# Patient Record
Sex: MALE | Race: WHITE | NOT HISPANIC OR LATINO | URBAN - METROPOLITAN AREA
[De-identification: names, ages, dates, MRNs, and addresses within clinical notes are randomized per-mention and may not be internally consistent; named-entity substitution may affect disease eponyms.]

---

## 2021-04-08 ENCOUNTER — EMERGENCY (EMERGENCY)
Facility: HOSPITAL | Age: 61
LOS: 1 days | Discharge: HOME | End: 2021-04-08
Attending: STUDENT IN AN ORGANIZED HEALTH CARE EDUCATION/TRAINING PROGRAM | Admitting: STUDENT IN AN ORGANIZED HEALTH CARE EDUCATION/TRAINING PROGRAM
Payer: COMMERCIAL

## 2021-04-08 VITALS
OXYGEN SATURATION: 98 % | RESPIRATION RATE: 18 BRPM | DIASTOLIC BLOOD PRESSURE: 88 MMHG | SYSTOLIC BLOOD PRESSURE: 149 MMHG | TEMPERATURE: 97 F | HEART RATE: 65 BPM

## 2021-04-08 VITALS
OXYGEN SATURATION: 97 % | RESPIRATION RATE: 18 BRPM | WEIGHT: 218.04 LBS | HEART RATE: 82 BPM | HEIGHT: 67 IN | TEMPERATURE: 98 F | SYSTOLIC BLOOD PRESSURE: 151 MMHG | DIASTOLIC BLOOD PRESSURE: 92 MMHG

## 2021-04-08 DIAGNOSIS — Z98.89 OTHER SPECIFIED POSTPROCEDURAL STATES: Chronic | ICD-10-CM

## 2021-04-08 DIAGNOSIS — Z90.49 ACQUIRED ABSENCE OF OTHER SPECIFIED PARTS OF DIGESTIVE TRACT: Chronic | ICD-10-CM

## 2021-04-08 LAB
ALBUMIN SERPL ELPH-MCNC: 3.5 G/DL — SIGNIFICANT CHANGE UP (ref 3.3–5)
ALP SERPL-CCNC: 79 U/L — SIGNIFICANT CHANGE UP (ref 40–120)
ALT FLD-CCNC: 31 U/L — SIGNIFICANT CHANGE UP (ref 12–78)
ANION GAP SERPL CALC-SCNC: 6 MMOL/L — SIGNIFICANT CHANGE UP (ref 5–17)
APTT BLD: 32.7 SEC — SIGNIFICANT CHANGE UP (ref 27.5–35.5)
AST SERPL-CCNC: 54 U/L — HIGH (ref 15–37)
BASOPHILS # BLD AUTO: 0.04 K/UL — SIGNIFICANT CHANGE UP (ref 0–0.2)
BASOPHILS NFR BLD AUTO: 0.4 % — SIGNIFICANT CHANGE UP (ref 0–2)
BILIRUB SERPL-MCNC: 0.6 MG/DL — SIGNIFICANT CHANGE UP (ref 0.2–1.2)
BUN SERPL-MCNC: 13 MG/DL — SIGNIFICANT CHANGE UP (ref 7–23)
CALCIUM SERPL-MCNC: 8.5 MG/DL — SIGNIFICANT CHANGE UP (ref 8.5–10.1)
CHLORIDE SERPL-SCNC: 109 MMOL/L — HIGH (ref 96–108)
CO2 SERPL-SCNC: 28 MMOL/L — SIGNIFICANT CHANGE UP (ref 22–31)
CREAT SERPL-MCNC: 0.78 MG/DL — SIGNIFICANT CHANGE UP (ref 0.5–1.3)
EOSINOPHIL # BLD AUTO: 0.1 K/UL — SIGNIFICANT CHANGE UP (ref 0–0.5)
EOSINOPHIL NFR BLD AUTO: 1 % — SIGNIFICANT CHANGE UP (ref 0–6)
GLUCOSE SERPL-MCNC: 93 MG/DL — SIGNIFICANT CHANGE UP (ref 70–99)
HCT VFR BLD CALC: 39 % — SIGNIFICANT CHANGE UP (ref 39–50)
HGB BLD-MCNC: 12.1 G/DL — LOW (ref 13–17)
IMM GRANULOCYTES NFR BLD AUTO: 0.3 % — SIGNIFICANT CHANGE UP (ref 0–1.5)
INR BLD: 1.11 RATIO — SIGNIFICANT CHANGE UP (ref 0.88–1.16)
LACTATE SERPL-SCNC: 0.7 MMOL/L — SIGNIFICANT CHANGE UP (ref 0.7–2)
LYMPHOCYTES # BLD AUTO: 1.69 K/UL — SIGNIFICANT CHANGE UP (ref 1–3.3)
LYMPHOCYTES # BLD AUTO: 17.2 % — SIGNIFICANT CHANGE UP (ref 13–44)
MCHC RBC-ENTMCNC: 19.7 PG — LOW (ref 27–34)
MCHC RBC-ENTMCNC: 31 GM/DL — LOW (ref 32–36)
MCV RBC AUTO: 63.5 FL — LOW (ref 80–100)
MONOCYTES # BLD AUTO: 1.24 K/UL — HIGH (ref 0–0.9)
MONOCYTES NFR BLD AUTO: 12.6 % — SIGNIFICANT CHANGE UP (ref 2–14)
NEUTROPHILS # BLD AUTO: 6.72 K/UL — SIGNIFICANT CHANGE UP (ref 1.8–7.4)
NEUTROPHILS NFR BLD AUTO: 68.5 % — SIGNIFICANT CHANGE UP (ref 43–77)
NRBC # BLD: 0 /100 WBCS — SIGNIFICANT CHANGE UP (ref 0–0)
PLATELET # BLD AUTO: 269 K/UL — SIGNIFICANT CHANGE UP (ref 150–400)
POTASSIUM SERPL-MCNC: 3.9 MMOL/L — SIGNIFICANT CHANGE UP (ref 3.5–5.3)
POTASSIUM SERPL-SCNC: 3.9 MMOL/L — SIGNIFICANT CHANGE UP (ref 3.5–5.3)
PROT SERPL-MCNC: 7.6 G/DL — SIGNIFICANT CHANGE UP (ref 6–8.3)
PROTHROM AB SERPL-ACNC: 12.9 SEC — SIGNIFICANT CHANGE UP (ref 10.6–13.6)
RBC # BLD: 6.14 M/UL — HIGH (ref 4.2–5.8)
RBC # FLD: 17.8 % — HIGH (ref 10.3–14.5)
SODIUM SERPL-SCNC: 143 MMOL/L — SIGNIFICANT CHANGE UP (ref 135–145)
WBC # BLD: 9.82 K/UL — SIGNIFICANT CHANGE UP (ref 3.8–10.5)
WBC # FLD AUTO: 9.82 K/UL — SIGNIFICANT CHANGE UP (ref 3.8–10.5)

## 2021-04-08 PROCEDURE — 80053 COMPREHEN METABOLIC PANEL: CPT

## 2021-04-08 PROCEDURE — 93971 EXTREMITY STUDY: CPT | Mod: 26,RT

## 2021-04-08 PROCEDURE — 73590 X-RAY EXAM OF LOWER LEG: CPT | Mod: 26,RT

## 2021-04-08 PROCEDURE — 90715 TDAP VACCINE 7 YRS/> IM: CPT

## 2021-04-08 PROCEDURE — 36415 COLL VENOUS BLD VENIPUNCTURE: CPT

## 2021-04-08 PROCEDURE — 85610 PROTHROMBIN TIME: CPT

## 2021-04-08 PROCEDURE — 90471 IMMUNIZATION ADMIN: CPT

## 2021-04-08 PROCEDURE — 73590 X-RAY EXAM OF LOWER LEG: CPT

## 2021-04-08 PROCEDURE — 85730 THROMBOPLASTIN TIME PARTIAL: CPT

## 2021-04-08 PROCEDURE — 73610 X-RAY EXAM OF ANKLE: CPT | Mod: 26,RT

## 2021-04-08 PROCEDURE — 93971 EXTREMITY STUDY: CPT

## 2021-04-08 PROCEDURE — 87040 BLOOD CULTURE FOR BACTERIA: CPT

## 2021-04-08 PROCEDURE — 83605 ASSAY OF LACTIC ACID: CPT

## 2021-04-08 PROCEDURE — 99284 EMERGENCY DEPT VISIT MOD MDM: CPT | Mod: 25

## 2021-04-08 PROCEDURE — 99285 EMERGENCY DEPT VISIT HI MDM: CPT

## 2021-04-08 PROCEDURE — 96374 THER/PROPH/DIAG INJ IV PUSH: CPT

## 2021-04-08 PROCEDURE — 73610 X-RAY EXAM OF ANKLE: CPT

## 2021-04-08 PROCEDURE — 85025 COMPLETE CBC W/AUTO DIFF WBC: CPT

## 2021-04-08 RX ORDER — AZTREONAM 2 G
160 VIAL (EA) INJECTION
Qty: 3200 | Refills: 0
Start: 2021-04-08 | End: 2021-04-17

## 2021-04-08 RX ORDER — CEPHALEXIN 500 MG
1 CAPSULE ORAL
Qty: 20 | Refills: 0
Start: 2021-04-08 | End: 2021-04-17

## 2021-04-08 RX ORDER — SODIUM CHLORIDE 9 MG/ML
2000 INJECTION INTRAMUSCULAR; INTRAVENOUS; SUBCUTANEOUS ONCE
Refills: 0 | Status: COMPLETED | OUTPATIENT
Start: 2021-04-08 | End: 2021-04-08

## 2021-04-08 RX ORDER — ACETAMINOPHEN 500 MG
650 TABLET ORAL ONCE
Refills: 0 | Status: COMPLETED | OUTPATIENT
Start: 2021-04-08 | End: 2021-04-08

## 2021-04-08 RX ORDER — CEFAZOLIN SODIUM 1 G
1000 VIAL (EA) INJECTION ONCE
Refills: 0 | Status: COMPLETED | OUTPATIENT
Start: 2021-04-08 | End: 2021-04-08

## 2021-04-08 RX ORDER — TETANUS TOXOID, REDUCED DIPHTHERIA TOXOID AND ACELLULAR PERTUSSIS VACCINE, ADSORBED 5; 2.5; 8; 8; 2.5 [IU]/.5ML; [IU]/.5ML; UG/.5ML; UG/.5ML; UG/.5ML
0.5 SUSPENSION INTRAMUSCULAR ONCE
Refills: 0 | Status: COMPLETED | OUTPATIENT
Start: 2021-04-08 | End: 2021-04-08

## 2021-04-08 RX ADMIN — SODIUM CHLORIDE 2000 MILLILITER(S): 9 INJECTION INTRAMUSCULAR; INTRAVENOUS; SUBCUTANEOUS at 16:22

## 2021-04-08 RX ADMIN — Medication 650 MILLIGRAM(S): at 16:20

## 2021-04-08 RX ADMIN — Medication 1 TABLET(S): at 16:20

## 2021-04-08 RX ADMIN — Medication 100 MILLIGRAM(S): at 16:20

## 2021-04-08 RX ADMIN — TETANUS TOXOID, REDUCED DIPHTHERIA TOXOID AND ACELLULAR PERTUSSIS VACCINE, ADSORBED 0.5 MILLILITER(S): 5; 2.5; 8; 8; 2.5 SUSPENSION INTRAMUSCULAR at 16:20

## 2021-04-08 NOTE — ED ADULT NURSE NOTE - NSIMPLEMENTINTERV_GEN_ALL_ED
Implemented All Universal Safety Interventions:  [place] to call system. Call bell, personal items and telephone within reach. Instruct patient to call for assistance. Room bathroom lighting operational. Non-slip footwear when patient is off stretcher. Physically safe environment: no spills, clutter or unnecessary equipment. Stretcher in lowest position, wheels locked, appropriate side rails in place.

## 2021-04-08 NOTE — ED PROVIDER NOTE - ATTENDING CONTRIBUTION TO CARE
60 M no PMH send by urgent care due to leg swelling. patient report he scrapped his leg a few days ago and has had swelling since then. patient report pain when walking on leg. No fevers, chills, nausea, vomiting. On exam, patient well appearing, right shin with abrasion with mild surrounding erythema and tenderness to palpation. DP 2+, sensation intact. r/o DVT, check labs / XRs, treat as cellulitis.

## 2021-04-08 NOTE — ED PROVIDER NOTE - PHYSICAL EXAMINATION
Constitutional: Awake, Alert, non-toxic. NAD. Well appearing, well nourished.   HEAD: Normocephalic, atraumatic.   EYES: EOM intact, conjunctiva and sclera are clear bilaterally.   ENT: No rhinorrhea, patent, mucous membranes pink/moist, no drooling or stridor.   NECK: Supple, non-tender  CARDIOVASCULAR: Normal S1, S2; regular rate and rhythm.  RESPIRATORY: Normal respiratory effort; breath sounds CTAB, no wheezes, rhonchi, or rales. Speaking in full sentences. No accessory muscle use.   ABDOMEN: Soft; non-tender, non-distended.   EXTREMITIES: Full passive and active ROM in all extremities; (+) right LE 6 cm abrasion with mild ecchymosis, (+) asymmetrical right leg swelling, (+) erythema surrounding abrasion with mild streaking proximal thigh, (+) calf TTP, distal pulses palpable and symmetric  SKIN: Warm, dry; good skin turgor, no apparent lesions or rashes, no ecchymosis, brisk capillary refill.  NEURO: A&O x3. Sensory and motor functions are grossly intact. Speech is normal. Appearance and judgement seem appropriate for gender and age.

## 2021-04-08 NOTE — ED PROVIDER NOTE - PATIENT PORTAL LINK FT
You can access the FollowMyHealth Patient Portal offered by [facility] by registering at the following website: [email or web address]. By joining [employer]’s FollowMyHealth portal, you will also be able to view your health information using other applications (apps) compatible with our system.

## 2021-04-08 NOTE — ED PROVIDER NOTE - CLINICAL SUMMARY MEDICAL DECISION MAKING FREE TEXT BOX
presents today due to RLE swelling x 2 days. pt reports a table fell on his right leg and sustained injury with abrasion. pt reports he is not UTD with tetanus. pt reports he went to urgent care PTA in which was advised to come to ED to rule out DVT. pt reports mild swelling, redness, and increased warmth. Plan includes labs, Doppler r/o DVT, Xray r/o free air, IV abx, adacel, re-assess

## 2021-04-08 NOTE — ED PROVIDER NOTE - OBJECTIVE STATEMENT
59 y/o male without reported PMHx presents today due to RLE swelling x 2 days. pt reports a table fell on his right leg and sustained injury with abrasion. pt reports he is not UTD with tetanus. pt reports he went to urgent care PTA in which was advised to come to ED to rule out DVT. pt reports mild swelling, redness, and increased warmth. Pt denies fever, numbness/weakness, chest pain, SOB, hx of DVT, or any other complaints.  PCP Jamie

## 2021-04-08 NOTE — ED PROVIDER NOTE - NSFOLLOWUPINSTRUCTIONS_ED_ALL_ED_FT
Please take your antibiotics as prescribed.  Please follow up with PMD.    Cellulitis    WHAT YOU NEED TO KNOW:    Cellulitis is a skin infection caused by bacteria. Cellulitis may go away on its own or you may need treatment. Your healthcare provider may draw a [place] around the outside edges of your cellulitis. If your cellulitis spreads, your healthcare provider will see it outside of the [place]. Cellulitis          DISCHARGE INSTRUCTIONS:    Call 911 if:     You have sudden trouble breathing or chest pain.        Return to the emergency department if:     Your wound gets larger and more painful.       You feel a crackling under your skin when you touch it.      You have purple dots or bumps on your skin, or you see bleeding under your skin.      You have new swelling and pain in your legs.      The red, warm, swollen area gets larger.      You see red streaks coming from the infected area.    Contact your healthcare provider if:     You have a fever.      Your fever or pain does not go away or gets worse.      The area does not get smaller after 2 days of antibiotics.      Your skin is flaking or peeling off.      You have questions or concerns about your condition or care.    Medicines:     Antibiotics help treat the bacterial infection.       NSAIDs, such as ibuprofen, help decrease swelling, pain, and fever. NSAIDs can cause stomach bleeding or kidney problems in certain people. If you take blood thinner medicine, always ask if NSAIDs are safe for you. Always read the medicine label and follow directions. Do not give these medicines to children under 6 months of age without direction from your child's healthcare provider.      Acetaminophen decreases pain and fever. It is available without a doctor's order. Ask how much to take and how often to take it. Follow directions. Read the labels of all other medicines you are using to see if they also contain acetaminophen, or ask your doctor or pharmacist. Acetaminophen can cause liver damage if not taken correctly. Do not use more than 4 grams (4,000 milligrams) total of acetaminophen in one day.       Take your medicine as directed. Contact your healthcare provider if you think your medicine is not helping or if you have side effects. Tell him or her if you are allergic to any medicine. Keep a list of the medicines, vitamins, and herbs you take. Include the amounts, and when and why you take them. Bring the list or the pill bottles to follow-up visits. Carry your medicine list with you in case of an emergency.    Self-care:     Elevate the area above the level of your heart as often as you can. This will help decrease swelling and pain. Prop the area on pillows or blankets to keep it elevated comfortably.       Clean the area daily until the wound scabs over. Gently wash the area with soap and water. Pat dry. Use dressings as directed.       Place cool or warm, wet cloths on the area as directed. Use clean cloths and clean water. Leave it on the area until the cloth is room temperature. Pat the area dry with a clean, dry cloth. The cloths may help decrease pain.     Prevent cellulitis:     Do not scratch bug bites or areas of injury. You increase your risk for cellulitis by scratching these areas.       Do not share personal items, such as towels, clothing, and razors.       Clean exercise equipment with germ-killing  before and after you use it.      Wash your hands often. Use soap and water. Wash your hands after you use the bathroom, change a child's diapers, or sneeze. Wash your hands before you prepare or eat food. Use lotion to prevent dry, cracked skin. Handwashing       Wear pressure stockings as directed. You may be told to wear the stockings if you have peripheral edema. The stockings improve blood flow and decrease swelling.      Treat athlete’s foot. This can help prevent the spread of a bacterial skin infection.    Follow up with your healthcare provider within 3 days, or as directed: Your healthcare provider will check if your cellulitis is getting better. You may need different medicine. Write down your questions so you remember to ask them during your visits.

## 2021-04-08 NOTE — ED ADULT TRIAGE NOTE - BP NONINVASIVE DIASTOLIC (MM HG)
Presenting Problems





- Arrival Data


Date of Arrival on Unit: 20


Time of Arrival on Unit: 09:46


Mode of Transport: Ambulatory





- Complaint


OB-Reason for Admission/Chief Complaint: PIH, Observation/Evaluation


Comment: Pt sent to triage c\script for PIH work up





Prenatal Medical History





- Pregnancy Information


: 1


Para: 0





- Gestational Age


Gestational Age by RENEE (wks/days): 22 Weeks and 0 Days





Review of Systems





- Review of Systems


Constitutional: No problems


Breast: No problems


ENT: No problems


Cardiovascular: No problems


Respiratory: No problems


Gastrointestinal: No problems


Genitourinary: No problems


Musculoskeletal: No problems


Neurological: No problems


Skin: No problems





Vital Signs





- Temperature


Temperature: 97.6 F


Temperature Source: Temporal Artery Scan





- Pulse


  ** Right Sitting Ulnar


Pulse Rate: 93


Pulse Assessment Method: Automatic Cuff





- Respirations


Respiratory Rate: 18


Oxygen Delivery Method: Room Air


O2 Sat by Pulse Oximetry: 100





- Blood Pressure


  ** Right Arm Sitting


Blood Pressure: 132/83


Blood Pressure Mean: 99


Blood Pressure Source: Automatic Cuff





Medical Screen Scoring (Pre)





- Cervical Exam


Dilation: Exam Deferred


Effacement: Exam Deferred


Membranes: Intact





- Uterine Contractions


Frequency: N/A





- Maternal Vital Signs


Maternal Temperature: N/A


Maternal Blood Pressure: N/A


Signs of Preeclampsia: N/A


Maternal Respirations: N/A





- Maternal Trauma


Maternal Trauma: N/A





- Fetal Assessment - Baby A


Baseline FHR: 134


Fetal Position: N/A


Fetal Station: N/A





- Total Score - Baby A


Total Score - Baby A: 0





- Total Score - Baby B


Total Score - Baby B: 0





- Total Score - Baby C


Total Score - Baby C: 0





- Level of Risk - Baby A


Level of Risk - Baby A: Low (0-5)





- Level of Risk - Baby B


Level of Risk - Baby B: Low (0-5)





- Level of Risk - Baby C


Level of Risk - Baby C: Low (0-5)





Physician Notification (Pre)





- Physician Notified


Physician Notified Date: 20


Physician Notified Time: 11:20


New Order Received: Yes





- Notification Comment


Comment: Spk c\Dr. Trammell, reviewed serial BP and labs as well as continued 

headache.  States to d/c home, follow up as scheduled, may take Tylenol and 

benadryl, avoid.  sudafed.





Disposition





- Disposition


OB Disposition: Discharge to home, Written follow up instructions reviewed


Discharge Date: 20


Discharge Time: 11:30


I agree with the RN Medical Screening Exam: Yes


Risk & Benefit of care provided described in d/c instruction: Yes


Diagnosis: HEADACHE, UNSPECIFIED 92

## 2021-04-08 NOTE — ED ADULT NURSE NOTE - OBJECTIVE STATEMENT
pt comes from home, A&OX4, VSS, afebrile. pt was moving a table on tuesday 4/6, table fell and hit his leg, large abrasion and +1 swelling noted to right lateral calf. Pt independent, sacrum/buttocks intact. continent, ambulates without difficulty. pt provided with callbell, blanket, tv remote. all labs drawn, IV placed, pt had US already and just came back from xray. to be medicated now. will continue to monitor.

## 2021-04-08 NOTE — ED PROVIDER NOTE - PROGRESS NOTE DETAILS
patient doing well, labs / imaging reviewed with patient. will discharge with antibiotics. PMD follow up suggested. red flags for return discussed with patient.

## 2021-04-13 LAB
CULTURE RESULTS: SIGNIFICANT CHANGE UP
CULTURE RESULTS: SIGNIFICANT CHANGE UP
SPECIMEN SOURCE: SIGNIFICANT CHANGE UP
SPECIMEN SOURCE: SIGNIFICANT CHANGE UP

## 2021-04-22 ENCOUNTER — EMERGENCY (EMERGENCY)
Facility: HOSPITAL | Age: 61
LOS: 1 days | Discharge: HOME | End: 2021-04-22
Attending: EMERGENCY MEDICINE | Admitting: EMERGENCY MEDICINE
Payer: COMMERCIAL

## 2021-04-22 VITALS
HEART RATE: 82 BPM | WEIGHT: 216.93 LBS | RESPIRATION RATE: 18 BRPM | DIASTOLIC BLOOD PRESSURE: 78 MMHG | SYSTOLIC BLOOD PRESSURE: 134 MMHG | HEIGHT: 67 IN | TEMPERATURE: 98 F | OXYGEN SATURATION: 97 %

## 2021-04-22 VITALS
DIASTOLIC BLOOD PRESSURE: 74 MMHG | SYSTOLIC BLOOD PRESSURE: 138 MMHG | HEART RATE: 78 BPM | OXYGEN SATURATION: 98 % | RESPIRATION RATE: 17 BRPM | TEMPERATURE: 98 F

## 2021-04-22 DIAGNOSIS — Z98.89 OTHER SPECIFIED POSTPROCEDURAL STATES: Chronic | ICD-10-CM

## 2021-04-22 DIAGNOSIS — Z90.49 ACQUIRED ABSENCE OF OTHER SPECIFIED PARTS OF DIGESTIVE TRACT: Chronic | ICD-10-CM

## 2021-04-22 DIAGNOSIS — T14.90XA INJURY, UNSPECIFIED, INITIAL ENCOUNTER: ICD-10-CM

## 2021-04-22 LAB
ALBUMIN SERPL ELPH-MCNC: 3.8 G/DL — SIGNIFICANT CHANGE UP (ref 3.3–5)
ALP SERPL-CCNC: 90 U/L — SIGNIFICANT CHANGE UP (ref 40–120)
ALT FLD-CCNC: 31 U/L — SIGNIFICANT CHANGE UP (ref 12–78)
ANION GAP SERPL CALC-SCNC: 5 MMOL/L — SIGNIFICANT CHANGE UP (ref 5–17)
ANISOCYTOSIS BLD QL: SLIGHT — SIGNIFICANT CHANGE UP
AST SERPL-CCNC: 20 U/L — SIGNIFICANT CHANGE UP (ref 15–37)
BASOPHILS # BLD AUTO: 0.07 K/UL — SIGNIFICANT CHANGE UP (ref 0–0.2)
BASOPHILS NFR BLD AUTO: 1.1 % — SIGNIFICANT CHANGE UP (ref 0–2)
BILIRUB SERPL-MCNC: 0.2 MG/DL — SIGNIFICANT CHANGE UP (ref 0.2–1.2)
BUN SERPL-MCNC: 12 MG/DL — SIGNIFICANT CHANGE UP (ref 7–23)
CALCIUM SERPL-MCNC: 8.5 MG/DL — SIGNIFICANT CHANGE UP (ref 8.5–10.1)
CHLORIDE SERPL-SCNC: 104 MMOL/L — SIGNIFICANT CHANGE UP (ref 96–108)
CO2 SERPL-SCNC: 27 MMOL/L — SIGNIFICANT CHANGE UP (ref 22–31)
CREAT SERPL-MCNC: 1 MG/DL — SIGNIFICANT CHANGE UP (ref 0.5–1.3)
DACRYOCYTES BLD QL SMEAR: SLIGHT — SIGNIFICANT CHANGE UP
EOSINOPHIL # BLD AUTO: 0.09 K/UL — SIGNIFICANT CHANGE UP (ref 0–0.5)
EOSINOPHIL NFR BLD AUTO: 1.5 % — SIGNIFICANT CHANGE UP (ref 0–6)
GLUCOSE SERPL-MCNC: 97 MG/DL — SIGNIFICANT CHANGE UP (ref 70–99)
HCT VFR BLD CALC: 39.2 % — SIGNIFICANT CHANGE UP (ref 39–50)
HGB BLD-MCNC: 12.5 G/DL — LOW (ref 13–17)
HYPOCHROMIA BLD QL: SLIGHT — SIGNIFICANT CHANGE UP
IMM GRANULOCYTES NFR BLD AUTO: 0.5 % — SIGNIFICANT CHANGE UP (ref 0–1.5)
LYMPHOCYTES # BLD AUTO: 1.59 K/UL — SIGNIFICANT CHANGE UP (ref 1–3.3)
LYMPHOCYTES # BLD AUTO: 26.1 % — SIGNIFICANT CHANGE UP (ref 13–44)
MANUAL SMEAR VERIFICATION: SIGNIFICANT CHANGE UP
MCHC RBC-ENTMCNC: 20 PG — LOW (ref 27–34)
MCHC RBC-ENTMCNC: 31.9 GM/DL — LOW (ref 32–36)
MCV RBC AUTO: 62.7 FL — LOW (ref 80–100)
MICROCYTES BLD QL: SIGNIFICANT CHANGE UP
MONOCYTES # BLD AUTO: 0.87 K/UL — SIGNIFICANT CHANGE UP (ref 0–0.9)
MONOCYTES NFR BLD AUTO: 14.3 % — HIGH (ref 2–14)
NEUTROPHILS # BLD AUTO: 3.44 K/UL — SIGNIFICANT CHANGE UP (ref 1.8–7.4)
NEUTROPHILS NFR BLD AUTO: 56.5 % — SIGNIFICANT CHANGE UP (ref 43–77)
NRBC # BLD: 0 /100 WBCS — SIGNIFICANT CHANGE UP (ref 0–0)
PLAT MORPH BLD: NORMAL — SIGNIFICANT CHANGE UP
PLATELET # BLD AUTO: 342 K/UL — SIGNIFICANT CHANGE UP (ref 150–400)
POTASSIUM SERPL-MCNC: 4.1 MMOL/L — SIGNIFICANT CHANGE UP (ref 3.5–5.3)
POTASSIUM SERPL-SCNC: 4.1 MMOL/L — SIGNIFICANT CHANGE UP (ref 3.5–5.3)
PROT SERPL-MCNC: 8.1 G/DL — SIGNIFICANT CHANGE UP (ref 6–8.3)
RBC # BLD: 6.25 M/UL — HIGH (ref 4.2–5.8)
RBC # FLD: 18.1 % — HIGH (ref 10.3–14.5)
RBC BLD AUTO: ABNORMAL
SODIUM SERPL-SCNC: 136 MMOL/L — SIGNIFICANT CHANGE UP (ref 135–145)
TARGETS BLD QL SMEAR: SLIGHT — SIGNIFICANT CHANGE UP
WBC # BLD: 6.09 K/UL — SIGNIFICANT CHANGE UP (ref 3.8–10.5)
WBC # FLD AUTO: 6.09 K/UL — SIGNIFICANT CHANGE UP (ref 3.8–10.5)

## 2021-04-22 PROCEDURE — 36415 COLL VENOUS BLD VENIPUNCTURE: CPT

## 2021-04-22 PROCEDURE — 99284 EMERGENCY DEPT VISIT MOD MDM: CPT

## 2021-04-22 PROCEDURE — 99284 EMERGENCY DEPT VISIT MOD MDM: CPT | Mod: 25

## 2021-04-22 PROCEDURE — 76376 3D RENDER W/INTRP POSTPROCES: CPT | Mod: 26

## 2021-04-22 PROCEDURE — 73700 CT LOWER EXTREMITY W/O DYE: CPT

## 2021-04-22 PROCEDURE — 93971 EXTREMITY STUDY: CPT | Mod: 26,RT

## 2021-04-22 PROCEDURE — 99285 EMERGENCY DEPT VISIT HI MDM: CPT

## 2021-04-22 PROCEDURE — 73700 CT LOWER EXTREMITY W/O DYE: CPT | Mod: 26,RT,MA

## 2021-04-22 PROCEDURE — 85025 COMPLETE CBC W/AUTO DIFF WBC: CPT

## 2021-04-22 PROCEDURE — 93971 EXTREMITY STUDY: CPT

## 2021-04-22 PROCEDURE — 76376 3D RENDER W/INTRP POSTPROCES: CPT

## 2021-04-22 PROCEDURE — 80053 COMPREHEN METABOLIC PANEL: CPT

## 2021-04-22 NOTE — ED PROVIDER NOTE - ATTENDING CONTRIBUTION TO CARE
60 male presents to ER for wound check of right lower extremity, states he was in ER 4/8/21, states a table fell on his right leg, patient had labs, xrays, US no fracture, no DVT, placed on bactrim and keflex, went to urgent care when he was done with antibiotics then placed on an extra week of antibiotics, patient returns today states wound has not fully healed, having some discomfort over the area, patient alert and oriented, no acute distress, right lower extrmity tib fib area, noted healing wound not closed with surrounding erythema, slight swelling, no weeping or drainage, f/u US r/oDVT, CT lower extrremity r/o hematoma/abscess, wound care.

## 2021-04-22 NOTE — CONSULT NOTE ADULT - ASSESSMENT
59 y/o M with PMHx s/p incisional hernia repair (2015), s/p appendectomy (2014), s/p cholecystectomy (2005), presents to ER with RLE wound, CT shows small anterolateral fluid collection, negative for DVT  61 y/o M with PMHx s/p incisional hernia repair (2015), s/p appendectomy (2014), s/p cholecystectomy (2005), presents to ER with RLE wound, CT shows small anterolateral fluid collection, negative for DVT   As above in PA notation.  Patient personally seen and examined.  Vitals non-suggestive.  Area of concern with thin, dry central eschar at site of prior wound.  Reactive erythema present without palpable fluid collection or blanching change/ tenderness to suggest cellulitis or abscess.  Labs reassuring with normal WBCs and no acidosis/ end organ failure.  Clinically, improving overall per patient's report, though he was concerned as to "slow nature" of progress.  Surgically, stable at present without indication for drainage or debridement.  I have discussed with Mr. Aparicio my concern for superinfection once any small collection is accessed or contaminated.  Duplex previously and then again today elevated.  Therefore, recommending:  -Elevation to address edema.  -Local wound care regimen as outlined in PA text  -Wound personally dressed tonight and supplies given  -Follow-up with me in 1 to 2 weeks.  This process should be amenable to conservative management.  However, if this becomes protracted, I would then incorporate a more formal wound care evaluation into his overall plain.  He is pleased, agrees, shows good insight and is willing to proceed as above.   61 y/o M with PMHx s/p incisional hernia repair (2015), s/p appendectomy (2014), s/p cholecystectomy (2005), presents to ER with RLE wound, CT shows small anterolateral fluid collection, negative for DVT   As above in PA notation.  Patient personally seen and examined.  Now with soft tissue edema, small hematoma/ seroma of SQ and muscular compartment as well as a closed wound with resultant eschar following minor trauma.  Vitals non-suggestive.  Area of concern with thin, dry central eschar at site of prior wound.  Reactive erythema present without palpable fluid collection or blanching change/ tenderness to suggest cellulitis or abscess.  Labs reassuring with normal WBCs and no acidosis/ end organ failure.  Clinically, improving overall per patient's report, though he was concerned as to "slow nature" of progress.  Surgically, stable at present without indication for drainage or debridement.  I have discussed with Mr. Aparicio my concern for superinfection once any small collection is accessed or contaminated.  Duplex previously and then again today elevated.  Therefore, recommending:  -Elevation to address edema.  -Local wound care regimen as outlined in PA text  -Wound personally dressed tonight and supplies given  -Follow-up with me in 1 to 2 weeks.  This process should be amenable to conservative management.  However, if this becomes protracted, I would then incorporate a more formal wound care evaluation into his overall plain.  He is pleased, agrees, shows good insight and is willing to proceed as above.

## 2021-04-22 NOTE — ED PROVIDER NOTE - PATIENT PORTAL LINK FT
You can access the FollowMyHealth Patient Portal offered by [facility] by registering at the following website: [email or web address]. By joining [employer]’s FollowMyHealth portal, you will also be able to view your health information using other applications (apps) compatible with our system.

## 2021-04-22 NOTE — CONSULT NOTE ADULT - NSICDXPROBLEMREC1_GEN_ALL_CORE_FT
RLE anterolateral wound s/p trauma  -No acute surgical intervention indicated at this time  -D/C from ER with outpatient follow up with Dr. Suero in one week  -Local wound care - wash daily with soapy water, apply hydrogen peroxide directly to wound, cover with xeroform dressing. Patient provided with instructions and necessary supplies   -Patient instructed to continue previously prescribed course of antibiotics. No additional antibiotics prescribed.   -Patient seen and examined with Dr. Suero

## 2021-04-22 NOTE — ED PROVIDER NOTE - NSFOLLOWUPINSTRUCTIONS_ED_ALL_ED_FT
Follow up with Dr. whitten. Return for fever, redness, swelling, etc. continue your antibiotics.     Acute Wounds    WHAT YOU NEED TO KNOW:    What is an acute wound? A wound is an injury that causes a break in the skin. An acute wound can happen suddenly, last a short time, and may heal on its own.    What causes an acute wound?   •An abrasion is a scrape caused when a rough surface rubs against the skin.      •A laceration is a jagged wound caused by a hard blow to the skin.      •A puncture wound is usually made by a sharp, round, and pointed object, such as a needle or nail.      •A cut is caused by an object with a sharp edge, such as a knife or broken glass. It is called an incision when the cut happens during surgery.      What are the signs and symptoms of an infected wound?   •Milky, yellow, green, or brown pus in the wound      •Red, tender, or warm area around the wound      •Fever      How is an acute wound diagnosed? Your healthcare provider will ask about your injury. He or she will examine the injury and the area around it. He or she will check to see how deep the wound is and look for signs of infection. Your provider may check how well you can move the injured body part. He or she will check to see if you are numb at your injury site or below it. You may have either of the following:  •A wound culture is a test of fluid or tissue used to see if your wound is infected. The wound culture will also tell your healthcare providers the cause of your infection.      •An x-ray is a picture of your bones and tissues in the wound area. Healthcare providers use the pictures to look for broken bones, injuries, or foreign objects such as glass or metal.      How is an acute wound treated? Treatment will depend on how severe the wound is and where it is located. It may also depend on the length of time you have had the injury. You may need any of the following:  •NSAIDs, such as ibuprofen, help decrease swelling, pain, and fever. This medicine is available with or without a doctor's order. NSAIDs can cause stomach bleeding or kidney problems in certain people. If you take blood thinner medicine, always ask if NSAIDs are safe for you. Always read the medicine label and follow directions. Do not give these medicines to children under 6 months of age without direction from your child's healthcare provider.      •Acetaminophen decreases pain and fever. It is available without a doctor's order. Ask how much to take and how often to take it. Follow directions. Read the labels of all other medicines you are using to see if they also contain acetaminophen, or ask your doctor or pharmacist. Acetaminophen can cause liver damage if not taken correctly. Do not use more than 4 grams (4,000 milligrams) total of acetaminophen in one day.       •Antibiotics may be given to prevent or treat an infection caused by bacteria.      •Td vaccine is a booster shot used to help prevent tetanus and diphtheria. The Td booster may be given to adolescents and adults every 10 years or for certain wounds and injuries.      •Wound care may include any of the following: ?Cleaning and debridement is done to clean and remove objects, dirt, or dead tissues from the open wound. Your healthcare provider may use soap and water or a different solution to clean your wound. He or she may use a syringe to push the solution into all areas of your wound. The force from the syringe will help push out dirt.      ?Closure of the wound is done with stitches, staples, skin adhesive, or other treatments. This may be done if the wound is wide or deep. Some wounds may need to be packed with wet gauze for some time before closure. Some wounds may not need closure at all to heal.        Call your local emergency number (911 in the [place]) if:   •You have sudden trouble breathing or chest pain.          When should I seek immediate care?   •You have pus or a foul odor coming from the wound.      •Blood soaks through your bandage.      When should I call my doctor?   •You have muscle, joint, or body aches, sweating, or a fever.      •You have increased swelling, redness, or bleeding in your wound.      •Your skin is itchy, swollen, or you have a rash.      •You have questions or concerns about your condition or care.      CARE AGREEMENT:    You have the right to help plan your care. Learn about your health condition and how it may be treated. Discuss treatment options with your healthcare providers to decide what care you want to receive. You always have the right to refuse treatment.        © Copyright [employer] 2021

## 2021-04-22 NOTE — CONSULT NOTE ADULT - SUBJECTIVE AND OBJECTIVE BOX
HPI:  61 y/o M with PMHx s/p incisional hernia repair (), s/p appendectomy (), s/p cholecystectomy (), presents to ER with RLE wound. Patient states a table fell on his R leg a few weeks ago. He noticed swelling and an abrasion to the affected area. He came to ER on 21 for swelling. DVT was ruled out. Patient was discharged home with antibiotics. He reports some improvement, but persistent swelling. He now comes in for re-evaluation of RLE wound. Denies fever, chills, chest pain, shortness of breath, abdominal pain, nausea, vomiting, weakness, numbness.    PAST MEDICAL & SURGICAL HISTORY:  Incisional hernia      S/P appendectomy  2014    S/P cholecystectomy  15 years ago    FAMILY HISTORY:  Family history of coronary artery disease in father  Father -  age 60 following MI      ALLERGIES:  No Known Allergies      REVIEW OF SYSTEMS:  CONSTITUTIONAL: No weakness, fever, chills   EYES/ENT: No visual changes;  No vertigo or throat pain   NECK: No pain or stiffness  RESPIRATORY: No cough, wheezing, hemoptysis; No shortness of breath  CARDIOVASCULAR: No chest pain or palpitations  GASTROINTESTINAL: No abdominal or epigastric pain. No nausea, vomiting, or hematemesis; No diarrhea or constipation. No melena or hematochezia.  GENITOURINARY: No dysuria, frequency or hematuria  NEUROLOGICAL: No numbness or weakness  SKIN: +RLE wound. No itching, burning, rashes  All other review of systems is negative unless indicated above.      Vital Signs Last 24 Hrs  T(C): 36.8 (2021 16:35), Max: 36.8 (2021 16:35)  T(F): 98.2 (2021 16:35), Max: 98.2 (2021 16:35)  HR: 82 (2021 16:35) (82 - 82)  BP: 134/78 (2021 16:35) (134/78 - 134/78)  BP(mean): --  RR: 18 (2021 16:35) (18 - 18)  SpO2: 97% (2021 16:35) (97% - 97%)      PHYSICAL EXAM:  GENERAL: NAD, resting comfortably in bed   HEAD:  Atraumatic, Normocephalic  NECK: Supple  NEUROLOGY: A&Ox3,  EXTREMITIES:  RLE with anterolateral superficial wound with eschar. +surrounding erythema. +edema. ROM, sensation, motor intact. +distal pulses.       LABS:                        12.5   6.09  )-----------( 342      ( 2021 17:48 )             39.2       LIVER FUNCTIONS - ( 2021 17:48 )  Alb: 3.8 g/dL / Pro: 8.1 g/dL / ALK PHOS: 90 U/L / ALT: 31 U/L / AST: 20 U/L / GGT: x             RADIOLOGY/IMAGING:  < from: CT Lower Extremity No Cont, Right (21 @ 18:51) >  IMPRESSION:  Fluid collection in the subcutaneous fat along the anterolateral aspect of the leg at the level of the mid tibial diaphysis with an associated fluid collection in the anterior compartment which partially displaces the adjacent musculature. It is difficult to determine if the fluid is sterile or infected on the basis of noncontrast imaging alone. Consider sampling of the fluid in the anterior compartment for decompression and further evaluation.    MONAE TURNER MD; Attending Radiologist  This document has been electronically signed. 2021  7:50PM    < from: US Duplex Venous Lower Ext Ltd, Right (21 @ 17:59) >  IMPRESSION:  No evidence of right lower extremity deep venous thrombosis.    < end of copied text >

## 2021-04-22 NOTE — ED PROVIDER NOTE - CARE PROVIDER_API CALL
Jozef Suero (MD)  Surgery  [address], [place] [id number]  Phone: [phone]  Fax: [phone]  Follow Up Time: 4-6 Days

## 2021-04-22 NOTE — ED ADULT NURSE NOTE - OBJECTIVE STATEMENT
Received the patient in the Er. Patient is alert and oriented. Skin warm and dry. S/P sustained a wound on right leg 2 weeks ago. Patient was seen and treated here. C/O more pain and black discoloration. Patient is ambulatory.

## 2021-04-22 NOTE — ED ADULT NURSE NOTE - NSIMPLEMENTINTERV_GEN_ALL_ED
Implemented All Universal Safety Interventions:  [place] to call system. Call bell, personal items and telephone within reach. Instruct patient to call for assistance. Room bathroom lighting operational. Non-slip footwear when patient is off stretcher. Physically safe environment: no spills, clutter or unnecessary equipment. Stretcher in lowest position, wheels locked, appropriate side rails in place.

## 2021-04-22 NOTE — ED ADULT NURSE REASSESSMENT NOTE - NS ED NURSE REASSESS COMMENT FT1
RN spoke with Trisha Suero concerning patient leg.  patient to follow up in 1 week with MD Suero and re-eval

## 2021-04-22 NOTE — ED PROVIDER NOTE - PHYSICAL EXAMINATION
Constitutional: Awake, Alert, non-toxic. NAD. Well appearing, well nourished.   HEAD: Normocephalic, atraumatic.   EYES: EOM intact, conjunctiva and sclera are clear bilaterally.   ENT: No rhinorrhea, patent, mucous membranes pink/moist, no drooling or stridor.   CARDIOVASCULAR: Normal S1, S2; regular rate and rhythm.  RESPIRATORY: Normal respiratory effort; breath sounds CTAB, no wheezes, rhonchi, or rales. Speaking in full sentences. No accessory muscle use.   EXTREMITIES: Full passive and active ROM in all extremities; (+) right LE 6 cm eschar, (+) asymmetrical right leg swelling, (+) erythema surrounding abrasion, no streaking erythema, distal pulses palpable and symmetric  SKIN: Warm, dry; good skin turgor, no ecchymosis, brisk capillary refill.  NEURO: A&O x3. Sensory and motor functions are grossly intact. Speech is normal. Appearance and judgement seem appropriate for gender and age.

## 2021-04-22 NOTE — ED PROVIDER NOTE - OBJECTIVE STATEMENT
61 y/o male without reported PMHx presents today due to RLE swelling x weeks. pt reports a table fell on his right leg and sustained injury with abrasion. pt came to ED in which had US to rule out DVT and was prescribed antibiotics. pt reports wound improved and went to urgent care. pt was prescribed Bactrim and keflex by urgent care. pt reports some persistent swelling. Pt denies fever, numbness/weakness, chest pain, SOB, hx of DVT, or any other complaints.

## 2021-04-22 NOTE — CONSULT NOTE ADULT - TIME BILLING
Discussed with patient in detail, ER staff at time of consult request, Surgery PA's and tonight's RN team.

## 2021-04-22 NOTE — ED PROVIDER NOTE - CLINICAL SUMMARY MEDICAL DECISION MAKING FREE TEXT BOX
presents today due to RLE swelling x weeks. pt reports a table fell on his right leg and sustained injury with abrasion. pt came to ED in which had US to rule out DVT and was prescribed antibiotics. pt reports wound improved and went to urgent care. pt was prescribed Bactrim and keflex by urgent care. pt reports some persistent swelling. plan includes Doppler r/o DVT, Ct extremity r/o abscess, labs, re-assess

## 2021-04-27 ENCOUNTER — APPOINTMENT (OUTPATIENT)
Dept: SURGERY | Facility: CLINIC | Age: 61
End: 2021-04-27
Payer: COMMERCIAL

## 2021-04-27 VITALS
SYSTOLIC BLOOD PRESSURE: 130 MMHG | BODY MASS INDEX: 36.12 KG/M2 | HEIGHT: 67 IN | HEART RATE: 83 BPM | WEIGHT: 230.16 LBS | DIASTOLIC BLOOD PRESSURE: 80 MMHG | TEMPERATURE: 98.2 F | OXYGEN SATURATION: 97 %

## 2021-04-27 PROCEDURE — 99072 ADDL SUPL MATRL&STAF TM PHE: CPT

## 2021-04-27 PROCEDURE — 99214 OFFICE O/P EST MOD 30 MIN: CPT

## 2021-04-29 NOTE — PLAN
[FreeTextEntry1] : Blunt trauma to right leg.  No systemic complaints to suggest sepsis.\par Patient reports improvement with edema with leg elevation.  Exam today shows softening and liyah eschar.\par No cellulitis and now off ABX.\par I would not advocate for drainage given risk of introduction of bacteria into these small collections.\par Rather, will continue supportive care as progress continues:\par -Daily warm water and soap wash, H2O2 rinse, Xeroform dressing, secure with DSD.\par -Instructions reviewed in detail and further supplies give.\par He agrees to contact me with any deterioration.  Otherwise, RTO ~ 2 weeks.  I remain available.\par I expect conservative care to be successful.  However, should progress fail, can consider Wound Care input.\par He is pleased, agrees, leaves in good spirits and is able to verbalize issues, plan and instructions as above.\par Please note that more than 50% of face to face time was spent in counseling and coordination of care.

## 2021-04-29 NOTE — DATA REVIEWED
[FreeTextEntry1] : EXAM:  CT LWR EXT RT                      \par EXAM:  CT 3D RECONSTRUCT [place]                      \par PROCEDURE DATE:  04/22/2021  \par INTERPRETATION:  CT OF THE RIGHT LOWER EXTREMITY\par CLINICAL INFORMATION: Table fell on leg 2 weeks ago. Patient reports recent increased pain and swelling about the 2-week-old right lower extremity wound. Evaluate for abscess.\par TECHNIQUE: Multidetector CT of the right lower extremity was performed from the level of the knee through the level of the ankle. The study was performed without the use of intravenous or intra-articular contrast. Multiplanar reformats were generated for review. Three dimensional reconstructions were obtained on an independent workstation. The interpretation of these images is included in the body of the report of the main portion of the study.\par COMPARISON: Right lower extremity radiographs 8 April 2021.\par FINDINGS:\par BONE: No acute fracture. No focal lytic or blastic lesions. No cortical destruction or periosteal new bone formation. No dislocation. Cartilage spaces are maintained. Enthesopathy at the attachment of the extensor mechanism on the patella.\par SOFT TISSUE: Fluid collection in the subcutaneous fat along the anterolateral aspect of the leg at the level of the mid tibial diaphysis. Additional fluid is seen in the anterior compartment of the leg adjacent to the anterior tibialis, extensor hallucis longus, and extensor digitorum longus muscle bellies (4:). The fluid exerts mild mass effect on the adjacent musculature. It is difficult to determine if the fluid is sterile or infected on the basis of noncontrast imaging alone. No additional focal fluid collections are identified. Normal CT appearance of the imaged tendons. Neurovascular structures are normal in course and caliber. No ankle or knee joint effusion.\par IMPRESSION:\par Fluid collection in the subcutaneous fat along the anterolateral aspect of the leg at the level of the mid tibial diaphysis with an associated fluid collection in the anterior compartment which partially displaces the adjacent musculature. It is difficult to determine if the fluid is sterile or infected on the basis of noncontrast imaging alone. Consider sampling of the fluid in the anterior compartment for decompression and further evaluation.\par MONAE TURNER MD; Attending Radiologist\par This document has been electronically signed. Apr 22 2021  7:50PM\par

## 2021-04-29 NOTE — HISTORY OF PRESENT ILLNESS
[de-identified] : Very pleasant gentleman who presents to office today for ongoing surgery follow-up.\par Last seen in ER for Surgery consult after prior outpatient care by Urgent Care and ER following blunt injury to leg with table.\par Multiple Doppler studies negative for DVT.\par Most recent CT with small SQ and intramuscular collection.\par He reports slow but continued improvement in leg edema and liyah eschar with ongoing wound care...

## 2021-04-29 NOTE — PHYSICAL EXAM
[Respiratory Effort] : normal respiratory effort [Normal Rate and Rhythm] : normal rate and rhythm [No HSM] : no hepatosplenomegaly [Tender] : was nontender [Enlarged] : not enlarged [Alert] : alert [Oriented to Person] : oriented to person [Oriented to Place] : oriented to place [Oriented to Time] : oriented to time [Calm] : calm [de-identified] : Appears well, no acute distress, ambulates easily into office and assumes examination table without need of assistance. [de-identified] : Normocephalic and atraumatic.  [de-identified] : Supple with full range of motion.  [FreeTextEntry1] : No cervical, supraclavicular, axillary or inguinal adenopathy.  [de-identified] : No visible lesion or palpable mass. [de-identified] : Full/ obese, but soft and non tense and non tender. [de-identified] : Deferred.  [de-identified] : Right lower extremity with mild edema compared to left below level of knee but no pitting edema. [de-identified] : Deferred.  [de-identified] : Lateral aspect of right leg with softening central eschar, mild surrounding reactive erythema, no cellulitis, no odor, no drainage and no gross fluctuance.

## 2021-05-11 ENCOUNTER — APPOINTMENT (OUTPATIENT)
Dept: SURGERY | Facility: CLINIC | Age: 61
End: 2021-05-11
Payer: COMMERCIAL

## 2021-05-11 VITALS
HEIGHT: 67 IN | SYSTOLIC BLOOD PRESSURE: 120 MMHG | HEART RATE: 89 BPM | OXYGEN SATURATION: 98 % | DIASTOLIC BLOOD PRESSURE: 80 MMHG | WEIGHT: 230 LBS | BODY MASS INDEX: 36.1 KG/M2 | TEMPERATURE: 97 F

## 2021-05-11 DIAGNOSIS — S81.801A UNSPECIFIED OPEN WOUND, RIGHT LOWER LEG, INITIAL ENCOUNTER: ICD-10-CM

## 2021-05-11 PROCEDURE — 99072 ADDL SUPL MATRL&STAF TM PHE: CPT

## 2021-05-11 PROCEDURE — 99214 OFFICE O/P EST MOD 30 MIN: CPT

## 2021-05-11 NOTE — PHYSICAL EXAM
[Respiratory Effort] : normal respiratory effort [Normal Rate and Rhythm] : normal rate and rhythm [No HSM] : no hepatosplenomegaly [Tender] : was nontender [Enlarged] : not enlarged [Alert] : alert [Oriented to Person] : oriented to person [Oriented to Place] : oriented to place [Oriented to Time] : oriented to time [Anxious] : anxious [de-identified] : Appears well, NAD, ambulates easily into office to assume exam table without need of assistance. [de-identified] : Remains normocephalic and atraumatic.  [de-identified] : Still supple with full range of motion.  [FreeTextEntry1] : No cervical, supraclavicular, axillary or inguinal adenopathy today.  [de-identified] : No visible lesion or palpable mass. [de-identified] : Full/ obese, but soft, non tense and non tender. [de-identified] : Deferred.  [de-identified] : Deferred.  [de-identified] : Right lower extremity with minimal edema compared to left below level of knee but no pitting edema. [de-identified] : Lateral aspect of right leg with softening/ sloughing central eschar, minimal surrounding reactive erythema, no cellulitis or odor, no drainage or fluctuance.  There is now exposed granulation tissue below the sloughing eschar. [de-identified] : though appropriately so...

## 2021-05-11 NOTE — REVIEW OF SYSTEMS
[Feeling Poorly] : not feeling poorly [Feeling Tired] : not feeling tired [As Noted in HPI] : as noted in HPI [Anxiety] : anxiety [Depression] : no depression [Negative] : Heme/Lymph [de-identified] : regarding this issue and visit...

## 2021-05-11 NOTE — DATA REVIEWED
[FreeTextEntry1] : EXAM:  CT LWR EXT RT                      \par EXAM:  CT 3D RECONSTRUCT WO WORKSTATION                      \par PROCEDURE DATE:  04/22/2021  \par INTERPRETATION:  CT OF THE RIGHT LOWER EXTREMITY\par CLINICAL INFORMATION: Table fell on leg 2 weeks ago. Patient reports recent increased pain and swelling about the 2-week-old right lower extremity wound. Evaluate for abscess.\par TECHNIQUE: Multidetector CT of the right lower extremity was performed from the level of the knee through the level of the ankle. The study was performed without the use of intravenous or intra-articular contrast. Multiplanar reformats were generated for review. Three dimensional reconstructions were obtained on an independent workstation. The interpretation of these images is included in the body of the report of the main portion of the study.\par COMPARISON: Right lower extremity radiographs 8 April 2021.\par FINDINGS:\par BONE: No acute fracture. No focal lytic or blastic lesions. No cortical destruction or periosteal new bone formation. No dislocation. Cartilage spaces are maintained. Enthesopathy at the attachment of the extensor mechanism on the patella.\par SOFT TISSUE: Fluid collection in the subcutaneous fat along the anterolateral aspect of the leg at the level of the mid tibial diaphysis. Additional fluid is seen in the anterior compartment of the leg adjacent to the anterior tibialis, extensor hallucis longus, and extensor digitorum longus muscle bellies (4:). The fluid exerts mild mass effect on the adjacent musculature. It is difficult to determine if the fluid is sterile or infected on the basis of noncontrast imaging alone. No additional focal fluid collections are identified. Normal CT appearance of the imaged tendons. Neurovascular structures are normal in course and caliber. No ankle or knee joint effusion.\par IMPRESSION:\par Fluid collection in the subcutaneous fat along the anterolateral aspect of the leg at the level of the mid tibial diaphysis with an associated fluid collection in the anterior compartment which partially displaces the adjacent musculature. It is difficult to determine if the fluid is sterile or infected on the basis of noncontrast imaging alone. Consider sampling of the fluid in the anterior compartment for decompression and further evaluation.\par MONAE TURNER MD; Attending Radiologist\par This document has been electronically signed. Apr 22 2021  7:50PM

## 2021-05-11 NOTE — PLAN
[FreeTextEntry1] : Blunt trauma to right leg with no systemic complaints to suggest sepsis and no neurologic sequelae.\par Patient reports ongoing improvement in edema with leg elevation.  \par Exam today shows softening, liyah eschar and now sloughing eschar.\par As such, will trial Allevyn dressing for comfort and ease.\par He agrees to contact me with any deterioration.  \par Otherwise, RTO ~ 2 weeks, though I remain available in the interim..\par While I still expect conservative care to be successful, this is proving to be a slow progress.\par As such, I have referred him to the [facility] practice in [place] for further input.\par He is pleased and agrees, leaves in good spirits and is able to verbalize issues and plan as above.\par I have provided him with an additional Allevyn dressing to apply independently in a week.\par Please note that more than 50% of face to face time was spent in counseling and coordination of care.

## 2021-05-11 NOTE — HISTORY OF PRESENT ILLNESS
[de-identified] : Very pleasant gentleman who presents to office today for ongoing surgery follow-up.\par Last seen in ER for Surgery consult after prior outpatient care by Urgent Care and ER following blunt injury to leg with table.\par Multiple Doppler studies negative for DVT.\par Most recent CT with small SQ and intramuscular collection.\par He reports slow but continued improvement in leg edema and liyah eschar with ongoing wound care... [de-identified] : Very pleasant gentleman who returns to office for ongoing surgery follow-up.\par Now being seen after Urgent Care and then ER visits following blunt injury to leg with table.\par Multiple Doppler studies have been negative for DVT.\par Prior CT with small SQ and intramuscular collection.\par However, Mr. Aparicio is pleased with the "normalization" of the size and shape of the leg.\par Yet, he is concerned regarding the sloughing eschar at the wound site...

## 2021-05-20 ENCOUNTER — APPOINTMENT (OUTPATIENT)
Dept: PLASTIC SURGERY | Facility: HOSPITAL | Age: 61
End: 2021-05-20
Payer: COMMERCIAL

## 2021-05-20 ENCOUNTER — OUTPATIENT (OUTPATIENT)
Dept: OUTPATIENT SERVICES | Facility: HOSPITAL | Age: 61
LOS: 1 days | Discharge: HOME | End: 2021-05-20
Payer: COMMERCIAL

## 2021-05-20 VITALS
SYSTOLIC BLOOD PRESSURE: 145 MMHG | WEIGHT: 222 LBS | BODY MASS INDEX: 34.84 KG/M2 | HEART RATE: 82 BPM | RESPIRATION RATE: 20 BRPM | HEIGHT: 67 IN | DIASTOLIC BLOOD PRESSURE: 84 MMHG | OXYGEN SATURATION: 97 % | TEMPERATURE: 97.5 F
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
## 2021-05-20 DIAGNOSIS — S81.801D UNSPECIFIED OPEN WOUND, RIGHT LOWER LEG, SUBSEQUENT ENCOUNTER: ICD-10-CM

## 2021-05-20 DIAGNOSIS — Z90.49 ACQUIRED ABSENCE OF OTHER SPECIFIED PARTS OF DIGESTIVE TRACT: ICD-10-CM

## 2021-05-20 DIAGNOSIS — Z98.89 OTHER SPECIFIED POSTPROCEDURAL STATES: Chronic | ICD-10-CM

## 2021-05-20 DIAGNOSIS — Z88.2 ALLERGY STATUS TO SULFONAMIDES: ICD-10-CM
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
## 2021-05-20 DIAGNOSIS — J44.9 CHRONIC OBSTRUCTIVE PULMONARY DISEASE, UNSPECIFIED: ICD-10-CM

## 2021-05-20 DIAGNOSIS — E66.9 OBESITY, UNSPECIFIED: ICD-10-CM

## 2021-05-20 DIAGNOSIS — L97.801 NON-PRESSURE CHRONIC ULCER OF OTHER PART OF UNSPECIFIED LOWER LEG LIMITED TO BREAKDOWN OF SKIN: ICD-10-CM

## 2021-05-20 DIAGNOSIS — Z78.9 OTHER SPECIFIED HEALTH STATUS: ICD-10-CM

## 2021-05-20 DIAGNOSIS — Z87.891 PERSONAL HISTORY OF NICOTINE DEPENDENCE: ICD-10-CM
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
## 2021-05-20 DIAGNOSIS — Z82.49 FAMILY HISTORY OF ISCHEMIC HEART DISEASE AND OTHER DISEASES OF THE CIRCULATORY SYSTEM: ICD-10-CM

## 2021-05-20 DIAGNOSIS — Z98.890 OTHER SPECIFIED POSTPROCEDURAL STATES: ICD-10-CM

## 2021-05-20 DIAGNOSIS — Z90.49 ACQUIRED ABSENCE OF OTHER SPECIFIED PARTS OF DIGESTIVE TRACT: Chronic | ICD-10-CM

## 2021-05-20 PROCEDURE — 97602 WOUND(S) CARE NON-SELECTIVE: CPT

## 2021-05-20 PROCEDURE — 99203 OFFICE O/P NEW LOW 30 MIN: CPT

## 2021-05-20 PROCEDURE — G0463: CPT | Mod: 25

## 2021-05-24 NOTE — ASSESSMENT
[Verbal] : Verbal [Demo] : Demo [Patient] : Patient [Good - alert, interested, motivated] : Good - alert, interested, motivated [Verbalizes knowledge/Understanding] : Verbalizes knowledge/understanding [Dressing changes] : dressing changes [Skin Care] : skin care [Pressure relief] : pressure relief [Signs and symptoms of infection] : sign and symptoms of infection [How and When to Call] : how and when to call [Off-loading] : off-loading [Patient responsibility to plan of care] : patient responsibility to plan of care [] : Yes [Stable] : stable [Home] : Home [Ambulatory] : Ambulatory [FreeTextEntry2] : Alteration in skin integrity- promote optimal skin integrity\par  [FreeTextEntry4] : Dr Chacko/ Photo taken\par Collagenase escribed by Dr Chacko\par Preauth sheet submitted for [facility] next visit to [facility] as per Dr Chacko\par F/U to [facility] in 1 week

## 2021-05-24 NOTE — HISTORY OF PRESENT ILLNESS
[FreeTextEntry1] : The wound is located on Right Leg- pt states that on 04/07/21 he was lifting a heavy metal table & it fell against his Leg & injured it- pt states he went to Urgent Care & was advised to go to ER- Pt went to [facility] ER on 04/08/21 & was given Tetanus shot & treated & prescribed Bactrim DS & Keflex (completed) & then followed up to Dr Suero x 2- pt states wound has not healing so Dr Suero recommended pt schedule appointment to [facility]

## 2021-05-24 NOTE — PHYSICAL EXAM
[Normal Breath Sounds] : Normal breath sounds [Normal Heart Sounds] : normal heart sounds [2+] : left 2+ [Ankle Swelling (On Exam)] : present [Ankle Swelling On The Right] : mild [Alert] : alert [Oriented to Person] : oriented to person [Oriented to Place] : oriented to place [Oriented to Time] : oriented to time [Calm] : calm [de-identified] : WD WN 59 Y/O White male in NAD [de-identified] : DOLORES [de-identified] : wound with some necrotic tissue right anterior lower leg [FreeTextEntry1] : Right Leg [FreeTextEntry2] : 6.9 [FreeTextEntry3] : 4.8 [FreeTextEntry4] : 0.1 [de-identified] : Small Serosanguineous [de-identified] : Intact [de-identified] : 25-50% [de-identified] : 25-50% [de-identified] : 1-25% [de-identified] : Collagenase/ Dry Dressing & Kerlix [de-identified] : Cleansed with Normal saline\par  [de-identified] : CIRCULATION\par Dorsalis Pedis: R palpable  L palpable\par Posterior Tibialis: R palpable L palpable\par Extremity Color: Pigmented\par Extremity Temperature: Warm\par Capillary Refill: < 3 seconds bilaterally\par Vascular studies not ordered by Dr Chacko\par \par \par  [de-identified] : None [de-identified] : Yes

## 2021-05-24 NOTE — PLAN
[FreeTextEntry1] : collagenase to right lower leg with DD QD\par stressed elevation. Patient does not want to wear ace wrap\par return 1 week\par 35 minutes to review, evaluate and treat

## 2021-05-24 NOTE — ED PROVIDER NOTE - CAS EDP CONSULT REGARDING 1
Patient ID: Miguel Ángel Ramsey is a 50 y o  male  Assessment/Plan:    Nerve sheath tumor  -- MRI of the brain with 5-6 mm stable acoustic neuroma,  For repeat study in 2022  -- patient's previous issues with balance have improved,   -- following with ENT for hearing loss and tinnitus, no surgical intervention, considering a hearing advice in the future      Headache/ cervicalgia  -- continue with gabapentin 100 mg t i d   -- MRI cervical spine with spondylitic degenerative changes C3 through C6, no cord compression  -- seen by [facility], underwent injections with no clear benefit  -- patient has access to Imitrex if needed  Limit use of NSAIDs no more than 3 times a week  Patient to call with any increase in headaches  -- can try Voltaren gel if needed for his neck and shoulder discomfort  Can consider baclofen if needed      No problem-specific Assessment & Plan notes found for this encounter  Diagnoses and all orders for this visit:    Acoustic neuroma [facility])    Generalized headaches           Subjective:    HPI    Divya Mcallister is a 66-year-old gentleman who presents today for neurologic follow-up for acoustic neuroma, cervicalgia  Patient noted to have stable 5-6 mm acoustic neuroma on MRI imaging  He notes issues with hearing loss and tinnitus  Also has issues with cervical radiculopathy  Was on Lexapro in the past but had side effects  patient last seen in February  Fortunately this time, remains clinically stable  The interim, patient seen by Otolaryngology  For re-evaluation of his right acoustic neuroma which was found in 2019  Patient continues with right-sided tinnitus and hearing loss, audiometric testing showed ongoing issues in the right ear  Left ear normal range  Discussed his options, patient not interested in operative intervention, would consider possible hearing aid in the future  Patient for repeat MRI of the brain in February 2022     His balance for the most part is stable, denies having had any interim falls  Overall, from a headache standpoint, remains clinically stable  He has had only 1 significant migraine which occurred approximately 3 weeks ago  This occurred in the left orbital-frontal region, associated with mild nausea, photophobia  Lasted approximately 24 hours  He took Imitrex, took about 2 hours to take effect  He denies any interim headaches other than that migraine  On occasion he has some mild neck discomfort, particularly at nighttime  Will provoke a minor cervicalgia, typically does not take any medication,  He does note some mild problems with neck discomfort, occasional numbness and tingling in his shoulders bilaterally  He continues on gabapentin 100 mg t i d  without any associated side effects  below maintain in chart for complexity of case and review as previously documented by myself in 2019   presented to [facility] on October 15 secondary to complaints of  a blind spot in the right eye,this occurred 4 days prior, and lasted approximately 20 minutes  Patient with history of dysesthesias occurring in his right upper extremity, radiating across his chest   Cardiac workup has been unremarkable  Patient recently seen by his PCP for those complaints, did undergo MRI of the cervical spine  According to the patient, he noted sudden loss of part of his visual field,  he could only read parts of a word  he is unclear if it occurred on the right or the left  It only lasted approximately 20 minutes  He did note having a slight frontal headache following this episode which lasted a few hours  He did take Advil at the time  The next day, he was mildly nauseous, He states he felt "funky"  He called his PCP, he was sent to the ER,  Labs were unremarkable, he underwent CT H which was unrevealing  He was treated with Toradol and Reglan  His symptoms had subsided and he was subsequently discharged    2 days later, patient was at work, once again he felt funny  At that point he reported back to the ER  He had occasional twitching in his muscles  Patient did note being under excessive stress, he lost his father September 2019 and his wife is now disabled paralyzed in a wheelchair  He has been trying Lexapro for anxiety  Patient seen by neurology, noted to have hyperreflexia  Symptoms were longstanding and stable  Patient had recent MRI cervical spine August 2019 with evidence of C3- C6 spondylitic changes associated with neck pain and vision loss on the right side  Neuro exam without focal deficits, eye exam grossly unrevealing  Patient was started on gabapentin  Unfortunately, patient needed to return home to care for his disabled wife  He was asked to obtain outpatient follow-up MRI, as well as to see Ophthalmology  He did undergo labs, NMO antibodies were negative  Carotid ultrasound, less than 50% stenosis noted on the right, no arterial disease noted in the left  He underwent MRI of the brain with and without contrast,  Evidence of small enhancing mass identified within the right IAC extending along the 7th nerve as it extends to the geniculate ganglion  This felt likely representing a nerve sheath tumor  MRI brain IAC's with without contrast 02/03/2020; Stable 5 to 6 mm enhancing nodule within the right internal auditory canal, likely acoustic neuroma      The following portions of the patient's history were reviewed and updated as appropriate: allergies, current medications, past family history, past medical history, past social history, past surgical history and problem list          Objective:  Current Outpatient Medications   Medication Sig Dispense Refill    escitalopram (LEXAPRO) 5 mg tablet Take 5 mg by mouth daily at bedtime      gabapentin (NEURONTIN) 100 mg capsule Take 1 capsule (100 mg total) by mouth 3 (three) times a day 90 capsule 0    omeprazole (PriLOSEC) 20 mg delayed release capsule Take 40 mg by mouth daily       LORazepam (ATIVAN) 0 5 mg tablet Take 1 tablet (0 5 mg total) by mouth every 8 (eight) hours as needed for anxiety for up to 10 days (Patient not taking: Reported on 8/24/2020) 15 tablet 0     No current facility-administered medications for this visit  Blood pressure 132/67, pulse 82, height 5' 8" (1 727 m), weight 83 5 kg (184 lb)  Physical Exam  Constitutional:       Appearance: Normal appearance  HENT:      Head: Normocephalic  Left Ear: Hearing normal    Eyes:      Extraocular Movements: Extraocular movements intact  Pupils: Pupils are equal, round, and reactive to light  Neck:      Musculoskeletal: Normal range of motion  Cardiovascular:      Rate and Rhythm: Normal rate and regular rhythm  Pulses: Normal pulses  Pulmonary:      Effort: Pulmonary effort is normal    Musculoskeletal: Normal range of motion  Skin:     General: Skin is warm  Neurological:      Deep Tendon Reflexes: Strength normal and reflexes are normal and symmetric  Psychiatric:         Mood and Affect: Mood normal          Speech: Speech normal          Behavior: Behavior normal          Thought Content: Thought content normal          Neurological Exam  Mental Status  Awake, alert and oriented to person, place and time  Speech is normal  Language is fluent with no aphasia  Cranial Nerves  CN II: Visual fields full to confrontation  CN III, IV, VI: Extraocular movements intact bilaterally  Pupils equal round and reactive to light bilaterally  CN V: Facial sensation is normal   CN VII: Full and symmetric facial movement  CN VIII:  Right: Hearing is decreased  Left: Hearing is normal   CN XI: Shoulder shrug strength is normal   CN XII: Tongue midline without atrophy or fasciculations  Motor  Normal muscle bulk throughout  Normal muscle tone  No abnormal involuntary movements  Strength is 5/5 throughout all four extremities      Sensory  Temperature is normal in upper and lower extremities  Vibration is normal in upper and lower extremities  Reflexes  Deep tendon reflexes are 2+ and symmetric in all four extremities with downgoing toes bilaterally  Coordination  Right: Finger-to-nose normal   Left: Finger-to-nose normal     Gait  Casual gait is normal including stance, stride, and arm swing  ROS: personally reviewed with patient    Review of Systems  Constitutional: Negative  Negative for appetite change and fever  HENT: Negative  Negative for hearing loss, tinnitus, trouble swallowing and voice change  Eyes: Negative  Negative for photophobia and pain  Respiratory: Negative  Negative for shortness of breath  Cardiovascular: Negative  Negative for palpitations  Gastrointestinal: Negative  Negative for nausea and vomiting  Endocrine: Negative  Negative for cold intolerance  Genitourinary: Negative  Negative for dysuria, frequency and urgency  Musculoskeletal: Positive for neck pain  Negative for myalgias  Skin: Negative  Negative for rash  Neurological: Positive for headaches  Negative for dizziness, tremors, seizures, syncope, facial asymmetry, speech difficulty, weakness, light-headedness and numbness  Hematological: Negative  Does not bruise/bleed easily  Psychiatric/Behavioral: Negative  Negative for confusion, hallucinations and sleep disturbance     All other systems reviewed and are negative     consult

## 2021-05-24 NOTE — REASON FOR VISIT
2018      RE: Jhoan Hoffman  [address] [facility] [address]       [facility]  Nephrology Clinic  Kidney/Pancreas Recipient  2018     Name: Jhoan Hoffman  MRN: [id number]   Age: 24 year old  : 1994  Referring provider: Jan Jones     CHRONIC TRANSPLANT NEPHROLOGY VISIT    Assessment and Plan:   # DDKT:    - Baseline Cr ~ 1.9-2.2; Stable from last visit and less likely rejection.  The patient is on triple immune suppression but with some low-normal tacrolimus levels.  Will recheck DSA and urine studies and consider biopsy vs increase in tacrolimus goals   - Proteinuria: Mild   - Date of DSA last checked: 2017 Latest DSA: No   - BK Viremia: No   - Kidney Tx Biopsy: No    # Immunosuppression: Tacrolimus immediate release (goal  4-6), Mycophenolate mofetil (goal  1-3.5) and Prednisone (dose  5 mg daily)   - Changes: No    # Prophylaxis:    - PJP on bactrim.     # Hypertension: Controlled; Goal BP: < 130/80   - Changes: No    # Anemia in chronic renal disease: Hgb: Stable   - Iron studies: Not checked recently    # Mineral Bone Disorder:    - Secondary renal hyperparathyroidism; PTH level is: Minimally elevated   - Vitamin D; level is: Low, started on supplementation   - Calcium; level is: Normal   - Phosphorus; level is: Not checked recently     # Electrolytes:   - Bicarbonate; level: Low, mildly low, continue to monitor    Follow-up: Return in about 6 months (around 2019) for Routine Visit.     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 The patient was last seen on 03/12/2018. During this visit no changes were made to his immunosuppression. Please see that note for details.     Today, the patient reports feeling great. Was recently at the Emergency Room for throat pain and inflammation. This has since resolved. He says he is not exercising very frequently. The patient has received a flu vaccination this season.  He notes current good energy level.  He enjoys playing video games.     Recent Hospitalizations:  [x] No [] Yes    New Medical Issues: [x] No [] Yes    Decreased energy: [x] No [] Yes    Chest pain or SOB with exertion:  [x] No [] Yes    Appetite change or weight change: [x] No [] Yes    Nausea, vomiting or diarrhea:  [x] No [] Yes    Fever, sweats or chills: [x] No [] Yes    Leg swelling: [x] No [] Yes      Other medical issues:  No    Home BP: Doesn't know     Review of Systems:   A comprehensive review of systems was obtained and negative, except as noted in the HPI or past medical history.     Active Medications:     Current Outpatient Prescriptions:      amLODIPine (NORVASC) 5 MG tablet, TAKE 1 TABLET BY MOUTH ONCE DAILY, Disp: 90 tablet, Rfl: 3     aspirin (ASPIRIN ADULT LOW STRENGTH) 81 MG EC tablet, Take 1 tablet by mouth daily., Disp: 90 tablet, Rfl: 3     atenolol (TENORMIN) 25 MG tablet, TAKE 1 TABLET BY MOUTH TWICE DAILY, Disp: 180 tablet, Rfl: 11     cholecalciferol 2000 UNITS CAPS, Take 2,000 Int'l Units by mouth daily, Disp: 30 capsule, Rfl:      clonazePAM (KLONOPIN) 0.5 MG tablet, Take 1 tablet (0.5 mg) by mouth 2 times daily as needed for anxiety, Disp: 20 tablet, Rfl: 0     FLUoxetine (PROZAC) 20 MG capsule, Take 40 mg by mouth daily , Disp: 90 capsule, Rfl: 0      mycophenolate (GENERIC EQUIVALENT) 250 MG capsule, TAKE 2 CAPSULES TWICE A DAY, Disp: 120 capsule, Rfl: 6     omeprazole 20 MG tablet, Take 1 tablet (20 mg) by mouth every 12 hours, Disp: 180 tablet, Rfl: 0     predniSONE (DELTASONE) 10 MG tablet, TAKE 1/2 TABLET BY MOUTH ONCE DAILY, Disp: 15 tablet, Rfl: 11     sulfamethoxazole-trimethoprim (BACTRIM/SEPTRA) 400-80 MG per tablet, Take 1 tablet by mouth daily, Disp: 30 tablet, Rfl: 11     tacrolimus (GENERIC EQUIVALENT) 0.5 MG capsule, Take 3 capsules (1.5 mg) by mouth 2 times daily, Disp: 180 capsule, Rfl: 11      Allergies:   Amoxicillin  Azithromycin  Vancomycin  Cefprozil      Active Medical Problems:  Kidney replaced by transplant  Hypertension secondary to other renal disorders  Depression   BK viremia  Gastroesophageal reflux disease  Secondary renal hyperparathyroidism  Vitamin D deficiency  Immunosuppressed status    Social History:   The patient doesn't smoke or consume alcohol.     Physical Exam:   There were no vitals taken for this visit.   Wt Readings from Last 4 Encounters:   03/12/18 91.3 kg (201 lb 3.2 oz)   09/18/17 87.6 kg (193 lb 3.2 oz)   02/20/17 91.5 kg (201 lb 12.8 oz)   08/25/16 90 kg (198 lb 6.4 oz)       GENERAL APPEARANCE: alert and no distress  HENT: mouth without ulcers or lesions  LYMPHATICS: no cervical or supraclavicular nodes  RESP: lungs clear to auscultation - no rales, rhonchi or wheezes  CV: regular rhythm, normal rate, no rub, no murmur  EDEMA: no LE edema bilaterally  ABDOMEN: soft, nondistended, nontender, bowel sounds normal  MS: extremities normal - no gross deformities noted, no evidence of inflammation in joints, no muscle tenderness  SKIN: no rash  TX KIDNEY: normal      Data:   Renal Latest Ref Rng & Units 12/6/2018 10/17/2018 8/10/2018   Na 133 - 144 mmol/L - - -   Na (external) 134 - 145 mmol/L 143 142 143   K 3.4 - 5.3 mmol/L - - -   K (external) 3.5 - 5.1 mmol/L 4.1 4.2 4.2   Cl 94 - 109 mmol/L - - -   Cl (external)  97 - 107 mmol/L 109(H) 107 105   CO2 20 - 32 mmol/L - - -   CO2 (external) 22 - 29 mmol/L 20(L) 21.0(L) 24.0   BUN 7 - 30 mg/dL - - -   BUN (external) 7.0 - 27.0 mg/dL 25.0 21.0 18.0   Cr 0.66 - 1.25 mg/dL - - -   Cr (external) 0.64 - 1.34 mg/dL 2.25(H) 1.88(H) 1.94(H)   Glucose 70 - 99 mg/dL - - -   Glucose (external) 70 - 110 mg/dL 100 86 94   Ca  8.5 - 10.1 mg/dL - - -   Ca (external) 8.6 - 10.3 mg/dL 10.1 10.0 9.1   Mg 1.6 - 2.3 mg/dL - - -   Mg (external) 1.8 - 2.4 MG/DL - - -     Bone Health Latest Ref Rng & Units 9/18/2017 3/27/2015 1/28/2015   Phos 2.8 - 4.6 mg/dL - - -   Phos (external) 2.5 - 4.9 MG/DL - 4.1 3.7   PTHi 12 - 72 pg/mL 151(H) - -   Vit D Def 20 - 75 ug/L 28 - -     Heme Latest Ref Rng & Units 12/6/2018 10/17/2018 8/10/2018   WBC 4.0 - 11.0 10e9/L - - -   WBC (external) 4.8 - 10.8 10(3)/uL 9.8 11.6(H) 9.2   Hgb 13.3 - 17.7 g/dL - - -   Hgb (external) 14.0 - 18.0 g/dL 13.5(L) 13.0(L) 13.8(L)   Plt 150 - 450 10e9/L - - -   Plt (external) 150 - 350 10(3)/uL 217 231 191     Liver Latest Ref Rng & Units 11/7/2017 9/18/2015 4/28/2015   AP 40 - 150 U/L - - -   AP (external) 46 - 116 U/L 146(H) 102 110   TBili 0.2 - 1.3 mg/dL - - -   TBili (external) 0.2 - 1.0 MG/DL 0.3 0.5 0.4   ALT 0 - 70 U/L - - -   ALT (external) 12 - 78 U/L 30 34 36   AST 0 - 45 U/L - - -   AST (external) 15 - 37 U/L 14(L) 15 14(L)   Tot Protein 6.8 - 8.8 g/dL - - -   Tot Protein (external) 6.4 - 8.2 GM/DL 7.3 7.0 6.3(L)   Albumin 3.9 - 5.1 g/dL - - -   Albumin (external) 3.4 - 5.0 GM/DL 4.0 4.1 3.6     Pancreas Latest Ref Rng & Units 2/16/2011 10/20/2010   Amylase 30 - 110 U/L 142(H) 127(H)   Lipase 20 - 250 U/L 202 256(H)     Iron studies Latest Ref Rng & Units 3/21/2012 12/13/2011 9/27/2011   Iron 35 - 180 ug/dL 138 63 81   Iron sat 15 - 46 % 75(H) 33 55(H)   Ferritin 20 - 300 ng/mL - - -     [facility] Txp Virology Latest Ref Rng & Units 11/7/2017 5/4/2017 2/17/2017   CMV IgG EU/mL - - -   CMV IgM <0.90 - - -   CMV IgM Interp <0.90 -  - -   CVM DNA Quant - - - -   CMV Quant <100 Copies/mL - - -   CMV QT Log <2.0 Log copies/mL - - -   BK Spec - - - -   BK Res BKNEG copies/mL - - -   BK Log <2.7 Log copies/mL - - -   BK Quant Log Ext - - - -   BK Quant Result Ext None detected None detected None detected None Detected   BK Quant Spec Ext - - - -   EBV IgG - - - -   Hep B Core NEG - - -   Hep B Surf - - - -   HIV 1&2 NEG - - -        Recent Labs   Lab Test  07/09/18   0850  08/10/18   0819  10/17/18   0834   DOSTAC  07/08/18  2200  2030, 08/09/18  2000, 10/16/18   TACROL  5.5  3.4*  4.9*     Recent Labs   Lab Test  06/02/11   0654  06/03/11   2340  08/30/11   1503   DOSMPA  Not Provided  Not Provided  [id number] CORRECTED ON 08/30 AT 1509: PREVIOUSLY REPORTED  ON 8 30   MPAG  >200.0*  >200.0*  38.5           Scribe Disclosure:   I, John Gold, am serving as a scribe to document services personally performed by German Comer at this visit, based upon the provider's statements to me. All documentation has been reviewed by the aforementioned provider prior to being entered into the official medical record.     Portions of this medical record were completed by a scribe. UPON MY REVIEW AND AUTHENTICATION BY ELECTRONIC SIGNATURE, this confirms (a) I performed the applicable clinical services, and (b) the record is accurate.       Kidney/Pancreas Recipient       [Consultation] : a consultation visit [FreeTextEntry1] : New Evaluation

## 2021-05-25 ENCOUNTER — APPOINTMENT (OUTPATIENT)
Dept: SURGERY | Facility: CLINIC | Age: 61
End: 2021-05-25

## 2021-05-25 NOTE — ED ADULT TRIAGE NOTE - PATIENT ON (OXYGEN DELIVERY METHOD)
no lesions , no deformities , no traumatic injuries , no significant scars are present , breathing is unlabored without accessory muscle use , normal breath sounds
room air

## 2021-05-27 ENCOUNTER — OUTPATIENT (OUTPATIENT)
Dept: OUTPATIENT SERVICES | Facility: HOSPITAL | Age: 61
LOS: 1 days | Discharge: HOME | End: 2021-05-27
Payer: COMMERCIAL

## 2021-05-27 ENCOUNTER — APPOINTMENT (OUTPATIENT)
Dept: SURGERY | Facility: HOSPITAL | Age: 61
End: 2021-05-27
Payer: COMMERCIAL

## 2021-05-27 VITALS
TEMPERATURE: 97.7 F | SYSTOLIC BLOOD PRESSURE: 134 MMHG | BODY MASS INDEX: 34.84 KG/M2 | OXYGEN SATURATION: 97 % | RESPIRATION RATE: 18 BRPM | DIASTOLIC BLOOD PRESSURE: 91 MMHG | HEIGHT: 67 IN | HEART RATE: 77 BPM | WEIGHT: 222 LBS

## 2021-05-27 DIAGNOSIS — Z90.49 ACQUIRED ABSENCE OF OTHER SPECIFIED PARTS OF DIGESTIVE TRACT: Chronic | ICD-10-CM

## 2021-05-27 DIAGNOSIS — L97.801 NON-PRESSURE CHRONIC ULCER OF OTHER PART OF UNSPECIFIED LOWER LEG LIMITED TO BREAKDOWN OF SKIN: ICD-10-CM

## 2021-05-27 DIAGNOSIS — Z98.89 OTHER SPECIFIED POSTPROCEDURAL STATES: Chronic | ICD-10-CM

## 2021-05-27 PROCEDURE — 97602 WOUND(S) CARE NON-SELECTIVE: CPT

## 2021-05-27 PROCEDURE — 99213 OFFICE O/P EST LOW 20 MIN: CPT

## 2021-05-27 RX ORDER — METHYLPREDNISOLONE 4 MG/1
4 TABLET ORAL
Refills: 0 | Status: ACTIVE | COMMUNITY

## 2021-05-27 NOTE — ASSESSMENT
[Verbal] : Verbal [Demo] : Demo [Patient] : Patient [Good - alert, interested, motivated] : Good - alert, interested, motivated [Dressing changes] : dressing changes [Verbalizes knowledge/Understanding] : Verbalizes knowledge/understanding [Skin Care] : skin care [Pressure relief] : pressure relief [Signs and symptoms of infection] : sign and symptoms of infection [How and When to Call] : how and when to call [Off-loading] : off-loading [Patient responsibility to plan of care] : patient responsibility to plan of care [Stable] : stable [Home] : Home [Ambulatory] : Ambulatory [FreeTextEntry2] : Alteration in skin integrity- promote optimal skin integrity\par  [] : No [FreeTextEntry4] : Dr Bauer\par F/U to [facility] in 1 week [FreeTextEntry1] : Right leg wound is improving, no acute infection\par

## 2021-05-27 NOTE — PHYSICAL EXAM
[Normal Breath Sounds] : Normal breath sounds [Normal Heart Sounds] : normal heart sounds [2+] : left 2+ [Ankle Swelling Bilaterally] : bilaterally  [JVD] : no jugular venous distention  [Ankle Swelling (On Exam)] : not present [Varicose Veins Of Lower Extremities] : not present [] : not present [de-identified] : WD/WN in no acute distress. [de-identified] : Within Paula Limits. [de-identified] : Within Paula Limits. [de-identified] : Within Paula Limits. [de-identified] : Right leg wound is clean, lose necrotic tissue was removed easily, baser is moderate slough with some granulation, no drainage, no odor, no acute infection, periwound skin is intact with no cellulitis. [FreeTextEntry1] : Right Leg [FreeTextEntry2] : 6.1 [FreeTextEntry3] : 4.2 [FreeTextEntry4] : 0.2 [de-identified] : 1-25% [de-identified] : Intact [de-identified] : 50-75% [de-identified] : 1-25% [de-identified] : Collagenase/ Dry Dressing [de-identified] : Cleansed with Normal saline\par  [de-identified] : None

## 2021-05-27 NOTE — PLAN
[FreeTextEntry1] : Continue Collagenase, dry dressing, return to office in one week.\par 25 minutes spent for patient care and medical decision making.\par

## 2021-05-27 NOTE — PHYSICAL EXAM
[Normal Breath Sounds] : Normal breath sounds [Normal Heart Sounds] : normal heart sounds [2+] : left 2+ [Ankle Swelling Bilaterally] : bilaterally  [JVD] : no jugular venous distention  [Ankle Swelling (On Exam)] : not present [Varicose Veins Of Lower Extremities] : not present [] : not present [de-identified] : WD/WN in no acute distress. [de-identified] : Within Paula Limits. [de-identified] : Within Paula Limits. [de-identified] : Within Paula Limits. [de-identified] : Right leg wound is clean, lose necrotic tissue was removed easily, baser is moderate slough with some granulation, no drainage, no odor, no acute infection, periwound skin is intact with no cellulitis. [FreeTextEntry1] : Right Leg [FreeTextEntry2] : 6.1 [FreeTextEntry3] : 4.2 [FreeTextEntry4] : 0.2 [de-identified] : 1-25% [de-identified] : Intact [de-identified] : 50-75% [de-identified] : 1-25% [de-identified] : Collagenase/ Dry Dressing [de-identified] : Cleansed with Normal saline\par  [de-identified] : None

## 2021-05-27 NOTE — ASSESSMENT
[Verbal] : Verbal [Demo] : Demo [Patient] : Patient [Good - alert, interested, motivated] : Good - alert, interested, motivated [Verbalizes knowledge/Understanding] : Verbalizes knowledge/understanding [Dressing changes] : dressing changes [Skin Care] : skin care [Pressure relief] : pressure relief [Signs and symptoms of infection] : sign and symptoms of infection [How and When to Call] : how and when to call [Off-loading] : off-loading [Patient responsibility to plan of care] : patient responsibility to plan of care [Stable] : stable [Home] : Home [Ambulatory] : Ambulatory [] : No [FreeTextEntry2] : Alteration in skin integrity- promote optimal skin integrity\par  [FreeTextEntry4] : Dr Bauer\par F/U to [facility] in 1 week [FreeTextEntry1] : Right leg wound is improving, no acute infection\par

## 2021-05-27 NOTE — VITALS
[] : No [de-identified] : Pt denies c/o any pains or discomforts at present [FreeTextEntry5] : Pt states he was started on Medrol tapering dose pack for resolving Abdomen rash- recent allergic reaction to Bactrim DS- Dr Baure advised & aware
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
## 2021-05-30 DIAGNOSIS — S81.801D UNSPECIFIED OPEN WOUND, RIGHT LOWER LEG, SUBSEQUENT ENCOUNTER: ICD-10-CM

## 2021-05-30 DIAGNOSIS — Z98.890 OTHER SPECIFIED POSTPROCEDURAL STATES: ICD-10-CM

## 2021-05-30 DIAGNOSIS — E66.9 OBESITY, UNSPECIFIED: ICD-10-CM
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
## 2021-05-30 DIAGNOSIS — Z90.49 ACQUIRED ABSENCE OF OTHER SPECIFIED PARTS OF DIGESTIVE TRACT: ICD-10-CM

## 2021-05-30 DIAGNOSIS — Z87.891 PERSONAL HISTORY OF NICOTINE DEPENDENCE: ICD-10-CM

## 2021-05-30 DIAGNOSIS — Z88.2 ALLERGY STATUS TO SULFONAMIDES: ICD-10-CM
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
## 2021-06-03 ENCOUNTER — OUTPATIENT (OUTPATIENT)
Dept: OUTPATIENT SERVICES | Facility: HOSPITAL | Age: 61
LOS: 1 days | Discharge: HOME | End: 2021-06-03
Payer: COMMERCIAL

## 2021-06-03 ENCOUNTER — APPOINTMENT (OUTPATIENT)
Dept: SURGERY | Facility: HOSPITAL | Age: 61
End: 2021-06-03
Payer: COMMERCIAL

## 2021-06-03 VITALS
HEART RATE: 75 BPM | HEIGHT: 67 IN | OXYGEN SATURATION: 96 % | WEIGHT: 222 LBS | BODY MASS INDEX: 34.84 KG/M2 | SYSTOLIC BLOOD PRESSURE: 134 MMHG | TEMPERATURE: 98.8 F | RESPIRATION RATE: 18 BRPM | DIASTOLIC BLOOD PRESSURE: 90 MMHG

## 2021-06-03 DIAGNOSIS — J44.9 CHRONIC OBSTRUCTIVE PULMONARY DISEASE, UNSPECIFIED: ICD-10-CM

## 2021-06-03 DIAGNOSIS — L97.801 NON-PRESSURE CHRONIC ULCER OF OTHER PART OF UNSPECIFIED LOWER LEG LIMITED TO BREAKDOWN OF SKIN: ICD-10-CM

## 2021-06-03 DIAGNOSIS — Z98.890 OTHER SPECIFIED POSTPROCEDURAL STATES: ICD-10-CM

## 2021-06-03 DIAGNOSIS — Z90.49 ACQUIRED ABSENCE OF OTHER SPECIFIED PARTS OF DIGESTIVE TRACT: Chronic | ICD-10-CM

## 2021-06-03 DIAGNOSIS — Z98.89 OTHER SPECIFIED POSTPROCEDURAL STATES: Chronic | ICD-10-CM

## 2021-06-03 DIAGNOSIS — E66.9 OBESITY, UNSPECIFIED: ICD-10-CM
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
## 2021-06-03 DIAGNOSIS — Z90.49 ACQUIRED ABSENCE OF OTHER SPECIFIED PARTS OF DIGESTIVE TRACT: ICD-10-CM
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
## 2021-06-03 DIAGNOSIS — Z88.2 ALLERGY STATUS TO SULFONAMIDES: ICD-10-CM

## 2021-06-03 DIAGNOSIS — Z87.891 PERSONAL HISTORY OF NICOTINE DEPENDENCE: ICD-10-CM
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
## 2021-06-03 DIAGNOSIS — S81.801D UNSPECIFIED OPEN WOUND, RIGHT LOWER LEG, SUBSEQUENT ENCOUNTER: ICD-10-CM
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
## 2021-06-03 PROCEDURE — 97602 WOUND(S) CARE NON-SELECTIVE: CPT

## 2021-06-03 PROCEDURE — 99213 OFFICE O/P EST LOW 20 MIN: CPT

## 2021-06-03 NOTE — PHYSICAL EXAM
[4 x 4] : 4 x 4  [JVD] : no jugular venous distention  [Normal Breath Sounds] : Normal breath sounds [Normal Heart Sounds] : normal heart sounds [2+] : left 2+ [Ankle Swelling (On Exam)] : not present [Ankle Swelling Bilaterally] : bilaterally  [Varicose Veins Of Lower Extremities] : bilaterally [] : bilaterally [Alert] : alert [Oriented to Person] : oriented to person [Calm] : calm [de-identified] : WD/WN in no acute distress. [de-identified] : Within Paula Limits. [de-identified] : Within Paula Limits. [de-identified] : Within Paula Limits. [de-identified] : Right leg wound is clean, moderate soft slough removed easily during examination, good granulation tissue is forming, no drainage, no odor, no acute infection, periwound skin is intact with no cellulitis. [FreeTextEntry1] : Right Leg [FreeTextEntry2] : 5.0 [FreeTextEntry3] : 3.5 [FreeTextEntry4] : 0.2 [de-identified] : Intact [de-identified] : <25% [de-identified] : Collagenase [de-identified] : Cleansed with Normal saline\par  [de-identified] : None [de-identified] : None [de-identified] : >75% [de-identified] : Daily

## 2021-06-03 NOTE — ASSESSMENT
[Verbal] : Verbal [Demo] : Demo [Patient] : Patient [Good - alert, interested, motivated] : Good - alert, interested, motivated [Verbalizes knowledge/Understanding] : Verbalizes knowledge/understanding [Dressing changes] : dressing changes [Skin Care] : skin care [Pressure relief] : pressure relief [Signs and symptoms of infection] : sign and symptoms of infection [How and When to Call] : how and when to call [Off-loading] : off-loading [Patient responsibility to plan of care] : patient responsibility to plan of care [] : Yes [Stable] : stable [Home] : Home [Ambulatory] : Ambulatory [FreeTextEntry2] : Restore Skin Integrity\par Infection Control\par Localized wound care\par Maintain acceptable pain levels [FreeTextEntry4] : F/U to [facility] in 1 week [FreeTextEntry1] : Right leg wound is healing, no acute infection.\par

## 2021-06-03 NOTE — PLAN
[FreeTextEntry1] : Continue Collagenase, dry dressing, return to office in one week.\par 25 minutes spent for patient care and medical decision making.\par \par

## 2021-06-10 ENCOUNTER — APPOINTMENT (OUTPATIENT)
Dept: PLASTIC SURGERY | Facility: HOSPITAL | Age: 61
End: 2021-06-10
Payer: COMMERCIAL

## 2021-06-10 ENCOUNTER — OUTPATIENT (OUTPATIENT)
Dept: OUTPATIENT SERVICES | Facility: HOSPITAL | Age: 61
LOS: 1 days | Discharge: HOME | End: 2021-06-10
Payer: COMMERCIAL

## 2021-06-10 VITALS
WEIGHT: 222 LBS | DIASTOLIC BLOOD PRESSURE: 78 MMHG | OXYGEN SATURATION: 95 % | RESPIRATION RATE: 18 BRPM | HEART RATE: 75 BPM | TEMPERATURE: 98.8 F | SYSTOLIC BLOOD PRESSURE: 133 MMHG | HEIGHT: 67 IN | BODY MASS INDEX: 34.84 KG/M2

## 2021-06-10 VITALS
TEMPERATURE: 98.8 F | SYSTOLIC BLOOD PRESSURE: 133 MMHG | DIASTOLIC BLOOD PRESSURE: 78 MMHG | BODY MASS INDEX: 34.84 KG/M2 | RESPIRATION RATE: 20 BRPM | HEIGHT: 67 IN | WEIGHT: 222 LBS | HEART RATE: 75 BPM
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
## 2021-06-10 DIAGNOSIS — E66.9 OBESITY, UNSPECIFIED: ICD-10-CM

## 2021-06-10 DIAGNOSIS — L97.801 NON-PRESSURE CHRONIC ULCER OF OTHER PART OF UNSPECIFIED LOWER LEG LIMITED TO BREAKDOWN OF SKIN: ICD-10-CM
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
## 2021-06-10 DIAGNOSIS — Z98.890 OTHER SPECIFIED POSTPROCEDURAL STATES: ICD-10-CM

## 2021-06-10 DIAGNOSIS — Z88.2 ALLERGY STATUS TO SULFONAMIDES: ICD-10-CM

## 2021-06-10 DIAGNOSIS — S81.801D UNSPECIFIED OPEN WOUND, RIGHT LOWER LEG, SUBSEQUENT ENCOUNTER: ICD-10-CM

## 2021-06-10 DIAGNOSIS — J44.9 CHRONIC OBSTRUCTIVE PULMONARY DISEASE, UNSPECIFIED: ICD-10-CM
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
## 2021-06-10 DIAGNOSIS — Z90.49 ACQUIRED ABSENCE OF OTHER SPECIFIED PARTS OF DIGESTIVE TRACT: ICD-10-CM

## 2021-06-10 DIAGNOSIS — Z90.49 ACQUIRED ABSENCE OF OTHER SPECIFIED PARTS OF DIGESTIVE TRACT: Chronic | ICD-10-CM

## 2021-06-10 DIAGNOSIS — Z87.891 PERSONAL HISTORY OF NICOTINE DEPENDENCE: ICD-10-CM

## 2021-06-10 DIAGNOSIS — Z98.89 OTHER SPECIFIED POSTPROCEDURAL STATES: Chronic | ICD-10-CM

## 2021-06-10 PROCEDURE — 99213 OFFICE O/P EST LOW 20 MIN: CPT

## 2021-06-10 PROCEDURE — G0463: CPT

## 2021-06-10 NOTE — PHYSICAL EXAM
[Normal Breath Sounds] : Normal breath sounds [Normal Heart Sounds] : normal heart sounds [2+] : left 2+ [Ankle Swelling Bilaterally] : bilaterally  [Alert] : alert [Oriented to Person] : oriented to person [Calm] : calm [JVD] : no jugular venous distention  [Ankle Swelling (On Exam)] : not present [Varicose Veins Of Lower Extremities] : not present [] : not present [de-identified] : WD/WN in no acute distress. [de-identified] : Within Paula Limits. [de-identified] : Within Paula Limits. [de-identified] : Within Paula Limits. [de-identified] : Right leg wound is clean, moderate soft slough removed easily during examination, good granulation tissue is forming, no drainage, no odor, no acute infection, periwound skin is intact with no cellulitis. [FreeTextEntry1] : Right Leg [FreeTextEntry2] : 5.0 [FreeTextEntry3] : 3.1 [FreeTextEntry4] : 0.1 [de-identified] : Small Serosanguineous [de-identified] : Intact [de-identified] : % [de-identified] : Karlee/ Dry Dressing [de-identified] : 1-25% [de-identified] : Cleansed with Normal saline\par  [FreeTextEntry7] : Right Leg- Anterior- Dry Eschar- NEW [de-identified] : No Dressing [de-identified] : Cleansed with Normal saline\par  [de-identified] : None [de-identified] : None [de-identified] : Yes

## 2021-06-10 NOTE — PLAN
[FreeTextEntry1] : mary alice QD, dry dressing, return to office in one week.\par 25 minutes spent for patient care and medical decision making.\par \par

## 2021-06-10 NOTE — ASSESSMENT
[Verbal] : Verbal [Demo] : Demo [Patient] : Patient [Good - alert, interested, motivated] : Good - alert, interested, motivated [Verbalizes knowledge/Understanding] : Verbalizes knowledge/understanding [Dressing changes] : dressing changes [Skin Care] : skin care [Pressure relief] : pressure relief [Signs and symptoms of infection] : sign and symptoms of infection [How and When to Call] : how and when to call [Off-loading] : off-loading [Patient responsibility to plan of care] : patient responsibility to plan of care [] : Yes [Stable] : stable [Home] : Home [Ambulatory] : Ambulatory [FreeTextEntry2] : Alteration in skin integrity- promote optimal skin integrity\par  [FreeTextEntry4] : Dr Chacko/ Photos taken\par F/U to [facility] in 1 week\par

## 2021-06-17 ENCOUNTER — OUTPATIENT (OUTPATIENT)
Dept: OUTPATIENT SERVICES | Facility: HOSPITAL | Age: 61
LOS: 1 days | Discharge: HOME | End: 2021-06-17
Payer: COMMERCIAL

## 2021-06-17 ENCOUNTER — APPOINTMENT (OUTPATIENT)
Dept: WOUND CARE | Facility: HOSPITAL | Age: 61
End: 2021-06-17
Payer: COMMERCIAL

## 2021-06-17 VITALS
SYSTOLIC BLOOD PRESSURE: 137 MMHG | TEMPERATURE: 97.9 F | OXYGEN SATURATION: 98 % | HEIGHT: 67 IN | DIASTOLIC BLOOD PRESSURE: 87 MMHG | HEART RATE: 73 BPM | RESPIRATION RATE: 18 BRPM | WEIGHT: 222 LBS | BODY MASS INDEX: 34.84 KG/M2

## 2021-06-17 DIAGNOSIS — Z98.89 OTHER SPECIFIED POSTPROCEDURAL STATES: Chronic | ICD-10-CM

## 2021-06-17 DIAGNOSIS — Z90.49 ACQUIRED ABSENCE OF OTHER SPECIFIED PARTS OF DIGESTIVE TRACT: Chronic | ICD-10-CM

## 2021-06-17 DIAGNOSIS — H90.5 UNSPECIFIED SENSORINEURAL HEARING LOSS: ICD-10-CM

## 2021-06-17 PROCEDURE — G0463: CPT

## 2021-06-17 PROCEDURE — 99213 OFFICE O/P EST LOW 20 MIN: CPT

## 2021-06-17 NOTE — PLAN
[FreeTextEntry1] : mary alice, DD\par ag nitrate applied.\par f/u 1 wk\par \par \par \par time spent 25 mins.

## 2021-06-17 NOTE — ASSESSMENT
[Verbal] : Verbal [Demo] : Demo [Patient] : Patient [Good - alert, interested, motivated] : Good - alert, interested, motivated [Verbalizes knowledge/Understanding] : Verbalizes knowledge/understanding [Dressing changes] : dressing changes [Skin Care] : skin care [Pressure relief] : pressure relief [Signs and symptoms of infection] : sign and symptoms of infection [How and When to Call] : how and when to call [Off-loading] : off-loading [Patient responsibility to plan of care] : patient responsibility to plan of care [] : Yes [Stable] : stable [Home] : Home [Ambulatory] : Ambulatory [FreeTextEntry2] : Alteration in skin integrity- promote optimal skin integrity\par  [FreeTextEntry4] : Dr Chacko/ Photos taken\par F/U to [facility] in 1 week\par

## 2021-06-17 NOTE — HISTORY OF PRESENT ILLNESS
[FreeTextEntry1] : 59 yo WM, here for a f/u of a traumatic wound to his RLE which developed in 4/21 when a metal fell onto the leg. Using mary alice. The wound is liyah.

## 2021-06-17 NOTE — PHYSICAL EXAM
[4 x 4] : 4 x 4  [JVD] : no jugular venous distention  [Normal Thyroid] : the thyroid was normal [Normal Breath Sounds] : Normal breath sounds [Normal Heart Sounds] : normal heart sounds [Normal Rate and Rhythm] : normal rate and rhythm [Ankle Swelling (On Exam)] : present [Ankle Swelling On The Right] : mild [Abdomen Masses] : No abdominal massess [Abdomen Tenderness] : ~T ~M No abdominal tenderness [Tender] : nontender [Enlarged] : not enlarged [Alert] : alert [Oriented to Person] : oriented to person [Oriented to Place] : oriented to place [Oriented to Time] : oriented to time [Calm] : calm [de-identified] : adult WM, NAD, alert, Ox3. [FreeTextEntry1] : Right Leg [FreeTextEntry3] : 3.1 [FreeTextEntry2] : 4.5 [FreeTextEntry4] : 0.1 [de-identified] : Small Serosanguineous [de-identified] : Intact [de-identified] : 90% [de-identified] : 1-10% [de-identified] : Karlee/ Dry Dressing [de-identified] : Cleansed with Normal saline\par  [de-identified] : No Dressing [FreeTextEntry7] : Right Leg- Anterior- Dry Eschar- NEW [de-identified] : Cleansed with Normal saline\par  [de-identified] : None [de-identified] : None [de-identified] : Yes [de-identified] : 3x Weekly
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
## 2021-06-18 DIAGNOSIS — Z87.891 PERSONAL HISTORY OF NICOTINE DEPENDENCE: ICD-10-CM
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
## 2021-06-18 DIAGNOSIS — E66.9 OBESITY, UNSPECIFIED: ICD-10-CM
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
## 2021-06-18 DIAGNOSIS — S81.801D UNSPECIFIED OPEN WOUND, RIGHT LOWER LEG, SUBSEQUENT ENCOUNTER: ICD-10-CM

## 2021-06-18 DIAGNOSIS — Z90.49 ACQUIRED ABSENCE OF OTHER SPECIFIED PARTS OF DIGESTIVE TRACT: ICD-10-CM

## 2021-06-18 DIAGNOSIS — Z98.890 OTHER SPECIFIED POSTPROCEDURAL STATES: ICD-10-CM
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
## 2021-06-18 DIAGNOSIS — Z88.2 ALLERGY STATUS TO SULFONAMIDES: ICD-10-CM

## 2021-06-18 DIAGNOSIS — J44.9 CHRONIC OBSTRUCTIVE PULMONARY DISEASE, UNSPECIFIED: ICD-10-CM

## 2021-06-24 ENCOUNTER — OUTPATIENT (OUTPATIENT)
Dept: OUTPATIENT SERVICES | Facility: HOSPITAL | Age: 61
LOS: 1 days | Discharge: HOME | End: 2021-06-24
Payer: COMMERCIAL

## 2021-06-24 ENCOUNTER — APPOINTMENT (OUTPATIENT)
Dept: WOUND CARE | Facility: HOSPITAL | Age: 61
End: 2021-06-24
Payer: COMMERCIAL

## 2021-06-24 VITALS
DIASTOLIC BLOOD PRESSURE: 86 MMHG | WEIGHT: 220 LBS | HEART RATE: 84 BPM | SYSTOLIC BLOOD PRESSURE: 132 MMHG | BODY MASS INDEX: 34.53 KG/M2 | HEIGHT: 67 IN | RESPIRATION RATE: 18 BRPM | TEMPERATURE: 97.8 F | OXYGEN SATURATION: 96 %
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
## 2021-06-24 DIAGNOSIS — Z98.890 OTHER SPECIFIED POSTPROCEDURAL STATES: ICD-10-CM

## 2021-06-24 DIAGNOSIS — L97.801 NON-PRESSURE CHRONIC ULCER OF OTHER PART OF UNSPECIFIED LOWER LEG LIMITED TO BREAKDOWN OF SKIN: ICD-10-CM

## 2021-06-24 DIAGNOSIS — Z88.2 ALLERGY STATUS TO SULFONAMIDES: ICD-10-CM

## 2021-06-24 DIAGNOSIS — E66.9 OBESITY, UNSPECIFIED: ICD-10-CM

## 2021-06-24 DIAGNOSIS — L92.9 GRANULOMATOUS DISORDER OF THE SKIN AND SUBCUTANEOUS TISSUE, UNSPECIFIED: ICD-10-CM

## 2021-06-24 DIAGNOSIS — J44.9 CHRONIC OBSTRUCTIVE PULMONARY DISEASE, UNSPECIFIED: ICD-10-CM
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
## 2021-06-24 DIAGNOSIS — Z90.49 ACQUIRED ABSENCE OF OTHER SPECIFIED PARTS OF DIGESTIVE TRACT: Chronic | ICD-10-CM

## 2021-06-24 DIAGNOSIS — Z98.89 OTHER SPECIFIED POSTPROCEDURAL STATES: Chronic | ICD-10-CM
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
## 2021-06-24 DIAGNOSIS — S81.801A UNSPECIFIED OPEN WOUND, RIGHT LOWER LEG, INITIAL ENCOUNTER: ICD-10-CM

## 2021-06-24 DIAGNOSIS — Z87.891 PERSONAL HISTORY OF NICOTINE DEPENDENCE: ICD-10-CM

## 2021-06-24 DIAGNOSIS — Z90.49 ACQUIRED ABSENCE OF OTHER SPECIFIED PARTS OF DIGESTIVE TRACT: ICD-10-CM
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
## 2021-06-24 PROCEDURE — 99213 OFFICE O/P EST LOW 20 MIN: CPT

## 2021-06-24 PROCEDURE — 17250 CHEM CAUT OF GRANLTJ TISSUE: CPT

## 2021-06-24 NOTE — ASSESSMENT
[Verbal] : Verbal [Written] : Written [Demo] : Demo [Patient] : Patient [Fair - mild discomfort, physical impairment, low acceptance] : Fair - mild discomfort, physical impairment, low acceptance [Needs reinforcement] : needs reinforcement [Dressing changes] : dressing changes [Skin Care] : skin care [Signs and symptoms of infection] : sign and symptoms of infection [Venous Disease] : venous disease [Nutrition] : nutrition [How and When to Call] : how and when to call [Pain Management] : pain management [Patient responsibility to plan of care] : patient responsibility to plan of care [Stable] : stable [Home] : Home [Ambulatory] : Ambulatory [Not Applicable - Long Term Care/Home Health Agency] : Long Term Care/Home Health Agency: Not Applicable [] : No [FreeTextEntry2] : Infection prevention\par Localized wound care \par Goal of remaining pain free regarding wounds.\par Collagen matrix therapy  [FreeTextEntry4] : Follow up in 2 weeks  [FreeTextEntry1] : Right leg laceration is healing, stable, no acute infection\par

## 2021-06-24 NOTE — PHYSICAL EXAM
[Normal Breath Sounds] : Normal breath sounds [Normal Heart Sounds] : normal heart sounds [2+] : left 2+ [Ankle Swelling Bilaterally] : bilaterally  [Alert] : alert [Oriented to Person] : oriented to person [Calm] : calm [4 x 4] : 4 x 4  [Ankle Swelling (On Exam)] : not present [JVD] : no jugular venous distention  [Varicose Veins Of Lower Extremities] : not present [] : not present [de-identified] : WD/WN in no acute distress. [de-identified] : Within Paula Limits. [de-identified] : Within Paula Limits. [de-identified] : Within Paula Limits. [de-identified] : Right leg wound is clean, base is covered with hypergranulation tissue, no drainage, no odor, no acute infection, periwound skin is intact with no cellulitis. [FreeTextEntry1] : Right leg  [FreeTextEntry2] : 3.5 [FreeTextEntry3] : 2.8 [FreeTextEntry4] : 0.1 [de-identified] : With some hypergranulation  [de-identified] : Karlee  [de-identified] : Cleansed with NS\par Cloth tape \par \par ** AgNo3 used by MD [de-identified] : None [de-identified] : Normal [de-identified] : None [de-identified] : 100% [de-identified] : No [de-identified] : Every other day [de-identified] : Primary Dressing

## 2021-06-24 NOTE — PLAN
[FreeTextEntry1] : Laceration was treated with two Silver Nitrate sticks, mary alice, dry dressing, return to office in two weeks.\par 25 minutes spent for patient care and medical decision making.\par

## 2021-07-08 ENCOUNTER — APPOINTMENT (OUTPATIENT)
Dept: WOUND CARE | Facility: HOSPITAL | Age: 61
End: 2021-07-08
Payer: COMMERCIAL

## 2021-07-08 ENCOUNTER — OUTPATIENT (OUTPATIENT)
Dept: OUTPATIENT SERVICES | Facility: HOSPITAL | Age: 61
LOS: 1 days | Discharge: HOME | End: 2021-07-08
Payer: COMMERCIAL

## 2021-07-08 VITALS
HEART RATE: 83 BPM | SYSTOLIC BLOOD PRESSURE: 136 MMHG | WEIGHT: 220 LBS | TEMPERATURE: 97.6 F | RESPIRATION RATE: 18 BRPM | BODY MASS INDEX: 34.53 KG/M2 | HEIGHT: 67 IN | DIASTOLIC BLOOD PRESSURE: 90 MMHG | OXYGEN SATURATION: 93 %

## 2021-07-08 DIAGNOSIS — L97.801 NON-PRESSURE CHRONIC ULCER OF OTHER PART OF UNSPECIFIED LOWER LEG LIMITED TO BREAKDOWN OF SKIN: ICD-10-CM

## 2021-07-08 DIAGNOSIS — Z90.49 ACQUIRED ABSENCE OF OTHER SPECIFIED PARTS OF DIGESTIVE TRACT: Chronic | ICD-10-CM

## 2021-07-08 DIAGNOSIS — Z98.89 OTHER SPECIFIED POSTPROCEDURAL STATES: Chronic | ICD-10-CM

## 2021-07-08 PROCEDURE — 17250 CHEM CAUT OF GRANLTJ TISSUE: CPT

## 2021-07-08 PROCEDURE — 99213 OFFICE O/P EST LOW 20 MIN: CPT

## 2021-07-08 NOTE — PLAN
[FreeTextEntry1] : Laceration was treated with one  Silver Nitrate sticks, mary alice, dry dressing QD, return to office in two weeks.\par 25 minutes spent for patient care and medical decision making.\par

## 2021-07-08 NOTE — PHYSICAL EXAM
[4 x 4] : 4 x 4  [Normal Breath Sounds] : Normal breath sounds [Normal Heart Sounds] : normal heart sounds [2+] : left 2+ [Ankle Swelling Bilaterally] : bilaterally  [Alert] : alert [Oriented to Person] : oriented to person [Calm] : calm [JVD] : no jugular venous distention  [Ankle Swelling (On Exam)] : not present [Varicose Veins Of Lower Extremities] : not present [] : not present [de-identified] : WD/WN in no acute distress. [de-identified] : Within Paula Limits. [de-identified] : Within Paula Limits. [de-identified] : Within Paula Limits. [de-identified] : Right leg wound is clean, base is covered with hypergranulation tissue, no drainage, no odor, no acute infection, periwound skin is intact with no cellulitis. [FreeTextEntry1] : Right leg  [FreeTextEntry2] : 2.7 [FreeTextEntry3] : 2.3 [FreeTextEntry4] : Hypergranular [de-identified] : Mingob [de-identified] : Karlee  [de-identified] : Cleansed with Normal Saline \par Cloth Tape \par \par ** AgNo3 used by MD [de-identified] : Normal [de-identified] : None [de-identified] : None [de-identified] : 100% [de-identified] : No [de-identified] : Every other day [de-identified] : Primary Dressing

## 2021-07-08 NOTE — ASSESSMENT
[Verbal] : Verbal [Written] : Written [Patient] : Patient [Good - alert, interested, motivated] : Good - alert, interested, motivated [Verbalizes knowledge/Understanding] : Verbalizes knowledge/understanding [Dressing changes] : dressing changes [] : Yes [Stable] : stable [Home] : Home [Ambulatory] : Ambulatory [Not Applicable - Long Term Care/Home Health Agency] : Long Term Care/Home Health Agency: Not Applicable [FreeTextEntry2] : Infection Prevention\par Localized Wound Care\par Pressure Relief\par Promote Optimal Skin Integrity\par Collagen Matrix\par Maintain acceptable pain level with use of pharmacological and nonpharmacological interventions  \par . [FreeTextEntry4] : F/U to [facility] for an assessment in Two Weeks

## 2021-07-08 NOTE — HISTORY OF PRESENT ILLNESS
[FreeTextEntry1] : Right leg laceration is clean, no C/O\par 7/8/21 wound with hypertrophic granulation but clean and smaller

## 2021-07-08 NOTE — VITALS
[] : No [de-identified] : Pt rates pain 0/10.  Patient denies any pain or discomfort at the present
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
## 2021-07-09 DIAGNOSIS — S81.801A UNSPECIFIED OPEN WOUND, RIGHT LOWER LEG, INITIAL ENCOUNTER: ICD-10-CM

## 2021-07-09 DIAGNOSIS — E66.9 OBESITY, UNSPECIFIED: ICD-10-CM

## 2021-07-09 DIAGNOSIS — L92.9 GRANULOMATOUS DISORDER OF THE SKIN AND SUBCUTANEOUS TISSUE, UNSPECIFIED: ICD-10-CM

## 2021-07-09 DIAGNOSIS — Z88.2 ALLERGY STATUS TO SULFONAMIDES: ICD-10-CM
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
## 2021-07-09 DIAGNOSIS — Z87.891 PERSONAL HISTORY OF NICOTINE DEPENDENCE: ICD-10-CM

## 2021-07-09 DIAGNOSIS — J44.9 CHRONIC OBSTRUCTIVE PULMONARY DISEASE, UNSPECIFIED: ICD-10-CM

## 2021-07-09 DIAGNOSIS — Z98.890 OTHER SPECIFIED POSTPROCEDURAL STATES: ICD-10-CM

## 2021-07-09 DIAGNOSIS — Z90.49 ACQUIRED ABSENCE OF OTHER SPECIFIED PARTS OF DIGESTIVE TRACT: ICD-10-CM
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
## 2021-07-22 ENCOUNTER — APPOINTMENT (OUTPATIENT)
Dept: WOUND CARE | Facility: HOSPITAL | Age: 61
End: 2021-07-22
Payer: COMMERCIAL

## 2021-07-22 ENCOUNTER — OUTPATIENT (OUTPATIENT)
Dept: OUTPATIENT SERVICES | Facility: HOSPITAL | Age: 61
LOS: 1 days | Discharge: HOME | End: 2021-07-22
Payer: COMMERCIAL

## 2021-07-22 VITALS
OXYGEN SATURATION: 95 % | BODY MASS INDEX: 34.53 KG/M2 | HEIGHT: 67 IN | RESPIRATION RATE: 18 BRPM | DIASTOLIC BLOOD PRESSURE: 81 MMHG | TEMPERATURE: 99.4 F | HEART RATE: 77 BPM | WEIGHT: 220 LBS | SYSTOLIC BLOOD PRESSURE: 139 MMHG

## 2021-07-22 DIAGNOSIS — Z88.2 ALLERGY STATUS TO SULFONAMIDES: ICD-10-CM

## 2021-07-22 DIAGNOSIS — S81.801D UNSPECIFIED OPEN WOUND, RIGHT LOWER LEG, SUBSEQUENT ENCOUNTER: ICD-10-CM
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
## 2021-07-22 DIAGNOSIS — L97.801 NON-PRESSURE CHRONIC ULCER OF OTHER PART OF UNSPECIFIED LOWER LEG LIMITED TO BREAKDOWN OF SKIN: ICD-10-CM

## 2021-07-22 DIAGNOSIS — Z90.49 ACQUIRED ABSENCE OF OTHER SPECIFIED PARTS OF DIGESTIVE TRACT: ICD-10-CM

## 2021-07-22 DIAGNOSIS — Z90.49 ACQUIRED ABSENCE OF OTHER SPECIFIED PARTS OF DIGESTIVE TRACT: Chronic | ICD-10-CM
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
## 2021-07-22 DIAGNOSIS — Z98.89 OTHER SPECIFIED POSTPROCEDURAL STATES: Chronic | ICD-10-CM

## 2021-07-22 DIAGNOSIS — J44.9 CHRONIC OBSTRUCTIVE PULMONARY DISEASE, UNSPECIFIED: ICD-10-CM

## 2021-07-22 DIAGNOSIS — Z87.891 PERSONAL HISTORY OF NICOTINE DEPENDENCE: ICD-10-CM
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
## 2021-07-22 DIAGNOSIS — Z98.890 OTHER SPECIFIED POSTPROCEDURAL STATES: ICD-10-CM

## 2021-07-22 PROCEDURE — G0463: CPT

## 2021-07-22 PROCEDURE — 99213 OFFICE O/P EST LOW 20 MIN: CPT

## 2021-07-22 RX ORDER — CLOTRIMAZOLE AND BETAMETHASONE DIPROPIONATE 10; .5 MG/G; MG/G
1-0.05 CREAM TOPICAL
Qty: 2 | Refills: 4 | Status: ACTIVE | COMMUNITY
Start: 2021-07-22 | End: 1900-01-01

## 2021-07-22 NOTE — ASSESSMENT
[FreeTextEntry2] : Restore Skin Integrity\par Infection Control\par Localized wound care\par Maintain acceptable pain levels at satisfactory relief.\par Demonstrates use of both nonpharmacological and pharmacological pain relief strategies [FreeTextEntry4] : Photos Taken\par F/U to [facility] in 2 weeks

## 2021-07-22 NOTE — PLAN
[FreeTextEntry1] :  mary alice, dry dressing QD, \par lotrisone to periwound skin\par return to office in two weeks.\par 25 minutes spent for patient care and medical decision making.\par

## 2021-07-22 NOTE — PHYSICAL EXAM
[Normal Breath Sounds] : Normal breath sounds [Normal Heart Sounds] : normal heart sounds [2+] : left 2+ [Ankle Swelling Bilaterally] : bilaterally  [Alert] : alert [Oriented to Person] : oriented to person [Calm] : calm [2 x 2] : 2 x 2  [JVD] : no jugular venous distention  [Ankle Swelling (On Exam)] : not present [Varicose Veins Of Lower Extremities] : not present [] : not present [de-identified] : WD/WN in no acute distress. [de-identified] : Within Paula Limits. [de-identified] : Within Paula Limits. [de-identified] : Within Paula Limits. [de-identified] : Right leg wound is clean, base is covered with hypergranulation tissue, no drainage, no odor, no acute infection, periwound skin is intact with no cellulitis. [FreeTextEntry1] : Right leg  [FreeTextEntry2] : 1.6 [FreeTextEntry3] : 1.4 [FreeTextEntry4] : 0.1 [de-identified] : Serosanguineous [de-identified] : Intact/ Dry Skin [de-identified] : Karlee  [de-identified] : Cleansed with Normal Saline \par Cloth Tape \par Lotrisone to Periwound [TWNoteComboBox4] : None [TWNoteComboBox5] : Yes [de-identified] : No [de-identified] : Erythema [de-identified] : None [de-identified] : None [de-identified] : 100% [de-identified] : No [de-identified] : 3x Weekly [de-identified] : Primary Dressing

## 2021-07-22 NOTE — HISTORY OF PRESENT ILLNESS
[FreeTextEntry1] : Right leg laceration is clean, no C/O\par 7/8/21 wound with hypertrophic granulation but clean and smaller\par 7/22/21 wound much smaller. C/O itching with mild erythema. Lotrisone started

## 2021-08-05 ENCOUNTER — APPOINTMENT (OUTPATIENT)
Dept: WOUND CARE | Facility: HOSPITAL | Age: 61
End: 2021-08-05

## 2021-08-05 ENCOUNTER — OUTPATIENT (OUTPATIENT)
Dept: OUTPATIENT SERVICES | Facility: HOSPITAL | Age: 61
LOS: 1 days | Discharge: HOME | End: 2021-08-05
Payer: COMMERCIAL

## 2021-08-05 ENCOUNTER — APPOINTMENT (OUTPATIENT)
Dept: WOUND CARE | Facility: HOSPITAL | Age: 61
End: 2021-08-05
Payer: COMMERCIAL

## 2021-08-05 VITALS
DIASTOLIC BLOOD PRESSURE: 85 MMHG | BODY MASS INDEX: 34.53 KG/M2 | WEIGHT: 220 LBS | SYSTOLIC BLOOD PRESSURE: 143 MMHG | HEIGHT: 67 IN | TEMPERATURE: 98.6 F | RESPIRATION RATE: 18 BRPM | OXYGEN SATURATION: 96 % | HEART RATE: 78 BPM

## 2021-08-05 DIAGNOSIS — L97.801 NON-PRESSURE CHRONIC ULCER OF OTHER PART OF UNSPECIFIED LOWER LEG LIMITED TO BREAKDOWN OF SKIN: ICD-10-CM

## 2021-08-05 DIAGNOSIS — Z90.49 ACQUIRED ABSENCE OF OTHER SPECIFIED PARTS OF DIGESTIVE TRACT: Chronic | ICD-10-CM

## 2021-08-05 DIAGNOSIS — Z98.89 OTHER SPECIFIED POSTPROCEDURAL STATES: Chronic | ICD-10-CM

## 2021-08-05 PROCEDURE — G0463: CPT

## 2021-08-05 PROCEDURE — 99213 OFFICE O/P EST LOW 20 MIN: CPT

## 2021-08-05 RX ORDER — COLLAGENASE SANTYL 250 [ARB'U]/G
250 OINTMENT TOPICAL DAILY
Qty: 4 | Refills: 2 | Status: DISCONTINUED | COMMUNITY
Start: 2021-05-20 | End: 2021-08-05

## 2021-08-05 RX ORDER — MUPIROCIN 2 G/100G
2 CREAM TOPICAL
Qty: 1 | Refills: 0 | Status: ACTIVE | COMMUNITY
Start: 2021-08-05 | End: 1900-01-01
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
## 2021-08-06 DIAGNOSIS — Z87.891 PERSONAL HISTORY OF NICOTINE DEPENDENCE: ICD-10-CM

## 2021-08-06 DIAGNOSIS — J44.9 CHRONIC OBSTRUCTIVE PULMONARY DISEASE, UNSPECIFIED: ICD-10-CM

## 2021-08-06 DIAGNOSIS — Z82.49 FAMILY HISTORY OF ISCHEMIC HEART DISEASE AND OTHER DISEASES OF THE CIRCULATORY SYSTEM: ICD-10-CM

## 2021-08-06 DIAGNOSIS — Z88.2 ALLERGY STATUS TO SULFONAMIDES: ICD-10-CM

## 2021-08-06 DIAGNOSIS — S81.801D UNSPECIFIED OPEN WOUND, RIGHT LOWER LEG, SUBSEQUENT ENCOUNTER: ICD-10-CM

## 2021-08-06 DIAGNOSIS — Z98.890 OTHER SPECIFIED POSTPROCEDURAL STATES: ICD-10-CM

## 2021-08-06 DIAGNOSIS — Z90.49 ACQUIRED ABSENCE OF OTHER SPECIFIED PARTS OF DIGESTIVE TRACT: ICD-10-CM
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
## 2021-08-06 NOTE — ASSESSMENT
[FreeTextEntry2] : Restore Skin Integrity\par Infection Control\par Localized wound care\par Maintain acceptable pain levels at satisfactory relief.\par Demonstrates use of both nonpharmacological and pharmacological pain relief strategies [FreeTextEntry4] : Photos Taken\par F/U to [facility] in 2 weeks

## 2021-08-06 NOTE — PLAN
[FreeTextEntry1] : 60M s/p RLE wound, healing well. \par \par -mary alice to remaining portion of open wound\par -bactroban to excoriated areas\par -continue RLE elevation\par -follow up in 2 weeks\par \par 20 minutes spent with patient making clinical decisions and coordinating care

## 2021-08-06 NOTE — HISTORY OF PRESENT ILLNESS
[FreeTextEntry1] : 60M with RLE laceration in April 2021. Has been treating with Karlee with significant improvement in overall size. Earlier this week patient roughly rubbed his leg with a towel after showering and caused 2 new superficial abrasions with small hematoma.

## 2021-08-13 RX ORDER — MUPIROCIN 20 MG/G
2 OINTMENT TOPICAL
Qty: 1 | Refills: 0 | Status: ACTIVE | COMMUNITY
Start: 2021-08-13 | End: 1900-01-01

## 2021-08-19 ENCOUNTER — OUTPATIENT (OUTPATIENT)
Dept: OUTPATIENT SERVICES | Facility: HOSPITAL | Age: 61
LOS: 1 days | Discharge: HOME | End: 2021-08-19
Payer: COMMERCIAL

## 2021-08-19 ENCOUNTER — APPOINTMENT (OUTPATIENT)
Dept: WOUND CARE | Facility: HOSPITAL | Age: 61
End: 2021-08-19
Payer: COMMERCIAL

## 2021-08-19 DIAGNOSIS — Z82.49 FAMILY HISTORY OF ISCHEMIC HEART DISEASE AND OTHER DISEASES OF THE CIRCULATORY SYSTEM: ICD-10-CM

## 2021-08-19 DIAGNOSIS — Z90.49 ACQUIRED ABSENCE OF OTHER SPECIFIED PARTS OF DIGESTIVE TRACT: Chronic | ICD-10-CM
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
## 2021-08-19 DIAGNOSIS — Z87.891 PERSONAL HISTORY OF NICOTINE DEPENDENCE: ICD-10-CM
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
## 2021-08-19 DIAGNOSIS — S81.801D UNSPECIFIED OPEN WOUND, RIGHT LOWER LEG, SUBSEQUENT ENCOUNTER: ICD-10-CM

## 2021-08-19 DIAGNOSIS — Z90.49 ACQUIRED ABSENCE OF OTHER SPECIFIED PARTS OF DIGESTIVE TRACT: ICD-10-CM

## 2021-08-19 DIAGNOSIS — J44.9 CHRONIC OBSTRUCTIVE PULMONARY DISEASE, UNSPECIFIED: ICD-10-CM

## 2021-08-19 DIAGNOSIS — Z98.89 OTHER SPECIFIED POSTPROCEDURAL STATES: Chronic | ICD-10-CM

## 2021-08-19 DIAGNOSIS — Z88.2 ALLERGY STATUS TO SULFONAMIDES: ICD-10-CM
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
## 2021-08-19 DIAGNOSIS — L97.801 NON-PRESSURE CHRONIC ULCER OF OTHER PART OF UNSPECIFIED LOWER LEG LIMITED TO BREAKDOWN OF SKIN: ICD-10-CM

## 2021-08-19 DIAGNOSIS — Z98.890 OTHER SPECIFIED POSTPROCEDURAL STATES: ICD-10-CM

## 2021-08-19 PROCEDURE — G0463: CPT

## 2021-08-19 PROCEDURE — 99213 OFFICE O/P EST LOW 20 MIN: CPT

## 2021-08-31 NOTE — HISTORY OF PRESENT ILLNESS
[FreeTextEntry1] : 60 yo WM, here for f/u of a traumatic wound to his RLE which he sustained in 4/21. Using mary alice and almost completely healed.

## 2021-08-31 NOTE — ASSESSMENT
[Verbal] : Verbal [Patient] : Patient [Good - alert, interested, motivated] : Good - alert, interested, motivated [Verbalizes knowledge/Understanding] : Verbalizes knowledge/understanding [Dressing changes] : dressing changes [Skin Care] : skin care [Signs and symptoms of infection] : sign and symptoms of infection [How and When to Call] : how and when to call [Patient responsibility to plan of care] : patient responsibility to plan of care [] : Yes [Stable] : stable [Home] : Home [Ambulatory] : Ambulatory [Not Applicable - Long Term Care/Home Health Agency] : Long Term Care/Home Health Agency: Not Applicable [FreeTextEntry2] : Restore Skin Integrity\par Infection Control\par Localized wound care\par Maintain acceptable pain levels at satisfactory relief.\par Demonstrates use of both nonpharmacological and pharmacological pain relief strategies [FreeTextEntry4] : F/U to [facility] in 2 weeks

## 2021-08-31 NOTE — PLAN
[FreeTextEntry1] : mary alice, Dd\par compression stockings.\par f/u 2 wks.\par \par time spent 20 mins.

## 2021-08-31 NOTE — ADDENDUM
[FreeTextEntry1] : Vitals not entered at time of visit due to error. Vital signs were within normal limits and patient was stable during their visit at [facility].\par

## 2021-08-31 NOTE — PHYSICAL EXAM
[Normal Thyroid] : the thyroid was normal [Normal Breath Sounds] : Normal breath sounds [Normal Heart Sounds] : normal heart sounds [Normal Rate and Rhythm] : normal rate and rhythm [Ankle Swelling (On Exam)] : present [Ankle Swelling On The Right] : of the right ankle [Ankle Swelling On The Left] : moderate [Alert] : alert [Oriented to Place] : oriented to place [Oriented to Person] : oriented to person [Oriented to Time] : oriented to time [Calm] : calm [JVD] : no jugular venous distention  [Varicose Veins Of Lower Extremities] : not present [] : not present [Abdomen Masses] : No abdominal massess [Abdomen Tenderness] : ~T ~M No abdominal tenderness [Tender] : nontender [Enlarged] : not enlarged [de-identified] : adult WM, NAD, alert, Ox3. [FreeTextEntry1] : Right Leg [FreeTextEntry3] : 0.3 [FreeTextEntry2] : 0.2 [FreeTextEntry4] : 0.1 [de-identified] : Scant Serosanguineous [de-identified] : Small scattered excoriation [de-identified] : Karlee [de-identified] : Cleansed with Normal Saline [TWNoteComboBox5] : No [de-identified] : No [de-identified] : None [de-identified] : None [de-identified] : 100% [de-identified] : No [de-identified] : 3x Weekly

## 2021-09-02 ENCOUNTER — APPOINTMENT (OUTPATIENT)
Dept: WOUND CARE | Facility: HOSPITAL | Age: 61
End: 2021-09-02
Payer: COMMERCIAL

## 2021-09-02 ENCOUNTER — OUTPATIENT (OUTPATIENT)
Dept: OUTPATIENT SERVICES | Facility: HOSPITAL | Age: 61
LOS: 1 days | Discharge: HOME | End: 2021-09-02
Payer: COMMERCIAL

## 2021-09-02 VITALS
HEART RATE: 83 BPM | HEIGHT: 67 IN | SYSTOLIC BLOOD PRESSURE: 144 MMHG | BODY MASS INDEX: 34.53 KG/M2 | OXYGEN SATURATION: 94 % | RESPIRATION RATE: 18 BRPM | WEIGHT: 220 LBS | DIASTOLIC BLOOD PRESSURE: 91 MMHG | TEMPERATURE: 97.8 F

## 2021-09-02 DIAGNOSIS — Z82.49 FAMILY HISTORY OF ISCHEMIC HEART DISEASE AND OTHER DISEASES OF THE CIRCULATORY SYSTEM: ICD-10-CM

## 2021-09-02 DIAGNOSIS — Z98.890 OTHER SPECIFIED POSTPROCEDURAL STATES: ICD-10-CM
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
## 2021-09-02 DIAGNOSIS — Z90.49 ACQUIRED ABSENCE OF OTHER SPECIFIED PARTS OF DIGESTIVE TRACT: Chronic | ICD-10-CM
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
## 2021-09-02 DIAGNOSIS — S81.801D UNSPECIFIED OPEN WOUND, RIGHT LOWER LEG, SUBSEQUENT ENCOUNTER: ICD-10-CM
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
## 2021-09-02 DIAGNOSIS — E66.9 OBESITY, UNSPECIFIED: ICD-10-CM

## 2021-09-02 DIAGNOSIS — L97.801 NON-PRESSURE CHRONIC ULCER OF OTHER PART OF UNSPECIFIED LOWER LEG LIMITED TO BREAKDOWN OF SKIN: ICD-10-CM

## 2021-09-02 DIAGNOSIS — Z87.891 PERSONAL HISTORY OF NICOTINE DEPENDENCE: ICD-10-CM

## 2021-09-02 DIAGNOSIS — Z90.49 ACQUIRED ABSENCE OF OTHER SPECIFIED PARTS OF DIGESTIVE TRACT: ICD-10-CM

## 2021-09-02 DIAGNOSIS — J44.9 CHRONIC OBSTRUCTIVE PULMONARY DISEASE, UNSPECIFIED: ICD-10-CM

## 2021-09-02 DIAGNOSIS — Z98.89 OTHER SPECIFIED POSTPROCEDURAL STATES: Chronic | ICD-10-CM
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
## 2021-09-02 DIAGNOSIS — Z88.2 ALLERGY STATUS TO SULFONAMIDES: ICD-10-CM

## 2021-09-02 PROCEDURE — 99213 OFFICE O/P EST LOW 20 MIN: CPT

## 2021-09-02 PROCEDURE — G0463: CPT

## 2021-09-02 NOTE — ASSESSMENT
[Verbal] : Verbal [Patient] : Patient [Good - alert, interested, motivated] : Good - alert, interested, motivated [Verbalizes knowledge/Understanding] : Verbalizes knowledge/understanding [Dressing changes] : dressing changes [Skin Care] : skin care [Signs and symptoms of infection] : sign and symptoms of infection [How and When to Call] : how and when to call [Patient responsibility to plan of care] : patient responsibility to plan of care [] : Yes [Stable] : stable [Home] : Home [Ambulatory] : Ambulatory [Not Applicable - Long Term Care/Home Health Agency] : Long Term Care/Home Health Agency: Not Applicable [FreeTextEntry2] : Restore Skin Integrity\par Infection Control\par Localized wound care\par Maintain acceptable pain levels at satisfactory relief.\par Demonstrates use of both nonpharmacological and pharmacological pain relief strategies\par F/U 1 month  [FreeTextEntry4] : F/U 1 month

## 2021-09-02 NOTE — VITALS
[Pain related to present condition?] : The patient's  pain is not related to present condition. [] : No [de-identified] : 0

## 2021-09-02 NOTE — PLAN
[FreeTextEntry1] : DD right lower leg\par compression stockings.\par f/u 1 mo\par \par time spent 20 mins.

## 2021-09-02 NOTE — HISTORY OF PRESENT ILLNESS
[FreeTextEntry1] : 60 yo WM, here for f/u of a traumatic wound to his RLE which he sustained in 4/21. Using mary alice and almost completely healed.\par \par 9/2/21 wound closed except for 1 small scab. patient to wear compression

## 2021-09-02 NOTE — PHYSICAL EXAM
[JVD] : no jugular venous distention  [Normal Thyroid] : the thyroid was normal [Normal Breath Sounds] : Normal breath sounds [Normal Heart Sounds] : normal heart sounds [Normal Rate and Rhythm] : normal rate and rhythm [Ankle Swelling (On Exam)] : present [Ankle Swelling On The Right] : of the right ankle [Ankle Swelling On The Left] : moderate [Varicose Veins Of Lower Extremities] : not present [] : not present [Abdomen Masses] : No abdominal massess [Abdomen Tenderness] : ~T ~M No abdominal tenderness [Tender] : nontender [Enlarged] : not enlarged [Alert] : alert [Oriented to Person] : oriented to person [Oriented to Place] : oriented to place [Oriented to Time] : oriented to time [Calm] : calm [de-identified] : adult WM, NAD, alert, Ox3. [FreeTextEntry1] :  Leg- eschar [de-identified] : No treatment required [de-identified] : Cleansed with Normal Saline [TWNoteComboBox1] : Right [TWNoteComboBox4] : None [TWNoteComboBox5] : No [de-identified] : No [de-identified] : Normal [de-identified] : None [de-identified] : None [de-identified] : 100% [de-identified] : No [de-identified] : 3x Weekly [de-identified] : Primary Dressing

## 2021-10-05 ENCOUNTER — OUTPATIENT (OUTPATIENT)
Dept: OUTPATIENT SERVICES | Facility: HOSPITAL | Age: 61
LOS: 1 days | Discharge: HOME | End: 2021-10-05
Payer: COMMERCIAL

## 2021-10-05 ENCOUNTER — APPOINTMENT (OUTPATIENT)
Dept: WOUND CARE | Facility: HOSPITAL | Age: 61
End: 2021-10-05
Payer: COMMERCIAL

## 2021-10-05 VITALS
HEIGHT: 67 IN | WEIGHT: 220 LBS | TEMPERATURE: 98.1 F | DIASTOLIC BLOOD PRESSURE: 91 MMHG | SYSTOLIC BLOOD PRESSURE: 151 MMHG | BODY MASS INDEX: 34.53 KG/M2 | OXYGEN SATURATION: 96 % | RESPIRATION RATE: 18 BRPM | HEART RATE: 72 BPM
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
## 2021-10-05 DIAGNOSIS — Z98.89 OTHER SPECIFIED POSTPROCEDURAL STATES: Chronic | ICD-10-CM

## 2021-10-05 DIAGNOSIS — Z87.891 PERSONAL HISTORY OF NICOTINE DEPENDENCE: ICD-10-CM

## 2021-10-05 DIAGNOSIS — Z82.49 FAMILY HISTORY OF ISCHEMIC HEART DISEASE AND OTHER DISEASES OF THE CIRCULATORY SYSTEM: ICD-10-CM
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
## 2021-10-05 DIAGNOSIS — Z90.49 ACQUIRED ABSENCE OF OTHER SPECIFIED PARTS OF DIGESTIVE TRACT: ICD-10-CM
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
## 2021-10-05 DIAGNOSIS — S81.801D UNSPECIFIED OPEN WOUND, RIGHT LOWER LEG, SUBSEQUENT ENCOUNTER: ICD-10-CM

## 2021-10-05 DIAGNOSIS — J44.9 CHRONIC OBSTRUCTIVE PULMONARY DISEASE, UNSPECIFIED: ICD-10-CM

## 2021-10-05 DIAGNOSIS — E66.9 OBESITY, UNSPECIFIED: ICD-10-CM

## 2021-10-05 DIAGNOSIS — Z98.890 OTHER SPECIFIED POSTPROCEDURAL STATES: ICD-10-CM

## 2021-10-05 DIAGNOSIS — Z88.2 ALLERGY STATUS TO SULFONAMIDES: ICD-10-CM

## 2021-10-05 DIAGNOSIS — Z90.49 ACQUIRED ABSENCE OF OTHER SPECIFIED PARTS OF DIGESTIVE TRACT: Chronic | ICD-10-CM

## 2021-10-05 PROCEDURE — G0463: CPT

## 2021-10-05 PROCEDURE — 99213 OFFICE O/P EST LOW 20 MIN: CPT

## 2021-10-05 NOTE — HISTORY OF PRESENT ILLNESS
[FreeTextEntry1] : 60 yo WM, here for f/u of a traumatic wound to his RLE which he sustained in 4/21. Using mary alice and almost completely healed.\par \par 9/2/21 wound closed except for 1 small scab. patient to wear compression\par 10/5/21 wound remains closed. Patient discharged

## 2021-10-05 NOTE — ASSESSMENT
[Verbal] : Verbal [Patient] : Patient [Good - alert, interested, motivated] : Good - alert, interested, motivated [Verbalizes knowledge/Understanding] : Verbalizes knowledge/understanding [Skin Care] : skin care [Signs and symptoms of infection] : sign and symptoms of infection [How and When to Call] : how and when to call [Patient responsibility to plan of care] : patient responsibility to plan of care [] : Yes [Stable] : stable [Home] : Home [Ambulatory] : Ambulatory [Discharge Planning] : discharge planning [FreeTextEntry2] : Maintain Skin Integrity  [FreeTextEntry4] : Pt Discharged from [facility]

## 2021-10-05 NOTE — PHYSICAL EXAM
[JVD] : no jugular venous distention  [Normal Thyroid] : the thyroid was normal [Normal Breath Sounds] : Normal breath sounds [Normal Heart Sounds] : normal heart sounds [Normal Rate and Rhythm] : normal rate and rhythm [Ankle Swelling (On Exam)] : present [Ankle Swelling On The Right] : of the right ankle [Ankle Swelling On The Left] : moderate [Varicose Veins Of Lower Extremities] : not present [] : not present [Abdomen Masses] : No abdominal massess [Abdomen Tenderness] : ~T ~M No abdominal tenderness [Tender] : nontender [Enlarged] : not enlarged [Alert] : alert [Oriented to Person] : oriented to person [Oriented to Place] : oriented to place [Oriented to Time] : oriented to time [Calm] : calm [de-identified] : adult WM, NAD, alert, Ox3. [FreeTextEntry1] :  Leg- Resolved [de-identified] : No treatment required [de-identified] : Cleansed with Normal Saline [TWNoteComboBox1] : Right [TWNoteComboBox4] : None [TWNoteComboBox5] : No [de-identified] : No [de-identified] : Normal [de-identified] : None [de-identified] : None [de-identified] : 100% [de-identified] : No

## 2021-10-05 NOTE — HISTORY OF PRESENT ILLNESS
[FreeTextEntry1] : 62 yo WM, here for f/u of a traumatic wound to his RLE which he sustained in 4/21. Using mary alice and almost completely healed.\par \par 9/2/21 wound closed except for 1 small scab. patient to wear compression\par 10/5/21 wound remains closed. Patient discharged

## 2021-10-05 NOTE — PHYSICAL EXAM
[JVD] : no jugular venous distention  [Normal Thyroid] : the thyroid was normal [Normal Breath Sounds] : Normal breath sounds [Normal Heart Sounds] : normal heart sounds [Normal Rate and Rhythm] : normal rate and rhythm [Ankle Swelling (On Exam)] : present [Ankle Swelling On The Right] : of the right ankle [Ankle Swelling On The Left] : moderate [Varicose Veins Of Lower Extremities] : not present [] : not present [Abdomen Masses] : No abdominal massess [Abdomen Tenderness] : ~T ~M No abdominal tenderness [Tender] : nontender [Enlarged] : not enlarged [Alert] : alert [Oriented to Person] : oriented to person [Oriented to Place] : oriented to place [Oriented to Time] : oriented to time [Calm] : calm [de-identified] : adult WM, NAD, alert, Ox3. [FreeTextEntry1] :  Leg- Resolved [de-identified] : No treatment required [de-identified] : Cleansed with Normal Saline [TWNoteComboBox1] : Right [TWNoteComboBox4] : None [TWNoteComboBox5] : No [de-identified] : No [de-identified] : Normal [de-identified] : None [de-identified] : None [de-identified] : 100% [de-identified] : No

## 2022-10-27 ENCOUNTER — APPOINTMENT (OUTPATIENT)
Dept: SURGERY | Facility: CLINIC | Age: 62
End: 2022-10-27

## 2022-10-27 VITALS
BODY MASS INDEX: 35.31 KG/M2 | OXYGEN SATURATION: 95 % | HEIGHT: 67 IN | WEIGHT: 225 LBS | TEMPERATURE: 97.5 F | DIASTOLIC BLOOD PRESSURE: 93 MMHG | HEART RATE: 77 BPM | SYSTOLIC BLOOD PRESSURE: 165 MMHG

## 2022-10-27 DIAGNOSIS — Z82.49 FAMILY HISTORY OF ISCHEMIC HEART DISEASE AND OTHER DISEASES OF THE CIRCULATORY SYSTEM: ICD-10-CM

## 2022-10-27 PROCEDURE — 21501 I&D DP ABSC/HMTMA SFT TS NCK: CPT | Mod: 59

## 2022-10-27 PROCEDURE — 99202 OFFICE O/P NEW SF 15 MIN: CPT | Mod: 25

## 2022-10-27 NOTE — HISTORY OF PRESENT ILLNESS
[de-identified] : posterior neck abscess [de-identified] : 62 year old white male who presents c/o pain and swelling of his neck for the past few days. Pt states he has had multiple small lumps on his neck that have not changed until this past week when this one suddenly increased in size and became red. He has not had any drainage from the area. He denies any fevers or chills. He was seen by his PMD two days ago and was started on antibiotics.

## 2022-10-27 NOTE — ASSESSMENT
[FreeTextEntry1] : Pt had an incision and drainage of his neck abscess with pus and sebum. The incision was packed open and a DSD was placed

## 2022-10-27 NOTE — PHYSICAL EXAM
[JVD] : no jugular venous distention  [Normal Thyroid] : the thyroid was normal [Carotid Bruits] : no carotid bruits [Normal Breath Sounds] : Normal breath sounds [Normal Heart Sounds] : normal heart sounds [Normal Rate and Rhythm] : normal rate and rhythm [No Rash or Lesion] : No rash or lesion [Alert] : alert [Oriented to Person] : oriented to person [Oriented to Place] : oriented to place [Oriented to Time] : oriented to time [Calm] : calm [de-identified] : well developed white male in no distress [de-identified] : noninjected and nonicteric [de-identified] : two cysts of neck, one abscess 3 by 3 cm [de-identified] : benign [de-identified] : without calf pain or swelling

## 2022-10-31 ENCOUNTER — APPOINTMENT (OUTPATIENT)
Dept: SURGERY | Facility: CLINIC | Age: 62
End: 2022-10-31

## 2022-10-31 VITALS — TEMPERATURE: 98 F

## 2022-10-31 PROCEDURE — 99024 POSTOP FOLLOW-UP VISIT: CPT

## 2022-10-31 NOTE — PHYSICAL EXAM
[Normal Breath Sounds] : Normal breath sounds [Normal Heart Sounds] : normal heart sounds [Normal Rate and Rhythm] : normal rate and rhythm [Calm] : calm [de-identified] : well developed male in no acute distress [de-identified] : incision opened with serous drainage ,

## 2022-10-31 NOTE — HISTORY OF PRESENT ILLNESS
[de-identified] : posterior neck abscess,s/p Incision and Drainage. [de-identified] : Pt improved, decreased swelling and drainage. He denies any fevers or chills

## 2022-11-29 ENCOUNTER — APPOINTMENT (OUTPATIENT)
Dept: SURGERY | Facility: CLINIC | Age: 62
End: 2022-11-29

## 2022-11-29 VITALS — TEMPERATURE: 96.3 F

## 2022-11-29 PROCEDURE — 99212 OFFICE O/P EST SF 10 MIN: CPT | Mod: 24

## 2022-11-29 NOTE — PHYSICAL EXAM
[Normal Breath Sounds] : Normal breath sounds [Normal Heart Sounds] : normal heart sounds [Calm] : calm [de-identified] : well developed male in no acute distress [de-identified] : No adenopathy [de-identified] : Three sebaceous cysts posterior aspect of neck, no erythema or draiage [de-identified] : No rashes

## 2022-11-29 NOTE — HISTORY OF PRESENT ILLNESS
[de-identified] : s/p I&D Abscess Back of Neck 10/27/2022 [de-identified] : 62 year old white male returns for follow-up.  Patient states lump in area of abscess has returned and he feels to other lumps on back of neck.\par Patient denies fever, chills or drainage from lumps.  Lumps bother him, since they rub on clothing.

## 2022-11-29 NOTE — PLAN
[FreeTextEntry1] : -Discuss risks, benefits and options to surgical removal of Neck cysts with patient.  All questions answered.\par Patient would like to schedule removal of three Neck Cysts.

## 2022-12-19 ENCOUNTER — OUTPATIENT (OUTPATIENT)
Dept: OUTPATIENT SERVICES | Facility: HOSPITAL | Age: 62
LOS: 1 days | End: 2022-12-19
Payer: COMMERCIAL

## 2022-12-19 DIAGNOSIS — Z98.89 OTHER SPECIFIED POSTPROCEDURAL STATES: Chronic | ICD-10-CM

## 2022-12-19 DIAGNOSIS — Z20.828 CONTACT WITH AND (SUSPECTED) EXPOSURE TO OTHER VIRAL COMMUNICABLE DISEASES: ICD-10-CM

## 2022-12-19 DIAGNOSIS — Z90.49 ACQUIRED ABSENCE OF OTHER SPECIFIED PARTS OF DIGESTIVE TRACT: Chronic | ICD-10-CM

## 2022-12-19 LAB — SARS-COV-2 RNA SPEC QL NAA+PROBE: SIGNIFICANT CHANGE UP

## 2022-12-19 PROCEDURE — U0003: CPT

## 2022-12-19 PROCEDURE — U0005: CPT

## 2022-12-20 ENCOUNTER — TRANSCRIPTION ENCOUNTER (OUTPATIENT)
Age: 62
End: 2022-12-20

## 2022-12-21 ENCOUNTER — OUTPATIENT (OUTPATIENT)
Dept: OUTPATIENT SERVICES | Facility: HOSPITAL | Age: 62
LOS: 1 days | End: 2022-12-21
Payer: COMMERCIAL

## 2022-12-21 ENCOUNTER — RESULT REVIEW (OUTPATIENT)
Age: 62
End: 2022-12-21

## 2022-12-21 ENCOUNTER — APPOINTMENT (OUTPATIENT)
Dept: SURGERY | Facility: HOSPITAL | Age: 62
End: 2022-12-21

## 2022-12-21 DIAGNOSIS — Z90.49 ACQUIRED ABSENCE OF OTHER SPECIFIED PARTS OF DIGESTIVE TRACT: Chronic | ICD-10-CM

## 2022-12-21 DIAGNOSIS — Z98.89 OTHER SPECIFIED POSTPROCEDURAL STATES: Chronic | ICD-10-CM

## 2022-12-21 DIAGNOSIS — D48.1 NEOPLASM OF UNCERTAIN BEHAVIOR OF CONNECTIVE AND OTHER SOFT TISSUE: ICD-10-CM

## 2022-12-21 PROCEDURE — 88304 TISSUE EXAM BY PATHOLOGIST: CPT | Mod: 26

## 2022-12-21 PROCEDURE — 11426 EXC H-F-NK-SP B9+MARG >4 CM: CPT

## 2022-12-21 PROCEDURE — 88304 TISSUE EXAM BY PATHOLOGIST: CPT

## 2022-12-21 PROCEDURE — 12046 INTMD RPR N-HF/GENIT20.1-30: CPT

## 2022-12-21 PROCEDURE — 21555 EXC NECK LES SC < 3 CM: CPT | Mod: 59,58

## 2022-12-21 PROCEDURE — 21552 EXC NECK LES SC 3 CM/>: CPT | Mod: 58

## 2022-12-23 LAB — SURGICAL PATHOLOGY STUDY: SIGNIFICANT CHANGE UP

## 2022-12-29 ENCOUNTER — APPOINTMENT (OUTPATIENT)
Dept: SURGERY | Facility: CLINIC | Age: 62
End: 2022-12-29

## 2022-12-29 VITALS — TEMPERATURE: 97.5 F

## 2022-12-29 PROCEDURE — 99024 POSTOP FOLLOW-UP VISIT: CPT

## 2022-12-29 RX ORDER — CEPHALEXIN 500 MG/1
500 CAPSULE ORAL
Qty: 14 | Refills: 0 | Status: ACTIVE | COMMUNITY
Start: 2022-12-29 | End: 1900-01-01

## 2022-12-29 NOTE — PLAN
[FreeTextEntry1] : -I&D Hematoma, Compressive dsg applied.\par -Warm soaks to area BID\par -Rx Keflex 500 mg po BID x 7 days\par -Follow-up 1 week

## 2022-12-29 NOTE — PHYSICAL EXAM
[Calm] : calm [de-identified] : well developed male in no acute distress [de-identified] : No adenopathy [de-identified] : Small Hematoma collection noted on lower incision

## 2022-12-29 NOTE — HISTORY OF PRESENT ILLNESS
[de-identified] : s/p Excision of Neck Masses 12/21/2022 [de-identified] : Patient is feeling well.  Patient denies fever, chills or wound drainage.  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

## 2023-01-03 ENCOUNTER — APPOINTMENT (OUTPATIENT)
Dept: SURGERY | Facility: CLINIC | Age: 63
End: 2023-01-03
Payer: COMMERCIAL

## 2023-01-03 VITALS — TEMPERATURE: 96.5 F

## 2023-01-03 PROCEDURE — 99024 POSTOP FOLLOW-UP VISIT: CPT

## 2023-01-03 NOTE — HISTORY OF PRESENT ILLNESS
[de-identified] : s/p Excision Neck Masses 12/21/2022 [de-identified] : 61 yo White Male returns s/p I&D Hematoma Bottom incision on neck on 12/02/2022.  Patient denies fever, chills oor wound drainage.  \par Patient is doing warm compresses, up to 5 x daily and taking Keflex, as prescribed.

## 2023-01-03 NOTE — PHYSICAL EXAM
[Calm] : calm [de-identified] : well developed male in n o acute distress [de-identified] : No adenopathy [de-identified] : Incisions clean and closed, bottom incision with mild swelling, improved from last visit

## 2023-01-03 NOTE — PLAN
[FreeTextEntry1] : -I&D Small amount of Hematoma bottom incision Neck, no signs of acute infection.\par -Compressive dressing applied.\par -Continue warm soaks\par -Complete Keflex course\par -Follow-up next week, or sooner if needed

## 2023-01-10 ENCOUNTER — APPOINTMENT (OUTPATIENT)
Dept: SURGERY | Facility: CLINIC | Age: 63
End: 2023-01-10
Payer: COMMERCIAL

## 2023-01-10 VITALS — TEMPERATURE: 95.2 F

## 2023-01-10 PROCEDURE — 99024 POSTOP FOLLOW-UP VISIT: CPT

## 2023-01-10 NOTE — HISTORY OF PRESENT ILLNESS
[de-identified] : posterior neck abscess,s/p Incision and Drainage. [de-identified] : Pt improved, drainage has stopped, redness has decreased, denies any fevers or chills

## 2023-01-10 NOTE — PHYSICAL EXAM
[Normal Breath Sounds] : Normal breath sounds [Normal Heart Sounds] : normal heart sounds [Calm] : calm [de-identified] : well developed male in no distress [de-identified] : all three incision are clean and closed [de-identified] : benign none

## 2023-01-24 ENCOUNTER — APPOINTMENT (OUTPATIENT)
Dept: SURGERY | Facility: CLINIC | Age: 63
End: 2023-01-24
Payer: COMMERCIAL

## 2023-01-24 PROCEDURE — 99024 POSTOP FOLLOW-UP VISIT: CPT

## 2023-01-24 NOTE — HISTORY OF PRESENT ILLNESS
[de-identified] : h/o I&D Posterior Neck Incisional Hematoma 12/29/2022\par s/p Excision Posterior Neck Mass x 3 12/21/2022 [de-identified] : 63 yo white male feeling well.  Patient denies pain, redness, fever, chills or drainage from Neck Incisions.  Patient has been applying warm compresses TID.\par \par Pathology: Epidermal Inclusion Cyst\par

## 2023-01-24 NOTE — PHYSICAL EXAM
[Normal Breath Sounds] : Normal breath sounds [Normal Heart Sounds] : normal heart sounds [de-identified] : incisions clean and closed [de-identified] : benign [No Rash or Lesion] : No rash or lesion [Calm] : calm [de-identified] : well developed male in no acute distress [de-identified] : No adenopathy [de-identified] : Incisions Posterior Neck  x 3, all clean and closed, still with minimal swelling, no erythema.  Possible development of Hypertrophic Scar vs. Keloid

## 2023-01-24 NOTE — PHYSICAL EXAM
[Normal Breath Sounds] : Normal breath sounds [Normal Heart Sounds] : normal heart sounds [de-identified] : incisions clean and closed [de-identified] : benign [No Rash or Lesion] : No rash or lesion [Calm] : calm [de-identified] : well developed male in no acute distress [de-identified] : No adenopathy [de-identified] : Incisions Posterior Neck  x 3, all clean and closed, still with minimal swelling, no erythema.  Possible development of Hypertrophic Scar vs. Keloid

## 2023-01-24 NOTE — PLAN
[FreeTextEntry1] : -Patient can d/c warm soaks, no signs of infection\par -Follow up appointment 3-4 weeks to reevaluate wound

## 2023-01-24 NOTE — HISTORY OF PRESENT ILLNESS
[de-identified] : h/o I&D Posterior Neck Incisional Hematoma 12/29/2022\par s/p Excision Posterior Neck Mass x 3 12/21/2022 [de-identified] : 61 yo white male feeling well.  Patient denies pain, redness, fever, chills or drainage from Neck Incisions.  Patient has been applying warm compresses TID.\par \par Pathology: Epidermal Inclusion Cyst\par

## 2023-02-28 ENCOUNTER — APPOINTMENT (OUTPATIENT)
Dept: SURGERY | Facility: CLINIC | Age: 63
End: 2023-02-28
Payer: COMMERCIAL

## 2023-02-28 VITALS — TEMPERATURE: 98.2 F

## 2023-02-28 DIAGNOSIS — T14.8XXA OTHER INJURY OF UNSPECIFIED BODY REGION, INITIAL ENCOUNTER: ICD-10-CM

## 2023-02-28 DIAGNOSIS — D36.7 BENIGN NEOPLASM OF OTHER SPECIFIED SITES: ICD-10-CM

## 2023-02-28 DIAGNOSIS — L02.11 CUTANEOUS ABSCESS OF NECK: ICD-10-CM

## 2023-02-28 PROCEDURE — 99024 POSTOP FOLLOW-UP VISIT: CPT

## 2023-02-28 NOTE — PHYSICAL EXAM
[No Rash or Lesion] : No rash or lesion [Alert] : alert [Oriented to Person] : oriented to person [Oriented to Place] : oriented to place [Oriented to Time] : oriented to time [Calm] : calm [de-identified] : well developed white male in no acute distress [de-identified] : No adenopathy [de-identified] : All three neck incisions clean and closed

## 2023-02-28 NOTE — HISTORY OF PRESENT ILLNESS
[de-identified] : h/o Excision Neck Masses x 3 12/21/2022\par h/o I&D abscess lower incision\par  [de-identified] : Patient feeling well.  Patient denies fever, chills or wound redness and drainage.

## 2023-03-21 NOTE — VITALS
Addended by: Sola Aguilar on: 3/21/2023 02:58 PM     Modules accepted: Orders [] : No [de-identified] : Pt denies c/o any pains or discomforts at present [FreeTextEntry5] : Pt states he was started on Medrol tapering dose pack for resolving Abdomen rash- recent allergic reaction to Bactrim DS- Dr Bauer advised & aware

## 2024-07-25 NOTE — ASSESSMENT
[FreeTextEntry1] : Patient doing well Detail Level: Zone Initiate Treatment: Zoryve 0.3 % topical foam \\nSig: Rebecca avila vin.\\n\\nfluocinonide 0.05 % topical solution \\nSig: Apply to scalp BID for itchiness as needed Initiate Treatment: Zoryve 0.3 % topical cream \\nSig: Apply to affected areas where psoriasis rash is present once daily until resolved then stop.

## 2024-09-30 ENCOUNTER — INPATIENT (INPATIENT)
Facility: HOSPITAL | Age: 64
LOS: 0 days | Discharge: HOME | End: 2024-10-01
Attending: INTERNAL MEDICINE | Admitting: INTERNAL MEDICINE
Payer: COMMERCIAL

## 2024-09-30 ENCOUNTER — EMERGENCY (EMERGENCY)
Facility: HOSPITAL | Age: 64
LOS: 1 days | Discharge: ANOTHER HOSPITAL | End: 2024-09-30
Attending: EMERGENCY MEDICINE | Admitting: EMERGENCY MEDICINE
Payer: COMMERCIAL

## 2024-09-30 VITALS
OXYGEN SATURATION: 97 % | HEART RATE: 72 BPM | DIASTOLIC BLOOD PRESSURE: 93 MMHG | TEMPERATURE: 98 F | RESPIRATION RATE: 17 BRPM | SYSTOLIC BLOOD PRESSURE: 153 MMHG

## 2024-09-30 VITALS
HEART RATE: 74 BPM | SYSTOLIC BLOOD PRESSURE: 158 MMHG | HEIGHT: 67 IN | TEMPERATURE: 99 F | RESPIRATION RATE: 16 BRPM | DIASTOLIC BLOOD PRESSURE: 83 MMHG | WEIGHT: 294.98 LBS | OXYGEN SATURATION: 96 %

## 2024-09-30 VITALS
DIASTOLIC BLOOD PRESSURE: 95 MMHG | RESPIRATION RATE: 16 BRPM | SYSTOLIC BLOOD PRESSURE: 151 MMHG | TEMPERATURE: 98 F | HEART RATE: 75 BPM | HEIGHT: 67 IN | WEIGHT: 225.97 LBS | OXYGEN SATURATION: 97 %

## 2024-09-30 DIAGNOSIS — Z90.49 ACQUIRED ABSENCE OF OTHER SPECIFIED PARTS OF DIGESTIVE TRACT: Chronic | ICD-10-CM

## 2024-09-30 DIAGNOSIS — K31.89 OTHER DISEASES OF STOMACH AND DUODENUM: ICD-10-CM

## 2024-09-30 DIAGNOSIS — Z98.89 OTHER SPECIFIED POSTPROCEDURAL STATES: Chronic | ICD-10-CM

## 2024-09-30 LAB
ALBUMIN SERPL ELPH-MCNC: 3.8 G/DL — SIGNIFICANT CHANGE UP (ref 3.3–5)
ALP SERPL-CCNC: 96 U/L — SIGNIFICANT CHANGE UP (ref 40–120)
ALT FLD-CCNC: 25 U/L — SIGNIFICANT CHANGE UP (ref 12–78)
ANION GAP SERPL CALC-SCNC: 8 MMOL/L — SIGNIFICANT CHANGE UP (ref 5–17)
APPEARANCE UR: CLEAR — SIGNIFICANT CHANGE UP
APTT BLD: 33.7 SEC — SIGNIFICANT CHANGE UP (ref 24.5–35.6)
AST SERPL-CCNC: 18 U/L — SIGNIFICANT CHANGE UP (ref 15–37)
BASOPHILS # BLD AUTO: 0.06 K/UL — SIGNIFICANT CHANGE UP (ref 0–0.2)
BASOPHILS NFR BLD AUTO: 0.7 % — SIGNIFICANT CHANGE UP (ref 0–2)
BILIRUB SERPL-MCNC: 0.4 MG/DL — SIGNIFICANT CHANGE UP (ref 0.2–1.2)
BILIRUB UR-MCNC: NEGATIVE — SIGNIFICANT CHANGE UP
BLD GP AB SCN SERPL QL: NEGATIVE — SIGNIFICANT CHANGE UP
BUN SERPL-MCNC: 15 MG/DL — SIGNIFICANT CHANGE UP (ref 7–23)
CALCIUM SERPL-MCNC: 9.1 MG/DL — SIGNIFICANT CHANGE UP (ref 8.5–10.1)
CHLORIDE SERPL-SCNC: 105 MMOL/L — SIGNIFICANT CHANGE UP (ref 96–108)
CO2 SERPL-SCNC: 22 MMOL/L — SIGNIFICANT CHANGE UP (ref 22–31)
COLOR SPEC: YELLOW — SIGNIFICANT CHANGE UP
CREAT SERPL-MCNC: 1.1 MG/DL — SIGNIFICANT CHANGE UP (ref 0.5–1.3)
DIFF PNL FLD: NEGATIVE — SIGNIFICANT CHANGE UP
EGFR: 75 ML/MIN/1.73M2 — SIGNIFICANT CHANGE UP
EOSINOPHIL # BLD AUTO: 0.03 K/UL — SIGNIFICANT CHANGE UP (ref 0–0.5)
EOSINOPHIL NFR BLD AUTO: 0.3 % — SIGNIFICANT CHANGE UP (ref 0–6)
FERRITIN SERPL-MCNC: 140 NG/ML — SIGNIFICANT CHANGE UP (ref 30–400)
GLUCOSE SERPL-MCNC: 94 MG/DL — SIGNIFICANT CHANGE UP (ref 70–99)
GLUCOSE UR QL: NEGATIVE MG/DL — SIGNIFICANT CHANGE UP
HCT VFR BLD CALC: 43.9 % — SIGNIFICANT CHANGE UP (ref 39–50)
HGB BLD-MCNC: 13.3 G/DL — SIGNIFICANT CHANGE UP (ref 13–17)
IMM GRANULOCYTES NFR BLD AUTO: 0.6 % — SIGNIFICANT CHANGE UP (ref 0–0.9)
INR BLD: 1.06 RATIO — SIGNIFICANT CHANGE UP (ref 0.85–1.16)
KETONES UR-MCNC: ABNORMAL MG/DL
LEUKOCYTE ESTERASE UR-ACNC: NEGATIVE — SIGNIFICANT CHANGE UP
LYMPHOCYTES # BLD AUTO: 0.98 K/UL — LOW (ref 1–3.3)
LYMPHOCYTES # BLD AUTO: 10.9 % — LOW (ref 13–44)
MCHC RBC-ENTMCNC: 19.4 PG — LOW (ref 27–34)
MCHC RBC-ENTMCNC: 30.3 GM/DL — LOW (ref 32–36)
MCV RBC AUTO: 64.1 FL — LOW (ref 80–100)
MONOCYTES # BLD AUTO: 1.04 K/UL — HIGH (ref 0–0.9)
MONOCYTES NFR BLD AUTO: 11.5 % — SIGNIFICANT CHANGE UP (ref 2–14)
NEUTROPHILS # BLD AUTO: 6.87 K/UL — SIGNIFICANT CHANGE UP (ref 1.8–7.4)
NEUTROPHILS NFR BLD AUTO: 76 % — SIGNIFICANT CHANGE UP (ref 43–77)
NITRITE UR-MCNC: NEGATIVE — SIGNIFICANT CHANGE UP
NRBC # BLD: 0 /100 WBCS — SIGNIFICANT CHANGE UP (ref 0–0)
PH UR: 6.5 — SIGNIFICANT CHANGE UP (ref 5–8)
PLATELET # BLD AUTO: 304 K/UL — SIGNIFICANT CHANGE UP (ref 150–400)
POTASSIUM SERPL-MCNC: 4.4 MMOL/L — SIGNIFICANT CHANGE UP (ref 3.5–5.3)
POTASSIUM SERPL-SCNC: 4.4 MMOL/L — SIGNIFICANT CHANGE UP (ref 3.5–5.3)
PROT SERPL-MCNC: 7.9 G/DL — SIGNIFICANT CHANGE UP (ref 6–8.3)
PROT UR-MCNC: SIGNIFICANT CHANGE UP MG/DL
PROTHROM AB SERPL-ACNC: 12.3 SEC — SIGNIFICANT CHANGE UP (ref 9.9–13.4)
RBC # BLD: 6.85 M/UL — HIGH (ref 4.2–5.8)
RBC # FLD: 18.7 % — HIGH (ref 10.3–14.5)
RH IG SCN BLD-IMP: POSITIVE — SIGNIFICANT CHANGE UP
SODIUM SERPL-SCNC: 135 MMOL/L — SIGNIFICANT CHANGE UP (ref 135–145)
SP GR SPEC: 1.02 — SIGNIFICANT CHANGE UP (ref 1–1.03)
UROBILINOGEN FLD QL: 0.2 MG/DL — SIGNIFICANT CHANGE UP (ref 0.2–1)
WBC # BLD: 9.03 K/UL — SIGNIFICANT CHANGE UP (ref 3.8–10.5)
WBC # FLD AUTO: 9.03 K/UL — SIGNIFICANT CHANGE UP (ref 3.8–10.5)

## 2024-09-30 PROCEDURE — 36415 COLL VENOUS BLD VENIPUNCTURE: CPT

## 2024-09-30 PROCEDURE — 87086 URINE CULTURE/COLONY COUNT: CPT

## 2024-09-30 PROCEDURE — 74176 CT ABD & PELVIS W/O CONTRAST: CPT | Mod: MC

## 2024-09-30 PROCEDURE — 85025 COMPLETE CBC W/AUTO DIFF WBC: CPT

## 2024-09-30 PROCEDURE — 99285 EMERGENCY DEPT VISIT HI MDM: CPT

## 2024-09-30 PROCEDURE — 96361 HYDRATE IV INFUSION ADD-ON: CPT

## 2024-09-30 PROCEDURE — 74176 CT ABD & PELVIS W/O CONTRAST: CPT | Mod: 26,MC

## 2024-09-30 PROCEDURE — 99223 1ST HOSP IP/OBS HIGH 75: CPT

## 2024-09-30 PROCEDURE — 81003 URINALYSIS AUTO W/O SCOPE: CPT

## 2024-09-30 PROCEDURE — 99285 EMERGENCY DEPT VISIT HI MDM: CPT | Mod: 25

## 2024-09-30 PROCEDURE — 96374 THER/PROPH/DIAG INJ IV PUSH: CPT

## 2024-09-30 PROCEDURE — 80053 COMPREHEN METABOLIC PANEL: CPT

## 2024-09-30 RX ORDER — SODIUM CHLORIDE 9 MG/ML
1000 INJECTION INTRAMUSCULAR; INTRAVENOUS; SUBCUTANEOUS ONCE
Refills: 0 | Status: COMPLETED | OUTPATIENT
Start: 2024-09-30 | End: 2024-09-30

## 2024-09-30 RX ORDER — 5-HYDROXYTRYPTOPHAN (5-HTP) 100 MG
3 TABLET,DISINTEGRATING ORAL AT BEDTIME
Refills: 0 | Status: DISCONTINUED | OUTPATIENT
Start: 2024-09-30 | End: 2024-10-01

## 2024-09-30 RX ORDER — KETOROLAC TROMETHAMINE 30 MG/ML
15 INJECTION, SOLUTION INTRAMUSCULAR ONCE
Refills: 0 | Status: DISCONTINUED | OUTPATIENT
Start: 2024-09-30 | End: 2024-09-30

## 2024-09-30 RX ORDER — KETOROLAC TROMETHAMINE 10 MG/1
15 TABLET, FILM COATED ORAL ONCE
Refills: 0 | Status: DISCONTINUED | OUTPATIENT
Start: 2024-09-30 | End: 2024-09-30

## 2024-09-30 RX ADMIN — KETOROLAC TROMETHAMINE 15 MILLIGRAM(S): 30 INJECTION, SOLUTION INTRAMUSCULAR at 10:32

## 2024-09-30 RX ADMIN — KETOROLAC TROMETHAMINE 15 MILLIGRAM(S): 10 TABLET, FILM COATED ORAL at 21:07

## 2024-09-30 RX ADMIN — SODIUM CHLORIDE 1000 MILLILITER(S): 9 INJECTION INTRAMUSCULAR; INTRAVENOUS; SUBCUTANEOUS at 13:39

## 2024-09-30 RX ADMIN — KETOROLAC TROMETHAMINE 15 MILLIGRAM(S): 30 INJECTION, SOLUTION INTRAMUSCULAR at 11:42

## 2024-09-30 RX ADMIN — SODIUM CHLORIDE 1000 MILLILITER(S): 9 INJECTION INTRAMUSCULAR; INTRAVENOUS; SUBCUTANEOUS at 10:33

## 2024-09-30 NOTE — H&P ADULT - NSHPREVIEWOFSYSTEMS_GEN_ALL_CORE
Review of Systems:   CONSTITUTIONAL: No fever or chills  EYES: No eye pain, visual disturbances, or discharge  ENMT:  No difficulty hearing, no throat pain  NECK: No pain or stiffness  RESPIRATORY: No cough, No shortness of breath  CARDIOVASCULAR: No chest pain, palpitations, dizziness, or leg swelling  GASTROINTESTINAL: L flank pain, nausea, vomiting or diarrhea  GENITOURINARY: No dysuria, or hematuria  NEUROLOGICAL: No headaches, weakness, or numbness  SKIN: No rashes, or lesions   LYMPH NODES: No enlarged glands  ENDOCRINE: No heat or cold intolerance  MUSCULOSKELETAL: No joint pain or swelling  PSYCHIATRIC: No depression or anxiety  HEME/LYMPH: No easy bruising, or bleeding  ALLERGY AND IMMUNOLOGIC: No hives or eczema

## 2024-09-30 NOTE — ED ADULT NURSE NOTE - OBJECTIVE STATEMENT
pt to er c/o left flank pain denies nausea s alert oriented skin warm and dry denies sob states he has had this pain for several days worse today no difficulty urinating iv started 20 angio right arm labs drawn iv infusing for ct scan

## 2024-09-30 NOTE — ED ADULT NURSE REASSESSMENT NOTE - NS ED NURSE REASSESS COMMENT FT1
Pt received in intake, aaox3, ambulatory, breathing even and unlabored in bed. Pt c/o lower abd/back pain at this time, pt medicated as per orders. Pt denies chest pain, SOB, dizziness, headache, blurry vision, chills. Bed in lowest position, call bell within reach. Report to be given to ESSU 2.

## 2024-09-30 NOTE — ED ADULT TRIAGE NOTE - NSWEIGHTCALCTOOLDRUG_GEN_A_CORE
Is This A New Presentation, Or A Follow-Up?: Animal Bite  used What Type Of Animal Bit You (Leave Blank If Unknown)?: dog How Severe Is It?: moderate Additional History: Pt states dog has all of his shots

## 2024-09-30 NOTE — ED PROVIDER NOTE - CLINICAL SUMMARY MEDICAL DECISION MAKING FREE TEXT BOX
Patient is a 64-year-old male who presents to the emergency room with a chief complaint of left flank pain.  Past medical history of an incarcerated hernia.  Patient reports he has been experiencing left flank pain for the last 5 days.  Initially thought he pulled a muscle but then developed some urinary frequency was concerned about a possible renal stone.  Denies any fevers chills nausea vomiting chest pain or shortness of breath.  Denies any unintentional weight loss.  On exam patient is lying in bed no acute distress normocephalic atraumatic pupils equal round and reactive hearts regular rate lungs clear to auscultation abdomen soft with no significant reproducible tenderness although there is some mild discomfort in the left upper quadrant to palpation positive bowel sounds noted.  Neuroexam is nonfocal.  Patient presenting to the emergency room with left flank pain.  Differential includes not limited to renal stone versus additional intra-abdominal pathology.  Will obtain screening labs check UA hydrate medicate for symptoms obtain CT imaging and monitor.  Ultimate clinical disposition will be pending results.  Independent review of CT reveals a gastrohepatic mass possibly enlarged lymph nodes.  Gastroenterology was consulted will need transfer for ultrasound-guided endoscopy.

## 2024-09-30 NOTE — ED PROVIDER NOTE - DIFFERENTIAL DIAGNOSIS
Patient presenting to the emergency room with left flank pain.  Differential includes not limited to renal stone versus additional intra-abdominal pathology.  Will obtain screening labs check UA hydrate medicate for symptoms obtain CT imaging and monitor.  Ultimate clinical disposition will be pending results Differential Diagnosis

## 2024-09-30 NOTE — ED ADULT NURSE NOTE - CHIEF COMPLAINT QUOTE
Pt transferred from [place] due to gastrohepatic mass.  Pt was sent for an US endoscopy.  Hx: gall bladder

## 2024-09-30 NOTE — ED PROVIDER NOTE - ATTENDING MD FREE TEXT FOR MDM DISCUSSED CASE WITH QUESTION
Pt informed  
Pt is asking for a Rf oh Hydrocodone and something for a cold sore he is getting.  KS    I sent in valtrex.  I printed out hydrocodone.  ESTELAW  
[employer]reichlers

## 2024-09-30 NOTE — CHART NOTE - NSCHARTNOTEFT_GEN_A_CORE
ON CALL NOTE    65yo gentleman presented to ED with complaint of L flank pain x 5 days, without fever, chills, night sweats, per ED team.     WBC 9.03, Hgb 13.3, MCV 64.1 (low), plt 304,   ANC 6.87, ALC 0.98 (low), absolute monocyte count 1.04  CMP unremarkable  UA negative    CT renal stone hunt:   IMPRESSION:  No renal stones or hydronephrosis.    Gastrohepatic mass (3.9 x 2.4 cm), possibly enlarged lymph node.   Mesenteric and retroperitoneal adenopathy. Findings suspicious for   malignancy such as lymphoma.    Recommendations:  - Please check LDH, uric acid, coags, iron, TIBC, ferritin, B12, folate, hemoglobin electrophoresis,   - Mentzer index is 9.4, suggesting possible beta thalassemia trait causing notable microcytosis.   - Please perform CT CAP w/ IV con  - Would appreciate GI evaluation for endoscopy and biopsy  - Notify our team when path results.     Note not finalized until signed by attending.   Please do not hesitate to page with questions.     Scarlet Pickett MD PGY6   [phone]  Hematology-Oncology Fellow  WEEKENDS- Please call  to page on-call fellow

## 2024-09-30 NOTE — H&P ADULT - HISTORY OF PRESENT ILLNESS
63 y/o M with no significant PMH p/w L flank pain.  Pt reports pain in L flank for the past week that waxes and wanes, but became more severe and persistent over the past 1-2 days.  Pain varies in quality from sharp to dull.  Pt has not had nausea, vomiting, hematuria, fever or chills.  No night sweats, weight loss or change in appetite.  Pt presented to [facility] ED where CT showed gastrohepatic mass and RP LAD suspicious for lymphoma.  He was evaluated by GI and transferred to [facility] for possible EUS.

## 2024-09-30 NOTE — H&P ADULT - ASSESSMENT
63 y/o M with no significant PMH p/w L flank pain, and found to have gastrohepatic mass and RP LAD.

## 2024-09-30 NOTE — ED ADULT TRIAGE NOTE - CHIEF COMPLAINT QUOTE
Pt transferred from [place] due to gastrohepatic mass.  Pt was sent for an US endoscopy.  Hx: gall bladder

## 2024-09-30 NOTE — ED PROVIDER NOTE - NSICDXFAMILYHX_GEN_ALL_CORE_FT
FAMILY HISTORY:  Family history of coronary artery disease in father, Father -  age 60 following MI

## 2024-09-30 NOTE — ED PROVIDER NOTE - OBJECTIVE STATEMENT
64-year-old male presents emergency department complaining of left flank pain x 5 days.  Patient states that pain is intermittent, initially thought that he pulled a muscle.  Patient now reports urinary frequency as well.  Patient denies hematuria, dysuria, fever, chills, nausea, vomiting.

## 2024-09-30 NOTE — H&P ADULT - NSHPLABSRESULTS_GEN_ALL_CORE
13.3   9.03  )-----------( 304      ( 30 Sep 2024 10:10 )             43.9     135  |  105  |  15  ----------------------------<  94     09-30  4.4   |  22  |  1.10    Ca    9.1      30 Sep 2024 10:10    TPro  7.9  /  Alb  3.8  /  TBili  0.4  /  DBili  x   /  AST  18  /  ALT  25  /  AlkPhos  96  09-30    PT/INR: 12.3/1.06 (09-30-24 @ 20:07)  PTT: 33.7 (09-30-24 @ 20:07)              < from: CT Renal Stone Hunt (09.30.24 @ 11:11) >    Gastrohepatic mass (3.9 x 2.4 cm), possibly enlarged lymph node.   Mesenteric and retroperitoneal adenopathy. Findings suspicious for   malignancy such as lymphoma.    < end of copied text >

## 2024-09-30 NOTE — ED ADULT NURSE NOTE - NSFALLUNIVINTERV_ED_ALL_ED
Bed/Stretcher in lowest position, wheels locked, appropriate side rails in place/Call bell, personal items and telephone in reach/Instruct patient to call for assistance before getting out of bed/chair/stretcher/Non-slip footwear applied when patient is off stretcher/[place]leve to call system/Physically safe environment - no spills, clutter or unnecessary equipment/Purposeful proactive rounding/Room/bathroom lighting operational, light cord in reach

## 2024-09-30 NOTE — ED PROVIDER NOTE - CLINICAL SUMMARY MEDICAL DECISION MAKING FREE TEXT BOX
Patient was transferred here to [facility] after being seen earlier today at [place].  Patient has no known history of any medical issues.  Denies hypertension although today "is excited".  He had pain in his left side that he thought might be a kidney stone first went to urgent care and they sent him to the emergency department.  No history of renal stones in the ED he had a CAT scan done and that the CAT scan then showed something concerning for gastrohepatic mass was seen by GI Dr. Sheikh in [place] who advised transfer to [facility]'s because the mass is not accessible via normal endoscopy and needs an ultrasound endoscopy.  Patient without nausea vomiting diarrhea abdominal pain chest pain shortness of breath.  Pain is well-controlled.  I contacted Dr. Fraga who is on for GI who advised patient should be admitted to medicine overnight and then can have procedure tomorrow.    Patient denies being on any sort of blood thinners or any other medications.  Otherwise has been feeling well denies fever nausea vomiting diarrhea.    Blood pressure 158/83 respiratory rate 16 heart rate 74 temp 98.8 O2 sat is normal    Pt alert and can phonate well  h at/nc  perrl, conj clear, sclera anicteric,  neck supple  no nodes in axilla or groin  cor rrr pos s1s2  lungs clear to asno wheeze  abd soft no r/g/t  ext no edema no deformities  neueo awake, lucid normal gait moves all extremities with strength  psych normal affect  vs reasonable    contacted GI on call as well as oncology.  as per gi, admit to medicine and they will arrange for procedure tomorrow.  oncology contacted on teams, but need tissue foirst    labs done at [place] in chart as well as ct.

## 2024-09-30 NOTE — ED ADULT NURSE NOTE - OBJECTIVE STATEMENT
pt is a 64y old male received awake and responsive, transferred from [facility] for eval, new onset of mass to lt sided abd, labs drawn and sent as per Md order

## 2024-09-30 NOTE — ED ADULT NURSE NOTE - IN THE PAST 12 MONTHS HAVE YOU USED DRUGS OTHER THAN THOSE REQUIRED FOR MEDICAL REASON?
Since I have not seen her in 2 1/2 years, I need to know what she needs this refill for. What are her current symptoms, and how often does she use the inhaler?   No

## 2024-09-30 NOTE — H&P ADULT - NSHPPHYSICALEXAM_GEN_ALL_CORE
Vital Signs Last 24 Hrs  T(C): 36.4 (30 Sep 2024 22:00), Max: 37.1 (30 Sep 2024 17:26)  T(F): 97.6 (30 Sep 2024 22:00), Max: 98.8 (30 Sep 2024 17:26)  HR: 65 (30 Sep 2024 22:00) (65 - 83)  BP: 113/76 (30 Sep 2024 22:00) (113/76 - 158/83)  BP(mean): --  RR: 17 (30 Sep 2024 22:00) (16 - 18)  SpO2: 96% (30 Sep 2024 22:00) (95% - 97%)    Parameters below as of 30 Sep 2024 22:00  Patient On (Oxygen Delivery Method): room air        PHYSICAL EXAM:  GENERAL: No Acute Distress  EYES: conjunctiva and sclera clear  ENMT: Moist mucous membranes   NECK: Supple  PULMONARY: Clear to auscultation bilaterally  CARDIAC: Regular rate and rhythm; No murmurs, rubs, or gallops  GASTROINTESTINAL: Abdomen soft, Nontender, Nondistended; Bowel sounds normal  EXTREMITIES:   No clubbing, cyanosis, or pedal edema  PSYCH: Normal Affect, Normal Behavior  NEUROLOGY:   - Mental status A&O x 3  SKIN: No rashes or lesions  MUSCULOSKELETAL: No joint swelling

## 2024-09-30 NOTE — CONSULT NOTE ADULT - ASSESSMENT
abnormal CT    CT reviewed  no luminal lesion accessible by EGD  will need eus/fnb; not available here  most expeditious way would be ER to ER transfer to site that offers eus   d/w patient  d/w ED
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
## 2024-09-30 NOTE — CONSULT NOTE ADULT - SUBJECTIVE AND OBJECTIVE BOX
[facility]    Scar Kirill Bernal NP    [address], [place] [id number] [phone]      Chief Complaint:  Patient is a 64y old  Male who presents with a chief complaint of     HPI: 64-year-old male presents emergency department complaining of left flank pain x 5 days.  Patient states that pain is intermittent, initially thought that he pulled a muscle.  Patient now reports urinary frequency as well.  Patient denies hematuria, dysuria, fever, chills, nausea, vomiting.    Allergies:  Bactrim DS (Hives)      Medications:      PMHX/PSHX:  No pertinent past medical history    Incisional hernia    No significant past surgical history    S/P appendectomy    S/P cholecystectomy        Family history:  Family history of coronary artery disease in father        Social History:     ROS:     General:  No wt loss, fevers, chills, night sweats, fatigue,   Eyes:  Good vision, no reported pain  ENT:  No sore throat, pain, runny nose, dysphagia  CV:  No pain, palpitations, hypo/hypertension  Resp:  No dyspnea, cough, tachypnea, wheezing  GI:  No pain, No nausea, No vomiting, No diarrhea, No constipation, No weight loss, No fever, No pruritis, No rectal bleeding, No tarry stools, No dysphagia,  :  No pain, bleeding, incontinence, nocturia  Muscle:  No pain, weakness  Neuro:  No weakness, tingling, memory problems  Psych:  No fatigue, insomnia, mood problems, depression  Endocrine:  No polyuria, polydipsia, cold/heat intolerance  Heme:  No petechiae, ecchymosis, easy bruisability  Skin:  No rash, tattoos, scars, edema      PHYSICAL EXAM:   Vital Signs:  Vital Signs Last 24 Hrs  T(C): 36.7 (30 Sep 2024 12:10), Max: 36.9 (30 Sep 2024 09:28)  T(F): 98 (30 Sep 2024 12:10), Max: 98.4 (30 Sep 2024 09:28)  HR: 72 (30 Sep 2024 12:10) (72 - 75)  BP: 153/93 (30 Sep 2024 12:10) (151/95 - 153/93)  BP(mean): --  RR: 17 (30 Sep 2024 12:10) (16 - 17)  SpO2: 97% (30 Sep 2024 12:10) (97% - 97%)      Daily Height in cm: 170.18 (30 Sep 2024 09:28)    Daily     GENERAL:  Appears stated age, well-groomed, well-nourished, no distress  HEENT:  NC/AT,  conjunctivae clear and pink, no thyromegaly, nodules, adenopathy, no JVD, sclera -anicteric  CHEST:  Full & symmetric excursion, no increased effort, breath sounds clear  HEART:  Regular rhythm, S1, S2, no murmur/rub/S3/S4, no abdominal bruit, no edema  ABDOMEN:  Soft, non-tender, non-distended, normoactive bowel sounds,  no masses ,no hepato-splenomegaly, no signs of chronic liver disease  EXTEREMITIES:  no cyanosis,clubbing or edema  SKIN:  No rash/erythema/ecchymoses/petechiae/wounds/abscess/warm/dry  NEURO:  Alert, oriented, no asterixis, no tremor, no encephalopathy    LABS:                        13.3   9.03  )-----------( 304      ( 30 Sep 2024 10:10 )             43.9     09-30    135  |  105  |  15  ----------------------------<  94  4.4   |  22  |  1.10    Ca    9.1      30 Sep 2024 10:10    TPro  7.9  /  Alb  3.8  /  TBili  0.4  /  DBili  x   /  AST  18  /  ALT  25  /  AlkPhos  96  09-30    LIVER FUNCTIONS - ( 30 Sep 2024 10:10 )  Alb: 3.8 g/dL / Pro: 7.9 g/dL / ALK PHOS: 96 U/L / ALT: 25 U/L / AST: 18 U/L / GGT: x             Urinalysis Basic - ( 30 Sep 2024 10:10 )    Color: x / Appearance: x / SG: x / pH: x  Gluc: 94 mg/dL / Ketone: x  / Bili: x / Urobili: x   Blood: x / Protein: x / Nitrite: x   Leuk Esterase: x / RBC: x / WBC x   Sq Epi: x / Non Sq Epi: x / Bacteria: x          Imaging:

## 2024-10-01 ENCOUNTER — TRANSCRIPTION ENCOUNTER (OUTPATIENT)
Age: 64
End: 2024-10-01

## 2024-10-01 VITALS
SYSTOLIC BLOOD PRESSURE: 123 MMHG | DIASTOLIC BLOOD PRESSURE: 77 MMHG | OXYGEN SATURATION: 96 % | HEART RATE: 70 BPM | RESPIRATION RATE: 16 BRPM

## 2024-10-01 DIAGNOSIS — R19.00 INTRA-ABDOMINAL AND PELVIC SWELLING, MASS AND LUMP, UNSPECIFIED SITE: ICD-10-CM

## 2024-10-01 LAB
CULTURE RESULTS: SIGNIFICANT CHANGE UP
FOLATE SERPL-MCNC: 12.6 NG/ML — SIGNIFICANT CHANGE UP (ref 3.1–17.5)
HEMOGLOBIN INTERPRETATION: SIGNIFICANT CHANGE UP
HGB A MFR BLD: 95 % — SIGNIFICANT CHANGE UP (ref 95–97.6)
HGB A2 MFR BLD: 4.5 % — HIGH (ref 2.4–3.5)
HGB F MFR BLD: <1 % — SIGNIFICANT CHANGE UP (ref 0–1.5)
SPECIMEN SOURCE: SIGNIFICANT CHANGE UP
VIT B12 SERPL-MCNC: 1506 PG/ML — HIGH (ref 200–900)

## 2024-10-01 PROCEDURE — 88360 TUMOR IMMUNOHISTOCHEM/MANUAL: CPT | Mod: 26,59

## 2024-10-01 PROCEDURE — 88305 TISSUE EXAM BY PATHOLOGIST: CPT | Mod: 26

## 2024-10-01 PROCEDURE — 88189 FLOWCYTOMETRY/READ 16 & >: CPT | Mod: 59

## 2024-10-01 PROCEDURE — 99222 1ST HOSP IP/OBS MODERATE 55: CPT | Mod: GC,25

## 2024-10-01 PROCEDURE — 43242 EGD US FINE NEEDLE BX/ASPIR: CPT | Mod: GC

## 2024-10-01 PROCEDURE — 88342 IMHCHEM/IMCYTCHM 1ST ANTB: CPT | Mod: 26

## 2024-10-01 PROCEDURE — 88341 IMHCHEM/IMCYTCHM EA ADD ANTB: CPT | Mod: 26

## 2024-10-01 PROCEDURE — 88108 CYTOPATH CONCENTRATE TECH: CPT | Mod: 26,59

## 2024-10-01 RX ORDER — ACETAMINOPHEN 325 MG
1000 TABLET ORAL ONCE
Refills: 0 | Status: COMPLETED | OUTPATIENT
Start: 2024-10-01 | End: 2024-10-01

## 2024-10-01 RX ORDER — KETOROLAC TROMETHAMINE 10 MG/1
15 TABLET, FILM COATED ORAL ONCE
Refills: 0 | Status: DISCONTINUED | OUTPATIENT
Start: 2024-10-01 | End: 2024-10-01

## 2024-10-01 RX ORDER — INFLUENZA VIRUS VACCINE 15; 15; 15; 15 UG/.5ML; UG/.5ML; UG/.5ML; UG/.5ML
0.5 SUSPENSION INTRAMUSCULAR ONCE
Refills: 0 | Status: DISCONTINUED | OUTPATIENT
Start: 2024-10-01 | End: 2024-10-01

## 2024-10-01 RX ADMIN — Medication 1000 MILLIGRAM(S): at 16:28

## 2024-10-01 RX ADMIN — Medication 400 MILLIGRAM(S): at 15:52

## 2024-10-01 RX ADMIN — KETOROLAC TROMETHAMINE 15 MILLIGRAM(S): 10 TABLET, FILM COATED ORAL at 10:20

## 2024-10-01 RX ADMIN — KETOROLAC TROMETHAMINE 15 MILLIGRAM(S): 10 TABLET, FILM COATED ORAL at 09:20

## 2024-10-01 NOTE — CONSULT NOTE ADULT - ASSESSMENT
Retroperitoneal Lymphadenopathy  -Gastrohepatic mass (3.9 x 2.4 cm), possibly enlarged lymph node. Mesenteric and retroperitoneal adenopathy  -Findings suspicious for Lymphoma  -check CT Chest/Abdomen/Pelvis for staging in addition to soft tissue neck  -pls check Flow cytometry, Uric acid, LDH, TLS labs  -EGD/EUS planned for today, -Will need core biopsies in the event EUS cannot achieve, please see if IR can determine if there any accessible areas for lymph node biopsy  -GI recs appreciated    Will continue to follow      Eloise Anderson NP  Hematology/Oncology  [facility]  [phone] (Office)  [phone] (Alt office)  Evenings and weekends please call MD on call or office   Retroperitoneal Lymphadenopathy  -Gastrohepatic mass (3.9 x 2.4 cm), possibly enlarged lymph node. Mesenteric and retroperitoneal adenopathy  -Findings suspicious for Lymphoma  -check CT Chest/Abdomen/Pelvis for staging in addition to soft tissue neck  -pls check Flow cytometry, Uric acid, LDH, TLS labs  -EGD/EUS planned for today, -Will need core biopsies in the event EUS cannot achieve, please see if IR can determine if there any accessible areas for lymph node biopsy  -GI recs appreciated        Anemia  -H&H 7.4/20.3 ( called in as critical)  -Transfuse for Hgb < 7.0  -Check Iron studies and Hemolysis labs      Will continue to follow      Eloise Anderson NP  Hematology/Oncology  [facility]  [phone] (Office)  [phone] (Alt office)  Evenings and weekends please call MD on call or office

## 2024-10-01 NOTE — DISCHARGE NOTE PROVIDER - NSDCCPCAREPLAN_GEN_ALL_CORE_FT
Bedside report given to Naveen Ochoa [facility]. Including SBAR, procedure summary, TR band status. 5cc air remain in band. No bleeding, no hematoma. PRINCIPAL DISCHARGE DIAGNOSIS  Diagnosis: Gastric mass  Assessment and Plan of Treatment: You have a gastric mass,  you had an Endoscopic procedure today and tissue was sent for pathology to rule out lymphoma. You have to follow-up with Gastroenterology and Oncology within this week or next week. Please call and make an appointment. It is very important that you follow-up with these doctors.

## 2024-10-01 NOTE — CONSULT NOTE ADULT - ATTENDING COMMENTS
Agree with above.    I saw and examined the patient on 10-01-24. I agree with the findings and pan of care as documented in the fellow/resident/medical student/physician assistant/nurse practitioner.    Today we are treating the patient for:   Abnormal imaging    ***General note with plan / recommendation:   64 yrs old male w/ no significant past medical history who presented with left sided flank pain, found to have a gastrohepatic mass, concerning for lymphoma.  Plan on EUS today.    Billing:  I have reviewed records from CT, labs    Other:  - Thank you for involving us in the care of this patient. If you have any questions regarding the plan of care please do not hesitate to call us back.    Contact:     Daytime        Available on [employer] Teams        [phone] ([facility] Short Range Pager)        [phone] ([facility] Long Range Pager)     On Weekends/Holidays (All day) and Weekdays after 5 PM to 8AM:        For non-urgent consults, please email [email or web address] or [email or web address]        For emergent consults, please contact on call GI team.  See Amion schedule ([facility]), [employer] paging system ([facility]), or call hospital  ([facility])

## 2024-10-01 NOTE — PROVIDER CONTACT NOTE (CRITICAL VALUE NOTIFICATION) - ASSESSMENT
Patient is A&Ox4, complains of abdominal pain, denies chest pain, shortness of breath, nausea, vomiting or dizziness.

## 2024-10-01 NOTE — DISCHARGE NOTE PROVIDER - HOSPITAL COURSE
63 y/o M with no significant PMH p/w L flank pain, and found to have Gastrohepatic mass and Retroperitoneal lymphadenopathy.      Intraabdominal mass.   ·  Plan: Gastrohepatic mass concerning for lymphoma  - GI consulted for EUS. S/P EUS, patient with enlarged lymph node, sent for pathology. As per GI, patient can be discharged and follow-up as outpatient.   - Patient had CT Abd/Pelvis done at [facility], will need CT chest as outpatient for staging in addition to soft tissue neck.   - Patient will follow-up with   Retroperitoneal Lymphadenopathy  -Gastrohepatic mass (3.9 x 2.4 cm), possibly enlarged lymph node. Mesenteric and retroperitoneal adenopathy  -Findings suspicious for Lymphoma    -Will need Flow cytometry, Uric acid, LDH, TLS labs    Anemia  -H&H 7.4/20.3 ( called in as critical)  -Transfuse for Hgb < 7.0  -Check Iron studies and Hemolysis labs      Will continue to follow       63 y/o M with no significant PMH p/w L flank pain, and found to have Gastrohepatic mass and Retroperitoneal lymphadenopathy.      Intraabdominal mass.   ·  Plan: Gastrohepatic mass concerning for lymphoma  - GI consulted for EUS. S/P EUS, patient with enlarged lymph node, sent for pathology. As per GI, patient can be discharged and follow-up as outpatient.   - Patient had CT Abd/Pelvis done at [facility], will need CT chest as outpatient for staging in addition to soft tissue neck.     Retroperitoneal Lymphadenopathy  -Gastrohepatic mass (3.9 x 2.4 cm), possibly enlarged lymph node. Mesenteric and retroperitoneal adenopathy  -Findings suspicious for Lymphoma    -Will need Flow cytometry, Uric acid, LDH, TLS labs    Anemia  -H&H 7.4/20.3 ( called in as critical)  -Transfuse for Hgb < 7.0  -Check Iron studies and Hemolysis labs      Patient will follow-up with:   Hematology/Oncology  [facility]  [phone] (Office)  [phone] (Alt office)             63 y/o M with no significant PMH p/w L flank pain, and found to have Gastrohepatic mass and Retroperitoneal lymphadenopathy.      Intraabdominal mass.   ·  Plan: Gastrohepatic mass concerning for lymphoma  - GI consulted for EUS. S/P EUS, patient with enlarged lymph node, sent for pathology. As per GI, patient can be discharged and follow-up as outpatient.   - Patient had CT Abd/Pelvis done at [facility], will need CT chest as outpatient for staging in addition to soft tissue neck.     Retroperitoneal Lymphadenopathy  -Gastrohepatic mass (3.9 x 2.4 cm), possibly enlarged lymph node. Mesenteric and retroperitoneal adenopathy  -Findings suspicious for Lymphoma    -Will need Flow cytometry, Uric acid, LDH, TLS labs    Anemia  -H&H 7.4/20.3 ( called in as critical)  -Transfuse for Hgb < 7.0  -Check Iron studies and Hemolysis labs      Patient will follow-up with:   Hematology/Oncology  [facility]  [phone] (Office)  [phone] (Alt office)    Gastroenterology               63 y/o M with no significant PMH p/w L flank pain, and found to have Gastrohepatic mass and Retroperitoneal lymphadenopathy.      Intraabdominal mass.   ·  Plan: Gastrohepatic mass concerning for lymphoma  - GI consulted for EUS. S/P EUS, patient with enlarged lymph node, sent for pathology. As per GI, patient can be discharged and follow-up as outpatient.   - Patient had CT Abd/Pelvis done at [facility], will need CT chest as outpatient for staging in addition to soft tissue neck.     Retroperitoneal Lymphadenopathy  -Gastrohepatic mass (3.9 x 2.4 cm), possibly enlarged lymph node. Mesenteric and retroperitoneal adenopathy  -Findings suspicious for Lymphoma    -Will need Flow cytometry, Uric acid, LDH, TLS labs as outpatient.     Anemia  -H&H 7.4/20.3 ( called in as critical)  -Transfuse for Hgb < 7.0  -Check Iron studies and Hemolysis labs      Patient will follow-up with:   Hematology/Oncology  [facility]  [phone] (Office)  [phone] (Alt office)    Gastroenterology  [facility]  Gastroenterology  [address] [place] [address]  Phone: [phone]  Fax:            63 y/o M with no significant PMH p/w L flank pain, and found to have Gastrohepatic mass and Retroperitoneal lymphadenopathy.      Intraabdominal mass.   ·  Plan: Gastrohepatic mass concerning for lymphoma  - GI consulted for EUS. S/P EUS, patient with enlarged lymph node, sent for pathology. As per GI, patient can be discharged and follow-up as outpatient.   - Patient had CT Abd/Pelvis done at [facility], will need CT chest as outpatient for staging in addition to soft tissue neck.     Retroperitoneal Lymphadenopathy  -Gastrohepatic mass (3.9 x 2.4 cm), possibly enlarged lymph node. Mesenteric and retroperitoneal adenopathy  -Findings suspicious for Lymphoma    -Will need Flow cytometry, Uric acid, LDH, TLS labs as outpatient.     Patient will follow-up with:   Hematology/Oncology  [facility]  [phone] (Office)  [phone] (Alt office)    Gastroenterology  [facility]  Gastroenterology  [address] [place] [address], [address]  [place] [address]  Phone: [phone]  Fax:            63 y/o M with no significant PMH p/w L flank pain, and found to have Gastrohepatic mass and Retroperitoneal lymphadenopathy.      Intraabdominal mass.   ·  Plan: Gastrohepatic mass concerning for lymphoma  - GI consulted for EUS. S/P EUS, patient with enlarged lymph node, sent for pathology. As per GI, patient can be discharged and follow-up as outpatient.   - Patient had CT Abd/Pelvis done at [facility], will need CT chest as outpatient for staging in addition to soft tissue neck.     Retroperitoneal Lymphadenopathy  -Gastrohepatic mass (3.9 x 2.4 cm), possibly enlarged lymph node. Mesenteric and retroperitoneal adenopathy  -Findings suspicious for Lymphoma    -Will need Flow cytometry, Uric acid, LDH, TLS labs as outpatient.     Patient will follow-up with:   Hematology/Oncology  [facility]  [phone] (Office)  [phone] (Alt office)    Gastroenterology  [facility]  Gastroenterology  [address] [place] [address], [address]  [place] [address]  Phone: [phone]  Fax:         Case discussed with Dr. Soni and patient is medically stable for discharge with outpatient follow-up.

## 2024-10-01 NOTE — CONSULT NOTE ADULT - ASSESSMENT
Impression:   64 yrs old male w/ no significant past medical history who presented with left sided flank pain, found to have a gastrohepatic mass, concerning for lymphoma. [facility] consulted for EUS.     #Left flank pain  #Gastrohepatic LAD/mass  - CT imaging showed gastrohepatic mass (3.9 x 2.4 cm), possibly enlarged lymph node.   Mesenteric and retroperitoneal adenopathy.  - CMP normal.     Recommendations:        Impression:   64 yrs old male w/ no significant past medical history who presented with left sided flank pain, found to have a gastrohepatic mass, concerning for lymphoma. [facility] consulted for EUS.     #Left flank pain  #Gastrohepatic LAD/mass  - CT imaging showed gastrohepatic mass (3.9 x 2.4 cm), possibly enlarged lymph node.   Mesenteric and retroperitoneal adenopathy.  - CMP normal.     Recommendations:   - Will plan for possible EGD/EUS today.   - Keep him NPO for now.   - If emergent procedures occurs later in the day today, will likely need to postpone the procedure tomorrow.   - Rest of the care per primary team.     Discussed the case with Dr. Garcia.     GI will continue to follow.     All recommendations are tentative until note is attested by an attending.     Ermelinda Carvajal, PGY-6  Gastroenterology/Hepatology Fellow  Available on [employer] Teams  [phone] (Short Range Pager)  [phone] (Long Range Pager)    After 5pm, please contact the on-call GI fellow.

## 2024-10-01 NOTE — CONSULT NOTE ADULT - SUBJECTIVE AND OBJECTIVE BOX
HPI:    Mr. Aparicio is a 64 yrs old male w/ no significant past medical history who presented with left sided flank pain. Reported the left flank pain started last week on Wednesday when he was at the Gym, felt like he pulled a muscle. The pain was persistent, so he went to the [facility] ED, received Toradol which resolved his pain. Denied abd pain, nausea, vomiting, fevers and chills. Denied weight loss. Reported maternal GF had colon cancer. CT showed gastrohepatic mass and RP LAD suspicious for lymphoma.  He was evaluated by GI and transferred to [facility] for possible EUS.      Allergies:  Bactrim DS (Hives)      Home Medications:  None    Hospital Medications:  influenza   Vaccine 0.5 milliLiter(s) IntraMuscular once  melatonin 3 milliGRAM(s) Oral at bedtime      PMHX/PSHX:  No pertinent past medical history    Incisional hernia    No significant past surgical history    S/P appendectomy    S/P cholecystectomy        Family history:  Family history of coronary artery disease in father, colon cancer in maternal grandfather.     Social History:   Tob: Denies  EtOH: Denies  Illicit Drugs: Denies    ROS:     General:  No wt loss, fevers, chills, night sweats, fatigue  Eyes:  Good vision, no reported pain  ENT:  No sore throat, pain, runny nose, dysphagia  CV:  No pain, palpitations, hypo/hypertension  Pulm:  No dyspnea, cough, tachypnea, wheezing  GI:  see HPI  :  No pain, bleeding, incontinence, nocturia  Muscle:  No pain, weakness  Neuro:  No weakness, tingling, memory problems  Psych:  No fatigue, insomnia, mood problems, depression  Endocrine:  No polyuria, polydipsia, cold/heat intolerance  Heme:  No petechiae, ecchymosis, easy bruisability  Skin:  No rash, tattoos, scars, edema    PHYSICAL EXAM:     GENERAL:  No acute distress, obese male, sitting on the bed.   HEENT:  NCAT, no scleral icterus   CHEST:  no respiratory distress  HEART:  Regular rate and rhythm  ABDOMEN:  Soft, non-tender, moderately distended.   EXTREMITIES: No LE edema b/l  SKIN:  No rash/erythema/ecchymoses/petechiae/wounds/abscess/warm/dry  NEURO:  Alert and oriented x 3, no tremors.     Vital Signs:  Vital Signs Last 24 Hrs  T(C): 36.6 (01 Oct 2024 10:29), Max: 37.1 (30 Sep 2024 17:26)  T(F): 97.8 (01 Oct 2024 10:29), Max: 98.8 (30 Sep 2024 17:26)  HR: 64 (01 Oct 2024 10:29) (64 - 83)  BP: 144/78 (01 Oct 2024 10:29) (113/76 - 158/83)  BP(mean): --  RR: 16 (01 Oct 2024 10:) (16 - 18)  SpO2: 97% (01 Oct 2024 10:) (95% - 97%)    Parameters below as of 01 Oct 2024 10:29  Patient On (Oxygen Delivery Method): room air      Daily Height in cm: 170.18 (30 Sep 2024 17:26)    Daily     LABS:                        13.3   9.03  )-----------( 304      ( 30 Sep 2024 10:10 )             43.9       09-30    135  |  105  |  15  ----------------------------<  94  4.4   |  22  |  1.10    Ca    9.1      30 Sep 2024 10:10    TPro  7.9  /  Alb  3.8  /  TBili  0.4  /  DBili  x   /  AST  18  /  ALT  25  /  AlkPhos  96  09-30    LIVER FUNCTIONS - ( 30 Sep 2024 10:10 )  Alb: 3.8 g/dL / Pro: 7.9 g/dL / ALK PHOS: 96 U/L / ALT: 25 U/L / AST: 18 U/L / GGT: x           PT/INR - ( 30 Sep 2024 20:07 )   PT: 12.3 sec;   INR: 1.06 ratio         PTT - ( 30 Sep 2024 20:07 )  PTT:33.7 sec  Urinalysis Basic - ( 30 Sep 2024 13:30 )    Color: Yellow / Appearance: Clear / S.017 / pH: x  Gluc: x / Ketone: Trace mg/dL  / Bili: Negative / Urobili: 0.2 mg/dL   Blood: x / Protein: Trace mg/dL / Nitrite: Negative   Leuk Esterase: Negative / RBC: x / WBC x   Sq Epi: x / Non Sq Epi: x / Bacteria: x                              13.3   9.03  )-----------( 304      ( 30 Sep 2024 10:10 )             43.9       Imaging:    CT renal  FINDINGS:  LOWER CHEST: Basilar atelectasis. Right lower lobe micronodule. Coronary   artery calcifications.    Please note that evaluation of the abdominal organs and vascular   structures is limited by lack of intravenous contrast.    LIVER: Within normal limits.  BILE DUCTS: Normal caliber.  GALLBLADDER: Within normal limits.  SPLEEN: Within normal limits.  PANCREAS: Within normal limits.  ADRENALS: Within normal limits.  KIDNEYS/URETERS: No renal stones or hydronephrosis. Renal hypodensities,   possibly cysts.    BLADDER: Within normal limits.  REPRODUCTIVE ORGANS: Prostate is not enlarged.    BOWEL: Hiatal hernia. No bowel obstruction. Appendix is absent.  PERITONEUM/RETROPERITONEUM: Within normal limits.  VESSELS: Atherosclerotic calcifications.  LYMPH NODES: Mesenteric and retroperitoneal adenopathy measuring up to   2.4 x 1.5 cm. 3.9 x 2.4 cm gastrohepatic mass.  ABDOMINAL WALL: Postsurgical changes.  BONES: Degenerative changes.    IMPRESSION:  No renal stones or hydronephrosis.    Gastrohepatic mass (3.9 x 2.4 cm), possibly enlarged lymph node.   Mesenteric and retroperitoneal adenopathy. Findings suspicious for   malignancy such as lymphoma.        
Reason for consult: R/O Lymphoma    HPI:  65 y/o M with no significant PMH p/w L flank pain.  Pt reports pain in L flank for the past week that waxes and wanes, but became more severe and persistent over the past 1-2 days.  Pain varies in quality from sharp to dull.  Pt has not had nausea, vomiting, hematuria, fever or chills.  No night sweats, weight loss or change in appetite.  Pt presented to [facility] ED where CT showed gastrohepatic mass and RP LAD suspicious for lymphoma.  He was evaluated by GI and transferred to [facility] for possible EUS.  (30 Sep 2024 23:41)    Oncology consultation completed for this 64 year old male with no significant medical history who was initially seen at [facility] with complaints of left flank pain whereby a CT scan revealed a gastrohepatic mass and retroperitoneal lymphadenopathy suspicious for Lymphoma. He was transferred  to [facility] for EUS, He denies weight loss, fevers and night sweats, colonoscopy overdue.      PAST MEDICAL & SURGICAL HISTORY:  Incisional hernia        S/P appendectomy  2014      S/P cholecystectomy  15 years ago          FAMILY HISTORY:  Family history of coronary artery disease in father  Father -  age 60 following MI        Alcohol: Denied  Smoking: Nonsmoker  Drug Use: Denied  Marital Status:         Allergies    Bactrim DS (Hives)    Intolerances        MEDICATIONS  (STANDING):  influenza   Vaccine 0.5 milliLiter(s) IntraMuscular once  melatonin 3 milliGRAM(s) Oral at bedtime    MEDICATIONS  (PRN):      ROS  No fever, sweats, chills  No epistaxis, HA, sore throat  No CP, SOB, cough, sputum  No n/v/d, abd pain, melena, hematochezia  No edema  No rash  No anxiety  No back pain, joint pain  No bleeding, bruising  No dysuria, hematuria    T(C): 36.6 (10-01-24 @ 10:29), Max: 37.1 (24 @ 17:26)  HR: 64 (10-01-24 @ 10:29) (64 - 83)  BP: 144/78 (10-01-24 @ 10:29) (113/76 - 158/83)  RR: 16 (10-01-24 @ 10:29) (16 - 18)  SpO2: 97% (10-01-24 @ 10:29) (95% - 97%)  Wt(kg): --    PE  NAD  Awake, alert  Anicteric, MMM  RRR  CTAB  Abd soft, NT, ND  No c/c/e  No rash grossly  FROM                          13.3   9.03  )-----------( 304      ( 30 Sep 2024 10:10 )             43.9           135  |  105  |  15  ----------------------------<  94  4.4   |  22  |  1.10    Ca    9.1      30 Sep 2024 10:10    TPro  7.9  /  Alb  3.8  /  TBili  0.4  /  DBili  x   /  AST  18  /  ALT  25  /  AlkPhos  96

## 2024-10-01 NOTE — DISCHARGE NOTE PROVIDER - PROVIDER TOKENS
FREE:[LAST:[Follow-up],PHONE:[(   )    -],FAX:[(   )    -],ADDRESS:[Please call and make an appointment.   Hematology/Oncology  [facility]  [phone] (Office)  [phone] (Alt office)]]

## 2024-10-01 NOTE — DISCHARGE NOTE PROVIDER - CARE PROVIDER_API CALL
Follow-up,   Please call and make an appointment.   Hematology/Oncology  [facility]  [phone] (Office)  [phone] (Alt office)  Phone: (   )    -  Fax: (   )    -  Follow Up Time:

## 2024-10-01 NOTE — PROVIDER CONTACT NOTE (CRITICAL VALUE NOTIFICATION) - BACKGROUND
35 year old may admitted for abdominal pain. Patient has jaundice and ascites. Lat paracentesis was on saturday.

## 2024-10-01 NOTE — ASU PREOP CHECKLIST - SPO2 (%)
Patient arrives for venofer # 4 of 5 ordered by  for Iron deficiency anemia. No complaints except for fatigue. States she will be having a colonoscopy in the near future. PIV started in right hand with  positive blood return noted, x1 IV stick done by Bakari Vela RN. Venofer given slowly via side port of free flowing NS flush bag. Occasional c/o burning , but subsided when IV flushed with saline. Patient tolerated well, no s/s of adverse reaction noted. Post vs after 30 minute observation wnl. PIV removed, site covered with 2x2 and coban. No redness swelling or discoloration at site. Patient discharged stable.
95

## 2024-10-01 NOTE — DISCHARGE NOTE NURSING/CASE MANAGEMENT/SOCIAL WORK - PATIENT PORTAL LINK FT
You can access the FollowMyHealth Patient Portal offered by [facility] by registering at the following website: [email or web address]. By joining [employer]’s FollowMyHealth portal, you will also be able to view your health information using other applications (apps) compatible with our system.

## 2024-10-01 NOTE — PROGRESS NOTE ADULT - SUBJECTIVE AND OBJECTIVE BOX
Patient is a 64y old  Male who presents with a chief complaint of Flank pain (01 Oct 2024 14:11)    Date of servie : 10-01-24 @ 16:53  INTERVAL HPI/OVERNIGHT EVENTS:  T(C): 36.2 (10-01-24 @ 15:16), Max: 37.1 (24 @ 17:26)  HR: 73 (10-01-24 @ 15:26) (64 - 83)  BP: 134/82 (10-01-24 @ 15:26) (113/76 - 158/83)  RR: 21 (10-01-24 @ 15:26) (15 - 21)  SpO2: 96% (10-01-24 @ 15:26) (95% - 97%)  Wt(kg): --  I&O's Summary      LABS:                        13.3   9.03  )-----------( 304      ( 30 Sep 2024 10:10 )             43.9     09-30    135  |  105  |  15  ----------------------------<  94  4.4   |  22  |  1.10    Ca    9.1      30 Sep 2024 10:10    TPro  7.9  /  Alb  3.8  /  TBili  0.4  /  DBili  x   /  AST  18  /  ALT  25  /  AlkPhos  96  09-30    PT/INR - ( 30 Sep 2024 20:07 )   PT: 12.3 sec;   INR: 1.06 ratio         PTT - ( 30 Sep 2024 20:07 )  PTT:33.7 sec  Urinalysis Basic - ( 30 Sep 2024 13:30 )    Color: Yellow / Appearance: Clear / S.017 / pH: x  Gluc: x / Ketone: Trace mg/dL  / Bili: Negative / Urobili: 0.2 mg/dL   Blood: x / Protein: Trace mg/dL / Nitrite: Negative   Leuk Esterase: Negative / RBC: x / WBC x   Sq Epi: x / Non Sq Epi: x / Bacteria: x      CAPILLARY BLOOD GLUCOSE            Urinalysis Basic - ( 30 Sep 2024 13:30 )    Color: Yellow / Appearance: Clear / S.017 / pH: x  Gluc: x / Ketone: Trace mg/dL  / Bili: Negative / Urobili: 0.2 mg/dL   Blood: x / Protein: Trace mg/dL / Nitrite: Negative   Leuk Esterase: Negative / RBC: x / WBC x   Sq Epi: x / Non Sq Epi: x / Bacteria: x        MEDICATIONS  (STANDING):  influenza   Vaccine 0.5 milliLiter(s) IntraMuscular once  melatonin 3 milliGRAM(s) Oral at bedtime    MEDICATIONS  (PRN):          PHYSICAL EXAM:  GENERAL: NAD, well-groomed, well-developed  HEAD:  Atraumatic, Normocephalic  CHEST/LUNG: Clear to percussion bilaterally; No rales, rhonchi, wheezing, or rubs  HEART: Regular rate and rhythm; No murmurs, rubs, or gallops  ABDOMEN: Soft, Nontender, Nondistended; Bowel sounds present  EXTREMITIES:  2+ Peripheral Pulses, No clubbing, cyanosis, or edema  LYMPH: No lymphadenopathy noted  SKIN: No rashes or lesions    Care Discussed with Consultants/Other Providers [ ] YES  [ ] NO

## 2024-10-01 NOTE — CONSULT NOTE ADULT - NS ATTEND AMEND GEN_ALL_CORE FT
Patient seen and examined with NP during rounds  I agree with her assessment and plan    patient admitted for left flank pain,   CT renal protocol notable for gastrohepatic mass, as well as mesenteric and retro LN (1-2 cm in size)  Concern for lymphoma, would send workup per NP note  GI planning for EGD/EUS, with FNA,  Would also check CT N/C to see if any other accessible sites for core biopsy  Will follow

## 2024-10-01 NOTE — PROGRESS NOTE ADULT - ASSESSMENT
63 y/o M with no significant PMH p/w L flank pain, and found to have gastrohepatic mass and RP LAD.          Intraabdominal mass.   ·  Plan: Gastrohepatic mass concerning for lymphoma  - GI fu   -  EUS today  - pain control  - heme onc fu as putpt

## 2024-10-01 NOTE — DISCHARGE NOTE NURSING/CASE MANAGEMENT/SOCIAL WORK - NSDCVIVACCINE_GEN_ALL_CORE_FT
influenza, injectable, quadrivalent, preservative free; 26-Nov-2014 17:06; Dulce Hair (RN); [employer]; ; IntraMuscular; Deltoid Left.; 0.5 milliLiter(s);   Tdap; 08-Apr-2021 16:20; Lilly OlsonRN); [employer]; [id number] (Exp. Date: 10-Shekhar-2022); IntraMuscular; Deltoid Left.; 0.5 milliLiter(s); VIS (VIS Published: 09-May-2013, VIS Presented: 08-Apr-2021);

## 2024-10-01 NOTE — PATIENT PROFILE ADULT - FALL HARM RISK - HARM RISK INTERVENTIONS

## 2024-10-01 NOTE — DISCHARGE NOTE PROVIDER - NSFOLLOWUPCLINICS_GEN_ALL_ED_FT
[facility] Gastroenter[facility]  Gastroenterology  [address] [place] [address] [address]  [place] [address]  Phone: [phone]  Fax:

## 2024-10-02 LAB
TM INTERPRETATION: SIGNIFICANT CHANGE UP
TM INTERPRETATION: SIGNIFICANT CHANGE UP

## 2024-10-09 LAB — SURGICAL PATHOLOGY STUDY: SIGNIFICANT CHANGE UP

## 2024-10-16 ENCOUNTER — APPOINTMENT (OUTPATIENT)
Dept: GASTROENTEROLOGY | Facility: CLINIC | Age: 64
End: 2024-10-16

## 2024-11-05 ENCOUNTER — NON-APPOINTMENT (OUTPATIENT)
Age: 64
End: 2024-11-05

## 2024-11-20 ENCOUNTER — INPATIENT (INPATIENT)
Facility: HOSPITAL | Age: 64
LOS: 10 days | Discharge: HOME | DRG: 684 | End: 2024-12-01
Attending: INTERNAL MEDICINE | Admitting: INTERNAL MEDICINE
Payer: COMMERCIAL

## 2024-11-20 VITALS
TEMPERATURE: 98 F | DIASTOLIC BLOOD PRESSURE: 90 MMHG | WEIGHT: 225.97 LBS | HEIGHT: 67 IN | HEART RATE: 74 BPM | SYSTOLIC BLOOD PRESSURE: 160 MMHG | RESPIRATION RATE: 18 BRPM

## 2024-11-20 DIAGNOSIS — N17.9 ACUTE KIDNEY FAILURE, UNSPECIFIED: ICD-10-CM

## 2024-11-20 DIAGNOSIS — Z98.89 OTHER SPECIFIED POSTPROCEDURAL STATES: Chronic | ICD-10-CM

## 2024-11-20 DIAGNOSIS — R06.00 DYSPNEA, UNSPECIFIED: ICD-10-CM

## 2024-11-20 DIAGNOSIS — Z29.9 ENCOUNTER FOR PROPHYLACTIC MEASURES, UNSPECIFIED: ICD-10-CM

## 2024-11-20 DIAGNOSIS — Z90.49 ACQUIRED ABSENCE OF OTHER SPECIFIED PARTS OF DIGESTIVE TRACT: Chronic | ICD-10-CM

## 2024-11-20 DIAGNOSIS — C85.99 NON-HODGKIN LYMPHOMA, UNSPECIFIED, EXTRANODAL AND SOLID ORGAN SITES: ICD-10-CM

## 2024-11-20 LAB
ALBUMIN SERPL ELPH-MCNC: 3.1 G/DL — LOW (ref 3.3–5)
ALP SERPL-CCNC: 79 U/L — SIGNIFICANT CHANGE UP (ref 40–120)
ALT FLD-CCNC: 39 U/L — SIGNIFICANT CHANGE UP (ref 12–78)
ANION GAP SERPL CALC-SCNC: 12 MMOL/L — SIGNIFICANT CHANGE UP (ref 5–17)
AST SERPL-CCNC: 19 U/L — SIGNIFICANT CHANGE UP (ref 15–37)
BASOPHILS # BLD AUTO: 0.04 K/UL — SIGNIFICANT CHANGE UP (ref 0–0.2)
BASOPHILS NFR BLD AUTO: 0.5 % — SIGNIFICANT CHANGE UP (ref 0–2)
BILIRUB SERPL-MCNC: 0.2 MG/DL — SIGNIFICANT CHANGE UP (ref 0.2–1.2)
BUN SERPL-MCNC: 58 MG/DL — HIGH (ref 7–23)
CALCIUM SERPL-MCNC: 8.3 MG/DL — LOW (ref 8.5–10.1)
CHLORIDE SERPL-SCNC: 107 MMOL/L — SIGNIFICANT CHANGE UP (ref 96–108)
CO2 SERPL-SCNC: 21 MMOL/L — LOW (ref 22–31)
CREAT SERPL-MCNC: 5.3 MG/DL — HIGH (ref 0.5–1.3)
EGFR: 11 ML/MIN/1.73M2 — LOW
EOSINOPHIL # BLD AUTO: 0.1 K/UL — SIGNIFICANT CHANGE UP (ref 0–0.5)
EOSINOPHIL NFR BLD AUTO: 1.4 % — SIGNIFICANT CHANGE UP (ref 0–6)
GLUCOSE SERPL-MCNC: 122 MG/DL — HIGH (ref 70–99)
HCT VFR BLD CALC: 31.1 % — LOW (ref 39–50)
HGB BLD-MCNC: 10 G/DL — LOW (ref 13–17)
IMM GRANULOCYTES NFR BLD AUTO: 0.3 % — SIGNIFICANT CHANGE UP (ref 0–0.9)
LYMPHOCYTES # BLD AUTO: 0.82 K/UL — LOW (ref 1–3.3)
LYMPHOCYTES # BLD AUTO: 11.2 % — LOW (ref 13–44)
MAGNESIUM SERPL-MCNC: 2.8 MG/DL — HIGH (ref 1.6–2.6)
MCHC RBC-ENTMCNC: 19.7 PG — LOW (ref 27–34)
MCHC RBC-ENTMCNC: 32.2 G/DL — SIGNIFICANT CHANGE UP (ref 32–36)
MCV RBC AUTO: 61.3 FL — LOW (ref 80–100)
MONOCYTES # BLD AUTO: 0.74 K/UL — SIGNIFICANT CHANGE UP (ref 0–0.9)
MONOCYTES NFR BLD AUTO: 10.1 % — SIGNIFICANT CHANGE UP (ref 2–14)
NEUTROPHILS # BLD AUTO: 5.59 K/UL — SIGNIFICANT CHANGE UP (ref 1.8–7.4)
NEUTROPHILS NFR BLD AUTO: 76.5 % — SIGNIFICANT CHANGE UP (ref 43–77)
NRBC # BLD: 0 /100 WBCS — SIGNIFICANT CHANGE UP (ref 0–0)
NT-PROBNP SERPL-SCNC: 593 PG/ML — HIGH (ref 0–125)
PLATELET # BLD AUTO: 282 K/UL — SIGNIFICANT CHANGE UP (ref 150–400)
POTASSIUM SERPL-MCNC: 4.3 MMOL/L — SIGNIFICANT CHANGE UP (ref 3.5–5.3)
POTASSIUM SERPL-SCNC: 4.3 MMOL/L — SIGNIFICANT CHANGE UP (ref 3.5–5.3)
PROT SERPL-MCNC: 6.5 G/DL — SIGNIFICANT CHANGE UP (ref 6–8.3)
RBC # BLD: 5.07 M/UL — SIGNIFICANT CHANGE UP (ref 4.2–5.8)
RBC # FLD: 16.2 % — HIGH (ref 10.3–14.5)
SODIUM SERPL-SCNC: 140 MMOL/L — SIGNIFICANT CHANGE UP (ref 135–145)
TROPONIN I, HIGH SENSITIVITY RESULT: 13.7 NG/L — SIGNIFICANT CHANGE UP
TSH SERPL-MCNC: 1.11 UIU/ML — SIGNIFICANT CHANGE UP (ref 0.36–3.74)
WBC # BLD: 7.31 K/UL — SIGNIFICANT CHANGE UP (ref 3.8–10.5)
WBC # FLD AUTO: 7.31 K/UL — SIGNIFICANT CHANGE UP (ref 3.8–10.5)

## 2024-11-20 PROCEDURE — 93010 ELECTROCARDIOGRAM REPORT: CPT

## 2024-11-20 PROCEDURE — 99285 EMERGENCY DEPT VISIT HI MDM: CPT

## 2024-11-20 PROCEDURE — 74176 CT ABD & PELVIS W/O CONTRAST: CPT | Mod: 26,MC

## 2024-11-20 PROCEDURE — 71045 X-RAY EXAM CHEST 1 VIEW: CPT | Mod: 26

## 2024-11-20 PROCEDURE — 71250 CT THORAX DX C-: CPT | Mod: 26,MC

## 2024-11-20 RX ORDER — ONDANSETRON HYDROCHLORIDE 4 MG/1
4 TABLET, FILM COATED ORAL EVERY 8 HOURS
Refills: 0 | Status: DISCONTINUED | OUTPATIENT
Start: 2024-11-20 | End: 2024-11-27

## 2024-11-20 RX ORDER — POLYETHYLENE GLYCOL 3350 17 G/17G
17 POWDER, FOR SOLUTION ORAL DAILY
Refills: 0 | Status: DISCONTINUED | OUTPATIENT
Start: 2024-11-20 | End: 2024-12-01

## 2024-11-20 RX ORDER — IPRATROPIUM BROMIDE AND ALBUTEROL SULFATE 2.5; .5 MG/3ML; MG/3ML
3 SOLUTION RESPIRATORY (INHALATION) EVERY 12 HOURS
Refills: 0 | Status: DISCONTINUED | OUTPATIENT
Start: 2024-11-20 | End: 2024-12-01

## 2024-11-20 RX ORDER — SENNOSIDES 8.6 MG
2 TABLET ORAL AT BEDTIME
Refills: 0 | Status: DISCONTINUED | OUTPATIENT
Start: 2024-11-20 | End: 2024-12-01

## 2024-11-20 RX ORDER — ACETAMINOPHEN, DIPHENHYDRAMINE HCL, PHENYLEPHRINE HCL 325; 25; 5 MG/1; MG/1; MG/1
5 TABLET ORAL AT BEDTIME
Refills: 0 | Status: DISCONTINUED | OUTPATIENT
Start: 2024-11-20 | End: 2024-12-01

## 2024-11-20 RX ORDER — SODIUM BICARBONATE 84 MG/ML
0.06 INJECTION, SOLUTION INTRAVENOUS
Qty: 75 | Refills: 0 | Status: DISCONTINUED | OUTPATIENT
Start: 2024-11-20 | End: 2024-11-21

## 2024-11-20 RX ORDER — SODIUM CHLORIDE 9 MG/ML
4 INJECTION, SOLUTION INTRAMUSCULAR; INTRAVENOUS; SUBCUTANEOUS EVERY 12 HOURS
Refills: 0 | Status: DISCONTINUED | OUTPATIENT
Start: 2024-11-20 | End: 2024-12-01

## 2024-11-20 RX ORDER — ACETAMINOPHEN 500MG 500 MG/1
650 TABLET, COATED ORAL EVERY 6 HOURS
Refills: 0 | Status: DISCONTINUED | OUTPATIENT
Start: 2024-11-20 | End: 2024-11-21

## 2024-11-20 RX ORDER — HEPARIN SODIUM,PORCINE 1000/ML
5000 VIAL (ML) INJECTION EVERY 8 HOURS
Refills: 0 | Status: DISCONTINUED | OUTPATIENT
Start: 2024-11-20 | End: 2024-11-27

## 2024-11-20 RX ORDER — SODIUM CHLORIDE 9 MG/ML
500 INJECTION, SOLUTION INTRAMUSCULAR; INTRAVENOUS; SUBCUTANEOUS ONCE
Refills: 0 | Status: COMPLETED | OUTPATIENT
Start: 2024-11-20 | End: 2024-11-20

## 2024-11-20 RX ORDER — MAGNESIUM, ALUMINUM HYDROXIDE 200-225/5
30 SUSPENSION, ORAL (FINAL DOSE FORM) ORAL EVERY 4 HOURS
Refills: 0 | Status: DISCONTINUED | OUTPATIENT
Start: 2024-11-20 | End: 2024-12-01

## 2024-11-20 RX ADMIN — SODIUM BICARBONATE 75 MEQ/KG/HR: 84 INJECTION, SOLUTION INTRAVENOUS at 23:27

## 2024-11-20 RX ADMIN — SODIUM CHLORIDE 500 MILLILITER(S): 9 INJECTION, SOLUTION INTRAMUSCULAR; INTRAVENOUS; SUBCUTANEOUS at 19:19

## 2024-11-20 RX ADMIN — ACETAMINOPHEN, DIPHENHYDRAMINE HCL, PHENYLEPHRINE HCL 5 MILLIGRAM(S): 325; 25; 5 TABLET ORAL at 23:09

## 2024-11-20 RX ADMIN — Medication 5000 UNIT(S): at 23:09

## 2024-11-20 NOTE — H&P ADULT - PROBLEM SELECTOR PLAN 1
Pt sent in to ED for elevated Cr (Baseline 1.1, increasing to 1.5-->3.0), endorsing decreased urination and mild flank pain  - Cr 5.3 on admission  - Metabolic Acidosis, CO2 21  - s/p wise catheter placement in ED with minimal output  - CT Abdomen: Collapsed bladder. Bilateral renal cysts, no hydro. No obstruction.  - No urgent indication for HD per nephro  - Can be pre-renal in setting of dehydration, less likely obstructive  - s/p 500cc NS bolus in ED  - Start sodium bicarb gtt 75cc/hr x20h for metabolic acidosis per nephro recs  - F/u urine and serum studies per nephro  - Tylenol PRN for flank pain  - Strict Is&Os q6h  - Monitor volume status  - Avoid nephrotoxic medications  - Daily CMPs  - Nephrology (Dr. Cantu) following, recs appreciated Pt sent in to ED for elevated Cr (Baseline 1.1, increasing to 1.5-->3.0), endorsing decreased urination and flank pain  - Pt c/o flank pain (L), per chart review, also was endorsing pain during admission at [facility], prior to elevated Cr  - Cr 5.3 on admission  - Metabolic Acidosis, CO2 21  - s/p wise catheter placement in ED with minimal output  - CT Abdomen: Collapsed bladder. Bilateral renal cysts, no hydro. No obstruction.  - No urgent indication for HD per nephro  - Can be pre-renal in setting of dehydration, less likely obstructive  - s/p 500cc NS bolus in ED  - Start sodium bicarb gtt 75cc/hr x20h for metabolic acidosis per nephro recs  - F/u urine and serum studies per nephro  - Tylenol PRN for flank pain  - Strict Is&Os q6h  - Monitor volume status  - Avoid nephrotoxic medications  - Daily CMPs  - Nephrology (Dr. Cantu) following, recs appreciated Pt sent in to ED for elevated Cr (Baseline 1.1, increasing to 1.5-->3.0), endorsing decreased urination and flank pain  - Pt c/o flank pain (L), per chart review, also was endorsing pain during admission at [facility], prior to elevated Cr  - Cr 5.3 on admission-Pre renal likely - 2/2 NSAID-Advil use   - Metabolic Acidosis, CO2 21  - s/p wise catheter placement in ED with minimal output  - CT Abdomen: Collapsed bladder. Bilateral renal cysts, no hydro. No obstruction.  - No urgent indication for HD per nephro  - Can be pre-renal in setting of dehydration, less likely obstructive  - s/p 500cc NS bolus in ED  - Start sodium bicarb gtt 75cc/hr x20h for metabolic acidosis per nephro recs  - F/u urine and serum studies per nephro  - Tylenol PRN for flank pain  - Strict Is&Os q6h  - Monitor volume status  - Avoid nephrotoxic medications, Renal dose meds   - Daily CMPs  - Nephrology (Dr. Cantu) following, recs appreciated d/w Pt sent in to ED for elevated Cr (Baseline 1.1, increasing to 1.5-->3.0), endorsing decreased urination and flank pain  - Pt c/o flank pain (L), per chart review, also was endorsing pain during admission at [facility], prior to elevated Cr  - Cr 5.3 on admission-Pre renal likely - 2/2 NSAID-Advil use   - Metabolic Acidosis, CO2 21  - s/p wise catheter placement in ED with minimal output  - CT Abdomen: Collapsed bladder. Bilateral renal cysts, no hydro. No obstruction.  - No urgent indication for HD per nephro  - Can be pre-renal in setting of dehydration, less likely obstructive  - s/p 500cc NS bolus in ED  - Start sodium bicarb gtt 75cc/hr x20h for metabolic acidosis per nephro recs  - F/u urine and serum studies per nephro  - tramadol 25mg PRN mild pain, tramadol 50mg for moderate pain, oxycodone 5mg for severe pain   - Strict Is&Os q6h  - Monitor volume status  - Avoid nephrotoxic medications, Renal dose meds   - Daily CMPs  - Nephrology (Dr. Cantu) following, recs appreciated d/w

## 2024-11-20 NOTE — ED PROVIDER NOTE - CLINICAL SUMMARY MEDICAL DECISION MAKING FREE TEXT BOX
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 patient recently just had chest port placed for planned cancer treatment but was found on blood work to have worsening creatinine. baseline around 1.1, went to 1.5>3.0 and now today 5.3. patient reports urinated this morning but since then no urine output. has some flank discomfort and some shortness of breath    PE: Patient is well appearing, no acute distress, no signs of head trauma, lungs clear bilaterally, abdomen is soft and nontender to palpation without guarding or rebound, normal pulses in all extremities    labs, urine, will place wise to assess for outlet obstruction. consults, anticipate admission for MARYSOL

## 2024-11-20 NOTE — ED PROVIDER NOTE - PROGRESS NOTE DETAILS
Patient has collapsed bladder volume and minimal urine output with wise placement. his creatinine continues to worsen. concern for MARYSOL and renal failure. nephrology consulted. will require admission

## 2024-11-20 NOTE — H&P ADULT - PROBLEM SELECTOR PLAN 2
Pt endorsing ZHOU while walking, new onset  - CT Chest:  Mild mottled density in the left mainstem bronchus may represent secretions. No focal consolidation or discrete mass is seen. Regions of atelectasis or scarring in the left upper lobe/lingula. Atelectatic changes at the lung bases  - Pt saturating well on RA  - Elevation in proBNP, can be 2/2 MARYSOL as pt does not appear clinically volume overloaded on examination and denies history of HF. No cardiomegaly on CT Chest  - Encourage incentive spirometer use  - Aspiration precautions  - Monitor routine hemodynamics Pt endorsing ZHOU while walking, new onset  - CT Chest:  Mild mottled density in the left mainstem bronchus may represent secretions. No focal consolidation or discrete mass is seen. Regions of atelectasis or scarring in the left upper lobe/lingula. Atelectatic changes at the lung bases  - Pt saturating well on RA  - Elevation in proBNP, can be 2/2 MARYSOL, less likely cardiac as pt does not appear clinically volume overloaded on examination and denies history of HF. No cardiomegaly on CT Chest  - Standing duonebs and hypertonic saline  - Encourage incentive spirometer use  - Aspiration precautions  - Monitor routine hemodynamics Pt endorsing ZHOU while walking, new onset  - CT Chest:  Mild mottled density in the left mainstem bronchus may represent secretions. No focal consolidation or discrete mass is seen. Regions of atelectasis or scarring in the left upper lobe/lingula. Atelectatic changes at the lung bases  - Pt saturating well on RA  - Elevation in proBNP, can be 2/2 MARYSOL, less likely cardiac as pt does not appear clinically volume overloaded on examination and denies history of HF. No cardiomegaly on CT Chest  - Standing duonebs and hypertonic saline  - Encourage incentive spirometer use  - Aspiration precautions  - Monitor routine hemodynamics  TTE

## 2024-11-20 NOTE — ED ADULT TRIAGE NOTE - CHIEF COMPLAINT QUOTE
patient ambulatory to triage a&ox4 sent by Dr Andrea for increased creatinine - had bloodwork done yesterday and port placed today for treatment for CA to stomach and lymph nodes.

## 2024-11-20 NOTE — H&P ADULT - GASTROINTESTINAL
details… normal/soft obese/normal/soft/nontender/nondistended/normal active bowel sounds/no guarding/no rigidity/no organomegaly/no palpable ted/no masses palpable

## 2024-11-20 NOTE — H&P ADULT - NEGATIVE GASTROINTESTINAL SYMPTOMS
no nausea/no vomiting/no diarrhea/no constipation no nausea/no vomiting/no diarrhea/no constipation/no change in bowel habits/no flatulence/no abdominal pain

## 2024-11-20 NOTE — H&P ADULT - NSHPSOCIALHISTORY_GEN_ALL_CORE
Lives at home with family  Recently retired  Active, ambulates independently and independent with ADLs Please follow up with your pediatrician 1-3 days. If no appointment can be made, please follow up at the [facility] clinic by calling [phone] to set up an appointment.

## 2024-11-20 NOTE — H&P ADULT - ATTENDING COMMENTS
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 Andrea for elevated Creatinine- MARYSOL with MA   Pt seen, Examined, Case & Care plan d/w pt residents at detail.  Consults  Renal -DR ELLIS   AM labs   PO diet  OOB to chair   DVT ppx

## 2024-11-20 NOTE — H&P ADULT - MUSCULOSKELETAL
negative ROM intact/no joint swelling/no calf tenderness ROM intact/no joint swelling/no calf tenderness/strength 5/5 bilateral upper extremities/strength 5/5 bilateral lower extremities

## 2024-11-20 NOTE — H&P ADULT - CARDIOVASCULAR
regular rate and rhythm/S1 S2 present/no murmur details… regular rate and rhythm/S1 S2 present/no murmur/no JVD/no pedal edema/vascular

## 2024-11-20 NOTE — ED ADULT NURSE NOTE - HISTORY OF COVID-19 VACCINATION
From: Flores Locke  Sent: 7/28/2022   4:52 PM CDT  To: Alicia Umaña, Altagracia Zarco RN, *  Subject: xolair denial (loading doses)                     Good afternoon,      I have contacted the insurance to follow up and they advised the Xolair authorization for the patient to receive the first 3 doses in the outpatient hospital setting has been denied.      The denial reason was that they did not see certain details about the patient's use and treatment.      I asked for the denial letter to be faxed to us. Inquired if there is option for peer to peer or an appeal. She stated there is an option for an appeal.      If you would like to submit an appeal for this medication please write a letter of medical necessity. After the letter has been completed please send a message to Alicia (who is the person working on this request and why I have included her in this message) and our department pool ([facility] IV Drug or Provider Admin Injection) to let us know that the letter is ready to be faxed to the insurance.      Thank you   Flores Locke    Yes

## 2024-11-20 NOTE — ED PROVIDER NOTE - RESPIRATORY [+], MLM
EXERTIONAL DYSPNEA/SHORTNESS OF BREATH Clindamycin Counseling: I counseled the patient regarding use of clindamycin as an antibiotic for prophylactic and/or therapeutic purposes. Clindamycin is active against numerous classes of bacteria, including skin bacteria. Side effects may include nausea, diarrhea, gastrointestinal upset, rash, hives, yeast infections, and in rare cases, colitis.

## 2024-11-20 NOTE — H&P ADULT - NEUROLOGICAL
Spoke to [employer] and Lyrica was denied due to not enough information based on an acceptable diagnosis.    normal/sensation intact normal/sensation intact/deep reflexes intact/superficial reflexes intact details…

## 2024-11-20 NOTE — H&P ADULT - PROBLEM SELECTOR PLAN 3
Pt with recently diagnosed gastric lymphoma, port placed today for planned chemo (appointment on Monday per pt). Follows Dr. Andrea  - CT Ab: Lymphadenopathy which appears increased when compared with 9/30/2024. Nodularity along posterior peritoneal reflections and minimal perihepatic fluid which were not seen previously. Pt with recently diagnosed gastric lymphoma, port placed today for planned chemo (appointment on Monday per pt). Follows Dr. Andrea  - CT Ab: Lymphadenopathy which appears increased when compared with 9/30/2024. Nodularity along posterior peritoneal reflections and minimal perihepatic fluid which were not seen previously.  Anemia -Mild anemia

## 2024-11-20 NOTE — H&P ADULT - ASSESSMENT
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 Andrea for elevated Creatinine.  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 Andrea for elevated Creatinine- MARYSOL with MA .

## 2024-11-20 NOTE — ED ADULT NURSE NOTE - CADM POA URETHRAL CATHETER
"Maicol Hunt" Monty was seen and treated in our emergency department on 12/16/2023.  He may return to school on 12/20/2023.      If you have any questions or concerns, please don't hesitate to call.      Rose Avila RN"
No

## 2024-11-20 NOTE — H&P ADULT - RESPIRATORY
clear to auscultation bilaterally/no wheezes/no rales/no rhonchi/no respiratory distress/no use of accessory muscles clear to auscultation bilaterally/no wheezes/no rales/no rhonchi/no respiratory distress/no use of accessory muscles/airway patent

## 2024-11-20 NOTE — ED PROVIDER NOTE - PHYSICAL EXAMINATION
Gen: Awake, Alert, NAD  Head:  NC/AT  Eyes:  PERRL, EOMI, Conjunctiva pink, no scleral icterus  ENT:  no exudates, no erythema, uvula midline, TMs clear bilaterally, moist mucus membranes  Neck: supple, nontender, no meningismus, no JVD, trachea midline  Cardiac/CV:  S1 S2, RRR, no murmurs  Respiratory/Pulm:  CTAB, good air movement, normal resp effort, no wheezes/stridor/retractions/rales/rhonchi  Gastrointestinal/Abdomen:  Soft, nontender, slightly distended, no rebound/guarding  Ext:  warm, well perfused, moving all extremities spontaneously, no cyanosis, no erythema, no edema, distal pulses intact  Skin: intact, no rash, no petechiae, no ecchymosis  Neuro:  AAOx3, sensation intact, motor 5/5 x 4 extremities, clear speech

## 2024-11-20 NOTE — H&P ADULT - NSICDXPASTMEDICALHX_GEN_ALL_CORE_FT
PAST MEDICAL HISTORY:  Gastric lymphoma     Incisional hernia 2015     PAST MEDICAL HISTORY:  Anemia of chronic disease     Gastric lymphoma     Incisional hernia 2015

## 2024-11-20 NOTE — PROGRESS NOTE ADULT - ASSESSMENT
MARYSOL  Gastric Lymphoma  Metabolic Acidosis  Check Urine and blood studies  Barba to r/o obstruction, however no hydro on CT abd  Trial of IVF  No emergent indication for dialysis  Consult to follow

## 2024-11-20 NOTE — H&P ADULT - HISTORY OF PRESENT ILLNESS
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 Andrea for elevated Creatinine. Pt called by doctor for elevated Cr at 5.3.  Baseline 1.1, increasing to 1.5-->3.0--> 5.3 on outpatient labs. Pt had chemo port placed today, has not been drinking as much as he usually does. Per pt, urinated a small amount this AM, contributed it to decreased fluid intake. Pt endorsing mild flank pain, prescribed oxycodone by outpatient doctor. Pt also endorsing new onset dyspnea on exertion.    Denies fever, chills, chest pain, palpitations, abdominal pain, nausea, vomiting, diarrhea, constipation, or dysuria    ED Course:   Vitals: BP: 160/90, HR: 74, Temp: 98F, RR: 18  Labs:  WBC 7.31, Hg 10, CO2 21, BUN/CR 58,5.3, Ca 8.3, eGFR 11, Mag 2.8, proBNP 593  UA: Pending  CT Chest: Mild mottled density in the left mainstem bronchus may represent secretions. No focal consolidation or discrete mass is seen. Regions of atelectasis or scarring in the left upper lobe/lingula. Atelectatic changes at the lung bases.  CT Abdomen/Pelvis: Lymphadenopathy which appears increased when compared with 9/30/2024. Nodularity along posterior peritoneal reflections and minimal perihepatic fluid which were not seen previously.    EKG: NSR rate 68bpm. QTc 408ms  Received in the ED: 500cc NS bolus x1   Pt is a 63yo M with PMHx recently diagnosed gastric lymphoma  S/P Biopsy of gastic mass & neck Lymphnode  with LN spread presents to the ED as recommended by his oncologist Dr. Andrea for elevated Creatinine. Pt called by doctor for elevated Cr at 5.3.  Baseline 1.1, increasing to 1.5-->3.0--> 5.3 on outpatient labs. Pt had chemo port placed today, has not been drinking as much as he usually does. Per pt, urinated a small amount this AM, contributed it to decreased fluid intake. Pt endorsing mild flank pain, prescribed oxycodone by outpatient doctor. Pt also endorsing new onset dyspnea on exertion.    Denies fever, chills, chest pain, palpitations, abdominal pain, nausea, vomiting, diarrhea, constipation, or dysuria    ED Course:   Vitals: BP: 160/90, HR: 74, Temp: 98F, RR: 18  Labs:  WBC 7.31, Hg 10, CO2 21, BUN/CR 58,5.3, Ca 8.3, eGFR 11, Mag 2.8, proBNP 593  UA: Pending  CT Chest: Mild mottled density in the left mainstem bronchus may represent secretions. No focal consolidation or discrete mass is seen. Regions of atelectasis or scarring in the left upper lobe/lingula. Atelectatic changes at the lung bases.  CT Abdomen/Pelvis: Lymphadenopathy which appears increased when compared with 9/30/2024. Nodularity along posterior peritoneal reflections and minimal perihepatic fluid which were not seen previously.    EKG: NSR rate 68bpm. QTc 408ms  Received in the ED: 500cc NS bolus x1   Pt is a 65yo M with PMHx recently diagnosed gastric lymphoma  S/P Biopsy of gastric mass & neck Lymph node  with LN spread presents to the ED as recommended by his oncologist Dr. Andrea for elevated Creatinine. Pt called by doctor for elevated Cr at 5.3.  Baseline 1.1, increasing to 1.5-->3.0--> 5.3 on outpatient labs. Pt had chemo port placed today, has not been drinking as much as he usually does. Per pt, urinated a small amount this AM, contributed it to decreased fluid intake. Pt endorsing mild flank pain, prescribed oxycodone by outpatient doctor. Pt also endorsing new onset dyspnea on exertion. Pt has been taking Advil for 5 weeks for Lower Back pain. Pt had placement of Medi port as out pt   today     Denies fever, chills, chest pain, palpitations, abdominal pain, nausea, vomiting, diarrhea, constipation, or dysuria    ED Course:   Vitals: BP: 160/90, HR: 74, Temp: 98F, RR: 18  Labs:  WBC 7.31, Hg 10, CO2 21, BUN/CR 58,5.3, Ca 8.3, eGFR 11, Mag 2.8, proBNP 593  UA: Pending  CT Chest: Mild mottled density in the left mainstem bronchus may represent secretions. No focal consolidation or discrete mass is seen. Regions of atelectasis or scarring in the left upper lobe/lingula. Atelectatic changes at the lung bases.  CT Abdomen/Pelvis: Lymphadenopathy which appears increased when compared with 9/30/2024. Nodularity along posterior peritoneal reflections and minimal perihepatic fluid which were not seen previously.    EKG: NSR rate 68bpm. QTc 408ms  Received in the ED: 500cc NS bolus x1

## 2024-11-20 NOTE — ED PROVIDER NOTE - OBJECTIVE STATEMENT
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 patient recently just had chest port placed for planned cancer treatment but was found on blood work to have worsening creatinine. baseline around 1.1, went to 1.5>3.0 and now today 5.3. patient reports urinated this morning but since then no urine output. has some flank discomfort and some shortness of breath

## 2024-11-21 DIAGNOSIS — E87.5 HYPERKALEMIA: ICD-10-CM

## 2024-11-21 LAB
ALBUMIN SERPL ELPH-MCNC: 3 G/DL — LOW (ref 3.3–5)
ALP SERPL-CCNC: 79 U/L — SIGNIFICANT CHANGE UP (ref 40–120)
ALT FLD-CCNC: 34 U/L — SIGNIFICANT CHANGE UP (ref 12–78)
ANION GAP SERPL CALC-SCNC: 11 MMOL/L — SIGNIFICANT CHANGE UP (ref 5–17)
ANION GAP SERPL CALC-SCNC: 9 MMOL/L — SIGNIFICANT CHANGE UP (ref 5–17)
APPEARANCE UR: CLEAR — SIGNIFICANT CHANGE UP
AST SERPL-CCNC: 16 U/L — SIGNIFICANT CHANGE UP (ref 15–37)
BASE EXCESS BLDV CALC-SCNC: -4.5 MMOL/L — LOW (ref -2–3)
BASOPHILS # BLD AUTO: 0.06 K/UL — SIGNIFICANT CHANGE UP (ref 0–0.2)
BASOPHILS NFR BLD AUTO: 0.6 % — SIGNIFICANT CHANGE UP (ref 0–2)
BILIRUB SERPL-MCNC: 0.2 MG/DL — SIGNIFICANT CHANGE UP (ref 0.2–1.2)
BILIRUB UR-MCNC: NEGATIVE — SIGNIFICANT CHANGE UP
BLOOD GAS COMMENTS, VENOUS: SIGNIFICANT CHANGE UP
BUN SERPL-MCNC: 66 MG/DL — HIGH (ref 7–23)
BUN SERPL-MCNC: 73 MG/DL — HIGH (ref 7–23)
CALCIUM SERPL-MCNC: 8.3 MG/DL — LOW (ref 8.5–10.1)
CALCIUM SERPL-MCNC: 8.5 MG/DL — SIGNIFICANT CHANGE UP (ref 8.5–10.1)
CHLORIDE SERPL-SCNC: 107 MMOL/L — SIGNIFICANT CHANGE UP (ref 96–108)
CHLORIDE SERPL-SCNC: 108 MMOL/L — SIGNIFICANT CHANGE UP (ref 96–108)
CO2 SERPL-SCNC: 18 MMOL/L — LOW (ref 22–31)
CO2 SERPL-SCNC: 22 MMOL/L — SIGNIFICANT CHANGE UP (ref 22–31)
COLOR SPEC: YELLOW — SIGNIFICANT CHANGE UP
CREAT ?TM UR-MCNC: 72 MG/DL — SIGNIFICANT CHANGE UP
CREAT SERPL-MCNC: 6.6 MG/DL — HIGH (ref 0.5–1.3)
CREAT SERPL-MCNC: 7.4 MG/DL — HIGH (ref 0.5–1.3)
DIFF PNL FLD: ABNORMAL
EGFR: 8 ML/MIN/1.73M2 — LOW
EGFR: 9 ML/MIN/1.73M2 — LOW
EOSINOPHIL # BLD AUTO: 0.1 K/UL — SIGNIFICANT CHANGE UP (ref 0–0.5)
EOSINOPHIL NFR BLD AUTO: 1 % — SIGNIFICANT CHANGE UP (ref 0–6)
GAS PNL BLDV: SIGNIFICANT CHANGE UP
GLUCOSE SERPL-MCNC: 103 MG/DL — HIGH (ref 70–99)
GLUCOSE SERPL-MCNC: 149 MG/DL — HIGH (ref 70–99)
GLUCOSE UR QL: NEGATIVE MG/DL — SIGNIFICANT CHANGE UP
HCO3 BLDV-SCNC: 22 MMOL/L — SIGNIFICANT CHANGE UP (ref 22–29)
HCT VFR BLD CALC: 30.9 % — LOW (ref 39–50)
HGB BLD-MCNC: 10 G/DL — LOW (ref 13–17)
IMM GRANULOCYTES NFR BLD AUTO: 0.3 % — SIGNIFICANT CHANGE UP (ref 0–0.9)
KETONES UR-MCNC: NEGATIVE MG/DL — SIGNIFICANT CHANGE UP
LEUKOCYTE ESTERASE UR-ACNC: ABNORMAL
LYMPHOCYTES # BLD AUTO: 0.82 K/UL — LOW (ref 1–3.3)
LYMPHOCYTES # BLD AUTO: 8.1 % — LOW (ref 13–44)
MAGNESIUM SERPL-MCNC: 3 MG/DL — HIGH (ref 1.6–2.6)
MCHC RBC-ENTMCNC: 19.7 PG — LOW (ref 27–34)
MCHC RBC-ENTMCNC: 32.4 G/DL — SIGNIFICANT CHANGE UP (ref 32–36)
MCV RBC AUTO: 60.8 FL — LOW (ref 80–100)
MONOCYTES # BLD AUTO: 1.04 K/UL — HIGH (ref 0–0.9)
MONOCYTES NFR BLD AUTO: 10.3 % — SIGNIFICANT CHANGE UP (ref 2–14)
NEUTROPHILS # BLD AUTO: 8.07 K/UL — HIGH (ref 1.8–7.4)
NEUTROPHILS NFR BLD AUTO: 79.7 % — HIGH (ref 43–77)
NITRITE UR-MCNC: NEGATIVE — SIGNIFICANT CHANGE UP
NRBC # BLD: 0 /100 WBCS — SIGNIFICANT CHANGE UP (ref 0–0)
OSMOLALITY UR: 287 MOSM/KG — SIGNIFICANT CHANGE UP (ref 50–1200)
PCO2 BLDV: 42 MMHG — SIGNIFICANT CHANGE UP (ref 42–55)
PH BLDV: 7.32 — SIGNIFICANT CHANGE UP (ref 7.32–7.43)
PH UR: 5.5 — SIGNIFICANT CHANGE UP (ref 5–8)
PHOSPHATE SERPL-MCNC: 5.5 MG/DL — HIGH (ref 2.5–4.5)
PLATELET # BLD AUTO: 298 K/UL — SIGNIFICANT CHANGE UP (ref 150–400)
PO2 BLDV: 52 MMHG — HIGH (ref 25–45)
POTASSIUM SERPL-MCNC: 4.5 MMOL/L — SIGNIFICANT CHANGE UP (ref 3.5–5.3)
POTASSIUM SERPL-MCNC: 5.9 MMOL/L — HIGH (ref 3.5–5.3)
POTASSIUM SERPL-SCNC: 4.5 MMOL/L — SIGNIFICANT CHANGE UP (ref 3.5–5.3)
POTASSIUM SERPL-SCNC: 5.9 MMOL/L — HIGH (ref 3.5–5.3)
POTASSIUM UR-SCNC: 22.4 MMOL/L — SIGNIFICANT CHANGE UP
PROT ?TM UR-MCNC: 39 MG/DL — HIGH (ref 0–12)
PROT SERPL-MCNC: 6.6 G/DL — SIGNIFICANT CHANGE UP (ref 6–8.3)
PROT UR-MCNC: 30 MG/DL
PROT/CREAT UR-RTO: 0.6 RATIO — HIGH (ref 0–0.2)
RBC # BLD: 5.08 M/UL — SIGNIFICANT CHANGE UP (ref 4.2–5.8)
RBC # FLD: 16.2 % — HIGH (ref 10.3–14.5)
SAO2 % BLDV: 82.6 % — SIGNIFICANT CHANGE UP (ref 67–88)
SODIUM SERPL-SCNC: 137 MMOL/L — SIGNIFICANT CHANGE UP (ref 135–145)
SODIUM SERPL-SCNC: 138 MMOL/L — SIGNIFICANT CHANGE UP (ref 135–145)
SODIUM UR-SCNC: 51 MMOL/L — SIGNIFICANT CHANGE UP
SP GR SPEC: 1.01 — SIGNIFICANT CHANGE UP (ref 1–1.03)
UROBILINOGEN FLD QL: 0.2 MG/DL — SIGNIFICANT CHANGE UP (ref 0.2–1)
UUN UR-MCNC: 417 MG/DL — SIGNIFICANT CHANGE UP
WBC # BLD: 10.12 K/UL — SIGNIFICANT CHANGE UP (ref 3.8–10.5)
WBC # FLD AUTO: 10.12 K/UL — SIGNIFICANT CHANGE UP (ref 3.8–10.5)

## 2024-11-21 RX ORDER — OXYCODONE HYDROCHLORIDE 30 MG/1
5 TABLET ORAL EVERY 6 HOURS
Refills: 0 | Status: DISCONTINUED | OUTPATIENT
Start: 2024-11-21 | End: 2024-11-24

## 2024-11-21 RX ORDER — SODIUM ZIRCONIUM CYCLOSILICATE 5 G/5G
10 POWDER, FOR SUSPENSION ORAL EVERY 8 HOURS
Refills: 0 | Status: COMPLETED | OUTPATIENT
Start: 2024-11-21 | End: 2024-11-22

## 2024-11-21 RX ORDER — SODIUM BICARBONATE 84 MG/ML
0.15 INJECTION, SOLUTION INTRAVENOUS
Qty: 150 | Refills: 0 | Status: DISCONTINUED | OUTPATIENT
Start: 2024-11-21 | End: 2024-11-23

## 2024-11-21 RX ORDER — TRAMADOL HYDROCHLORIDE 300 MG/1
25 CAPSULE ORAL EVERY 12 HOURS
Refills: 0 | Status: DISCONTINUED | OUTPATIENT
Start: 2024-11-21 | End: 2024-11-27

## 2024-11-21 RX ORDER — INFLUENZA VIRUS VACCINE 15; 15; 15; 15 UG/.5ML; UG/.5ML; UG/.5ML; UG/.5ML
0.5 SUSPENSION INTRAMUSCULAR ONCE
Refills: 0 | Status: DISCONTINUED | OUTPATIENT
Start: 2024-11-21 | End: 2024-12-01

## 2024-11-21 RX ORDER — TRAMADOL HYDROCHLORIDE 300 MG/1
50 CAPSULE ORAL EVERY 6 HOURS
Refills: 0 | Status: DISCONTINUED | OUTPATIENT
Start: 2024-11-21 | End: 2024-11-21

## 2024-11-21 RX ORDER — TRAMADOL HYDROCHLORIDE 300 MG/1
50 CAPSULE ORAL EVERY 12 HOURS
Refills: 0 | Status: DISCONTINUED | OUTPATIENT
Start: 2024-11-21 | End: 2024-11-27

## 2024-11-21 RX ORDER — TRAMADOL HYDROCHLORIDE 300 MG/1
25 CAPSULE ORAL EVERY 6 HOURS
Refills: 0 | Status: DISCONTINUED | OUTPATIENT
Start: 2024-11-21 | End: 2024-11-21

## 2024-11-21 RX ORDER — CHLORHEXIDINE GLUCONATE 1.2 MG/ML
1 RINSE ORAL DAILY
Refills: 0 | Status: DISCONTINUED | OUTPATIENT
Start: 2024-11-21 | End: 2024-11-25

## 2024-11-21 RX ADMIN — OXYCODONE HYDROCHLORIDE 5 MILLIGRAM(S): 30 TABLET ORAL at 06:35

## 2024-11-21 RX ADMIN — SODIUM CHLORIDE 4 MILLILITER(S): 9 INJECTION, SOLUTION INTRAMUSCULAR; INTRAVENOUS; SUBCUTANEOUS at 07:23

## 2024-11-21 RX ADMIN — Medication 5000 UNIT(S): at 21:59

## 2024-11-21 RX ADMIN — SODIUM ZIRCONIUM CYCLOSILICATE 10 GRAM(S): 5 POWDER, FOR SUSPENSION ORAL at 10:56

## 2024-11-21 RX ADMIN — Medication 2 TABLET(S): at 21:51

## 2024-11-21 RX ADMIN — SODIUM ZIRCONIUM CYCLOSILICATE 10 GRAM(S): 5 POWDER, FOR SUSPENSION ORAL at 21:51

## 2024-11-21 RX ADMIN — Medication 100 GRAM(S): at 10:56

## 2024-11-21 RX ADMIN — IPRATROPIUM BROMIDE AND ALBUTEROL SULFATE 3 MILLILITER(S): 2.5; .5 SOLUTION RESPIRATORY (INHALATION) at 07:27

## 2024-11-21 RX ADMIN — IPRATROPIUM BROMIDE AND ALBUTEROL SULFATE 3 MILLILITER(S): 2.5; .5 SOLUTION RESPIRATORY (INHALATION) at 19:54

## 2024-11-21 RX ADMIN — OXYCODONE HYDROCHLORIDE 5 MILLIGRAM(S): 30 TABLET ORAL at 06:05

## 2024-11-21 RX ADMIN — SODIUM BICARBONATE 100 MEQ/KG/HR: 84 INJECTION, SOLUTION INTRAVENOUS at 14:02

## 2024-11-21 RX ADMIN — SODIUM CHLORIDE 4 MILLILITER(S): 9 INJECTION, SOLUTION INTRAMUSCULAR; INTRAVENOUS; SUBCUTANEOUS at 19:54

## 2024-11-21 RX ADMIN — OXYCODONE HYDROCHLORIDE 5 MILLIGRAM(S): 30 TABLET ORAL at 19:18

## 2024-11-21 RX ADMIN — Medication 5000 UNIT(S): at 14:02

## 2024-11-21 RX ADMIN — POLYETHYLENE GLYCOL 3350 17 GRAM(S): 17 POWDER, FOR SOLUTION ORAL at 11:07

## 2024-11-21 RX ADMIN — ACETAMINOPHEN, DIPHENHYDRAMINE HCL, PHENYLEPHRINE HCL 5 MILLIGRAM(S): 325; 25; 5 TABLET ORAL at 21:51

## 2024-11-21 RX ADMIN — Medication 5000 UNIT(S): at 06:03

## 2024-11-21 NOTE — PHYSICAL THERAPY INITIAL EVALUATION ADULT - BED MOBILITY TRAINING, PT EVAL
Pt dropped of malorie santos 35 units hs  novolog 8 units break 10 lunch and dinner Patient will be independent in all bed mobility in 2 weeks in order to increase safety at home.

## 2024-11-21 NOTE — PROGRESS NOTE ADULT - SUBJECTIVE AND OBJECTIVE BOX
Patient is a 64y old  Male who presents with a chief complaint of MARYSOL (2024 22:40)    HPI:  Pt is a 65yo M with PMHx recently diagnosed gastric lymphoma  S/P Biopsy of gastric mass & neck Lymph node  with LN spread presents to the ED as recommended by his oncologist Dr. Andrea for elevated Creatinine. Pt called by doctor for elevated Cr at 5.3.  Baseline 1.1, increasing to 1.5-->3.0--> 5.3 on outpatient labs. Pt had chemo port placed today, has not been drinking as much as he usually does. Per pt, urinated a small amount this AM, contributed it to decreased fluid intake. Pt endorsing mild flank pain, prescribed oxycodone by outpatient doctor. Pt also endorsing new onset dyspnea on exertion. Pt has been taking Advil for 5 weeks for Lower Back pain. Pt had placement of Medi port as out pt   today     Denies fever, chills, chest pain, palpitations, abdominal pain, nausea, vomiting, diarrhea, constipation, or dysuria    ED Course:   Vitals: BP: 160/90, HR: 74, Temp: 98F, RR: 18  Labs:  WBC 7.31, Hg 10, CO2 21, BUN/CR 58,5.3, Ca 8.3, eGFR 11, Mag 2.8, proBNP 593  UA: Pending  CT Chest: Mild mottled density in the left mainstem bronchus may represent secretions. No focal consolidation or discrete mass is seen. Regions of atelectasis or scarring in the left upper lobe/lingula. Atelectatic changes at the lung bases.  CT Abdomen/Pelvis: Lymphadenopathy which appears increased when compared with 2024. Nodularity along posterior peritoneal reflections and minimal perihepatic fluid which were not seen previously.    EKG: NSR rate 68bpm. QTc 408ms  Received in the ED: 500cc NS bolus x1   (2024 22:40)    INTERVAL HPI:  10/21: Pt seen and examined at bedside. Pt was making minimal urine output overnight but has voided significantly more since than. He has been taking Advil for the last month at the very minimum, with  max of 6 pills/day for R. low back pain. However, the pain is not present currently. No acute c/o at this time.     OVERNIGHT EVENTS:    Home Medications:      MEDICATIONS  (STANDING):  albuterol/ipratropium for Nebulization 3 milliLiter(s) Nebulizer every 12 hours  heparin   Injectable 5000 Unit(s) SubCutaneous every 8 hours  influenza   Vaccine 0.5 milliLiter(s) IntraMuscular once  melatonin 5 milliGRAM(s) Oral at bedtime  polyethylene glycol 3350 17 Gram(s) Oral daily  senna 2 Tablet(s) Oral at bedtime  sodium bicarbonate  Infusion 0.146 mEq/kG/Hr (100 mL/Hr) IV Continuous <Continuous>  sodium chloride 3%  Inhalation 4 milliLiter(s) Inhalation every 12 hours  sodium zirconium cyclosilicate 10 Gram(s) Oral every 8 hours    MEDICATIONS  (PRN):  aluminum hydroxide/magnesium hydroxide/simethicone Suspension 30 milliLiter(s) Oral every 4 hours PRN Dyspepsia  ondansetron Injectable 4 milliGRAM(s) IV Push every 8 hours PRN Nausea and/or Vomiting  oxyCODONE    IR 5 milliGRAM(s) Oral every 6 hours PRN Severe Pain (7 - 10)  traMADol 25 milliGRAM(s) Oral every 12 hours PRN Mild Pain (1 - 3)  traMADol 50 milliGRAM(s) Oral every 12 hours PRN Moderate Pain (4 - 6)      Allergies    Bactrim DS (Hives)    Intolerances        Social History:  Lives at home with family  Recently retired  Active, ambulates independently and independent with ADLs (2024 22:40)      REVIEW OF SYSTEMS:  CONSTITUTIONAL: No fever, No chills, No fatigue, No myalgia, No Body ache, No Weakness  EYES: No eye pain,  No visual disturbances, No discharge, NO Redness  ENMT:  No ear pain, No nose bleed, No vertigo; No sinus pain, NO throat pain, No Congestion  NECK: No pain, No stiffness  RESPIRATORY: No cough, NO wheezing, No  hemoptysis, NO  shortness of breath  CARDIOVASCULAR: No chest pain, palpitations  GASTROINTESTINAL: No abdominal pain, NO epigastric pain. No nausea, No vomiting; No diarrhea, No constipation. [  ] BM  GENITOURINARY: No dysuria, No frequency, No urgency, No hematuria, NO incontinence  NEUROLOGICAL: No headaches, No dizziness, No numbness, No tingling, No tremors, No weakness  EXT: No Swelling, No Pain, No Edema  SKIN:  [ x ] No itching, burning, rashes, or lesions   MUSCULOSKELETAL: No joint pain ,No Jt swelling; No muscle pain, No back pain, No extremity pain  PSYCHIATRIC: No depression,  No anxiety,  No mood swings ,No difficulty sleeping at night  PAIN SCALE: [x  ] None  [  ] Other-  ROS Unable to obtain due to - [  ] Dementia  [  ] Lethargy [  ] Drowsy [  ] Sedated [  ] non verbal  REST OF REVIEW Of SYSTEM - [  ] Normal     Vital Signs Last 24 Hrs  T(C): 37.4 (:55), Max: 37.4 (:55)  T(F): 99.3 (:55), Max: 99.3 (:55)  HR: 69 (:) (59 - 78)  BP: 140/85 () (125/68 - 160/90)  BP(mean): --  RR: 20 () (18 - 20)  SpO2: 92% () (91% - 97%)    Parameters below as of :  Patient On (Oxygen Delivery Method): room air      Finger Stick         @ :  -   @ 07:00  --------------------------------------------------------  IN: 0 mL / OUT: 110 mL / NET: -110 mL     @ :  -   @ 14:23  --------------------------------------------------------  IN: 1690 mL / OUT: 500 mL / NET: 1190 mL        PHYSICAL EXAM:  GENERAL:  [ x ] NAD , [ x ] well appearing, [  ] Agitated, [  ] Drowsy,  [  ] Lethargy, [  ] confused   HEAD:  [ x ] Normal, [  ] Other  EYES:  [ x ] EOMI, [  ] PERRLA, [x  ] conjunctiva and sclera clear normal, [  ] Other,  [  ] Pallor,[  ] Discharge  ENMT:  [ x] Normal, [ x ] Moist mucous membranes, [  ] Good dentition, [  ] No Thrush  NECK:  [ x ] Supple, [x  ] No JVD, [  ] Normal thyroid, [  ] Lymphadenopathy [  ] Other  CHEST/LUNG:  [  x] Clear to auscultation bilaterally, [x  ] Breath Sounds equal B/L [  ] poor effort  [x  ] No rales, [ x ] No rhonchi  [  x]  No wheezing,   HEART:  [x  ] Regular rate and rhythm, [  ] tachycardia, [  ] Bradycardia,  [  ] irregular  [ x ] No murmurs, No rubs, No gallops, [  ] PPM in place (Mfr:  )  ABDOMEN:  [x  ] Soft, [x  ] Nontender, [ x ] Nondistended, [  ] No mass, [  ] Bowel sounds present, [  ] obese  NERVOUS SYSTEM:  [ x ] Alert & Oriented X3, [  ] Nonfocal  [  ] Confusion  [  ] Encephalopathic [  ] Sedated [  ] Unable to assess, [  ] Dementia [  ] Other-  EXTREMITIES: [ x ] 2+ Peripheral Pulses, No clubbing, No cyanosis,  [  ] edema B/L lower EXT. [  ] PVD stasis skin changes B/L Lower EXT, [  ] wound  LYMPH: No lymphadenopathy noted  SKIN:  [x  ] No rashes or lesions, [  ] Pressure Ulcers, [  ] ecchymosis, [  ] Skin Tears, [  ] Other    DIET: Diet, Renal Restrictions:   For patients receiving Renal Replacement - No Protein Restr, No Conc K, No Conc Phos, Low Sodium (24 @ 12:33)      LABS:                        10.0   10.12 )-----------( 298      ( 2024 06:48 )             30.9     2024 06:48    137    |  108    |  66     ----------------------------<  103    5.9     |  18     |  6.60     Ca    8.5        2024 06:48  Phos  5.5       2024 06:48  Mg     3.0       2024 06:48    TPro  6.6    /  Alb  3.0    /  TBili  0.2    /  DBili  x      /  AST  16     /  ALT  34     /  AlkPhos  79     2024 06:48      Urinalysis Basic - ( 2024 07:30 )    Color: Yellow / Appearance: Clear / S.010 / pH: x  Gluc: x / Ketone: Negative mg/dL  / Bili: Negative / Urobili: 0.2 mg/dL   Blood: x / Protein: 30 mg/dL / Nitrite: Negative   Leuk Esterase: Moderate / RBC: 50 /HPF / WBC 4 /HPF   Sq Epi: x / Non Sq Epi: x / Bacteria: x                        Urinalysis with Rflx Culture (collected 2024 07:30)         Anemia Panel:      Thyroid Panel:  Thyroid Stimulating Hormone, Serum: 1.11 uIU/mL (24 @ 18:40)                RADIOLOGY & ADDITIONAL TESTS:      HEALTH ISSUES - PROBLEM Dx:  MARYSOL (acute kidney injury)    Gastric lymphoma    Need for prophylactic measure    Dyspnea            Consultant(s) Notes Reviewed:  [  ] YES     Care Discussed with [X] Consultants  [  ] Patient  [  ] Family [  ] HCP [  ]   [  ] Social Service  [  ] RN, [  ] Physical Therapy,[  ] Palliative care team  DVT PPX: [  ] Lovenox, [  ] S C Heparin, [  ] Coumadin, [  ] Xarelto, [  ] Eliquis, [  ] Pradaxa, [  ] IV Heparin drip, [  ] SCD [  ] Contraindication 2 to GI Bleed,[  ] Ambulation [  ] Contraindicated 2 to  bleed [  ] Contraindicated 2 to Brain Bleed  Advanced directive: [  ] None, [  ] DNR/DNI Patient is a 64y old  Male who presents with a chief complaint of MARYSOL (2024 22:40)    HPI:  Pt is a 65yo M with PMHx recently diagnosed gastric lymphoma  S/P Biopsy of gastric mass & neck Lymph node  with LN spread presents to the ED as recommended by his oncologist Dr. Andrea for elevated Creatinine. Pt called by doctor for elevated Cr at 5.3.  Baseline 1.1, increasing to 1.5-->3.0--> 5.3 on outpatient labs. Pt had chemo port placed today, has not been drinking as much as he usually does. Per pt, urinated a small amount this AM, contributed it to decreased fluid intake. Pt endorsing mild flank pain, prescribed oxycodone by outpatient doctor. Pt also endorsing new onset dyspnea on exertion. Pt has been taking Advil for 5 weeks for Lower Back pain. Pt had placement of Medi port as out pt   today     Denies fever, chills, chest pain, palpitations, abdominal pain, nausea, vomiting, diarrhea, constipation, or dysuria    ED Course:   Vitals: BP: 160/90, HR: 74, Temp: 98F, RR: 18  Labs:  WBC 7.31, Hg 10, CO2 21, BUN/CR 58,5.3, Ca 8.3, eGFR 11, Mag 2.8, proBNP 593  UA: Pending  CT Chest: Mild mottled density in the left mainstem bronchus may represent secretions. No focal consolidation or discrete mass is seen. Regions of atelectasis or scarring in the left upper lobe/lingula. Atelectatic changes at the lung bases.  CT Abdomen/Pelvis: Lymphadenopathy which appears increased when compared with 2024. Nodularity along posterior peritoneal reflections and minimal perihepatic fluid which were not seen previously.    EKG: NSR rate 68bpm. QTc 408ms  Received in the ED: 500cc NS bolus x1   (2024 22:40)    INTERVAL HPI:  10/21: Pt seen and examined at bedside. Pt was making minimal urine output overnight but has voided significantly more since than. He has been taking Advil for the last month at the very minimum, with  max of 6 pills/day for R. low back pain. However, the pain is not present currently. No acute c/o at this time.     OVERNIGHT EVENTS: NONE    Home Medications:      MEDICATIONS  (STANDING):  albuterol/ipratropium for Nebulization 3 milliLiter(s) Nebulizer every 12 hours  heparin   Injectable 5000 Unit(s) SubCutaneous every 8 hours  influenza   Vaccine 0.5 milliLiter(s) IntraMuscular once  melatonin 5 milliGRAM(s) Oral at bedtime  polyethylene glycol 3350 17 Gram(s) Oral daily  senna 2 Tablet(s) Oral at bedtime  sodium bicarbonate  Infusion 0.146 mEq/kG/Hr (100 mL/Hr) IV Continuous <Continuous>  sodium chloride 3%  Inhalation 4 milliLiter(s) Inhalation every 12 hours  sodium zirconium cyclosilicate 10 Gram(s) Oral every 8 hours    MEDICATIONS  (PRN):  aluminum hydroxide/magnesium hydroxide/simethicone Suspension 30 milliLiter(s) Oral every 4 hours PRN Dyspepsia  ondansetron Injectable 4 milliGRAM(s) IV Push every 8 hours PRN Nausea and/or Vomiting  oxyCODONE    IR 5 milliGRAM(s) Oral every 6 hours PRN Severe Pain (7 - 10)  traMADol 25 milliGRAM(s) Oral every 12 hours PRN Mild Pain (1 - 3)  traMADol 50 milliGRAM(s) Oral every 12 hours PRN Moderate Pain (4 - 6)      Allergies    Bactrim DS (Hives)    Intolerances        Social History:  Lives at home with family  Recently retired  Active, ambulates independently and independent with ADLs (2024 22:40)      REVIEW OF SYSTEMS: i am OK  CONSTITUTIONAL: No fever, No chills, No fatigue, No myalgia, No Body ache, No Weakness  EYES: No eye pain,  No visual disturbances, No discharge, NO Redness  ENMT:  No ear pain, No nose bleed, No vertigo; No sinus pain, NO throat pain, No Congestion  NECK: No pain, No stiffness  RESPIRATORY: No cough, NO wheezing, No  hemoptysis, NO  shortness of breath  CARDIOVASCULAR: No chest pain, palpitations  GASTROINTESTINAL: No abdominal pain, NO epigastric pain. No nausea, No vomiting; No diarrhea, No constipation. [  ] BM  GENITOURINARY: No dysuria, No frequency, No urgency, No hematuria, NO incontinence  NEUROLOGICAL: No headaches, No dizziness, No numbness, No tingling, No tremors, No weakness  EXT: No Swelling, No Pain, No Edema  SKIN:  [ x ] No itching, burning, rashes, or lesions   MUSCULOSKELETAL: No joint pain ,No Jt swelling; No muscle pain, No back pain, No extremity pain  PSYCHIATRIC: No depression,  No anxiety,  No mood swings ,No difficulty sleeping at night  PAIN SCALE: [x  ] None  [  ] Other-  ROS Unable to obtain due to - [  ] Dementia  [  ] Lethargy [  ] Drowsy [  ] Sedated [  ] non verbal  REST OF REVIEW Of SYSTEM - [x ] Normal     Vital Signs Last 24 Hrs  T(C): 37.4 (2024 13:55), Max: 37.4 (:55)  T(F): 99.3 (:55), Max: 99.3 (:55)  HR: 69 (:) (59 - 78)  BP: 140/85 (:) (125/68 - 160/90)  BP(mean): --  RR: 20 (:) (18 - 20)  SpO2: 92% (:) (91% - 97%)    Parameters below as of :  Patient On (Oxygen Delivery Method): room air      Finger Stick         @ : @ 07:00  --------------------------------------------------------  IN: 0 mL / OUT: 110 mL / NET: -110 mL     @ :  -   @ 14:23  --------------------------------------------------------  IN: 1690 mL / OUT: 500 mL / NET: 1190 mL        PHYSICAL EXAM: medi port Rt chest wall  GENERAL:  [ x ] NAD , [ x ] well appearing, [  ] Agitated, [  ] Drowsy,  [  ] Lethargy, [  ] confused   HEAD:  [ x ] Normal, [  ] Other  EYES:  [ x ] EOMI, [  ] PERRLA, [x  ] conjunctiva and sclera clear normal, [  ] Other,  [  ] Pallor,[  ] Discharge  ENMT:  [ x] Normal, [ x ] Moist mucous membranes, [  ] Good dentition, [  ] No Thrush  NECK:  [ x ] Supple, [x  ] No JVD, [x  ] Normal thyroid, [  ] Lymphadenopathy [  ] Other  CHEST/LUNG:  [  x] Clear to auscultation bilaterally, [x  ] Breath Sounds equal B/L [  ] poor effort  [x  ] No rales, [ x ] No rhonchi  [  x]  No wheezing,   HEART:  [x  ] Regular rate and rhythm, [  ] tachycardia, [  ] Bradycardia,  [  ] irregular  [ x ] No murmurs, No rubs, No gallops, [  ] PPM in place (Mfr:  )  ABDOMEN:  [x  ] Soft, [x  ] Nontender, [ x ] Nondistended, [  ] No mass, [ x ] Bowel sounds present, [  ] obese  NERVOUS SYSTEM:  [ x ] Alert & Oriented X3, [ x ] Nonfocal  [  ] Confusion  [  ] Encephalopathic [  ] Sedated [  ] Unable to assess, [  ] Dementia [  ] Other-  EXTREMITIES: [ x ] 2+ Peripheral Pulses, No clubbing, No cyanosis,  [  ] edema B/L lower EXT. [  ] PVD stasis skin changes B/L Lower EXT, [  ] wound  LYMPH: No lymphadenopathy noted  SKIN:  [x  ] No rashes or lesions, [  ] Pressure Ulcers, [  ] ecchymosis, [  ] Skin Tears, [  ] Other    DIET: Diet, Renal Restrictions:   For patients receiving Renal Replacement - No Protein Restr, No Conc K, No Conc Phos, Low Sodium (24 @ 12:33)      LABS:                        10.0   10.12 )-----------( 298      ( 2024 06:48 )             30.9     2024 06:48    137    |  108    |  66     ----------------------------<  103    5.9     |  18     |  6.60     Ca    8.5        2024 06:48  Phos  5.5       2024 06:48  Mg     3.0       2024 06:48    TPro  6.6    /  Alb  3.0    /  TBili  0.2    /  DBili  x      /  AST  16     /  ALT  34     /  AlkPhos  79     2024 06:48      Urinalysis Basic - ( 2024 07:30 )    Color: Yellow / Appearance: Clear / S.010 / pH: x  Gluc: x / Ketone: Negative mg/dL  / Bili: Negative / Urobili: 0.2 mg/dL   Blood: x / Protein: 30 mg/dL / Nitrite: Negative   Leuk Esterase: Moderate / RBC: 50 /HPF / WBC 4 /HPF   Sq Epi: x / Non Sq Epi: x / Bacteria: x    x      Urinalysis with Rflx Culture (collected 2024 07:30)         Anemia Panel:      Thyroid Panel:  Thyroid Stimulating Hormone, Serum: 1.11 uIU/mL (24 @ 18:40)    x    RADIOLOGY & ADDITIONAL TESTS:      HEALTH ISSUES - PROBLEM Dx:  MARYSOL (acute kidney injury)    Gastric lymphoma    Need for prophylactic measure    Dyspnea        Consultant(s) Notes Reviewed:  [x  ] YES     Care Discussed with [X] Consultants  [x  ] Patient  [x  ] Family [  ] HCP [  ]   [  ] Social Service  [x  ] RN, [  ] Physical Therapy,[  ] Palliative care team  DVT PPX: [  ] Lovenox, [ x ] S C Heparin, [  ] Coumadin, [  ] Xarelto, [  ] Eliquis, [  ] Pradaxa, [  ] IV Heparin drip, [  ] SCD [  ] Contraindication 2 to GI Bleed,[  ] Ambulation [  ] Contraindicated 2 to  bleed [  ] Contraindicated 2 to Brain Bleed  Advanced directive: [ x ] None, [  ] DNR/DNI

## 2024-11-21 NOTE — PROGRESS NOTE ADULT - PROBLEM SELECTOR PLAN 1
Pt sent in to ED for elevated Cr (Baseline 1.1, increasing to 1.5-->3.0), endorsing decreased urination and flank pain  - Pt c/o flank pain (L), per chart review, also was endorsing pain during admission at [facility], prior to elevated Cr  - Cr 5.3 on admission-Pre renal likely - 2/2 NSAID-Advil use   -Cr elevated today to 6.6, K 5.9. Nephro aware. calcium gluconate and Lokelma ordered STAT. Pt asymptomatic.  - Metabolic Acidosis, CO2 21  - s/p wise catheter placement in ED with minimal output  - CT Abdomen: Collapsed bladder. Bilateral renal cysts, no hydro. No obstruction.  - No urgent indication for HD per nephro  - Can be pre-renal in setting of dehydration, less likely obstructive  - s/p 500cc NS bolus in ED  - Start sodium bicarb gtt 150cc/hr for metabolic acidosis per nephro  - F/u urine and serum studies per nephro  - tramadol 25mg PRN mild pain, tramadol 50mg for moderate pain, oxycodone 5mg for severe pain   - Strict Is&Os q6h  - Monitor volume status  - Avoid nephrotoxic medications, Renal dose meds   - Daily CMPs  - Nephrology (Dr. Cantu) following, recs appreciated d/w Pt sent in to ED for elevated Cr (Baseline 1.1, increasing to 1.5-->3.0), endorsing decreased urination and flank pain  - Pt c/o flank pain (L), per chart review, also was endorsing pain during admission at [facility], prior to elevated Cr  - Cr 5.3 on admission-Pre renal likely - 2/2 NSAID-Advil use   -Cr elevated today to 6.6, K 5.9. Nephro aware. calcium gluconate and Lokelma ordered STAT. Pt asymptomatic.  - Metabolic Acidosis, CO2 21  - s/p wise catheter placement in ED with minimal output  - CT Abdomen: Collapsed bladder. Bilateral renal cysts, no hydro. No obstruction.  - No urgent indication for HD per nephro  -FeNA 3.4, likely ATN from progression of prerenal MARYSOL  - s/p 500cc NS bolus in ED  - Start sodium bicarb gtt 150cc/hr for metabolic acidosis per nephro  - F/u urine and serum studies per nephro  - tramadol 25mg PRN mild pain, tramadol 50mg for moderate pain, oxycodone 5mg for severe pain   - Strict Is&Os q6h  - Monitor volume status  - Avoid nephrotoxic medications, Renal dose meds   - Daily CMPs  - Nephrology (Dr. Cantu) following, recs appreciated d/w Pt sent in to ED for elevated Cr (Baseline 1.1, increasing to 1.5-->3.0), endorsing decreased urination and flank pain  - Pt c/o flank pain (L), per chart review, also was endorsing pain during admission at [facility], prior to elevated Cr  - Cr 5.3 on admission, pt with prolonged NSAID use.   -FeNA 3.4, likely ATN from progression of prerenal MARYSOL  -Cr elevated today to 6.6, K 5.9. Nephro aware. calcium gluconate and Lokelma ordered STAT. Pt asymptomatic.  - Metabolic Acidosis, CO2 21  - s/p wise catheter placement in ED with minimal output  - CT Abdomen: Collapsed bladder. Bilateral renal cysts, no hydro. No obstruction.  - No urgent indication for HD per nephro  - s/p 500cc NS bolus in ED  - Start sodium bicarb gtt 150cc/hr for metabolic acidosis per nephro  - tramadol 25mg PRN mild pain, tramadol 50mg for moderate pain, oxycodone 5mg for severe pain   - Strict Is&Os q6h  - Monitor volume status  - Avoid nephrotoxic medications, Renal dose meds   - Daily CMPs  - Nephrology (Dr. Cantu) following, recs appreciated d/w Pt sent in to ED for elevated Cr (Baseline 1.1, increasing to 1.5-->3.0), endorsing decreased urination and flank pain  - Pt c/o flank pain (L), per chart review, also was endorsing pain during admission at [facility], prior to elevated Cr  - Cr 5.3 on admission, pt with prolonged NSAID use.   -FeNA 3.4, likely ATN from progression of prerenal MARYSOL  -Cr elevated today to 6.6, K 5.9. Nephro aware. calcium gluconate and Lokelma ordered STAT. Pt asymptomatic.  - Metabolic Acidosis, CO2 21  - s/p wise catheter placement in ED with minimal output  - CT Abdomen: Collapsed bladder. Bilateral renal cysts, no hydro. No obstruction.  - No urgent indication for HD per nephro  - s/p 500cc NS bolus in ED  - Start sodium bicarb gtt 150cc/hr for metabolic acidosis per nephro  - tramadol 25mg PRN mild pain, tramadol 50mg for moderate pain, oxycodone 5mg for severe pain   - Strict Is&Os q6h  - Monitor volume status  - Avoid nephrotoxic medications, Renal dose meds   -Renal diet  - Daily CMPs  - Nephrology (Dr. Cantu) following, recs appreciated d/w Pt sent in to ED for elevated Cr (Baseline 1.1, increasing to 1.5-->3.0), endorsing decreased urination and flank pain  - Pt c/o flank pain (L), per chart review, also was endorsing pain during admission at [facility], prior to elevated Cr  - Cr 5.3 on admission, pt with prolonged NSAID use.   -FeNA 3.4, likely ATN from progression of prerenal MARYSOL MA   -Cr elevated today to 6.6, K 5.9. Nephro aware. calcium gluconate and Lokelma ordered STAT. Pt asymptomatic.  - Metabolic Acidosis, CO2 21  - s/p wise catheter placement in ED with minimal output, I/O  - CT Abdomen: Collapsed bladder. Bilateral renal cysts, no hydro. No obstruction.  - No urgent indication for HD per nephro  - s/p 500cc NS bolus in ED  - Start sodium bicarb gtt 150cc/hr for metabolic acidosis per nephro  - tramadol 25mg PRN mild pain, tramadol 50mg for moderate pain, oxycodone 5mg for severe pain   - Strict Is&Os q6h  - Monitor volume status  - Avoid nephrotoxic medications, Renal dose meds   -Renal diet  - Daily CMPs  - Nephrology (Dr. Cantu) following, recs appreciated d/w

## 2024-11-21 NOTE — PHYSICAL THERAPY INITIAL EVALUATION ADULT - ADDITIONAL COMMENTS
Patient lives in private home with brother.  Patient resides on main floor and was independent in all ADLs and ambulated independently without device.

## 2024-11-21 NOTE — PHYSICAL THERAPY INITIAL EVALUATION ADULT - GENERAL OBSERVATIONS, REHAB EVAL
Patient received supine in bed, cooperative with physical therapy, +IV, Barba Patient received reclined in elena chair, cooperative with physical therapy, +IV, Barba

## 2024-11-21 NOTE — PHYSICAL THERAPY INITIAL EVALUATION ADULT - PERTINENT HX OF CURRENT PROBLEM, REHAB EVAL
Pt is a 65yo M with PMHx recently diagnosed gastric lymphoma  S/P Biopsy of gastric mass & neck Lymph node  with LN spread presents to the ED as recommended by his oncologist Dr. Andrea for elevated Creatinine. Pt called by doctor for elevated Cr at 5.3.  Baseline 1.1, increasing to 1.5-->3.0--> 5.3 on outpatient labs. Pt had chemo port placed today, has not been drinking as much as he usually does. Per pt, urinated a small amount this AM, contributed it to decreased fluid intake. Pt endorsing mild flank pain, prescribed oxycodone by outpatient doctor. Pt also endorsing new onset dyspnea on exertion. Pt has been taking Advil for 5 weeks for Lower Back pain. Pt had placement of Medi port as out pt   today

## 2024-11-21 NOTE — PROGRESS NOTE ADULT - PROBLEM SELECTOR PLAN 4
Heparin 5000U qd Pt with recently diagnosed gastric lymphoma, port placed today for planned chemo (appointment on Monday per pt). Follows Dr. Andrea  - CT Ab: Lymphadenopathy which appears increased when compared with 9/30/2024. Nodularity along posterior peritoneal reflections and minimal perihepatic fluid which were not seen previously.  Anemia -Mild anemia  -F/U AM Iron st alexysies

## 2024-11-21 NOTE — CARE COORDINATION ASSESSMENT. - NSPASTMEDSURGHISTORY_GEN_ALL_CORE_FT
PAST MEDICAL & SURGICAL HISTORY:  Incisional hernia  2015      S/P cholecystectomy  15 years ago      S/P appendectomy  November 17th 2014      Anemia of chronic disease      Gastric lymphoma

## 2024-11-21 NOTE — CARE COORDINATION ASSESSMENT. - NSCAREPROVIDERS_GEN_ALL_CORE_FT
CARE PROVIDERS:  Accepting Physician: David Tyler  Administration: Saravanan Samuels  Admitting: David Tyler  Attending: David Tyler  Case Management: Gladys Kiser  Consultant: Rashi Cantu  Covering Team: Vale Hawkins  ED Attending: Jerrod Enriquez ED Nurse: Jemma Gorman  Nurse: Frances Diez  Nurse: Sharee Zamora  Nurse: Tonie Ambriz  Oncology: Scarlet Pickett  Ordered: Doctor, Unknown  Outpatient Provider: Jhonny Hunter  Override: Frances Diez  Override: Dora Hercules  Override: Hermelindo Archer  Override: Tonie Ambriz  PCA/Nursing Assistant: Nimo Roa  Primary Team: Luis E Angela  Primary Team: Arin Daniels  Primary Team: David Tyler  Registered Dietitian: Naima Michelle  Respiratory Therapy: Jennifer Ramirez  Respiratory Therapy: Tom Thrasher

## 2024-11-21 NOTE — CONSULT NOTE ADULT - ASSESSMENT
Acute kidney injury, with ATN  Anemia  Hyperkalemia  Metabolic acidosis      -MARYSOL likely due to ATN from NSAID use and poor po intake, has normal renal function at baseline  -SCr 1.1 at baseline with normal electrolytes  -SCr was rising on last check early this am, but since then having more uop  -Repeat labs this afternoon to eval hyperkalemia, if K remains elevated, please call Nephrology  -S/p Lokelma  -Remains on bicarb infusion, will change to full dose with 150meq bicarb in D5W  -Change to renal diet restrictions  -daily chem and cbc  -Iron panel with am labs for anemia  -no HD indications at this time, will follow labs    D/w hospitalist Dr. Tyler Acute kidney injury, with ATN  Anemia  Hyperkalemia  Metabolic acidosis      -MARYSOL likely due to ATN from NSAID use and poor po intake, has normal renal function at baseline  -SCr 1.1 at baseline with normal electrolytes  -SCr was rising on last check early this am, but since then having more uop  -Repeat labs this afternoon to eval hyperkalemia, if K remains elevated, please call Nephrology  -S/p Lokelma  -Remains on bicarb infusion, will change to full dose with 150meq bicarb in D5W  -Change to renal diet restrictions  -daily chem and cbc  -Iron panel with am labs for anemia  -no HD indications at this time, will follow labs  -Advised patient to avoid all nsaids     D/w hospitalist Dr. Tyler Acute kidney injury, with ATN  Anemia  Hyperkalemia  Metabolic acidosis  Urinary retention      -MARYSOL likely due to ATN from NSAID use and poor po intake, has normal renal function at baseline  -Likely some retention, has wise  -SCr 1.1 at baseline with normal electrolytes  -SCr was rising on last check early this am, but since then having more uop  -Repeat labs this afternoon to eval hyperkalemia, if K remains elevated, please call Nephrology  -S/p Lokelma  -Remains on bicarb infusion, will change to full dose with 150meq bicarb in D5W  -Change to renal diet restrictions  -daily chem and cbc  -Iron panel with am labs for anemia  -no HD indications at this time, will follow labs  -Advised patient to avoid all nsaids     D/w hospitalist Dr. Tyler

## 2024-11-21 NOTE — PROGRESS NOTE ADULT - PROBLEM SELECTOR PLAN 2
Pt endorsing ZHOU while walking, new onset  - CT Chest:  Mild mottled density in the left mainstem bronchus may represent secretions. No focal consolidation or discrete mass is seen. Regions of atelectasis or scarring in the left upper lobe/lingula. Atelectatic changes at the lung bases  - Pt saturating well on RA  - Elevation in proBNP, can be 2/2 MARYSOL, less likely cardiac as pt does not appear clinically volume overloaded on examination and denies history of HF. No cardiomegaly on CT Chest  - Standing duonebs and hypertonic saline  - Encourage incentive spirometer use  - Aspiration precautions  - Monitor routine hemodynamics  -F/U TTE elevated k, Mag,  Nutritional eval  Diet change

## 2024-11-21 NOTE — SBIRT NOTE ADULT - NSSBIRTUNABLESCR_GEN_A_CORE
attempted to meet with pt yesterday/ today but was not in room. SW to follow and remain available for any needs.

## 2024-11-21 NOTE — PROGRESS NOTE ADULT - ASSESSMENT
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 Andrea for elevated Creatinine- MARYSOL with MA .

## 2024-11-21 NOTE — PATIENT PROFILE ADULT - FALL HARM RISK - HARM RISK INTERVENTIONS

## 2024-11-21 NOTE — PHYSICAL THERAPY INITIAL EVALUATION ADULT - ASSISTIVE DEVICE FOR TRANSFER: SIT/STAND, REHAB EVAL
I have personally seen and examined this patient.  I have fully participated in the care of this patient. I have reviewed all pertinent clinical information, including history, physical exam, plan and the Resident’s note and agree except as noted. no device

## 2024-11-22 ENCOUNTER — TRANSCRIPTION ENCOUNTER (OUTPATIENT)
Age: 64
End: 2024-11-22

## 2024-11-22 LAB
ALBUMIN SERPL ELPH-MCNC: 2.8 G/DL — LOW (ref 3.3–5)
ALP SERPL-CCNC: 80 U/L — SIGNIFICANT CHANGE UP (ref 40–120)
ALT FLD-CCNC: 19 U/L — SIGNIFICANT CHANGE UP (ref 12–78)
ANION GAP SERPL CALC-SCNC: 13 MMOL/L — SIGNIFICANT CHANGE UP (ref 5–17)
ANION GAP SERPL CALC-SCNC: 9 MMOL/L — SIGNIFICANT CHANGE UP (ref 5–17)
AST SERPL-CCNC: 11 U/L — LOW (ref 15–37)
BASOPHILS # BLD AUTO: 0.06 K/UL — SIGNIFICANT CHANGE UP (ref 0–0.2)
BASOPHILS NFR BLD AUTO: 0.7 % — SIGNIFICANT CHANGE UP (ref 0–2)
BILIRUB SERPL-MCNC: 0.3 MG/DL — SIGNIFICANT CHANGE UP (ref 0.2–1.2)
BUN SERPL-MCNC: 76 MG/DL — HIGH (ref 7–23)
BUN SERPL-MCNC: 78 MG/DL — HIGH (ref 7–23)
CALCIUM SERPL-MCNC: 7.9 MG/DL — LOW (ref 8.5–10.1)
CALCIUM SERPL-MCNC: 8 MG/DL — LOW (ref 8.5–10.1)
CHLORIDE SERPL-SCNC: 100 MMOL/L — SIGNIFICANT CHANGE UP (ref 96–108)
CHLORIDE SERPL-SCNC: 102 MMOL/L — SIGNIFICANT CHANGE UP (ref 96–108)
CO2 SERPL-SCNC: 25 MMOL/L — SIGNIFICANT CHANGE UP (ref 22–31)
CO2 SERPL-SCNC: 26 MMOL/L — SIGNIFICANT CHANGE UP (ref 22–31)
CREAT SERPL-MCNC: 8.1 MG/DL — HIGH (ref 0.5–1.3)
CREAT SERPL-MCNC: 8.3 MG/DL — HIGH (ref 0.5–1.3)
EGFR: 7 ML/MIN/1.73M2 — LOW
EGFR: 7 ML/MIN/1.73M2 — LOW
EOSINOPHIL # BLD AUTO: 0.16 K/UL — SIGNIFICANT CHANGE UP (ref 0–0.5)
EOSINOPHIL NFR BLD AUTO: 1.9 % — SIGNIFICANT CHANGE UP (ref 0–6)
FERRITIN SERPL-MCNC: 257 NG/ML — SIGNIFICANT CHANGE UP (ref 30–400)
GLUCOSE SERPL-MCNC: 115 MG/DL — HIGH (ref 70–99)
GLUCOSE SERPL-MCNC: 147 MG/DL — HIGH (ref 70–99)
HBV CORE AB SER-ACNC: SIGNIFICANT CHANGE UP
HBV SURFACE AB SER-ACNC: <3 MIU/ML — LOW
HBV SURFACE AG SER-ACNC: SIGNIFICANT CHANGE UP
HCT VFR BLD CALC: 29.3 % — LOW (ref 39–50)
HCV AB S/CO SERPL IA: 0.24 S/CO — SIGNIFICANT CHANGE UP (ref 0–0.99)
HCV AB SERPL-IMP: SIGNIFICANT CHANGE UP
HGB BLD-MCNC: 9.7 G/DL — LOW (ref 13–17)
IMM GRANULOCYTES NFR BLD AUTO: 1.1 % — HIGH (ref 0–0.9)
IRON SATN MFR SERPL: 17 UG/DL — LOW (ref 45–165)
IRON SATN MFR SERPL: 7 % — LOW (ref 16–55)
LDH SERPL L TO P-CCNC: 340 U/L — HIGH (ref 50–242)
LYMPHOCYTES # BLD AUTO: 0.91 K/UL — LOW (ref 1–3.3)
LYMPHOCYTES # BLD AUTO: 11 % — LOW (ref 13–44)
MAGNESIUM SERPL-MCNC: 2.8 MG/DL — HIGH (ref 1.6–2.6)
MCHC RBC-ENTMCNC: 19.7 PG — LOW (ref 27–34)
MCHC RBC-ENTMCNC: 33.1 G/DL — SIGNIFICANT CHANGE UP (ref 32–36)
MCV RBC AUTO: 59.4 FL — LOW (ref 80–100)
MONOCYTES # BLD AUTO: 1.02 K/UL — HIGH (ref 0–0.9)
MONOCYTES NFR BLD AUTO: 12.3 % — SIGNIFICANT CHANGE UP (ref 2–14)
NEUTROPHILS # BLD AUTO: 6.02 K/UL — SIGNIFICANT CHANGE UP (ref 1.8–7.4)
NEUTROPHILS NFR BLD AUTO: 73 % — SIGNIFICANT CHANGE UP (ref 43–77)
NRBC # BLD: 0 /100 WBCS — SIGNIFICANT CHANGE UP (ref 0–0)
PHOSPHATE SERPL-MCNC: 6.7 MG/DL — HIGH (ref 2.5–4.5)
PLATELET # BLD AUTO: 274 K/UL — SIGNIFICANT CHANGE UP (ref 150–400)
POTASSIUM SERPL-MCNC: 4.3 MMOL/L — SIGNIFICANT CHANGE UP (ref 3.5–5.3)
POTASSIUM SERPL-MCNC: 4.9 MMOL/L — SIGNIFICANT CHANGE UP (ref 3.5–5.3)
POTASSIUM SERPL-SCNC: 4.3 MMOL/L — SIGNIFICANT CHANGE UP (ref 3.5–5.3)
POTASSIUM SERPL-SCNC: 4.9 MMOL/L — SIGNIFICANT CHANGE UP (ref 3.5–5.3)
PROT SERPL-MCNC: 6.2 G/DL — SIGNIFICANT CHANGE UP (ref 6–8.3)
RBC # BLD: 4.93 M/UL — SIGNIFICANT CHANGE UP (ref 4.2–5.8)
RBC # FLD: 15.8 % — HIGH (ref 10.3–14.5)
SODIUM SERPL-SCNC: 137 MMOL/L — SIGNIFICANT CHANGE UP (ref 135–145)
SODIUM SERPL-SCNC: 138 MMOL/L — SIGNIFICANT CHANGE UP (ref 135–145)
TIBC SERPL-MCNC: 241 UG/DL — SIGNIFICANT CHANGE UP (ref 220–430)
UIBC SERPL-MCNC: 225 UG/DL — SIGNIFICANT CHANGE UP (ref 110–370)
URATE SERPL-MCNC: 8.9 MG/DL — HIGH (ref 3.4–8.8)
WBC # BLD: 8.26 K/UL — SIGNIFICANT CHANGE UP (ref 3.8–10.5)
WBC # FLD AUTO: 8.26 K/UL — SIGNIFICANT CHANGE UP (ref 3.8–10.5)

## 2024-11-22 RX ORDER — PANTOPRAZOLE SODIUM 40 MG/1
40 TABLET, DELAYED RELEASE ORAL ONCE
Refills: 0 | Status: COMPLETED | OUTPATIENT
Start: 2024-11-22 | End: 2024-11-22

## 2024-11-22 RX ADMIN — SODIUM BICARBONATE 100 MEQ/KG/HR: 84 INJECTION, SOLUTION INTRAVENOUS at 00:02

## 2024-11-22 RX ADMIN — PANTOPRAZOLE SODIUM 40 MILLIGRAM(S): 40 TABLET, DELAYED RELEASE ORAL at 18:49

## 2024-11-22 RX ADMIN — SODIUM BICARBONATE 100 MEQ/KG/HR: 84 INJECTION, SOLUTION INTRAVENOUS at 11:34

## 2024-11-22 RX ADMIN — Medication 5000 UNIT(S): at 14:06

## 2024-11-22 RX ADMIN — POLYETHYLENE GLYCOL 3350 17 GRAM(S): 17 POWDER, FOR SOLUTION ORAL at 11:38

## 2024-11-22 RX ADMIN — Medication 5000 UNIT(S): at 21:17

## 2024-11-22 RX ADMIN — Medication 2 TABLET(S): at 21:17

## 2024-11-22 RX ADMIN — SODIUM CHLORIDE 4 MILLILITER(S): 9 INJECTION, SOLUTION INTRAMUSCULAR; INTRAVENOUS; SUBCUTANEOUS at 20:48

## 2024-11-22 RX ADMIN — SODIUM CHLORIDE 4 MILLILITER(S): 9 INJECTION, SOLUTION INTRAMUSCULAR; INTRAVENOUS; SUBCUTANEOUS at 07:44

## 2024-11-22 RX ADMIN — IPRATROPIUM BROMIDE AND ALBUTEROL SULFATE 3 MILLILITER(S): 2.5; .5 SOLUTION RESPIRATORY (INHALATION) at 20:45

## 2024-11-22 RX ADMIN — SODIUM ZIRCONIUM CYCLOSILICATE 10 GRAM(S): 5 POWDER, FOR SUSPENSION ORAL at 05:42

## 2024-11-22 RX ADMIN — IPRATROPIUM BROMIDE AND ALBUTEROL SULFATE 3 MILLILITER(S): 2.5; .5 SOLUTION RESPIRATORY (INHALATION) at 07:44

## 2024-11-22 RX ADMIN — SODIUM BICARBONATE 100 MEQ/KG/HR: 84 INJECTION, SOLUTION INTRAVENOUS at 21:16

## 2024-11-22 RX ADMIN — Medication 5000 UNIT(S): at 05:42

## 2024-11-22 RX ADMIN — ACETAMINOPHEN, DIPHENHYDRAMINE HCL, PHENYLEPHRINE HCL 5 MILLIGRAM(S): 325; 25; 5 TABLET ORAL at 21:17

## 2024-11-22 NOTE — DISCHARGE NOTE PROVIDER - NSDCCPCAREPLAN_GEN_ALL_CORE_FT
PRINCIPAL DISCHARGE DIAGNOSIS  Diagnosis: MARYSOL (acute kidney injury)  Assessment and Plan of Treatment: You were admitted to the hospital with acute renal failure. You had a wise catheter placed and you were treated with fluids. You were seen by a nephrologist. Your kidney numbers continued to rise, so you were started on dialysis while hospitalized. It is very important that you go to every session of dialysis once you are discharged. Please be sure to follow up with your nephrologist and your primary care doctor within one week of your discharge.     PRINCIPAL DISCHARGE DIAGNOSIS  Diagnosis: MARYSOL (acute kidney injury)  Assessment and Plan of Treatment: You were admitted to the hospital with acute renal failure. You had a wise catheter placed and you were treated with fluids. You were seen by a nephrologist. Your kidney numbers continued to rise, so you were started on dialysis while hospitalized. You were also started on a water pill called bumex, which helps to remove excess fluids. Your urinary catheter was removed, and you were started on flomax to help with urinary retention. Upon your discharge, you will be started on renvela to help remove the extra phosphate that has built up due to your decreased kidney function. It is very important that you go to every session of dialysis once you are discharged. Please be sure to follow up with your nephrologist and your primary care doctor within one week of your discharge. Please take medications as follows:  Bumex (Bumetanide- water pill) once per day  Labetalol twice per day (Blood pressure)  Pantoprazole once per day in the morning 30 mins prior to first meal (Reflux)  Flomax (Tamsulosin- for urinary retention) once per day at bedtime  Renvela (sevelamer) three times per day with meals      SECONDARY DISCHARGE DIAGNOSES  Diagnosis: Hypertension  Assessment and Plan of Treatment: Your blood pressure was found to be high while hospitalized. This is likely related to your kidney function. You were started on a medication called labetalol, and your blood pressure improved. Please continue to take labetalol as prescribed. You should start to check your blood pressure at home, and if the top number is below 110 you should not take this medication. Your vital signs including blood pressure will be checked when you go to dialysis, and adjustments may be made. Please be sure to follow up with your primary care doctor and your nephrologist within one week of your discharge     PRINCIPAL DISCHARGE DIAGNOSIS  Diagnosis: JUANA (acute kidney injury)  Assessment and Plan of Treatment: You were admitted to the hospital with acute renal failure.  Juana,ATN with Hyperkalemia, elevated Phos & MAg level  - You had a wise catheter placed and you were treated with fluids. You were seen by a nephrologist. Your kidney numbers continued to rise, so you were started on  hemo dialysis while hospitalized. You were also started on a water pill called bumex, which helps to remove excess fluids. Your urinary catheter was removed, and you were started on flomax   -Upon your discharge, you will be started on renvela to help remove the extra phosphate that has built up due to your decreased kidney function. It is very important that you go to every session of dialysis once you are discharged. Please be sure to follow up with your nephrologist and your primary care doctor within one week of your discharge. Please take medications as follows:  Bumex (Bumetanide- water pill) once per day  -HD cath -PC in left chest wall -Keep dressing Dry, sterile intact  Pantoprazole once per day in the morning 30 mins prior to first meal (Reflux)  Flomax (Tamsulosin- for urinary retention) once per day at bedtime  Renvela (sevelamer) three times per day with meals      SECONDARY DISCHARGE DIAGNOSES  Diagnosis: Hypertension  Assessment and Plan of Treatment: Your blood pressure was found to be high while hospitalized.   This is likely related to your kidney function.  - You were started on a medication called labetalol, and your blood pressure improved.  - Please continue to take labetalol 2x day  as prescribed.  -,. Your vital signs including blood pressure will be checked when you go to dialysis, and adjustments may be made.  - Please be sure to follow up with your primary care doctor and your nephrologist within one week of your discharge    Diagnosis: Gastric lymphoma  Assessment and Plan of Treatment: Follow up with your Pncologist on Monday next week for treatment options for Lymphoma  Mediport in Rt Chest in place     PRINCIPAL DISCHARGE DIAGNOSIS  Diagnosis: JUANA (acute kidney injury)  Assessment and Plan of Treatment: You were admitted to the hospital with acute renal failure.  Juana,ATN with Hyperkalemia, elevated Phos & MAg level  - You had a wise catheter placed and you were treated with fluids. You were seen by a nephrologist. Your kidney numbers continued to rise, so you were started on  hemo dialysis while hospitalized. You were also started on a water pill called bumex, which helps to remove excess fluids. Your urinary catheter was removed, and you were started on flomax   -Upon your discharge, you will be started on renvela to help remove the extra phosphate that has built up due to your decreased kidney function. It is very important that you go to every session of dialysis once you are discharged. Please be sure to follow up with your nephrologist and your primary care doctor within one week of your discharge. Please take medications as follows:  Bumex (Bumetanide- water pill) once per day  -HD cath -PC in left chest wall -Keep dressing Dry, sterile intact  Pantoprazole once per day in the morning 30 mins prior to first meal (Reflux)  Flomax (Tamsulosin- for urinary retention) once per day at bedtime  Renvela (sevelamer) three times per day with meals      SECONDARY DISCHARGE DIAGNOSES  Diagnosis: Hypertension  Assessment and Plan of Treatment: Your blood pressure was found to be high while hospitalized.   This is likely related to your JUANA  - You were started on a medication called labetalol, and your blood pressure improved.  - Please continue to take labetalol 2x day  as prescribed.  -,Your  blood pressure will be checked at dialysis, and adjustments may be made.  - Please be sure to follow up with your primary care doctor and your nephrologist within one week of your discharge    Diagnosis: Gastric lymphoma  Assessment and Plan of Treatment: you have recent diagnosis of Gastric Lymphoma   Follow up with your Oncologist on Monday next week for treatment options for Lymphoma  Mediport in Rt Chest in place    Diagnosis: GERD (gastroesophageal reflux disease)  Assessment and Plan of Treatment: seen by GI  Continue Pantoprezole 40 mg 1 tab 2x day  start Carafate 1 gm 1 tab 4x day x 2 weeks     PRINCIPAL DISCHARGE DIAGNOSIS  Diagnosis: JUANA (acute kidney injury)  Assessment and Plan of Treatment: You were admitted to the hospital with acute renal failure.  Juana,ATN with Hyperkalemia, elevated Phos & MAg level  - You had a wise catheter placed and you were treated with fluids. You were seen by a nephrologist. Your kidney numbers continued to rise, so you were started on  hemo dialysis while hospitalized. You were also started on a water pill called bumex, which helps to remove excess fluids. Your urinary catheter was removed, and you were started on flomax   -Upon your discharge, you will be started on renvela to help remove the extra phosphate that has built up due to your decreased kidney function. It is very important that you go to every session of dialysis once you are discharged. Please be sure to follow up with your nephrologist and your primary care doctor within one week of your discharge. Please take medications as follows:  Bumex (Bumetanide- water pill) once per day  -HD cath -PC in left chest wall -Keep dressing Dry, sterile intact  Pantoprazole once per day in the morning 30 mins prior to first meal (Reflux)  Flomax (Tamsulosin- for urinary retention) once per day at bedtime  Renvela (sevelamer)  1 tab three times per day with meals      SECONDARY DISCHARGE DIAGNOSES  Diagnosis: Hypertension  Assessment and Plan of Treatment: Your blood pressure was found to be high while hospitalized.   This is likely related to your JUANA  - You were started on a medication called labetalol, and your blood pressure improved.  - Please continue to take labetalol 2x day  as prescribed.  -,Your  blood pressure will be checked at dialysis, and adjustments may be made.  - Please be sure to follow up with your primary care doctor and your nephrologist within one week of your discharge    Diagnosis: Gastric lymphoma  Assessment and Plan of Treatment: you have recent diagnosis of Gastric Lymphoma   Follow up with your Oncologist on Monday next week for treatment options for Lymphoma  Mediport in Rt Chest in place    Diagnosis: GERD (gastroesophageal reflux disease)  Assessment and Plan of Treatment: seen by GI  Continue Pantoprezole 40 mg 1 tab daily  start Carafate 1 gm 1 tab 4x day x 2 weeks    Diagnosis: Leukocytosis  Assessment and Plan of Treatment: Likely reactive with Vomitting ,? PNA   CT Chest/A/P done  New Patchy and nodular opacities in the lower lobes, left greater than   right, concerning for multifocal infection.  Upper abdominal adenopathy without significant change.  Mild bilateral hydronephrosis without change.  seen by ID   start Cefuroxime 500 mg 1 tab 2x day x 5 days   Repeat CBC with Oncologist tomorrow at visit

## 2024-11-22 NOTE — DISCHARGE NOTE PROVIDER - HOSPITAL COURSE
ADMISSION DATE:  11-20-24    ---  FROM ADMISSION H+P:   HPI:  Pt is a 63yo M with PMHx recently diagnosed gastric lymphoma  S/P Biopsy of gastric mass & neck Lymph node  with LN spread presents to the ED as recommended by his oncologist Dr. Andrea for elevated Creatinine. Pt called by doctor for elevated Cr at 5.3.  Baseline 1.1, increasing to 1.5-->3.0--> 5.3 on outpatient labs. Pt had chemo port placed today, has not been drinking as much as he usually does. Per pt, urinated a small amount this AM, contributed it to decreased fluid intake. Pt endorsing mild flank pain, prescribed oxycodone by outpatient doctor. Pt also endorsing new onset dyspnea on exertion. Pt has been taking Advil for 5 weeks for Lower Back pain. Pt had placement of Medi port as out pt   today     Denies fever, chills, chest pain, palpitations, abdominal pain, nausea, vomiting, diarrhea, constipation, or dysuria    ED Course:   Vitals: BP: 160/90, HR: 74, Temp: 98F, RR: 18  Labs:  WBC 7.31, Hg 10, CO2 21, BUN/CR 58,5.3, Ca 8.3, eGFR 11, Mag 2.8, proBNP 593  UA: Pending  CT Chest: Mild mottled density in the left mainstem bronchus may represent secretions. No focal consolidation or discrete mass is seen. Regions of atelectasis or scarring in the left upper lobe/lingula. Atelectatic changes at the lung bases.  CT Abdomen/Pelvis: Lymphadenopathy which appears increased when compared with 9/30/2024. Nodularity along posterior peritoneal reflections and minimal perihepatic fluid which were not seen previously.    EKG: NSR rate 68bpm. QTc 408ms  Received in the ED: 500cc NS bolus x1   (20 Nov 2024 22:40)      ---  HOSPITAL COURSE/PERTINENT LABS/PROCEDURES PERFORMED/PENDING TESTS:  The patient was admitted to the hospital for ARF and Nephrology was consulted. He was treated with Lokelma for hyperkalemia. He also c/o dyspnea on exertion which was treated with duonebs and hypertonic saline nebs.    The patient was seen by physical therapy who recommended ***. The patient was seen and examined on the day of discharge. The patient is medically optimized for discharge to ***.    ---  PATIENT CONDITION:  - stable    ---  PHYSICAL EXAM ON DAY OF DISCHARGE: See progress note from day of discharge.    ---  CONSULTANTS:   Nephrology Dr. Pepe Rivera/Onc Dr. Hu  ---  ADVANCED CARE PLANNING:  - Code status:      - MOLST completed:      [  ] NO     [  ] YES    ---  TIME SPENT:  I, the attending physician, was physically present for the key portions of the evaluation and management (E/M) service provided. The total amount of time spent reviewing the hospital notes, laboratory values, imaging findings, assessing/counseling the patient, discussing with consultant physicians, social work, nursing staff was -- minutes ADMISSION DATE:  11-20-24    ---  FROM ADMISSION H+P:   HPI:  Pt is a 65yo M with PMHx recently diagnosed gastric lymphoma  S/P Biopsy of gastric mass & neck Lymph node  with LN spread presents to the ED as recommended by his oncologist Dr. Andrea for elevated Creatinine. Pt called by doctor for elevated Cr at 5.3.  Baseline 1.1, increasing to 1.5-->3.0--> 5.3 on outpatient labs. Pt had chemo port placed today, has not been drinking as much as he usually does. Per pt, urinated a small amount this AM, contributed it to decreased fluid intake. Pt endorsing mild flank pain, prescribed oxycodone by outpatient doctor. Pt also endorsing new onset dyspnea on exertion. Pt has been taking Advil for 5 weeks for Lower Back pain. Pt had placement of Medi port as out pt   today     Denies fever, chills, chest pain, palpitations, abdominal pain, nausea, vomiting, diarrhea, constipation, or dysuria    ED Course:   Vitals: BP: 160/90, HR: 74, Temp: 98F, RR: 18  Labs:  WBC 7.31, Hg 10, CO2 21, BUN/CR 58,5.3, Ca 8.3, eGFR 11, Mag 2.8, proBNP 593  UA: Pending  CT Chest: Mild mottled density in the left mainstem bronchus may represent secretions. No focal consolidation or discrete mass is seen. Regions of atelectasis or scarring in the left upper lobe/lingula. Atelectatic changes at the lung bases.  CT Abdomen/Pelvis: Lymphadenopathy which appears increased when compared with 9/30/2024. Nodularity along posterior peritoneal reflections and minimal perihepatic fluid which were not seen previously.    EKG: NSR rate 68bpm. QTc 408ms  Received in the ED: 500cc NS bolus x1   (20 Nov 2024 22:40)      ---  HOSPITAL COURSE/PERTINENT LABS/PROCEDURES PERFORMED/PENDING TESTS:  The patient was admitted to the hospital for ARF and Nephrology was consulted. He was treated with Lokelma for hyperkalemia. He also c/o dyspnea on exertion which was treated with duonebs and hypertonic saline nebs. He had a TTE which was WNL. His kidney function and urine output were closely monitored. The patient had adequate urine output, however his Cr continued to rise and the decision was made to start the patient on temporary HD on 11/24.    The patient was seen by physical therapy who recommended ***. The patient was seen and examined on the day of discharge. The patient is medically optimized for discharge to ***.    ---  PATIENT CONDITION:  - stable    ---  PHYSICAL EXAM ON DAY OF DISCHARGE: See progress note from day of discharge.    ---  CONSULTANTS:   Nephrology Dr. Pepe Rivera/Onc Dr. Hu  ---  ADVANCED CARE PLANNING:  - Code status:      - MOLST completed:      [  ] NO     [  ] YES    ---  TIME SPENT:  I, the attending physician, was physically present for the key portions of the evaluation and management (E/M) service provided. The total amount of time spent reviewing the hospital notes, laboratory values, imaging findings, assessing/counseling the patient, discussing with consultant physicians, social work, nursing staff was -- minutes ADMISSION DATE:  11-20-24    ---  FROM ADMISSION H+P:   HPI:  Pt is a 65yo M with PMHx recently diagnosed gastric lymphoma  S/P Biopsy of gastric mass & neck Lymph node  with LN spread presents to the ED as recommended by his oncologist Dr. Andrea for elevated Creatinine. Pt called by doctor for elevated Cr at 5.3.  Baseline 1.1, increasing to 1.5-->3.0--> 5.3 on outpatient labs. Pt had chemo port placed today, has not been drinking as much as he usually does. Per pt, urinated a small amount this AM, contributed it to decreased fluid intake. Pt endorsing mild flank pain, prescribed oxycodone by outpatient doctor. Pt also endorsing new onset dyspnea on exertion. Pt has been taking Advil for 5 weeks for Lower Back pain. Pt had placement of Medi port as out pt   today     Denies fever, chills, chest pain, palpitations, abdominal pain, nausea, vomiting, diarrhea, constipation, or dysuria    ED Course:   Vitals: BP: 160/90, HR: 74, Temp: 98F, RR: 18  Labs:  WBC 7.31, Hg 10, CO2 21, BUN/CR 58,5.3, Ca 8.3, eGFR 11, Mag 2.8, proBNP 593  UA: Pending  CT Chest: Mild mottled density in the left mainstem bronchus may represent secretions. No focal consolidation or discrete mass is seen. Regions of atelectasis or scarring in the left upper lobe/lingula. Atelectatic changes at the lung bases.  CT Abdomen/Pelvis: Lymphadenopathy which appears increased when compared with 9/30/2024. Nodularity along posterior peritoneal reflections and minimal perihepatic fluid which were not seen previously.    EKG: NSR rate 68bpm. QTc 408ms  Received in the ED: 500cc NS bolus x1   (20 Nov 2024 22:40)      ---  HOSPITAL COURSE/PERTINENT LABS/PROCEDURES PERFORMED/PENDING TESTS:  The patient was admitted to the hospital for ARF and Nephrology was consulted. He was treated with Lokelma for hyperkalemia. He also c/o dyspnea on exertion which was treated with duonebs and hypertonic saline nebs. He had a TTE which was WNL. His kidney function and urine output were closely monitored. The patient had adequate urine output, however his Cr continued to rise and the decision was made to start the patient on temporary HD on 11/24. Permacath was placed.    The patient was seen and examined on the day of discharge. The patient is medically optimized for discharge to ***.    ---  PATIENT CONDITION:  - stable    ---  PHYSICAL EXAM ON DAY OF DISCHARGE: See progress note from day of discharge.    ---  CONSULTANTS:   Nephrology Dr. Pepe Rivera/Onc Dr. Hu  ---  ADVANCED CARE PLANNING:  - Code status:      - MOLST completed:      [  ] NO     [  ] YES    ---  TIME SPENT:  I, the attending physician, was physically present for the key portions of the evaluation and management (E/M) service provided. The total amount of time spent reviewing the hospital notes, laboratory values, imaging findings, assessing/counseling the patient, discussing with consultant physicians, social work, nursing staff was -- minutes ADMISSION DATE:  11-20-24    ---  FROM ADMISSION H+P:   HPI:  Pt is a 63yo M with PMHx recently diagnosed gastric lymphoma  S/P Biopsy of gastric mass & neck Lymph node  with LN spread presents to the ED as recommended by his oncologist Dr. Andrea for elevated Creatinine. Pt called by doctor for elevated Cr at 5.3.  Baseline 1.1, increasing to 1.5-->3.0--> 5.3 on outpatient labs. Pt had chemo port placed today, has not been drinking as much as he usually does. Per pt, urinated a small amount this AM, contributed it to decreased fluid intake. Pt endorsing mild flank pain, prescribed oxycodone by outpatient doctor. Pt also endorsing new onset dyspnea on exertion. Pt has been taking Advil for 5 weeks for Lower Back pain. Pt had placement of Medi port as out pt   today     Denies fever, chills, chest pain, palpitations, abdominal pain, nausea, vomiting, diarrhea, constipation, or dysuria    ED Course:   Vitals: BP: 160/90, HR: 74, Temp: 98F, RR: 18  Labs:  WBC 7.31, Hg 10, CO2 21, BUN/CR 58,5.3, Ca 8.3, eGFR 11, Mag 2.8, proBNP 593  UA: Pending  CT Chest: Mild mottled density in the left mainstem bronchus may represent secretions. No focal consolidation or discrete mass is seen. Regions of atelectasis or scarring in the left upper lobe/lingula. Atelectatic changes at the lung bases.  CT Abdomen/Pelvis: Lymphadenopathy which appears increased when compared with 9/30/2024. Nodularity along posterior peritoneal reflections and minimal perihepatic fluid which were not seen previously.    EKG: NSR rate 68bpm. QTc 408ms  Received in the ED: 500cc NS bolus x1   (20 Nov 2024 22:40)      ---  HOSPITAL COURSE/PERTINENT LABS/PROCEDURES PERFORMED/PENDING TESTS:  The patient was admitted to the hospital for ARF and Nephrology was consulted. He was treated with Lokelma for hyperkalemia. He also c/o dyspnea on exertion which was treated with duonebs and hypertonic saline nebs. He had a TTE which was WNL. His kidney function and urine output were closely monitored. The patient had adequate urine output, however his Cr continued to rise and the decision was made to start the patient on temporary HD on 11/24 which the patient tolerated well. The patients Cr began to downtrend and he continued to have good UOP. He was started on bumex for further diuresis. Labetalol was initiated due to elevated BPs. Permacath was placed with IR on 11/27. The patient was set up to continue outpatient HD.    The patient was seen and examined on the day of discharge. The patient is medically optimized for discharge to home with close outpatient follow up.    ---  PATIENT CONDITION:  - stable    ---  PHYSICAL EXAM ON DAY OF DISCHARGE: See progress note from day of discharge.    ---  CONSULTANTS:   Nephrology Dr. Pepe Rivera/Onc Dr. Hu  ---  ADVANCED CARE PLANNING:  - Code status:      - MOLST completed:      [  ] NO     [  ] YES    ---  TIME SPENT:  I, the attending physician, was physically present for the key portions of the evaluation and management (E/M) service provided. The total amount of time spent reviewing the hospital notes, laboratory values, imaging findings, assessing/counseling the patient, discussing with consultant physicians, social work, nursing staff was -- minutes ADMISSION DATE:  11-20-24    ---  FROM ADMISSION H+P:   HPI:  Pt is a 63yo M with PMHx recently diagnosed gastric lymphoma  S/P Biopsy of gastric mass & neck Lymph node  with LN spread presents to the ED as recommended by his oncologist Dr. Andrea for elevated Creatinine. Pt called by doctor for elevated Cr at 5.3.  Baseline 1.1, increasing to 1.5-->3.0--> 5.3 on outpatient labs. Pt had chemo port placed today, has not been drinking as much as he usually does. Per pt, urinated a small amount this AM, contributed it to decreased fluid intake. Pt endorsing mild flank pain, prescribed oxycodone by outpatient doctor. Pt also endorsing new onset dyspnea on exertion. Pt has been taking Advil for 5 weeks for Lower Back pain. Pt had placement of Medi port as out pt   today     Denies fever, chills, chest pain, palpitations, abdominal pain, nausea, vomiting, diarrhea, constipation, or dysuria    ED Course:   Vitals: BP: 160/90, HR: 74, Temp: 98F, RR: 18  Labs:  WBC 7.31, Hg 10, CO2 21, BUN/CR 58,5.3, Ca 8.3, eGFR 11, Mag 2.8, proBNP 593  UA: Pending  CT Chest: Mild mottled density in the left mainstem bronchus may represent secretions. No focal consolidation or discrete mass is seen. Regions of atelectasis or scarring in the left upper lobe/lingula. Atelectatic changes at the lung bases.  CT Abdomen/Pelvis: Lymphadenopathy which appears increased when compared with 9/30/2024. Nodularity along posterior peritoneal reflections and minimal perihepatic fluid which were not seen previously.    EKG: NSR rate 68bpm. QTc 408ms  Received in the ED: 500cc NS bolus x1   (20 Nov 2024 22:40)      ---  HOSPITAL COURSE/PERTINENT LABS/PROCEDURES PERFORMED/PENDING TESTS:  The patient was admitted to the hospital for ARF and Nephrology was consulted. He was treated with Lokelma for hyperkalemia. He also c/o dyspnea on exertion which was treated with duonebs and hypertonic saline nebs. He had a TTE which was WNL. His kidney function and urine output were closely monitored. The patient had adequate urine output, however his Cr continued to rise and the decision was made to start the patient on HD on 11/24 which the patient tolerated well. The patients Cr began to downtrend and he continued to have good UOP. He was started on bumex for further diuresis. Labetalol was initiated due to elevated BPs. Permacath was placed with IR on 11/27. He had some swelling and pain in his bilateral LEs, b/l dopplers were done which was negative for DVT. The patient was set up to continue outpatient HD.    The patient was seen and examined on the day of discharge. The patient is medically optimized for discharge to home with close outpatient follow up.    ---  PATIENT CONDITION:  - stable    ---  PHYSICAL EXAM ON DAY OF DISCHARGE: See progress note from day of discharge.    ---  CONSULTANTS:   Nephrology Dr. Pepe Rivera/Onc Dr. Hu  ---  ADVANCED CARE PLANNING:  - Code status:      - MOLST completed:      [  ] NO     [  ] YES    ---  TIME SPENT:  I, the attending physician, was physically present for the key portions of the evaluation and management (E/M) service provided. The total amount of time spent reviewing the hospital notes, laboratory values, imaging findings, assessing/counseling the patient, discussing with consultant physicians, social work, nursing staff was -- minutes ADMISSION DATE:  11-20-24    ---  FROM ADMISSION H+P:   HPI:  Pt is a 65yo M with PMHx recently diagnosed gastric lymphoma  S/P Biopsy of gastric mass & neck Lymph node  with LN spread presents to the ED as recommended by his oncologist Dr. Andrea for elevated Creatinine. Pt called by doctor for elevated Cr at 5.3.  Baseline 1.1, increasing to 1.5-->3.0--> 5.3 on outpatient labs. Pt had chemo port placed today, has not been drinking as much as he usually does. Per pt, urinated a small amount this AM, contributed it to decreased fluid intake. Pt endorsing mild flank pain, prescribed oxycodone by outpatient doctor. Pt also endorsing new onset dyspnea on exertion. Pt has been taking Advil for 5 weeks for Lower Back pain. Pt had placement of Medi port as out pt   today     Denies fever, chills, chest pain, palpitations, abdominal pain, nausea, vomiting, diarrhea, constipation, or dysuria    ED Course:   Vitals: BP: 160/90, HR: 74, Temp: 98F, RR: 18  Labs:  WBC 7.31, Hg 10, CO2 21, BUN/CR 58,5.3, Ca 8.3, eGFR 11, Mag 2.8, proBNP 593  UA: Pending  CT Chest: Mild mottled density in the left mainstem bronchus may represent secretions. No focal consolidation or discrete mass is seen. Regions of atelectasis or scarring in the left upper lobe/lingula. Atelectatic changes at the lung bases.  CT Abdomen/Pelvis: Lymphadenopathy which appears increased when compared with 9/30/2024. Nodularity along posterior peritoneal reflections and minimal perihepatic fluid which were not seen previously.    EKG: NSR rate 68bpm. QTc 408ms  Received in the ED: 500cc NS bolus x1   (20 Nov 2024 22:40)      ---  HOSPITAL COURSE/PERTINENT LABS/PROCEDURES PERFORMED/PENDING TESTS:  The patient was admitted to the hospital for ARF and Nephrology was consulted. He was treated with Lokelma for hyperkalemia. He also c/o dyspnea on exertion which was treated with duonebs and hypertonic saline nebs. He had a TTE which was WNL. His kidney function and urine output were closely monitored. The patient had adequate urine output, however his Cr continued to rise and the decision was made to start the patient on HD on 11/24 which the patient tolerated well. The patients Cr began to downtrend and he continued to have good UOP. He was started on bumex for further diuresis. Labetalol was initiated due to elevated BPs. Permacath was placed with IR on 11/27. He had some swelling and pain in his bilateral LEs, b/l dopplers were done which was negative for DVT. The patient was set up to continue outpatient HD for M/W/F with Dr LEONARD Eugene    The patient was seen and examined on the day of discharge. The patient is medically optimized for discharge to home with close outpatient follow up.    ---  PATIENT CONDITION:  - stable    ---  PHYSICAL EXAM ON DAY OF DISCHARGE: See progress note from day of discharge.    ---  CONSULTANTS:   Nephrology Dr. Pepe Rivera/Onc Dr. Hu  ---  ADVANCED CARE PLANNING:  - Code status:      - MOLST completed:      [  ] NO     [  ] YES    ---  TIME SPENT:  I, the attending physician, was physically present for the key portions of the evaluation and management (E/M) service provided. The total amount of time spent reviewing the hospital notes, laboratory values, imaging findings, assessing/counseling the patient, discussing with consultant physicians, social work, nursing staff was 60 minutes ADMISSION DATE:  11-20-24    ---  FROM ADMISSION H+P:   HPI:  Pt is a 65yo M with PMHx recently diagnosed gastric lymphoma  S/P Biopsy of gastric mass & neck Lymph node  with LN spread presents to the ED as recommended by his oncologist Dr. Andrea for elevated Creatinine. Pt called by doctor for elevated Cr at 5.3.  Baseline 1.1, increasing to 1.5-->3.0--> 5.3 on outpatient labs. Pt had chemo port placed today, has not been drinking as much as he usually does. Per pt, urinated a small amount this AM, contributed it to decreased fluid intake. Pt endorsing mild flank pain, prescribed oxycodone by outpatient doctor. Pt also endorsing new onset dyspnea on exertion. Pt has been taking Advil for 5 weeks for Lower Back pain. Pt had placement of Medi port as out pt   today     Denies fever, chills, chest pain, palpitations, abdominal pain, nausea, vomiting, diarrhea, constipation, or dysuria    ED Course:   Vitals: BP: 160/90, HR: 74, Temp: 98F, RR: 18  Labs:  WBC 7.31, Hg 10, CO2 21, BUN/CR 58,5.3, Ca 8.3, eGFR 11, Mag 2.8, proBNP 593  UA: Pending  CT Chest: Mild mottled density in the left mainstem bronchus may represent secretions. No focal consolidation or discrete mass is seen. Regions of atelectasis or scarring in the left upper lobe/lingula. Atelectatic changes at the lung bases.  CT Abdomen/Pelvis: Lymphadenopathy which appears increased when compared with 9/30/2024. Nodularity along posterior peritoneal reflections and minimal perihepatic fluid which were not seen previously.    EKG: NSR rate 68bpm. QTc 408ms  Received in the ED: 500cc NS bolus x1   (20 Nov 2024 22:40)      ---  HOSPITAL COURSE/PERTINENT LABS/PROCEDURES PERFORMED/PENDING TESTS:  The patient was admitted to the hospital for ARF and Nephrology was consulted. He was treated with Lokelma for hyperkalemia. He also c/o dyspnea on exertion which was treated with duonebs and hypertonic saline nebs. He had a TTE which was WNL. His kidney function and urine output were closely monitored. The patient had adequate urine output, however his Cr continued to rise and the decision was made to start the patient on HD on 11/24 which the patient tolerated well. The patients Cr began to downtrend and he continued to have good UOP. He was started on bumex for further diuresis. Labetalol was initiated due to elevated BPs. Permacath was placed with IR on 11/27. He had some swelling and pain in his bilateral LEs, b/l dopplers were done which was negative for DVT. The patient was set up to continue outpatient HD for M/W/F with Dr LEONARD Eugene. On 11/30 the patient began to have nausea and vomiting. He was started on antiemetics and abdominal XR was done. GI was consulted.CTAP with oral contrast was done which showed ______.    The patient was seen and examined on the day of discharge. The patient is medically optimized for discharge to home with close outpatient follow up.    ---  PATIENT CONDITION:  - stable    ---  PHYSICAL EXAM ON DAY OF DISCHARGE: See progress note from day of discharge.    ---  CONSULTANTS:   Nephrology Dr. Pepe Rivera/Onc Dr. Hu  ---  ADVANCED CARE PLANNING:  - Code status:      - MOLST completed:      [  ] NO     [  ] YES    ---  TIME SPENT:  I, the attending physician, was physically present for the key portions of the evaluation and management (E/M) service provided. The total amount of time spent reviewing the hospital notes, laboratory values, imaging findings, assessing/counseling the patient, discussing with consultant physicians, social work, nursing staff was 60 minutes ADMISSION DATE:  11-20-24    ---  FROM ADMISSION H+P:   HPI:  Pt is a 63yo M with PMHx recently diagnosed gastric lymphoma  S/P Biopsy of gastric mass & neck Lymph node  with LN spread presents to the ED as recommended by his oncologist Dr. Andrea for elevated Creatinine. Pt called by doctor for elevated Cr at 5.3.  Baseline 1.1, increasing to 1.5-->3.0--> 5.3 on outpatient labs. Pt had chemo port placed today, has not been drinking as much as he usually does. Per pt, urinated a small amount this AM, contributed it to decreased fluid intake. Pt endorsing mild flank pain, prescribed oxycodone by outpatient doctor. Pt also endorsing new onset dyspnea on exertion. Pt has been taking Advil for 5 weeks for Lower Back pain. Pt had placement of Medi port as out pt   today     Denies fever, chills, chest pain, palpitations, abdominal pain, nausea, vomiting, diarrhea, constipation, or dysuria    ED Course:   Vitals: BP: 160/90, HR: 74, Temp: 98F, RR: 18  Labs:  WBC 7.31, Hg 10, CO2 21, BUN/CR 58,5.3, Ca 8.3, eGFR 11, Mag 2.8, proBNP 593  UA: Pending  CT Chest: Mild mottled density in the left mainstem bronchus may represent secretions. No focal consolidation or discrete mass is seen. Regions of atelectasis or scarring in the left upper lobe/lingula. Atelectatic changes at the lung bases.  CT Abdomen/Pelvis: Lymphadenopathy which appears increased when compared with 9/30/2024. Nodularity along posterior peritoneal reflections and minimal perihepatic fluid which were not seen previously.    EKG: NSR rate 68bpm. QTc 408ms  Received in the ED: 500cc NS bolus x1   (20 Nov 2024 22:40)      ---  HOSPITAL COURSE/PERTINENT LABS/PROCEDURES PERFORMED/PENDING TESTS:  The patient was admitted to the hospital for ARF and Nephrology was consulted. He was treated with Lokelma for hyperkalemia. He also c/o dyspnea on exertion which was treated with duonebs and hypertonic saline nebs. He had a TTE which was WNL. His kidney function and urine output were closely monitored. The patient had adequate urine output, however his Cr continued to rise and the decision was made to start the patient on HD on 11/24 which the patient tolerated well. The patients Cr began to downtrend and he continued to have good UOP. He was started on Bumex for further diuresis. Labetalol was initiated due to elevated BPs. Permacath was placed with IR on 11/27. He had some swelling and pain in his bilateral LEs, b/l dopplers were done which was negative for DVT. The patient was set up to continue outpatient HD for M/W/F with Dr LEONARD Eugene. On 11/30 the patient began to have nausea and vomiting. He was started on antiemetics and abdominal XR was done. GI was consulted. CT scan C/A/P with oral contrast was done which showed     IMPRESSION:  New Patchy and nodular opacities in the lower lobes, left greater than   right, concerning for multifocal infection.  Upper abdominal adenopathy without significant change.  Mild bilateral hydronephrosis without change.  D/W ID -Dr JUANY Tyler about CT scan findings with Mild leukocytosis , ------    The patient was seen and examined on the day of discharge. The patient is medically optimized for discharge to home with close outpatient follow up.    ---  PATIENT CONDITION:  - stable    ---  PHYSICAL EXAM ON DAY OF DISCHARGE: See progress note from day of discharge.    ---  CONSULTANTS:   Nephrology Dr. Pepe Rivera/Onc Dr. Hu  ID- BRADEN Montgomery -   GI- Dr Calvin group       ADVANCED CARE PLANNING:  - Code status:   Full   - MOLST completed:      [ x ] NO     [  ] YES    ---  TIME SPENT:  I, the attending physician, was physically present for the key portions of the evaluation and management (E/M) service provided. The total amount of time spent reviewing the hospital notes, laboratory values, imaging findings, assessing/counseling the patient, discussing with consultant physicians, social work, nursing staff was 60 minutes ADMISSION DATE:  11-20-24    ---  FROM ADMISSION H+P:   HPI:  Pt is a 63yo M with PMHx recently diagnosed gastric lymphoma  S/P Biopsy of gastric mass & neck Lymph node  with LN spread presents to the ED as recommended by his oncologist Dr. Andrea for elevated Creatinine. Pt called by doctor for elevated Cr at 5.3.  Baseline 1.1, increasing to 1.5-->3.0--> 5.3 on outpatient labs. Pt had chemo port placed today, has not been drinking as much as he usually does. Per pt, urinated a small amount this AM, contributed it to decreased fluid intake. Pt endorsing mild flank pain, prescribed oxycodone by outpatient doctor. Pt also endorsing new onset dyspnea on exertion. Pt has been taking Advil for 5 weeks for Lower Back pain. Pt had placement of Medi port as out pt   today     Denies fever, chills, chest pain, palpitations, abdominal pain, nausea, vomiting, diarrhea, constipation, or dysuria    ED Course:   Vitals: BP: 160/90, HR: 74, Temp: 98F, RR: 18  Labs:  WBC 7.31, Hg 10, CO2 21, BUN/CR 58,5.3, Ca 8.3, eGFR 11, Mag 2.8, proBNP 593  UA: Pending  CT Chest: Mild mottled density in the left mainstem bronchus may represent secretions. No focal consolidation or discrete mass is seen. Regions of atelectasis or scarring in the left upper lobe/lingula. Atelectatic changes at the lung bases.  CT Abdomen/Pelvis: Lymphadenopathy which appears increased when compared with 9/30/2024. Nodularity along posterior peritoneal reflections and minimal perihepatic fluid which were not seen previously.    EKG: NSR rate 68bpm. QTc 408ms  Received in the ED: 500cc NS bolus x1   (20 Nov 2024 22:40)      ---  HOSPITAL COURSE/PERTINENT LABS/PROCEDURES PERFORMED/PENDING TESTS:  The patient was admitted to the hospital for ARF and Nephrology was consulted. He was treated with Lokelma for hyperkalemia. He also c/o dyspnea on exertion which was treated with duonebs and hypertonic saline nebs. He had a TTE which was WNL. His kidney function and urine output were closely monitored. The patient had adequate urine output, however his Cr continued to rise and the decision was made to start the patient on HD on 11/24 which the patient tolerated well. The patients Cr began to downtrend and he continued to have good UOP. He was started on Bumex for further diuresis. Labetalol was initiated due to elevated BPs. Permacath was placed with IR on 11/27. He had some swelling and pain in his bilateral LEs, b/l dopplers were done which was negative for DVT. The patient was set up to continue outpatient HD for M/W/F with Dr LEONARD Eugene. On 11/30 the patient began to have nausea and vomiting. He was started on antiemetics and abdominal XR was done. GI was consulted. CT scan C/A/P with oral contrast was done which showed     IMPRESSION:  New Patchy and nodular opacities in the lower lobes, left greater than   right, concerning for multifocal infection.  Upper abdominal adenopathy without significant change.  Mild bilateral hydronephrosis without change.  D/W ID -Dr JUANY Tyler about CT scan findings with Mild leukocytosis ,Id Dr JUANY Tyler started on PO Cefuroxime 500 mg 2x day x 5 days with out pt follow up with Oncologist for CBC check prior to Treatment.    The patient was seen and examined on the day of discharge. The patient is medically optimized for discharge to home with close outpatient follow up.    ---  PATIENT CONDITION:  - stable    ---  PHYSICAL EXAM ON DAY OF DISCHARGE: See progress note from day of discharge.    ---  CONSULTANTS:   Nephrology Dr. Pepe Rivera/Onc Dr. Mayte Montgomery -   SANDY- Dr Calvin group       ADVANCED CARE PLANNING:  - Code status:   Full   - MOLST completed:      [ x ] NO     [  ] YES    ---  TIME SPENT:  I, the attending physician, was physically present for the key portions of the evaluation and management (E/M) service provided. The total amount of time spent reviewing the hospital notes, laboratory values, imaging findings, assessing/counseling the patient, discussing with consultant physicians, social work, nursing staff was 60 minutes

## 2024-11-22 NOTE — DISCHARGE NOTE PROVIDER - NSDCFUADDINST_GEN_ALL_CORE_FT
Follow up with your oncologist on Monday & Out pt HD unit   DO NOT Wet your Dialysis Cath in chest wall Follow up with your oncologist on Monday & Out pt HD unit   DO NOT Wet your Dialysis Cath in chest wall- dressing will be changed at dialysis center-Keep dressing Dry.Clean ,Intact

## 2024-11-22 NOTE — DIETITIAN INITIAL EVALUATION ADULT - PERTINENT MEDS FT
MEDICATIONS  (STANDING):  albuterol/ipratropium for Nebulization 3 milliLiter(s) Nebulizer every 12 hours  chlorhexidine 2% Cloths 1 Application(s) Topical daily  heparin   Injectable 5000 Unit(s) SubCutaneous every 8 hours  influenza   Vaccine 0.5 milliLiter(s) IntraMuscular once  melatonin 5 milliGRAM(s) Oral at bedtime  polyethylene glycol 3350 17 Gram(s) Oral daily  senna 2 Tablet(s) Oral at bedtime  sodium bicarbonate  Infusion 0.146 mEq/kG/Hr (100 mL/Hr) IV Continuous <Continuous>  sodium chloride 3%  Inhalation 4 milliLiter(s) Inhalation every 12 hours    MEDICATIONS  (PRN):  aluminum hydroxide/magnesium hydroxide/simethicone Suspension 30 milliLiter(s) Oral every 4 hours PRN Dyspepsia  ondansetron Injectable 4 milliGRAM(s) IV Push every 8 hours PRN Nausea and/or Vomiting  oxyCODONE    IR 5 milliGRAM(s) Oral every 6 hours PRN Severe Pain (7 - 10)  traMADol 25 milliGRAM(s) Oral every 12 hours PRN Mild Pain (1 - 3)  traMADol 50 milliGRAM(s) Oral every 12 hours PRN Moderate Pain (4 - 6)

## 2024-11-22 NOTE — CONSULT NOTE ADULT - ASSESSMENT
65 yo man (does not see any medical PCP),  recent dx lymphoma (neck/retroperitonal/mesenteric and ?stomach involvements, under care Dr Mariana Fung [facility]), pt had blood work done w Onc Tues 11/19 adn infusoport through IR at [facility] Wed, was called by Onc that his Cr sig high and go to ER.   Pt reports dev EMILY Monday 11/18, has been drinking fluids, but due to right flank pain(assoc w the LADs) on Advil 6 tabs a day x1wk(was on Tylenol before but without effect)  No night sweats fever anorexia weight loss  Pt had first presented w flank pain 9/2024 thought renal stone, but 9/30/24 CT renal stone hunt show sig mesenteric/retroperitoneal LADs up to 3.9cm), tx from [place] to [facility] for ?endoscopic bx(pt reports had "stomach bx", but path report in computer 9/30/24 perihepatic LN FNA c/w met poorly diff ca), Pt subsequent had left neck LN bx and reports was told lymphoma(does not know type)  Chemo w Dr Fung was planned for Monday 11/25/24 (does not recall name of medication)  Has had PETCT w [facility]    Labs   9/30/24 BUN/Cr 15/1,1on adm 11/20 58/5.3, w daily progression, now 73/7.4, urinating  Hgb stable since adm at 10  CT c/a/p--no sig above diaphragm findings, sig mesenteric/retroperitoneal LADs up to 4.6cm had been up to 3.9cm on 9/30/24 CT renal hunt. No hydronephrsis, andreas renal cysts seen    -seen by Renal, feels progressive MARYSOL may be due to NSAID effect, possible dialysis  -no obstructive nephropathy/ tract noted  -will check uric acid and LDC for sign s of tumor lysis(but pt has not started on treatment yet, unless this is aggressive lymphoma)  -will contact Dr Fung for more information w exact pathology, type of lymphoma. [phone]    multiple records reviewed  time spent on management 90min  -

## 2024-11-22 NOTE — PROGRESS NOTE ADULT - ASSESSMENT
Acute kidney injury, with ATN  Anemia  Hyperkalemia  Metabolic acidosis  Urinary retention      -MARYSOL likely due to ATN from NSAID use and poor po intake, has normal renal function at baseline  -Likely some retention, has wise  -SCr 1.1 at baseline with normal electrolytes  -S/p Lokelma  -Remains on bicarb infusion,  full dose with 150meq bicarb in D5W  -Change to renal diet restrictions  -daily chem and cbc  -Iron panel with am labs for anemia  -No HD indications at this time, He still has adequate urine output, but discussed with him about worsening renal function and dialysis and risks and benefits of HD discussed upto and including death discussed. Informed consent obtained and he agrees to HD if necessary  -Advised patient to avoid all nsaids     D/w RN  d/w family

## 2024-11-22 NOTE — DIETITIAN INITIAL EVALUATION ADULT - NSICDXPASTMEDICALHX_GEN_ALL_CORE_FT
PAST MEDICAL HISTORY:  Anemia of chronic disease     Gastric lymphoma     Incisional hernia 2015

## 2024-11-22 NOTE — DISCHARGE NOTE PROVIDER - NPI NUMBER (FOR SYSADMIN USE ONLY) :
[[id number]] [[id number] [[id number]] [[id number]] [[id number]]

## 2024-11-22 NOTE — DIETITIAN INITIAL EVALUATION ADULT - PERTINENT LABORATORY DATA
11-22    137  |  102  |  78[H]  ----------------------------<  115[H]  4.9   |  26  |  8.30[H]    Ca    7.9[L]      22 Nov 2024 07:05  Phos  6.7     11-22  Mg     2.8     11-22    TPro  6.2  /  Alb  2.8[L]  /  TBili  0.3  /  DBili  x   /  AST  11[L]  /  ALT  19  /  AlkPhos  80  11-22

## 2024-11-22 NOTE — DIETITIAN INITIAL EVALUATION ADULT - OTHER INFO
"Pt is a 63yo M with PMHx recently diagnosed gastric lymphoma with LN spread presents to the ED as recommended by his oncologist Dr. Andrea for elevated Creatinine- MARYSOL with MA ."

## 2024-11-22 NOTE — DISCHARGE NOTE PROVIDER - NSDCACTIVITY_GEN_ALL_CORE
Activity as tolerated Bathing allowed/Stairs allowed/Walking - Indoors allowed/Activity as tolerated Bathing allowed/Stairs allowed/Walking - Indoors allowed/No heavy lifting/straining/Activity as tolerated

## 2024-11-22 NOTE — DIETITIAN INITIAL EVALUATION ADULT - PROBLEM SELECTOR PLAN 3
Pt with recently diagnosed gastric lymphoma, port placed today for planned chemo (appointment on Monday per pt). Follows Dr. Andrea  - CT Ab: Lymphadenopathy which appears increased when compared with 9/30/2024. Nodularity along posterior peritoneal reflections and minimal perihepatic fluid which were not seen previously.  Anemia -Mild anemia

## 2024-11-22 NOTE — DISCHARGE NOTE PROVIDER - NSDCMRMEDTOKEN_GEN_ALL_CORE_FT
bumetanide 1 mg oral tablet: 1 tab(s) orally once a day  labetalol 100 mg oral tablet: 1 tab(s) orally 2 times a day  pantoprazole 40 mg oral delayed release tablet: 1 tab(s) orally once a day (before a meal)  Renvela 800 mg oral tablet: 2 tab(s) orally 3 times a day (with meals)  tamsulosin 0.4 mg oral capsule: 1 cap(s) orally once a day (at bedtime)   bumetanide 1 mg oral tablet: 1 tab(s) orally once a day  labetalol 100 mg oral tablet: 1 tab(s) orally 2 times a day  pantoprazole 40 mg oral delayed release tablet: 1 tab(s) orally once a day (before a meal)  Renvela 800 mg oral tablet: 2 tab(s) orally 3 times a day (with meals)  sevelamer carbonate 800 mg oral tablet: 2 tab(s) orally 3 times a day (with meals)  tamsulosin 0.4 mg oral capsule: 1 cap(s) orally once a day (at bedtime)   bumetanide 1 mg oral tablet: 1 tab(s) orally once a day  labetalol 100 mg oral tablet: 1 tab(s) orally 2 times a day  pantoprazole 40 mg oral delayed release tablet: 1 tab(s) orally once a day (before a meal)  sevelamer carbonate 800 mg oral tablet: 2 tab(s) orally 3 times a day (with meals)  tamsulosin 0.4 mg oral capsule: 1 cap(s) orally once a day (at bedtime)   bumetanide 1 mg oral tablet: 1 tab(s) orally once a day  cefuroxime 500 mg oral tablet: 1 tab(s) orally 2 times a day  labetalol 100 mg oral tablet: 1 tab(s) orally 2 times a day  pantoprazole 40 mg oral delayed release tablet: 1 tab(s) orally once a day (before a meal)  sucralfate 1 g oral tablet: 1 tab(s) orally 4 times a day  tamsulosin 0.4 mg oral capsule: 1 cap(s) orally once a day (at bedtime)

## 2024-11-22 NOTE — DIETITIAN INITIAL EVALUATION ADULT - ORAL INTAKE PTA/DIET HISTORY
Pt doesn't follow any therapeutic diet at home, diet recall reveals intake of processed meats often, hero s/w, burgers, etc. Does not seem to have lost wt, no problems chewing or swallowing. NKFA. Pt reports not eating as much as usual lately due to current medical issues.

## 2024-11-22 NOTE — PROGRESS NOTE ADULT - PROBLEM SELECTOR PLAN 2
Pt with elevated electrolytes likely in setting of ARF  - K improved s/p Lokelma  - Mag/Phos remain elevated  - will check BMP @ 1500 to ensure K remains NWL  - Nutritional eval  - Renal diet Pt with elevated electrolytes likely in setting of [place]-RESOLVED  - K improved s/p Lokelma  - Mag/Phos remain elevated  - will check BMP @ 1500 to ensure K remains NWL  - Nutritional eval  - Renal diet

## 2024-11-22 NOTE — DIETITIAN INITIAL EVALUATION ADULT - PROBLEM SELECTOR PLAN 1
Pt sent in to ED for elevated Cr (Baseline 1.1, increasing to 1.5-->3.0), endorsing decreased urination and flank pain  - Pt c/o flank pain (L), per chart review, also was endorsing pain during admission at [facility], prior to elevated Cr  - Cr 5.3 on admission-Pre renal likely - 2/2 NSAID-Advil use   - Metabolic Acidosis, CO2 21  - s/p wsie catheter placement in ED with minimal output  - CT Abdomen: Collapsed bladder. Bilateral renal cysts, no hydro. No obstruction.  - No urgent indication for HD per nephro  - Can be pre-renal in setting of dehydration, less likely obstructive  - s/p 500cc NS bolus in ED  - Start sodium bicarb gtt 75cc/hr x20h for metabolic acidosis per nephro recs  - F/u urine and serum studies per nephro  - tramadol 25mg PRN mild pain, tramadol 50mg for moderate pain, oxycodone 5mg for severe pain   - Strict Is&Os q6h  - Monitor volume status  - Avoid nephrotoxic medications, Renal dose meds   - Daily CMPs  - Nephrology (Dr. Cantu) following, recs appreciated d/w

## 2024-11-22 NOTE — DISCHARGE NOTE PROVIDER - CARE PROVIDER_API CALL
125.[address] Rashi Cantu  Nephrology  [address], [address]  [place] [address]  Phone: [phone]  Fax: [phone]  Follow Up Time:    Rashi Cantu  Nephrology  [address], [address]  [place] [address]  Phone: [phone]  Fax: [phone]  Follow Up Time:     Ruba Cardenas  [facility] Medicine  [address], [address]  [place] [address]  Phone: [phone]  Fax: [phone]  Follow Up Time:    Rashi Cantu  Nephrology  [address], [address]  [place] [address]  Phone: [phone]  Fax: [phone]  Follow Up Time:     Yanick Arita  Internal Medicine  [address] [place] [address]  Phone: [phone]  Fax: [phone]  Follow Up Time:     Tl Mejía  Internal Medicine  [address] [place] [address]  Phone: [phone]  Fax: [phone]  Follow Up Time:    Rashi Cantu  Nephrology  [address], [address]  [place] [address]  Phone: [phone]  Fax: [phone]  Follow Up Time:     Yanick Arita  Internal Medicine  [address] [place] [address]  Phone: [phone]  Fax: [phone]  Follow Up Time:     Tl Mejía  Internal Medicine  [address] [place] [address]  Phone: [phone]  Fax: [phone]  Follow Up Time:     Jay Quiñones  Medical Oncology  [address], [address]  [place] [address]  Phone: [phone]  Fax: [phone]  Follow Up Time:    Rashi Cantu  Nephrology  [address], [address]  [place] [address]  Phone: [phone]  Fax: [phone]  Follow Up Time:     Yanick Arita  Internal Medicine  [address] [place] [address]  Phone: [phone]  Fax: [phone]  Follow Up Time:     lT Mejía  Internal Medicine  [address] [place] [address]  Phone: [phone]  Fax: [phone]  Follow Up Time:     Jay Quiñones  Medical Oncology  [address], [address]  [place] [address]  Phone: [phone]  Fax: [phone]  Follow Up Time:     Scar Roy  Gastroenterology  [address] [place] [address]  Phone: [phone]  Fax: [phone]  Follow Up Time:

## 2024-11-22 NOTE — PROGRESS NOTE ADULT - PROBLEM SELECTOR PLAN 1
Pt sent in to ED for elevated Cr (Baseline 1.1, increasing to 1.5-->3.0), endorsing decreased urination and flank pain  - Pt c/o flank pain (L), per chart review, also was endorsing pain during admission at [facility], prior to elevated Cr  - Cr 5.3 on admission, pt with prolonged NSAID use.   - FeNA 3.4, likely ATN from progression of prerenal MARYSOL MA   - s/p wise catheter placement in ED with minimal output  - CT Abdomen: Collapsed bladder. Bilateral renal cysts, no hydro. No obstruction.  - Creatinine continues to rise however good UOP; K elevated yesterday now s/p lokelma with improvement in K  - Metabolic Acidosis, CO2 21  - No urgent indication for HD at this time as pt with good UOP - has been consented and agrees to HD if needed as per nephro  - continue sodium bicarb gtt 150cc/hr for metabolic acidosis  - tramadol 25mg PRN mild pain, tramadol 50mg for moderate pain, oxycodone 5mg for severe pain   - Strict Is&Os q6h  - Monitor volume status  - Avoid nephrotoxic medications, Renal dose meds   - Renal diet  - Daily CMPs - will check BMP @ 1500 to check K/Cr  - Nephrology (Dr. Cantu) following, recs appreciated d/w

## 2024-11-22 NOTE — DIETITIAN INITIAL EVALUATION ADULT - NS FNS DIET ORDER
Diet, Renal Restrictions:   For patients receiving Renal Replacement - No Protein Restr, No Conc K, No Conc Phos, Low Sodium (11-21-24 @ 12:33)

## 2024-11-22 NOTE — PROGRESS NOTE ADULT - PROBLEM SELECTOR PLAN 4
Pt with recently diagnosed gastric lymphoma, port placed prior to admission for planned chemo (appointment on Monday per pt). Follows Dr. Andrea  - CT Ab: Lymphadenopathy which appears increased when compared with 9/30/2024. Nodularity along posterior peritoneal reflections and minimal perihepatic fluid which were not seen previously.  - Microcytic anemia on labs  - F/U AM Iron studies   - hx beta thal minor Pt with recently diagnosed gastric lymphoma, port placed prior to admission for planned chemo (appointment on Monday per pt). Follows Dr. Andrea  - CT Ab: Lymphadenopathy which appears increased when compared with 9/30/2024. Nodularity along posterior peritoneal reflections and minimal perihepatic fluid which were not seen previously.  - Microcytic anemia on labs  - F/U AM Iron studies   - hx beta thal minor  Hematology -Dr lu called

## 2024-11-22 NOTE — PROGRESS NOTE ADULT - PROBLEM SELECTOR PLAN 3
Pt endorsing ZHOU while walking, new onset  - CT Chest:  Mild mottled density in the left mainstem bronchus may represent secretions. No focal consolidation or discrete mass is seen. Regions of atelectasis or scarring in the left upper lobe/lingula. Atelectatic changes at the lung bases  - Pt saturating well on RA  - Elevation in proBNP, can be 2/2 MARYSOL, less likely cardiac as pt does not appear clinically volume overloaded on examination and denies history of HF. No cardiomegaly on CT Chest  - Standing duonebs and hypertonic saline  - Encourage incentive spirometer use  - Aspiration precautions  - Monitor routine hemodynamics  - F/U TTE - ordered

## 2024-11-22 NOTE — DISCHARGE NOTE PROVIDER - PROVIDER TOKENS
PROVIDER:[TOKEN:[[id number]]] PROVIDER:[TOKEN:[[id number]]],PROVIDER:[TOKEN:[[id number]]] PROVIDER:[TOKEN:[[id number]]],PROVIDER:[TOKEN:[[id number]]],PROVIDER:[TOKEN:[[id number]]] PROVIDER:[TOKEN:[[id number]]],PROVIDER:[TOKEN:[[id number]]],PROVIDER:[TOKEN:[[id number]]],PROVIDER:[TOKEN:[[id number]]] PROVIDER:[TOKEN:[[id number]]],PROVIDER:[TOKEN:[[id number]]],PROVIDER:[TOKEN:[[id number]]],PROVIDER:[TOKEN:[[id number]]],PROVIDER:[TOKEN:[[id number]]]

## 2024-11-22 NOTE — DISCHARGE NOTE PROVIDER - CARE PROVIDERS DIRECT ADDRESSES
,DirectAddress_Unknown ,DirectAddress_Unknown,[email or web address] ,DirectAddress_Unknown,DirectAddress_Unknown,[email or web address] ,DirectAddress_Unknown,DirectAddress_Unknown,[email or web address],DirectAddress_Unknown ,DirectAddress_Unknown,DirectAddress_Unknown,[email or web address],DirectAddress_Unknown,DirectAddress_Unknown

## 2024-11-22 NOTE — PROGRESS NOTE ADULT - SUBJECTIVE AND OBJECTIVE BOX
Patient is a 64y old  Male who presents with a chief complaint of MARYSOL (22 Nov 2024 09:53)    HPI:  Pt is a 65yo M with PMHx recently diagnosed gastric lymphoma  S/P Biopsy of gastric mass & neck Lymph node  with LN spread presents to the ED as recommended by his oncologist Dr. Andrea for elevated Creatinine. Pt called by doctor for elevated Cr at 5.3.  Baseline 1.1, increasing to 1.5-->3.0--> 5.3 on outpatient labs. Pt had chemo port placed today, has not been drinking as much as he usually does. Per pt, urinated a small amount this AM, contributed it to decreased fluid intake. Pt endorsing mild flank pain, prescribed oxycodone by outpatient doctor. Pt also endorsing new onset dyspnea on exertion. Pt has been taking Advil for 5 weeks for Lower Back pain. Pt had placement of Medi port as out pt   today     Denies fever, chills, chest pain, palpitations, abdominal pain, nausea, vomiting, diarrhea, constipation, or dysuria    ED Course:   Vitals: BP: 160/90, HR: 74, Temp: 98F, RR: 18  Labs:  WBC 7.31, Hg 10, CO2 21, BUN/CR 58,5.3, Ca 8.3, eGFR 11, Mag 2.8, proBNP 593  UA: Pending  CT Chest: Mild mottled density in the left mainstem bronchus may represent secretions. No focal consolidation or discrete mass is seen. Regions of atelectasis or scarring in the left upper lobe/lingula. Atelectatic changes at the lung bases.  CT Abdomen/Pelvis: Lymphadenopathy which appears increased when compared with 9/30/2024. Nodularity along posterior peritoneal reflections and minimal perihepatic fluid which were not seen previously.    EKG: NSR rate 68bpm. QTc 408ms  Received in the ED: 500cc NS bolus x1   (20 Nov 2024 22:40)    INTERVAL HPI:  11/21: Pt seen and examined at bedside. Pt was making minimal urine output overnight but has voided significantly more since than. He has been taking Advil for the last month at the very minimum, with  max of 6 pills/day for R. low back pain. However, the pain is not present currently. No acute c/o at this time.   11/22: Pt seen and examined at bedside. 1.3L UOP in the last 24 hours. Pt reports feeling well, shortnedd of breath has improved a bit. No other concerns    OVERNIGHT EVENTS: None    Home Medications:      MEDICATIONS  (STANDING):  albuterol/ipratropium for Nebulization 3 milliLiter(s) Nebulizer every 12 hours  chlorhexidine 2% Cloths 1 Application(s) Topical daily  heparin   Injectable 5000 Unit(s) SubCutaneous every 8 hours  influenza   Vaccine 0.5 milliLiter(s) IntraMuscular once  melatonin 5 milliGRAM(s) Oral at bedtime  polyethylene glycol 3350 17 Gram(s) Oral daily  senna 2 Tablet(s) Oral at bedtime  sodium bicarbonate  Infusion 0.146 mEq/kG/Hr (100 mL/Hr) IV Continuous <Continuous>  sodium chloride 3%  Inhalation 4 milliLiter(s) Inhalation every 12 hours    MEDICATIONS  (PRN):  aluminum hydroxide/magnesium hydroxide/simethicone Suspension 30 milliLiter(s) Oral every 4 hours PRN Dyspepsia  ondansetron Injectable 4 milliGRAM(s) IV Push every 8 hours PRN Nausea and/or Vomiting  oxyCODONE    IR 5 milliGRAM(s) Oral every 6 hours PRN Severe Pain (7 - 10)  traMADol 25 milliGRAM(s) Oral every 12 hours PRN Mild Pain (1 - 3)  traMADol 50 milliGRAM(s) Oral every 12 hours PRN Moderate Pain (4 - 6)      Allergies    Bactrim DS (Hives)    Intolerances        Social History:  Lives at home with family  Recently retired  Active, ambulates independently and independent with ADLs (20 Nov 2024 22:40)      REVIEW OF SYSTEMS:  CONSTITUTIONAL: No fever, No chills, No fatigue, No myalgia, No Body ache, No Weakness  EYES: No eye pain,  No visual disturbances, No discharge, NO Redness  ENMT:  No ear pain, No nose bleed, No vertigo; No sinus pain, NO throat pain, No Congestion  NECK: No pain, No stiffness  RESPIRATORY: No cough, NO wheezing, No  hemoptysis, NO  shortness of breath  CARDIOVASCULAR: No chest pain, palpitations  GASTROINTESTINAL: No abdominal pain, NO epigastric pain. No nausea, No vomiting; No diarrhea, No constipation. [  ] BM  GENITOURINARY: No dysuria, No frequency, No urgency, No hematuria, NO incontinence  NEUROLOGICAL: No headaches, No dizziness, No numbness, No tingling, No tremors, No weakness  EXT: No Swelling, No Pain, No Edema  SKIN:  [ x ] No itching, burning, rashes, or lesions   MUSCULOSKELETAL: No joint pain ,No Jt swelling; No muscle pain, No back pain, No extremity pain  PSYCHIATRIC: No depression,  No anxiety,  No mood swings ,No difficulty sleeping at night  PAIN SCALE: [ x ] None  [  ] Other-  ROS Unable to obtain due to - [  ] Dementia  [  ] Lethargy [  ] Drowsy [  ] Sedated [  ] non verbal  REST OF REVIEW Of SYSTEM - [ x ] Normal     Vital Signs Last 24 Hrs  T(C): 37 (22 Nov 2024 04:45), Max: 37.4 (21 Nov 2024 13:55)  T(F): 98.6 (22 Nov 2024 04:45), Max: 99.3 (21 Nov 2024 13:55)  HR: 61 (22 Nov 2024 08:00) (59 - 69)  BP: 130/76 (22 Nov 2024 04:45) (125/68 - 140/85)  BP(mean): --  RR: 19 (22 Nov 2024 04:45) (19 - 20)  SpO2: 93% (22 Nov 2024 08:00) (91% - 93%)    Parameters below as of 22 Nov 2024 08:00  Patient On (Oxygen Delivery Method): room air      Finger Stick        11-21 @ 07:01  -  11-22 @ 07:00  --------------------------------------------------------  IN: 3655 mL / OUT: 1350 mL / NET: 2305 mL        PHYSICAL EXAM: medi port right chest wall  GENERAL:  [ x ] NAD , [ x ] well appearing, [  ] Agitated, [  ] Drowsy,  [  ] Lethargy, [  ] confused   HEAD:  [ x ] Normal, [  ] Other  EYES:  [ x ] EOMI, [  ] PERRLA, [ x ] conjunctiva and sclera clear normal, [  ] Other,  [  ] Pallor,[  ] Discharge  ENMT:  [ x ] Normal, [ x ] Moist mucous membranes, [  ] Good dentition, [  ] No Thrush  NECK:  [ x ] Supple, [x  ] No JVD, [  ] Normal thyroid, [  ] Lymphadenopathy [  ] Other  CHEST/LUNG:  [  x] Clear to auscultation bilaterally, [x  ] Breath Sounds equal B/L [  ] poor effort  [x  ] No rales, [ x ] No rhonchi  [  x]  No wheezing,   HEART:  [x  ] Regular rate and rhythm, [  ] tachycardia, [  ] Bradycardia,  [  ] irregular  [ x ] No murmurs, No rubs, No gallops, [  ] PPM in place (Mfr:  )  ABDOMEN:  [x  ] Soft, [x  ] Nontender, [ x ] Nondistended, [  ] No mass, [ x ] Bowel sounds present, [  ] obese  NERVOUS SYSTEM:  [ x ] Alert & Oriented X3, [ x ] Nonfocal  [  ] Confusion  [  ] Encephalopathic [  ] Sedated [  ] Unable to assess, [  ] Dementia [  ] Other-  EXTREMITIES: [ x ] 2+ Peripheral Pulses, No clubbing, No cyanosis,  [  ] edema B/L lower EXT. [  ] PVD stasis skin changes B/L Lower EXT, [  ] wound  LYMPH: No lymphadenopathy noted  SKIN:  [x  ] No rashes or lesions, [  ] Pressure Ulcers, [  ] ecchymosis, [  ] Skin Tears, [  ] Other    DIET: Diet, Renal Restrictions:   For patients receiving Renal Replacement - No Protein Restr, No Conc K, No Conc Phos, Low Sodium (11-21-24 @ 12:33)      LABS:                        9.7    8.26  )-----------( 274      ( 22 Nov 2024 07:05 )             29.3     22 Nov 2024 07:05    137    |  102    |  78     ----------------------------<  115    4.9     |  26     |  8.30     Ca    7.9        22 Nov 2024 07:05  Phos  6.7       22 Nov 2024 07:05  Mg     2.8       22 Nov 2024 07:05    TPro  6.2    /  Alb  2.8    /  TBili  0.3    /  DBili  x      /  AST  11     /  ALT  19     /  AlkPhos  80     22 Nov 2024 07:05      Urinalysis Basic - ( 22 Nov 2024 07:05 )    Color: x / Appearance: x / SG: x / pH: x  Gluc: 115 mg/dL / Ketone: x  / Bili: x / Urobili: x   Blood: x / Protein: x / Nitrite: x   Leuk Esterase: x / RBC: x / WBC x   Sq Epi: x / Non Sq Epi: x / Bacteria: x        Culture Results:   No growth (11-21 @ 07:30)      culture blood  -- Clean Catch 11-21 @ 07:30    culture urine  --  11-21 @ 07:30              Urinalysis with Rflx Culture (collected 21 Nov 2024 07:30)    Culture - Urine (collected 21 Nov 2024 07:30)  Source: Clean Catch  Final Report (22 Nov 2024 09:09):    No growth         Anemia Panel:      Thyroid Panel:  Thyroid Stimulating Hormone, Serum: 1.11 uIU/mL (11-20-24 @ 18:40)                RADIOLOGY & ADDITIONAL TESTS:      HEALTH ISSUES - PROBLEM Dx:  MARYSOL (acute kidney injury)    Gastric lymphoma    Need for prophylactic measure    Dyspnea    Hyperkalemia            Consultant(s) Notes Reviewed:  [ x ] YES     Care Discussed with [X] Consultants  [ x ] Patient  [  ] Family [  ] HCP [  ]   [  ] Social Service  [  ] RN, [  ] Physical Therapy,[  ] Palliative care team  DVT PPX: [  ] Lovenox, [ x ] S C Heparin, [  ] Coumadin, [  ] Xarelto, [  ] Eliquis, [  ] Pradaxa, [  ] IV Heparin drip, [  ] SCD [  ] Contraindication 2 to GI Bleed,[  ] Ambulation [  ] Contraindicated 2 to  bleed [  ] Contraindicated 2 to Brain Bleed  Advanced directive: [ x ] None, [  ] DNR/DNI Patient is a 64y old  Male who presents with a chief complaint of MARYSOL (22 Nov 2024 09:53)    HPI:  Pt is a 63yo M with PMHx recently diagnosed gastric lymphoma  S/P Biopsy of gastric mass & neck Lymph node  with LN spread presents to the ED as recommended by his oncologist Dr. Andrea for elevated Creatinine. Pt called by doctor for elevated Cr at 5.3.  Baseline 1.1, increasing to 1.5-->3.0--> 5.3 on outpatient labs. Pt had chemo port placed today, has not been drinking as much as he usually does. Per pt, urinated a small amount this AM, contributed it to decreased fluid intake. Pt endorsing mild flank pain, prescribed oxycodone by outpatient doctor. Pt also endorsing new onset dyspnea on exertion. Pt has been taking Advil for 5 weeks for Lower Back pain. Pt had placement of Medi port as out pt   today     Denies fever, chills, chest pain, palpitations, abdominal pain, nausea, vomiting, diarrhea, constipation, or dysuria    ED Course:   Vitals: BP: 160/90, HR: 74, Temp: 98F, RR: 18  Labs:  WBC 7.31, Hg 10, CO2 21, BUN/CR 58,5.3, Ca 8.3, eGFR 11, Mag 2.8, proBNP 593  UA: Pending  CT Chest: Mild mottled density in the left mainstem bronchus may represent secretions. No focal consolidation or discrete mass is seen. Regions of atelectasis or scarring in the left upper lobe/lingula. Atelectatic changes at the lung bases.  CT Abdomen/Pelvis: Lymphadenopathy which appears increased when compared with 9/30/2024. Nodularity along posterior peritoneal reflections and minimal perihepatic fluid which were not seen previously.    EKG: NSR rate 68bpm. QTc 408ms  Received in the ED: 500cc NS bolus x1   (20 Nov 2024 22:40)    INTERVAL HPI:  11/21: Pt seen and examined at bedside. Pt was making minimal urine output overnight but has voided significantly more since than. He has been taking Advil for the last month at the very minimum, with  max of 6 pills/day for R. low back pain. However, the pain is not present currently. No acute c/o at this time.   11/22: Pt seen and examined at bedside. 1.3L UOP in the last 24 hours. Pt reports feeling well, shortness  of breath has improved a bit. No other concerns worsening MARYSOL , K stable     OVERNIGHT EVENTS: None    Home Medications:      MEDICATIONS  (STANDING):  albuterol/ipratropium for Nebulization 3 milliLiter(s) Nebulizer every 12 hours  chlorhexidine 2% Cloths 1 Application(s) Topical daily  heparin   Injectable 5000 Unit(s) SubCutaneous every 8 hours  influenza   Vaccine 0.5 milliLiter(s) IntraMuscular once  melatonin 5 milliGRAM(s) Oral at bedtime  polyethylene glycol 3350 17 Gram(s) Oral daily  senna 2 Tablet(s) Oral at bedtime  sodium bicarbonate  Infusion 0.146 mEq/kG/Hr (100 mL/Hr) IV Continuous <Continuous>  sodium chloride 3%  Inhalation 4 milliLiter(s) Inhalation every 12 hours    MEDICATIONS  (PRN):  aluminum hydroxide/magnesium hydroxide/simethicone Suspension 30 milliLiter(s) Oral every 4 hours PRN Dyspepsia  ondansetron Injectable 4 milliGRAM(s) IV Push every 8 hours PRN Nausea and/or Vomiting  oxyCODONE    IR 5 milliGRAM(s) Oral every 6 hours PRN Severe Pain (7 - 10)  traMADol 25 milliGRAM(s) Oral every 12 hours PRN Mild Pain (1 - 3)  traMADol 50 milliGRAM(s) Oral every 12 hours PRN Moderate Pain (4 - 6)      Allergies    Bactrim DS (Hives)    Intolerances        Social History:  Lives at home with family  Recently retired  Active, ambulates independently and independent with ADLs (20 Nov 2024 22:40)      REVIEW OF SYSTEMS:  CONSTITUTIONAL: No fever, No chills, No fatigue, No myalgia, No Body ache, No Weakness  EYES: No eye pain,  No visual disturbances, No discharge, NO Redness  ENMT:  No ear pain, No nose bleed, No vertigo; No sinus pain, NO throat pain, No Congestion  NECK: No pain, No stiffness  RESPIRATORY: No cough, NO wheezing, No  hemoptysis, NO  shortness of breath  CARDIOVASCULAR: No chest pain, palpitations  GASTROINTESTINAL: No abdominal pain, NO epigastric pain. No nausea, No vomiting; No diarrhea, No constipation. [  ] BM  GENITOURINARY: No dysuria, No frequency, No urgency, No hematuria, NO incontinence  NEUROLOGICAL: No headaches, No dizziness, No numbness, No tingling, No tremors, No weakness  EXT: No Swelling, No Pain, No Edema  SKIN:  [ x ] No itching, burning, rashes, or lesions   MUSCULOSKELETAL: No joint pain ,No Jt swelling; No muscle pain, No back pain, No extremity pain  PSYCHIATRIC: No depression,  No anxiety,  No mood swings ,No difficulty sleeping at night  PAIN SCALE: [ x ] None  [  ] Other-  ROS Unable to obtain due to - [  ] Dementia  [  ] Lethargy [  ] Drowsy [  ] Sedated [  ] non verbal  REST OF REVIEW Of SYSTEM - [ x ] Normal     Vital Signs Last 24 Hrs  T(C): 37 (22 Nov 2024 04:45), Max: 37.4 (21 Nov 2024 13:55)  T(F): 98.6 (22 Nov 2024 04:45), Max: 99.3 (21 Nov 2024 13:55)  HR: 61 (22 Nov 2024 08:00) (59 - 69)  BP: 130/76 (22 Nov 2024 04:45) (125/68 - 140/85)  BP(mean): --  RR: 19 (22 Nov 2024 04:45) (19 - 20)  SpO2: 93% (22 Nov 2024 08:00) (91% - 93%)    Parameters below as of 22 Nov 2024 08:00  Patient On (Oxygen Delivery Method): room air      Finger Stick        11-21 @ 07:01  -  11-22 @ 07:00  --------------------------------------------------------  IN: 3655 mL / OUT: 1350 mL / NET: 2305 mL        PHYSICAL EXAM: medi port right chest wall  GENERAL:  [ x ] NAD , [ x ] well appearing, [  ] Agitated, [  ] Drowsy,  [  ] Lethargy, [  ] confused   HEAD:  [ x ] Normal, [  ] Other  EYES:  [ x ] EOMI, [  ] PERRLA, [ x ] conjunctiva and sclera clear normal, [  ] Other,  [  ] Pallor,[  ] Discharge  ENMT:  [ x ] Normal, [ x ] Moist mucous membranes, [  ] Good dentition, [  ] No Thrush  NECK:  [ x ] Supple, [x  ] No JVD, [  ] Normal thyroid, [  ] Lymphadenopathy [  ] Other  CHEST/LUNG:  [  x] Clear to auscultation bilaterally, [x  ] Breath Sounds equal B/L [  ] poor effort  [x  ] No rales, [ x ] No rhonchi  [  x]  No wheezing,   HEART:  [x  ] Regular rate and rhythm, [  ] tachycardia, [  ] Bradycardia,  [  ] irregular  [ x ] No murmurs, No rubs, No gallops, [  ] PPM in place (Mfr:  )  ABDOMEN:  [x  ] Soft, [x  ] Nontender, [ x ] Nondistended, [  ] No mass, [ x ] Bowel sounds present, [  ] obese  NERVOUS SYSTEM:  [ x ] Alert & Oriented X3, [ x ] Nonfocal  [  ] Confusion  [  ] Encephalopathic [  ] Sedated [  ] Unable to assess, [  ] Dementia [  ] Other-  EXTREMITIES: [ x ] 2+ Peripheral Pulses, No clubbing, No cyanosis,  [ x ] trace pitting  edema B/L lower EXT. [x  ] PVD stasis skin changes B/L Lower EXT, [  ] wound  LYMPH: No lymphadenopathy noted  SKIN:  [x  ] No rashes or lesions, [  ] Pressure Ulcers, [  ] ecchymosis, [  ] Skin Tears, [  ] Other    DIET: Diet, Renal Restrictions:   For patients receiving Renal Replacement - No Protein Restr, No Conc K, No Conc Phos, Low Sodium (11-21-24 @ 12:33)      LABS:                        9.7    8.26  )-----------( 274      ( 22 Nov 2024 07:05 )             29.3     22 Nov 2024 07:05    137    |  102    |  78     ----------------------------<  115    4.9     |  26     |  8.30     Ca    7.9        22 Nov 2024 07:05  Phos  6.7       22 Nov 2024 07:05  Mg     2.8       22 Nov 2024 07:05    TPro  6.2    /  Alb  2.8    /  TBili  0.3    /  DBili  x      /  AST  11     /  ALT  19     /  AlkPhos  80     22 Nov 2024 07:05      Urinalysis Basic - ( 22 Nov 2024 07:05 )    Color: x / Appearance: x / SG: x / pH: x  Gluc: 115 mg/dL / Ketone: x  / Bili: x / Urobili: x   Blood: x / Protein: x / Nitrite: x   Leuk Esterase: x / RBC: x / WBC x   Sq Epi: x / Non Sq Epi: x / Bacteria: x        Culture Results:   No growth (11-21 @ 07:30)      culture blood  -- Clean Catch 11-21 @ 07:30    culture urine  --  11-21 @ 07:30        Urinalysis with Rflx Culture (collected 21 Nov 2024 07:30)    Culture - Urine (collected 21 Nov 2024 07:30)  Source: Clean Catch  Final Report (22 Nov 2024 09:09):    No growth         Anemia Panel:      Thyroid Panel:  Thyroid Stimulating Hormone, Serum: 1.11 uIU/mL (11-20-24 @ 18:40)        RADIOLOGY & ADDITIONAL TESTS:      HEALTH ISSUES - PROBLEM Dx:  MARYSOL (acute kidney injury)    Gastric lymphoma    Need for prophylactic measure    Dyspnea    Hyperkalemia            Consultant(s) Notes Reviewed:  [ x ] YES     Care Discussed with [X] Consultants  [ x ] Patient  [  ] Family [  ] HCP [  ]   [  ] Social Service  [x  ] RN, [  ] Physical Therapy,[  ] Palliative care team  DVT PPX: [  ] Lovenox, [ x ] S C Heparin, [  ] Coumadin, [  ] Xarelto, [  ] Eliquis, [  ] Pradaxa, [  ] IV Heparin drip, [  ] SCD [  ] Contraindication 2 to GI Bleed,[  ] Ambulation [  ] Contraindicated 2 to  bleed [  ] Contraindicated 2 to Brain Bleed  Advanced directive: [ x ] None, [  ] DNR/DNI

## 2024-11-22 NOTE — DIETITIAN INITIAL EVALUATION ADULT - PROBLEM SELECTOR PLAN 2
Pt endorsing ZHOU while walking, new onset  - CT Chest:  Mild mottled density in the left mainstem bronchus may represent secretions. No focal consolidation or discrete mass is seen. Regions of atelectasis or scarring in the left upper lobe/lingula. Atelectatic changes at the lung bases  - Pt saturating well on RA  - Elevation in proBNP, can be 2/2 MARYSOL, less likely cardiac as pt does not appear clinically volume overloaded on examination and denies history of HF. No cardiomegaly on CT Chest  - Standing duonebs and hypertonic saline  - Encourage incentive spirometer use  - Aspiration precautions  - Monitor routine hemodynamics  TTE

## 2024-11-22 NOTE — PROGRESS NOTE ADULT - SUBJECTIVE AND OBJECTIVE BOX
[facility]                             Nephrology and Hypertension                             Gustavo Oscar                                          [phone]     Patient is a 64y old  Male who presents with a chief complaint of MARYSOL (2024 14:23)     HPI:  Pt is a 65yo M with PMHx recently diagnosed gastric lymphoma  S/P Biopsy of gastric mass & neck Lymph node  with LN spread presents to the ED as recommended by his oncologist Dr. Andrea for elevated Creatinine. Pt called by doctor for elevated Cr at 5.3.  Baseline 1.1, increasing to 1.5-->3.0--> 5.3 on outpatient labs. Pt had chemo port placed today, has not been drinking as much as he usually does. Per pt, urinated a small amount this AM, contributed it to decreased fluid intake. Pt endorsing mild flank pain, prescribed oxycodone by outpatient doctor. Pt also endorsing new onset dyspnea on exertion. Pt has been taking Advil for 5 weeks for Lower Back pain. Pt had placement of Medi port as out pt today   Nephrology is c/s for Acute kidney injury. When I saw him, he is very anxious. He states he had been taking 6 tabs of advil every day for a few weeks for pain. He also endorsed not eating as much. Per discussion with RN frandy was not draining much overnight but over last few hours, has better uop. On labs review, Serum Creat was 1.1 in 2024, was 5.3 on admission, is >6 on repeat labs early this am with hyperkalemia. Was given lokelma . He has no sob, no dizziness, no N/V, no new pain.       Has some decreased appetite, but says he feels a bit depressed with everything going on. NO N/V/SOB       PAST MEDICAL & SURGICAL HISTORY:  Incisional hernia    Gastric lymphoma  Anemia of chronic disease  S/P appendectomy  2014  S/P cholecystectomy  15 years ago  FAMILY HISTORY:  Family history of coronary artery disease in father  Father -  age 60 following MI    Social History:Non smoker    MEDICATIONS  (STANDING):  albuterol/ipratropium for Nebulization 3 milliLiter(s) Nebulizer every 12 hours  chlorhexidine 2% Cloths 1 Application(s) Topical daily  heparin   Injectable 5000 Unit(s) SubCutaneous every 8 hours  influenza   Vaccine 0.5 milliLiter(s) IntraMuscular once  melatonin 5 milliGRAM(s) Oral at bedtime  polyethylene glycol 3350 17 Gram(s) Oral daily  senna 2 Tablet(s) Oral at bedtime  sodium bicarbonate  Infusion 0.146 mEq/kG/Hr (100 mL/Hr) IV Continuous <Continuous>  sodium chloride 3%  Inhalation 4 milliLiter(s) Inhalation every 12 hours  sodium zirconium cyclosilicate 10 Gram(s) Oral every 8 hours    MEDICATIONS  (PRN):  aluminum hydroxide/magnesium hydroxide/simethicone Suspension 30 milliLiter(s) Oral every 4 hours PRN Dyspepsia  ondansetron Injectable 4 milliGRAM(s) IV Push every 8 hours PRN Nausea and/or Vomiting  oxyCODONE    IR 5 milliGRAM(s) Oral every 6 hours PRN Severe Pain (7 - 10)  traMADol 25 milliGRAM(s) Oral every 12 hours PRN Mild Pain (1 - 3)  traMADol 50 milliGRAM(s) Oral every 12 hours PRN Moderate Pain (4 - 6)   Meds reviewed    Allergies    Bactrim DS (Hives)    Intolerances         REVIEW OF SYSTEMS: as per HPI    ICU Vital Signs Last 24 Hrs  T(C): 37 (2024 04:45), Max: 37.4 (2024 13:55)  T(F): 98.6 (2024 04:45), Max: 99.3 (2024 13:55)  HR: 61 (2024 08:00) (59 - 69)  BP: 130/76 (2024 04:45) (125/68 - 140/85)  BP(mean): --  ABP: --  ABP(mean): --  RR: 19 (2024 04:45) (19 - 20)  SpO2: 93% (2024 08:00) (91% - 93%)    O2 Parameters below as of 2024 08:00  Patient On (Oxygen Delivery Method): room air            PHYSICAL EXAM:    GENERAL: NAD, obese, anxious  HEAD:  Atraumatic, Normocephalic  EYES: EOMI, conjunctiva and sclera clear  ENMT: No Drainage from nares, No drainage from ears  NERVOUS SYSTEM:  Awake and Alert  CHEST/LUNG: Clear to percussion bilaterally, RCW port  EXTREMITIES:  no edema  SKIN: No rashes No obvious ecchymosis  : wise noted       LABS:                                   9.7    8.26  )-----------( 274      ( 2024 07:05 )             29.3         137  |  102  |  78[H]  ----------------------------<  115[H]  4.9   |  26  |  8.30[H]    Ca    7.9[L]      2024 07:05  Phos  6.7       Mg     2.8         TPro  6.2  /  Alb  2.8[L]  /  TBili  0.3  /  DBili  x   /  AST  11[L]  /  ALT  19  /  AlkPhos  80        Urinalysis Basic - ( 2024 07:05 )    Color: x / Appearance: x / SG: x / pH: x  Gluc: 115 mg/dL / Ketone: x  / Bili: x / Urobili: x   Blood: x / Protein: x / Nitrite: x   Leuk Esterase: x / RBC: x / WBC x   Sq Epi: x / Non Sq Epi: x / Bacteria: x                  RADIOLOGY & ADDITIONAL TESTS:

## 2024-11-23 ENCOUNTER — RESULT REVIEW (OUTPATIENT)
Age: 64
End: 2024-11-23

## 2024-11-23 LAB
ALBUMIN SERPL ELPH-MCNC: 2.7 G/DL — LOW (ref 3.3–5)
ALP SERPL-CCNC: 81 U/L — SIGNIFICANT CHANGE UP (ref 40–120)
ALT FLD-CCNC: 14 U/L — SIGNIFICANT CHANGE UP (ref 12–78)
ANION GAP SERPL CALC-SCNC: 13 MMOL/L — SIGNIFICANT CHANGE UP (ref 5–17)
AST SERPL-CCNC: 25 U/L — SIGNIFICANT CHANGE UP (ref 15–37)
BASOPHILS # BLD AUTO: 0.05 K/UL — SIGNIFICANT CHANGE UP (ref 0–0.2)
BASOPHILS NFR BLD AUTO: 0.6 % — SIGNIFICANT CHANGE UP (ref 0–2)
BILIRUB SERPL-MCNC: 0.4 MG/DL — SIGNIFICANT CHANGE UP (ref 0.2–1.2)
BUN SERPL-MCNC: 76 MG/DL — HIGH (ref 7–23)
CALCIUM SERPL-MCNC: 8 MG/DL — LOW (ref 8.5–10.1)
CHLORIDE SERPL-SCNC: 99 MMOL/L — SIGNIFICANT CHANGE UP (ref 96–108)
CO2 SERPL-SCNC: 24 MMOL/L — SIGNIFICANT CHANGE UP (ref 22–31)
CREAT SERPL-MCNC: 8.9 MG/DL — HIGH (ref 0.5–1.3)
EGFR: 6 ML/MIN/1.73M2 — LOW
EOSINOPHIL # BLD AUTO: 0.13 K/UL — SIGNIFICANT CHANGE UP (ref 0–0.5)
EOSINOPHIL NFR BLD AUTO: 1.5 % — SIGNIFICANT CHANGE UP (ref 0–6)
GLUCOSE SERPL-MCNC: 106 MG/DL — HIGH (ref 70–99)
HCT VFR BLD CALC: 29.8 % — LOW (ref 39–50)
HGB BLD-MCNC: 9.9 G/DL — LOW (ref 13–17)
IMM GRANULOCYTES NFR BLD AUTO: 0.6 % — SIGNIFICANT CHANGE UP (ref 0–0.9)
LYMPHOCYTES # BLD AUTO: 0.78 K/UL — LOW (ref 1–3.3)
LYMPHOCYTES # BLD AUTO: 9.1 % — LOW (ref 13–44)
MAGNESIUM SERPL-MCNC: 2.7 MG/DL — HIGH (ref 1.6–2.6)
MCHC RBC-ENTMCNC: 19.8 PG — LOW (ref 27–34)
MCHC RBC-ENTMCNC: 33.2 G/DL — SIGNIFICANT CHANGE UP (ref 32–36)
MCV RBC AUTO: 59.6 FL — LOW (ref 80–100)
MONOCYTES # BLD AUTO: 0.95 K/UL — HIGH (ref 0–0.9)
MONOCYTES NFR BLD AUTO: 11.1 % — SIGNIFICANT CHANGE UP (ref 2–14)
NEUTROPHILS # BLD AUTO: 6.61 K/UL — SIGNIFICANT CHANGE UP (ref 1.8–7.4)
NEUTROPHILS NFR BLD AUTO: 77.1 % — HIGH (ref 43–77)
NRBC # BLD: 0 /100 WBCS — SIGNIFICANT CHANGE UP (ref 0–0)
PHOSPHATE SERPL-MCNC: 7.5 MG/DL — HIGH (ref 2.5–4.5)
PLATELET # BLD AUTO: 297 K/UL — SIGNIFICANT CHANGE UP (ref 150–400)
POTASSIUM SERPL-MCNC: 5.3 MMOL/L — SIGNIFICANT CHANGE UP (ref 3.5–5.3)
POTASSIUM SERPL-SCNC: 5.3 MMOL/L — SIGNIFICANT CHANGE UP (ref 3.5–5.3)
PROT SERPL-MCNC: 6.5 G/DL — SIGNIFICANT CHANGE UP (ref 6–8.3)
RBC # BLD: 5 M/UL — SIGNIFICANT CHANGE UP (ref 4.2–5.8)
RBC # FLD: 15.9 % — HIGH (ref 10.3–14.5)
SODIUM SERPL-SCNC: 136 MMOL/L — SIGNIFICANT CHANGE UP (ref 135–145)
WBC # BLD: 8.57 K/UL — SIGNIFICANT CHANGE UP (ref 3.8–10.5)
WBC # FLD AUTO: 8.57 K/UL — SIGNIFICANT CHANGE UP (ref 3.8–10.5)

## 2024-11-23 PROCEDURE — 93306 TTE W/DOPPLER COMPLETE: CPT | Mod: 26

## 2024-11-23 RX ORDER — 0.9 % SODIUM CHLORIDE 0.9 %
1000 INTRAVENOUS SOLUTION INTRAVENOUS
Refills: 0 | Status: DISCONTINUED | OUTPATIENT
Start: 2024-11-23 | End: 2024-11-24

## 2024-11-23 RX ADMIN — Medication 2 TABLET(S): at 21:13

## 2024-11-23 RX ADMIN — CHLORHEXIDINE GLUCONATE 1 APPLICATION(S): 1.2 RINSE ORAL at 05:26

## 2024-11-23 RX ADMIN — Medication 75 MILLILITER(S): at 21:39

## 2024-11-23 RX ADMIN — Medication 75 MILLILITER(S): at 10:55

## 2024-11-23 RX ADMIN — SODIUM CHLORIDE 4 MILLILITER(S): 9 INJECTION, SOLUTION INTRAMUSCULAR; INTRAVENOUS; SUBCUTANEOUS at 08:17

## 2024-11-23 RX ADMIN — IPRATROPIUM BROMIDE AND ALBUTEROL SULFATE 3 MILLILITER(S): 2.5; .5 SOLUTION RESPIRATORY (INHALATION) at 08:17

## 2024-11-23 RX ADMIN — Medication 5000 UNIT(S): at 21:13

## 2024-11-23 RX ADMIN — IPRATROPIUM BROMIDE AND ALBUTEROL SULFATE 3 MILLILITER(S): 2.5; .5 SOLUTION RESPIRATORY (INHALATION) at 20:35

## 2024-11-23 RX ADMIN — OXYCODONE HYDROCHLORIDE 5 MILLIGRAM(S): 30 TABLET ORAL at 15:31

## 2024-11-23 RX ADMIN — SODIUM BICARBONATE 100 MEQ/KG/HR: 84 INJECTION, SOLUTION INTRAVENOUS at 08:24

## 2024-11-23 RX ADMIN — OXYCODONE HYDROCHLORIDE 5 MILLIGRAM(S): 30 TABLET ORAL at 16:30

## 2024-11-23 RX ADMIN — OXYCODONE HYDROCHLORIDE 5 MILLIGRAM(S): 30 TABLET ORAL at 22:39

## 2024-11-23 RX ADMIN — ACETAMINOPHEN, DIPHENHYDRAMINE HCL, PHENYLEPHRINE HCL 5 MILLIGRAM(S): 325; 25; 5 TABLET ORAL at 21:13

## 2024-11-23 RX ADMIN — Medication 5000 UNIT(S): at 05:25

## 2024-11-23 RX ADMIN — Medication 5000 UNIT(S): at 14:29

## 2024-11-23 RX ADMIN — OXYCODONE HYDROCHLORIDE 5 MILLIGRAM(S): 30 TABLET ORAL at 21:39

## 2024-11-23 RX ADMIN — SODIUM CHLORIDE 4 MILLILITER(S): 9 INJECTION, SOLUTION INTRAMUSCULAR; INTRAVENOUS; SUBCUTANEOUS at 20:35

## 2024-11-23 NOTE — PROGRESS NOTE ADULT - PROBLEM SELECTOR PLAN 3
Pt endorsing ZHOU while walking, new onset  - CT Chest:  Mild mottled density in the left mainstem bronchus may represent secretions. No focal consolidation or discrete mass is seen. Regions of atelectasis or scarring in the left upper lobe/lingula. Atelectatic changes at the lung bases  - Pt saturating well on RA  - Elevation in proBNP, can be 2/2 MARYSOL, less likely cardiac as pt does not appear clinically volume overloaded on examination and denies history of HF. No cardiomegaly on CT Chest  - Standing Duoneb and hypertonic saline  - Encourage incentive spirometer use  - Aspiration precautions  - Monitor routine hemodynamics  - F/U TTE - done, NL EF

## 2024-11-23 NOTE — PROGRESS NOTE ADULT - PROBLEM SELECTOR PLAN 4
Pt with recently diagnosed gastric lymphoma, port placed prior to admission for planned chemo (appointment on Monday per pt). Follows Dr. Andrea  - CT Ab: Lymphadenopathy which appears increased when compared with 9/30/2024. Nodularity along posterior peritoneal reflections and minimal perihepatic fluid which were not seen previously.  - Microcytic anemia on labs  - F/U AM Iron studies   - hx beta thal minor  Hematology -Dr lu follow up

## 2024-11-23 NOTE — PROGRESS NOTE ADULT - ASSESSMENT
Acute kidney injury, with ATN  Anemia  Hyperkalemia  Metabolic acidosis  Urinary retention      -MARYSOL likely due to ATN from NSAID use and poor po intake, has normal renal function at baseline  -Likely some retention, has wise  -SCr 1.1 at baseline with normal electrolytes  -Change to renal diet restrictions  -Iron panel with am labs for anemia  -No HD indications at this time, He still has adequate urine output, but discussed with him about worsening renal function and dialysis and risks and benefits of HD discussed upto and including death discussed. Informed consent obtained and he agrees to HD if necessary  -Advised patient to avoid all nsaids   -DC bicarb drip, start LR  -Lokelma 10 grams po x 1     D/w RN  d/w family

## 2024-11-23 NOTE — PROGRESS NOTE ADULT - PROBLEM SELECTOR PLAN 2
Pt with elevated electrolytes likely in setting of [place]- RESOLVED  - K improved s/p Lokelma  - Mag/Phos remain elevated  - will check BMP @ 1500 to ensure K remains NWL  - Nutritional eval  - Renal diet

## 2024-11-23 NOTE — PROGRESS NOTE ADULT - PROBLEM SELECTOR PLAN 1
Pt sent in to ED for elevated Cr (Baseline 1.1, increasing to 1.5-->3.0), endorsing decreased urination and flank pain  - Pt c/o flank pain (L), per chart review, also was endorsing pain during admission at [facility], prior to elevated Cr  - Cr 5.3 on admission, pt with prolonged NSAID use.   - FeNA 3.4, likely ATN from progression of prerenal MARYSOL MA   - s/p wise catheter placement in ED with minimal output  - CT Abdomen: Collapsed bladder. Bilateral renal cysts, no hydro. No obstruction.  - Creatinine continues to rise however good UOP; K elevated yesterday now s/p LOKELMA with improvement in K  - Metabolic Acidosis,   - No urgent indication for HD at this time as pt with good UOP - has been consented and agrees to HD if needed as per nephro  - continue sodium bicarb gtt 150cc/hr for metabolic acidosis---> change to LR   - tramadol 25mg PRN mild pain, tramadol 50mg for moderate pain, oxycodone 5mg for severe pain   - Strict Is&Os q6h  - Monitor volume status  - Avoid nephrotoxic medications, Renal dose meds   - Renal diet  - Daily CMP  - Nephrology (Dr. Cantu) following, recs appreciated d/w - pt may need HD tomorrow

## 2024-11-23 NOTE — PROGRESS NOTE ADULT - SUBJECTIVE AND OBJECTIVE BOX
All interim records and events noted.    up in chair, reports feeling improved  cont to urinate well      MEDICATIONS  (STANDING):  albuterol/ipratropium for Nebulization 3 milliLiter(s) Nebulizer every 12 hours  chlorhexidine 2% Cloths 1 Application(s) Topical daily  heparin   Injectable 5000 Unit(s) SubCutaneous every 8 hours  influenza   Vaccine 0.5 milliLiter(s) IntraMuscular once  lactated ringers. 1000 milliLiter(s) (75 mL/Hr) IV Continuous <Continuous>  melatonin 5 milliGRAM(s) Oral at bedtime  polyethylene glycol 3350 17 Gram(s) Oral daily  senna 2 Tablet(s) Oral at bedtime  sodium chloride 3%  Inhalation 4 milliLiter(s) Inhalation every 12 hours    MEDICATIONS  (PRN):  aluminum hydroxide/magnesium hydroxide/simethicone Suspension 30 milliLiter(s) Oral every 4 hours PRN Dyspepsia  ondansetron Injectable 4 milliGRAM(s) IV Push every 8 hours PRN Nausea and/or Vomiting  oxyCODONE    IR 5 milliGRAM(s) Oral every 6 hours PRN Severe Pain (7 - 10)  traMADol 25 milliGRAM(s) Oral every 12 hours PRN Mild Pain (1 - 3)  traMADol 50 milliGRAM(s) Oral every 12 hours PRN Moderate Pain (4 - 6)      Vital Signs Last 24 Hrs  T(C): 36.7 (23 Nov 2024 11:32), Max: 37.3 (23 Nov 2024 05:47)  T(F): 98 (23 Nov 2024 11:32), Max: 99.1 (23 Nov 2024 05:47)  HR: 74 (23 Nov 2024 11:32) (66 - 83)  BP: 142/72 (23 Nov 2024 11:32) (133/70 - 167/87)  BP(mean): --  RR: 18 (23 Nov 2024 11:32) (18 - 20)  SpO2: 93% (23 Nov 2024 11:32) (85% - 94%)    Parameters below as of 23 Nov 2024 11:32  Patient On (Oxygen Delivery Method): room air        PHYSICAL EXAM  General: in no acute distress  Head: atraumatic, normocephalic  ENT: sclera anicteric, buccal mucosa moist  Neck: supple, trachea midline  CV: S1 S2, regular rate and rhythm  Lungs: clear to auscultation, no wheezes/rhonchi  Abdomen: distended but nontender to palp, bowel sounds normal  Extrem: no clubbing/cyanosis/edema  Skin: no significant increased ecchymosis/petechiae  Neuro: alert and oriented X3,  no focal deficits      LABS:             9.9    8.57  )-----------( 297      ( 11-23 @ 10:50 )             29.8                9.7    8.26  )-----------( 274      ( 11-22 @ 07:05 )             29.3                10.0   10.12 )-----------( 298      ( 11-21 @ 06:48 )             30.9                10.0   7.31  )-----------( 282      ( 11-20 @ 18:40 )             31.1       11-23    136  |  99  |  76[H]  ----------------------------<  106[H]  5.3   |  24  |  8.90[H]    Ca    8.0[L]      23 Nov 2024 07:40  Phos  7.5     11-23  Mg     2.7     11-23    TPro  6.5  /  Alb  2.7[L]  /  TBili  0.4  /  DBili  x   /  AST  25  /  ALT  14  /  AlkPhos  81  11-23        RADIOLOGY & ADDITIONAL STUDIES:    IMPRESSION/RECOMMENDATIONS:

## 2024-11-23 NOTE — PROGRESS NOTE ADULT - ASSESSMENT
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 LN/mesenteric LN involvements(under care Dr Mariana Fung [facility]), adm w rapidly progressive MARYSOL, Cr 5s on adm was 1.1 9/30/24 and 3 w Dr Fung Tuesday 11/19  Pt had first presented w flank pain 9/2024 thought renal stone, but 9/30/24 CT renal stone hunt show sig mesenteric/retroperitoneal LADs up to 3.9cm), tx from [facility] for ?endoscopic bx(pt reports had "stomach bx", but path report in computer 9/30/24 perihepatic LN FNA c/w met poorly diff ca), Pt subsequent had additional left neck LN bx  First chemo FOLFOX w Dr Fung was planned for Monday 11/25/24   Hgb stable since adm at 10  CT c/a/p--no sig above diaphragm findings, sig mesenteric/retroperitoneal LADs up to 4.6cm had been up to 3.9cm on 9/30/24 CT renal hunt. No hydronephrsis, andreas renal cysts seen  Seen by Renal, etiology of MARYSOL ?NSAIDs effect    -Cr continue to worsen since adm now appear rel stabilized at 8s since yesterday, continue urinating well  -LDH 300s and Uric Acid 8s(Uric Acid similar w Dr Fung), doubt contributes to the MARYSOL  -Hgb remains stable today  -continue present management    discussed w pt  -

## 2024-11-23 NOTE — PROGRESS NOTE ADULT - SUBJECTIVE AND OBJECTIVE BOX
[facility]                             Nephrology and Hypertension                             Gustavo Oscar                                          [phone]     Patient is a 64y old  Male who presents with a chief complaint of MARYSOL (2024 14:23)     HPI:  Pt is a 63yo M with PMHx recently diagnosed gastric lymphoma  S/P Biopsy of gastric mass & neck Lymph node  with LN spread presents to the ED as recommended by his oncologist Dr. Andrea for elevated Creatinine. Pt called by doctor for elevated Cr at 5.3.  Baseline 1.1, increasing to 1.5-->3.0--> 5.3 on outpatient labs. Pt had chemo port placed today, has not been drinking as much as he usually does. Per pt, urinated a small amount this AM, contributed it to decreased fluid intake. Pt endorsing mild flank pain, prescribed oxycodone by outpatient doctor. Pt also endorsing new onset dyspnea on exertion. Pt has been taking Advil for 5 weeks for Lower Back pain. Pt had placement of Medi port as out pt today   Nephrology is c/s for Acute kidney injury. When I saw him, he is very anxious. He states he had been taking 6 tabs of advil every day for a few weeks for pain. He also endorsed not eating as much. Per discussion with RN frandy was not draining much overnight but over last few hours, has better uop. On labs review, Serum Creat was 1.1 in 2024, was 5.3 on admission, is >6 on repeat labs early this am with hyperkalemia. Was given lokelma . He has no sob, no dizziness, no N/V, no new pain.       Has some decreased appetite, but says he feels a bit depressed with everything going on. NO N/V/SOB       PAST MEDICAL & SURGICAL HISTORY:  Incisional hernia    Gastric lymphoma  Anemia of chronic disease  S/P appendectomy  2014  S/P cholecystectomy  15 years ago  FAMILY HISTORY:  Family history of coronary artery disease in father  Father -  age 60 following MI    Social History:Non smoker    MEDICATIONS  (STANDING):  albuterol/ipratropium for Nebulization 3 milliLiter(s) Nebulizer every 12 hours  chlorhexidine 2% Cloths 1 Application(s) Topical daily  heparin   Injectable 5000 Unit(s) SubCutaneous every 8 hours  influenza   Vaccine 0.5 milliLiter(s) IntraMuscular once  melatonin 5 milliGRAM(s) Oral at bedtime  polyethylene glycol 3350 17 Gram(s) Oral daily  senna 2 Tablet(s) Oral at bedtime  sodium bicarbonate  Infusion 0.146 mEq/kG/Hr (100 mL/Hr) IV Continuous <Continuous>  sodium chloride 3%  Inhalation 4 milliLiter(s) Inhalation every 12 hours  sodium zirconium cyclosilicate 10 Gram(s) Oral every 8 hours    MEDICATIONS  (PRN):  aluminum hydroxide/magnesium hydroxide/simethicone Suspension 30 milliLiter(s) Oral every 4 hours PRN Dyspepsia  ondansetron Injectable 4 milliGRAM(s) IV Push every 8 hours PRN Nausea and/or Vomiting  oxyCODONE    IR 5 milliGRAM(s) Oral every 6 hours PRN Severe Pain (7 - 10)  traMADol 25 milliGRAM(s) Oral every 12 hours PRN Mild Pain (1 - 3)  traMADol 50 milliGRAM(s) Oral every 12 hours PRN Moderate Pain (4 - 6)   Meds reviewed    Allergies    Bactrim DS (Hives)    Intolerances         REVIEW OF SYSTEMS: as per HPI      Vital Signs Last 24 Hrs  T(C): 37.3 (2024 05:47), Max: 37.3 (2024 05:47)  T(F): 99.1 (2024 05:47), Max: 99.1 (2024 05:47)  HR: 80 (2024 08:20) (66 - 83)  BP: 167/87 (2024 05:47) (133/70 - 167/87)  BP(mean): --  RR: 20 (2024 05:47) (20 - 20)  SpO2: 94% (2024 08:20) (85% - 94%)    Parameters below as of 2024 05:47  Patient On (Oxygen Delivery Method): room air      PHYSICAL EXAM:    GENERAL: NAD, obese, anxious  HEAD:  Atraumatic, Normocephalic  EYES: EOMI, conjunctiva and sclera clear  ENMT: No Drainage from nares, No drainage from ears  NERVOUS SYSTEM:  Awake and Alert  CHEST/LUNG: Clear to percussion bilaterally, RCW port  EXTREMITIES:  no edema  SKIN: No rashes No obvious ecchymosis  : wise noted       LABS:                        9.7    8.26  )-----------( 274      ( 2024 07:05 )             29.3         136  |  99  |  76[H]  ----------------------------<  106[H]  5.3   |  24  |  8.90[H]    Ca    8.0[L]      2024 07:40  Phos  7.5       Mg     2.7         TPro  6.5  /  Alb  2.7[L]  /  TBili  0.4  /  DBili  x   /  AST  25  /  ALT  14  /  AlkPhos  81        Urinalysis Basic - ( 2024 07:40 )    Color: x / Appearance: x / SG: x / pH: x  Gluc: 106 mg/dL / Ketone: x  / Bili: x / Urobili: x   Blood: x / Protein: x / Nitrite: x   Leuk Esterase: x / RBC: x / WBC x   Sq Epi: x / Non Sq Epi: x / Bacteria: x

## 2024-11-23 NOTE — PROGRESS NOTE ADULT - SUBJECTIVE AND OBJECTIVE BOX
Patient is a 64y old  Male who presents with a chief complaint of MARYSOL (23 Nov 2024 12:48)    HPI:  Pt is a 63yo M with PMHx recently diagnosed gastric lymphoma  S/P Biopsy of gastric mass & neck Lymph node  with LN spread presents to the ED as recommended by his oncologist Dr. Andrea for elevated Creatinine. Pt called by doctor for elevated Cr at 5.3.  Baseline 1.1, increasing to 1.5-->3.0--> 5.3 on outpatient labs. Pt had chemo port placed today, has not been drinking as much as he usually does. Per pt, urinated a small amount this AM, contributed it to decreased fluid intake. Pt endorsing mild flank pain, prescribed oxycodone by outpatient doctor. Pt also endorsing new onset dyspnea on exertion. Pt has been taking Advil for 5 weeks for Lower Back pain. Pt had placement of Medi port as out pt   today     Denies fever, chills, chest pain, palpitations, abdominal pain, nausea, vomiting, diarrhea, constipation, or dysuria    ED Course:   Vitals: BP: 160/90, HR: 74, Temp: 98F, RR: 18  Labs:  WBC 7.31, Hg 10, CO2 21, BUN/CR 58,5.3, Ca 8.3, eGFR 11, Mag 2.8, proBNP 593  UA: Pending  CT Chest: Mild mottled density in the left mainstem bronchus may represent secretions. No focal consolidation or discrete mass is seen. Regions of atelectasis or scarring in the left upper lobe/lingula. Atelectatic changes at the lung bases.  CT Abdomen/Pelvis: Lymphadenopathy which appears increased when compared with 9/30/2024. Nodularity along posterior peritoneal reflections and minimal perihepatic fluid which were not seen previously.    EKG: NSR rate 68bpm. QTc 408ms  Received in the ED: 500cc NS bolus x1   (20 Nov 2024 22:40)      INTERVAL HPI:  11/21: Pt seen and examined at bedside. Pt was making minimal urine output overnight but has voided significantly more since than. He has been taking Advil for the last month at the very minimum, with  max of 6 pills/day for R. low back pain. However, the pain is not present currently. No acute c/o at this time.   11/22: Pt seen and examined at bedside. 1.3L UOP in the last 24 hours. Pt reports feeling well, shortness  of breath has improved a bit. No other concerns worsening MARYSOL , K stable   11/23:pt seen, examined, Cr increasing, Low h/h stable, High electrolytes.        OVERNIGHT EVENTS: None    Home Medications:      MEDICATIONS  (STANDING):  albuterol/ipratropium for Nebulization 3 milliLiter(s) Nebulizer every 12 hours  chlorhexidine 2% Cloths 1 Application(s) Topical daily  heparin   Injectable 5000 Unit(s) SubCutaneous every 8 hours  influenza   Vaccine 0.5 milliLiter(s) IntraMuscular once  lactated ringers. 1000 milliLiter(s) (75 mL/Hr) IV Continuous <Continuous>  melatonin 5 milliGRAM(s) Oral at bedtime  polyethylene glycol 3350 17 Gram(s) Oral daily  senna 2 Tablet(s) Oral at bedtime  sodium chloride 3%  Inhalation 4 milliLiter(s) Inhalation every 12 hours    MEDICATIONS  (PRN):  aluminum hydroxide/magnesium hydroxide/simethicone Suspension 30 milliLiter(s) Oral every 4 hours PRN Dyspepsia  ondansetron Injectable 4 milliGRAM(s) IV Push every 8 hours PRN Nausea and/or Vomiting  oxyCODONE    IR 5 milliGRAM(s) Oral every 6 hours PRN Severe Pain (7 - 10)  traMADol 25 milliGRAM(s) Oral every 12 hours PRN Mild Pain (1 - 3)  traMADol 50 milliGRAM(s) Oral every 12 hours PRN Moderate Pain (4 - 6)      Allergies    Bactrim DS (Hives)    Intolerances        Social History:  Lives at home with family  Recently retired  Active, ambulates independently and independent with ADLs (20 Nov 2024 22:40)      REVIEW OF SYSTEMS: i am ok, appropriate urine out put  CONSTITUTIONAL: No fever, No chills, No fatigue, No myalgia, No Body ache, No Weakness  EYES: No eye pain,  No visual disturbances, No discharge, NO Redness  ENMT:  No ear pain, No nose bleed, No vertigo; No sinus pain, NO throat pain, No Congestion  NECK: No pain, No stiffness  RESPIRATORY: No cough, NO wheezing, No  hemoptysis, NO  shortness of breath  CARDIOVASCULAR: No chest pain, palpitations  GASTROINTESTINAL: No abdominal pain, NO epigastric pain. No nausea, No vomiting; No diarrhea, No constipation. [ x ] BM  GENITOURINARY: No dysuria, No frequency, No urgency, No hematuria, NO incontinence  NEUROLOGICAL: No headaches, No dizziness, No numbness, No tingling, No tremors, No weakness  EXT: No Swelling, No Pain, No Edema  SKIN:  [x  ] No itching, burning, rashes, or lesions   MUSCULOSKELETAL: No joint pain ,No Jt swelling; No muscle pain, No back pain, No extremity pain  PSYCHIATRIC: No depression,  No anxiety,  No mood swings ,No difficulty sleeping at night  PAIN SCALE: [  ] None  [ x ] Other-  ROS Unable to obtain due to - [  ] Dementia  [  ] Lethargy [  ] Drowsy [  ] Sedated [  ] non verbal  REST OF REVIEW Of SYSTEM - [ x ] Normal     Vital Signs Last 24 Hrs  T(C): 36.7 (23 Nov 2024 11:32), Max: 37.3 (23 Nov 2024 05:47)  T(F): 98 (23 Nov 2024 11:32), Max: 99.1 (23 Nov 2024 05:47)  HR: 74 (23 Nov 2024 11:32) (74 - 83)  BP: 142/72 (23 Nov 2024 11:32) (133/70 - 167/87)  BP(mean): --  RR: 18 (23 Nov 2024 11:32) (18 - 20)  SpO2: 93% (23 Nov 2024 11:32) (85% - 94%)    Parameters below as of 23 Nov 2024 11:32  Patient On (Oxygen Delivery Method): room air      Finger Stick        11-22 @ 07:01 - 11-23 @ 07:00  --------------------------------------------------------  IN: 3480 mL / OUT: 2450 mL / NET: 1030 mL    11-23 @ 07:01 - 11-23 @ 16:18  --------------------------------------------------------  IN: 0 mL / OUT: 650 mL / NET: -650 mL        PHYSICAL EXAM:  GENERAL:  [x  ] NAD , [ x ] well appearing, [  ] Agitated, [  ] Drowsy,  [  ] Lethargy, [  ] confused   HEAD:  [ x ] Normal, [  ] Other  EYES:  [ x ] EOMI, [ x ] PERRLA, [ x ] conjunctiva and sclera clear normal, [  ] Other,  [ x ] Pallor,[  ] Discharge  ENMT:  [ x ] Normal, [ x ] Moist mucous membranes, [  ] Good dentition, [ x ] No Thrush  NECK:  [x  ] Supple, [ x ] No JVD, [ x ] Normal thyroid, [  ] Lymphadenopathy [  ] Other  CHEST/LUNG:  [ x ] Clear to auscultation bilaterally, [ x ] Breath Sounds equal B/L / Decrease, [x  ] poor effort  [x  ] No rales, [x  ] No rhonchi  [ x ]  No wheezing,   HEART:  [x  ] Regular rate and rhythm, [  ] tachycardia, [  ] Bradycardia,  [  ] irregular  [ x ] No murmurs, No rubs, No gallops, [  ] PPM in place (Mfr:  )  ABDOMEN:  [ x ] Soft, [ x ] Nontender, [ x ] Nondistended, [x  ] No mass, [ x ] Bowel sounds present, [x  ] obese  NERVOUS SYSTEM:  [ x ] Alert & Oriented X3, [x  ] Nonfocal  [  ] Confusion  [  ] Encephalopathic [  ] Sedated [  ] Unable to assess, [  ] Dementia [  ] Other-  EXTREMITIES: [ x ] 2+ Peripheral Pulses, No clubbing, No cyanosis,  [x  ] 1 + pitting edema B/L lower EXT. [  ] PVD stasis skin changes B/L Lower EXT, [  ] wound  LYMPH: No lymphadenopathy noted  SKIN:  [x  ] No rashes or lesions, [  ] Pressure Ulcers, [  ] ecchymosis, [  ] Skin Tears, [  ] Other    DIET: Diet, Renal Restrictions:   For patients receiving Renal Replacement - No Protein Restr, No Conc K, No Conc Phos, Low Sodium  Supplement Feeding Modality:  Oral  Suplena Cans or Servings Per Day:  1       Frequency:  Daily (11-22-24 @ 11:31)      LABS:                        9.9    8.57  )-----------( 297      ( 23 Nov 2024 10:50 )             29.8     23 Nov 2024 07:40    136    |  99     |  76     ----------------------------<  106    5.3     |  24     |  8.90     Ca    8.0        23 Nov 2024 07:40  Phos  7.5       23 Nov 2024 07:40  Mg     2.7       23 Nov 2024 07:40    TPro  6.5    /  Alb  2.7    /  TBili  0.4    /  DBili  x      /  AST  25     /  ALT  14     /  AlkPhos  81     23 Nov 2024 07:40      Urinalysis Basic - ( 23 Nov 2024 07:40 )    Color: x / Appearance: x / SG: x / pH: x  Gluc: 106 mg/dL / Ketone: x  / Bili: x / Urobili: x   Blood: x / Protein: x / Nitrite: x   Leuk Esterase: x / RBC: x / WBC x   Sq Epi: x / Non Sq Epi: x / Bacteria: x        Culture Results:   No growth (11-21 @ 07:30)      Culture - Urine (collected 21 Nov 2024 07:30)  Source: Clean Catch  Final Report (22 Nov 2024 09:09):    No growth    Urinalysis with Rflx Culture (collected 21 Nov 2024 07:30)         Anemia Panel:  Iron Total: 17 ug/dL (11-22-24 @ 07:05)  Iron - Total Binding Capacity.: 241 ug/dL (11-22-24 @ 07:05)  Ferritin: 257 ng/mL (11-22-24 @ 07:05)      Thyroid Panel:  Thyroid Stimulating Hormone, Serum: 1.11 uIU/mL (11-20-24 @ 18:40)      RADIOLOGY & ADDITIONAL TESTS:  < from: TTE W or WO Ultrasound Enhancing Agent (11.23.24 @ 08:34) >     CONCLUSIONS:      1. Technically difficult image quality.   2. Left ventricular cavity is normal in size. Left ventricular systolic function is normal with an ejection fraction of 65 % by David's method of disks.   3. Normal right ventricular cavity size and normal right ventricular systolic function.   4. Trileaflet aortic valve with normal systolic excursion.   5. Structurally normal mitral valve with normal leaflet excursion.   6. Trace mitral regurgitation.   7. Tricuspid valve was not well visualized.   8. The inferior vena cava is normal in size measuring 1.50 cm in diameter, (normal <2.1cm) with normal inspiratory collapse (normal >50%) consistent with normal right atrial pressure (~3, range 0-5mmHg).   9. No pericardial effusion seen.      < end of copied text >      HEALTH ISSUES - PROBLEM Dx:  MARYSOL (acute kidney injury)    Hyperkalemia    Dyspnea    Gastric lymphoma    Need for prophylactic measure      Consultant(s) Notes Reviewed:  [x  ] YES     Care Discussed with [X] Consultants  [ x ] Patient  [x  ] Family [  ] HCP [  ]   [  ] Social Service  [ x ] RN, [  ] Physical Therapy,[  ] Palliative care team  DVT PPX: [  ] Lovenox, [ x ] S C Heparin, [  ] Coumadin, [  ] Xarelto, [  ] Eliquis, [  ] Pradaxa, [  ] IV Heparin drip, [  ] SCD [  ] Contraindication 2 to GI Bleed,[  ] Ambulation [  ] Contraindicated 2 to  bleed [  ] Contraindicated 2 to Brain Bleed  Advanced directive: [x  ] None, [  ] DNR/DNI

## 2024-11-24 LAB
ALBUMIN SERPL ELPH-MCNC: 2.6 G/DL — LOW (ref 3.3–5)
ALP SERPL-CCNC: 74 U/L — SIGNIFICANT CHANGE UP (ref 40–120)
ALT FLD-CCNC: 11 U/L — LOW (ref 12–78)
ANION GAP SERPL CALC-SCNC: 14 MMOL/L — SIGNIFICANT CHANGE UP (ref 5–17)
AST SERPL-CCNC: 10 U/L — LOW (ref 15–37)
BILIRUB SERPL-MCNC: 0.3 MG/DL — SIGNIFICANT CHANGE UP (ref 0.2–1.2)
BUN SERPL-MCNC: 82 MG/DL — HIGH (ref 7–23)
CALCIUM SERPL-MCNC: 7.9 MG/DL — LOW (ref 8.5–10.1)
CHLORIDE SERPL-SCNC: 98 MMOL/L — SIGNIFICANT CHANGE UP (ref 96–108)
CO2 SERPL-SCNC: 26 MMOL/L — SIGNIFICANT CHANGE UP (ref 22–31)
CREAT SERPL-MCNC: 10 MG/DL — HIGH (ref 0.5–1.3)
EGFR: 5 ML/MIN/1.73M2 — LOW
GLUCOSE SERPL-MCNC: 95 MG/DL — SIGNIFICANT CHANGE UP (ref 70–99)
HCT VFR BLD CALC: 28.8 % — LOW (ref 39–50)
HGB BLD-MCNC: 9.6 G/DL — LOW (ref 13–17)
MAGNESIUM SERPL-MCNC: 2.8 MG/DL — HIGH (ref 1.6–2.6)
MCHC RBC-ENTMCNC: 20.1 PG — LOW (ref 27–34)
MCHC RBC-ENTMCNC: 33.3 G/DL — SIGNIFICANT CHANGE UP (ref 32–36)
MCV RBC AUTO: 60.4 FL — LOW (ref 80–100)
NRBC # BLD: 0 /100 WBCS — SIGNIFICANT CHANGE UP (ref 0–0)
PHOSPHATE SERPL-MCNC: 8.5 MG/DL — HIGH (ref 2.5–4.5)
PLATELET # BLD AUTO: 290 K/UL — SIGNIFICANT CHANGE UP (ref 150–400)
POTASSIUM SERPL-MCNC: 4.8 MMOL/L — SIGNIFICANT CHANGE UP (ref 3.5–5.3)
POTASSIUM SERPL-SCNC: 4.8 MMOL/L — SIGNIFICANT CHANGE UP (ref 3.5–5.3)
PROT SERPL-MCNC: 6.4 G/DL — SIGNIFICANT CHANGE UP (ref 6–8.3)
RBC # BLD: 4.77 M/UL — SIGNIFICANT CHANGE UP (ref 4.2–5.8)
RBC # FLD: 15.9 % — HIGH (ref 10.3–14.5)
SODIUM SERPL-SCNC: 138 MMOL/L — SIGNIFICANT CHANGE UP (ref 135–145)
WBC # BLD: 7.74 K/UL — SIGNIFICANT CHANGE UP (ref 3.8–10.5)
WBC # FLD AUTO: 7.74 K/UL — SIGNIFICANT CHANGE UP (ref 3.8–10.5)

## 2024-11-24 PROCEDURE — 71045 X-RAY EXAM CHEST 1 VIEW: CPT | Mod: 26

## 2024-11-24 RX ORDER — HYDROMORPHONE HYDROCHLORIDE 2 MG/1
0.5 TABLET ORAL ONCE
Refills: 0 | Status: DISCONTINUED | OUTPATIENT
Start: 2024-11-24 | End: 2024-11-24

## 2024-11-24 RX ORDER — CHLORHEXIDINE GLUCONATE 1.2 MG/ML
1 RINSE ORAL
Refills: 0 | Status: DISCONTINUED | OUTPATIENT
Start: 2024-11-24 | End: 2024-12-01

## 2024-11-24 RX ORDER — SODIUM CHLORIDE 9 MG/ML
10 INJECTION, SOLUTION INTRAMUSCULAR; INTRAVENOUS; SUBCUTANEOUS
Refills: 0 | Status: DISCONTINUED | OUTPATIENT
Start: 2024-11-24 | End: 2024-12-01

## 2024-11-24 RX ADMIN — SODIUM CHLORIDE 4 MILLILITER(S): 9 INJECTION, SOLUTION INTRAMUSCULAR; INTRAVENOUS; SUBCUTANEOUS at 07:21

## 2024-11-24 RX ADMIN — Medication 2 TABLET(S): at 21:25

## 2024-11-24 RX ADMIN — OXYCODONE HYDROCHLORIDE 5 MILLIGRAM(S): 30 TABLET ORAL at 22:25

## 2024-11-24 RX ADMIN — HYDROMORPHONE HYDROCHLORIDE 0.5 MILLIGRAM(S): 2 TABLET ORAL at 11:41

## 2024-11-24 RX ADMIN — ACETAMINOPHEN, DIPHENHYDRAMINE HCL, PHENYLEPHRINE HCL 5 MILLIGRAM(S): 325; 25; 5 TABLET ORAL at 21:25

## 2024-11-24 RX ADMIN — OXYCODONE HYDROCHLORIDE 5 MILLIGRAM(S): 30 TABLET ORAL at 21:25

## 2024-11-24 RX ADMIN — HYDROMORPHONE HYDROCHLORIDE 0.5 MILLIGRAM(S): 2 TABLET ORAL at 12:00

## 2024-11-24 RX ADMIN — IPRATROPIUM BROMIDE AND ALBUTEROL SULFATE 3 MILLILITER(S): 2.5; .5 SOLUTION RESPIRATORY (INHALATION) at 20:51

## 2024-11-24 RX ADMIN — Medication 5000 UNIT(S): at 21:25

## 2024-11-24 RX ADMIN — IPRATROPIUM BROMIDE AND ALBUTEROL SULFATE 3 MILLILITER(S): 2.5; .5 SOLUTION RESPIRATORY (INHALATION) at 07:21

## 2024-11-24 RX ADMIN — SODIUM CHLORIDE 4 MILLILITER(S): 9 INJECTION, SOLUTION INTRAMUSCULAR; INTRAVENOUS; SUBCUTANEOUS at 20:51

## 2024-11-24 RX ADMIN — Medication 5000 UNIT(S): at 06:06

## 2024-11-24 NOTE — PROGRESS NOTE ADULT - ASSESSMENT
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 LN/mesenteric LN involvements(under care Dr Mariana Fung [facility]), adm w rapidly progressive MARYSOL, Cr 5s on adm was 1.1 9/30/24 and 3 w Dr Fung Tuesday 11/19  Pt had first presented w flank pain 9/2024 thought renal stone, but 9/30/24 CT renal stone hunt show sig mesenteric/retroperitoneal LADs up to 3.9cm), tx from [place] to [facility] for ?endoscopic bx(pt reports had "stomach bx", but path report in computer 9/30/24 perihepatic LN FNA c/w met poorly diff ca), Pt subsequent had additional left neck LN bx  First chemo FOLFOX w Dr Fung was planned for Monday 11/25/24   Hgb stable since adm at 10  CT c/a/p--no sig above diaphragm findings, sig mesenteric/retroperitoneal LADs up to 4.6cm had been up to 3.9cm on 9/30/24 CT renal hunt. No hydronephrsis, andreas renal cysts seen  Seen by Renal, etiology of MARYSOL ?NSAIDs effect    -Cr continue to worsen since adm, today at 10, starting HD  -Hgb remains stable today at 9s  -continue present management    discussed w pt  -

## 2024-11-24 NOTE — PROGRESS NOTE ADULT - PROBLEM SELECTOR PLAN 2
Pt with elevated electrolytes likely in setting of [place]- RESOLVED  - K improved s/p Lokelma  - Mag/Phos remain elevated  - for HD today  - Nutritional eval  - Renal diet

## 2024-11-24 NOTE — PROGRESS NOTE ADULT - PROBLEM SELECTOR PLAN 1
Pt sent in to ED for elevated Cr (Baseline 1.1, increasing to 1.5-->3.0), endorsing decreased urination and flank pain  - Pt c/o flank pain (L), per chart review, also was endorsing pain during admission at [facility], prior to elevated Cr  - Cr 5.3 on admission, pt with prolonged NSAID use.   - FeNA 3.4, likely ATN from progression of prerenal MARYSOL MA   - s/p wise catheter placement in ED with minimal output  - CT Abdomen: Collapsed bladder. Bilateral renal cysts, no hydro. No obstruction.  - Creatinine continues to rise however good UOP; K elevated on admission now s/p LOKELMA x1 with improvement in K  - Metabolic Acidosis,   - Per nephro - pt to start temporary HD today given Cr continues to increase  - fluids discontinued due to LE edema  - tramadol 25mg PRN mild pain, tramadol 50mg for moderate pain, oxycodone 5mg for severe pain   - Strict Is&Os q6h  - Monitor volume status  - Avoid nephrotoxic medications, Renal dose meds   - Renal diet  - Daily CMP  - Nephrology (Dr. Cantu) following, recs appreciated - HD today Pt sent in to ED for elevated Cr (Baseline 1.1, increasing to 1.5-->3.0), endorsing decreased urination and flank pain  - Pt c/o flank pain (L), per chart review, also was endorsing pain during admission at [facility], prior to elevated Cr  - Cr 5.3 on admission, pt with prolonged NSAID use.   - FeNA 3.4, likely ATN from progression of prerenal MARYSOL MA -HD Rx today as Cr increasing   - s/p wise catheter placement in ED with minimal output  - CT Abdomen: Collapsed bladder. Bilateral renal cysts, no hydro. No obstruction.  - Creatinine continues to rise however good UOP; K elevated on admission now s/p LOKELMA x1 with improvement in K  - Metabolic Acidosis, S/P- NaHco3  - Per nephro - pt to start temporary HD today given Cr continues to increase.  - fluids discontinued due to LE edema  - tramadol 25mg PRN mild pain, tramadol 50mg for moderate pain, oxycodone 5mg for severe pain   - Strict Is&Os q6h  - Monitor volume status  - Avoid nephrotoxic medications, Renal dose meds   - Renal diet  - Daily CMP  - Nephrology (Dr. Cantu)  group following, recs appreciated - HD today

## 2024-11-24 NOTE — PROCEDURE NOTE - ADDITIONAL PROCEDURE DETAILS
Date of entry of this note is equal to the date of services rendered.  Procedural time noninclusive of other E/M time  Dx: Acute renal failure, metabolic acidosis, hyperphos, hypermagnesemia, Gastric lymphoma

## 2024-11-24 NOTE — PROGRESS NOTE ADULT - SUBJECTIVE AND OBJECTIVE BOX
Patient is a 64y old  Male who presents with a chief complaint of MARYSOL (24 Nov 2024 09:19)    HPI:  Pt is a 63yo M with PMHx recently diagnosed gastric lymphoma  S/P Biopsy of gastric mass & neck Lymph node  with LN spread presents to the ED as recommended by his oncologist Dr. Andrea for elevated Creatinine. Pt called by doctor for elevated Cr at 5.3.  Baseline 1.1, increasing to 1.5-->3.0--> 5.3 on outpatient labs. Pt had chemo port placed today, has not been drinking as much as he usually does. Per pt, urinated a small amount this AM, contributed it to decreased fluid intake. Pt endorsing mild flank pain, prescribed oxycodone by outpatient doctor. Pt also endorsing new onset dyspnea on exertion. Pt has been taking Advil for 5 weeks for Lower Back pain. Pt had placement of Medi port as out pt   today     Denies fever, chills, chest pain, palpitations, abdominal pain, nausea, vomiting, diarrhea, constipation, or dysuria    ED Course:   Vitals: BP: 160/90, HR: 74, Temp: 98F, RR: 18  Labs:  WBC 7.31, Hg 10, CO2 21, BUN/CR 58,5.3, Ca 8.3, eGFR 11, Mag 2.8, proBNP 593  UA: Pending  CT Chest: Mild mottled density in the left mainstem bronchus may represent secretions. No focal consolidation or discrete mass is seen. Regions of atelectasis or scarring in the left upper lobe/lingula. Atelectatic changes at the lung bases.  CT Abdomen/Pelvis: Lymphadenopathy which appears increased when compared with 9/30/2024. Nodularity along posterior peritoneal reflections and minimal perihepatic fluid which were not seen previously.    EKG: NSR rate 68bpm. QTc 408ms  Received in the ED: 500cc NS bolus x1   (20 Nov 2024 22:40)    INTERVAL HPI:  11/21: Pt seen and examined at bedside. Pt was making minimal urine output overnight but has voided significantly more since than. He has been taking Advil for the last month at the very minimum, with  max of 6 pills/day for R. low back pain. However, the pain is not present currently. No acute c/o at this time.   11/22: Pt seen and examined at bedside. 1.3L UOP in the last 24 hours. Pt reports feeling well, shortness  of breath has improved a bit. No other concerns worsening MARYSOL , K stable   11/23:pt seen, examined, Cr increasing, Low h/h stable, High electrolytes.  11/24: Pt seen and examined, seated in chair. Pt reports feeling well, and states his shortness of breath when walking has improved. He still has intermittent lower back pain. Cr rising, today 10, nephro to start temporary HD today.    OVERNIGHT EVENTS: None    Home Medications:      MEDICATIONS  (STANDING):  albuterol/ipratropium for Nebulization 3 milliLiter(s) Nebulizer every 12 hours  chlorhexidine 2% Cloths 1 Application(s) Topical daily  heparin   Injectable 5000 Unit(s) SubCutaneous every 8 hours  influenza   Vaccine 0.5 milliLiter(s) IntraMuscular once  melatonin 5 milliGRAM(s) Oral at bedtime  polyethylene glycol 3350 17 Gram(s) Oral daily  senna 2 Tablet(s) Oral at bedtime  sodium chloride 3%  Inhalation 4 milliLiter(s) Inhalation every 12 hours    MEDICATIONS  (PRN):  aluminum hydroxide/magnesium hydroxide/simethicone Suspension 30 milliLiter(s) Oral every 4 hours PRN Dyspepsia  ondansetron Injectable 4 milliGRAM(s) IV Push every 8 hours PRN Nausea and/or Vomiting  oxyCODONE    IR 5 milliGRAM(s) Oral every 6 hours PRN Severe Pain (7 - 10)  traMADol 25 milliGRAM(s) Oral every 12 hours PRN Mild Pain (1 - 3)  traMADol 50 milliGRAM(s) Oral every 12 hours PRN Moderate Pain (4 - 6)      Allergies    Bactrim DS (Hives)    Intolerances        Social History:  Lives at home with family  Recently retired  Active, ambulates independently and independent with ADLs (20 Nov 2024 22:40)      REVIEW OF SYSTEMS:  CONSTITUTIONAL: No fever, No chills, No fatigue, No myalgia, No Body ache, No Weakness  EYES: No eye pain,  No visual disturbances, No discharge, NO Redness  ENMT:  No ear pain, No nose bleed, No vertigo; No sinus pain, NO throat pain, No Congestion  NECK: No pain, No stiffness  RESPIRATORY: No cough, NO wheezing, No  hemoptysis, NO  shortness of breath  CARDIOVASCULAR: No chest pain, palpitations  GASTROINTESTINAL: No abdominal pain, NO epigastric pain. No nausea, No vomiting; No diarrhea, No constipation. [ x ] BM  GENITOURINARY: No dysuria, No frequency, No urgency, No hematuria, NO incontinence  NEUROLOGICAL: No headaches, No dizziness, No numbness, No tingling, No tremors, No weakness  EXT: + Swelling, No Pain  SKIN:  [  ] No itching, burning, rashes, or lesions   MUSCULOSKELETAL: No joint pain ,No Jt swelling; No muscle pain, + back pain, No extremity pain  PSYCHIATRIC: No depression,  No anxiety,  No mood swings ,No difficulty sleeping at night  PAIN SCALE: [  ] None  [  ] Other-  ROS Unable to obtain due to - [  ] Dementia  [  ] Lethargy [  ] Drowsy [  ] Sedated [  ] non verbal  REST OF REVIEW Of SYSTEM - [ x ] Normal     Vital Signs Last 24 Hrs  T(C): 37 (24 Nov 2024 04:51), Max: 37.1 (23 Nov 2024 19:57)  T(F): 98.6 (24 Nov 2024 04:51), Max: 98.7 (23 Nov 2024 19:57)  HR: 85 (24 Nov 2024 07:25) (69 - 88)  BP: 146/83 (24 Nov 2024 04:51) (139/74 - 146/83)  BP(mean): --  RR: 18 (24 Nov 2024 04:51) (18 - 18)  SpO2: 95% (24 Nov 2024 07:25) (93% - 95%)    Parameters below as of 24 Nov 2024 07:25  Patient On (Oxygen Delivery Method): room air      Finger Stick        11-23 @ 07:01  -  11-24 @ 07:00  --------------------------------------------------------  IN: 900 mL / OUT: 1300 mL / NET: -400 mL        PHYSICAL EXAM:  GENERAL:  [ x ] NAD , [ x ] well appearing, [  ] Agitated, [  ] Drowsy,  [  ] Lethargy, [  ] confused   HEAD:  [ x ] Normal, [  ] Other  EYES:  [ x ] EOMI, [  ] PERRLA, [ x ] erythematous conjunctiva, sclera clear, [  ] Other,  [  ] Pallor,[  ] Discharge  ENMT:  [ x ] Normal, [  ] Moist mucous membranes, [  ] Good dentition, [  ] No Thrush  NECK:  [  ] Supple, [ x ] No JVD, [  ] Normal thyroid, [  ] Lymphadenopathy [  ] Other  CHEST/LUNG:  [ x ] Clear to auscultation bilaterally, [ x ] Breath Sounds equal B/L, [  ] poor effort  [ x ] No rales, [ x ] No rhonchi  [ x ]  No wheezing,   HEART:  [ x ] Regular rate and rhythm, [  ] tachycardia, [  ] Bradycardia,  [  ] irregular  [ x ] No murmurs, No rubs, No gallops, [  ] PPM in place (Mfr:  )  ABDOMEN:  [ x ] Soft, [ x ] Nontender, [ x ] Nondistended, [  ] No mass, [  ] Bowel sounds present, [  ] obese  NERVOUS SYSTEM:  [ x ] Alert & Oriented X3, [ x ] Nonfocal  [  ] Confusion  [  ] Encephalopathic [  ] Sedated [  ] Unable to assess, [  ] Dementia [  ] Other-  EXTREMITIES: [  ] 2+ Peripheral Pulses, No clubbing, No cyanosis,  [ x ] edema B/L lower EXT. [  ] PVD stasis skin changes B/L Lower EXT, [  ] wound  LYMPH: No lymphadenopathy noted  SKIN:  [  ] No rashes or lesions, [  ] Pressure Ulcers, [  ] ecchymosis, [  ] Skin Tears, [  ] Other    DIET: Diet, Renal Restrictions:   For patients receiving Renal Replacement - No Protein Restr, No Conc K, No Conc Phos, Low Sodium  Supplement Feeding Modality:  Oral  Suplena Cans or Servings Per Day:  1       Frequency:  Daily (11-22-24 @ 11:31)      LABS:                        9.6    7.74  )-----------( 290      ( 24 Nov 2024 07:12 )             28.8     24 Nov 2024 07:12    138    |  98     |  82     ----------------------------<  95     4.8     |  26     |  10.00    Ca    7.9        24 Nov 2024 07:12  Phos  8.5       24 Nov 2024 07:12  Mg     2.8       24 Nov 2024 07:12    TPro  6.4    /  Alb  2.6    /  TBili  0.3    /  DBili  x      /  AST  10     /  ALT  11     /  AlkPhos  74     24 Nov 2024 07:12      Urinalysis Basic - ( 24 Nov 2024 07:12 )    Color: x / Appearance: x / SG: x / pH: x  Gluc: 95 mg/dL / Ketone: x  / Bili: x / Urobili: x   Blood: x / Protein: x / Nitrite: x   Leuk Esterase: x / RBC: x / WBC x   Sq Epi: x / Non Sq Epi: x / Bacteria: x        Culture Results:   No growth (11-21 @ 07:30)                  Culture - Urine (collected 21 Nov 2024 07:30)  Source: Clean Catch  Final Report (22 Nov 2024 09:09):    No growth    Urinalysis with Rflx Culture (collected 21 Nov 2024 07:30)         Anemia Panel:  Iron Total: 17 ug/dL (11-22-24 @ 07:05)  Iron - Total Binding Capacity.: 241 ug/dL (11-22-24 @ 07:05)  Ferritin: 257 ng/mL (11-22-24 @ 07:05)      Thyroid Panel:  Thyroid Stimulating Hormone, Serum: 1.11 uIU/mL (11-20-24 @ 18:40)                RADIOLOGY & ADDITIONAL TESTS:      HEALTH ISSUES - PROBLEM Dx:  MARYSOL (acute kidney injury)    Gastric lymphoma    Need for prophylactic measure    Dyspnea    Hyperkalemia            Consultant(s) Notes Reviewed:  [ x ] YES     Care Discussed with [X] Consultants  [ x ] Patient  [ x ] Family [  ] HCP [  ]   [  ] Social Service  [  ] RN, [  ] Physical Therapy,[  ] Palliative care team  DVT PPX: [  ] Lovenox, [ x ] S C Heparin, [  ] Coumadin, [  ] Xarelto, [  ] Eliquis, [  ] Pradaxa, [  ] IV Heparin drip, [  ] SCD [  ] Contraindication 2 to GI Bleed,[  ] Ambulation [  ] Contraindicated 2 to  bleed [  ] Contraindicated 2 to Brain Bleed  Advanced directive: [  ] None, [  ] DNR/DNI Patient is a 64y old  Male who presents with a chief complaint of MARYSOL (24 Nov 2024 09:19)    HPI:  Pt is a 65yo M with PMHx recently diagnosed gastric lymphoma  S/P Biopsy of gastric mass & neck Lymph node  with LN spread presents to the ED as recommended by his oncologist Dr. Andrea for elevated Creatinine. Pt called by doctor for elevated Cr at 5.3.  Baseline 1.1, increasing to 1.5-->3.0--> 5.3 on outpatient labs. Pt had chemo port placed today, has not been drinking as much as he usually does. Per pt, urinated a small amount this AM, contributed it to decreased fluid intake. Pt endorsing mild flank pain, prescribed oxycodone by outpatient doctor. Pt also endorsing new onset dyspnea on exertion. Pt has been taking Advil for 5 weeks for Lower Back pain. Pt had placement of Medi port as out pt   today     Denies fever, chills, chest pain, palpitations, abdominal pain, nausea, vomiting, diarrhea, constipation, or dysuria    ED Course:   Vitals: BP: 160/90, HR: 74, Temp: 98F, RR: 18  Labs:  WBC 7.31, Hg 10, CO2 21, BUN/CR 58,5.3, Ca 8.3, eGFR 11, Mag 2.8, proBNP 593  UA: Pending  CT Chest: Mild mottled density in the left mainstem bronchus may represent secretions. No focal consolidation or discrete mass is seen. Regions of atelectasis or scarring in the left upper lobe/lingula. Atelectatic changes at the lung bases.  CT Abdomen/Pelvis: Lymphadenopathy which appears increased when compared with 9/30/2024. Nodularity along posterior peritoneal reflections and minimal perihepatic fluid which were not seen previously.    EKG: NSR rate 68bpm. QTc 408ms  Received in the ED: 500cc NS bolus x1   (20 Nov 2024 22:40)    INTERVAL HPI:  11/21: Pt seen and examined at bedside. Pt was making minimal urine output overnight but has voided significantly more since than. He has been taking Advil for the last month at the very minimum, with  max of 6 pills/day for R. low back pain. However, the pain is not present currently. No acute c/o at this time.   11/22: Pt seen and examined at bedside. 1.3L UOP in the last 24 hours. Pt reports feeling well, shortness  of breath has improved a bit. No other concerns worsening MARYSOL , K stable   11/23:pt seen, examined, Cr increasing, Low h/h stable, High electrolytes.  11/24: Pt seen and examined, seated in chair. Pt reports feeling well, and states his shortness of breath when walking has improved. He still has intermittent lower back pain. Cr rising, today 10, nephro to start temporary HD today.    OVERNIGHT EVENTS: None    Home Medications:      MEDICATIONS  (STANDING):  albuterol/ipratropium for Nebulization 3 milliLiter(s) Nebulizer every 12 hours  chlorhexidine 2% Cloths 1 Application(s) Topical daily  heparin   Injectable 5000 Unit(s) SubCutaneous every 8 hours  influenza   Vaccine 0.5 milliLiter(s) IntraMuscular once  melatonin 5 milliGRAM(s) Oral at bedtime  polyethylene glycol 3350 17 Gram(s) Oral daily  senna 2 Tablet(s) Oral at bedtime  sodium chloride 3%  Inhalation 4 milliLiter(s) Inhalation every 12 hours    MEDICATIONS  (PRN):  aluminum hydroxide/magnesium hydroxide/simethicone Suspension 30 milliLiter(s) Oral every 4 hours PRN Dyspepsia  ondansetron Injectable 4 milliGRAM(s) IV Push every 8 hours PRN Nausea and/or Vomiting  oxyCODONE    IR 5 milliGRAM(s) Oral every 6 hours PRN Severe Pain (7 - 10)  traMADol 25 milliGRAM(s) Oral every 12 hours PRN Mild Pain (1 - 3)  traMADol 50 milliGRAM(s) Oral every 12 hours PRN Moderate Pain (4 - 6)      Allergies    Bactrim DS (Hives)    Intolerances        Social History:  Lives at home with family  Recently retired  Active, ambulates independently and independent with ADLs (20 Nov 2024 22:40)      REVIEW OF SYSTEMS: i am ok,   CONSTITUTIONAL: No fever, No chills, No fatigue, No myalgia, No Body ache, No Weakness  EYES: No eye pain,  No visual disturbances, No discharge, NO Redness  ENMT:  No ear pain, No nose bleed, No vertigo; No sinus pain, NO throat pain, No Congestion  NECK: No pain, No stiffness  RESPIRATORY: No cough, NO wheezing, No  hemoptysis, NO  shortness of breath  CARDIOVASCULAR: No chest pain, palpitations  GASTROINTESTINAL: No abdominal pain, NO epigastric pain. No nausea, No vomiting; No diarrhea, No constipation. [ x ] BM  GENITOURINARY: No dysuria, No frequency, No urgency, No hematuria, NO incontinence  NEUROLOGICAL: No headaches, No dizziness, No numbness, No tingling, No tremors, No weakness  EXT: + Swelling, No Pain  SKIN:  [x  ] No itching, burning, rashes, or lesions   MUSCULOSKELETAL: No joint pain ,No Jt swelling; No muscle pain, + back pain, No extremity pain  PSYCHIATRIC: No depression,  No anxiety,  No mood swings ,No difficulty sleeping at night  PAIN SCALE: [ x ] None  [  ] Other-  ROS Unable to obtain due to - [  ] Dementia  [  ] Lethargy [  ] Drowsy [  ] Sedated [  ] non verbal  REST OF REVIEW Of SYSTEM - [ x ] Normal     Vital Signs Last 24 Hrs  T(C): 37 (24 Nov 2024 04:51), Max: 37.1 (23 Nov 2024 19:57)  T(F): 98.6 (24 Nov 2024 04:51), Max: 98.7 (23 Nov 2024 19:57)  HR: 85 (24 Nov 2024 07:25) (69 - 88)  BP: 146/83 (24 Nov 2024 04:51) (139/74 - 146/83)  BP(mean): --  RR: 18 (24 Nov 2024 04:51) (18 - 18)  SpO2: 95% (24 Nov 2024 07:25) (93% - 95%)    Parameters below as of 24 Nov 2024 07:25  Patient On (Oxygen Delivery Method): room air      Finger Stick        11-23 @ 07:01  -  11-24 @ 07:00  --------------------------------------------------------  IN: 900 mL / OUT: 1300 mL / NET: -400 mL        PHYSICAL EXAM: chemo port  GENERAL:  [ x ] NAD , [ x ] well appearing, [  ] Agitated, [  ] Drowsy,  [  ] Lethargy, [  ] confused   HEAD:  [ x ] Normal, [  ] Other  EYES:  [ x ] EOMI, [  ] PERRLA, [ x ] erythematous conjunctiva, sclera clear, [  ] Other,  [  ] Pallor,[  ] Discharge  ENMT:  [ x ] Normal, [  ] Moist mucous membranes, [  ] Good dentition, [  ] No Thrush  NECK:  [  ] Supple, [ x ] No JVD, [  ] Normal thyroid, [  ] Lymphadenopathy [  ] Other  CHEST/LUNG:  [ x ] Clear to auscultation bilaterally, [ x ] Breath Sounds equal B/L, [  ] poor effort  [ x ] No rales, [ x ] No rhonchi  [ x ]  No wheezing,   HEART:  [ x ] Regular rate and rhythm, [  ] tachycardia, [  ] Bradycardia,  [  ] irregular  [ x ] No murmurs, No rubs, No gallops, [  ] PPM in place (Mfr:  )  ABDOMEN:  [ x ] Soft, [ x ] Nontender, [ x ] Nondistended, [  ] No mass, [  ] Bowel sounds present, [  ] obese  NERVOUS SYSTEM:  [ x ] Alert & Oriented X3, [ x ] Nonfocal  [  ] Confusion  [  ] Encephalopathic [  ] Sedated [  ] Unable to assess, [  ] Dementia [  ] Other-  EXTREMITIES: [  ] 2+ Peripheral Pulses, No clubbing, No cyanosis,  [ x ]  2 +pitting edema B/L lower EXT. [  ] PVD stasis skin changes B/L Lower EXT, [  ] wound  LYMPH: No lymphadenopathy noted  SKIN:  [  ] No rashes or lesions, [  ] Pressure Ulcers, [  ] ecchymosis, [  ] Skin Tears, [  ] Other    DIET: Diet, Renal Restrictions:   For patients receiving Renal Replacement - No Protein Restr, No Conc K, No Conc Phos, Low Sodium  Supplement Feeding Modality:  Oral  Suplena Cans or Servings Per Day:  1       Frequency:  Daily (11-22-24 @ 11:31)      LABS:                        9.6    7.74  )-----------( 290      ( 24 Nov 2024 07:12 )             28.8     24 Nov 2024 07:12    138    |  98     |  82     ----------------------------<  95     4.8     |  26     |  10.00    Ca    7.9        24 Nov 2024 07:12  Phos  8.5       24 Nov 2024 07:12  Mg     2.8       24 Nov 2024 07:12    TPro  6.4    /  Alb  2.6    /  TBili  0.3    /  DBili  x      /  AST  10     /  ALT  11     /  AlkPhos  74     24 Nov 2024 07:12      Urinalysis Basic - ( 24 Nov 2024 07:12 )    Color: x / Appearance: x / SG: x / pH: x  Gluc: 95 mg/dL / Ketone: x  / Bili: x / Urobili: x   Blood: x / Protein: x / Nitrite: x   Leuk Esterase: x / RBC: x / WBC x   Sq Epi: x / Non Sq Epi: x / Bacteria: x        Culture Results:   No growth (11-21 @ 07:30)      Culture - Urine (collected 21 Nov 2024 07:30)  Source: Clean Catch  Final Report (22 Nov 2024 09:09):    No growth    Urinalysis with Rflx Culture (collected 21 Nov 2024 07:30)      Anemia Panel:  Iron Total: 17 ug/dL (11-22-24 @ 07:05)  Iron - Total Binding Capacity.: 241 ug/dL (11-22-24 @ 07:05)  Ferritin: 257 ng/mL (11-22-24 @ 07:05)      Thyroid Panel:  Thyroid Stimulating Hormone, Serum: 1.11 uIU/mL (11-20-24 @ 18:40)      RADIOLOGY & ADDITIONAL TESTS:      HEALTH ISSUES - PROBLEM Dx:  MARYSOL (acute kidney injury)    Gastric lymphoma    Need for prophylactic measure    Dyspnea    Hyperkalemia        Consultant(s) Notes Reviewed:  [ x ] YES     Care Discussed with [X] Consultants  [ x ] Patient  [ x ] Family [  ] HCP [  ]   [  ] Social Service  [ x ] RN, [  ] Physical Therapy,[  ] Palliative care team  DVT PPX: [  ] Lovenox, [ x ] S C Heparin, [  ] Coumadin, [  ] Xarelto, [  ] Eliquis, [  ] Pradaxa, [  ] IV Heparin drip, [  ] SCD [  ] Contraindication 2 to GI Bleed,[  ] Ambulation [  ] Contraindicated 2 to  bleed [  ] Contraindicated 2 to Brain Bleed  Advanced directive: [ x ] None, [  ] DNR/DNI

## 2024-11-24 NOTE — PROGRESS NOTE ADULT - SUBJECTIVE AND OBJECTIVE BOX
All interim records and events noted.    feeling well, but Cr now 10 and staring HD today  urinating  no SOB or ZHOU      MEDICATIONS  (STANDING):  albuterol/ipratropium for Nebulization 3 milliLiter(s) Nebulizer every 12 hours  chlorhexidine 2% Cloths 1 Application(s) Topical daily  heparin   Injectable 5000 Unit(s) SubCutaneous every 8 hours  influenza   Vaccine 0.5 milliLiter(s) IntraMuscular once  melatonin 5 milliGRAM(s) Oral at bedtime  polyethylene glycol 3350 17 Gram(s) Oral daily  senna 2 Tablet(s) Oral at bedtime  sodium chloride 3%  Inhalation 4 milliLiter(s) Inhalation every 12 hours    MEDICATIONS  (PRN):  aluminum hydroxide/magnesium hydroxide/simethicone Suspension 30 milliLiter(s) Oral every 4 hours PRN Dyspepsia  ondansetron Injectable 4 milliGRAM(s) IV Push every 8 hours PRN Nausea and/or Vomiting  oxyCODONE    IR 5 milliGRAM(s) Oral every 6 hours PRN Severe Pain (7 - 10)  traMADol 25 milliGRAM(s) Oral every 12 hours PRN Mild Pain (1 - 3)  traMADol 50 milliGRAM(s) Oral every 12 hours PRN Moderate Pain (4 - 6)      Vital Signs Last 24 Hrs  T(C): 37 (24 Nov 2024 04:51), Max: 37.1 (23 Nov 2024 19:57)  T(F): 98.6 (24 Nov 2024 04:51), Max: 98.7 (23 Nov 2024 19:57)  HR: 85 (24 Nov 2024 07:25) (69 - 88)  BP: 146/83 (24 Nov 2024 04:51) (139/74 - 146/83)  BP(mean): --  RR: 18 (24 Nov 2024 04:51) (18 - 18)  SpO2: 95% (24 Nov 2024 07:25) (94% - 95%)    Parameters below as of 24 Nov 2024 07:25  Patient On (Oxygen Delivery Method): room air        PHYSICAL EXAM  General: in no acute distress  Head: atraumatic, normocephalic  ENT: sclera anicteric, buccal mucosa moist  Neck: supple, trachea midline  CV: S1 S2, regular rate and rhythm  Lungs: clear to auscultation, no wheezes/rhonchi  Abdomen: soft, nontender, bowel sounds present, pouchy  Extrem: no clubbing/cyanosis/edema  Skin: no significant increased ecchymosis/petechiae  Neuro: alert and oriented X3,  no focal deficits      LABS:             9.6    7.74  )-----------( 290      ( 11-24 @ 07:12 )             28.8                9.9    8.57  )-----------( 297      ( 11-23 @ 10:50 )             29.8                9.7    8.26  )-----------( 274      ( 11-22 @ 07:05 )             29.3       11-24    138  |  98  |  82[H]  ----------------------------<  95  4.8   |  26  |  10.00[H]    Ca    7.9[L]      24 Nov 2024 07:12  Phos  8.5     11-24  Mg     2.8     11-24    TPro  6.4  /  Alb  2.6[L]  /  TBili  0.3  /  DBili  x   /  AST  10[L]  /  ALT  11[L]  /  AlkPhos  74  11-24        RADIOLOGY & ADDITIONAL STUDIES:    IMPRESSION/RECOMMENDATIONS:

## 2024-11-24 NOTE — PROGRESS NOTE ADULT - SUBJECTIVE AND OBJECTIVE BOX
[facility]                             Nephrology and Hypertension                             Gustavo Oscar                                          [phone]     Patient is a 64y old  Male who presents with a chief complaint of MARYSOL (2024 14:23)     HPI:  Pt is a 65yo M with PMHx recently diagnosed gastric lymphoma  S/P Biopsy of gastric mass & neck Lymph node  with LN spread presents to the ED as recommended by his oncologist Dr. Andrea for elevated Creatinine. Pt called by doctor for elevated Cr at 5.3.  Baseline 1.1, increasing to 1.5-->3.0--> 5.3 on outpatient labs. Pt had chemo port placed today, has not been drinking as much as he usually does. Per pt, urinated a small amount this AM, contributed it to decreased fluid intake. Pt endorsing mild flank pain, prescribed oxycodone by outpatient doctor. Pt also endorsing new onset dyspnea on exertion. Pt has been taking Advil for 5 weeks for Lower Back pain. Pt had placement of Medi port as out pt today   Nephrology is c/s for Acute kidney injury. When I saw him, he is very anxious. He states he had been taking 6 tabs of advil every day for a few weeks for pain. He also endorsed not eating as much. Per discussion with RN frandy was not draining much overnight but over last few hours, has better uop. On labs review, Serum Creat was 1.1 in 2024, was 5.3 on admission, is >6 on repeat labs early this am with hyperkalemia. Was given lokelma . He has no sob, no dizziness, no N/V, no new pain.       Has some decreased appetite, but says he feels a bit depressed with everything going on. NO N/V/SOB    Leg elevated in chair. No c/o        PAST MEDICAL & SURGICAL HISTORY:  Incisional hernia    Gastric lymphoma  Anemia of chronic disease  S/P appendectomy  2014  S/P cholecystectomy  15 years ago  FAMILY HISTORY:  Family history of coronary artery disease in father  Father -  age 60 following MI    Social History:Non smoker    MEDICATIONS  (STANDING):  albuterol/ipratropium for Nebulization 3 milliLiter(s) Nebulizer every 12 hours  chlorhexidine 2% Cloths 1 Application(s) Topical daily  heparin   Injectable 5000 Unit(s) SubCutaneous every 8 hours  influenza   Vaccine 0.5 milliLiter(s) IntraMuscular once  melatonin 5 milliGRAM(s) Oral at bedtime  polyethylene glycol 3350 17 Gram(s) Oral daily  senna 2 Tablet(s) Oral at bedtime  sodium bicarbonate  Infusion 0.146 mEq/kG/Hr (100 mL/Hr) IV Continuous <Continuous>  sodium chloride 3%  Inhalation 4 milliLiter(s) Inhalation every 12 hours  sodium zirconium cyclosilicate 10 Gram(s) Oral every 8 hours    MEDICATIONS  (PRN):  aluminum hydroxide/magnesium hydroxide/simethicone Suspension 30 milliLiter(s) Oral every 4 hours PRN Dyspepsia  ondansetron Injectable 4 milliGRAM(s) IV Push every 8 hours PRN Nausea and/or Vomiting  oxyCODONE    IR 5 milliGRAM(s) Oral every 6 hours PRN Severe Pain (7 - 10)  traMADol 25 milliGRAM(s) Oral every 12 hours PRN Mild Pain (1 - 3)  traMADol 50 milliGRAM(s) Oral every 12 hours PRN Moderate Pain (4 - 6)   Meds reviewed    Allergies    Bactrim DS (Hives)    Intolerances         REVIEW OF SYSTEMS: as per HPI    Vital Signs Last 24 Hrs  T(C): 37 (2024 04:51), Max: 37.1 (2024 19:57)  T(F): 98.6 (2024 04:51), Max: 98.7 (2024 19:57)  HR: 85 (2024 07:25) (69 - 88)  BP: 146/83 (2024 04:51) (139/74 - 146/83)  BP(mean): --  RR: 18 (2024 04:51) (18 - 18)  SpO2: 95% (2024 07:25) (93% - 95%)    Parameters below as of 2024 07:25  Patient On (Oxygen Delivery Method): room air      PHYSICAL EXAM:    GENERAL: NAD, obese, anxious  HEAD:  Atraumatic, Normocephalic  EYES: EOMI, conjunctiva and sclera clear  ENMT: No Drainage from nares, No drainage from ears  NERVOUS SYSTEM:  Awake and Alert  CHEST/LUNG: Clear to percussion bilaterally, RCW port  EXTREMITIES:  no edema  SKIN: No rashes No obvious ecchymosis  : wise noted       LABS:                        9.6    7.74  )-----------( 290      ( 2024 07:12 )             28.8         138  |  98  |  82[H]  ----------------------------<  95  4.8   |  26  |  10.00[H]    Ca    7.9[L]      2024 07:12  Phos  8.5       Mg     2.8         TPro  6.4  /  Alb  2.6[L]  /  TBili  0.3  /  DBili  x   /  AST  10[L]  /  ALT  11[L]  /  AlkPhos  74  -24      Urinalysis Basic - ( 2024 07:12 )    Color: x / Appearance: x / SG: x / pH: x  Gluc: 95 mg/dL / Ketone: x  / Bili: x / Urobili: x   Blood: x / Protein: x / Nitrite: x   Leuk Esterase: x / RBC: x / WBC x   Sq Epi: x / Non Sq Epi: x / Bacteria: x

## 2024-11-24 NOTE — PROGRESS NOTE ADULT - ASSESSMENT
Acute kidney injury, with ATN  Anemia  Hyperkalemia  Metabolic acidosis  Urinary retention      -MARYSOL likely due to ATN from NSAID use and poor po intake, has normal renal function at baseline  -Likely some retention, has wise  -SCr 1.1 at baseline with normal electrolytes  -Changed to renal diet restrictions  -Iron panel with am labs for anemia  -Informed consent obtained. HD discussed upto and including death discussed. Informed consent obtained and he agrees to HD if necessary  -Advised patient to avoid all nsaids   -DC IVF. Fluid overloaded   -Lokelma 10 grams po x 1   -ICU team to place temp dialysis catheter. Appreciate the help. HD to start once dialysis catheter obtained. HD to start today 11/24/24     D/w HD RN  D/w RN  d/w family

## 2024-11-24 NOTE — PROGRESS NOTE ADULT - PROBLEM SELECTOR PLAN 4
Pt with recently diagnosed gastric lymphoma, port placed prior to admission for planned chemo (appointment on Monday per pt). Follows Dr. Andrea  - CT Ab: Lymphadenopathy which appears increased when compared with 9/30/2024. Nodularity along posterior peritoneal reflections and minimal perihepatic fluid which were not seen previously.  - Microcytic anemia on labs  - iron studies noted, heme/onc following  - hx beta thal minor  Hematology -Dr lu follow up

## 2024-11-24 NOTE — PROGRESS NOTE ADULT - PROBLEM SELECTOR PLAN 3
Pt endorsing ZHOU while walking, new onset  - CT Chest:  Mild mottled density in the left mainstem bronchus may represent secretions. No focal consolidation or discrete mass is seen. Regions of atelectasis or scarring in the left upper lobe/lingula. Atelectatic changes at the lung bases  - Pt saturating well on RA  - Elevation in proBNP, can be 2/2 MARSYOL, less likely cardiac as pt does not appear clinically volume overloaded on examination and denies history of HF. No cardiomegaly on CT Chest  - Standing Duoneb and hypertonic saline  - Encourage incentive spirometer use  - Aspiration precautions  - Monitor routine hemodynamics  - F/U TTE - done, NL EF  - ZHOU improving subjectively

## 2024-11-25 LAB
ALBUMIN SERPL ELPH-MCNC: 2.8 G/DL — LOW (ref 3.3–5)
ALP SERPL-CCNC: 80 U/L — SIGNIFICANT CHANGE UP (ref 40–120)
ALT FLD-CCNC: 16 U/L — SIGNIFICANT CHANGE UP (ref 12–78)
ANION GAP SERPL CALC-SCNC: 12 MMOL/L — SIGNIFICANT CHANGE UP (ref 5–17)
AST SERPL-CCNC: 20 U/L — SIGNIFICANT CHANGE UP (ref 15–37)
BILIRUB SERPL-MCNC: 0.2 MG/DL — SIGNIFICANT CHANGE UP (ref 0.2–1.2)
BUN SERPL-MCNC: 81 MG/DL — HIGH (ref 7–23)
CALCIUM SERPL-MCNC: 8.1 MG/DL — LOW (ref 8.5–10.1)
CHLORIDE SERPL-SCNC: 99 MMOL/L — SIGNIFICANT CHANGE UP (ref 96–108)
CO2 SERPL-SCNC: 25 MMOL/L — SIGNIFICANT CHANGE UP (ref 22–31)
CREAT SERPL-MCNC: 11 MG/DL — HIGH (ref 0.5–1.3)
EGFR: 5 ML/MIN/1.73M2 — LOW
GLUCOSE SERPL-MCNC: 98 MG/DL — SIGNIFICANT CHANGE UP (ref 70–99)
HCT VFR BLD CALC: 28.6 % — LOW (ref 39–50)
HGB BLD-MCNC: 9.5 G/DL — LOW (ref 13–17)
MAGNESIUM SERPL-MCNC: 2.9 MG/DL — HIGH (ref 1.6–2.6)
MCHC RBC-ENTMCNC: 19.8 PG — LOW (ref 27–34)
MCHC RBC-ENTMCNC: 33.2 G/DL — SIGNIFICANT CHANGE UP (ref 32–36)
MCV RBC AUTO: 59.5 FL — LOW (ref 80–100)
NRBC # BLD: 0 /100 WBCS — SIGNIFICANT CHANGE UP (ref 0–0)
PHOSPHATE SERPL-MCNC: 8.1 MG/DL — HIGH (ref 2.5–4.5)
PLATELET # BLD AUTO: 300 K/UL — SIGNIFICANT CHANGE UP (ref 150–400)
POTASSIUM SERPL-MCNC: 5.2 MMOL/L — SIGNIFICANT CHANGE UP (ref 3.5–5.3)
POTASSIUM SERPL-SCNC: 5.2 MMOL/L — SIGNIFICANT CHANGE UP (ref 3.5–5.3)
PROT SERPL-MCNC: 6.4 G/DL — SIGNIFICANT CHANGE UP (ref 6–8.3)
RBC # BLD: 4.81 M/UL — SIGNIFICANT CHANGE UP (ref 4.2–5.8)
RBC # FLD: 15.7 % — HIGH (ref 10.3–14.5)
SODIUM SERPL-SCNC: 136 MMOL/L — SIGNIFICANT CHANGE UP (ref 135–145)
WBC # BLD: 8.82 K/UL — SIGNIFICANT CHANGE UP (ref 3.8–10.5)
WBC # FLD AUTO: 8.82 K/UL — SIGNIFICANT CHANGE UP (ref 3.8–10.5)

## 2024-11-25 RX ADMIN — SODIUM CHLORIDE 4 MILLILITER(S): 9 INJECTION, SOLUTION INTRAMUSCULAR; INTRAVENOUS; SUBCUTANEOUS at 07:00

## 2024-11-25 RX ADMIN — Medication 5000 UNIT(S): at 05:45

## 2024-11-25 RX ADMIN — Medication 5000 UNIT(S): at 21:38

## 2024-11-25 RX ADMIN — IPRATROPIUM BROMIDE AND ALBUTEROL SULFATE 3 MILLILITER(S): 2.5; .5 SOLUTION RESPIRATORY (INHALATION) at 19:56

## 2024-11-25 RX ADMIN — IPRATROPIUM BROMIDE AND ALBUTEROL SULFATE 3 MILLILITER(S): 2.5; .5 SOLUTION RESPIRATORY (INHALATION) at 07:43

## 2024-11-25 RX ADMIN — SODIUM CHLORIDE 4 MILLILITER(S): 9 INJECTION, SOLUTION INTRAMUSCULAR; INTRAVENOUS; SUBCUTANEOUS at 19:58

## 2024-11-25 RX ADMIN — Medication 5000 UNIT(S): at 14:28

## 2024-11-25 RX ADMIN — CHLORHEXIDINE GLUCONATE 1 APPLICATION(S): 1.2 RINSE ORAL at 05:45

## 2024-11-25 RX ADMIN — POLYETHYLENE GLYCOL 3350 17 GRAM(S): 17 POWDER, FOR SOLUTION ORAL at 14:28

## 2024-11-25 RX ADMIN — ACETAMINOPHEN, DIPHENHYDRAMINE HCL, PHENYLEPHRINE HCL 5 MILLIGRAM(S): 325; 25; 5 TABLET ORAL at 21:38

## 2024-11-25 NOTE — SOCIAL WORK PROGRESS NOTE - NSSWPROGRESSNOTE_GEN_ALL_CORE
SW met with pt at bedside regarding dc planning. Per Nephro, pt will need HD post dc from the hospital. Pt requesting referral to [facility] and [facility] for MWF slot earliest time available (3-5AM if possible). SW to send referrals. SW to follow and remain available for any needs.

## 2024-11-25 NOTE — PROGRESS NOTE ADULT - ASSESSMENT
IMPRESSION    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/mesenteric LN involvements(under care Dr Mariana Fung [facility]), adm w rapidly progressive MARYSOL, Cr 5s on adm was 1.1 9/30/24 and 3 w Dr Fung Tuesday 11/19  Pt had first presented w flank pain 9/2024 thought renal stone, but 9/30/24 CT renal stone hunt show sig mesenteric/retroperitoneal LADs up to 3.9cm), tx from [place] to [facility] for ?endoscopic bx(pt reports had "stomach bx", but path report in computer 9/30/24 perihepatic LN FNA c/w met poorly diff ca), Pt subsequent had additional left neck LN bx  First chemo FOLFOX w Dr Fung was planned for Monday 11/25/24   Hgb stable since adm at 10    CT CAP: no sig above diaphragm findings, significant mesenteric/retroperitoneal LADs up to 4.6cm had been up to 3.9cm on 9/30/24 CT renal hunt.   No hydronephrosis andreas renal cysts seen  Seen by Renal, etiology of MARYSOL ?NSAIDs effect    -Cr continue to worsen since adm, today at 10, starting HD  -Hgb remains stable today at 9s  -continue present management      RECOMMENDATIONS    AMRYSOL  HD per renal  follow renal fx  follow urine outpt    Gastric Cancer  supportive measures  outpt followup with oncology  treatment to be re-eval given new MARYSOL, and hopefully not long term ESRD    DVT prophyalxis    discussed w pt

## 2024-11-25 NOTE — PROGRESS NOTE ADULT - ASSESSMENT
Acute kidney injury, with ATN  Anemia  Hyperkalemia  Metabolic acidosis  Urinary retention      -MARYSOL likely due to ATN from NSAID use and poor po intake, has normal renal function at baseline  -Likely some retention, has wise  -SCr 1.1 at baseline with normal electrolytes  -Changed to renal diet restrictions  -Iron panel with am labs for anemia  -Informed consent obtained. HD discussed upto and including death discussed. Informed consent obtained and he agrees to HD if necessary  -Advised patient to avoid all nsaids   -DC IVF. Fluid overloaded   -Lokelma 10 grams po x 1   -ICU team to place temp dialysis catheter. Appreciate the help. HD to start once dialysis catheter obtained. HD started 11/24/24, Plan HD #2 today    SW/CM for out patient HD At [facility] ([facility]), [facility], [facility], [facility] ([place]), [facility],  [facility], [facility], [facility] Acute kidney injury, with ATN  Anemia  Hyperkalemia  Metabolic acidosis  Urinary retention      -MARYSOL likely due to ATN from NSAID use and poor po intake, has normal renal function at baseline  -Likely some retention, has wise  -SCr 1.1 at baseline with normal electrolytes  -Changed to renal diet restrictions  -Iron panel with am labs for anemia  -Informed consent obtained. HD discussed upto and including death discussed. Informed consent obtained and he agrees to HD if necessary  -Advised patient to avoid all nsaids   -DC IVF. Fluid overloaded   -Lokelma 10 grams po x 1   -ICU team to place temp dialysis catheter. Appreciate the help. HD to start once dialysis catheter obtained. HD started 11/24/24, Plan HD #2 today    SW/CM for out patient HD At [facility] ([facility]), [facility], [facility], [facility],  [facility], [facility] Acute kidney injury, with ATN  Anemia  Hyperkalemia  Metabolic acidosis  Urinary retention      -MARYSOL likely due to ATN from NSAID use and poor po intake, has normal renal function at baseline  -Likely some retention, has wise  -SCr 1.1 at baseline with normal electrolytes  -Changed to renal diet restrictions  -Iron panel with am labs for anemia  -Informed consent obtained. HD discussed upto and including death discussed. Informed consent obtained and he agrees to HD if necessary  -Advised patient to avoid all nsaids   -DC IVF. Fluid overloaded   -Lokelma 10 grams po x 1   -ICU team  placed temp dialysis catheter. Appreciate the help.  HD started 11/24/24, Plan HD #2 today.  HD again tomorrow with more fluid removal  -IR consult for PC     SW/CM for out patient HD At [facility] ([facility]), [facility], [facility], [facility]), [facility],  [facility], [facility]

## 2024-11-25 NOTE — PROGRESS NOTE ADULT - SUBJECTIVE AND OBJECTIVE BOX
[INTERVAL HX: ]  Patient seen and examined;  Chart reviewed and events noted;     s/p HD yest  s/p HD today      [MEDICATIONS]  MEDICATIONS  (STANDING):  albuterol/ipratropium for Nebulization 3 milliLiter(s) Nebulizer every 12 hours  chlorhexidine 4% Liquid 1 Application(s) Topical <User Schedule>  heparin   Injectable 5000 Unit(s) SubCutaneous every 8 hours  influenza   Vaccine 0.5 milliLiter(s) IntraMuscular once  melatonin 5 milliGRAM(s) Oral at bedtime  polyethylene glycol 3350 17 Gram(s) Oral daily  senna 2 Tablet(s) Oral at bedtime  sodium chloride 3%  Inhalation 4 milliLiter(s) Inhalation every 12 hours    MEDICATIONS  (PRN):  aluminum hydroxide/magnesium hydroxide/simethicone Suspension 30 milliLiter(s) Oral every 4 hours PRN Dyspepsia  ondansetron Injectable 4 milliGRAM(s) IV Push every 8 hours PRN Nausea and/or Vomiting  oxyCODONE    IR 5 milliGRAM(s) Oral every 6 hours PRN Severe Pain (7 - 10)  sodium chloride 0.9% lock flush 10 milliLiter(s) IV Push every 1 hour PRN Pre/post blood products, medications, blood draw, and to maintain line patency  traMADol 25 milliGRAM(s) Oral every 12 hours PRN Mild Pain (1 - 3)  traMADol 50 milliGRAM(s) Oral every 12 hours PRN Moderate Pain (4 - 6)      [VITALS]  Vital Signs Last 24 Hrs  T(C): 36.9 (2024 12:50), Max: 37.6 (2024 20:11)  T(F): 98.5 (2024 12:50), Max: 99.6 (2024 20:11)  HR: 79 (2024 12:50) (72 - 81)  BP: 159/89 (2024 12:50) (124/76 - 166/89)  BP(mean): --  RR: 19 (2024 12:50) (16 - 20)  SpO2: 93% (2024 12:50) (91% - 96%)    Parameters below as of 2024 12:50  Patient On (Oxygen Delivery Method): room air      [WT/HT]  Daily     Daily Weight in k.4 (2024 12:25)  [VENT]      [PHYSICAL EXAM]  GEN: NAD  HEENT: normocephalic and atraumatic. EOMI. PERRL.    NECK: Supple.  No lymphadenopathy   LUNGS: Clear to auscultation.  HEART: Regular rate and rhythm,  no MRG  ABDOMEN: Soft, nontender, and nondistended.  Positive bowel sounds.    : No CVA tenderness  EXTREMITIES: Without edema.  NEUROLOGIC: grossly intact.  PSYCHIATRIC: Appropriate affect .  SKIN: No rash     [LABS:]                        9.5    8.82  )-----------( 300      ( 2024 06:15 )             28.6         136  |  99  |  81[H]  ----------------------------<  98  5.2   |  25  |  11.00[H]    Ca    8.1[L]      2024 06:15  Phos  8.1       Mg     2.9         TPro  6.4  /  Alb  2.8[L]  /  TBili  0.2  /  DBili  x   /  AST  20  /  ALT  16  /  AlkPhos  80        Iron - Total Binding Capacity.: 241 ug/dL [220 - 430] (24 @ 07:05)    Ferritin: 257 ng/mL [30 - 400] (24 @ 07:05)    Vitamin B12, Serum: 1506 pg/mL *H* [200 - 900] (24 @ 19:53)    Ferritin: 140 ng/mL [30 - 400] (24 @ 19:53)    Folate, Serum: 12.6 ng/mL [3.1 - 17.5] (24 @ 19:53)      Urinalysis Basic - ( 2024 06:15 )    Color: x / Appearance: x / SG: x / pH: x  Gluc: 98 mg/dL / Ketone: x  / Bili: x / Urobili: x   Blood: x / Protein: x / Nitrite: x   Leuk Esterase: x / RBC: x / WBC x   Sq Epi: x / Non Sq Epi: x / Bacteria: x                [RADIOLOGY STUDIES:]

## 2024-11-25 NOTE — PROGRESS NOTE ADULT - PROBLEM SELECTOR PLAN 1
Pt sent in to ED for elevated Cr (Baseline 1.1, increasing to 1.5-->3.0), endorsing decreased urination and flank pain  - Pt c/o flank pain (L), per chart review, also was endorsing pain during admission at [facility], prior to elevated Cr  - Cr 5.3 on admission, pt with prolonged NSAID use.   - FeNA 3.4, likely ATN from progression of prerenal MARYSOL MA  - s/p wise catheter placement in ED with minimal output  - CT Abdomen: Collapsed bladder. Bilateral renal cysts, no hydro. No obstruction.  - Creatinine continues to rise however good UOP; K elevated on admission now s/p LOKELMA x1 with improvement in K  - Metabolic Acidosis, S/P- NaHco3  - first session of HD yesterday 11/24 with 1L removal, Cr today still rising now 11 - likely will undergo HD again today  - fluids discontinued due to LE edema  - tramadol 25mg PRN mild pain, tramadol 50mg for moderate pain, oxycodone 5mg for severe pain   - Strict Is&Os q6h  - Monitor volume status  - Avoid nephrotoxic medications, Renal dose meds   - Renal diet  - Daily CMP  - Nephrology (Dr. Cantu)  group following, recs appreciated - HD today

## 2024-11-25 NOTE — PROGRESS NOTE ADULT - SUBJECTIVE AND OBJECTIVE BOX
Patient is a 64y old  Male who presents with a chief complaint of MARYSOL (24 Nov 2024 12:01)    HPI:  Pt is a 63yo M with PMHx recently diagnosed gastric lymphoma  S/P Biopsy of gastric mass & neck Lymph node  with LN spread presents to the ED as recommended by his oncologist Dr. Andrea for elevated Creatinine. Pt called by doctor for elevated Cr at 5.3.  Baseline 1.1, increasing to 1.5-->3.0--> 5.3 on outpatient labs. Pt had chemo port placed today, has not been drinking as much as he usually does. Per pt, urinated a small amount this AM, contributed it to decreased fluid intake. Pt endorsing mild flank pain, prescribed oxycodone by outpatient doctor. Pt also endorsing new onset dyspnea on exertion. Pt has been taking Advil for 5 weeks for Lower Back pain. Pt had placement of Medi port as out pt   today     Denies fever, chills, chest pain, palpitations, abdominal pain, nausea, vomiting, diarrhea, constipation, or dysuria    ED Course:   Vitals: BP: 160/90, HR: 74, Temp: 98F, RR: 18  Labs:  WBC 7.31, Hg 10, CO2 21, BUN/CR 58,5.3, Ca 8.3, eGFR 11, Mag 2.8, proBNP 593  UA: Pending  CT Chest: Mild mottled density in the left mainstem bronchus may represent secretions. No focal consolidation or discrete mass is seen. Regions of atelectasis or scarring in the left upper lobe/lingula. Atelectatic changes at the lung bases.  CT Abdomen/Pelvis: Lymphadenopathy which appears increased when compared with 9/30/2024. Nodularity along posterior peritoneal reflections and minimal perihepatic fluid which were not seen previously.    EKG: NSR rate 68bpm. QTc 408ms  Received in the ED: 500cc NS bolus x1   (20 Nov 2024 22:40)    INTERVAL HPI:  11/21: Pt seen and examined at bedside. Pt was making minimal urine output overnight but has voided significantly more since than. He has been taking Advil for the last month at the very minimum, with  max of 6 pills/day for R. low back pain. However, the pain is not present currently. No acute c/o at this time.   11/22: Pt seen and examined at bedside. 1.3L UOP in the last 24 hours. Pt reports feeling well, shortness  of breath has improved a bit. No other concerns worsening MARYSOL , K stable   11/23:pt seen, examined, Cr increasing, Low h/h stable, High electrolytes.  11/24: Pt seen and examined, seated in chair. Pt reports feeling well, and states his shortness of breath when walking has improved. He still has intermittent lower back pain. Cr rising, today 10, nephro to start temporary HD today.  11/25: Pt seen and examined, seated in chair about to eat breakfast. Pt reports he felt a little nauseous during his first HD session yesterday, and stated he felt tired after but this morning he feels well. 1L removed yesterday. Cr still rising, 11 today. Likely will undergo another session of HD today. No concerns per patient.    OVERNIGHT EVENTS: None    Home Medications:      MEDICATIONS  (STANDING):  albuterol/ipratropium for Nebulization 3 milliLiter(s) Nebulizer every 12 hours  chlorhexidine 4% Liquid 1 Application(s) Topical <User Schedule>  heparin   Injectable 5000 Unit(s) SubCutaneous every 8 hours  influenza   Vaccine 0.5 milliLiter(s) IntraMuscular once  melatonin 5 milliGRAM(s) Oral at bedtime  polyethylene glycol 3350 17 Gram(s) Oral daily  senna 2 Tablet(s) Oral at bedtime  sodium chloride 3%  Inhalation 4 milliLiter(s) Inhalation every 12 hours    MEDICATIONS  (PRN):  aluminum hydroxide/magnesium hydroxide/simethicone Suspension 30 milliLiter(s) Oral every 4 hours PRN Dyspepsia  ondansetron Injectable 4 milliGRAM(s) IV Push every 8 hours PRN Nausea and/or Vomiting  oxyCODONE    IR 5 milliGRAM(s) Oral every 6 hours PRN Severe Pain (7 - 10)  sodium chloride 0.9% lock flush 10 milliLiter(s) IV Push every 1 hour PRN Pre/post blood products, medications, blood draw, and to maintain line patency  traMADol 25 milliGRAM(s) Oral every 12 hours PRN Mild Pain (1 - 3)  traMADol 50 milliGRAM(s) Oral every 12 hours PRN Moderate Pain (4 - 6)      Allergies    Bactrim DS (Hives)    Intolerances        Social History:  Lives at home with family  Recently retired  Active, ambulates independently and independent with ADLs (20 Nov 2024 22:40)      REVIEW OF SYSTEMS:  CONSTITUTIONAL: No fever, No chills, No fatigue, No myalgia, No Body ache, No Weakness  EYES: No eye pain,  No visual disturbances, No discharge, NO Redness  ENMT:  No ear pain, No nose bleed, No vertigo; No sinus pain, NO throat pain, No Congestion  NECK: +discomfort at catheter site, No pain, No stiffness  RESPIRATORY: No cough, NO wheezing, No  hemoptysis, NO  shortness of breath  CARDIOVASCULAR: No chest pain, palpitations  GASTROINTESTINAL: No abdominal pain, NO epigastric pain. No nausea, No vomiting; No diarrhea, No constipation. [  ] BM  GENITOURINARY: No dysuria, No frequency, No urgency, No hematuria, NO incontinence  NEUROLOGICAL: No headaches, No dizziness, No numbness, No tingling, No tremors, No weakness  EXT: No Swelling, No Pain, No Edema  SKIN:  [ x ] No itching, burning, rashes, or lesions   MUSCULOSKELETAL: No joint pain ,No Jt swelling; No muscle pain, No back pain, No extremity pain  PSYCHIATRIC: No depression,  No anxiety,  No mood swings ,No difficulty sleeping at night  PAIN SCALE: [ x ] None  [  ] Other-  ROS Unable to obtain due to - [  ] Dementia  [  ] Lethargy [  ] Drowsy [  ] Sedated [  ] non verbal  REST OF REVIEW Of SYSTEM - [ x ] Normal     Vital Signs Last 24 Hrs  T(C): 36.8 (25 Nov 2024 05:19), Max: 37.6 (24 Nov 2024 20:11)  T(F): 98.3 (25 Nov 2024 05:19), Max: 99.6 (24 Nov 2024 20:11)  HR: 73 (25 Nov 2024 07:00) (72 - 78)  BP: 166/89 (25 Nov 2024 05:19) (145/89 - 173/88)  BP(mean): --  RR: 18 (25 Nov 2024 05:19) (18 - 18)  SpO2: 93% (25 Nov 2024 07:00) (92% - 96%)    Parameters below as of 25 Nov 2024 07:00  Patient On (Oxygen Delivery Method): room air      Finger Stick        11-24 @ 07:01  -  11-25 @ 07:00  --------------------------------------------------------  IN: 0 mL / OUT: 2750 mL / NET: -2750 mL        PHYSICAL EXAM: chemo port, RIJ  GENERAL:  [ x ] NAD , [ x ] well appearing, [  ] Agitated, [  ] Drowsy,  [  ] Lethargy, [  ] confused   HEAD:  [ x ] Normal, [  ] Other  EYES:  [ x ] EOMI, [  ] PERRLA, [ x ] erythematous conjunctiva, sclera clear, [  ] Other,  [  ] Pallor,[  ] Discharge  ENMT:  [ x ] Normal, [  ] Moist mucous membranes, [ x ] RIJ catheter [  ] Good dentition, [  ] No Thrush  NECK:  [  ] Supple, [ x ] No JVD, [  ] Normal thyroid, [  ] Lymphadenopathy [  ] Other  CHEST/LUNG:  [ x ] Clear to auscultation bilaterally, [ x ] Breath Sounds equal B/L, [  ] poor effort  [ x ] No rales, [ x ] No rhonchi  [ x ]  No wheezing,   HEART:  [ x ] Regular rate and rhythm, [  ] tachycardia, [  ] Bradycardia,  [  ] irregular  [ x ] No murmurs, No rubs, No gallops, [  ] PPM in place (Mfr:  )  ABDOMEN:  [ x ] Soft, [ x ] Nontender, [ x ] Nondistended, [  ] No mass, [  ] Bowel sounds present, [  ] obese  NERVOUS SYSTEM:  [ x ] Alert & Oriented X3, [ x ] Nonfocal  [  ] Confusion  [  ] Encephalopathic [  ] Sedated [  ] Unable to assess, [  ] Dementia [  ] Other-  EXTREMITIES: [  ] 2+ Peripheral Pulses, No clubbing, No cyanosis,  [ x ]  improving pitting edema B/L lower EXT. [  ] PVD stasis skin changes B/L Lower EXT, [  ] wound  LYMPH: No lymphadenopathy noted  SKIN:  [  ] No rashes or lesions, [  ] Pressure Ulcers, [  ] ecchymosis, [  ] Skin Tears, [  ]     DIET: Diet, Renal Restrictions:   For patients receiving Renal Replacement - No Protein Restr, No Conc K, No Conc Phos, Low Sodium  Supplement Feeding Modality:  Oral  Suplena Cans or Servings Per Day:  1       Frequency:  Daily (11-22-24 @ 11:31)      LABS:                        9.5    8.82  )-----------( 300      ( 25 Nov 2024 06:15 )             28.6     25 Nov 2024 06:15    136    |  99     |  81     ----------------------------<  98     5.2     |  25     |  11.00    Ca    8.1        25 Nov 2024 06:15  Phos  8.1       25 Nov 2024 06:15  Mg     2.9       25 Nov 2024 06:15    TPro  6.4    /  Alb  2.8    /  TBili  0.2    /  DBili  x      /  AST  20     /  ALT  16     /  AlkPhos  80     25 Nov 2024 06:15      Urinalysis Basic - ( 25 Nov 2024 06:15 )    Color: x / Appearance: x / SG: x / pH: x  Gluc: 98 mg/dL / Ketone: x  / Bili: x / Urobili: x   Blood: x / Protein: x / Nitrite: x   Leuk Esterase: x / RBC: x / WBC x   Sq Epi: x / Non Sq Epi: x / Bacteria: x        Culture Results:   No growth (11-21 @ 07:30)                  Culture - Urine (collected 21 Nov 2024 07:30)  Source: Clean Catch  Final Report (22 Nov 2024 09:09):    No growth    Urinalysis with Rflx Culture (collected 21 Nov 2024 07:30)         Anemia Panel:  Iron Total: 17 ug/dL (11-22-24 @ 07:05)  Iron - Total Binding Capacity.: 241 ug/dL (11-22-24 @ 07:05)  Ferritin: 257 ng/mL (11-22-24 @ 07:05)      Thyroid Panel:  Thyroid Stimulating Hormone, Serum: 1.11 uIU/mL (11-20-24 @ 18:40)                RADIOLOGY & ADDITIONAL TESTS:      HEALTH ISSUES - PROBLEM Dx:  MARYSOL (acute kidney injury)    Gastric lymphoma    Need for prophylactic measure    Dyspnea    Hyperkalemia            Consultant(s) Notes Reviewed:  [ x ] YES     Care Discussed with [X] Consultants  [ x ] Patient  [  ] Family [  ] HCP [  ]   [  ] Social Service  [  ] RN, [  ] Physical Therapy,[  ] Palliative care team  DVT PPX: [  ] Lovenox, [ x ] S C Heparin, [  ] Coumadin, [  ] Xarelto, [  ] Eliquis, [  ] Pradaxa, [  ] IV Heparin drip, [  ] SCD [  ] Contraindication 2 to GI Bleed,[  ] Ambulation [  ] Contraindicated 2 to  bleed [  ] Contraindicated 2 to Brain Bleed  Advanced directive: [  ] None, [  ] DNR/DNI Patient is a 64y old  Male who presents with a chief complaint of MARYSOL (24 Nov 2024 12:01)    HPI:  Pt is a 63yo M with PMHx recently diagnosed gastric lymphoma  S/P Biopsy of gastric mass & neck Lymph node  with LN spread presents to the ED as recommended by his oncologist Dr. Andrea for elevated Creatinine. Pt called by doctor for elevated Cr at 5.3.  Baseline 1.1, increasing to 1.5-->3.0--> 5.3 on outpatient labs. Pt had chemo port placed today, has not been drinking as much as he usually does. Per pt, urinated a small amount this AM, contributed it to decreased fluid intake. Pt endorsing mild flank pain, prescribed oxycodone by outpatient doctor. Pt also endorsing new onset dyspnea on exertion. Pt has been taking Advil for 5 weeks for Lower Back pain. Pt had placement of Medi port as out pt   today     Denies fever, chills, chest pain, palpitations, abdominal pain, nausea, vomiting, diarrhea, constipation, or dysuria    ED Course:   Vitals: BP: 160/90, HR: 74, Temp: 98F, RR: 18  Labs:  WBC 7.31, Hg 10, CO2 21, BUN/CR 58,5.3, Ca 8.3, eGFR 11, Mag 2.8, proBNP 593  UA: Pending  CT Chest: Mild mottled density in the left mainstem bronchus may represent secretions. No focal consolidation or discrete mass is seen. Regions of atelectasis or scarring in the left upper lobe/lingula. Atelectatic changes at the lung bases.  CT Abdomen/Pelvis: Lymphadenopathy which appears increased when compared with 9/30/2024. Nodularity along posterior peritoneal reflections and minimal perihepatic fluid which were not seen previously.    EKG: NSR rate 68bpm. QTc 408ms  Received in the ED: 500cc NS bolus x1   (20 Nov 2024 22:40)    INTERVAL HPI:  11/21: Pt seen and examined at bedside. Pt was making minimal urine output overnight but has voided significantly more since than. He has been taking Advil for the last month at the very minimum, with  max of 6 pills/day for R. low back pain. However, the pain is not present currently. No acute c/o at this time.   11/22: Pt seen and examined at bedside. 1.3L UOP in the last 24 hours. Pt reports feeling well, shortness  of breath has improved a bit. No other concerns worsening MARYSOL , K stable   11/23:pt seen, examined, Cr increasing, Low h/h stable, High electrolytes.  11/24: Pt seen and examined, seated in chair. Pt reports feeling well, and states his shortness of breath when walking has improved. He still has intermittent lower back pain. Cr rising, today 10, nephro to start temporary HD today.  11/25: Pt seen and examined, seated in chair about to eat breakfast. Pt reports he felt a little nauseous during his first HD session yesterday, and stated he felt tired after but this morning he feels well. 1L removed yesterday. Cr still rising, 11 today. Likely will undergo another session of HD today. No concerns per patient.HD Rx today     OVERNIGHT EVENTS: None    Home Medications:      MEDICATIONS  (STANDING):  albuterol/ipratropium for Nebulization 3 milliLiter(s) Nebulizer every 12 hours  chlorhexidine 4% Liquid 1 Application(s) Topical <User Schedule>  heparin   Injectable 5000 Unit(s) SubCutaneous every 8 hours  influenza   Vaccine 0.5 milliLiter(s) IntraMuscular once  melatonin 5 milliGRAM(s) Oral at bedtime  polyethylene glycol 3350 17 Gram(s) Oral daily  senna 2 Tablet(s) Oral at bedtime  sodium chloride 3%  Inhalation 4 milliLiter(s) Inhalation every 12 hours    MEDICATIONS  (PRN):  aluminum hydroxide/magnesium hydroxide/simethicone Suspension 30 milliLiter(s) Oral every 4 hours PRN Dyspepsia  ondansetron Injectable 4 milliGRAM(s) IV Push every 8 hours PRN Nausea and/or Vomiting  oxyCODONE    IR 5 milliGRAM(s) Oral every 6 hours PRN Severe Pain (7 - 10)  sodium chloride 0.9% lock flush 10 milliLiter(s) IV Push every 1 hour PRN Pre/post blood products, medications, blood draw, and to maintain line patency  traMADol 25 milliGRAM(s) Oral every 12 hours PRN Mild Pain (1 - 3)  traMADol 50 milliGRAM(s) Oral every 12 hours PRN Moderate Pain (4 - 6)      Allergies    Bactrim DS (Hives)    Intolerances        Social History:  Lives at home with family  Recently retired  Active, ambulates independently and independent with ADLs (20 Nov 2024 22:40)      REVIEW OF SYSTEMS:  CONSTITUTIONAL: No fever, No chills, No fatigue, No myalgia, No Body ache, No Weakness  EYES: No eye pain,  No visual disturbances, No discharge, NO Redness  ENMT:  No ear pain, No nose bleed, No vertigo; No sinus pain, NO throat pain, No Congestion  NECK: +discomfort at catheter site, No pain, No stiffness  RESPIRATORY: No cough, NO wheezing, No  hemoptysis, NO  shortness of breath  CARDIOVASCULAR: No chest pain, palpitations  GASTROINTESTINAL: No abdominal pain, NO epigastric pain. No nausea, No vomiting; No diarrhea, No constipation. [  ] BM  GENITOURINARY: No dysuria, No frequency, No urgency, No hematuria, NO incontinence  NEUROLOGICAL: No headaches, No dizziness, No numbness, No tingling, No tremors, No weakness  EXT: No Swelling, No Pain, No Edema  SKIN:  [ x ] No itching, burning, rashes, or lesions   MUSCULOSKELETAL: No joint pain ,No Jt swelling; No muscle pain, No back pain, No extremity pain  PSYCHIATRIC: No depression,  No anxiety,  No mood swings ,No difficulty sleeping at night  PAIN SCALE: [ x ] None  [  ] Other-  ROS Unable to obtain due to - [  ] Dementia  [  ] Lethargy [  ] Drowsy [  ] Sedated [  ] non verbal  REST OF REVIEW Of SYSTEM - [ x ] Normal     Vital Signs Last 24 Hrs  T(C): 36.8 (25 Nov 2024 05:19), Max: 37.6 (24 Nov 2024 20:11)  T(F): 98.3 (25 Nov 2024 05:19), Max: 99.6 (24 Nov 2024 20:11)  HR: 73 (25 Nov 2024 07:00) (72 - 78)  BP: 166/89 (25 Nov 2024 05:19) (145/89 - 173/88)  BP(mean): --  RR: 18 (25 Nov 2024 05:19) (18 - 18)  SpO2: 93% (25 Nov 2024 07:00) (92% - 96%)    Parameters below as of 25 Nov 2024 07:00  Patient On (Oxygen Delivery Method): room air      Finger Stick        11-24 @ 07:01  -  11-25 @ 07:00  --------------------------------------------------------  IN: 0 mL / OUT: 2750 mL / NET: -2750 mL        PHYSICAL EXAM: chemo port, RIJ  GENERAL:  [ x ] NAD , [ x ] well appearing, [  ] Agitated, [  ] Drowsy,  [  ] Lethargy, [  ] confused   HEAD:  [ x ] Normal, [  ] Other  EYES:  [ x ] EOMI, [  ] PERRLA, [ x ] erythematous conjunctiva, sclera clear, [  ] Other,  [  ] Pallor,[  ] Discharge  ENMT:  [ x ] Normal, [  ] Moist mucous membranes, [ x ] RIJ catheter [  ] Good dentition, [x  ] No Thrush  NECK:  [  ] Supple, [ x ] No JVD, [x  ] Normal thyroid, [  ] Lymphadenopathy [  ] Other  CHEST/LUNG:  [ x ] Clear to auscultation bilaterally, [ x ] Breath Sounds equal B/L, [  ] poor effort  [ x ] No rales, [ x ] No rhonchi  [ x ]  No wheezing,   HEART:  [ x ] Regular rate and rhythm, [  ] tachycardia, [  ] Bradycardia,  [  ] irregular  [ x ] No murmurs, No rubs, No gallops, [  ] PPM in place (Mfr:  )  ABDOMEN:  [ x ] Soft, [ x ] Nontender, [ x ] Nondistended, x[  ] No mass, [x  ] Bowel sounds present, [ x ] obese  NERVOUS SYSTEM:  [ x ] Alert & Oriented X3, [ x ] Nonfocal  [  ] Confusion  [  ] Encephalopathic [  ] Sedated [  ] Unable to assess, [  ] Dementia [  ] Other-  EXTREMITIES: [  ] 2+ Peripheral Pulses, No clubbing, No cyanosis,  [ x ]2+  improving pitting edema B/L lower EXT. [  ] PVD stasis skin changes B/L Lower EXT, [  ] wound  LYMPH: No lymphadenopathy noted  SKIN:  [  ] No rashes or lesions, [  ] Pressure Ulcers, [  ] ecchymosis, [  ] Skin Tears, [  ]     DIET: Diet, Renal Restrictions:   For patients receiving Renal Replacement - No Protein Restr, No Conc K, No Conc Phos, Low Sodium  Supplement Feeding Modality:  Oral  Suplena Cans or Servings Per Day:  1       Frequency:  Daily (11-22-24 @ 11:31)      LABS:                        9.5    8.82  )-----------( 300      ( 25 Nov 2024 06:15 )             28.6     25 Nov 2024 06:15    136    |  99     |  81     ----------------------------<  98     5.2     |  25     |  11.00    Ca    8.1        25 Nov 2024 06:15  Phos  8.1       25 Nov 2024 06:15  Mg     2.9       25 Nov 2024 06:15    TPro  6.4    /  Alb  2.8    /  TBili  0.2    /  DBili  x      /  AST  20     /  ALT  16     /  AlkPhos  80     25 Nov 2024 06:15      Urinalysis Basic - ( 25 Nov 2024 06:15 )    Color: x / Appearance: x / SG: x / pH: x  Gluc: 98 mg/dL / Ketone: x  / Bili: x / Urobili: x   Blood: x / Protein: x / Nitrite: x   Leuk Esterase: x / RBC: x / WBC x   Sq Epi: x / Non Sq Epi: x / Bacteria: x        Culture Results:   No growth (11-21 @ 07:30)    Culture - Urine (collected 21 Nov 2024 07:30)  Source: Clean Catch  Final Report (22 Nov 2024 09:09):    No growth    Urinalysis with Rflx Culture (collected 21 Nov 2024 07:30)         Anemia Panel:  Iron Total: 17 ug/dL (11-22-24 @ 07:05)  Iron - Total Binding Capacity.: 241 ug/dL (11-22-24 @ 07:05)  Ferritin: 257 ng/mL (11-22-24 @ 07:05)      Thyroid Panel:  Thyroid Stimulating Hormone, Serum: 1.11 uIU/mL (11-20-24 @ 18:40)      RADIOLOGY & ADDITIONAL TESTS:      HEALTH ISSUES - PROBLEM Dx:  MARYSOL (acute kidney injury)    Gastric lymphoma    Need for prophylactic measure    Dyspnea    Hyperkalemia            Consultant(s) Notes Reviewed:  [ x ] YES     Care Discussed with [X] Consultants  [ x ] Patient  [  ] Family [  ] HCP [  ]   [ x ] Social Service  [ x ] RN, [  ] Physical Therapy,[  ] Palliative care team  DVT PPX: [  ] Lovenox, [ x ] S C Heparin, [  ] Coumadin, [  ] Xarelto, [  ] Eliquis, [  ] Pradaxa, [  ] IV Heparin drip, [  ] SCD [  ] Contraindication 2 to GI Bleed,[  ] Ambulation [  ] Contraindicated 2 to  bleed [  ] Contraindicated 2 to Brain Bleed  Advanced directive: [x  ] None, [  ] DNR/DNI

## 2024-11-25 NOTE — PROGRESS NOTE ADULT - SUBJECTIVE AND OBJECTIVE BOX
[facility]                             Nephrology and Hypertension                             Gustavo Oscar                                          [phone]     Patient is a 64y old  Male who presents with a chief complaint of MARYSOL (2024 14:23)     HPI:  Pt is a 65yo M with PMHx recently diagnosed gastric lymphoma  S/P Biopsy of gastric mass & neck Lymph node  with LN spread presents to the ED as recommended by his oncologist Dr. Andrea for elevated Creatinine. Pt called by doctor for elevated Cr at 5.3.  Baseline 1.1, increasing to 1.5-->3.0--> 5.3 on outpatient labs. Pt had chemo port placed today, has not been drinking as much as he usually does. Per pt, urinated a small amount this AM, contributed it to decreased fluid intake. Pt endorsing mild flank pain, prescribed oxycodone by outpatient doctor. Pt also endorsing new onset dyspnea on exertion. Pt has been taking Advil for 5 weeks for Lower Back pain. Pt had placement of Medi port as out pt today   Nephrology is c/s for Acute kidney injury. When I saw him, he is very anxious. He states he had been taking 6 tabs of advil every day for a few weeks for pain. He also endorsed not eating as much. Per discussion with RN frandy was not draining much overnight but over last few hours, has better uop. On labs review, Serum Creat was 1.1 in 2024, was 5.3 on admission, is >6 on repeat labs early this am with hyperkalemia. Was given lokelma . He has no sob, no dizziness, no N/V, no new pain.       Has some decreased appetite, but says he feels a bit depressed with everything going on. NO N/V/SOB    Leg elevated in chair. No c/o        PAST MEDICAL & SURGICAL HISTORY:  Incisional hernia    Gastric lymphoma  Anemia of chronic disease  S/P appendectomy  2014  S/P cholecystectomy  15 years ago  FAMILY HISTORY:  Family history of coronary artery disease in father  Father -  age 60 following MI    Social History:Non smoker    MEDICATIONS  (STANDING):  albuterol/ipratropium for Nebulization 3 milliLiter(s) Nebulizer every 12 hours  chlorhexidine 2% Cloths 1 Application(s) Topical daily  heparin   Injectable 5000 Unit(s) SubCutaneous every 8 hours  influenza   Vaccine 0.5 milliLiter(s) IntraMuscular once  melatonin 5 milliGRAM(s) Oral at bedtime  polyethylene glycol 3350 17 Gram(s) Oral daily  senna 2 Tablet(s) Oral at bedtime  sodium bicarbonate  Infusion 0.146 mEq/kG/Hr (100 mL/Hr) IV Continuous <Continuous>  sodium chloride 3%  Inhalation 4 milliLiter(s) Inhalation every 12 hours  sodium zirconium cyclosilicate 10 Gram(s) Oral every 8 hours    MEDICATIONS  (PRN):  aluminum hydroxide/magnesium hydroxide/simethicone Suspension 30 milliLiter(s) Oral every 4 hours PRN Dyspepsia  ondansetron Injectable 4 milliGRAM(s) IV Push every 8 hours PRN Nausea and/or Vomiting  oxyCODONE    IR 5 milliGRAM(s) Oral every 6 hours PRN Severe Pain (7 - 10)  traMADol 25 milliGRAM(s) Oral every 12 hours PRN Mild Pain (1 - 3)  traMADol 50 milliGRAM(s) Oral every 12 hours PRN Moderate Pain (4 - 6)   Meds reviewed    Allergies    Bactrim DS (Hives)    Intolerances         REVIEW OF SYSTEMS: as per HPI    Vital Signs Last 24 Hrs  T(C): 37 (2024 04:51), Max: 37.1 (2024 19:57)  T(F): 98.6 (2024 04:51), Max: 98.7 (2024 19:57)  HR: 85 (2024 07:25) (69 - 88)  BP: 146/83 (2024 04:51) (139/74 - 146/83)  BP(mean): --  RR: 18 (2024 04:51) (18 - 18)  SpO2: 95% (2024 07:25) (93% - 95%)    Parameters below as of 2024 07:25  Patient On (Oxygen Delivery Method): room air      PHYSICAL EXAM:    GENERAL: NAD, obese, anxious  HEAD:  Atraumatic, Normocephalic  EYES: EOMI, conjunctiva and sclera clear  ENMT: No Drainage from nares, No drainage from ears  NERVOUS SYSTEM:  Awake and Alert  CHEST/LUNG: Clear to percussion bilaterally, RCW port  EXTREMITIES:  no edema  SKIN: No rashes No obvious ecchymosis  : wise noted       LABS:                        9.6    7.74  )-----------( 290      ( 2024 07:12 )             28.8         138  |  98  |  82[H]  ----------------------------<  95  4.8   |  26  |  10.00[H]    Ca    7.9[L]      2024 07:12  Phos  8.5       Mg     2.8         TPro  6.4  /  Alb  2.6[L]  /  TBili  0.3  /  DBili  x   /  AST  10[L]  /  ALT  11[L]  /  AlkPhos  74  -24      Urinalysis Basic - ( 2024 07:12 )    Color: x / Appearance: x / SG: x / pH: x  Gluc: 95 mg/dL / Ketone: x  / Bili: x / Urobili: x   Blood: x / Protein: x / Nitrite: x   Leuk Esterase: x / RBC: x / WBC x   Sq Epi: x / Non Sq Epi: x / Bacteria: x                                          [facility]                             Nephrology and Hypertension                             Gustavo Oscar                                          [phone]     Patient is a 64y old  Male who presents with a chief complaint of MARYSOL (2024 14:23)     HPI:  Pt is a 65yo M with PMHx recently diagnosed gastric lymphoma  S/P Biopsy of gastric mass & neck Lymph node  with LN spread presents to the ED as recommended by his oncologist Dr. Andrea for elevated Creatinine. Pt called by doctor for elevated Cr at 5.3.  Baseline 1.1, increasing to 1.5-->3.0--> 5.3 on outpatient labs. Pt had chemo port placed today, has not been drinking as much as he usually does. Per pt, urinated a small amount this AM, contributed it to decreased fluid intake. Pt endorsing mild flank pain, prescribed oxycodone by outpatient doctor. Pt also endorsing new onset dyspnea on exertion. Pt has been taking Advil for 5 weeks for Lower Back pain. Pt had placement of Medi port as out pt today   Nephrology is c/s for Acute kidney injury. When I saw him, he is very anxious. He states he had been taking 6 tabs of advil every day for a few weeks for pain. He also endorsed not eating as much. Per discussion with RN frandy was not draining much overnight but over last few hours, has better uop. On labs review, Serum Creat was 1.1 in 2024, was 5.3 on admission, is >6 on repeat labs early this am with hyperkalemia. Was given lokelma . He has no sob, no dizziness, no N/V, no new pain.       Has some decreased appetite, but says he feels a bit depressed with everything going on. NO N/V/SOB    Leg elevated in chair. No c/o        PAST MEDICAL & SURGICAL HISTORY:  Incisional hernia    Gastric lymphoma  Anemia of chronic disease  S/P appendectomy  2014  S/P cholecystectomy  15 years ago  FAMILY HISTORY:  Family history of coronary artery disease in father  Father -  age 60 following MI    Social History:Non smoker    MEDICATIONS  (STANDING):  albuterol/ipratropium for Nebulization 3 milliLiter(s) Nebulizer every 12 hours  chlorhexidine 2% Cloths 1 Application(s) Topical daily  heparin   Injectable 5000 Unit(s) SubCutaneous every 8 hours  influenza   Vaccine 0.5 milliLiter(s) IntraMuscular once  melatonin 5 milliGRAM(s) Oral at bedtime  polyethylene glycol 3350 17 Gram(s) Oral daily  senna 2 Tablet(s) Oral at bedtime  sodium bicarbonate  Infusion 0.146 mEq/kG/Hr (100 mL/Hr) IV Continuous <Continuous>  sodium chloride 3%  Inhalation 4 milliLiter(s) Inhalation every 12 hours  sodium zirconium cyclosilicate 10 Gram(s) Oral every 8 hours    MEDICATIONS  (PRN):  aluminum hydroxide/magnesium hydroxide/simethicone Suspension 30 milliLiter(s) Oral every 4 hours PRN Dyspepsia  ondansetron Injectable 4 milliGRAM(s) IV Push every 8 hours PRN Nausea and/or Vomiting  oxyCODONE    IR 5 milliGRAM(s) Oral every 6 hours PRN Severe Pain (7 - 10)  traMADol 25 milliGRAM(s) Oral every 12 hours PRN Mild Pain (1 - 3)  traMADol 50 milliGRAM(s) Oral every 12 hours PRN Moderate Pain (4 - 6)   Meds reviewed    Allergies    Bactrim DS (Hives)    Intolerances         REVIEW OF SYSTEMS: as per HPI    ICU Vital Signs Last 24 Hrs  T(C): 37.3 (2024 18:10), Max: 37.3 (2024 18:10)  T(F): 99.1 (2024 18:10), Max: 99.1 (2024 18:10)  HR: 76 (2024 18:10) (73 - 81)  BP: 152/74 (2024 18:10) (124/76 - 166/89)  BP(mean): --  ABP: --  ABP(mean): --  RR: 16 (2024 18:10) (16 - 20)  SpO2: 95% (2024 18:10) (91% - 96%)    O2 Parameters below as of 2024 18:10  Patient On (Oxygen Delivery Method): room air            PHYSICAL EXAM:    GENERAL: NAD, obese, anxious  HEAD:  Atraumatic, Normocephalic  EYES: EOMI, conjunctiva and sclera clear  ENMT: No Drainage from nares, No drainage from ears  NERVOUS SYSTEM:  Awake and Alert  CHEST/LUNG: Clear to percussion bilaterally, RCW port  EXTREMITIES:  no edema  SKIN: No rashes No obvious ecchymosis  : wise noted       LABS:                                   9.5    8.82  )-----------( 300      ( 2024 06:15 )             28.6         136  |  99  |  81[H]  ----------------------------<  98  5.2   |  25  |  11.00[H]    Ca    8.1[L]      2024 06:15  Phos  8.1       Mg     2.9         TPro  6.4  /  Alb  2.8[L]  /  TBili  0.2  /  DBili  x   /  AST  20  /  ALT  16  /  AlkPhos  80        Urinalysis Basic - ( 2024 06:15 )    Color: x / Appearance: x / SG: x / pH: x  Gluc: 98 mg/dL / Ketone: x  / Bili: x / Urobili: x   Blood: x / Protein: x / Nitrite: x   Leuk Esterase: x / RBC: x / WBC x   Sq Epi: x / Non Sq Epi: x / Bacteria: x

## 2024-11-25 NOTE — PROGRESS NOTE ADULT - PROBLEM SELECTOR PLAN 3
Pt endorsing ZHOU while walking, new onset  - CT Chest:  Mild mottled density in the left mainstem bronchus may represent secretions. No focal consolidation or discrete mass is seen. Regions of atelectasis or scarring in the left upper lobe/lingula. Atelectatic changes at the lung bases  - Pt saturating well on RA  - Elevation in proBNP, can be 2/2 MARYSOL, less likely cardiac as pt does not appear clinically volume overloaded on examination and denies history of HF. No cardiomegaly on CT Chest  - Standing Duoneb and hypertonic saline  - Encourage incentive spirometer use  - Aspiration precautions  - Monitor routine hemodynamics  - F/U TTE - done, NL EF  - ZHOU improving subjectively

## 2024-11-25 NOTE — PROGRESS NOTE ADULT - PROBLEM SELECTOR PLAN 4
Pt with recently diagnosed gastric lymphoma, port placed prior to admission for planned chemo. Follows Dr. Andrea  - CT Ab: Lymphadenopathy which appears increased when compared with 9/30/2024. Nodularity along posterior peritoneal reflections and minimal perihepatic fluid which were not seen previously.  - Microcytic anemia on labs  - iron studies noted, heme/onc following  - hx beta thal minor  Hematology -Dr lu follow up

## 2024-11-25 NOTE — CASE MANAGEMENT PROGRESS NOTE - NSCMPROGRESSNOTE_GEN_ALL_CORE
CM consult noted for PICC line. Will continue to follow patient throughout hospital stay and assist with discharge planning needs.

## 2024-11-26 LAB
ALBUMIN SERPL ELPH-MCNC: 2.6 G/DL — LOW (ref 3.3–5)
ALP SERPL-CCNC: 81 U/L — SIGNIFICANT CHANGE UP (ref 40–120)
ALT FLD-CCNC: 27 U/L — SIGNIFICANT CHANGE UP (ref 12–78)
ANION GAP SERPL CALC-SCNC: 10 MMOL/L — SIGNIFICANT CHANGE UP (ref 5–17)
AST SERPL-CCNC: 28 U/L — SIGNIFICANT CHANGE UP (ref 15–37)
BASOPHILS # BLD AUTO: 0.06 K/UL — SIGNIFICANT CHANGE UP (ref 0–0.2)
BASOPHILS NFR BLD AUTO: 0.8 % — SIGNIFICANT CHANGE UP (ref 0–2)
BILIRUB SERPL-MCNC: 0.4 MG/DL — SIGNIFICANT CHANGE UP (ref 0.2–1.2)
BUN SERPL-MCNC: 76 MG/DL — HIGH (ref 7–23)
CALCIUM SERPL-MCNC: 8.2 MG/DL — LOW (ref 8.5–10.1)
CHLORIDE SERPL-SCNC: 99 MMOL/L — SIGNIFICANT CHANGE UP (ref 96–108)
CO2 SERPL-SCNC: 25 MMOL/L — SIGNIFICANT CHANGE UP (ref 22–31)
CREAT SERPL-MCNC: 10 MG/DL — HIGH (ref 0.5–1.3)
EGFR: 5 ML/MIN/1.73M2 — LOW
EOSINOPHIL # BLD AUTO: 0.3 K/UL — SIGNIFICANT CHANGE UP (ref 0–0.5)
EOSINOPHIL NFR BLD AUTO: 4.2 % — SIGNIFICANT CHANGE UP (ref 0–6)
GLUCOSE SERPL-MCNC: 94 MG/DL — SIGNIFICANT CHANGE UP (ref 70–99)
HCT VFR BLD CALC: 29.1 % — LOW (ref 39–50)
HGB BLD-MCNC: 9.6 G/DL — LOW (ref 13–17)
IMM GRANULOCYTES NFR BLD AUTO: 0.4 % — SIGNIFICANT CHANGE UP (ref 0–0.9)
LYMPHOCYTES # BLD AUTO: 0.89 K/UL — LOW (ref 1–3.3)
LYMPHOCYTES # BLD AUTO: 12.6 % — LOW (ref 13–44)
MAGNESIUM SERPL-MCNC: 2.8 MG/DL — HIGH (ref 1.6–2.6)
MCHC RBC-ENTMCNC: 19.8 PG — LOW (ref 27–34)
MCHC RBC-ENTMCNC: 33 G/DL — SIGNIFICANT CHANGE UP (ref 32–36)
MCV RBC AUTO: 59.9 FL — LOW (ref 80–100)
MONOCYTES # BLD AUTO: 1.08 K/UL — HIGH (ref 0–0.9)
MONOCYTES NFR BLD AUTO: 15.3 % — HIGH (ref 2–14)
NEUTROPHILS # BLD AUTO: 4.71 K/UL — SIGNIFICANT CHANGE UP (ref 1.8–7.4)
NEUTROPHILS NFR BLD AUTO: 66.7 % — SIGNIFICANT CHANGE UP (ref 43–77)
NRBC # BLD: 0 /100 WBCS — SIGNIFICANT CHANGE UP (ref 0–0)
PHOSPHATE SERPL-MCNC: 8.2 MG/DL — HIGH (ref 2.5–4.5)
PLATELET # BLD AUTO: 313 K/UL — SIGNIFICANT CHANGE UP (ref 150–400)
POTASSIUM SERPL-MCNC: 5.2 MMOL/L — SIGNIFICANT CHANGE UP (ref 3.5–5.3)
POTASSIUM SERPL-SCNC: 5.2 MMOL/L — SIGNIFICANT CHANGE UP (ref 3.5–5.3)
PROT SERPL-MCNC: 6.3 G/DL — SIGNIFICANT CHANGE UP (ref 6–8.3)
RBC # BLD: 4.86 M/UL — SIGNIFICANT CHANGE UP (ref 4.2–5.8)
RBC # FLD: 15.7 % — HIGH (ref 10.3–14.5)
SODIUM SERPL-SCNC: 134 MMOL/L — LOW (ref 135–145)
WBC # BLD: 7.07 K/UL — SIGNIFICANT CHANGE UP (ref 3.8–10.5)
WBC # FLD AUTO: 7.07 K/UL — SIGNIFICANT CHANGE UP (ref 3.8–10.5)

## 2024-11-26 RX ORDER — ERYTHROPOIETIN 3000 [IU]/ML
10000 INJECTION, SOLUTION INTRAVENOUS; SUBCUTANEOUS ONCE
Refills: 0 | Status: COMPLETED | OUTPATIENT
Start: 2024-11-26 | End: 2024-11-26

## 2024-11-26 RX ORDER — BUMETANIDE 1 MG/1
2 TABLET ORAL DAILY
Refills: 0 | Status: DISCONTINUED | OUTPATIENT
Start: 2024-11-26 | End: 2024-11-28

## 2024-11-26 RX ORDER — PANTOPRAZOLE SODIUM 40 MG/1
40 TABLET, DELAYED RELEASE ORAL
Refills: 0 | Status: DISCONTINUED | OUTPATIENT
Start: 2024-11-26 | End: 2024-11-30

## 2024-11-26 RX ORDER — SUCRALFATE 1 G/1
1 TABLET ORAL ONCE
Refills: 0 | Status: COMPLETED | OUTPATIENT
Start: 2024-11-26 | End: 2024-11-26

## 2024-11-26 RX ADMIN — Medication 5000 UNIT(S): at 05:48

## 2024-11-26 RX ADMIN — SUCRALFATE 1 GRAM(S): 1 TABLET ORAL at 18:09

## 2024-11-26 RX ADMIN — Medication 5000 UNIT(S): at 21:26

## 2024-11-26 RX ADMIN — Medication 5000 UNIT(S): at 13:15

## 2024-11-26 RX ADMIN — IPRATROPIUM BROMIDE AND ALBUTEROL SULFATE 3 MILLILITER(S): 2.5; .5 SOLUTION RESPIRATORY (INHALATION) at 07:36

## 2024-11-26 RX ADMIN — CHLORHEXIDINE GLUCONATE 1 APPLICATION(S): 1.2 RINSE ORAL at 05:50

## 2024-11-26 RX ADMIN — SODIUM CHLORIDE 4 MILLILITER(S): 9 INJECTION, SOLUTION INTRAMUSCULAR; INTRAVENOUS; SUBCUTANEOUS at 07:36

## 2024-11-26 RX ADMIN — SODIUM CHLORIDE 4 MILLILITER(S): 9 INJECTION, SOLUTION INTRAMUSCULAR; INTRAVENOUS; SUBCUTANEOUS at 20:12

## 2024-11-26 RX ADMIN — IPRATROPIUM BROMIDE AND ALBUTEROL SULFATE 3 MILLILITER(S): 2.5; .5 SOLUTION RESPIRATORY (INHALATION) at 20:12

## 2024-11-26 RX ADMIN — BUMETANIDE 2 MILLIGRAM(S): 1 TABLET ORAL at 09:09

## 2024-11-26 RX ADMIN — POLYETHYLENE GLYCOL 3350 17 GRAM(S): 17 POWDER, FOR SOLUTION ORAL at 11:34

## 2024-11-26 RX ADMIN — ACETAMINOPHEN, DIPHENHYDRAMINE HCL, PHENYLEPHRINE HCL 5 MILLIGRAM(S): 325; 25; 5 TABLET ORAL at 21:26

## 2024-11-26 RX ADMIN — Medication 2 TABLET(S): at 21:26

## 2024-11-26 RX ADMIN — ERYTHROPOIETIN 10000 UNIT(S): 3000 INJECTION, SOLUTION INTRAVENOUS; SUBCUTANEOUS at 15:10

## 2024-11-26 RX ADMIN — PANTOPRAZOLE SODIUM 40 MILLIGRAM(S): 40 TABLET, DELAYED RELEASE ORAL at 17:55

## 2024-11-26 NOTE — PROGRESS NOTE ADULT - ASSESSMENT
Acute kidney injury, with ATN  Anemia  Hyperkalemia  Metabolic acidosis  Urinary retention      -MARYSOL likely due to ATN from NSAID use and poor po intake, has normal renal function at baseline  -Likely some retention, has wise  -SCr 1.1 at baseline with normal electrolytes  -Renal diet restrictions  -Iron panel with am labs for anemia  -EPO ordered  -Informed consent previosuly obtained. HD discussed upto and including death discussed. Informed consent obtained and he agrees to HD if necessary  -Advised patient to avoid all nsaids   -s/p IVF. Fluid overloaded   -s/p Lokelma  -ICU team placed temp dialysis catheter. Appreciate the help.  HD started 11/24/24; plan for PUF today 11/26/24 for volume removal; noted to have much higher urine output  -Add Bumex 2mg IVP daily  -IR consult for PC  -Monitor daily chem for renal recovery  -Plan for voiding trial tomorrow 11/27/24    D/W Dr Tyler and RN    SW/CM for out patient HD At [facility] ([facility]), [facility], [facility], [facility],  [facility], [facility]

## 2024-11-26 NOTE — PROGRESS NOTE ADULT - PROBLEM SELECTOR PLAN 1
Pt sent in to ED for elevated Cr (Baseline 1.1, increasing to 1.5-->3.0), endorsing decreased urination and flank pain  - Pt c/o flank pain (L), per chart review, also was endorsing pain during admission at [facility], prior to elevated Cr  - Cr 5.3 on admission, pt with prolonged NSAID use.   - FeNA 3.4, likely ATN from progression of prerenal MARYSOL MA  - s/p wise catheter placement in ED with minimal output  - CT Abdomen: Collapsed bladder. Bilateral renal cysts, no hydro. No obstruction.  - Creatinine continues to rise however good UOP; K elevated on admission now s/p LOKELMA x1 with improvement in K  - Metabolic Acidosis, S/P- NaHco3  - first session of HD 11/24 with 1L removal, 11/26 1L removal, due for HD today plan for 2.5L removal and initiation of bumex  - fluids discontinued due to LE edema  - tramadol 25mg PRN mild pain, tramadol 50mg for moderate pain, oxycodone 5mg for severe pain   - Strict Is&Os q6h  - Monitor volume status  - Avoid nephrotoxic medications, Renal dose meds   - Renal diet  - Daily CMP  - Nephrology (Dr. Cantu)  group following, recs appreciated - HD today  - CM/SW fallowing for outpatient HD arrangements Pt sent in to ED for elevated Cr (Baseline 1.1, increasing to 1.5-->3.0), endorsing decreased urination and flank pain  - Pt c/o flank pain (L), per chart review, also was endorsing pain during admission at [facility], prior to elevated Cr  - Cr 5.3 on admission, pt with prolonged NSAID use.  - FeNA 3.4, likely ATN from progression of prerenal MARYSOL MA  - s/p wise catheter placement in ED with minimal output  - CT Abdomen: Collapsed bladder. Bilateral renal cysts, no hydro. No obstruction.  - Creatinine continues to rise however good UOP; K elevated on admission now s/p LOKELMA x1 with improvement in K  - Metabolic Acidosis, S/P- NaHco3  - first session of HD 11/24 with 1L removal, 11/26 1L removal, due for HD today plan for 2.5L removal and initiation of Bumex  - Avoid nephrotoxic medications, Renal dose meds   - Renal diet  - Daily CMP  - Nephrology (Dr. Juárez follow up - HD- plan for PUF today 11/26/24 for volume removal; noted to have much higher urine output  -Add Bumex 2mg IVP daily  -IR consult for PC  - CM/SW fallowing for outpatient HD arrangements

## 2024-11-26 NOTE — PROGRESS NOTE ADULT - ASSESSMENT
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/mesenteric LN involvements(under care Dr Mariana Fung [facility]), adm w rapidly progressive MARYSOL, Cr 5s on adm was 1.1 9/30/24 and 3 w Dr Fung Tuesday 11/19  Pt had first presented w flank pain 9/2024 thought renal stone, but 9/30/24 CT renal stone hunt show sig mesenteric/retroperitoneal LADs up to 3.9cm), tx from [place] to [facility] for ?endoscopic bx(pt reports had "stomach bx", but path report in computer 9/30/24 perihepatic LN FNA c/w met poorly diff ca), Pt subsequent had additional left neck LN bx  First chemo FOLFOX w Dr Fung was planned for Monday 11/25/24   Hgb stable since adm at 10  CT c/a/p--no sig above diaphragm findings, sig mesenteric/retroperitoneal LADs up to 4.6cm had been up to 3.9cm on 9/30/24 CT renal hunt. No hydronephrsis, andreas renal cysts seen  Seen by Renal, etiology of MARYSOL ?NSAIDs effect    -started on urgent HD Sunday, Cr appear stabilized  -clinically no longer ZHOU when ambulates sill some shayy legs edema  -anemia due to chronic ds, malignancy, renal failure. Stable  -continue present management

## 2024-11-26 NOTE — SOCIAL WORK PROGRESS NOTE - NSSWPROGRESSNOTE_GEN_ALL_CORE
Pt's [facility] coordinator Elian can be reached at [phone] ext. [phone]. Pt approved at [facility] for MWF 5AM time slot. Pt discussed in rounds and plan for permacath later today and continue HD. Will need to follow up with [facility] once pt is medically stable for dc to coordinate and send clinical updates. SW met with pt and brother at bedside to discuss and pt in agreement and accepting time slot. SW to follow and remain available for any needs.

## 2024-11-26 NOTE — PROGRESS NOTE ADULT - SUBJECTIVE AND OBJECTIVE BOX
[facility]                             Nephrology and Hypertension                             Gustavo Oscar                                          [phone]     Patient is a 64y old  Male who presents with a chief complaint of MARYSOL (2024 14:23)     HPI:  Pt is a 65yo M with PMHx recently diagnosed gastric lymphoma  S/P Biopsy of gastric mass & neck Lymph node  with LN spread presents to the ED as recommended by his oncologist Dr. Andrea for elevated Creatinine. Pt called by doctor for elevated Cr at 5.3.  Baseline 1.1, increasing to 1.5-->3.0--> 5.3 on outpatient labs. Pt had chemo port placed today, has not been drinking as much as he usually does. Per pt, urinated a small amount this AM, contributed it to decreased fluid intake. Pt endorsing mild flank pain, prescribed oxycodone by outpatient doctor. Pt also endorsing new onset dyspnea on exertion. Pt has been taking Advil for 5 weeks for Lower Back pain. Pt had placement of Medi port as out pt today   Nephrology is c/s for Acute kidney injury. When I saw him, he is very anxious. He states he had been taking 6 tabs of advil every day for a few weeks for pain. He also endorsed not eating as much. Per discussion with RN frandy was not draining much overnight but over last few hours, has better uop. On labs review, Serum Creat was 1.1 in 2024, was 5.3 on admission, is >6 on repeat labs early this am with hyperkalemia. Was given lokelma . He has no sob, no dizziness, no N/V, no new pain.       Has some decreased appetite, but says he feels a bit depressed with everything going on. NO N/V/SOB    Leg elevated in chair. No c/o        PAST MEDICAL & SURGICAL HISTORY:  Incisional hernia    Gastric lymphoma  Anemia of chronic disease  S/P appendectomy  2014  S/P cholecystectomy  15 years ago  FAMILY HISTORY:  Family history of coronary artery disease in father  Father -  age 60 following MI    Social History:Non smoker    MEDICATIONS  (STANDING):  albuterol/ipratropium for Nebulization 3 milliLiter(s) Nebulizer every 12 hours  buMETAnide Injectable 2 milliGRAM(s) IV Push daily  chlorhexidine 4% Liquid 1 Application(s) Topical <User Schedule>  heparin   Injectable 5000 Unit(s) SubCutaneous every 8 hours  influenza   Vaccine 0.5 milliLiter(s) IntraMuscular once  melatonin 5 milliGRAM(s) Oral at bedtime  polyethylene glycol 3350 17 Gram(s) Oral daily  senna 2 Tablet(s) Oral at bedtime  sodium chloride 3%  Inhalation 4 milliLiter(s) Inhalation every 12 hours    MEDICATIONS  (PRN):  aluminum hydroxide/magnesium hydroxide/simethicone Suspension 30 milliLiter(s) Oral every 4 hours PRN Dyspepsia  ondansetron Injectable 4 milliGRAM(s) IV Push every 8 hours PRN Nausea and/or Vomiting  oxyCODONE    IR 5 milliGRAM(s) Oral every 6 hours PRN Severe Pain (7 - 10)  sodium chloride 0.9% lock flush 10 milliLiter(s) IV Push every 1 hour PRN Pre/post blood products, medications, blood draw, and to maintain line patency  traMADol 25 milliGRAM(s) Oral every 12 hours PRN Mild Pain (1 - 3)  traMADol 50 milliGRAM(s) Oral every 12 hours PRN Moderate Pain (4 - 6)      Allergies    Bactrim DS (Hives)    Intolerances         REVIEW OF SYSTEMS: as per HPI    Vital Signs Last 24 Hrs  T(C): 36.7 (2024 05:20), Max: 37.8 (2024 20:54)  T(F): 98 (2024 05:20), Max: 100 (2024 20:54)  HR: 71 (2024 05:20) (71 - 81)  BP: 168/95 (2024 05:20) (124/76 - 168/95)  BP(mean): --  RR: 18 (2024 05:20) (16 - 20)  SpO2: 91% (2024 05:20) (91% - 95%)    Parameters below as of 2024 05:20  Patient On (Oxygen Delivery Method): room air        PHYSICAL EXAM:    GENERAL: NAD, obese  HEAD:  Atraumatic, Normocephalic  EYES: EOMI, conjunctiva and sclera clear  ENMT: No Drainage from nares, No drainage from ears  NERVOUS SYSTEM:  Awake and Alert  CHEST/LUNG: Clear bilaterally, RCW port  EXTREMITIES:  +++ edema  SKIN: No rashes No obvious ecchymosis  : wise noted       LABS:                                                         9.6    7.07  )-----------( 313      ( 2024 06:28 )             29.1         136  |  99  |  81[H]  ----------------------------<  98  5.2   |  25  |  11.00[H]    Ca    8.1[L]      2024 06:15  Phos  8.1       Mg     2.9         TPro  6.4  /  Alb  2.8[L]  /  TBili  0.2  /  DBili  x   /  AST  20  /  ALT  16  /  AlkPhos  80        Urinalysis Basic - ( 2024 06:15 )    Color: x / Appearance: x / SG: x / pH: x  Gluc: 98 mg/dL / Ketone: x  / Bili: x / Urobili: x   Blood: x / Protein: x / Nitrite: x   Leuk Esterase: x / RBC: x / WBC x   Sq Epi: x / Non Sq Epi: x / Bacteria: x

## 2024-11-26 NOTE — PROGRESS NOTE ADULT - SUBJECTIVE AND OBJECTIVE BOX
Patient is a 64y old  Male who presents with a chief complaint of MARYSOL (26 Nov 2024 08:50)    HPI:  Pt is a 65yo M with PMHx recently diagnosed gastric lymphoma  S/P Biopsy of gastric mass & neck Lymph node  with LN spread presents to the ED as recommended by his oncologist Dr. Andrea for elevated Creatinine. Pt called by doctor for elevated Cr at 5.3.  Baseline 1.1, increasing to 1.5-->3.0--> 5.3 on outpatient labs. Pt had chemo port placed today, has not been drinking as much as he usually does. Per pt, urinated a small amount this AM, contributed it to decreased fluid intake. Pt endorsing mild flank pain, prescribed oxycodone by outpatient doctor. Pt also endorsing new onset dyspnea on exertion. Pt has been taking Advil for 5 weeks for Lower Back pain. Pt had placement of Medi port as out pt   today     Denies fever, chills, chest pain, palpitations, abdominal pain, nausea, vomiting, diarrhea, constipation, or dysuria    ED Course:   Vitals: BP: 160/90, HR: 74, Temp: 98F, RR: 18  Labs:  WBC 7.31, Hg 10, CO2 21, BUN/CR 58,5.3, Ca 8.3, eGFR 11, Mag 2.8, proBNP 593  UA: Pending  CT Chest: Mild mottled density in the left mainstem bronchus may represent secretions. No focal consolidation or discrete mass is seen. Regions of atelectasis or scarring in the left upper lobe/lingula. Atelectatic changes at the lung bases.  CT Abdomen/Pelvis: Lymphadenopathy which appears increased when compared with 9/30/2024. Nodularity along posterior peritoneal reflections and minimal perihepatic fluid which were not seen previously.    EKG: NSR rate 68bpm. QTc 408ms  Received in the ED: 500cc NS bolus x1   (20 Nov 2024 22:40)    INTERVAL HPI:  11/21: Pt seen and examined at bedside. Pt was making minimal urine output overnight but has voided significantly more since than. He has been taking Advil for the last month at the very minimum, with  max of 6 pills/day for R. low back pain. However, the pain is not present currently. No acute c/o at this time.   11/22: Pt seen and examined at bedside. 1.3L UOP in the last 24 hours. Pt reports feeling well, shortness  of breath has improved a bit. No other concerns worsening MARYSOL , K stable   11/23:pt seen, examined, Cr increasing, Low h/h stable, High electrolytes.  11/24: Pt seen and examined, seated in chair. Pt reports feeling well, and states his shortness of breath when walking has improved. He still has intermittent lower back pain. Cr rising, today 10, nephro to start temporary HD today.  11/25: Pt seen and examined, seated in chair about to eat breakfast. Pt reports he felt a little nauseous during his first HD session yesterday, and stated he felt tired after but this morning he feels well. 1L removed yesterday. Cr still rising, 11 today. Likely will undergo another session of HD today. No concerns per patient.HD Rx today   11/26: Pt seen and examined, seated in chair. He reports feeling well this morning and denies any specific concerns. 1L removed with HD yesterday, plan for 2.5L removal today along with initiation of bumex. Plan for IR for permacath tomorrow, NPO after midnight tonight.    OVERNIGHT EVENTS: None    Home Medications:      MEDICATIONS  (STANDING):  albuterol/ipratropium for Nebulization 3 milliLiter(s) Nebulizer every 12 hours  buMETAnide Injectable 2 milliGRAM(s) IV Push daily  chlorhexidine 4% Liquid 1 Application(s) Topical <User Schedule>  epoetin kathy-epbx (RETACRIT) Injectable 63149 Unit(s) IV Push once  heparin   Injectable 5000 Unit(s) SubCutaneous every 8 hours  influenza   Vaccine 0.5 milliLiter(s) IntraMuscular once  melatonin 5 milliGRAM(s) Oral at bedtime  polyethylene glycol 3350 17 Gram(s) Oral daily  senna 2 Tablet(s) Oral at bedtime  sodium chloride 3%  Inhalation 4 milliLiter(s) Inhalation every 12 hours    MEDICATIONS  (PRN):  aluminum hydroxide/magnesium hydroxide/simethicone Suspension 30 milliLiter(s) Oral every 4 hours PRN Dyspepsia  ondansetron Injectable 4 milliGRAM(s) IV Push every 8 hours PRN Nausea and/or Vomiting  oxyCODONE    IR 5 milliGRAM(s) Oral every 6 hours PRN Severe Pain (7 - 10)  sodium chloride 0.9% lock flush 10 milliLiter(s) IV Push every 1 hour PRN Pre/post blood products, medications, blood draw, and to maintain line patency  traMADol 25 milliGRAM(s) Oral every 12 hours PRN Mild Pain (1 - 3)  traMADol 50 milliGRAM(s) Oral every 12 hours PRN Moderate Pain (4 - 6)      Allergies    Bactrim DS (Hives)    Intolerances        Social History:  Lives at home with family  Recently retired  Active, ambulates independently and independent with ADLs (20 Nov 2024 22:40)      REVIEW OF SYSTEMS:  CONSTITUTIONAL: No fever, No chills, No fatigue, No myalgia, No Body ache, No Weakness  EYES: No eye pain,  No visual disturbances, No discharge, NO Redness  ENMT:  No ear pain, No nose bleed, No vertigo; No sinus pain, NO throat pain, No Congestion  NECK: +discomfort at catheter site, No pain, No stiffness  RESPIRATORY: No cough, NO wheezing, No  hemoptysis, NO  shortness of breath  CARDIOVASCULAR: No chest pain, palpitations  GASTROINTESTINAL: No abdominal pain, NO epigastric pain. No nausea, No vomiting; No diarrhea, No constipation. [  ] BM  GENITOURINARY: No dysuria, No frequency, No urgency, No hematuria, NO incontinence  NEUROLOGICAL: No headaches, No dizziness, No numbness, No tingling, No tremors, No weakness  EXT: No Swelling, No Pain, No Edema  SKIN:  [ x ] No itching, burning, rashes, or lesions   MUSCULOSKELETAL: No joint pain ,No Jt swelling; No muscle pain, No back pain, No extremity pain  PSYCHIATRIC: No depression,  No anxiety,  No mood swings ,No difficulty sleeping at night  PAIN SCALE: [ x ] None  [  ] Other-  ROS Unable to obtain due to - [  ] Dementia  [  ] Lethargy [  ] Drowsy [  ] Sedated [  ] non verbal  REST OF REVIEW Of SYSTEM - [ x ] Normal     Vital Signs Last 24 Hrs  T(C): 37 (26 Nov 2024 09:00), Max: 37.8 (25 Nov 2024 20:54)  T(F): 98.6 (26 Nov 2024 09:00), Max: 100 (25 Nov 2024 20:54)  HR: 81 (26 Nov 2024 09:47) (71 - 81)  BP: 144/82 (26 Nov 2024 09:47) (124/76 - 168/95)  BP(mean): --  RR: 19 (26 Nov 2024 09:00) (16 - 19)  SpO2: 93% (26 Nov 2024 09:00) (91% - 95%)    Parameters below as of 26 Nov 2024 09:00  Patient On (Oxygen Delivery Method): room air      Finger Stick        11-25 @ 07:01  -  11-26 @ 07:00  --------------------------------------------------------  IN: 830 mL / OUT: 3200 mL / NET: -2370 mL        PHYSICAL EXAM: chemo port, RIJ  GENERAL:  [ x ] NAD , [ x ] well appearing, [  ] Agitated, [  ] Drowsy,  [  ] Lethargy, [  ] confused   HEAD:  [ x ] Normal, [  ] Other  EYES:  [ x ] EOMI, [  ] PERRLA, [ x ] sclera clear, [  ] Other,  [  ] Pallor,[  ] Discharge  ENMT:  [ x ] Normal, [  ] Moist mucous membranes, [ x ] RIJ catheter [  ] Good dentition, [x  ] No Thrush  NECK:  [  ] Supple, [ x ] No JVD, [x  ] Normal thyroid, [  ] Lymphadenopathy [  ] Other  CHEST/LUNG:  [ x ] Clear to auscultation bilaterally, [ x ] Breath Sounds equal B/L, [  ] poor effort  [ x ] No rales, [ x ] No rhonchi  [ x ]  No wheezing,   HEART:  [ x ] Regular rate and rhythm, [  ] tachycardia, [  ] Bradycardia,  [  ] irregular  [ x ] No murmurs, No rubs, No gallops, [  ] PPM in place (Mfr:  )  ABDOMEN:  [ x ] Soft, [ x ] Nontender, [ x ] Nondistended, [  ] No mass, [x  ] Bowel sounds present, [ x ] obese  NERVOUS SYSTEM:  [ x ] Alert & Oriented X3, [ x ] Nonfocal  [  ] Confusion  [  ] Encephalopathic [  ] Sedated [  ] Unable to assess, [  ] Dementia [  ] Other-  EXTREMITIES: [  ] 2+ Peripheral Pulses, No clubbing, No cyanosis,  [ x ]2+  improving pitting edema B/L lower EXT. [  ] PVD stasis skin changes B/L Lower EXT, [  ] wound  LYMPH: No lymphadenopathy noted  SKIN:  [  ] No rashes or lesions, [  ] Pressure Ulcers, [  ] ecchymosis, [  ] Skin Tears, [  ]     DIET: Diet, Renal Restrictions:   For patients receiving Renal Replacement - No Protein Restr, No Conc K, No Conc Phos, Low Sodium  Supplement Feeding Modality:  Oral  Suplena Cans or Servings Per Day:  1       Frequency:  Daily (11-22-24 @ 11:31)      LABS:                        9.6    7.07  )-----------( 313      ( 26 Nov 2024 06:28 )             29.1     26 Nov 2024 06:28    134    |  99     |  76     ----------------------------<  94     5.2     |  25     |  10.00    Ca    8.2        26 Nov 2024 06:28  Phos  8.2       26 Nov 2024 06:28  Mg     2.8       26 Nov 2024 06:28    TPro  6.3    /  Alb  2.6    /  TBili  0.4    /  DBili  x      /  AST  28     /  ALT  27     /  AlkPhos  81     26 Nov 2024 06:28      Urinalysis Basic - ( 26 Nov 2024 06:28 )    Color: x / Appearance: x / SG: x / pH: x  Gluc: 94 mg/dL / Ketone: x  / Bili: x / Urobili: x   Blood: x / Protein: x / Nitrite: x   Leuk Esterase: x / RBC: x / WBC x   Sq Epi: x / Non Sq Epi: x / Bacteria: x        Culture Results:   No growth (11-21 @ 07:30)                  Culture - Urine (collected 21 Nov 2024 07:30)  Source: Clean Catch  Final Report (22 Nov 2024 09:09):    No growth    Urinalysis with Rflx Culture (collected 21 Nov 2024 07:30)         Anemia Panel:  Iron Total: 17 ug/dL (11-22-24 @ 07:05)  Iron - Total Binding Capacity.: 241 ug/dL (11-22-24 @ 07:05)  Ferritin: 257 ng/mL (11-22-24 @ 07:05)      Thyroid Panel:  Thyroid Stimulating Hormone, Serum: 1.11 uIU/mL (11-20-24 @ 18:40)                RADIOLOGY & ADDITIONAL TESTS:      HEALTH ISSUES - PROBLEM Dx:  MARYSOL (acute kidney injury)    Gastric lymphoma    Need for prophylactic measure    Dyspnea    Hyperkalemia            Consultant(s) Notes Reviewed:  [ x ] YES     Care Discussed with [X] Consultants  [ x ] Patient  [ x ] Family [  ] HCP [  ]   [  ] Social Service  [  ] RN, [  ] Physical Therapy,[  ] Palliative care team  DVT PPX: [  ] Lovenox, [ x ] S C Heparin, [  ] Coumadin, [  ] Xarelto, [  ] Eliquis, [  ] Pradaxa, [  ] IV Heparin drip, [  ] SCD [  ] Contraindication 2 to GI Bleed,[  ] Ambulation [  ] Contraindicated 2 to  bleed [  ] Contraindicated 2 to Brain Bleed  Advanced directive: [ x ] None, [  ] DNR/DNI

## 2024-11-26 NOTE — PROGRESS NOTE ADULT - PROBLEM SELECTOR PLAN 3
Pt endorsing ZHOU while walking, new onset  - CT Chest:  Mild mottled density in the left mainstem bronchus may represent secretions. No focal consolidation or discrete mass is seen. Regions of atelectasis or scarring in the left upper lobe/lingula. Atelectatic changes at the lung bases  - Pt saturating well on RA  - Elevation in proBNP, can be 2/2 MARYSOL, less likely cardiac as pt does not appear clinically volume overloaded on examination and denies history of HF. No cardiomegaly on CT Chest  - Standing Duoneb and hypertonic saline  - Encourage incentive spirometer use  - Aspiration precautions  - Monitor routine hemodynamics  - F/U TTE - done, NL EF  - ZHOU improving subjectively Pt endorsing ZHOU while walking, -improving , likely 2/2 Volume over load  - CT Chest:  Mild mottled density in the left mainstem bronchus may represent secretions. No focal consolidation or discrete mass is seen. Regions of atelectasis or scarring in the left upper lobe/lingula. Atelectatic changes at the lung bases  - Pt saturating well on RA  - Elevation in proBNP, can be 2/2 MARYSOL, less likely cardiac as pt does not appear clinically volume overloaded on examination and denies history of HF. No cardiomegaly on CT Chest  - Standing Duoneb and hypertonic saline  - Encourage incentive spirometer use  - Aspiration precautions  - Monitor routine hemodynamics  - F/U TTE - done, NL EF  - ZHOU improving subjectively

## 2024-11-26 NOTE — PROGRESS NOTE ADULT - SUBJECTIVE AND OBJECTIVE BOX
All interim records and events noted.    seen w brother present  up in chair  reports tolerated HD  able to ambulate, no sig ZHOU now  feeling improved      MEDICATIONS  (STANDING):  albuterol/ipratropium for Nebulization 3 milliLiter(s) Nebulizer every 12 hours  buMETAnide Injectable 2 milliGRAM(s) IV Push daily  chlorhexidine 4% Liquid 1 Application(s) Topical <User Schedule>  epoetin kathy-epbx (RETACRIT) Injectable 83273 Unit(s) IV Push once  heparin   Injectable 5000 Unit(s) SubCutaneous every 8 hours  influenza   Vaccine 0.5 milliLiter(s) IntraMuscular once  melatonin 5 milliGRAM(s) Oral at bedtime  polyethylene glycol 3350 17 Gram(s) Oral daily  senna 2 Tablet(s) Oral at bedtime  sodium chloride 3%  Inhalation 4 milliLiter(s) Inhalation every 12 hours    MEDICATIONS  (PRN):  aluminum hydroxide/magnesium hydroxide/simethicone Suspension 30 milliLiter(s) Oral every 4 hours PRN Dyspepsia  ondansetron Injectable 4 milliGRAM(s) IV Push every 8 hours PRN Nausea and/or Vomiting  oxyCODONE    IR 5 milliGRAM(s) Oral every 6 hours PRN Severe Pain (7 - 10)  sodium chloride 0.9% lock flush 10 milliLiter(s) IV Push every 1 hour PRN Pre/post blood products, medications, blood draw, and to maintain line patency  traMADol 25 milliGRAM(s) Oral every 12 hours PRN Mild Pain (1 - 3)  traMADol 50 milliGRAM(s) Oral every 12 hours PRN Moderate Pain (4 - 6)      Vital Signs Last 24 Hrs  T(C): 36.9 (26 Nov 2024 12:03), Max: 37.8 (25 Nov 2024 20:54)  T(F): 98.4 (26 Nov 2024 12:03), Max: 100 (25 Nov 2024 20:54)  HR: 68 (26 Nov 2024 12:03) (68 - 81)  BP: 138/72 (26 Nov 2024 12:03) (124/76 - 168/95)  BP(mean): --  RR: 20 (26 Nov 2024 12:03) (16 - 20)  SpO2: 93% (26 Nov 2024 12:03) (91% - 95%)    Parameters below as of 26 Nov 2024 12:03  Patient On (Oxygen Delivery Method): room air        PHYSICAL EXAM  General: in no acute distress  Head: atraumatic, normocephalic  ENT: sclera anicteric, buccal mucosa moist  Neck: supple, trachea midline  CV: S1 S2, regular rate and rhythm  Lungs: clear to auscultation, no wheezes/rhonchi  Abdomen: soft, nontender, bowel sounds present, no palpable masses. Pouchy  Extrem: no clubbing/cyanosis. Trace edema andreas legs to 1/3up lower legs  Skin: no significant increased ecchymosis/petechiae  Neuro: alert and oriented X3,  no focal deficits      LABS:             9.6    7.07  )-----------( 313      ( 11-26 @ 06:28 )             29.1                9.5    8.82  )-----------( 300      ( 11-25 @ 06:15 )             28.6                9.6    7.74  )-----------( 290      ( 11-24 @ 07:12 )             28.8       11-26    134[L]  |  99  |  76[H]  ----------------------------<  94  5.2   |  25  |  10.00[H]    Ca    8.2[L]      26 Nov 2024 06:28  Phos  8.2     11-26  Mg     2.8     11-26    TPro  6.3  /  Alb  2.6[L]  /  TBili  0.4  /  DBili  x   /  AST  28  /  ALT  27  /  AlkPhos  81  11-26        RADIOLOGY & ADDITIONAL STUDIES:    IMPRESSION/RECOMMENDATIONS:

## 2024-11-27 DIAGNOSIS — I10 ESSENTIAL (PRIMARY) HYPERTENSION: ICD-10-CM

## 2024-11-27 LAB
ALBUMIN SERPL ELPH-MCNC: 3.2 G/DL — LOW (ref 3.3–5)
ALP SERPL-CCNC: 96 U/L — SIGNIFICANT CHANGE UP (ref 40–120)
ALT FLD-CCNC: 27 U/L — SIGNIFICANT CHANGE UP (ref 12–78)
ANION GAP SERPL CALC-SCNC: 9 MMOL/L — SIGNIFICANT CHANGE UP (ref 5–17)
APTT BLD: 32.6 SEC — SIGNIFICANT CHANGE UP (ref 24.5–35.6)
AST SERPL-CCNC: 21 U/L — SIGNIFICANT CHANGE UP (ref 15–37)
BILIRUB SERPL-MCNC: 0.4 MG/DL — SIGNIFICANT CHANGE UP (ref 0.2–1.2)
BUN SERPL-MCNC: 73 MG/DL — HIGH (ref 7–23)
CALCIUM SERPL-MCNC: 9 MG/DL — SIGNIFICANT CHANGE UP (ref 8.5–10.1)
CHLORIDE SERPL-SCNC: 101 MMOL/L — SIGNIFICANT CHANGE UP (ref 96–108)
CO2 SERPL-SCNC: 26 MMOL/L — SIGNIFICANT CHANGE UP (ref 22–31)
CREAT SERPL-MCNC: 9.1 MG/DL — HIGH (ref 0.5–1.3)
EGFR: 6 ML/MIN/1.73M2 — LOW
GLUCOSE SERPL-MCNC: 107 MG/DL — HIGH (ref 70–99)
HCT VFR BLD CALC: 35 % — LOW (ref 39–50)
HGB BLD-MCNC: 11.3 G/DL — LOW (ref 13–17)
INR BLD: 1.22 RATIO — HIGH (ref 0.85–1.16)
MAGNESIUM SERPL-MCNC: 3 MG/DL — HIGH (ref 1.6–2.6)
MCHC RBC-ENTMCNC: 19.7 PG — LOW (ref 27–34)
MCHC RBC-ENTMCNC: 32.3 G/DL — SIGNIFICANT CHANGE UP (ref 32–36)
MCV RBC AUTO: 61 FL — LOW (ref 80–100)
NRBC # BLD: 0 /100 WBCS — SIGNIFICANT CHANGE UP (ref 0–0)
PHOSPHATE SERPL-MCNC: 7.8 MG/DL — HIGH (ref 2.5–4.5)
PLATELET # BLD AUTO: 341 K/UL — SIGNIFICANT CHANGE UP (ref 150–400)
POTASSIUM SERPL-MCNC: 5.1 MMOL/L — SIGNIFICANT CHANGE UP (ref 3.5–5.3)
POTASSIUM SERPL-SCNC: 5.1 MMOL/L — SIGNIFICANT CHANGE UP (ref 3.5–5.3)
PROT SERPL-MCNC: 7.5 G/DL — SIGNIFICANT CHANGE UP (ref 6–8.3)
PROTHROM AB SERPL-ACNC: 14.2 SEC — HIGH (ref 9.9–13.4)
RBC # BLD: 5.74 M/UL — SIGNIFICANT CHANGE UP (ref 4.2–5.8)
RBC # FLD: 16.2 % — HIGH (ref 10.3–14.5)
SODIUM SERPL-SCNC: 136 MMOL/L — SIGNIFICANT CHANGE UP (ref 135–145)
WBC # BLD: 8.48 K/UL — SIGNIFICANT CHANGE UP (ref 3.8–10.5)
WBC # FLD AUTO: 8.48 K/UL — SIGNIFICANT CHANGE UP (ref 3.8–10.5)

## 2024-11-27 PROCEDURE — 36558 INSERT TUNNELED CV CATH: CPT | Mod: LT

## 2024-11-27 PROCEDURE — 76937 US GUIDE VASCULAR ACCESS: CPT | Mod: 26

## 2024-11-27 RX ORDER — ACETAMINOPHEN 500MG 500 MG/1
650 TABLET, COATED ORAL EVERY 6 HOURS
Refills: 0 | Status: DISCONTINUED | OUTPATIENT
Start: 2024-11-27 | End: 2024-12-01

## 2024-11-27 RX ORDER — LABETALOL 100 MG/1
100 TABLET, FILM COATED ORAL
Refills: 0 | Status: DISCONTINUED | OUTPATIENT
Start: 2024-11-27 | End: 2024-11-27

## 2024-11-27 RX ORDER — TAMSULOSIN HYDROCHLORIDE 0.4 MG/1
0.4 CAPSULE ORAL AT BEDTIME
Refills: 0 | Status: DISCONTINUED | OUTPATIENT
Start: 2024-11-27 | End: 2024-12-01

## 2024-11-27 RX ORDER — LABETALOL 100 MG/1
100 TABLET, FILM COATED ORAL
Refills: 0 | Status: DISCONTINUED | OUTPATIENT
Start: 2024-11-27 | End: 2024-12-01

## 2024-11-27 RX ORDER — HEPARIN SODIUM,PORCINE 1000/ML
5000 VIAL (ML) INJECTION EVERY 8 HOURS
Refills: 0 | Status: DISCONTINUED | OUTPATIENT
Start: 2024-11-27 | End: 2024-12-01

## 2024-11-27 RX ADMIN — POLYETHYLENE GLYCOL 3350 17 GRAM(S): 17 POWDER, FOR SOLUTION ORAL at 16:15

## 2024-11-27 RX ADMIN — IPRATROPIUM BROMIDE AND ALBUTEROL SULFATE 3 MILLILITER(S): 2.5; .5 SOLUTION RESPIRATORY (INHALATION) at 07:29

## 2024-11-27 RX ADMIN — Medication 5000 UNIT(S): at 21:52

## 2024-11-27 RX ADMIN — TAMSULOSIN HYDROCHLORIDE 0.4 MILLIGRAM(S): 0.4 CAPSULE ORAL at 21:52

## 2024-11-27 RX ADMIN — SODIUM CHLORIDE 4 MILLILITER(S): 9 INJECTION, SOLUTION INTRAMUSCULAR; INTRAVENOUS; SUBCUTANEOUS at 07:29

## 2024-11-27 RX ADMIN — LABETALOL 100 MILLIGRAM(S): 100 TABLET, FILM COATED ORAL at 09:03

## 2024-11-27 RX ADMIN — CHLORHEXIDINE GLUCONATE 1 APPLICATION(S): 1.2 RINSE ORAL at 05:36

## 2024-11-27 RX ADMIN — Medication 2 TABLET(S): at 21:52

## 2024-11-27 RX ADMIN — LABETALOL 100 MILLIGRAM(S): 100 TABLET, FILM COATED ORAL at 17:28

## 2024-11-27 RX ADMIN — BUMETANIDE 2 MILLIGRAM(S): 1 TABLET ORAL at 05:30

## 2024-11-27 RX ADMIN — IPRATROPIUM BROMIDE AND ALBUTEROL SULFATE 3 MILLILITER(S): 2.5; .5 SOLUTION RESPIRATORY (INHALATION) at 19:21

## 2024-11-27 RX ADMIN — PANTOPRAZOLE SODIUM 40 MILLIGRAM(S): 40 TABLET, DELAYED RELEASE ORAL at 05:30

## 2024-11-27 RX ADMIN — ACETAMINOPHEN, DIPHENHYDRAMINE HCL, PHENYLEPHRINE HCL 5 MILLIGRAM(S): 325; 25; 5 TABLET ORAL at 21:52

## 2024-11-27 RX ADMIN — SODIUM CHLORIDE 4 MILLILITER(S): 9 INJECTION, SOLUTION INTRAMUSCULAR; INTRAVENOUS; SUBCUTANEOUS at 19:21

## 2024-11-27 NOTE — PROGRESS NOTE ADULT - ASSESSMENT
Acute kidney injury, with ATN  Anemia  Hyperkalemia  Metabolic acidosis  Urinary retention  HTN      -MARYSOL likely due to ATN from NSAID use and poor po intake, has normal renal function at baseline  -SCr 1.1 at baseline with normal electrolytes  -Renal diet restrictions  -Iron panel with am labs for anemia  -EPO ordered  -Informed consent previously obtained. HD discussed up to and including death discussed. Informed consent obtained and he agrees to HD if necessary  -Advised patient to avoid all nsaids   -s/p IVF. Fluid overloaded   -s/p Lokelma  -ICU team placed temp dialysis catheter. HD started 11/24/24; s/p HD x 3 thus far  -Urinating well; continue Bumex IV; DC wise for voiding trial  -Add flomax  -Monitor trend of creatinine, if improving, will plan to taper off dialysis. If creatinine is not trending appropriately, will continue with dialysis; plan for next session to be tentatively done on 11/29/24  -IR consult for PC; proceed with PC as it will be safer than keeping temporary access at this time  -Monitor daily chem for renal recovery; will follow labs  -Add labetalol with holding parameters for blood pressure control    D/W RN    SW/CM for out patient HD At [facility] ([facility]), [facility], [facility], [facility] ([place]), [facility],  [facility], [facility] Acute kidney injury, with ATN  Anemia  Hyperkalemia  Metabolic acidosis  Urinary retention  HTN      -MARYSOL likely due to ATN from NSAID use and poor po intake, has normal renal function at baseline  -SCr 1.1 at baseline with normal electrolytes  -Renal diet restrictions  -Iron panel with am labs for anemia  -EPO ordered  -Informed consent previously obtained. HD discussed up to and including death discussed. Informed consent obtained and he agrees to HD if necessary  -Advised patient to avoid all nsaids   -s/p IVF. Fluid overloaded   -s/p Lokelma  -ICU team placed temp dialysis catheter. HD started 11/24/24; s/p HD x 3 thus far  -Urinating well; continue Bumex IV; DC wise for voiding trial  -Add flomax  -Monitor trend of creatinine, if improving, will plan to taper off dialysis. If creatinine is not trending appropriately, will continue with dialysis; plan for next session to be tentatively done on 11/29/24  -IR consult for PC; proceed with PC as it will be safer than keeping temporary access at this time; planned for acces either today or Friday the latest  -Monitor daily chem for renal recovery; will follow labs  -Add labetalol with holding parameters for blood pressure control    D/W RN and SW as well Primary team  Patient is arranged for [facility] and to start outpatient on 12/2/24    Plan for DC home on Friday after HD and PC placement

## 2024-11-27 NOTE — PROGRESS NOTE ADULT - SUBJECTIVE AND OBJECTIVE BOX
Patient is a 64y old  Male who presents with a chief complaint of MARYSOL (27 Nov 2024 08:35)    HPI:  Pt is a 63yo M with PMHx recently diagnosed gastric lymphoma  S/P Biopsy of gastric mass & neck Lymph node  with LN spread presents to the ED as recommended by his oncologist Dr. Andrea for elevated Creatinine. Pt called by doctor for elevated Cr at 5.3.  Baseline 1.1, increasing to 1.5-->3.0--> 5.3 on outpatient labs. Pt had chemo port placed today, has not been drinking as much as he usually does. Per pt, urinated a small amount this AM, contributed it to decreased fluid intake. Pt endorsing mild flank pain, prescribed oxycodone by outpatient doctor. Pt also endorsing new onset dyspnea on exertion. Pt has been taking Advil for 5 weeks for Lower Back pain. Pt had placement of Medi port as out pt   today     Denies fever, chills, chest pain, palpitations, abdominal pain, nausea, vomiting, diarrhea, constipation, or dysuria    ED Course:   Vitals: BP: 160/90, HR: 74, Temp: 98F, RR: 18  Labs:  WBC 7.31, Hg 10, CO2 21, BUN/CR 58,5.3, Ca 8.3, eGFR 11, Mag 2.8, proBNP 593  UA: Pending  CT Chest: Mild mottled density in the left mainstem bronchus may represent secretions. No focal consolidation or discrete mass is seen. Regions of atelectasis or scarring in the left upper lobe/lingula. Atelectatic changes at the lung bases.  CT Abdomen/Pelvis: Lymphadenopathy which appears increased when compared with 9/30/2024. Nodularity along posterior peritoneal reflections and minimal perihepatic fluid which were not seen previously.    EKG: NSR rate 68bpm. QTc 408ms  Received in the ED: 500cc NS bolus x1   (20 Nov 2024 22:40)    INTERVAL HPI:  11/21: Pt seen and examined at bedside. Pt was making minimal urine output overnight but has voided significantly more since than. He has been taking Advil for the last month at the very minimum, with  max of 6 pills/day for R. low back pain. However, the pain is not present currently. No acute c/o at this time.   11/22: Pt seen and examined at bedside. 1.3L UOP in the last 24 hours. Pt reports feeling well, shortness  of breath has improved a bit. No other concerns worsening MARYSOL , K stable   11/23:pt seen, examined, Cr increasing, Low h/h stable, High electrolytes.  11/24: Pt seen and examined, seated in chair. Pt reports feeling well, and states his shortness of breath when walking has improved. He still has intermittent lower back pain. Cr rising, today 10, nephro to start temporary HD today.  11/25: Pt seen and examined, seated in chair about to eat breakfast. Pt reports he felt a little nauseous during his first HD session yesterday, and stated he felt tired after but this morning he feels well. 1L removed yesterday. Cr still rising, 11 today. Likely will undergo another session of HD today. No concerns per patient.HD Rx today   11/26: Pt seen and examined, seated in chair. He reports feeling well this morning and denies any specific concerns. 1L removed with HD yesterday, plan for 2.5L removal today along with initiation of bumex. Plan for IR for permacath tomorrow, NPO after midnight tonight.  11/27: Pt seen and examined, seated in chair. S/p 3rd HD session yesterday which he tolerated well. Pt reports that he feels good, denies any concerns. Remains on bumex with good UOP. Planned for IR procedure for permacath today.    OVERNIGHT EVENTS: None    Home Medications:      MEDICATIONS  (STANDING):  albuterol/ipratropium for Nebulization 3 milliLiter(s) Nebulizer every 12 hours  buMETAnide Injectable 2 milliGRAM(s) IV Push daily  chlorhexidine 4% Liquid 1 Application(s) Topical <User Schedule>  heparin   Injectable 5000 Unit(s) SubCutaneous every 8 hours  influenza   Vaccine 0.5 milliLiter(s) IntraMuscular once  labetalol 100 milliGRAM(s) Oral two times a day  melatonin 5 milliGRAM(s) Oral at bedtime  pantoprazole    Tablet 40 milliGRAM(s) Oral before breakfast  polyethylene glycol 3350 17 Gram(s) Oral daily  senna 2 Tablet(s) Oral at bedtime  sodium chloride 3%  Inhalation 4 milliLiter(s) Inhalation every 12 hours  tamsulosin 0.4 milliGRAM(s) Oral at bedtime    MEDICATIONS  (PRN):  aluminum hydroxide/magnesium hydroxide/simethicone Suspension 30 milliLiter(s) Oral every 4 hours PRN Dyspepsia  ondansetron Injectable 4 milliGRAM(s) IV Push every 8 hours PRN Nausea and/or Vomiting  oxyCODONE    IR 5 milliGRAM(s) Oral every 6 hours PRN Severe Pain (7 - 10)  sodium chloride 0.9% lock flush 10 milliLiter(s) IV Push every 1 hour PRN Pre/post blood products, medications, blood draw, and to maintain line patency  traMADol 25 milliGRAM(s) Oral every 12 hours PRN Mild Pain (1 - 3)  traMADol 50 milliGRAM(s) Oral every 12 hours PRN Moderate Pain (4 - 6)      Allergies    Bactrim DS (Hives)    Intolerances        Social History:  Lives at home with family  Recently retired  Active, ambulates independently and independent with ADLs (20 Nov 2024 22:40)      REVIEW OF SYSTEMS:  CONSTITUTIONAL: No fever, No chills, No fatigue, No myalgia, No Body ache, No Weakness  EYES: No eye pain,  No visual disturbances, No discharge, NO Redness  ENMT:  No ear pain, No nose bleed, No vertigo; No sinus pain, NO throat pain, No Congestion  NECK: No pain, No stiffness  RESPIRATORY: No cough, NO wheezing, No  hemoptysis, NO  shortness of breath  CARDIOVASCULAR: No chest pain, palpitations  GASTROINTESTINAL: No abdominal pain, NO epigastric pain. No nausea, No vomiting; No diarrhea, No constipation. [  ] BM  GENITOURINARY: No dysuria, No frequency, No urgency, No hematuria, NO incontinence  NEUROLOGICAL: No headaches, No dizziness, No numbness, No tingling, No tremors, No weakness  EXT: No Swelling, No Pain, No Edema  SKIN:  [  ] No itching, burning, rashes, or lesions   MUSCULOSKELETAL: No joint pain ,No Jt swelling; No muscle pain, No back pain, No extremity pain  PSYCHIATRIC: No depression,  No anxiety,  No mood swings ,No difficulty sleeping at night  PAIN SCALE: [ x ] None  [  ] Other-  ROS Unable to obtain due to - [  ] Dementia  [  ] Lethargy [  ] Drowsy [  ] Sedated [  ] non verbal  REST OF REVIEW Of SYSTEM - [ x ] Normal     Vital Signs Last 24 Hrs  T(C): 36.7 (27 Nov 2024 05:28), Max: 37.7 (26 Nov 2024 20:40)  T(F): 98 (27 Nov 2024 05:28), Max: 99.8 (26 Nov 2024 20:40)  HR: 86 (27 Nov 2024 08:56) (68 - 86)  BP: 151/83 (27 Nov 2024 08:56) (138/72 - 170/92)  BP(mean): --  RR: 18 (27 Nov 2024 05:28) (18 - 20)  SpO2: 96% (27 Nov 2024 08:15) (92% - 96%)    Parameters below as of 27 Nov 2024 08:15  Patient On (Oxygen Delivery Method): room air      Finger Stick        11-26 @ 07:01 - 11-27 @ 07:00  --------------------------------------------------------  IN: 700 mL / OUT: 4850 mL / NET: -4150 mL    11-27 @ 07:01  -  11-27 @ 09:52  --------------------------------------------------------  IN: 0 mL / OUT: 1100 mL / NET: -1100 mL        PHYSICAL EXAM:  GENERAL:  [ x ] NAD , [ x ] well appearing, [  ] Agitated, [  ] Drowsy,  [  ] Lethargy, [  ] confused   HEAD:  [ x ] Normal, [  ] Other  EYES:  [ x ] EOMI, [  ] PERRLA, [ x ] conjunctiva and sclera clear normal, [  ] Other,  [  ] Pallor,[  ] Discharge  ENMT:  [ x ] Normal, [ x ] Moist mucous membranes, [  ] Good dentition, [  ] No Thrush  NECK:  [  ] Supple, [ x ] No JVD, [  ] Normal thyroid, [  ] Lymphadenopathy [  ] Other  CHEST/LUNG:  [ x ] Clear to auscultation bilaterally, [ x ] Breath Sounds equal [  ] poor effort  [  ] No rales, [  ] No rhonchi  [  ]  No wheezing,   HEART:  [ x ] Regular rate and rhythm, [  ] tachycardia, [  ] Bradycardia,  [  ] irregular  [ x ] No murmurs, No rubs, No gallops, [  ] PPM in place (Mfr:  )  ABDOMEN:  [ x ] Soft, [ x ] Nontender, [ x ] Nondistended, [  ] No mass, [ x ] Bowel sounds present, [  ] obese  NERVOUS SYSTEM:  [ x ] Alert & Oriented X3, [ x ] Nonfocal  [  ] Confusion  [  ] Encephalopathic [  ] Sedated [  ] Unable to assess, [  ] Dementia [  ] Other-  EXTREMITIES: [  ] 2+ Peripheral Pulses, No clubbing, No cyanosis,  [ x ] improving edema B/L lower EXT. [  ] PVD stasis skin changes B/L Lower EXT, [  ] wound  LYMPH: No lymphadenopathy noted  SKIN:  [ x ] No rashes or lesions, [  ] Pressure Ulcers, [  ] ecchymosis, [  ] Skin Tears, [  ] Other    DIET: Diet, NPO after Midnight:      NPO Start Date: 26-Nov-2024,   NPO Start Time: 23:59  Except Medications (11-26-24 @ 10:51)  Diet, Renal Restrictions:   For patients receiving Renal Replacement - No Protein Restr, No Conc K, No Conc Phos, Low Sodium  Supplement Feeding Modality:  Oral  Suplena Cans or Servings Per Day:  1       Frequency:  Daily (11-22-24 @ 11:31)      LABS:                        11.3   8.48  )-----------( 341      ( 27 Nov 2024 08:19 )             35.0     27 Nov 2024 08:19    136    |  101    |  73     ----------------------------<  107    5.1     |  26     |  9.10     Ca    9.0        27 Nov 2024 08:19  Phos  7.8       27 Nov 2024 08:19  Mg     3.0       27 Nov 2024 08:19    TPro  7.5    /  Alb  3.2    /  TBili  0.4    /  DBili  x      /  AST  21     /  ALT  27     /  AlkPhos  96     27 Nov 2024 08:19    PT/INR - ( 27 Nov 2024 08:19 )   PT: 14.2 sec;   INR: 1.22 ratio         PTT - ( 27 Nov 2024 08:19 )  PTT:32.6 sec  Urinalysis Basic - ( 27 Nov 2024 08:19 )    Color: x / Appearance: x / SG: x / pH: x  Gluc: 107 mg/dL / Ketone: x  / Bili: x / Urobili: x   Blood: x / Protein: x / Nitrite: x   Leuk Esterase: x / RBC: x / WBC x   Sq Epi: x / Non Sq Epi: x / Bacteria: x        Culture Results:   No growth (11-21 @ 07:30)                  Culture - Urine (collected 21 Nov 2024 07:30)  Source: Clean Catch  Final Report (22 Nov 2024 09:09):    No growth    Urinalysis with Rflx Culture (collected 21 Nov 2024 07:30)         Anemia Panel:  Iron Total: 17 ug/dL (11-22-24 @ 07:05)  Iron - Total Binding Capacity.: 241 ug/dL (11-22-24 @ 07:05)  Ferritin: 257 ng/mL (11-22-24 @ 07:05)      Thyroid Panel:  Thyroid Stimulating Hormone, Serum: 1.11 uIU/mL (11-20-24 @ 18:40)                RADIOLOGY & ADDITIONAL TESTS:      HEALTH ISSUES - PROBLEM Dx:  MARYSOL (acute kidney injury)    Gastric lymphoma    Need for prophylactic measure    Dyspnea    Hyperkalemia            Consultant(s) Notes Reviewed:  [ x ] YES     Care Discussed with [X] Consultants  [ x ] Patient  [  ] Family [  ] HCP [  ]   [  ] Social Service  [  ] RN, [  ] Physical Therapy,[  ] Palliative care team  DVT PPX: [  ] Lovenox, [ x ] S C Heparin, [  ] Coumadin, [  ] Xarelto, [  ] Eliquis, [  ] Pradaxa, [  ] IV Heparin drip, [  ] SCD [  ] Contraindication 2 to GI Bleed,[  ] Ambulation [  ] Contraindicated 2 to  bleed [  ] Contraindicated 2 to Brain Bleed  Advanced directive: [ x ] None, [  ] DNR/DNI Patient is a 64y old  Male who presents with a chief complaint of MARYSOL (27 Nov 2024 08:35)    HPI:  Pt is a 65yo M with PMHx recently diagnosed gastric lymphoma  S/P Biopsy of gastric mass & neck Lymph node  with LN spread presents to the ED as recommended by his oncologist Dr. Andrea for elevated Creatinine. Pt called by doctor for elevated Cr at 5.3.  Baseline 1.1, increasing to 1.5-->3.0--> 5.3 on outpatient labs. Pt had chemo port placed today, has not been drinking as much as he usually does. Per pt, urinated a small amount this AM, contributed it to decreased fluid intake. Pt endorsing mild flank pain, prescribed oxycodone by outpatient doctor. Pt also endorsing new onset dyspnea on exertion. Pt has been taking Advil for 5 weeks for Lower Back pain. Pt had placement of Medi port as out pt   today     Denies fever, chills, chest pain, palpitations, abdominal pain, nausea, vomiting, diarrhea, constipation, or dysuria    ED Course:   Vitals: BP: 160/90, HR: 74, Temp: 98F, RR: 18  Labs:  WBC 7.31, Hg 10, CO2 21, BUN/CR 58,5.3, Ca 8.3, eGFR 11, Mag 2.8, proBNP 593  UA: Pending  CT Chest: Mild mottled density in the left mainstem bronchus may represent secretions. No focal consolidation or discrete mass is seen. Regions of atelectasis or scarring in the left upper lobe/lingula. Atelectatic changes at the lung bases.  CT Abdomen/Pelvis: Lymphadenopathy which appears increased when compared with 9/30/2024. Nodularity along posterior peritoneal reflections and minimal perihepatic fluid which were not seen previously.    EKG: NSR rate 68bpm. QTc 408ms  Received in the ED: 500cc NS bolus x1   (20 Nov 2024 22:40)    INTERVAL HPI:  11/21: Pt seen and examined at bedside. Pt was making minimal urine output overnight but has voided significantly more since than. He has been taking Advil for the last month at the very minimum, with  max of 6 pills/day for R. low back pain. However, the pain is not present currently. No acute c/o at this time.   11/22: Pt seen and examined at bedside. 1.3L UOP in the last 24 hours. Pt reports feeling well, shortness  of breath has improved a bit. No other concerns worsening MARYSOL , K stable   11/23:pt seen, examined, Cr increasing, Low h/h stable, High electrolytes.  11/24: Pt seen and examined, seated in chair. Pt reports feeling well, and states his shortness of breath when walking has improved. He still has intermittent lower back pain. Cr rising, today 10, nephro to start temporary HD today.  11/25: Pt seen and examined, seated in chair about to eat breakfast. Pt reports he felt a little nauseous during his first HD session yesterday, and stated he felt tired after but this morning he feels well. 1L removed yesterday. Cr still rising, 11 today. Likely will undergo another session of HD today. No concerns per patient.HD Rx today   11/26: Pt seen and examined, seated in chair. He reports feeling well this morning and denies any specific concerns. 1L removed with HD yesterday, plan for 2.5L removal today along with initiation of bumex. Plan for IR for permacath tomorrow, NPO after midnight tonight.  11/27: Pt seen and examined, seated in chair. S/p 3rd HD session yesterday which he tolerated well. Pt reports that he feels good, denies any concerns. Remains on bumex with good UOP. Planned for IR procedure for permacath  today .D/C Barba cath today-TOV , PC placed     OVERNIGHT EVENTS: None    Home Medications:      MEDICATIONS  (STANDING):  albuterol/ipratropium for Nebulization 3 milliLiter(s) Nebulizer every 12 hours  buMETAnide Injectable 2 milliGRAM(s) IV Push daily  chlorhexidine 4% Liquid 1 Application(s) Topical <User Schedule>  heparin   Injectable 5000 Unit(s) SubCutaneous every 8 hours  influenza   Vaccine 0.5 milliLiter(s) IntraMuscular once  labetalol 100 milliGRAM(s) Oral two times a day  melatonin 5 milliGRAM(s) Oral at bedtime  pantoprazole    Tablet 40 milliGRAM(s) Oral before breakfast  polyethylene glycol 3350 17 Gram(s) Oral daily  senna 2 Tablet(s) Oral at bedtime  sodium chloride 3%  Inhalation 4 milliLiter(s) Inhalation every 12 hours  tamsulosin 0.4 milliGRAM(s) Oral at bedtime    MEDICATIONS  (PRN):  aluminum hydroxide/magnesium hydroxide/simethicone Suspension 30 milliLiter(s) Oral every 4 hours PRN Dyspepsia  ondansetron Injectable 4 milliGRAM(s) IV Push every 8 hours PRN Nausea and/or Vomiting  oxyCODONE    IR 5 milliGRAM(s) Oral every 6 hours PRN Severe Pain (7 - 10)  sodium chloride 0.9% lock flush 10 milliLiter(s) IV Push every 1 hour PRN Pre/post blood products, medications, blood draw, and to maintain line patency  traMADol 25 milliGRAM(s) Oral every 12 hours PRN Mild Pain (1 - 3)  traMADol 50 milliGRAM(s) Oral every 12 hours PRN Moderate Pain (4 - 6)      Allergies    Bactrim DS (Hives)    Intolerances        Social History:  Lives at home with family  Recently retired  Active, ambulates independently and independent with ADLs (20 Nov 2024 22:40)      REVIEW OF SYSTEMS: i feel better   CONSTITUTIONAL: No fever, No chills, No fatigue, No myalgia, No Body ache, No Weakness  EYES: No eye pain,  No visual disturbances, No discharge, NO Redness  ENMT:  No ear pain, No nose bleed, No vertigo; No sinus pain, NO throat pain, No Congestion  NECK: No pain, No stiffness  RESPIRATORY: No cough, NO wheezing, No  hemoptysis, NO  shortness of breath  CARDIOVASCULAR: No chest pain, palpitations  GASTROINTESTINAL: No abdominal pain, NO epigastric pain. No nausea, No vomiting; No diarrhea, No constipation. [  ] BM  GENITOURINARY: No dysuria, No frequency, No urgency, No hematuria, NO incontinence  NEUROLOGICAL: No headaches, No dizziness, No numbness, No tingling, No tremors, No weakness  EXT: No Swelling, No Pain, No Edema  SKIN:  [ x ] No itching, burning, rashes, or lesions   MUSCULOSKELETAL: No joint pain ,No Jt swelling; No muscle pain, No back pain, No extremity pain  PSYCHIATRIC: No depression,  No anxiety,  No mood swings ,No difficulty sleeping at night  PAIN SCALE: [ x ] None  [  ] Other-  ROS Unable to obtain due to - [  ] Dementia  [  ] Lethargy [  ] Drowsy [  ] Sedated [  ] non verbal  REST OF REVIEW Of SYSTEM - [ x ] Normal     Vital Signs Last 24 Hrs  T(C): 36.7 (27 Nov 2024 05:28), Max: 37.7 (26 Nov 2024 20:40)  T(F): 98 (27 Nov 2024 05:28), Max: 99.8 (26 Nov 2024 20:40)  HR: 86 (27 Nov 2024 08:56) (68 - 86)  BP: 151/83 (27 Nov 2024 08:56) (138/72 - 170/92)  BP(mean): --  RR: 18 (27 Nov 2024 05:28) (18 - 20)  SpO2: 96% (27 Nov 2024 08:15) (92% - 96%)    Parameters below as of 27 Nov 2024 08:15  Patient On (Oxygen Delivery Method): room air      Finger Stick        11-26 @ 07:01 - 11-27 @ 07:00  --------------------------------------------------------  IN: 700 mL / OUT: 4850 mL / NET: -4150 mL    11-27 @ 07:01 - 11-27 @ 09:52  --------------------------------------------------------  IN: 0 mL / OUT: 1100 mL / NET: -1100 mL        PHYSICAL EXAM:  GENERAL:  [ x ] NAD , [ x ] well appearing, [  ] Agitated, [  ] Drowsy,  [  ] Lethargy, [  ] confused   HEAD:  [ x ] Normal, [  ] Other  EYES:  [ x ] EOMI, [  ] PERRLA, [ x ] conjunctiva and sclera clear normal, [  ] Other,  [  ] Pallor,[  ] Discharge  ENMT:  [ x ] Normal, [ x ] Moist mucous membranes, [  ] Good dentition, [  ] No Thrush  NECK:  [  ] Supple, [ x ] No JVD, x[  ] Normal thyroid, [  ] Lymphadenopathy [  ] Other  CHEST/LUNG:  [ x ] Clear to auscultation bilaterally, [ x ] Breath Sounds equal [  ] poor effort  [ x ] No rales, [ x ] No rhonchi  [ x ]  No wheezing,   HEART:  [ x ] Regular rate and rhythm, [  ] tachycardia, [  ] Bradycardia,  [  ] irregular  [ x ] No murmurs, No rubs, No gallops, [  ] PPM in place (Mfr:  )  ABDOMEN:  [ x ] Soft, [ x ] Nontender, [ x ] Nondistended, [ x ] No mass, [ x ] Bowel sounds present, [ x ] obese  NERVOUS SYSTEM:  [ x ] Alert & Oriented X3, [ x ] Nonfocal  [  ] Confusion  [  ] Encephalopathic [  ] Sedated [  ] Unable to assess, [  ] Dementia [  ] Other-  EXTREMITIES: [  ] 2+ Peripheral Pulses, No clubbing, No cyanosis,  [ x ] 2 + pitting  improving edema B/L lower EXT. [  ] PVD stasis skin changes B/L Lower EXT, [  ] wound  LYMPH: No lymphadenopathy noted  SKIN:  [ x ] No rashes or lesions, [  ] Pressure Ulcers, [  ] ecchymosis, [  ] Skin Tears, [  ] Other    DIET: Diet, NPO after Midnight:      NPO Start Date: 26-Nov-2024,   NPO Start Time: 23:59  Except Medications (11-26-24 @ 10:51)  Diet, Renal Restrictions:   For patients receiving Renal Replacement - No Protein Restr, No Conc K, No Conc Phos, Low Sodium  Supplement Feeding Modality:  Oral  Suplena Cans or Servings Per Day:  1       Frequency:  Daily (11-22-24 @ 11:31)      LABS:                        11.3   8.48  )-----------( 341      ( 27 Nov 2024 08:19 )             35.0     27 Nov 2024 08:19    136    |  101    |  73     ----------------------------<  107    5.1     |  26     |  9.10     Ca    9.0        27 Nov 2024 08:19  Phos  7.8       27 Nov 2024 08:19  Mg     3.0       27 Nov 2024 08:19    TPro  7.5    /  Alb  3.2    /  TBili  0.4    /  DBili  x      /  AST  21     /  ALT  27     /  AlkPhos  96     27 Nov 2024 08:19    PT/INR - ( 27 Nov 2024 08:19 )   PT: 14.2 sec;   INR: 1.22 ratio         PTT - ( 27 Nov 2024 08:19 )  PTT:32.6 sec  Urinalysis Basic - ( 27 Nov 2024 08:19 )    Color: x / Appearance: x / SG: x / pH: x  Gluc: 107 mg/dL / Ketone: x  / Bili: x / Urobili: x   Blood: x / Protein: x / Nitrite: x   Leuk Esterase: x / RBC: x / WBC x   Sq Epi: x / Non Sq Epi: x / Bacteria: x        Culture Results:   No growth (11-21 @ 07:30)      Culture - Urine (collected 21 Nov 2024 07:30)  Source: Clean Catch  Final Report (22 Nov 2024 09:09):    No growth    Urinalysis with Rflx Culture (collected 21 Nov 2024 07:30)         Anemia Panel:  Iron Total: 17 ug/dL (11-22-24 @ 07:05)  Iron - Total Binding Capacity.: 241 ug/dL (11-22-24 @ 07:05)  Ferritin: 257 ng/mL (11-22-24 @ 07:05)      Thyroid Panel:  Thyroid Stimulating Hormone, Serum: 1.11 uIU/mL (11-20-24 @ 18:40)      RADIOLOGY & ADDITIONAL TESTS:      HEALTH ISSUES - PROBLEM Dx:  MARYSOL (acute kidney injury)    Gastric lymphoma    Need for prophylactic measure    Dyspnea    Hyperkalemia            Consultant(s) Notes Reviewed:  [ x ] YES     Care Discussed with [X] Consultants  [ x ] Patient  [  ] Family [  ] HCP [ x ]   [  x] Social Service  [ x ] RN, [  ] Physical Therapy,[  ] Palliative care team  DVT PPX: [  ] Lovenox, [ x ] S C Heparin, [  ] Coumadin, [  ] Xarelto, [  ] Eliquis, [  ] Pradaxa, [  ] IV Heparin drip, [  ] SCD [  ] Contraindication 2 to GI Bleed,[  ] Ambulation [  ] Contraindicated 2 to  bleed [  ] Contraindicated 2 to Brain Bleed  Advanced directive: [ x ] None, [  ] DNR/DNI

## 2024-11-27 NOTE — PROGRESS NOTE ADULT - ASSESSMENT
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/mesenteric LN involvements(under care Dr Mariana Fung [facility]), adm w rapidly progressive MARYSOL, Cr 5s on adm was 1.1 9/30/24 and 3 w Dr Fung Tuesday 11/19  Pt had first presented w flank pain 9/2024 thought renal stone, but 9/30/24 CT renal stone hunt show sig mesenteric/retroperitoneal LADs up to 3.9cm), tx from [place] to [facility] for ?endoscopic bx(pt reports had "stomach bx", but path report in computer 9/30/24 perihepatic LN FNA c/w met poorly diff ca), Pt subsequent had additional left neck LN bx  First chemo FOLFOX w Dr Fung was planned for Monday 11/25/24   Hgb stable since adm at 10  CT c/a/p--no sig above diaphragm findings, sig mesenteric/retroperitoneal LADs up to 4.6cm had been up to 3.9cm on 9/30/24 CT renal hunt. No hydronephrsis, andreas renal cysts seen  Seen by Renal, etiology of MARYSOL ?NSAIDs effect    -started on urgent HD Sunday, Cr appear stabilized. Cr stil high  -clinically no longer ZHOU when ambulates sill some shayy legs edema  -anemia due to chronic ds, malignancy, renal failure. Stable      RECOMMENDATIONS    MARYSOL  HD per renal  follow renal fx  follow urine outpt    Gastric Cancer  supportive measures  outpt followup with oncology  treatment to be re-eval given new MARYSOL, and hopefully not long term ESRD    DVT prophyalxis    discussed w pt    Thank you for consulting us.   No additional recommendations at current time.   Will sign off on case for now.   Please call, or re-consult if needed.

## 2024-11-27 NOTE — PROGRESS NOTE ADULT - PROBLEM SELECTOR PLAN 1
Pt sent in to ED for elevated Cr (Baseline 1.1, increasing to 1.5-->3.0), endorsing decreased urination and flank pain  - Pt c/o flank pain (L), per chart review, also was endorsing pain during admission at [facility], prior to elevated Cr  - Cr 5.3 on admission, pt with prolonged NSAID use.    - FeNA 3.4, likely ATN from progression of prerenal MARYSOL MA  - s/p wise catheter placement in ED with minimal output  - CT Abdomen: Collapsed bladder. Bilateral renal cysts, no hydro. No obstruction.  - K elevated on admission now s/p LOKELMA x1 with improvement in K  - Metabolic Acidosis, S/P- NaHco3  - first session of HD 11/24 with 1L removal, 11/26 1L removal, 11/27 2.5 L removed  - Cr now downtrending  - Avoid nephrotoxic medications, Renal dose meds   - Renal diet  - Daily CMP  - Nephrology (Dr. Juárez) follow up - HD- Bumex  -IR consult for PC - planned for today however may get moved to friday, if so patient will need to be NPO after midnight thursday night  - CM/SW fallowing for outpatient HD arrangements - [facility] - will follow with Dr. Eugene

## 2024-11-27 NOTE — SOCIAL WORK PROGRESS NOTE - NSSWPROGRESSNOTE_GEN_ALL_CORE
spoke with MD, plan for permacath placement later today, or Friday. pt aware. contacted by Juana at [facility] -[phone], confirmed SOC for Monday 12/2. requested that due to holiday and long wknd, for initial appointment, pt come at 2 pm on Monday 12/2 vs 5:15 am. pt is aware and in agreement.  spoke with MD, plan for permacath placement later today. contacted by Juana at [facility] -[phone], confirmed SOC for Monday 12/2. due to holiday and long wknd, for initial appointment only, Juana requesting pt come at 2 pm on Monday 12/2 vs 5:15 am. pt is aware and in agreement.

## 2024-11-27 NOTE — PROCEDURE NOTE - PROCEDURE FINDINGS AND DETAILS
left ij tunneled dialysis catheter placed. tip in svc, ok to access. indwelling right neck non tunneled dialysis catheter removed.

## 2024-11-27 NOTE — PRE PROCEDURE NOTE - PRE PROCEDURE EVALUATION
Interventional Radiology Pre-Procedure Note    Patient is a 64y old Male with renal failure requiring long term dialysis access.    PAST MEDICAL & SURGICAL HISTORY:  Incisional hernia  2015      Gastric lymphoma      Anemia of chronic disease      S/P appendectomy  November 17th 2014      S/P cholecystectomy  15 years ago           Allergies: Bactrim DS (Hives)      LABS:                        11.3   8.48  )-----------( 341      ( 27 Nov 2024 08:19 )             35.0     11-27    136  |  101  |  73[H]  ----------------------------<  107[H]  5.1   |  26  |  9.10[H]    Ca    9.0      27 Nov 2024 08:19  Phos  7.8     11-27  Mg     3.0     11-27    TPro  7.5  /  Alb  3.2[L]  /  TBili  0.4  /  DBili  x   /  AST  21  /  ALT  27  /  AlkPhos  96  11-27    PT/INR - ( 27 Nov 2024 08:19 )   PT: 14.2 sec;   INR: 1.22 ratio         PTT - ( 27 Nov 2024 08:19 )  PTT:32.6 sec    T(C): 36.7 (11-27-24 @ 10:49), Max: 37.7 (11-26-24 @ 20:40)  HR: 75 (11-27-24 @ 10:49) (75 - 86)  BP: 132/83 (11-27-24 @ 10:49) (132/83 - 170/92)  RR: 21 (11-27-24 @ 10:49) (18 - 21)  SpO2: 92% (11-27-24 @ 10:49) (92% - 96%)    Procedure/ risks/ benefits were explained, informed consent obtained from patient, verbalizes understanding.

## 2024-11-27 NOTE — PROGRESS NOTE ADULT - PROBLEM SELECTOR PLAN 4
Pt with recently diagnosed gastric lymphoma, port placed prior to admission for planned chemo. Follows Dr. Andrea  - CT Ab: Lymphadenopathy which appears increased when compared with 9/30/2024. Nodularity along posterior peritoneal reflections and minimal perihepatic fluid which were not seen previously.  - Microcytic anemia on labs  - iron studies noted, heme/onc following  - hx beta thal minor  Hematology -Dr lu follow up Pt endorsing ZHOU while walking, -improving , likely 2/2 Volume over load  - CT Chest:  Mild mottled density in the left mainstem bronchus may represent secretions. No focal consolidation or discrete mass is seen. Regions of atelectasis or scarring in the left upper lobe/lingula. Atelectatic changes at the lung bases  - Pt saturating well on RA  - Elevation in proBNP, can be 2/2 MARYSOL, less likely cardiac as pt does not appear clinically volume overloaded on examination and denies history of HF. No cardiomegaly on CT Chest  - Standing Duoneb and hypertonic saline  - Encourage incentive spirometer use  - Aspiration precautions  - Monitor routine hemodynamics  - F/U TTE - done, NL EF  - ZHOU improving subjectively

## 2024-11-27 NOTE — PROGRESS NOTE ADULT - ASSESSMENT
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 Andrea for elevated Creatinine- MARYSOL with MA .

## 2024-11-27 NOTE — PROGRESS NOTE ADULT - PROBLEM SELECTOR PLAN 6
Heparin 5000U qd    TOV today - started on flomax    Dispo  - Outpatient HD set up at [facility] MWF 5am, has spot starting 12/2

## 2024-11-27 NOTE — PROGRESS NOTE ADULT - PROBLEM SELECTOR PLAN 3
Pt endorsing ZHOU while walking, -improving , likely 2/2 Volume over load  - CT Chest:  Mild mottled density in the left mainstem bronchus may represent secretions. No focal consolidation or discrete mass is seen. Regions of atelectasis or scarring in the left upper lobe/lingula. Atelectatic changes at the lung bases  - Pt saturating well on RA  - Elevation in proBNP, can be 2/2 MARYSOL, less likely cardiac as pt does not appear clinically volume overloaded on examination and denies history of HF. No cardiomegaly on CT Chest  - Standing Duoneb and hypertonic saline  - Encourage incentive spirometer use  - Aspiration precautions  - Monitor routine hemodynamics  - F/U TTE - done, NL EF  - ZHOU improving subjectively Pt with elevated electrolytes likely in setting of [place]- RESOLVED  - K improved s/p Lokelma  - Mag/Phos remain elevated  - for HD today  - Nutritional eval  - Renal diet

## 2024-11-27 NOTE — PROGRESS NOTE ADULT - PROBLEM SELECTOR PLAN 5
Heparin 5000U qd    Dispo  - Outpatient HD set up at [facility] MWF 5am, has spot starting 12/2 Pt with recently diagnosed gastric lymphoma, port placed prior to admission for planned chemo. Follows Dr. Andrea  - CT Ab: Lymphadenopathy which appears increased when compared with 9/30/2024. Nodularity along posterior peritoneal reflections and minimal perihepatic fluid which were not seen previously.  - Microcytic anemia on labs  - iron studies noted, heme/onc following  - hx beta thal minor  Hematology -Dr lu follow up

## 2024-11-27 NOTE — PROGRESS NOTE ADULT - PROBLEM SELECTOR PLAN 2
Pt with elevated electrolytes likely in setting of [place]- RESOLVED  - K improved s/p Lokelma  - Mag/Phos remain elevated  - for HD today  - Nutritional eval  - Renal diet 2 Pt with HTN now with -170s  - start labetalol 100mg BID with hold parameters as per nephro  - monitor routine hemodynamics

## 2024-11-27 NOTE — PROGRESS NOTE ADULT - SUBJECTIVE AND OBJECTIVE BOX
[facility]                             Nephrology and Hypertension                             Gustavo Oscar                                          [phone]     Patient is a 64y old  Male who presents with a chief complaint of MARYSOL (2024 14:23)     HPI:  Pt is a 65yo M with PMHx recently diagnosed gastric lymphoma  S/P Biopsy of gastric mass & neck Lymph node  with LN spread presents to the ED as recommended by his oncologist Dr. Andrea for elevated Creatinine. Pt called by doctor for elevated Cr at 5.3.  Baseline 1.1, increasing to 1.5-->3.0--> 5.3 on outpatient labs. Pt had chemo port placed today, has not been drinking as much as he usually does. Per pt, urinated a small amount this AM, contributed it to decreased fluid intake. Pt endorsing mild flank pain, prescribed oxycodone by outpatient doctor. Pt also endorsing new onset dyspnea on exertion. Pt has been taking Advil for 5 weeks for Lower Back pain. Pt had placement of Medi port as out pt today   Nephrology is c/s for Acute kidney injury. When I saw him, he is very anxious. He states he had been taking 6 tabs of advil every day for a few weeks for pain. He also endorsed not eating as much. Per discussion with RN frandy was not draining much overnight but over last few hours, has better uop. On labs review, Serum Creat was 1.1 in 2024, was 5.3 on admission, is >6 on repeat labs early this am with hyperkalemia. Was given lokelma . He has no sob, no dizziness, no N/V, no new pain.       No acute complaints  No CP, No SOB, No N/V/D       PAST MEDICAL & SURGICAL HISTORY:  Incisional hernia    Gastric lymphoma  Anemia of chronic disease  S/P appendectomy  2014  S/P cholecystectomy  15 years ago  FAMILY HISTORY:  Family history of coronary artery disease in father  Father -  age 60 following MI    Social History:Non smoker    MEDICATIONS  (STANDING):  albuterol/ipratropium for Nebulization 3 milliLiter(s) Nebulizer every 12 hours  buMETAnide Injectable 2 milliGRAM(s) IV Push daily  chlorhexidine 4% Liquid 1 Application(s) Topical <User Schedule>  heparin   Injectable 5000 Unit(s) SubCutaneous every 8 hours  influenza   Vaccine 0.5 milliLiter(s) IntraMuscular once  labetalol 100 milliGRAM(s) Oral two times a day  melatonin 5 milliGRAM(s) Oral at bedtime  pantoprazole    Tablet 40 milliGRAM(s) Oral before breakfast  polyethylene glycol 3350 17 Gram(s) Oral daily  senna 2 Tablet(s) Oral at bedtime  sodium chloride 3%  Inhalation 4 milliLiter(s) Inhalation every 12 hours    MEDICATIONS  (PRN):  aluminum hydroxide/magnesium hydroxide/simethicone Suspension 30 milliLiter(s) Oral every 4 hours PRN Dyspepsia  ondansetron Injectable 4 milliGRAM(s) IV Push every 8 hours PRN Nausea and/or Vomiting  oxyCODONE    IR 5 milliGRAM(s) Oral every 6 hours PRN Severe Pain (7 - 10)  sodium chloride 0.9% lock flush 10 milliLiter(s) IV Push every 1 hour PRN Pre/post blood products, medications, blood draw, and to maintain line patency  traMADol 25 milliGRAM(s) Oral every 12 hours PRN Mild Pain (1 - 3)  traMADol 50 milliGRAM(s) Oral every 12 hours PRN Moderate Pain (4 - 6)        Allergies    Bactrim DS (Hives)    Intolerances         REVIEW OF SYSTEMS: as per HPI    Vital Signs Last 24 Hrs  T(C): 36.7 (2024 05:20), Max: 37.8 (2024 20:54)  T(F): 98 (2024 05:20), Max: 100 (2024 20:54)  HR: 71 (2024 05:20) (71 - 81)  BP: 168/95 (2024 05:20) (124/76 - 168/95)  BP(mean): --  RR: 18 (2024 05:20) (16 - 20)  SpO2: 91% (2024 05:20) (91% - 95%)    Parameters below as of 2024 05:20  Patient On (Oxygen Delivery Method): room air        PHYSICAL EXAM:    GENERAL: NAD, obese  HEAD:  Atraumatic, Normocephalic  EYES: EOMI, conjunctiva and sclera clear  ENMT: No Drainage from nares, No drainage from ears  NERVOUS SYSTEM:  Awake and Alert  CHEST/LUNG: Clear bilaterally, RCW port  EXTREMITIES:  +++ edema  SKIN: No rashes No obvious ecchymosis  : wise noted       LABS:             24: pending                                            9.6    7.07  )-----------( 313      ( 2024 06:28 )             29.1         136  |  99  |  81[H]  ----------------------------<  98  5.2   |  25  |  11.00[H]    Ca    8.1[L]      2024 06:15  Phos  8.1       Mg     2.9         TPro  6.4  /  Alb  2.8[L]  /  TBili  0.2  /  DBili  x   /  AST  20  /  ALT  16  /  AlkPhos  80        Urinalysis Basic - ( 2024 06:15 )    Color: x / Appearance: x / SG: x / pH: x  Gluc: 98 mg/dL / Ketone: x  / Bili: x / Urobili: x   Blood: x / Protein: x / Nitrite: x   Leuk Esterase: x / RBC: x / WBC x   Sq Epi: x / Non Sq Epi: x / Bacteria: x

## 2024-11-27 NOTE — PROGRESS NOTE ADULT - SUBJECTIVE AND OBJECTIVE BOX
[INTERVAL HX: ]  Patient seen and examined;  Chart reviewed and events noted;   no CP, no SOB    s/p permacath    to be DC over weekend for HD Mon    [MEDICATIONS]  MEDICATIONS  (STANDING):  albuterol/ipratropium for Nebulization 3 milliLiter(s) Nebulizer every 12 hours  buMETAnide Injectable 2 milliGRAM(s) IV Push daily  chlorhexidine 4% Liquid 1 Application(s) Topical <User Schedule>  heparin   Injectable 5000 Unit(s) SubCutaneous every 8 hours  influenza   Vaccine 0.5 milliLiter(s) IntraMuscular once  labetalol 100 milliGRAM(s) Oral two times a day  melatonin 5 milliGRAM(s) Oral at bedtime  pantoprazole    Tablet 40 milliGRAM(s) Oral before breakfast  polyethylene glycol 3350 17 Gram(s) Oral daily  senna 2 Tablet(s) Oral at bedtime  sodium chloride 3%  Inhalation 4 milliLiter(s) Inhalation every 12 hours  tamsulosin 0.4 milliGRAM(s) Oral at bedtime    MEDICATIONS  (PRN):  acetaminophen     Tablet .. 650 milliGRAM(s) Oral every 6 hours PRN Mild Pain (1 - 3)  aluminum hydroxide/magnesium hydroxide/simethicone Suspension 30 milliLiter(s) Oral every 4 hours PRN Dyspepsia  oxyCODONE    IR 5 milliGRAM(s) Oral every 6 hours PRN Severe Pain (7 - 10)  sodium chloride 0.9% lock flush 10 milliLiter(s) IV Push every 1 hour PRN Pre/post blood products, medications, blood draw, and to maintain line patency      [VITALS]  Vital Signs Last 24 Hrs  T(C): 36.7 (2024 17:27), Max: 37.7 (2024 20:40)  T(F): 98 (2024 17:27), Max: 99.8 (2024 20:40)  HR: 68 (2024 17:27) (68 - 86)  BP: 166/89 (2024 17:27) (127/73 - 170/92)  BP(mean): --  RR: 17 (2024 17:27) (15 - 23)  SpO2: 93% (2024 17:27) (92% - 96%)    Parameters below as of 2024 17:27  Patient On (Oxygen Delivery Method): room air      [WT/HT]  Daily Height in cm: 170.18 (2024 10:49)    Daily Weight in k.2 (2024 05:28)  [VENT]      [PHYSICAL EXAM]  GEN: NAD  HEENT: normocephalic and atraumatic. EOMI. PERRL.    NECK: Supple.  No lymphadenopathy   LUNGS: Clear to auscultation.  HEART: Regular rate and rhythm,  no MRG  ABDOMEN: Soft, nontender, and nondistended.  Positive bowel sounds.    : No CVA tenderness  EXTREMITIES: Without edema.  NEUROLOGIC: grossly intact.  PSYCHIATRIC: Appropriate affect .  SKIN: No rash     [LABS:]                        11.3   8.48  )-----------( 341      ( 2024 08:19 )             35.0         136  |  101  |  73[H]  ----------------------------<  107[H]  5.1   |  26  |  9.10[H]    Ca    9.0      2024 08:19  Phos  7.8       Mg     3.0         TPro  7.5  /  Alb  3.2[L]  /  TBili  0.4  /  DBili  x   /  AST  21  /  ALT    /  AlkPhos  96      PT/INR - ( 2024 08:19 )   PT: 14.2 sec;   INR: 1.22 ratio         PTT - ( 2024 08:19 )  PTT:32.6 sec      Iron - Total Binding Capacity.: 241 ug/dL [220 - 430] (24 @ 07:05)    Ferritin: 257 ng/mL [30 - 400] (24 @ 07:05)    Vitamin B12, Serum: 1506 pg/mL *H* [200 - 900] (24 @ 19:53)    Ferritin: 140 ng/mL [30 - 400] (24 @ 19:53)    Folate, Serum: 12.6 ng/mL [3.1 - 17.5] (24 @ 19:53)      Urinalysis Basic - ( 2024 08:19 )    Color: x / Appearance: x / SG: x / pH: x  Gluc: 107 mg/dL / Ketone: x  / Bili: x / Urobili: x   Blood: x / Protein: x / Nitrite: x   Leuk Esterase: x / RBC: x / WBC x   Sq Epi: x / Non Sq Epi: x / Bacteria: x                [RADIOLOGY STUDIES:]

## 2024-11-28 DIAGNOSIS — R50.9 FEVER, UNSPECIFIED: ICD-10-CM

## 2024-11-28 LAB
ALBUMIN SERPL ELPH-MCNC: 3.2 G/DL — LOW (ref 3.3–5)
ALP SERPL-CCNC: 87 U/L — SIGNIFICANT CHANGE UP (ref 40–120)
ALT FLD-CCNC: 29 U/L — SIGNIFICANT CHANGE UP (ref 12–78)
ANION GAP SERPL CALC-SCNC: 8 MMOL/L — SIGNIFICANT CHANGE UP (ref 5–17)
AST SERPL-CCNC: 23 U/L — SIGNIFICANT CHANGE UP (ref 15–37)
BILIRUB SERPL-MCNC: 0.4 MG/DL — SIGNIFICANT CHANGE UP (ref 0.2–1.2)
BUN SERPL-MCNC: 88 MG/DL — HIGH (ref 7–23)
CALCIUM SERPL-MCNC: 9.1 MG/DL — SIGNIFICANT CHANGE UP (ref 8.5–10.1)
CHLORIDE SERPL-SCNC: 101 MMOL/L — SIGNIFICANT CHANGE UP (ref 96–108)
CO2 SERPL-SCNC: 24 MMOL/L — SIGNIFICANT CHANGE UP (ref 22–31)
CREAT SERPL-MCNC: 10 MG/DL — HIGH (ref 0.5–1.3)
EGFR: 5 ML/MIN/1.73M2 — LOW
GLUCOSE SERPL-MCNC: 117 MG/DL — HIGH (ref 70–99)
HCT VFR BLD CALC: 32.4 % — LOW (ref 39–50)
HGB BLD-MCNC: 10.6 G/DL — LOW (ref 13–17)
MAGNESIUM SERPL-MCNC: 3 MG/DL — HIGH (ref 1.6–2.6)
MCHC RBC-ENTMCNC: 19.7 PG — LOW (ref 27–34)
MCHC RBC-ENTMCNC: 32.7 G/DL — SIGNIFICANT CHANGE UP (ref 32–36)
MCV RBC AUTO: 60.2 FL — LOW (ref 80–100)
NRBC # BLD: 0 /100 WBCS — SIGNIFICANT CHANGE UP (ref 0–0)
PHOSPHATE SERPL-MCNC: 8.3 MG/DL — HIGH (ref 2.5–4.5)
PLATELET # BLD AUTO: 371 K/UL — SIGNIFICANT CHANGE UP (ref 150–400)
POTASSIUM SERPL-MCNC: 5.6 MMOL/L — HIGH (ref 3.5–5.3)
POTASSIUM SERPL-SCNC: 5.6 MMOL/L — HIGH (ref 3.5–5.3)
PROT SERPL-MCNC: 7.1 G/DL — SIGNIFICANT CHANGE UP (ref 6–8.3)
RBC # BLD: 5.38 M/UL — SIGNIFICANT CHANGE UP (ref 4.2–5.8)
RBC # FLD: 15.8 % — HIGH (ref 10.3–14.5)
SODIUM SERPL-SCNC: 133 MMOL/L — LOW (ref 135–145)
WBC # BLD: 12.35 K/UL — HIGH (ref 3.8–10.5)
WBC # FLD AUTO: 12.35 K/UL — HIGH (ref 3.8–10.5)

## 2024-11-28 PROCEDURE — 93970 EXTREMITY STUDY: CPT | Mod: 26

## 2024-11-28 RX ORDER — SODIUM ZIRCONIUM CYCLOSILICATE 5 G/5G
10 POWDER, FOR SUSPENSION ORAL ONCE
Refills: 0 | Status: COMPLETED | OUTPATIENT
Start: 2024-11-28 | End: 2024-11-28

## 2024-11-28 RX ORDER — BUMETANIDE 1 MG/1
1 TABLET ORAL DAILY
Refills: 0 | Status: DISCONTINUED | OUTPATIENT
Start: 2024-11-29 | End: 2024-12-01

## 2024-11-28 RX ADMIN — LABETALOL 100 MILLIGRAM(S): 100 TABLET, FILM COATED ORAL at 05:28

## 2024-11-28 RX ADMIN — CHLORHEXIDINE GLUCONATE 1 APPLICATION(S): 1.2 RINSE ORAL at 05:36

## 2024-11-28 RX ADMIN — BUMETANIDE 2 MILLIGRAM(S): 1 TABLET ORAL at 05:27

## 2024-11-28 RX ADMIN — SODIUM CHLORIDE 4 MILLILITER(S): 9 INJECTION, SOLUTION INTRAMUSCULAR; INTRAVENOUS; SUBCUTANEOUS at 07:46

## 2024-11-28 RX ADMIN — IPRATROPIUM BROMIDE AND ALBUTEROL SULFATE 3 MILLILITER(S): 2.5; .5 SOLUTION RESPIRATORY (INHALATION) at 20:17

## 2024-11-28 RX ADMIN — Medication 5000 UNIT(S): at 05:28

## 2024-11-28 RX ADMIN — PANTOPRAZOLE SODIUM 40 MILLIGRAM(S): 40 TABLET, DELAYED RELEASE ORAL at 05:28

## 2024-11-28 RX ADMIN — TAMSULOSIN HYDROCHLORIDE 0.4 MILLIGRAM(S): 0.4 CAPSULE ORAL at 21:39

## 2024-11-28 RX ADMIN — ACETAMINOPHEN, DIPHENHYDRAMINE HCL, PHENYLEPHRINE HCL 5 MILLIGRAM(S): 325; 25; 5 TABLET ORAL at 21:39

## 2024-11-28 RX ADMIN — Medication 5000 UNIT(S): at 21:39

## 2024-11-28 RX ADMIN — SODIUM CHLORIDE 4 MILLILITER(S): 9 INJECTION, SOLUTION INTRAMUSCULAR; INTRAVENOUS; SUBCUTANEOUS at 20:17

## 2024-11-28 RX ADMIN — Medication 2 TABLET(S): at 21:39

## 2024-11-28 RX ADMIN — SODIUM ZIRCONIUM CYCLOSILICATE 10 GRAM(S): 5 POWDER, FOR SUSPENSION ORAL at 09:26

## 2024-11-28 RX ADMIN — IPRATROPIUM BROMIDE AND ALBUTEROL SULFATE 3 MILLILITER(S): 2.5; .5 SOLUTION RESPIRATORY (INHALATION) at 07:46

## 2024-11-28 RX ADMIN — LABETALOL 100 MILLIGRAM(S): 100 TABLET, FILM COATED ORAL at 17:17

## 2024-11-28 RX ADMIN — Medication 5000 UNIT(S): at 15:27

## 2024-11-28 NOTE — PROGRESS NOTE ADULT - PROBLEM SELECTOR PLAN 1
Pt sent in to ED for elevated Cr (Baseline 1.1, increasing to 1.5-->3.0), endorsing decreased urination and flank pain  - Pt c/o flank pain (L), per chart review, also was endorsing pain during admission at [facility], prior to elevated Cr  - Cr 5.3 on admission, pt with prolonged NSAID use.    - FeNA 3.4, likely ATN from progression of prerenal MARYSOL MA  - s/p wise catheter placement in ED with minimal output  - CT Abdomen: Collapsed bladder. Bilateral renal cysts, no hydro. No obstruction.  - K elevated on admission now s/p LOKELMA x1 with improvement in K  - Metabolic Acidosis, S/P- NaHco3  - first session of HD 11/24 with 1L removal, 11/26 1L removal, 11/27 2.5 L removed  - Cr now downtrending  - Avoid nephrotoxic medications, Renal dose meds   - Renal diet  - Daily CMP  - Nephrology (Dr. Juárez) follow up - HD- Bumex  -IR consult for PC - planned for today however may get moved to friday, if so patient will need to be NPO after midnight thursday night  - CM/SW fallowing for outpatient HD arrangements - [facility] - will follow with Dr. Eugene

## 2024-11-28 NOTE — PROGRESS NOTE ADULT - ASSESSMENT
Acute kidney injury, with ATN  Anemia  Hyperkalemia  Metabolic acidosis  Urinary retention  HTN  Lymphoma      -MARYSOL likely due to ATN from NSAID use and poor po intake, has normal renal function at baseline  -SCr 1.1 at baseline with normal electrolytes  -Informed consent previously obtained. HD discussed up to and including death discussed. Informed consent obtained and he agrees to HD if necessary  -Advised patient to avoid all nsaids   -s/p IVF. Fluid overloaded   -s/p [facility]  -ICU team placed temp dialysis catheter. HD started 11/24/24; s/p HD x 3 thus far  -Urinating well s/p wise being removed; change IV bumex to PO; edema is improving  -flomax  -S/p PC by IR 11/27/24  -Renal indices are not showing signs of improvement despite urinating well  -Maintain HD MWF; plan for next dialysis to be done on 11/29/24  -Lokelma 10gm x 1  -Renal diet  -BP improving on labetalol; monitor  -ID eval pending for borderline fever      D/W Dr Tyler  Patient is arranged for [facility] and to start outpatient on 12/2/24    DC planning per primary team

## 2024-11-28 NOTE — PROGRESS NOTE ADULT - PROBLEM SELECTOR PLAN 2
pt had low grade fever this am 100.3 -observe  Likely reactive   Follow CBC  ID Dr Ulises burns called  PRN Tylenol

## 2024-11-28 NOTE — PROGRESS NOTE ADULT - SUBJECTIVE AND OBJECTIVE BOX
Patient is a 64y old  Male who presents with a chief complaint of MARYSOL (27 Nov 2024 19:19)    HPI:  Pt is a 65yo M with PMHx recently diagnosed gastric lymphoma  S/P Biopsy of gastric mass & neck Lymph node  with LN spread presents to the ED as recommended by his oncologist Dr. Andrea for elevated Creatinine. Pt called by doctor for elevated Cr at 5.3.  Baseline 1.1, increasing to 1.5-->3.0--> 5.3 on outpatient labs. Pt had chemo port placed today, has not been drinking as much as he usually does. Per pt, urinated a small amount this AM, contributed it to decreased fluid intake. Pt endorsing mild flank pain, prescribed oxycodone by outpatient doctor. Pt also endorsing new onset dyspnea on exertion. Pt has been taking Advil for 5 weeks for Lower Back pain. Pt had placement of Medi port as out pt   today     Denies fever, chills, chest pain, palpitations, abdominal pain, nausea, vomiting, diarrhea, constipation, or dysuria    ED Course:   Vitals: BP: 160/90, HR: 74, Temp: 98F, RR: 18  Labs:  WBC 7.31, Hg 10, CO2 21, BUN/CR 58,5.3, Ca 8.3, eGFR 11, Mag 2.8, proBNP 593  UA: Pending  CT Chest: Mild mottled density in the left mainstem bronchus may represent secretions. No focal consolidation or discrete mass is seen. Regions of atelectasis or scarring in the left upper lobe/lingula. Atelectatic changes at the lung bases.  CT Abdomen/Pelvis: Lymphadenopathy which appears increased when compared with 9/30/2024. Nodularity along posterior peritoneal reflections and minimal perihepatic fluid which were not seen previously.    EKG: NSR rate 68bpm. QTc 408ms  Received in the ED: 500cc NS bolus x1   (20 Nov 2024 22:40)      INTERVAL HPI:  11/21: Pt seen and examined at bedside. Pt was making minimal urine output overnight but has voided significantly more since than. He has been taking Advil for the last month at the very minimum, with  max of 6 pills/day for R. low back pain. However, the pain is not present currently. No acute c/o at this time.   11/22: Pt seen and examined at bedside. 1.3L UOP in the last 24 hours. Pt reports feeling well, shortness  of breath has improved a bit. No other concerns worsening MARYSOL , K stable   11/23:pt seen, examined, Cr increasing, Low h/h stable, High electrolytes.  11/24: Pt seen and examined, seated in chair. Pt reports feeling well, and states his shortness of breath when walking has improved. He still has intermittent lower back pain. Cr rising, today 10, nephro to start temporary HD today.  11/25: Pt seen and examined, seated in chair about to eat breakfast. Pt reports he felt a little nauseous during his first HD session yesterday, and stated he felt tired after but this morning he feels well. 1L removed yesterday. Cr still rising, 11 today. Likely will undergo another session of HD today. No concerns per patient.HD Rx today   11/26: Pt seen and examined, seated in chair. He reports feeling well this morning and denies any specific concerns. 1L removed with HD yesterday, plan for 2.5L removal today along with initiation of bumex. Plan for IR for permacath tomorrow, NPO after midnight tonight.  11/27: Pt seen and examined, seated in chair. S/p 3rd HD session yesterday which he tolerated well. Pt reports that he feels good, denies any concerns. Remains on Bumex with good UOP. Planned for IR procedure for PermCath  today .D/C Barba cath today-TOV , PC placed   11/28: pt seen, examined ,OOB to chair S/P PC placed, pt is voiding well, HD Rx on 11/29, Low grade fever 100.3      OVERNIGHT EVENTS: NONE    Home Medications:      MEDICATIONS  (STANDING):  albuterol/ipratropium for Nebulization 3 milliLiter(s) Nebulizer every 12 hours  buMETAnide Injectable 2 milliGRAM(s) IV Push daily  chlorhexidine 4% Liquid 1 Application(s) Topical <User Schedule>  heparin   Injectable 5000 Unit(s) SubCutaneous every 8 hours  influenza   Vaccine 0.5 milliLiter(s) IntraMuscular once  labetalol 100 milliGRAM(s) Oral two times a day  melatonin 5 milliGRAM(s) Oral at bedtime  pantoprazole    Tablet 40 milliGRAM(s) Oral before breakfast  polyethylene glycol 3350 17 Gram(s) Oral daily  senna 2 Tablet(s) Oral at bedtime  sodium chloride 3%  Inhalation 4 milliLiter(s) Inhalation every 12 hours  tamsulosin 0.4 milliGRAM(s) Oral at bedtime    MEDICATIONS  (PRN):  acetaminophen     Tablet .. 650 milliGRAM(s) Oral every 6 hours PRN Mild Pain (1 - 3)  aluminum hydroxide/magnesium hydroxide/simethicone Suspension 30 milliLiter(s) Oral every 4 hours PRN Dyspepsia  oxyCODONE    IR 5 milliGRAM(s) Oral every 6 hours PRN Severe Pain (7 - 10)  sodium chloride 0.9% lock flush 10 milliLiter(s) IV Push every 1 hour PRN Pre/post blood products, medications, blood draw, and to maintain line patency      Allergies    Bactrim DS (Hives)    Intolerances        Social History:  Lives at home with family  Recently retired  Active, ambulates independently and independent with ADLs (20 Nov 2024 22:40)      REVIEW OF SYSTEMS: i am OK  CONSTITUTIONAL: No fever, No chills, No fatigue, No myalgia, No Body ache, No Weakness  EYES: No eye pain,  No visual disturbances, No discharge, NO Redness  ENMT:  No ear pain, No nose bleed, No vertigo; No sinus pain, NO throat pain, No Congestion  NECK: No pain, No stiffness  RESPIRATORY: No cough, NO wheezing, No  hemoptysis, NO  shortness of breath  CARDIOVASCULAR: No chest pain, palpitations  GASTROINTESTINAL: No abdominal pain, NO epigastric pain. No nausea, No vomiting; No diarrhea, No constipation. [ x ] BM  GENITOURINARY: No dysuria, No frequency, No urgency, No hematuria, NO incontinence  NEUROLOGICAL: No headaches, No dizziness, No numbness, No tingling, No tremors, No weakness  EXT: No Swelling, No Pain, No Edema  SKIN:  [ x ] No itching, burning, rashes, or lesions   MUSCULOSKELETAL: No joint pain ,No Jt swelling; No muscle pain, No back pain, No extremity pain  PSYCHIATRIC: No depression,  No anxiety,  No mood swings ,No difficulty sleeping at night  PAIN SCALE: [x  ] None  [  ] Other-  ROS Unable to obtain due to - [  ] Dementia  [  ] Lethargy [  ] Drowsy [  ] Sedated [  ] non verbal  REST OF REVIEW Of SYSTEM - [x  ] Normal     Vital Signs Last 24 Hrs  T(C): 37.7 (28 Nov 2024 05:25), Max: 37.9 (28 Nov 2024 04:56)  T(F): 99.8 (28 Nov 2024 05:25), Max: 100.3 (28 Nov 2024 04:56)  HR: 82 (28 Nov 2024 04:56) (68 - 86)  BP: 150/81 (28 Nov 2024 04:56) (127/73 - 166/89)  BP(mean): --  RR: 18 (28 Nov 2024 04:56) (15 - 23)  SpO2: 94% (28 Nov 2024 04:56) (92% - 96%)    Parameters below as of 27 Nov 2024 20:22  Patient On (Oxygen Delivery Method): room air      Finger Stick        11-27 @ 07:01  -  11-28 @ 07:00  --------------------------------------------------------  IN: 950 mL / OUT: 3150 mL / NET: -2200 mL        PHYSICAL EXAM: Medi port , +  GENERAL:  [ x ] NAD , [ x ] well appearing, [  ] Agitated, [  ] Drowsy,  [  ] Lethargy, [  ] confused   HEAD:  [x  ] Normal, [  ] Other  EYES:  [x  ] EOMI, [ x ] PERRLA, [  ] conjunctiva and sclera clear normal, [  ] Other,  [ x ] Pallor,[  ] Discharge  ENMT:  [ x ] Normal, [ x ] Moist mucous membranes, [  ] Good dentition, [x] No Thrush  NECK:  [ x ] Supple, [ x ] No JVD, [ x ] Normal thyroid, [  ] Lymphadenopathy [  ] Other  CHEST/LUNG:  [x  ] Clear to auscultation bilaterally, [ x ] Breath Sounds equal B/L / Decrease, [x  ] poor effort  [x  ] No rales, [x  ] No rhonchi  [ x ]  No wheezing,   HEART:  [x  ] Regular rate and rhythm, [  ] tachycardia, [  ] Bradycardia,  [  ] irregular  [x  ] No murmurs, No rubs, No gallops, [  ] PPM in place (Mfr:  )  ABDOMEN:  [x  ] Soft, [  x] Nontender, [x  ] Nondistended, [x  ] No mass, [x  ] Bowel sounds present, [  x] obese  NERVOUS SYSTEM:  [ x ] Alert & Oriented X3, [ x ] Nonfocal  [  ] Confusion  [  ] Encephalopathic [  ] Sedated [  ] Unable to assess, [  ] Dementia [  ] Other-  EXTREMITIES: [x  ] 2+ Peripheral Pulses, No clubbing, No cyanosis,  [ x ] 2 + pitting  edema B/L lower EXT. [  ] PVD stasis skin changes B/L Lower EXT, [  ] wound  LYMPH: No lymphadenopathy noted  SKIN:  [x  ] No rashes or lesions, [  ] Pressure Ulcers, [  ] ecchymosis, [  ] Skin Tears, [  ] Other    DIET: Diet, Renal Restrictions:   For patients receiving Renal Replacement - No Protein Restr, No Conc K, No Conc Phos, Low Sodium  Supplement Feeding Modality:  Oral  Suplena Cans or Servings Per Day:  1       Frequency:  Daily (11-22-24 @ 11:31)      LABS:                        11.3   8.48  )-----------( 341      ( 27 Nov 2024 08:19 )             35.0     27 Nov 2024 08:19    136    |  101    |  73     ----------------------------<  107    5.1     |  26     |  9.10     Ca    9.0        27 Nov 2024 08:19  Phos  7.8       27 Nov 2024 08:19  Mg     3.0       27 Nov 2024 08:19    TPro  7.5    /  Alb  3.2    /  TBili  0.4    /  DBili  x      /  AST  21     /  ALT  27     /  AlkPhos  96     27 Nov 2024 08:19    PT/INR - ( 27 Nov 2024 08:19 )   PT: 14.2 sec;   INR: 1.22 ratio         PTT - ( 27 Nov 2024 08:19 )  PTT:32.6 sec  Urinalysis Basic - ( 27 Nov 2024 08:19 )    Color: x / Appearance: x / SG: x / pH: x  Gluc: 107 mg/dL / Ketone: x  / Bili: x / Urobili: x   Blood: x / Protein: x / Nitrite: x   Leuk Esterase: x / RBC: x / WBC x   Sq Epi: x / Non Sq Epi: x / Bacteria: x        Culture Results:   No growth (11-21 @ 07:30)        Culture - Urine (collected 21 Nov 2024 07:30)  Source: Clean Catch  Final Report (22 Nov 2024 09:09):    No growth    Urinalysis with Rflx Culture (collected 21 Nov 2024 07:30)      Anemia Panel:  Iron Total: 17 ug/dL (11-22-24 @ 07:05)  Iron - Total Binding Capacity.: 241 ug/dL (11-22-24 @ 07:05)  Ferritin: 257 ng/mL (11-22-24 @ 07:05)        RADIOLOGY & ADDITIONAL TESTS:      HEALTH ISSUES - PROBLEM Dx:  MARYSOL (acute kidney injury)    Hyperkalemia    Dyspnea    Gastric lymphoma    Need for prophylactic measure    Hypertension            Consultant(s) Notes Reviewed:  [x  ] YES     Care Discussed with [X] Consultants  [ x ] Patient  [  ] Family [  ] HCP [  ]   [  ] Social Service  [ x ] RN, [  ] Physical Therapy,[  ] Palliative care team  DVT PPX: [  ] Lovenox, [ x ] S C Heparin, [  ] Coumadin, [  ] Xarelto, [  ] Eliquis, [  ] Pradaxa, [  ] IV Heparin drip, [  ] SCD [  ] Contraindication 2 to GI Bleed,[  ] Ambulation [  ] Contraindicated 2 to  bleed [  ] Contraindicated 2 to Brain Bleed  Advanced directive: [x  ] None, [  ] DNR/DNI

## 2024-11-28 NOTE — PROVIDER CONTACT NOTE (OTHER) - ASSESSMENT
pATIENT IN CHAIR WITH NO COMPLAINTS. uRINE CLEAR in wise
bilateral legs swelling and complained of calf pain.

## 2024-11-28 NOTE — PROGRESS NOTE ADULT - PROBLEM SELECTOR PLAN 6
Pt with recently diagnosed gastric lymphoma, port placed prior to admission for planned chemo. Follows Dr. Andrea  - CT Ab: Lymphadenopathy which appears increased when compared with 9/30/2024. Nodularity along posterior peritoneal reflections and minimal perihepatic fluid which were not seen previously.  - Microcytic anemia on labs  - iron studies noted, heme/onc following  - hx beta thal minor  Hematology -Dr lu /Deandre s/off

## 2024-11-28 NOTE — PROGRESS NOTE ADULT - SUBJECTIVE AND OBJECTIVE BOX
[facility]                             Nephrology and Hypertension                             Gustavo Oscar                                          [phone]     Patient is a 64y old  Male who presents with a chief complaint of MARYSOL (2024 14:23)     HPI:  Pt is a 65yo M with PMHx recently diagnosed gastric lymphoma  S/P Biopsy of gastric mass & neck Lymph node  with LN spread presents to the ED as recommended by his oncologist Dr. Andrea for elevated Creatinine. Pt called by doctor for elevated Cr at 5.3.  Baseline 1.1, increasing to 1.5-->3.0--> 5.3 on outpatient labs. Pt had chemo port placed today, has not been drinking as much as he usually does. Per pt, urinated a small amount this AM, contributed it to decreased fluid intake. Pt endorsing mild flank pain, prescribed oxycodone by outpatient doctor. Pt also endorsing new onset dyspnea on exertion. Pt has been taking Advil for 5 weeks for Lower Back pain. Pt had placement of Medi port as out pt today   Nephrology is c/s for Acute kidney injury. When I saw him, he is very anxious. He states he had been taking 6 tabs of advil every day for a few weeks for pain. He also endorsed not eating as much. Per discussion with RN frandy was not draining much overnight but over last few hours, has better uop. On labs review, Serum Creat was 1.1 in 2024, was 5.3 on admission, is >6 on repeat labs early this am with hyperkalemia. Was given lokelma . He has no sob, no dizziness, no N/V, no new pain.       No acute complaints  No CP, No SOB, No N/V/D  Edema is improving  urinating well       PAST MEDICAL & SURGICAL HISTORY:  Incisional hernia    Gastric lymphoma  Anemia of chronic disease  S/P appendectomy  2014  S/P cholecystectomy  15 years ago  FAMILY HISTORY:  Family history of coronary artery disease in father  Father -  age 60 following MI    Social History:Non smoker    MEDICATIONS  (STANDING):  albuterol/ipratropium for Nebulization 3 milliLiter(s) Nebulizer every 12 hours  chlorhexidine 4% Liquid 1 Application(s) Topical <User Schedule>  heparin   Injectable 5000 Unit(s) SubCutaneous every 8 hours  influenza   Vaccine 0.5 milliLiter(s) IntraMuscular once  labetalol 100 milliGRAM(s) Oral two times a day  melatonin 5 milliGRAM(s) Oral at bedtime  pantoprazole    Tablet 40 milliGRAM(s) Oral before breakfast  polyethylene glycol 3350 17 Gram(s) Oral daily  senna 2 Tablet(s) Oral at bedtime  sodium chloride 3%  Inhalation 4 milliLiter(s) Inhalation every 12 hours  tamsulosin 0.4 milliGRAM(s) Oral at bedtime    MEDICATIONS  (PRN):  acetaminophen     Tablet .. 650 milliGRAM(s) Oral every 6 hours PRN Mild Pain (1 - 3)  aluminum hydroxide/magnesium hydroxide/simethicone Suspension 30 milliLiter(s) Oral every 4 hours PRN Dyspepsia  oxyCODONE    IR 5 milliGRAM(s) Oral every 6 hours PRN Severe Pain (7 - 10)  sodium chloride 0.9% lock flush 10 milliLiter(s) IV Push every 1 hour PRN Pre/post blood products, medications, blood draw, and to maintain line patency          Allergies    Bactrim DS (Hives)    Intolerances         REVIEW OF SYSTEMS: as per HPI    Vital Signs Last 24 Hrs  T(C): 37.4 (2024 08:10), Max: 37.9 (2024 04:56)  T(F): 99.3 (2024 08:10), Max: 100.3 (2024 04:56)  HR: 80 (2024 08:10) (68 - 86)  BP: 144/82 (2024 08:10) (127/73 - 166/89)  BP(mean): --  RR: 19 (2024 08:10) (15 - 23)  SpO2: 91% (2024 08:10) (91% - 96%)    Parameters below as of 2024 08:10  Patient On (Oxygen Delivery Method): room air      PHYSICAL EXAM:    GENERAL: NAD, obese  HEAD:  Atraumatic, Normocephalic  EYES: EOMI, conjunctiva and sclera clear  ENMT: No Drainage from nares, No drainage from ears  NERVOUS SYSTEM:  Awake and Alert  CHEST/LUNG: Clear bilaterally, RCW port  EXTREMITIES:  ++ edema  SKIN: No rashes No obvious ecchymosis        LABS:                                   10.6   12.35 )-----------( 371      ( 2024 06:25 )             32.4         133[L]  |  101  |  88[H]  ----------------------------<  117[H]  5.6[H]   |  24  |  10.00[H]    Ca    9.1      2024 06:25  Phos  8.3       Mg     3.0         TPro  7.1  /  Alb  3.2[L]  /  TBili  0.4  /  DBili  x   /  AST  23  /  ALT  29  /  AlkPhos  87      PT/INR - ( 2024 08:19 )   PT: 14.2 sec;   INR: 1.22 ratio         PTT - ( 2024 08:19 )  PTT:32.6 sec  Urinalysis Basic - ( 2024 06:25 )    Color: x / Appearance: x / SG: x / pH: x  Gluc: 117 mg/dL / Ketone: x  / Bili: x / Urobili: x   Blood: x / Protein: x / Nitrite: x   Leuk Esterase: x / RBC: x / WBC x   Sq Epi: x / Non Sq Epi: x / Bacteria: x

## 2024-11-28 NOTE — PROGRESS NOTE ADULT - PROBLEM SELECTOR PLAN 5
Pt endorsing ZHOU while walking, -improving , likely 2/2 Volume over load  - CT Chest:  Mild mottled density in the left mainstem bronchus may represent secretions. No focal consolidation or discrete mass is seen. Regions of atelectasis or scarring in the left upper lobe/lingula. Atelectatic changes at the lung bases  - Pt saturating well on RA  - S/P Duoneb and hypertonic saline  -  incentive spirometer use  - Aspiration precautions  - F/U TTE -  NL EF

## 2024-11-28 NOTE — PROGRESS NOTE ADULT - PROBLEM SELECTOR PLAN 7
Heparin 5000U qd    TOV today - started on flomax    Dispo  - Outpatient HD set up at [facility] MWF 5am, has spot starting 12/2

## 2024-11-28 NOTE — PROGRESS NOTE ADULT - PROBLEM SELECTOR PLAN 3
Pt with HTN now with -170s  - start labetalol 100mg BID with hold parameters as per nephro  - monitor routine hemodynamics

## 2024-11-28 NOTE — CONSULT NOTE ADULT - ASSESSMENT
63yo M with recently diagnosed gastric lymphoma  S/P Biopsy of gastric mass & neck Lymph node  with LN spread presents to the ED as recommended by his oncologist Dr. Andrea for elevated Creatinine. Pt called by doctor for elevated Cr at 5.3.  Baseline 1.1, increasing to 1.5-->3.0--> 5.3 on outpatient labs. Pt had chemo port placed and was not been drinking as much as he usually does. Per pt, urinated a small amount this AM, contributed it to decreased fluid intake. Pt endorsed mild flank pain, prescribed oxycodone by outpatient doctor. Adm 11/20 for renal failure    ED Vitals: BP: 160/90, HR: 74, Temp: 98F, RR: 18  Labs:  WBC 7.31, Hg 10, CO2 21, BUN/CR 58,5.3, Ca 8.3, eGFR 11, Mag 2.8, proBNP 593  UA: Pending  CT Chest: Mild mottled density in the left mainstem bronchus may represent secretions. No focal consolidation or discrete mass is seen. Regions of atelectasis or scarring in the left upper lobe/lingula. Atelectatic changes at the lung bases.  CT Abdomen/Pelvis: Lymphadenopathy which appears increased when compared with 9/30/2024. Nodularity along posterior peritoneal reflections and minimal perihepatic fluid which were not seen previously.  PT doing well and being evaluated for long term dialysis access. Morning of 11/28 reports he was drinking a hot cup of coffee and had his temperature taken orally. This measured at 100.3 with HR stable in the 80s and no reported new symptoms.    RECOMMENDATIONS  Fever and leukocytosis  Body temperature not over 100.4°F (38°C) or under 96.8°F (36°C).  Heart rate not greater than 90 beats per minute, Respiratory rate not greater than 20 breaths per minute or partial pressure of CO2 less than 32 mmHg, White blood cell count just greater than 12,000 per µL (12 × 10^9 per L), and there is this story of drinking coffee just prior to isolated temp elevation, so patient is not compelling for any kind of acute infection driving this  rec  repeat CBC w diff in am  follow vitals  observation off abx with attention for any new clinical developments    Thank you for consulting us and involving us in the management of this most interesting and challenging case.  We will follow along in the care of this patient. Please call us at [phone] or text me directly on my cell# at [phone] with any concerns.    Starting tomorrow Dr Karen Tyler will be assuming care of this patient so please contact her with any questions, concerns or new micro data.

## 2024-11-29 LAB
ANION GAP SERPL CALC-SCNC: 11 MMOL/L — SIGNIFICANT CHANGE UP (ref 5–17)
BUN SERPL-MCNC: 101 MG/DL — HIGH (ref 7–23)
CALCIUM SERPL-MCNC: 8.7 MG/DL — SIGNIFICANT CHANGE UP (ref 8.5–10.1)
CHLORIDE SERPL-SCNC: 101 MMOL/L — SIGNIFICANT CHANGE UP (ref 96–108)
CO2 SERPL-SCNC: 23 MMOL/L — SIGNIFICANT CHANGE UP (ref 22–31)
CREAT SERPL-MCNC: 11 MG/DL — HIGH (ref 0.5–1.3)
EGFR: 5 ML/MIN/1.73M2 — LOW
GLUCOSE SERPL-MCNC: 127 MG/DL — HIGH (ref 70–99)
HCT VFR BLD CALC: 29.4 % — LOW (ref 39–50)
HGB BLD-MCNC: 9.7 G/DL — LOW (ref 13–17)
MAGNESIUM SERPL-MCNC: 3 MG/DL — HIGH (ref 1.6–2.6)
MCHC RBC-ENTMCNC: 19.8 PG — LOW (ref 27–34)
MCHC RBC-ENTMCNC: 33 G/DL — SIGNIFICANT CHANGE UP (ref 32–36)
MCV RBC AUTO: 59.9 FL — LOW (ref 80–100)
NRBC # BLD: 0 /100 WBCS — SIGNIFICANT CHANGE UP (ref 0–0)
PHOSPHATE SERPL-MCNC: 9 MG/DL — HIGH (ref 2.5–4.5)
PLATELET # BLD AUTO: 301 K/UL — SIGNIFICANT CHANGE UP (ref 150–400)
POTASSIUM SERPL-MCNC: 4.8 MMOL/L — SIGNIFICANT CHANGE UP (ref 3.5–5.3)
POTASSIUM SERPL-SCNC: 4.8 MMOL/L — SIGNIFICANT CHANGE UP (ref 3.5–5.3)
RBC # BLD: 4.91 M/UL — SIGNIFICANT CHANGE UP (ref 4.2–5.8)
RBC # FLD: 15.9 % — HIGH (ref 10.3–14.5)
SODIUM SERPL-SCNC: 135 MMOL/L — SIGNIFICANT CHANGE UP (ref 135–145)
WBC # BLD: 7.56 K/UL — SIGNIFICANT CHANGE UP (ref 3.8–10.5)
WBC # FLD AUTO: 7.56 K/UL — SIGNIFICANT CHANGE UP (ref 3.8–10.5)

## 2024-11-29 RX ADMIN — Medication 2 TABLET(S): at 21:02

## 2024-11-29 RX ADMIN — ACETAMINOPHEN, DIPHENHYDRAMINE HCL, PHENYLEPHRINE HCL 5 MILLIGRAM(S): 325; 25; 5 TABLET ORAL at 21:03

## 2024-11-29 RX ADMIN — SODIUM CHLORIDE 4 MILLILITER(S): 9 INJECTION, SOLUTION INTRAMUSCULAR; INTRAVENOUS; SUBCUTANEOUS at 20:12

## 2024-11-29 RX ADMIN — Medication 5000 UNIT(S): at 05:31

## 2024-11-29 RX ADMIN — Medication 5000 UNIT(S): at 21:02

## 2024-11-29 RX ADMIN — BUMETANIDE 1 MILLIGRAM(S): 1 TABLET ORAL at 05:31

## 2024-11-29 RX ADMIN — IPRATROPIUM BROMIDE AND ALBUTEROL SULFATE 3 MILLILITER(S): 2.5; .5 SOLUTION RESPIRATORY (INHALATION) at 20:12

## 2024-11-29 RX ADMIN — CHLORHEXIDINE GLUCONATE 1 APPLICATION(S): 1.2 RINSE ORAL at 05:41

## 2024-11-29 RX ADMIN — LABETALOL 100 MILLIGRAM(S): 100 TABLET, FILM COATED ORAL at 17:12

## 2024-11-29 RX ADMIN — LABETALOL 100 MILLIGRAM(S): 100 TABLET, FILM COATED ORAL at 05:31

## 2024-11-29 RX ADMIN — SODIUM CHLORIDE 4 MILLILITER(S): 9 INJECTION, SOLUTION INTRAMUSCULAR; INTRAVENOUS; SUBCUTANEOUS at 08:13

## 2024-11-29 RX ADMIN — Medication 5000 UNIT(S): at 13:19

## 2024-11-29 RX ADMIN — TAMSULOSIN HYDROCHLORIDE 0.4 MILLIGRAM(S): 0.4 CAPSULE ORAL at 21:02

## 2024-11-29 RX ADMIN — POLYETHYLENE GLYCOL 3350 17 GRAM(S): 17 POWDER, FOR SOLUTION ORAL at 13:19

## 2024-11-29 RX ADMIN — PANTOPRAZOLE SODIUM 40 MILLIGRAM(S): 40 TABLET, DELAYED RELEASE ORAL at 05:32

## 2024-11-29 NOTE — PROGRESS NOTE ADULT - PROBLEM SELECTOR PLAN 3
Pt with HTN now with -170s  - start labetalol 100mg BID with hold parameters as per nephro  - monitor routine hemodynamics Pt with HTN now with -170s  - labetalol 100mg BID with hold parameters as per nephro  - monitor routine hemodynamics

## 2024-11-29 NOTE — PROGRESS NOTE ADULT - PROBLEM SELECTOR PLAN 7
Heparin 5000U qd    TOV today - started on flomax    Dispo  - Outpatient HD set up at [facility] MWF 5am, has spot starting 12/2 Heparin 5000U qd    Dispo  - Outpatient HD set up at [facility] MWF 5am, has spot starting 12/2

## 2024-11-29 NOTE — PROGRESS NOTE ADULT - ASSESSMENT
Acute kidney injury, with ATN  Anemia  Hyperkalemia  Metabolic acidosis  Urinary retention  HTN  Lymphoma      -MARYSOL likely due to ATN from NSAID use and poor po intake, has normal renal function at baseline  -SCr 1.1 at baseline with normal electrolytes  -Informed consent previously obtained. HD discussed up to and including death discussed. Informed consent obtained and he agrees to HD if necessary  -Advised patient to avoid all nsaids   -s/p IVF. Fluid overloaded   -s/p Lokelma  -ICU team placed temp dialysis catheter. HD started 11/24/24; s/p HD x 3 thus far  -Urinating well s/p wise being removed; change IV bumex to PO; edema is improving  -flomax  -S/p PC by IR 11/27/24  -Renal indices are not showing signs of improvement despite urinating well  -Maintain HD MW  -Lokelma 10gm x 1  -Renal diet  -BP improving on labetalol; monitor  -ID eval pending for borderline fever      D/W Dr Tyler  Patient is arranged for [facility] and to start outpatient on 12/2/24    DC planning per primary team

## 2024-11-29 NOTE — CHART NOTE - NSCHARTNOTEFT_GEN_A_CORE
Assessment:   Pt seen for nutrition follow up.  Chart reviewed, hospital course noted.     Brief History: "Pt is a 65yo M with PMHx recently diagnosed gastric lymphoma  S/P Biopsy of gastric mass & neck Lymph node  with LN spread presents to the ED as recommended by his oncologist Dr. Andrea for elevated Creatinine."    Pt seen while having HD session.  Pt is eating well, BM 11/28.  Still appears relatively anxious, reports trying to learn more about diet and has been researching on his own in addition to reviewing educational materials provided by this writer earlier.   Permacath placed 11/27.   Per renal, Renal indices are not showing signs of improvement despite urinating well;  plan is to Maintain HD MWF    Factors impacting intake: [x] none [ ] nausea  [ ] vomiting [ ] diarrhea [ ] constipation  [ ]chewing problems [ ] swallowing issues  [ ] other:     Diet Prescription: Diet, Renal Restrictions:   For patients receiving Renal Replacement - No Protein Restr, No Conc K, No Conc Phos, Low Sodium  Supplement Feeding Modality:  Oral  Suplena Cans or Servings Per Day:  1       Frequency:  Daily (11-22-24 @ 11:31)    Intake: good    Current Weight: 11/23 227#, 11/29 228.1#      Pertinent Medications: MEDICATIONS  (STANDING):  albuterol/ipratropium for Nebulization 3 milliLiter(s) Nebulizer every 12 hours  buMETAnide 1 milliGRAM(s) Oral daily  chlorhexidine 4% Liquid 1 Application(s) Topical <User Schedule>  heparin   Injectable 5000 Unit(s) SubCutaneous every 8 hours  influenza   Vaccine 0.5 milliLiter(s) IntraMuscular once  labetalol 100 milliGRAM(s) Oral two times a day  melatonin 5 milliGRAM(s) Oral at bedtime  pantoprazole    Tablet 40 milliGRAM(s) Oral before breakfast  polyethylene glycol 3350 17 Gram(s) Oral daily  senna 2 Tablet(s) Oral at bedtime  sodium chloride 3%  Inhalation 4 milliLiter(s) Inhalation every 12 hours  tamsulosin 0.4 milliGRAM(s) Oral at bedtime    MEDICATIONS  (PRN):  acetaminophen     Tablet .. 650 milliGRAM(s) Oral every 6 hours PRN Mild Pain (1 - 3)  aluminum hydroxide/magnesium hydroxide/simethicone Suspension 30 milliLiter(s) Oral every 4 hours PRN Dyspepsia  sodium chloride 0.9% lock flush 10 milliLiter(s) IV Push every 1 hour PRN Pre/post blood products, medications, blood draw, and to maintain line patency    Pertinent Labs: 11-29 Na135 mmol/L Glu 127 mg/dL[H] K+ 4.8 mmol/L Cr  11.00 mg/dL[H]  mg/dL[H] 11-29 Phos 9.0 mg/dL[H] 11-28 Alb 3.2 g/dL[L]       Skin: no pressure injuries  Edema: 2+ legs    Estimated Needs:   [x] no change since previous assessment on: 11/22 based on adjusted #  [ ] recalculated: based on   kcal/day: 6924-4030  gms protein/day: 81-97    Previous Nutrition Diagnosis:   [x] Inadequate Oral Intake    [x] Altered Nutrition Related Labs (renal)    Nutrition Diagnosis is [x] ongoing  [ ] resolved [ ] not applicable     New Nutrition Diagnosis: [x] not applicable       Interventions:   Recommend  [x] Continue current diet  [ ] Change Diet To:  [x] Nutrition Supplement- recommend change Suplena ONS to Nepro QD due to initiation of RRT  [ ] Nutrition Support  [ ] Other:     Monitoring and Evaluation:   [x] PO intake [ x ] Tolerance to diet prescription [ x ] weights [ x ] labs [ x ] follow up per protocol  [x] other: s/s GI distress, bowel function, skin integrity/ edema Assessment:   Pt seen for nutrition follow up.  Chart reviewed, hospital course noted.     Brief History: "Pt is a 65yo M with PMHx recently diagnosed gastric lymphoma  S/P Biopsy of gastric mass & neck Lymph node  with LN spread presents to the ED as recommended by his oncologist Dr. Andrea for elevated Creatinine."    Pt seen while having HD session.  Pt is eating well, BM 11/28.  Still appears relatively anxious, reports trying to learn more about diet and has been researching on his own in addition to reviewing educational materials provided by this writer earlier.   Permacath placed 11/27.   Per renal, Renal indices are not showing signs of improvement despite urinating well;  plan is to Maintain HD MWF    Factors impacting intake: [x] none [ ] nausea  [ ] vomiting [ ] diarrhea [ ] constipation  [ ]chewing problems [ ] swallowing issues  [ ] other:     Diet Prescription: Diet, Renal Restrictions:   For patients receiving Renal Replacement - No Protein Restr, No Conc K, No Conc Phos, Low Sodium  Supplement Feeding Modality:  Oral  Suplena Cans or Servings Per Day:  1       Frequency:  Daily (11-22-24 @ 11:31)    Intake: good    Current Weight: 11/23 227#, 11/29 228.1#      Pertinent Medications: MEDICATIONS  (STANDING):  albuterol/ipratropium for Nebulization 3 milliLiter(s) Nebulizer every 12 hours  buMETAnide 1 milliGRAM(s) Oral daily  chlorhexidine 4% Liquid 1 Application(s) Topical <User Schedule>  heparin   Injectable 5000 Unit(s) SubCutaneous every 8 hours  influenza   Vaccine 0.5 milliLiter(s) IntraMuscular once  labetalol 100 milliGRAM(s) Oral two times a day  melatonin 5 milliGRAM(s) Oral at bedtime  pantoprazole    Tablet 40 milliGRAM(s) Oral before breakfast  polyethylene glycol 3350 17 Gram(s) Oral daily  senna 2 Tablet(s) Oral at bedtime  sodium chloride 3%  Inhalation 4 milliLiter(s) Inhalation every 12 hours  tamsulosin 0.4 milliGRAM(s) Oral at bedtime    MEDICATIONS  (PRN):  acetaminophen     Tablet .. 650 milliGRAM(s) Oral every 6 hours PRN Mild Pain (1 - 3)  aluminum hydroxide/magnesium hydroxide/simethicone Suspension 30 milliLiter(s) Oral every 4 hours PRN Dyspepsia  sodium chloride 0.9% lock flush 10 milliLiter(s) IV Push every 1 hour PRN Pre/post blood products, medications, blood draw, and to maintain line patency    Pertinent Labs: 11-29 Na135 mmol/L Glu 127 mg/dL[H] K+ 4.8 mmol/L Cr  11.00 mg/dL[H]  mg/dL[H] 11-29 Phos 9.0 mg/dL[H] 11-28 Alb 3.2 g/dL[L]       Skin: no pressure injuries  Edema: 2+ legs    Estimated Needs:   [x] no change since previous assessment on: 11/22 based on adjusted #  [ ] recalculated: based on   kcal/day: 5226-1179  gms protein/day: 81-97    Previous Nutrition Diagnosis:   [x] Inadequate Oral Intake  (resolved)  [x] Altered Nutrition Related Labs (renal) (ongoing)    Nutrition Diagnosis is [x] ongoing  [x] resolved [ ] not applicable     New Nutrition Diagnosis: [x] not applicable       Interventions:   Recommend  [x] Continue current diet  [ ] Change Diet To:  [x] Nutrition Supplement- recommend change Suplena ONS to Nepro QD due to initiation of RRT  [ ] Nutrition Support  [ ] Other:     Monitoring and Evaluation:   [x] PO intake [ x ] Tolerance to diet prescription [ x ] weights [ x ] labs [ x ] follow up per protocol  [x] other: s/s GI distress, bowel function, skin integrity/ edema

## 2024-11-29 NOTE — PROGRESS NOTE ADULT - SUBJECTIVE AND OBJECTIVE BOX
[facility], Division of Infectious Diseases  DANY Burris Y. Patel, S. Shah, [facility]  [phone]    Name: JESSE EATON  Age: 64y  Gender: Male  MRN: [id number]    Interval History:  Patient seen and examined at bedside  No acute overnight events. Afebrile  No complaints  Notes reviewed    Antibiotics:      Medications:  acetaminophen     Tablet .. 650 milliGRAM(s) Oral every 6 hours PRN  albuterol/ipratropium for Nebulization 3 milliLiter(s) Nebulizer every 12 hours  aluminum hydroxide/magnesium hydroxide/simethicone Suspension 30 milliLiter(s) Oral every 4 hours PRN  buMETAnide 1 milliGRAM(s) Oral daily  chlorhexidine 4% Liquid 1 Application(s) Topical <User Schedule>  heparin   Injectable 5000 Unit(s) SubCutaneous every 8 hours  influenza   Vaccine 0.5 milliLiter(s) IntraMuscular once  labetalol 100 milliGRAM(s) Oral two times a day  melatonin 5 milliGRAM(s) Oral at bedtime  pantoprazole    Tablet 40 milliGRAM(s) Oral before breakfast  polyethylene glycol 3350 17 Gram(s) Oral daily  senna 2 Tablet(s) Oral at bedtime  sodium chloride 0.9% lock flush 10 milliLiter(s) IV Push every 1 hour PRN  sodium chloride 3%  Inhalation 4 milliLiter(s) Inhalation every 12 hours  tamsulosin 0.4 milliGRAM(s) Oral at bedtime      Review of Systems:  A 10-point review of systems was obtained.   Review of systems otherwise negative except as previously noted.    Allergies: Bactrim DS (Hives)    For details regarding the patient's past medical history, social history, family history, and other miscellaneous elements, please refer the initial infectious diseases consultation and/or the admitting history and physical examination for this admission.    Objective:  Vitals:   T(C): 36.7 (11-29-24 @ 09:40), Max: 37.1 (11-28-24 @ 20:16)  HR: 79 (11-29-24 @ 09:40) (74 - 88)  BP: 146/99 (11-29-24 @ 09:40) (127/78 - 169/94)  RR: 17 (11-29-24 @ 09:40) (17 - 19)  SpO2: 93% (11-29-24 @ 09:40) (93% - 99%)    Physical Examination:  General: no acute distress  HEENT: NC/AT, EOMI,  Cardio: S1, S2 heard, RRR, no murmurs  Resp: breath sounds heard bilaterally, no rales, wheezes or rhonchi  Abd: soft, NT, ND,  Ext: no edema or cyanosis  Skin: warm, dry, no visible rash      Laboratory Studies:  CBC:                       9.7    7.56  )-----------( 301      ( 29 Nov 2024 06:00 )             29.4     CMP: 11-29    135  |  101  |  101[H]  ----------------------------<  127[H]  4.8   |  23  |  11.00[H]    Ca    8.7      29 Nov 2024 06:00  Phos  9.0     11-29  Mg     3.0     11-29    TPro  7.1  /  Alb  3.2[L]  /  TBili  0.4  /  DBili  x   /  AST  23  /  ALT  29  /  AlkPhos  87  11-28    LIVER FUNCTIONS - ( 28 Nov 2024 06:25 )  Alb: 3.2 g/dL / Pro: 7.1 g/dL / ALK PHOS: 87 U/L / ALT: 29 U/L / AST: 23 U/L / GGT: x           Urinalysis Basic - ( 29 Nov 2024 06:00 )    Color: x / Appearance: x / SG: x / pH: x  Gluc: 127 mg/dL / Ketone: x  / Bili: x / Urobili: x   Blood: x / Protein: x / Nitrite: x   Leuk Esterase: x / RBC: x / WBC x   Sq Epi: x / Non Sq Epi: x / Bacteria: x        Microbiology: reviewed    Culture - Urine (collected 11-21-24 @ 07:30)  Source: Clean Catch  Final Report (11-22-24 @ 09:09):    No growth    Urinalysis with Rflx Culture (collected 11-21-24 @ 07:30)          Radiology: reviewed

## 2024-11-29 NOTE — PROGRESS NOTE ADULT - SUBJECTIVE AND OBJECTIVE BOX
[facility]                             Nephrology and Hypertension                             Gustavo Oscar                                          [phone]     Patient is a 64y old  Male who presents with a chief complaint of MARYSOL (2024 14:23)     HPI:  Pt is a 63yo M with PMHx recently diagnosed gastric lymphoma  S/P Biopsy of gastric mass & neck Lymph node  with LN spread presents to the ED as recommended by his oncologist Dr. Andrea for elevated Creatinine. Pt called by doctor for elevated Cr at 5.3.  Baseline 1.1, increasing to 1.5-->3.0--> 5.3 on outpatient labs. Pt had chemo port placed today, has not been drinking as much as he usually does. Per pt, urinated a small amount this AM, contributed it to decreased fluid intake. Pt endorsing mild flank pain, prescribed oxycodone by outpatient doctor. Pt also endorsing new onset dyspnea on exertion. Pt has been taking Advil for 5 weeks for Lower Back pain. Pt had placement of Medi port as out pt today   Nephrology is c/s for Acute kidney injury. When I saw him, he is very anxious. He states he had been taking 6 tabs of advil every day for a few weeks for pain. He also endorsed not eating as much. Per discussion with RN frandy was not draining much overnight but over last few hours, has better uop. On labs review, Serum Creat was 1.1 in 2024, was 5.3 on admission, is >6 on repeat labs early this am with hyperkalemia. Was given lokelma . He has no sob, no dizziness, no N/V, no new pain.       No acute complaints  No CP, No SOB, No N/V/D  Edema is improving  urinating well       PAST MEDICAL & SURGICAL HISTORY:  Incisional hernia    Gastric lymphoma  Anemia of chronic disease  S/P appendectomy  2014  S/P cholecystectomy  15 years ago  FAMILY HISTORY:  Family history of coronary artery disease in father  Father -  age 60 following MI    Social History:Non smoker    MEDICATIONS  (STANDING):  albuterol/ipratropium for Nebulization 3 milliLiter(s) Nebulizer every 12 hours  chlorhexidine 4% Liquid 1 Application(s) Topical <User Schedule>  heparin   Injectable 5000 Unit(s) SubCutaneous every 8 hours  influenza   Vaccine 0.5 milliLiter(s) IntraMuscular once  labetalol 100 milliGRAM(s) Oral two times a day  melatonin 5 milliGRAM(s) Oral at bedtime  pantoprazole    Tablet 40 milliGRAM(s) Oral before breakfast  polyethylene glycol 3350 17 Gram(s) Oral daily  senna 2 Tablet(s) Oral at bedtime  sodium chloride 3%  Inhalation 4 milliLiter(s) Inhalation every 12 hours  tamsulosin 0.4 milliGRAM(s) Oral at bedtime    MEDICATIONS  (PRN):  acetaminophen     Tablet .. 650 milliGRAM(s) Oral every 6 hours PRN Mild Pain (1 - 3)  aluminum hydroxide/magnesium hydroxide/simethicone Suspension 30 milliLiter(s) Oral every 4 hours PRN Dyspepsia  oxyCODONE    IR 5 milliGRAM(s) Oral every 6 hours PRN Severe Pain (7 - 10)  sodium chloride 0.9% lock flush 10 milliLiter(s) IV Push every 1 hour PRN Pre/post blood products, medications, blood draw, and to maintain line patency          Allergies    Bactrim DS (Hives)    Intolerances         REVIEW OF SYSTEMS: as per HPI    ICU Vital Signs Last 24 Hrs  T(C): 36.9 (2024 13:04), Max: 37.1 (2024 20:16)  T(F): 98.4 (2024 13:04), Max: 98.7 (2024 20:16)  HR: 82 (2024 13:04) (74 - 88)  BP: 146/79 (2024 13:04) (138/78 - 169/94)  BP(mean): --  ABP: --  ABP(mean): --  RR: 18 (2024 13:04) (17 - 19)  SpO2: 94% (2024 13:04) (93% - 99%)    O2 Parameters below as of 2024 13:04  Patient On (Oxygen Delivery Method): room air              PHYSICAL EXAM:    GENERAL: NAD, obese  HEAD:  Atraumatic, Normocephalic  EYES: EOMI, conjunctiva and sclera clear  ENMT: No Drainage from nares, No drainage from ears  NERVOUS SYSTEM:  Awake and Alert  CHEST/LUNG: Clear bilaterally, RCW port  EXTREMITIES:  ++ edema  SKIN: No rashes No obvious ecchymosis        LABS:                                              9.7    7.56  )-----------( 301      ( 2024 06:00 )             29.4         135  |  101  |  101[H]  ----------------------------<  127[H]  4.8   |  23  |  11.00[H]    Ca    8.7      2024 06:00  Phos  9.0       Mg     3.0         TPro  7.1  /  Alb  3.2[L]  /  TBili  0.4  /  DBili  x   /  AST  23  /  ALT    /  AlkPhos  87        Urinalysis Basic - ( 2024 06:00 )    Color: x / Appearance: x / SG: x / pH: x  Gluc: 127 mg/dL / Ketone: x  / Bili: x / Urobili: x   Blood: x / Protein: x / Nitrite: x   Leuk Esterase: x / RBC: x / WBC x   Sq Epi: x / Non Sq Epi: x / Bacteria: x

## 2024-11-29 NOTE — PROGRESS NOTE ADULT - SUBJECTIVE AND OBJECTIVE BOX
Patient is a 64y old  Male who presents with a chief complaint of MARYSOL (29 Nov 2024 10:09)    HPI:  Pt is a 65yo M with PMHx recently diagnosed gastric lymphoma  S/P Biopsy of gastric mass & neck Lymph node  with LN spread presents to the ED as recommended by his oncologist Dr. Andrea for elevated Creatinine. Pt called by doctor for elevated Cr at 5.3.  Baseline 1.1, increasing to 1.5-->3.0--> 5.3 on outpatient labs. Pt had chemo port placed today, has not been drinking as much as he usually does. Per pt, urinated a small amount this AM, contributed it to decreased fluid intake. Pt endorsing mild flank pain, prescribed oxycodone by outpatient doctor. Pt also endorsing new onset dyspnea on exertion. Pt has been taking Advil for 5 weeks for Lower Back pain. Pt had placement of Medi port as out pt   today     Denies fever, chills, chest pain, palpitations, abdominal pain, nausea, vomiting, diarrhea, constipation, or dysuria    ED Course:   Vitals: BP: 160/90, HR: 74, Temp: 98F, RR: 18  Labs:  WBC 7.31, Hg 10, CO2 21, BUN/CR 58,5.3, Ca 8.3, eGFR 11, Mag 2.8, proBNP 593  UA: Pending  CT Chest: Mild mottled density in the left mainstem bronchus may represent secretions. No focal consolidation or discrete mass is seen. Regions of atelectasis or scarring in the left upper lobe/lingula. Atelectatic changes at the lung bases.  CT Abdomen/Pelvis: Lymphadenopathy which appears increased when compared with 9/30/2024. Nodularity along posterior peritoneal reflections and minimal perihepatic fluid which were not seen previously.    EKG: NSR rate 68bpm. QTc 408ms  Received in the ED: 500cc NS bolus x1   (20 Nov 2024 22:40)    INTERVAL HPI:      OVERNIGHT EVENTS:    Home Medications:      MEDICATIONS  (STANDING):  albuterol/ipratropium for Nebulization 3 milliLiter(s) Nebulizer every 12 hours  buMETAnide 1 milliGRAM(s) Oral daily  chlorhexidine 4% Liquid 1 Application(s) Topical <User Schedule>  heparin   Injectable 5000 Unit(s) SubCutaneous every 8 hours  influenza   Vaccine 0.5 milliLiter(s) IntraMuscular once  labetalol 100 milliGRAM(s) Oral two times a day  melatonin 5 milliGRAM(s) Oral at bedtime  pantoprazole    Tablet 40 milliGRAM(s) Oral before breakfast  polyethylene glycol 3350 17 Gram(s) Oral daily  senna 2 Tablet(s) Oral at bedtime  sodium chloride 3%  Inhalation 4 milliLiter(s) Inhalation every 12 hours  tamsulosin 0.4 milliGRAM(s) Oral at bedtime    MEDICATIONS  (PRN):  acetaminophen     Tablet .. 650 milliGRAM(s) Oral every 6 hours PRN Mild Pain (1 - 3)  aluminum hydroxide/magnesium hydroxide/simethicone Suspension 30 milliLiter(s) Oral every 4 hours PRN Dyspepsia  sodium chloride 0.9% lock flush 10 milliLiter(s) IV Push every 1 hour PRN Pre/post blood products, medications, blood draw, and to maintain line patency      Allergies    Bactrim DS (Hives)    Intolerances        Social History:  Lives at home with family  Recently retired  Active, ambulates independently and independent with ADLs (20 Nov 2024 22:40)      REVIEW OF SYSTEMS:  CONSTITUTIONAL: No fever, No chills, No fatigue, No myalgia, No Body ache, No Weakness  EYES: No eye pain,  No visual disturbances, No discharge, NO Redness  ENMT:  No ear pain, No nose bleed, No vertigo; No sinus pain, NO throat pain, No Congestion  NECK: No pain, No stiffness  RESPIRATORY: No cough, NO wheezing, No  hemoptysis, NO  shortness of breath  CARDIOVASCULAR: No chest pain, palpitations  GASTROINTESTINAL: No abdominal pain, NO epigastric pain. No nausea, No vomiting; No diarrhea, No constipation. [  ] BM  GENITOURINARY: No dysuria, No frequency, No urgency, No hematuria, NO incontinence  NEUROLOGICAL: No headaches, No dizziness, No numbness, No tingling, No tremors, No weakness  EXT: No Swelling, No Pain, No Edema  SKIN:  [  ] No itching, burning, rashes, or lesions   MUSCULOSKELETAL: No joint pain ,No Jt swelling; No muscle pain, No back pain, No extremity pain  PSYCHIATRIC: No depression,  No anxiety,  No mood swings ,No difficulty sleeping at night  PAIN SCALE: [  ] None  [  ] Other-  ROS Unable to obtain due to - [  ] Dementia  [  ] Lethargy [  ] Drowsy [  ] Sedated [  ] non verbal  REST OF REVIEW Of SYSTEM - [  ] Normal     Vital Signs Last 24 Hrs  T(C): 36.7 (29 Nov 2024 09:40), Max: 37.1 (28 Nov 2024 20:16)  T(F): 98 (29 Nov 2024 09:40), Max: 98.7 (28 Nov 2024 20:16)  HR: 79 (29 Nov 2024 09:40) (74 - 88)  BP: 146/99 (29 Nov 2024 09:40) (127/78 - 169/94)  BP(mean): --  RR: 17 (29 Nov 2024 09:40) (17 - 19)  SpO2: 93% (29 Nov 2024 09:40) (93% - 99%)    Parameters below as of 29 Nov 2024 09:40  Patient On (Oxygen Delivery Method): room air      Finger Stick        11-28 @ 07:01  -  11-29 @ 07:00  --------------------------------------------------------  IN: 1000 mL / OUT: 2050 mL / NET: -1050 mL        PHYSICAL EXAM:  GENERAL:  [  ] NAD , [  ] well appearing, [  ] Agitated, [  ] Drowsy,  [  ] Lethargy, [  ] confused   HEAD:  [  ] Normal, [  ] Other  EYES:  [  ] EOMI, [  ] PERRLA, [  ] conjunctiva and sclera clear normal, [  ] Other,  [  ] Pallor,[  ] Discharge  ENMT:  [  ] Normal, [  ] Moist mucous membranes, [  ] Good dentition, [  ] No Thrush  NECK:  [  ] Supple, [  ] No JVD, [  ] Normal thyroid, [  ] Lymphadenopathy [  ] Other  CHEST/LUNG:  [  ] Clear to auscultation bilaterally, [  ] Breath Sounds equal B/L / Decrease, [  ] poor effort  [  ] No rales, [  ] No rhonchi  [  ]  No wheezing,   HEART:  [  ] Regular rate and rhythm, [  ] tachycardia, [  ] Bradycardia,  [  ] irregular  [  ] No murmurs, No rubs, No gallops, [  ] PPM in place (Mfr:  )  ABDOMEN:  [  ] Soft, [  ] Nontender, [  ] Nondistended, [  ] No mass, [  ] Bowel sounds present, [  ] obese  NERVOUS SYSTEM:  [  ] Alert & Oriented X3, [  ] Nonfocal  [  ] Confusion  [  ] Encephalopathic [  ] Sedated [  ] Unable to assess, [  ] Dementia [  ] Other-  EXTREMITIES: [  ] 2+ Peripheral Pulses, No clubbing, No cyanosis,  [  ] edema B/L lower EXT. [  ] PVD stasis skin changes B/L Lower EXT, [  ] wound  LYMPH: No lymphadenopathy noted  SKIN:  [  ] No rashes or lesions, [  ] Pressure Ulcers, [  ] ecchymosis, [  ] Skin Tears, [  ] Other    DIET: Diet, Renal Restrictions:   For patients receiving Renal Replacement - No Protein Restr, No Conc K, No Conc Phos, Low Sodium  Supplement Feeding Modality:  Oral  Suplena Cans or Servings Per Day:  1       Frequency:  Daily (11-22-24 @ 11:31)      LABS:                        9.7    7.56  )-----------( 301      ( 29 Nov 2024 06:00 )             29.4     29 Nov 2024 06:00    135    |  101    |  101    ----------------------------<  127    4.8     |  23     |  11.00    Ca    8.7        29 Nov 2024 06:00  Phos  9.0       29 Nov 2024 06:00  Mg     3.0       29 Nov 2024 06:00        Urinalysis Basic - ( 29 Nov 2024 06:00 )    Color: x / Appearance: x / SG: x / pH: x  Gluc: 127 mg/dL / Ketone: x  / Bili: x / Urobili: x   Blood: x / Protein: x / Nitrite: x   Leuk Esterase: x / RBC: x / WBC x   Sq Epi: x / Non Sq Epi: x / Bacteria: x                           Anemia Panel:      Thyroid Panel:                RADIOLOGY & ADDITIONAL TESTS:      HEALTH ISSUES - PROBLEM Dx:  MARYSOL (acute kidney injury)    Fever    Hypertension    Hyperkalemia    Dyspnea    Gastric lymphoma    Need for prophylactic measure            Consultant(s) Notes Reviewed:  [  ] YES     Care Discussed with [X] Consultants  [  ] Patient  [  ] Family [  ] HCP [  ]   [  ] Social Service  [  ] RN, [  ] Physical Therapy,[  ] Palliative care team  DVT PPX: [  ] Lovenox, [  ] S C Heparin, [  ] Coumadin, [  ] Xarelto, [  ] Eliquis, [  ] Pradaxa, [  ] IV Heparin drip, [  ] SCD [  ] Contraindication 2 to GI Bleed,[  ] Ambulation [  ] Contraindicated 2 to  bleed [  ] Contraindicated 2 to Brain Bleed  Advanced directive: [  ] None, [  ] DNR/DNI Patient is a 64y old  Male who presents with a chief complaint of MARYSOL (29 Nov 2024 10:09)    HPI:  Pt is a 63yo M with PMHx recently diagnosed gastric lymphoma  S/P Biopsy of gastric mass & neck Lymph node  with LN spread presents to the ED as recommended by his oncologist Dr. Andrea for elevated Creatinine. Pt called by doctor for elevated Cr at 5.3.  Baseline 1.1, increasing to 1.5-->3.0--> 5.3 on outpatient labs. Pt had chemo port placed today, has not been drinking as much as he usually does. Per pt, urinated a small amount this AM, contributed it to decreased fluid intake. Pt endorsing mild flank pain, prescribed oxycodone by outpatient doctor. Pt also endorsing new onset dyspnea on exertion. Pt has been taking Advil for 5 weeks for Lower Back pain. Pt had placement of Medi port as out pt   today     Denies fever, chills, chest pain, palpitations, abdominal pain, nausea, vomiting, diarrhea, constipation, or dysuria    ED Course:   Vitals: BP: 160/90, HR: 74, Temp: 98F, RR: 18  Labs:  WBC 7.31, Hg 10, CO2 21, BUN/CR 58,5.3, Ca 8.3, eGFR 11, Mag 2.8, proBNP 593  UA: Pending  CT Chest: Mild mottled density in the left mainstem bronchus may represent secretions. No focal consolidation or discrete mass is seen. Regions of atelectasis or scarring in the left upper lobe/lingula. Atelectatic changes at the lung bases.  CT Abdomen/Pelvis: Lymphadenopathy which appears increased when compared with 9/30/2024. Nodularity along posterior peritoneal reflections and minimal perihepatic fluid which were not seen previously.    EKG: NSR rate 68bpm. QTc 408ms  Received in the ED: 500cc NS bolus x1   (20 Nov 2024 22:40)    INTERVAL HPI:  11/21: Pt seen and examined at bedside. Pt was making minimal urine output overnight but has voided significantly more since than. He has been taking Advil for the last month at the very minimum, with  max of 6 pills/day for R. low back pain. However, the pain is not present currently. No acute c/o at this time.   11/22: Pt seen and examined at bedside. 1.3L UOP in the last 24 hours. Pt reports feeling well, shortness  of breath has improved a bit. No other concerns worsening MARYSOL , K stable   11/23:pt seen, examined, Cr increasing, Low h/h stable, High electrolytes.  11/24: Pt seen and examined, seated in chair. Pt reports feeling well, and states his shortness of breath when walking has improved. He still has intermittent lower back pain. Cr rising, today 10, nephro to start temporary HD today.  11/25: Pt seen and examined, seated in chair about to eat breakfast. Pt reports he felt a little nauseous during his first HD session yesterday, and stated he felt tired after but this morning he feels well. 1L removed yesterday. Cr still rising, 11 today. Likely will undergo another session of HD today. No concerns per patient.HD Rx today   11/26: Pt seen and examined, seated in chair. He reports feeling well this morning and denies any specific concerns. 1L removed with HD yesterday, plan for 2.5L removal today along with initiation of bumex. Plan for IR for permacath tomorrow, NPO after midnight tonight.  11/27: Pt seen and examined, seated in chair. S/p 3rd HD session yesterday which he tolerated well. Pt reports that he feels good, denies any concerns. Remains on Bumex with good UOP. Planned for IR procedure for PermCath  today .D/C Barba cath today-TOV , PC placed   11/28: pt seen, examined ,OOB to chair S/P PC placed, pt is voiding well, HD Rx on 11/29, Low grade fever 100.3  11/29: Pt seen and examined while receiving HD. Pt reports that he feels great today, and denies any concerns. b/l LE dopplers yesterday negative.    OVERNIGHT EVENTS: None.    Home Medications:      MEDICATIONS  (STANDING):  albuterol/ipratropium for Nebulization 3 milliLiter(s) Nebulizer every 12 hours  buMETAnide 1 milliGRAM(s) Oral daily  chlorhexidine 4% Liquid 1 Application(s) Topical <User Schedule>  heparin   Injectable 5000 Unit(s) SubCutaneous every 8 hours  influenza   Vaccine 0.5 milliLiter(s) IntraMuscular once  labetalol 100 milliGRAM(s) Oral two times a day  melatonin 5 milliGRAM(s) Oral at bedtime  pantoprazole    Tablet 40 milliGRAM(s) Oral before breakfast  polyethylene glycol 3350 17 Gram(s) Oral daily  senna 2 Tablet(s) Oral at bedtime  sodium chloride 3%  Inhalation 4 milliLiter(s) Inhalation every 12 hours  tamsulosin 0.4 milliGRAM(s) Oral at bedtime    MEDICATIONS  (PRN):  acetaminophen     Tablet .. 650 milliGRAM(s) Oral every 6 hours PRN Mild Pain (1 - 3)  aluminum hydroxide/magnesium hydroxide/simethicone Suspension 30 milliLiter(s) Oral every 4 hours PRN Dyspepsia  sodium chloride 0.9% lock flush 10 milliLiter(s) IV Push every 1 hour PRN Pre/post blood products, medications, blood draw, and to maintain line patency      Allergies    Bactrim DS (Hives)    Intolerances        Social History:  Lives at home with family  Recently retired  Active, ambulates independently and independent with ADLs (20 Nov 2024 22:40)      REVIEW OF SYSTEMS:  CONSTITUTIONAL: No fever, No chills, No fatigue, No myalgia, No Body ache, No Weakness  EYES: No eye pain, No visual disturbances, No discharge, NO Redness  ENMT:  No ear pain, No nose bleed, No vertigo; No sinus pain, NO throat pain, No Congestion  NECK: No pain, No stiffness  RESPIRATORY: No cough, NO wheezing, No  hemoptysis, NO  shortness of breath  CARDIOVASCULAR: No chest pain, palpitations  GASTROINTESTINAL: No abdominal pain, NO epigastric pain. No nausea, No vomiting; No diarrhea, No constipation. [  ] BM  GENITOURINARY: No dysuria, No frequency, No urgency, No hematuria, NO incontinence  NEUROLOGICAL: No headaches, No dizziness, No numbness, No tingling, No tremors, No weakness  EXT: No Swelling, No Pain, No Edema  SKIN:  [ x ] No itching, burning, rashes, or lesions   MUSCULOSKELETAL: No joint pain ,No Jt swelling; No muscle pain, No back pain, No extremity pain  PSYCHIATRIC: No depression,  No anxiety,  No mood swings ,No difficulty sleeping at night  PAIN SCALE: [x  ] None  [  ] Other-  ROS Unable to obtain due to - [  ] Dementia  [  ] Lethargy [  ] Drowsy [  ] Sedated [  ] non verbal  REST OF REVIEW Of SYSTEM - [x  ] Normal     Vital Signs Last 24 Hrs  T(C): 36.7 (29 Nov 2024 09:40), Max: 37.1 (28 Nov 2024 20:16)  T(F): 98 (29 Nov 2024 09:40), Max: 98.7 (28 Nov 2024 20:16)  HR: 79 (29 Nov 2024 09:40) (74 - 88)  BP: 146/99 (29 Nov 2024 09:40) (127/78 - 169/94)  BP(mean): --  RR: 17 (29 Nov 2024 09:40) (17 - 19)  SpO2: 93% (29 Nov 2024 09:40) (93% - 99%)    Parameters below as of 29 Nov 2024 09:40  Patient On (Oxygen Delivery Method): room air      Finger Stick        11-28 @ 07:01  -  11-29 @ 07:00  --------------------------------------------------------  IN: 1000 mL / OUT: 2050 mL / NET: -1050 mL        PHYSICAL EXAM: Medi port ,PC +  GENERAL:  [ x ] NAD , [ x ] well appearing, [  ] Agitated, [  ] Drowsy,  [  ] Lethargy, [  ] confused   HEAD:  [x  ] Normal, [  ] Other  EYES:  [x  ] EOMI, [ x ] PERRLA, [  ] conjunctiva and sclera clear normal, [  ] Other,  [ x ] Pallor,[  ] Discharge  ENMT:  [ x ] Normal, [ x ] Moist mucous membranes, [  ] Good dentition, [x] No Thrush  NECK:  [ x ] Supple, [ x ] No JVD, [ x ] Normal thyroid, [  ] Lymphadenopathy [  ] Other  CHEST/LUNG:  [x  ] Clear to auscultation bilaterally, [ x ] Breath Sounds equal B/L / Decrease, [x  ] poor effort  [x  ] No rales, [x  ] No rhonchi  [ x ]  No wheezing,   HEART:  [x  ] Regular rate and rhythm, [  ] tachycardia, [  ] Bradycardia,  [  ] irregular  [x  ] No murmurs, No rubs, No gallops, [  ] PPM in place (Mfr:  )  ABDOMEN:  [x  ] Soft, [  x] Nontender, [x  ] Nondistended, [x  ] No mass, [x  ] Bowel sounds present, [  ] obese  NERVOUS SYSTEM:  [ x ] Alert & Oriented X3, [ x ] Nonfocal  [  ] Confusion  [  ] Encephalopathic [  ] Sedated [  ] Unable to assess, [  ] Dementia [  ] Other-  EXTREMITIES: [  ] 2+ Peripheral Pulses, No clubbing, No cyanosis,  [ x ] 2 + pitting  edema B/L lower EXT. [  ] PVD stasis skin changes B/L Lower EXT, [  ] wound  LYMPH: No lymphadenopathy noted  SKIN:  [x  ] No rashes or lesions, [  ] Pressure Ulcers, [  ] ecchymosis, [  ] Skin Tears, [  ] Other    DIET: Diet, Renal Restrictions:   For patients receiving Renal Replacement - No Protein Restr, No Conc K, No Conc Phos, Low Sodium  Supplement Feeding Modality:  Oral  Suplena Cans or Servings Per Day:  1       Frequency:  Daily (11-22-24 @ 11:31)      LABS:                        9.7    7.56  )-----------( 301      ( 29 Nov 2024 06:00 )             29.4     29 Nov 2024 06:00    135    |  101    |  101    ----------------------------<  127    4.8     |  23     |  11.00    Ca    8.7        29 Nov 2024 06:00  Phos  9.0       29 Nov 2024 06:00  Mg     3.0       29 Nov 2024 06:00        Urinalysis Basic - ( 29 Nov 2024 06:00 )    Color: x / Appearance: x / SG: x / pH: x  Gluc: 127 mg/dL / Ketone: x  / Bili: x / Urobili: x   Blood: x / Protein: x / Nitrite: x   Leuk Esterase: x / RBC: x / WBC x   Sq Epi: x / Non Sq Epi: x / Bacteria: x                           Anemia Panel:      Thyroid Panel:                RADIOLOGY & ADDITIONAL TESTS:      HEALTH ISSUES - PROBLEM Dx:  MARYSOL (acute kidney injury)    Fever    Hypertension    Hyperkalemia    Dyspnea    Gastric lymphoma    Need for prophylactic measure            Consultant(s) Notes Reviewed:  [ x ] YES     Care Discussed with [X] Consultants  [ x ] Patient  [  ] Family [  ] HCP [  ]   [  ] Social Service  [  ] RN, [  ] Physical Therapy,[  ] Palliative care team  DVT PPX: [  ] Lovenox, [ x ] S C Heparin, [  ] Coumadin, [  ] Xarelto, [  ] Eliquis, [  ] Pradaxa, [  ] IV Heparin drip, [  ] SCD [  ] Contraindication 2 to GI Bleed,[  ] Ambulation [  ] Contraindicated 2 to  bleed [  ] Contraindicated 2 to Brain Bleed  Advanced directive: [ x ] None, [  ] DNR/DNI

## 2024-11-29 NOTE — PROGRESS NOTE ADULT - PROBLEM SELECTOR PLAN 6
Pt with recently diagnosed gastric lymphoma, port placed prior to admission for planned chemo. Follows Dr. nAdrea  - CT Ab: Lymphadenopathy which appears increased when compared with 9/30/2024. Nodularity along posterior peritoneal reflections and minimal perihepatic fluid which were not seen previously.  - Microcytic anemia on labs  - iron studies noted, heme/onc following  - hx beta thal minor  Hematology -Dr lu /Deandre s/off

## 2024-11-29 NOTE — PROGRESS NOTE ADULT - PROBLEM SELECTOR PLAN 2
pt had low grade fever this am 100.3 -observe  Likely reactive   Follow CBC  ID Dr Ulises burns called  PRN Tylenol pt had low grade fever yesterday am 100.3 -observe  Likely reactive   Follow CBC - WNL today  ID Dr Ulises burns called - monitor off abx  PRN Tylenol

## 2024-11-29 NOTE — PROGRESS NOTE ADULT - PROBLEM SELECTOR PLAN 4
Pt with elevated electrolytes likely in setting of [place]- RESOLVED  - K improved s/p Lokelma  - Mag/Phos remain elevated  - for HD today  - Nutritional eval  - Renal diet Pt with elevated electrolytes likely in setting of [place]- RESOLVED  - K improved s/p Lokelma yesterday  - Mag/Phos remain elevated  - for HD today 12/29  - Nutritional eval  - Renal diet

## 2024-11-29 NOTE — PROGRESS NOTE ADULT - ASSESSMENT
63yo M with recently diagnosed gastric lymphoma  S/P Biopsy of gastric mass & neck Lymph node  with LN spread presents to the ED as recommended by his oncologist Dr. Andrea for elevated Creatinine. Pt called by doctor for elevated Cr at 5.3.  Baseline 1.1, increasing to 1.5-->3.0--> 5.3 on outpatient labs. Pt had chemo port placed and was not been drinking as much as he usually does. Per pt, urinated a small amount this AM, contributed it to decreased fluid intake. Pt endorsed mild flank pain, prescribed oxycodone by outpatient doctor. Adm 11/20 for renal failure    ED Vitals: BP: 160/90, HR: 74, Temp: 98F, RR: 18  Labs:  WBC 7.31, Hg 10, CO2 21, BUN/CR 58,5.3, Ca 8.3, eGFR 11, Mag 2.8, proBNP 593  UA: Pending  CT Chest: Mild mottled density in the left mainstem bronchus may represent secretions. No focal consolidation or discrete mass is seen. Regions of atelectasis or scarring in the left upper lobe/lingula. Atelectatic changes at the lung bases.  CT Abdomen/Pelvis: Lymphadenopathy which appears increased when compared with 9/30/2024. Nodularity along posterior peritoneal reflections and minimal perihepatic fluid which were not seen previously.  PT doing well and being evaluated for long term dialysis access. Morning of 11/28 reports he was drinking a hot cup of coffee and had his temperature taken orally. This measured at 100.3 with HR stable in the 80s and no reported new symptoms.    RECOMMENDATIONS  Fever and leukocytosis  Body temperature not over 100.4°F (38°C) or under 96.8°F (36°C).  Heart rate not greater than 90 beats per minute, Respiratory rate not greater than 20 breaths per minute or partial pressure of CO2 less than 32 mmHg, White blood cell count just greater than 12,000 per µL (12 × 10^9 per L), and there is this story of drinking coffee just prior to isolated temp elevation, so patient is not compelling for any kind of acute infection driving this    repeat CBC w diff on 11/29 showing resolution of leukocytosis. pt remains afebrile  no other focal complaints  observation off abx    Infectious Diseases will sign off at this time   Please call with any further questions.  Karen Tyler M.D.  [facility] Division of Infectious Diseases [phone]  For after 5 P.M. and weekends, please call [phone]  Available on [employer] TEAMS

## 2024-11-29 NOTE — PROGRESS NOTE ADULT - PROBLEM SELECTOR PLAN 1
Pt sent in to ED for elevated Cr (Baseline 1.1, increasing to 1.5-->3.0), endorsing decreased urination and flank pain  - Pt c/o flank pain (L), per chart review, also was endorsing pain during admission at [facility], prior to elevated Cr  - Cr 5.3 on admission, pt with prolonged NSAID use.    - FeNA 3.4, likely ATN from progression of prerenal MARYSOL MA  - s/p wise catheter placement in ED with minimal output  - CT Abdomen: Collapsed bladder. Bilateral renal cysts, no hydro. No obstruction.  - K elevated on admission now s/p LOKELMA x1 with improvement in K  - Metabolic Acidosis, S/P- NaHco3  - first session of HD 11/24 with 1L removal, 11/26 1L removal, 11/27 2.5 L removed  - Cr now downtrending  - Avoid nephrotoxic medications, Renal dose meds   - Renal diet  - Daily CMP  - Nephrology (Dr. Juárez) follow up - HD- Bumex  -IR consult for PC - planned for today however may get moved to friday, if so patient will need to be NPO after midnight thursday night  - CM/SW fallowing for outpatient HD arrangements - [facility] - will follow with Dr. Eugene Pt sent in to ED for elevated Cr (Baseline 1.1, increasing to 1.5-->3.0), endorsing decreased urination and flank pain  - Pt c/o flank pain (L), per chart review, also was endorsing pain during admission at [facility] prior to elevated Cr  - Cr 5.3 on admission, pt with prolonged NSAID use.    - FeNA 3.4, likely ATN from progression of prerenal MARYSOL MA  - s/p wise catheter placement in ED with minimal output  - CT Abdomen: Collapsed bladder. Bilateral renal cysts, no hydro. No obstruction.  - K elevated on admission now s/p LOKELMA x1 with improvement in K  - Metabolic Acidosis, S/P- NaHco3  - first session of HD 11/24 with 1L removal, 11/26 1L removal, 11/27 2.5 L removed; due for HD today 11/29  - Cr now downtrending  - Avoid nephrotoxic medications, Renal dose meds   - Renal diet  - Daily CMP  - PC placed 11/27  - Nephrology (Dr. Juárez) follow up - HD- Bumex  - CM/SW fallowing for outpatient HD arrangements - [facility] - will follow with Dr. Eugene

## 2024-11-30 LAB
ALBUMIN SERPL ELPH-MCNC: 3.3 G/DL — SIGNIFICANT CHANGE UP (ref 3.3–5)
ALP SERPL-CCNC: 78 U/L — SIGNIFICANT CHANGE UP (ref 40–120)
ALT FLD-CCNC: 26 U/L — SIGNIFICANT CHANGE UP (ref 12–78)
ANION GAP SERPL CALC-SCNC: 14 MMOL/L — SIGNIFICANT CHANGE UP (ref 5–17)
AST SERPL-CCNC: 16 U/L — SIGNIFICANT CHANGE UP (ref 15–37)
BILIRUB SERPL-MCNC: 0.4 MG/DL — SIGNIFICANT CHANGE UP (ref 0.2–1.2)
BUN SERPL-MCNC: 66 MG/DL — HIGH (ref 7–23)
CALCIUM SERPL-MCNC: 8.9 MG/DL — SIGNIFICANT CHANGE UP (ref 8.5–10.1)
CHLORIDE SERPL-SCNC: 102 MMOL/L — SIGNIFICANT CHANGE UP (ref 96–108)
CO2 SERPL-SCNC: 24 MMOL/L — SIGNIFICANT CHANGE UP (ref 22–31)
CREAT SERPL-MCNC: 7.9 MG/DL — HIGH (ref 0.5–1.3)
EGFR: 7 ML/MIN/1.73M2 — LOW
GLUCOSE SERPL-MCNC: 119 MG/DL — HIGH (ref 70–99)
HCT VFR BLD CALC: 32.5 % — LOW (ref 39–50)
HGB BLD-MCNC: 10.4 G/DL — LOW (ref 13–17)
MAGNESIUM SERPL-MCNC: 2.9 MG/DL — HIGH (ref 1.6–2.6)
MCHC RBC-ENTMCNC: 19.4 PG — LOW (ref 27–34)
MCHC RBC-ENTMCNC: 32 G/DL — SIGNIFICANT CHANGE UP (ref 32–36)
MCV RBC AUTO: 60.7 FL — LOW (ref 80–100)
NRBC # BLD: 0 /100 WBCS — SIGNIFICANT CHANGE UP (ref 0–0)
PHOSPHATE SERPL-MCNC: 8 MG/DL — HIGH (ref 2.5–4.5)
PLATELET # BLD AUTO: 349 K/UL — SIGNIFICANT CHANGE UP (ref 150–400)
POTASSIUM SERPL-MCNC: 4.3 MMOL/L — SIGNIFICANT CHANGE UP (ref 3.5–5.3)
POTASSIUM SERPL-SCNC: 4.3 MMOL/L — SIGNIFICANT CHANGE UP (ref 3.5–5.3)
PROT SERPL-MCNC: 7.4 G/DL — SIGNIFICANT CHANGE UP (ref 6–8.3)
RBC # BLD: 5.35 M/UL — SIGNIFICANT CHANGE UP (ref 4.2–5.8)
RBC # FLD: 16 % — HIGH (ref 10.3–14.5)
SODIUM SERPL-SCNC: 140 MMOL/L — SIGNIFICANT CHANGE UP (ref 135–145)
WBC # BLD: 8.82 K/UL — SIGNIFICANT CHANGE UP (ref 3.8–10.5)
WBC # FLD AUTO: 8.82 K/UL — SIGNIFICANT CHANGE UP (ref 3.8–10.5)

## 2024-11-30 PROCEDURE — 74018 RADEX ABDOMEN 1 VIEW: CPT | Mod: 26

## 2024-11-30 RX ORDER — PANTOPRAZOLE SODIUM 40 MG/1
1 TABLET, DELAYED RELEASE ORAL
Qty: 30 | Refills: 0
Start: 2024-11-30 | End: 2024-12-29

## 2024-11-30 RX ORDER — PANTOPRAZOLE SODIUM 40 MG/1
40 TABLET, DELAYED RELEASE ORAL
Refills: 0 | Status: DISCONTINUED | OUTPATIENT
Start: 2024-11-30 | End: 2024-12-01

## 2024-11-30 RX ORDER — SEVELAMER CARBONATE 800 MG/1
2 TABLET, FILM COATED ORAL
Qty: 180 | Refills: 0
Start: 2024-11-30 | End: 2024-12-29

## 2024-11-30 RX ORDER — TAMSULOSIN HYDROCHLORIDE 0.4 MG/1
1 CAPSULE ORAL
Qty: 30 | Refills: 0
Start: 2024-11-30 | End: 2024-12-29

## 2024-11-30 RX ORDER — METOCLOPRAMIDE HYDROCHLORIDE 10 MG/1
10 TABLET ORAL EVERY 4 HOURS
Refills: 0 | Status: DISCONTINUED | OUTPATIENT
Start: 2024-11-30 | End: 2024-12-01

## 2024-11-30 RX ORDER — ONDANSETRON HYDROCHLORIDE 4 MG/1
4 TABLET, FILM COATED ORAL ONCE
Refills: 0 | Status: COMPLETED | OUTPATIENT
Start: 2024-11-30 | End: 2024-11-30

## 2024-11-30 RX ORDER — PANTOPRAZOLE SODIUM 40 MG/1
40 TABLET, DELAYED RELEASE ORAL
Refills: 0 | Status: DISCONTINUED | OUTPATIENT
Start: 2024-11-30 | End: 2024-11-30

## 2024-11-30 RX ORDER — LABETALOL 100 MG/1
1 TABLET, FILM COATED ORAL
Qty: 60 | Refills: 0
Start: 2024-11-30 | End: 2024-12-29

## 2024-11-30 RX ORDER — BUMETANIDE 1 MG/1
1 TABLET ORAL
Qty: 30 | Refills: 0
Start: 2024-11-30 | End: 2024-12-29

## 2024-11-30 RX ORDER — ONDANSETRON HYDROCHLORIDE 4 MG/1
4 TABLET, FILM COATED ORAL EVERY 8 HOURS
Refills: 0 | Status: DISCONTINUED | OUTPATIENT
Start: 2024-11-30 | End: 2024-11-30

## 2024-11-30 RX ORDER — PROCHLORPERAZINE EDISYLATE 5 MG/ML
5 INJECTION INTRAMUSCULAR; INTRAVENOUS ONCE
Refills: 0 | Status: COMPLETED | OUTPATIENT
Start: 2024-11-30 | End: 2024-11-30

## 2024-11-30 RX ORDER — SUCRALFATE 1 G/1
1 TABLET ORAL
Refills: 0 | Status: DISCONTINUED | OUTPATIENT
Start: 2024-11-30 | End: 2024-12-01

## 2024-11-30 RX ORDER — SIMETHICONE 125 MG
80 CAPSULE ORAL ONCE
Refills: 0 | Status: COMPLETED | OUTPATIENT
Start: 2024-11-30 | End: 2024-11-30

## 2024-11-30 RX ORDER — SUCRALFATE 1 G/1
1 TABLET ORAL
Refills: 0 | Status: DISCONTINUED | OUTPATIENT
Start: 2024-11-30 | End: 2024-11-30

## 2024-11-30 RX ORDER — IOHEXOL 300 MG/ML
30 INJECTION, SOLUTION INTRAVENOUS ONCE
Refills: 0 | Status: COMPLETED | OUTPATIENT
Start: 2024-11-30 | End: 2024-11-30

## 2024-11-30 RX ADMIN — METOCLOPRAMIDE HYDROCHLORIDE 10 MILLIGRAM(S): 10 TABLET ORAL at 21:25

## 2024-11-30 RX ADMIN — Medication 5000 UNIT(S): at 05:11

## 2024-11-30 RX ADMIN — IPRATROPIUM BROMIDE AND ALBUTEROL SULFATE 3 MILLILITER(S): 2.5; .5 SOLUTION RESPIRATORY (INHALATION) at 07:30

## 2024-11-30 RX ADMIN — BUMETANIDE 1 MILLIGRAM(S): 1 TABLET ORAL at 05:11

## 2024-11-30 RX ADMIN — Medication 80 MILLIGRAM(S): at 14:03

## 2024-11-30 RX ADMIN — CHLORHEXIDINE GLUCONATE 1 APPLICATION(S): 1.2 RINSE ORAL at 05:12

## 2024-11-30 RX ADMIN — LABETALOL 100 MILLIGRAM(S): 100 TABLET, FILM COATED ORAL at 19:53

## 2024-11-30 RX ADMIN — SUCRALFATE 1 GRAM(S): 1 TABLET ORAL at 14:03

## 2024-11-30 RX ADMIN — LABETALOL 100 MILLIGRAM(S): 100 TABLET, FILM COATED ORAL at 05:12

## 2024-11-30 RX ADMIN — IOHEXOL 30 MILLILITER(S): 300 INJECTION, SOLUTION INTRAVENOUS at 21:25

## 2024-11-30 RX ADMIN — SODIUM CHLORIDE 4 MILLILITER(S): 9 INJECTION, SOLUTION INTRAMUSCULAR; INTRAVENOUS; SUBCUTANEOUS at 07:30

## 2024-11-30 RX ADMIN — SUCRALFATE 1 GRAM(S): 1 TABLET ORAL at 19:53

## 2024-11-30 RX ADMIN — ONDANSETRON HYDROCHLORIDE 4 MILLIGRAM(S): 4 TABLET, FILM COATED ORAL at 08:05

## 2024-11-30 RX ADMIN — PROCHLORPERAZINE EDISYLATE 5 MILLIGRAM(S): 5 INJECTION INTRAMUSCULAR; INTRAVENOUS at 14:53

## 2024-11-30 RX ADMIN — PANTOPRAZOLE SODIUM 40 MILLIGRAM(S): 40 TABLET, DELAYED RELEASE ORAL at 05:59

## 2024-11-30 NOTE — CONSULT NOTE ADULT - ASSESSMENT
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  [facility]  [phone]

## 2024-11-30 NOTE — PROGRESS NOTE ADULT - PROBLEM SELECTOR PLAN 3
Pt with HTN with -170s  - labetalol 100mg BID with hold parameters as per nephro - BPs improved  - monitor routine hemodynamics Pt with recently diagnosed gastric lymphoma, port placed prior to admission for planned chemo. Follows Dr. Andrea  - CT Ab: Lymphadenopathy which appears increased when compared with 9/30/2024. Nodularity along posterior peritoneal reflections and minimal perihepatic fluid which were not seen previously.  - Microcytic anemia on labs  - iron studies noted, heme/onc following  - hx beta thal minor  Hematology -Dr lu /Deandre s/off

## 2024-11-30 NOTE — PROGRESS NOTE ADULT - SUBJECTIVE AND OBJECTIVE BOX
Patient is a 64y old  Male who presents with a chief complaint of MARYSOL (30 Nov 2024 07:28)    HPI:  Pt is a 63yo M with PMHx recently diagnosed gastric lymphoma  S/P Biopsy of gastric mass & neck Lymph node  with LN spread presents to the ED as recommended by his oncologist Dr. Andrea for elevated Creatinine. Pt called by doctor for elevated Cr at 5.3.  Baseline 1.1, increasing to 1.5-->3.0--> 5.3 on outpatient labs. Pt had chemo port placed today, has not been drinking as much as he usually does. Per pt, urinated a small amount this AM, contributed it to decreased fluid intake. Pt endorsing mild flank pain, prescribed oxycodone by outpatient doctor. Pt also endorsing new onset dyspnea on exertion. Pt has been taking Advil for 5 weeks for Lower Back pain. Pt had placement of Medi port as out pt   today     Denies fever, chills, chest pain, palpitations, abdominal pain, nausea, vomiting, diarrhea, constipation, or dysuria    ED Course:   Vitals: BP: 160/90, HR: 74, Temp: 98F, RR: 18  Labs:  WBC 7.31, Hg 10, CO2 21, BUN/CR 58,5.3, Ca 8.3, eGFR 11, Mag 2.8, proBNP 593  UA: Pending  CT Chest: Mild mottled density in the left mainstem bronchus may represent secretions. No focal consolidation or discrete mass is seen. Regions of atelectasis or scarring in the left upper lobe/lingula. Atelectatic changes at the lung bases.  CT Abdomen/Pelvis: Lymphadenopathy which appears increased when compared with 9/30/2024. Nodularity along posterior peritoneal reflections and minimal perihepatic fluid which were not seen previously.    EKG: NSR rate 68bpm. QTc 408ms  Received in the ED: 500cc NS bolus x1   (20 Nov 2024 22:40)    INTERVAL HPI:  11/21: Pt seen and examined at bedside. Pt was making minimal urine output overnight but has voided significantly more since than. He has been taking Advil for the last month at the very minimum, with  max of 6 pills/day for R. low back pain. However, the pain is not present currently. No acute c/o at this time.   11/22: Pt seen and examined at bedside. 1.3L UOP in the last 24 hours. Pt reports feeling well, shortness  of breath has improved a bit. No other concerns worsening MARYSOL , K stable   11/23:pt seen, examined, Cr increasing, Low h/h stable, High electrolytes.  11/24: Pt seen and examined, seated in chair. Pt reports feeling well, and states his shortness of breath when walking has improved. He still has intermittent lower back pain. Cr rising, today 10, nephro to start temporary HD today.  11/25: Pt seen and examined, seated in chair about to eat breakfast. Pt reports he felt a little nauseous during his first HD session yesterday, and stated he felt tired after but this morning he feels well. 1L removed yesterday. Cr still rising, 11 today. Likely will undergo another session of HD today. No concerns per patient.HD Rx today   11/26: Pt seen and examined, seated in chair. He reports feeling well this morning and denies any specific concerns. 1L removed with HD yesterday, plan for 2.5L removal today along with initiation of bumex. Plan for IR for permacath tomorrow, NPO after midnight tonight.  11/27: Pt seen and examined, seated in chair. S/p 3rd HD session yesterday which he tolerated well. Pt reports that he feels good, denies any concerns. Remains on Bumex with good UOP. Planned for IR procedure for PermCath  today .D/C Barba cath today-TOV , PC placed   11/28: pt seen, examined ,OOB to chair S/P PC placed, pt is voiding well, HD Rx on 11/29, Low grade fever 100.3  11/29: Pt seen and examined while receiving HD. Pt reports that he feels great today, and denies any concerns. b/l LE dopplers yesterday negative.  11/30: Pt seen and examined, seated in chair. Patient had some mild nausea with a small episode of vomiting this morning, got zofran and is feeling better. Pt does have hx of reflux for which he takes pepcid occasionally at home, also states he ate grapes which were only partially digested. Otherwise he's feeling well and denies other concerns. Plan for discharge today with outpatient HD to start Monday 12/2.    OVERNIGHT EVENTS: None    Home Medications:      MEDICATIONS  (STANDING):  albuterol/ipratropium for Nebulization 3 milliLiter(s) Nebulizer every 12 hours  buMETAnide 1 milliGRAM(s) Oral daily  chlorhexidine 4% Liquid 1 Application(s) Topical <User Schedule>  heparin   Injectable 5000 Unit(s) SubCutaneous every 8 hours  influenza   Vaccine 0.5 milliLiter(s) IntraMuscular once  labetalol 100 milliGRAM(s) Oral two times a day  melatonin 5 milliGRAM(s) Oral at bedtime  pantoprazole    Tablet 40 milliGRAM(s) Oral before breakfast  polyethylene glycol 3350 17 Gram(s) Oral daily  senna 2 Tablet(s) Oral at bedtime  sodium chloride 3%  Inhalation 4 milliLiter(s) Inhalation every 12 hours  tamsulosin 0.4 milliGRAM(s) Oral at bedtime    MEDICATIONS  (PRN):  acetaminophen     Tablet .. 650 milliGRAM(s) Oral every 6 hours PRN Mild Pain (1 - 3)  aluminum hydroxide/magnesium hydroxide/simethicone Suspension 30 milliLiter(s) Oral every 4 hours PRN Dyspepsia  sodium chloride 0.9% lock flush 10 milliLiter(s) IV Push every 1 hour PRN Pre/post blood products, medications, blood draw, and to maintain line patency      Allergies    Bactrim DS (Hives)    Intolerances        Social History:  Lives at home with family  Recently retired  Active, ambulates independently and independent with ADLs (20 Nov 2024 22:40)      REVIEW OF SYSTEMS:  CONSTITUTIONAL: No fever, No chills, No fatigue, No myalgia, No Body ache, No Weakness  EYES: No eye pain,  No visual disturbances, No discharge, NO Redness  ENMT:  No ear pain, No nose bleed, No vertigo; No sinus pain, NO throat pain, No Congestion  NECK: No pain, No stiffness  RESPIRATORY: No cough, NO wheezing, No  hemoptysis, NO  shortness of breath  CARDIOVASCULAR: No chest pain, palpitations  GASTROINTESTINAL: No abdominal pain, NO epigastric pain. No nausea, No vomiting; No diarrhea, No constipation. [  ] BM  GENITOURINARY: No dysuria, No frequency, No urgency, No hematuria, NO incontinence  NEUROLOGICAL: No headaches, No dizziness, No numbness, No tingling, No tremors, No weakness  EXT: No Swelling, No Pain, No Edema  SKIN:  [  ] No itching, burning, rashes, or lesions   MUSCULOSKELETAL: No joint pain ,No Jt swelling; No muscle pain, No back pain, No extremity pain  PSYCHIATRIC: No depression,  No anxiety,  No mood swings ,No difficulty sleeping at night  PAIN SCALE: [ x ] None  [  ] Other-  ROS Unable to obtain due to - [  ] Dementia  [  ] Lethargy [  ] Drowsy [  ] Sedated [  ] non verbal  REST OF REVIEW Of SYSTEM - [ x ] Normal     Vital Signs Last 24 Hrs  T(C): 36.8 (30 Nov 2024 04:40), Max: 36.9 (29 Nov 2024 13:04)  T(F): 98.2 (30 Nov 2024 04:40), Max: 98.4 (29 Nov 2024 13:04)  HR: 74 (30 Nov 2024 08:00) (74 - 84)  BP: 121/66 (30 Nov 2024 04:40) (121/66 - 147/76)  BP(mean): --  RR: 16 (30 Nov 2024 04:40) (16 - 18)  SpO2: 97% (30 Nov 2024 08:00) (93% - 97%)    Parameters below as of 30 Nov 2024 08:00  Patient On (Oxygen Delivery Method): room air      Finger Stick        11-29 @ 07:01  -  11-30 @ 07:00  --------------------------------------------------------  IN: 600 mL / OUT: 3300 mL / NET: -2700 mL        PHYSICAL EXAM: Karen +  GENERAL:  [ x ] NAD , [ x ] well appearing, [  ] Agitated, [  ] Drowsy,  [  ] Lethargy, [  ] confused   HEAD:  [ x ] Normal, [  ] Other  EYES:  [ x ] EOMI, [  ] PERRLA, [ x ] conjunctiva and sclera clear normal, [  ] Other,  [  ] Pallor,[  ] Discharge  ENMT:  [ x ] Normal, [ x ] Moist mucous membranes, [  ] Good dentition, [  ] No Thrush  NECK:  [ x ] Supple, [  ] No JVD, [  ] Normal thyroid, [  ] Lymphadenopathy [ x ] Other - healing site of IJ  CHEST/LUNG:  [ x ] Clear to auscultation bilaterally, [ x ] Breath Sounds equal B/L [  ] poor effort  [ x ] No rales, [ x ] No rhonchi  [ x ]  No wheezing,   HEART:  [ x ] Regular rate and rhythm, [  ] tachycardia, [  ] Bradycardia,  [  ] irregular  [ x ] No murmurs, No rubs, No gallops, [  ] PPM in place (Mfr:  )  ABDOMEN:  [ x ] Soft, [ x ] Nontender, [  ] Nondistended, [  ] No mass, [  ] Bowel sounds present, [  ] obese  NERVOUS SYSTEM:  [ x ] Alert & Oriented X3, [  ] Nonfocal  [  ] Confusion  [  ] Encephalopathic [  ] Sedated [  ] Unable to assess, [  ] Dementia [  ] Other-  EXTREMITIES: [  ] 2+ Peripheral Pulses, No clubbing, No cyanosis,  [ x ] edema B/L lower EXT improving [  ] PVD stasis skin changes B/L Lower EXT, [  ] wound  LYMPH: No lymphadenopathy noted  SKIN:  [ x ] No rashes or lesions, [  ] Pressure Ulcers, [  ] ecchymosis, [  ] Skin Tears, [  ] Other    DIET: Diet, Renal Restrictions:   For patients receiving Renal Replacement - No Protein Restr, No Conc K, No Conc Phos, Low Sodium  Supplement Feeding Modality:  Oral  Suplena Cans or Servings Per Day:  1       Frequency:  Daily (11-22-24 @ 11:31)      LABS:                        10.4   8.82  )-----------( 349      ( 30 Nov 2024 06:47 )             32.5     30 Nov 2024 06:47    140    |  102    |  66     ----------------------------<  119    4.3     |  24     |  7.90     Ca    8.9        30 Nov 2024 06:47  Phos  8.0       30 Nov 2024 06:47  Mg     2.9       30 Nov 2024 06:47    TPro  7.4    /  Alb  3.3    /  TBili  0.4    /  DBili  x      /  AST  16     /  ALT  26     /  AlkPhos  78     30 Nov 2024 06:47      Urinalysis Basic - ( 30 Nov 2024 06:47 )    Color: x / Appearance: x / SG: x / pH: x  Gluc: 119 mg/dL / Ketone: x  / Bili: x / Urobili: x   Blood: x / Protein: x / Nitrite: x   Leuk Esterase: x / RBC: x / WBC x   Sq Epi: x / Non Sq Epi: x / Bacteria: x                           Anemia Panel:      Thyroid Panel:                RADIOLOGY & ADDITIONAL TESTS:      HEALTH ISSUES - PROBLEM Dx:  MARYSOL (acute kidney injury)    Hypertension    Hyperkalemia    Dyspnea    Gastric lymphoma    Need for prophylactic measure    Fever            Consultant(s) Notes Reviewed:  [ x ] YES     Care Discussed with [X] Consultants  [ x ] Patient  [  ] Family [  ] HCP [  ]   [  ] Social Service  [  ] RN, [  ] Physical Therapy,[  ] Palliative care team  DVT PPX: [  ] Lovenox, [ x ] S C Heparin, [  ] Coumadin, [  ] Xarelto, [  ] Eliquis, [  ] Pradaxa, [  ] IV Heparin drip, [  ] SCD [  ] Contraindication 2 to GI Bleed,[  ] Ambulation [  ] Contraindicated 2 to  bleed [  ] Contraindicated 2 to Brain Bleed  Advanced directive: [ x ] None, [  ] DNR/DNI Patient is a 64y old  Male who presents with a chief complaint of MARYSOL (30 Nov 2024 07:28)    HPI:  Pt is a 65yo M with PMHx recently diagnosed gastric lymphoma  S/P Biopsy of gastric mass & neck Lymph node  with LN spread presents to the ED as recommended by his oncologist Dr. Andrea for elevated Creatinine. Pt called by doctor for elevated Cr at 5.3.  Baseline 1.1, increasing to 1.5-->3.0--> 5.3 on outpatient labs. Pt had chemo port placed today, has not been drinking as much as he usually does. Per pt, urinated a small amount this AM, contributed it to decreased fluid intake. Pt endorsing mild flank pain, prescribed oxycodone by outpatient doctor. Pt also endorsing new onset dyspnea on exertion. Pt has been taking Advil for 5 weeks for Lower Back pain. Pt had placement of Medi port as out pt   today     Denies fever, chills, chest pain, palpitations, abdominal pain, nausea, vomiting, diarrhea, constipation, or dysuria    ED Course:   Vitals: BP: 160/90, HR: 74, Temp: 98F, RR: 18  Labs:  WBC 7.31, Hg 10, CO2 21, BUN/CR 58,5.3, Ca 8.3, eGFR 11, Mag 2.8, proBNP 593  UA: Pending  CT Chest: Mild mottled density in the left mainstem bronchus may represent secretions. No focal consolidation or discrete mass is seen. Regions of atelectasis or scarring in the left upper lobe/lingula. Atelectatic changes at the lung bases.  CT Abdomen/Pelvis: Lymphadenopathy which appears increased when compared with 9/30/2024. Nodularity along posterior peritoneal reflections and minimal perihepatic fluid which were not seen previously.    EKG: NSR rate 68bpm. QTc 408ms  Received in the ED: 500cc NS bolus x1   (20 Nov 2024 22:40)    INTERVAL HPI:  11/21: Pt seen and examined at bedside. Pt was making minimal urine output overnight but has voided significantly more since than. He has been taking Advil for the last month at the very minimum, with  max of 6 pills/day for R. low back pain. However, the pain is not present currently. No acute c/o at this time.   11/22: Pt seen and examined at bedside. 1.3L UOP in the last 24 hours. Pt reports feeling well, shortness  of breath has improved a bit. No other concerns worsening MARYSOL , K stable   11/23:pt seen, examined, Cr increasing, Low h/h stable, High electrolytes.  11/24: Pt seen and examined, seated in chair. Pt reports feeling well, and states his shortness of breath when walking has improved. He still has intermittent lower back pain. Cr rising, today 10, nephro to start temporary HD today.  11/25: Pt seen and examined, seated in chair about to eat breakfast. Pt reports he felt a little nauseous during his first HD session yesterday, and stated he felt tired after but this morning he feels well. 1L removed yesterday. Cr still rising, 11 today. Likely will undergo another session of HD today. No concerns per patient.HD Rx today   11/26: Pt seen and examined, seated in chair. He reports feeling well this morning and denies any specific concerns. 1L removed with HD yesterday, plan for 2.5L removal today along with initiation of bumex. Plan for IR for permacath tomorrow, NPO after midnight tonight.  11/27: Pt seen and examined, seated in chair. S/p 3rd HD session yesterday which he tolerated well. Pt reports that he feels good, denies any concerns. Remains on Bumex with good UOP. Planned for IR procedure for PermCath  today .D/C Barba cath today-TOV , PC placed   11/28: pt seen, examined ,OOB to chair S/P PC placed, pt is voiding well, HD Rx on 11/29, Low grade fever 100.3  11/29: Pt seen and examined while receiving HD. Pt reports that he feels great today, and denies any concerns. b/l LE dopplers yesterday negative.  11/30: Pt seen and examined, seated in chair. Patient had some mild nausea with a small episode of vomiting this morning, got ZOFRAN and is feeling better. Pt does have hx of reflux for which he takes pepcid occasionally at home, also states he ate grapes which were only partially digested. Otherwise he's feeling well and denies other concerns. Plan for discharge today with outpatient HD to start Monday 12/2.    OVERNIGHT EVENTS: None    Home Medications:      MEDICATIONS  (STANDING):  albuterol/ipratropium for Nebulization 3 milliLiter(s) Nebulizer every 12 hours  buMETAnide 1 milliGRAM(s) Oral daily  chlorhexidine 4% Liquid 1 Application(s) Topical <User Schedule>  heparin   Injectable 5000 Unit(s) SubCutaneous every 8 hours  influenza   Vaccine 0.5 milliLiter(s) IntraMuscular once  labetalol 100 milliGRAM(s) Oral two times a day  melatonin 5 milliGRAM(s) Oral at bedtime  pantoprazole    Tablet 40 milliGRAM(s) Oral before breakfast  polyethylene glycol 3350 17 Gram(s) Oral daily  senna 2 Tablet(s) Oral at bedtime  sodium chloride 3%  Inhalation 4 milliLiter(s) Inhalation every 12 hours  tamsulosin 0.4 milliGRAM(s) Oral at bedtime    MEDICATIONS  (PRN):  acetaminophen     Tablet .. 650 milliGRAM(s) Oral every 6 hours PRN Mild Pain (1 - 3)  aluminum hydroxide/magnesium hydroxide/simethicone Suspension 30 milliLiter(s) Oral every 4 hours PRN Dyspepsia  sodium chloride 0.9% lock flush 10 milliLiter(s) IV Push every 1 hour PRN Pre/post blood products, medications, blood draw, and to maintain line patency      Allergies    Bactrim DS (Hives)    Intolerances        Social History:  Lives at home with family  Recently retired  Active, ambulates independently and independent with ADLs (20 Nov 2024 22:40)      REVIEW OF SYSTEMS:  CONSTITUTIONAL: No fever, No chills, No fatigue, No myalgia, No Body ache, No Weakness  EYES: No eye pain,  No visual disturbances, No discharge, NO Redness  ENMT:  No ear pain, No nose bleed, No vertigo; No sinus pain, NO throat pain, No Congestion  NECK: No pain, No stiffness  RESPIRATORY: No cough, NO wheezing, No  hemoptysis, NO  shortness of breath  CARDIOVASCULAR: No chest pain, palpitations  GASTROINTESTINAL: No abdominal pain, NO epigastric pain. No nausea, No vomiting; No diarrhea, No constipation. [  ] BM  GENITOURINARY: No dysuria, No frequency, No urgency, No hematuria, NO incontinence  NEUROLOGICAL: No headaches, No dizziness, No numbness, No tingling, No tremors, No weakness  EXT: No Swelling, No Pain, No Edema  SKIN:  [  ] No itching, burning, rashes, or lesions   MUSCULOSKELETAL: No joint pain ,No Jt swelling; No muscle pain, No back pain, No extremity pain  PSYCHIATRIC: No depression,  No anxiety,  No mood swings ,No difficulty sleeping at night  PAIN SCALE: [ x ] None  [  ] Other-  ROS Unable to obtain due to - [  ] Dementia  [  ] Lethargy [  ] Drowsy [  ] Sedated [  ] non verbal  REST OF REVIEW Of SYSTEM - [ x ] Normal     Vital Signs Last 24 Hrs  T(C): 36.8 (30 Nov 2024 04:40), Max: 36.9 (29 Nov 2024 13:04)  T(F): 98.2 (30 Nov 2024 04:40), Max: 98.4 (29 Nov 2024 13:04)  HR: 74 (30 Nov 2024 08:00) (74 - 84)  BP: 121/66 (30 Nov 2024 04:40) (121/66 - 147/76)  BP(mean): --  RR: 16 (30 Nov 2024 04:40) (16 - 18)  SpO2: 97% (30 Nov 2024 08:00) (93% - 97%)    Parameters below as of 30 Nov 2024 08:00  Patient On (Oxygen Delivery Method): room air      Finger Stick        11-29 @ 07:01  -  11-30 @ 07:00  --------------------------------------------------------  IN: 600 mL / OUT: 3300 mL / NET: -2700 mL        PHYSICAL EXAM: Karen +  GENERAL:  [ x ] NAD , [ x ] well appearing, [  ] Agitated, [  ] Drowsy,  [  ] Lethargy, [  ] confused   HEAD:  [ x ] Normal, [  ] Other  EYES:  [ x ] EOMI, [  ] PERRLA, [ x ] conjunctiva and sclera clear normal, [  ] Other,  [  ] Pallor,[  ] Discharge  ENMT:  [ x ] Normal, [ x ] Moist mucous membranes, [  ] Good dentition, [  ] No Thrush  NECK:  [ x ] Supple, [  ] No JVD, [  ] Normal thyroid, [  ] Lymphadenopathy [ x ] Other - healing site of IJ  CHEST/LUNG:  [ x ] Clear to auscultation bilaterally, [ x ] Breath Sounds equal B/L [  ] poor effort  [ x ] No rales, [ x ] No rhonchi  [ x ]  No wheezing,   HEART:  [ x ] Regular rate and rhythm, [  ] tachycardia, [  ] Bradycardia,  [  ] irregular  [ x ] No murmurs, No rubs, No gallops, [  ] PPM in place (Mfr:  )  ABDOMEN:  [ x ] Soft, [ x ] Nontender, [  ] Nondistended, [  ] No mass, [  ] Bowel sounds present, [  ] obese  NERVOUS SYSTEM:  [ x ] Alert & Oriented X3, [  ] Nonfocal  [  ] Confusion  [  ] Encephalopathic [  ] Sedated [  ] Unable to assess, [  ] Dementia [  ] Other-  EXTREMITIES: [  ] 2+ Peripheral Pulses, No clubbing, No cyanosis,  [ x ] edema B/L lower EXT improving [  ] PVD stasis skin changes B/L Lower EXT, [  ] wound  LYMPH: No lymphadenopathy noted  SKIN:  [ x ] No rashes or lesions, [  ] Pressure Ulcers, [  ] ecchymosis, [  ] Skin Tears, [  ] Other    DIET: Diet, Renal Restrictions:   For patients receiving Renal Replacement - No Protein Restr, No Conc K, No Conc Phos, Low Sodium  Supplement Feeding Modality:  Oral  Suplena Cans or Servings Per Day:  1       Frequency:  Daily (11-22-24 @ 11:31)      LABS:                        10.4   8.82  )-----------( 349      ( 30 Nov 2024 06:47 )             32.5     30 Nov 2024 06:47    140    |  102    |  66     ----------------------------<  119    4.3     |  24     |  7.90     Ca    8.9        30 Nov 2024 06:47  Phos  8.0       30 Nov 2024 06:47  Mg     2.9       30 Nov 2024 06:47    TPro  7.4    /  Alb  3.3    /  TBili  0.4    /  DBili  x      /  AST  16     /  ALT  26     /  AlkPhos  78     30 Nov 2024 06:47      Urinalysis Basic - ( 30 Nov 2024 06:47 )    Color: x / Appearance: x / SG: x / pH: x  Gluc: 119 mg/dL / Ketone: x  / Bili: x / Urobili: x   Blood: x / Protein: x / Nitrite: x   Leuk Esterase: x / RBC: x / WBC x   Sq Epi: x / Non Sq Epi: x / Bacteria: x                           Anemia Panel:      Thyroid Panel:                RADIOLOGY & ADDITIONAL TESTS:      HEALTH ISSUES - PROBLEM Dx:  MARYSOL (acute kidney injury)    Hypertension    Hyperkalemia    Dyspnea    Gastric lymphoma    Need for prophylactic measure    Fever            Consultant(s) Notes Reviewed:  [ x ] YES     Care Discussed with [X] Consultants  [ x ] Patient  [  ] Family [  ] HCP [  ]   [  ] Social Service  [  ] RN, [  ] Physical Therapy,[  ] Palliative care team  DVT PPX: [  ] Lovenox, [ x ] S C Heparin, [  ] Coumadin, [  ] Xarelto, [  ] Eliquis, [  ] Pradaxa, [  ] IV Heparin drip, [  ] SCD [  ] Contraindication 2 to GI Bleed,[  ] Ambulation [  ] Contraindicated 2 to  bleed [  ] Contraindicated 2 to Brain Bleed  Advanced directive: [ x ] None, [  ] DNR/DNI Patient is a 64y old  Male who presents with a chief complaint of MARYSOL (30 Nov 2024 07:28)    HPI:  Pt is a 63yo M with PMHx recently diagnosed gastric lymphoma  S/P Biopsy of gastric mass & neck Lymph node  with LN spread presents to the ED as recommended by his oncologist Dr. Andrea for elevated Creatinine. Pt called by doctor for elevated Cr at 5.3.  Baseline 1.1, increasing to 1.5-->3.0--> 5.3 on outpatient labs. Pt had chemo port placed today, has not been drinking as much as he usually does. Per pt, urinated a small amount this AM, contributed it to decreased fluid intake. Pt endorsing mild flank pain, prescribed oxycodone by outpatient doctor. Pt also endorsing new onset dyspnea on exertion. Pt has been taking Advil for 5 weeks for Lower Back pain. Pt had placement of Medi port as out pt   today     Denies fever, chills, chest pain, palpitations, abdominal pain, nausea, vomiting, diarrhea, constipation, or dysuria    ED Course:   Vitals: BP: 160/90, HR: 74, Temp: 98F, RR: 18  Labs:  WBC 7.31, Hg 10, CO2 21, BUN/CR 58,5.3, Ca 8.3, eGFR 11, Mag 2.8, proBNP 593  UA: Pending  CT Chest: Mild mottled density in the left mainstem bronchus may represent secretions. No focal consolidation or discrete mass is seen. Regions of atelectasis or scarring in the left upper lobe/lingula. Atelectatic changes at the lung bases.  CT Abdomen/Pelvis: Lymphadenopathy which appears increased when compared with 9/30/2024. Nodularity along posterior peritoneal reflections and minimal perihepatic fluid which were not seen previously.    EKG: NSR rate 68bpm. QTc 408ms  Received in the ED: 500cc NS bolus x1   (20 Nov 2024 22:40)    INTERVAL HPI:  11/21: Pt seen and examined at bedside. Pt was making minimal urine output overnight but has voided significantly more since than. He has been taking Advil for the last month at the very minimum, with  max of 6 pills/day for R. low back pain. However, the pain is not present currently. No acute c/o at this time.   11/22: Pt seen and examined at bedside. 1.3L UOP in the last 24 hours. Pt reports feeling well, shortness  of breath has improved a bit. No other concerns worsening MARYSOL , K stable   11/23:pt seen, examined, Cr increasing, Low h/h stable, High electrolytes.  11/24: Pt seen and examined, seated in chair. Pt reports feeling well, and states his shortness of breath when walking has improved. He still has intermittent lower back pain. Cr rising, today 10, nephro to start temporary HD today.  11/25: Pt seen and examined, seated in chair about to eat breakfast. Pt reports he felt a little nauseous during his first HD session yesterday, and stated he felt tired after but this morning he feels well. 1L removed yesterday. Cr still rising, 11 today. Likely will undergo another session of HD today. No concerns per patient.HD Rx today   11/26: Pt seen and examined, seated in chair. He reports feeling well this morning and denies any specific concerns. 1L removed with HD yesterday, plan for 2.5L removal today along with initiation of bumex. Plan for IR for permacath tomorrow, NPO after midnight tonight.  11/27: Pt seen and examined, seated in chair. S/p 3rd HD session yesterday which he tolerated well. Pt reports that he feels good, denies any concerns. Remains on Bumex with good UOP. Planned for IR procedure for PermCath  today .D/C Barba cath today-TOV , PC placed   11/28: pt seen, examined ,OOB to chair S/P PC placed, pt is voiding well, HD Rx on 11/29, Low grade fever 100.3  11/29: Pt seen and examined while receiving HD. Pt reports that he feels great today, and denies any concerns. b/l LE dopplers yesterday negative.  11/30: Pt seen and examined, seated in chair. Patient had some mild nausea with a small episode of vomiting this morning, got ZOFRAN and is feeling better. Pt does have hx of reflux for which he takes pepcid occasionally at home, also states he ate grapes which were only partially digested. Otherwise he's feeling well and denies other concerns. Plan for discharge today with outpatient HD to start Monday 12/2.    OVERNIGHT EVENTS: None    Home Medications:      MEDICATIONS  (STANDING):  albuterol/ipratropium for Nebulization 3 milliLiter(s) Nebulizer every 12 hours  buMETAnide 1 milliGRAM(s) Oral daily  chlorhexidine 4% Liquid 1 Application(s) Topical <User Schedule>  heparin   Injectable 5000 Unit(s) SubCutaneous every 8 hours  influenza   Vaccine 0.5 milliLiter(s) IntraMuscular once  labetalol 100 milliGRAM(s) Oral two times a day  melatonin 5 milliGRAM(s) Oral at bedtime  pantoprazole    Tablet 40 milliGRAM(s) Oral before breakfast  polyethylene glycol 3350 17 Gram(s) Oral daily  senna 2 Tablet(s) Oral at bedtime  sodium chloride 3%  Inhalation 4 milliLiter(s) Inhalation every 12 hours  tamsulosin 0.4 milliGRAM(s) Oral at bedtime    MEDICATIONS  (PRN):  acetaminophen     Tablet .. 650 milliGRAM(s) Oral every 6 hours PRN Mild Pain (1 - 3)  aluminum hydroxide/magnesium hydroxide/simethicone Suspension 30 milliLiter(s) Oral every 4 hours PRN Dyspepsia  sodium chloride 0.9% lock flush 10 milliLiter(s) IV Push every 1 hour PRN Pre/post blood products, medications, blood draw, and to maintain line patency      Allergies    Bactrim DS (Hives)    Intolerances        Social History:  Lives at home with family  Recently retired  Active, ambulates independently and independent with ADLs (20 Nov 2024 22:40)      REVIEW OF SYSTEMS:  CONSTITUTIONAL: No fever, No chills, No fatigue, No myalgia, No Body ache, No Weakness  EYES: No eye pain,  No visual disturbances, No discharge, NO Redness  ENMT:  No ear pain, No nose bleed, No vertigo; No sinus pain, NO throat pain, No Congestion  NECK: No pain, No stiffness  RESPIRATORY: No cough, NO wheezing, No  hemoptysis, NO  shortness of breath  CARDIOVASCULAR: No chest pain, palpitations  GASTROINTESTINAL: No abdominal pain, NO epigastric pain. + nausea, ++vomiting; No diarrhea, No constipation. [ x ] BM  GENITOURINARY: No dysuria, No frequency, No urgency, No hematuria, NO incontinence  NEUROLOGICAL: No headaches, No dizziness, No numbness, No tingling, No tremors, No weakness  EXT: No Swelling, No Pain, No Edema  SKIN:  [  ] No itching, burning, rashes, or lesions   MUSCULOSKELETAL: No joint pain ,No Jt swelling; No muscle pain, No back pain, No extremity pain  PSYCHIATRIC: No depression,  No anxiety,  No mood swings ,No difficulty sleeping at night  PAIN SCALE: [ x ] None  [  ] Other-  ROS Unable to obtain due to - [  ] Dementia  [  ] Lethargy [  ] Drowsy [  ] Sedated [  ] non verbal  REST OF REVIEW Of SYSTEM - [ x ] Normal     Vital Signs Last 24 Hrs  T(C): 36.8 (30 Nov 2024 04:40), Max: 36.9 (29 Nov 2024 13:04)  T(F): 98.2 (30 Nov 2024 04:40), Max: 98.4 (29 Nov 2024 13:04)  HR: 74 (30 Nov 2024 08:00) (74 - 84)  BP: 121/66 (30 Nov 2024 04:40) (121/66 - 147/76)  BP(mean): --  RR: 16 (30 Nov 2024 04:40) (16 - 18)  SpO2: 97% (30 Nov 2024 08:00) (93% - 97%)    Parameters below as of 30 Nov 2024 08:00  Patient On (Oxygen Delivery Method): room air      Finger Stick        11-29 @ 07:01  -  11-30 @ 07:00  --------------------------------------------------------  IN: 600 mL / OUT: 3300 mL / NET: -2700 mL        PHYSICAL EXAM: Karen +  GENERAL:  [ x ] NAD , [ x ] well appearing, [  ] Agitated, [  ] Drowsy,  [  ] Lethargy, [  ] confused   HEAD:  [ x ] Normal, [  ] Other  EYES:  [ x ] EOMI, [  ] PERRLA, [ x ] conjunctiva and sclera clear normal, [  ] Other,  [  ] Pallor,[  ] Discharge  ENMT:  [ x ] Normal, [ x ] Moist mucous membranes, [ x ] Good dentition, [x  ] No Thrush  NECK:  [ x ] Supple, [x] No JVD, [x  ] Normal thyroid, [  ] Lymphadenopathy [ x ] Other - healing site of IJ  CHEST/LUNG:  [ x ] Clear to auscultation bilaterally, [ x ] Breath Sounds equal B/L [  ] poor effort  [ x ] No rales, [ x ] No rhonchi  [ x ]  No wheezing,   HEART:  [ x ] Regular rate and rhythm, [  ] tachycardia, [  ] Bradycardia,  [  ] irregular  [ x ] No murmurs, No rubs, No gallops, [  ] PPM in place (Mfr:  )  ABDOMEN:  [ x ] Soft, [ x ] Nontender, [ x ] Nondistended, [x  ] No mass, [ x ] Bowel sounds present, [ x ] obese  NERVOUS SYSTEM:  [ x ] Alert & Oriented X3, [  x Nonfocal  [  ] Confusion  [  ] Encephalopathic [  ] Sedated [  ] Unable to assess, [  ] Dementia [  ] Other-  EXTREMITIES: [  ] 2+ Peripheral Pulses, No clubbing, No cyanosis,  [ x ] edema B/L lower EXT improving [  ] PVD stasis skin changes B/L Lower EXT, [  ] wound  LYMPH: No lymphadenopathy noted  SKIN:  [ x ] No rashes or lesions, [  ] Pressure Ulcers, [  ] ecchymosis, [  ] Skin Tears, [  ] Other    DIET: Diet, Renal Restrictions:   For patients receiving Renal Replacement - No Protein Restr, No Conc K, No Conc Phos, Low Sodium  Supplement Feeding Modality:  Oral  Suplena Cans or Servings Per Day:  1       Frequency:  Daily (11-22-24 @ 11:31)      LABS:                        10.4   8.82  )-----------( 349      ( 30 Nov 2024 06:47 )             32.5     30 Nov 2024 06:47    140    |  102    |  66     ----------------------------<  119    4.3     |  24     |  7.90     Ca    8.9        30 Nov 2024 06:47  Phos  8.0       30 Nov 2024 06:47  Mg     2.9       30 Nov 2024 06:47    TPro  7.4    /  Alb  3.3    /  TBili  0.4    /  DBili  x      /  AST  16     /  ALT  26     /  AlkPhos  78     30 Nov 2024 06:47      Urinalysis Basic - ( 30 Nov 2024 06:47 )    Color: x / Appearance: x / SG: x / pH: x  Gluc: 119 mg/dL / Ketone: x  / Bili: x / Urobili: x   Blood: x / Protein: x / Nitrite: x   Leuk Esterase: x / RBC: x / WBC x   Sq Epi: x / Non Sq Epi: x / Bacteria: x    RADIOLOGY & ADDITIONAL TESTS:      HEALTH ISSUES - PROBLEM Dx:  MARYSOL (acute kidney injury)    Hypertension    Hyperkalemia    Dyspnea    Gastric lymphoma    Need for prophylactic measure    Fever            Consultant(s) Notes Reviewed:  [ x ] YES     Care Discussed with [X] Consultants  [ x ] Patient  [  ] Family [  ] HCP [  ]   [  ] Social Service  [  ] RN, [  ] Physical Therapy,[  ] Palliative care team  DVT PPX: [  ] Lovenox, [ x ] S C Heparin, [  ] Coumadin, [  ] Xarelto, [  ] Eliquis, [  ] Pradaxa, [  ] IV Heparin drip, [  ] SCD [  ] Contraindication 2 to GI Bleed,[  ] Ambulation [  ] Contraindicated 2 to  bleed [  ] Contraindicated 2 to Brain Bleed  Advanced directive: [ x ] None, [  ] DNR/DNI

## 2024-11-30 NOTE — PROGRESS NOTE ADULT - PROBLEM SELECTOR PLAN 2
pt had low grade fever 11/28 am 100.3 -observe  Likely reactive   Follow CBC - WNL today  ID Dr Ulises bruns called - monitor off abx  PRN Tylenol pt had low grade fever 11/28 am 100.3 -observe-resolved  Likely reactive   Follow CBC - WNL today  ID Dr Ulises burns called - monitor off abx  PRN Tylenol Pt with HTN with -170s  - labetalol 100mg BID with hold parameters as per nephro - BPs improved  - monitor routine hemodynamics

## 2024-11-30 NOTE — PROGRESS NOTE ADULT - PROBLEM SELECTOR PLAN 5
Pt endorsing ZHOU while walking, -improving , likely 2/2 Volume over load  - CT Chest:  Mild mottled density in the left mainstem bronchus may represent secretions. No focal consolidation or discrete mass is seen. Regions of atelectasis or scarring in the left upper lobe/lingula. Atelectatic changes at the lung bases  - Pt saturating well on RA  - S/P Duoneb and hypertonic saline  - incentive spirometer use  - TTE WNL  - ZHOU improved Pt endorsing ZHOU while walking, -improving , likely 2/2 Volume over load-RESOLVED  - CT Chest:  Mild mottled density in the left mainstem bronchus may represent secretions. No focal consolidation or discrete mass is seen. Regions of atelectasis or scarring in the left upper lobe/lingula. Atelectatic changes at the lung bases  - Pt saturating well on RA  - S/P Duoneb and hypertonic saline  - incentive spirometer use  - TTE WNL  - ZHOU improved pt had low grade fever 11/28 am 100.3 -observe-resolved  Likely reactive   Follow CBC - WNL today  ID Dr Ulises burns called - monitor off abx  PRN Tylenol

## 2024-11-30 NOTE — CHART NOTE - NSCHARTNOTEFT_GEN_A_CORE
RN called due to patient not tolerating the PO contrast for the CT ab/pelvis. Patient complaining of nausea and vomiting, reglan given prior to PO contrast, but patient vomited x1 after drinking the contrast. Will order CT ab/pelvis non contrast for now due to not tolerating the contrast. Day team to reassess need for CT with contrast.

## 2024-11-30 NOTE — PROGRESS NOTE ADULT - SUBJECTIVE AND OBJECTIVE BOX
[facility]                             Nephrology and Hypertension                             Gustavo Oscar                                          [phone]     Patient is a 64y old  Male who presents with a chief complaint of MARYSOL (2024 14:23)     HPI:  Pt is a 65yo M with PMHx recently diagnosed gastric lymphoma  S/P Biopsy of gastric mass & neck Lymph node  with LN spread presents to the ED as recommended by his oncologist Dr. Andrea for elevated Creatinine. Pt called by doctor for elevated Cr at 5.3.  Baseline 1.1, increasing to 1.5-->3.0--> 5.3 on outpatient labs. Pt had chemo port placed today, has not been drinking as much as he usually does. Per pt, urinated a small amount this AM, contributed it to decreased fluid intake. Pt endorsing mild flank pain, prescribed oxycodone by outpatient doctor. Pt also endorsing new onset dyspnea on exertion. Pt has been taking Advil for 5 weeks for Lower Back pain. Pt had placement of Medi port as out pt today   Nephrology is c/s for Acute kidney injury. When I saw him, he is very anxious. He states he had been taking 6 tabs of advil every day for a few weeks for pain. He also endorsed not eating as much. Per discussion with RN frandy was not draining much overnight but over last few hours, has better uop. On labs review, Serum Creat was 1.1 in 2024, was 5.3 on admission, is >6 on repeat labs early this am with hyperkalemia. Was given lokelma . He has no sob, no dizziness, no N/V, no new pain.       No acute complaints  No CP, No SOB, No N/V/D  Edema is improving  urinating well       PAST MEDICAL & SURGICAL HISTORY:  Incisional hernia    Gastric lymphoma  Anemia of chronic disease  S/P appendectomy  2014  S/P cholecystectomy  15 years ago  FAMILY HISTORY:  Family history of coronary artery disease in father  Father -  age 60 following MI    Social History:Non smoker    MEDICATIONS  (STANDING):  albuterol/ipratropium for Nebulization 3 milliLiter(s) Nebulizer every 12 hours  buMETAnide 1 milliGRAM(s) Oral daily  chlorhexidine 4% Liquid 1 Application(s) Topical <User Schedule>  heparin   Injectable 5000 Unit(s) SubCutaneous every 8 hours  influenza   Vaccine 0.5 milliLiter(s) IntraMuscular once  labetalol 100 milliGRAM(s) Oral two times a day  melatonin 5 milliGRAM(s) Oral at bedtime  pantoprazole    Tablet 40 milliGRAM(s) Oral before breakfast  polyethylene glycol 3350 17 Gram(s) Oral daily  senna 2 Tablet(s) Oral at bedtime  sodium chloride 3%  Inhalation 4 milliLiter(s) Inhalation every 12 hours  tamsulosin 0.4 milliGRAM(s) Oral at bedtime    MEDICATIONS  (PRN):  acetaminophen     Tablet .. 650 milliGRAM(s) Oral every 6 hours PRN Mild Pain (1 - 3)  aluminum hydroxide/magnesium hydroxide/simethicone Suspension 30 milliLiter(s) Oral every 4 hours PRN Dyspepsia  sodium chloride 0.9% lock flush 10 milliLiter(s) IV Push every 1 hour PRN Pre/post blood products, medications, blood draw, and to maintain line patency        Allergies    Bactrim DS (Hives)    Intolerances         REVIEW OF SYSTEMS: as per HPI    Vital Signs Last 24 Hrs  T(C): 36.8 (2024 04:40), Max: 36.9 (2024 13:04)  T(F): 98.2 (2024 04:40), Max: 98.4 (2024 13:04)  HR: 74 (2024 04:40) (74 - 88)  BP: 121/66 (2024 04:40) (121/66 - 147/76)  BP(mean): --  RR: 16 (2024 04:40) (16 - 18)  SpO2: 93% (2024 04:40) (93% - 99%)    Parameters below as of 2024 04:40  Patient On (Oxygen Delivery Method): room air            PHYSICAL EXAM:    GENERAL: NAD, obese  HEAD:  Atraumatic, Normocephalic  EYES: EOMI, conjunctiva and sclera clear  ENMT: No Drainage from nares, No drainage from ears  NERVOUS SYSTEM:  Awake and Alert  CHEST/LUNG: Clear bilaterally, RCW port  EXTREMITIES:  + edema  SKIN: No rashes No obvious ecchymosis        LABS:              24: pending                                9.7    7.56  )-----------( 301      ( 2024 06:00 )             29.4     11-29    135  |  101  |  101[H]  ----------------------------<  127[H]  4.8   |  23  |  11.00[H]    Ca    8.7      2024 06:00  Phos  9.0       Mg     3.0         TPro  7.1  /  Alb  3.2[L]  /  TBili  0.4  /  DBili  x   /  AST  23  /  ALT    /  AlkPhos  87        Urinalysis Basic - ( 2024 06:00 )    Color: x / Appearance: x / SG: x / pH: x  Gluc: 127 mg/dL / Ketone: x  / Bili: x / Urobili: x   Blood: x / Protein: x / Nitrite: x   Leuk Esterase: x / RBC: x / WBC x   Sq Epi: x / Non Sq Epi: x / Bacteria: x

## 2024-11-30 NOTE — CONSULT NOTE ADULT - SUBJECTIVE AND OBJECTIVE BOX
[facility]                             Nephrology and Hypertension                             Gustavo Oscar                                          [phone]     Patient is a 64y old  Male who presents with a chief complaint of MARYSOL (2024 14:23)     HPI:  Pt is a 63yo M with PMHx recently diagnosed gastric lymphoma  S/P Biopsy of gastric mass & neck Lymph node  with LN spread presents to the ED as recommended by his oncologist Dr. Andrea for elevated Creatinine. Pt called by doctor for elevated Cr at 5.3.  Baseline 1.1, increasing to 1.5-->3.0--> 5.3 on outpatient labs. Pt had chemo port placed today, has not been drinking as much as he usually does. Per pt, urinated a small amount this AM, contributed it to decreased fluid intake. Pt endorsing mild flank pain, prescribed oxycodone by outpatient doctor. Pt also endorsing new onset dyspnea on exertion. Pt has been taking Advil for 5 weeks for Lower Back pain. Pt had placement of Medi port as out pt today     Nephrology is c/s for Acute kidney injury. When I saw him, he is very anxious. He states he had been taking 6 tabs of advil every day for a few weeks for pain. He also endorsed not eating as much. Per discussion with RN frandy was not draining much overnight but over last few hours, has better uop. On labs review, Serum Creat was 1.1 in 2024, was 5.3 on admission, is >6 on repeat labs early this am with hyperkalemia. Was given lokelma today. He has no sob, no dizziness, no N/V, no new pain.     ED Course:   Vitals: BP: 160/90, HR: 74, Temp: 98F, RR: 18  Labs:  WBC 7.31, Hg 10, CO2 21, BUN/CR 58,5.3, Ca 8.3, eGFR 11, Mag 2.8, proBNP 593  UA: Pending  CT Chest: Mild mottled density in the left mainstem bronchus may represent secretions. No focal consolidation or discrete mass is seen. Regions of atelectasis or scarring in the left upper lobe/lingula. Atelectatic changes at the lung bases.  CT Abdomen/Pelvis: Lymphadenopathy which appears increased when compared with 2024. Nodularity along posterior peritoneal reflections and minimal perihepatic fluid which were not seen previously.    EKG: NSR rate 68bpm. QTc 408ms  Received in the ED: 500cc NS bolus x1   (2024 22:40)       PAST MEDICAL & SURGICAL HISTORY:  Incisional hernia  2015  Gastric lymphoma  Anemia of chronic disease  S/P appendectomy  2014  S/P cholecystectomy  15 years ago  FAMILY HISTORY:  Family history of coronary artery disease in father  Father -  age 60 following MI    Social History:Non smoker    MEDICATIONS  (STANDING):  albuterol/ipratropium for Nebulization 3 milliLiter(s) Nebulizer every 12 hours  chlorhexidine 2% Cloths 1 Application(s) Topical daily  heparin   Injectable 5000 Unit(s) SubCutaneous every 8 hours  influenza   Vaccine 0.5 milliLiter(s) IntraMuscular once  melatonin 5 milliGRAM(s) Oral at bedtime  polyethylene glycol 3350 17 Gram(s) Oral daily  senna 2 Tablet(s) Oral at bedtime  sodium bicarbonate  Infusion 0.146 mEq/kG/Hr (100 mL/Hr) IV Continuous <Continuous>  sodium chloride 3%  Inhalation 4 milliLiter(s) Inhalation every 12 hours  sodium zirconium cyclosilicate 10 Gram(s) Oral every 8 hours    MEDICATIONS  (PRN):  aluminum hydroxide/magnesium hydroxide/simethicone Suspension 30 milliLiter(s) Oral every 4 hours PRN Dyspepsia  ondansetron Injectable 4 milliGRAM(s) IV Push every 8 hours PRN Nausea and/or Vomiting  oxyCODONE    IR 5 milliGRAM(s) Oral every 6 hours PRN Severe Pain (7 - 10)  traMADol 25 milliGRAM(s) Oral every 12 hours PRN Mild Pain (1 - 3)  traMADol 50 milliGRAM(s) Oral every 12 hours PRN Moderate Pain (4 - 6)   Meds reviewed    Allergies    Bactrim DS (Hives)    Intolerances         REVIEW OF SYSTEMS: as per HPI    Vital Signs Last 24 Hrs  T(C): 37.4 (2024 13:55), Max: 37.4 (2024 13:55)  T(F): 99.3 (2024 13:55), Max: 99.3 (2024 13:55)  HR: 69 (2024 13:55) (59 - 78)  BP: 140/85 (2024 13:55) (125/68 - 160/90)  BP(mean): --  RR: 20 (2024 13:55) (18 - 20)  SpO2: 92% (2024 13:55) (91% - 97%)    Parameters below as of 2024 13:55  Patient On (Oxygen Delivery Method): room air      Daily Height in cm: 170.18 (2024 18:04)    Daily     PHYSICAL EXAM:    GENERAL: NAD, obese, anxious  HEAD:  Atraumatic, Normocephalic  EYES: EOMI, conjunctiva and sclera clear  ENMT: No Drainage from nares, No drainage from ears  NERVOUS SYSTEM:  Awake and Alert  CHEST/LUNG: Clear to percussion bilaterally, RCW port  EXTREMITIES:  no edema  SKIN: No rashes No obvious ecchymosis  : frandy noted       LABS:                        10.0   10.12 )-----------( 298      ( 2024 06:48 )             30.9         137  |  108  |  66[H]  ----------------------------<  103[H]  5.9[H]   |  18[L]  |  6.60[H]    Ca    8.5      2024 06:48  Phos  5.5       Mg     3.0         TPro  6.6  /  Alb  3.0[L]  /  TBili  0.2  /  DBili  x   /  AST  16  /  ALT  34  /  AlkPhos  79        Urinalysis Basic - ( 2024 07:30 )    Color: Yellow / Appearance: Clear / S.010 / pH: x  Gluc: x / Ketone: Negative mg/dL  / Bili: Negative / Urobili: 0.2 mg/dL   Blood: x / Protein: 30 mg/dL / Nitrite: Negative   Leuk Esterase: Moderate / RBC: 50 /HPF / WBC 4 /HPF   Sq Epi: x / Non Sq Epi: x / Bacteria: x      Magnesium: 3.0 mg/dL ( @ 06:48)  Phosphorus: 5.5 mg/dL ( @ 06:48)  Magnesium: 2.8 mg/dL ( @ 18:40)          RADIOLOGY & ADDITIONAL TESTS:  
All records reviewed.    HPI:    65 yo man (does not see any medical PCP),  recent dx lymphoma (neck/retroperitonal/mesenteric and ?stomach involvements, under care Dr Mariana Fung [facility]), pt had blood work done w Onc  adn infusoport through IR at [facility] Wed, was called by Onc that his Cr sig high and go to ER.   Pt reports dev EMILY , has been drinking fluids, but due to right flank pain(assoc w the LADs) on Advil 6 tabs a day x1wk(was on Tylenol before but without effect)  No night sweats fever anorexia weight loss  Pt had first presented w flank pain 2024 thought renal stone, but 24 CT renal stone hunt show sig mesenteric/retroperitoneal LADs up to 3.9cm), tx from [place] to [facility] for ?endoscopic bx(pt reports had "stomach bx", but path report in computer 24 perihepatic LN FNA c/w met poorly diff ca), Pt subsequent had left neck LN bx and reports was told lymphoma(does not know type)  Chemo w Dr Fung was planned for 24 (does not recall name of medication)    ED Course:   Vitals: BP: 160/90, HR: 74, Temp: 98F, RR: 18  Labs:  WBC 7.31, Hg 10, CO2 21, BUN/CR 58,5.3, Ca 8.3, eGFR 11, Mag 2.8, proBNP 593  CT Chest: Mild mottled density in the left mainstem bronchus may represent secretions. No focal consolidation or discrete mass is seen. Regions of atelectasis or scarring in the left upper lobe/lingula. Atelectatic changes at the lung bases.  CT Abdomen/Pelvis: Lymphadenopathy which appears increased when compared with 2024. Nodularity along posterior peritoneal reflections and minimal perihepatic fluid which were not seen previously.(up to 4.6cm)    Labs show BUN/Cr 15/1,1 on 24  Since adm, BUN/Cr progressive worsening since admission today 73/7.4  Hgb remains 10  Urinating without problems    PAST MEDICAL & SURGICAL HISTORY:  Incisional hernia        Gastric lymphoma      Anemia of chronic disease      S/P appendectomy  2014      S/P cholecystectomy  15 years ago          Review of System:  See above  Also abdomen more distended    MEDICATIONS  (STANDING):  albuterol/ipratropium for Nebulization 3 milliLiter(s) Nebulizer every 12 hours  chlorhexidine 2% Cloths 1 Application(s) Topical daily  heparin   Injectable 5000 Unit(s) SubCutaneous every 8 hours  influenza   Vaccine 0.5 milliLiter(s) IntraMuscular once  melatonin 5 milliGRAM(s) Oral at bedtime  polyethylene glycol 3350 17 Gram(s) Oral daily  senna 2 Tablet(s) Oral at bedtime  sodium bicarbonate  Infusion 0.146 mEq/kG/Hr (100 mL/Hr) IV Continuous <Continuous>  sodium chloride 3%  Inhalation 4 milliLiter(s) Inhalation every 12 hours    MEDICATIONS  (PRN):  aluminum hydroxide/magnesium hydroxide/simethicone Suspension 30 milliLiter(s) Oral every 4 hours PRN Dyspepsia  ondansetron Injectable 4 milliGRAM(s) IV Push every 8 hours PRN Nausea and/or Vomiting  oxyCODONE    IR 5 milliGRAM(s) Oral every 6 hours PRN Severe Pain (7 - 10)  traMADol 25 milliGRAM(s) Oral every 12 hours PRN Mild Pain (1 - 3)  traMADol 50 milliGRAM(s) Oral every 12 hours PRN Moderate Pain (4 - 6)      Allergies    Bactrim DS (Hives)    Intolerances        SOCIAL HISTORY:  d/c tobacco 18years, no Etoh    FAMILY HISTORY:  Family history of coronary artery disease in father  Father -  age 60 following MI    no malignancy  maternal grandfather colon polyp        Vital Signs Last 24 Hrs  T(C): 36.8 (2024 10:20), Max: 37.4 (2024 13:55)  T(F): 98.2 (2024 10:20), Max: 99.3 (2024 13:55)  HR: 63 (2024 10:20) (61 - 69)  BP: 133/81 (2024 10:20) (126/74 - 140/85)  BP(mean): --  RR: 18 (2024 10:20) (18 - 20)  SpO2: 94% (2024 10:20) (91% - 94%)    Parameters below as of 2024 10:20  Patient On (Oxygen Delivery Method): room air        PHYSICAL EXAM:      General:up in chair in no acute distress  Eyes:sclera anicteric, pupils equal and EOMI  ENMT:buccal mucosa moist,   Neck:supple, trachea midline  Lungs:clear, no wheeze/rhonchi  Cardiovascular:regular rate and rhythm, S1 S2  Abdomen:soft, nontender, no organomegaly present, bowel sounds normal. Pouchy  Neurological:alert and oriented x3, Cranial Nerves II-XII grossly intact  Skin:no increased ecchymosis/petechiae/purpura  Lymph Nodes:no palpable cervical/supraclavicular lymph nodes enlargements  Extremities:no cyanosis/clubbing/edema        LABS:                        9.7    8.26  )-----------( 274      ( 2024 07:05 )             29.3      @ 07:05  WBC8.26  RBC4.93 Hgb9.7 Hct29.3  MCV59.4  [id number]  Auto Xzxhja16.0 Band-- Auto Lymph11.0 Atypical Lymph-- Reactive Lymph-- Auto Mono12.3 Auto Eos1.9 Auto Baso0.7        - @ 06:48  WBC10.12  RBC5.08 Hgb10.0 Hct30.9  MCV60.8  Jnlj256  Auto Eipftj59.7 Band-- Auto Lymph8.1 Atypical Lymph-- Reactive Lymph-- Auto Mono10.3 Auto Eos1.0 Auto Baso0.6        - @ 18:40  WBC7.31  RBC5.07 Hgb10.0 Hct31.1  MCV61.3  [id number]  Auto Ejemdw86.5 Band-- Auto Lymph11.2 Atypical Lymph-- Reactive Lymph-- Auto Mono10.1 Auto Eos1.4 Auto Baso0.5              137  |  102  |  78[H]  ----------------------------<  115[H]  4.9   |  26  |  8.30[H]    Ca    7.9[L]      2024 07:05  Phos  6.7       Mg     2.8         TPro  6.2  /  Alb  2.8[L]  /  TBili  0.3  /  DBili  x   /  AST  11[L]  /  ALT  19  /  AlkPhos  80        Urinalysis Basic - ( 2024 07:05 )    Color: x / Appearance: x / SG: x / pH: x  Gluc: 115 mg/dL / Ketone: x  / Bili: x / Urobili: x   Blood: x / Protein: x / Nitrite: x   Leuk Esterase: x / RBC: x / WBC x   Sq Epi: x / Non Sq Epi: x / Bacteria: x        PERIPHERAL BLOOD SMEAR REVIEW:      RADIOLOGY & ADDITIONAL STUDIES:  < from: CT Abdomen and Pelvis No Cont (24 @ 19:44) >    ACC: [id number] EXAM:  CT ABDOMEN AND PELVIS   ORDERED BY:  ELIJAH GONZALEZ     ACC: [id number] EXAM:  CT CHEST   ORDERED BY:  ELIJAH GONZALEZ     PROCEDURE DATE:  2024          INTERPRETATION:  CLINICAL INFORMATION: Abdominal pain. Shortness of   breath. New diagnosis of cancer. Renal Insufficiency.    COMPARISON: CT scan of the abdomen and pelvis from 2024 and chest CT   from 2014    CONTRAST/COMPLICATIONS:  IV Contrast: NONE  Oral Contrast: NONE      PROCEDURE:  CT of the Chest, Abdomen and Pelvis was performed.  Sagittal and coronal reformats were performed.    FINDINGS:  CHEST:  LUNGS AND LARGE AIRWAYS: Mild mottled density in the left mainstem   bronchus may represent secretions. No focal consolidation or discrete   mass is seen. Regions of atelectasis or scarring in the left upper   lobe/lingula. Atelectatic changes at the lung bases.  PLEURA: No pleural effusion.  VESSELS: Right-sided central line with its tip in the distal SVC/right   atrium.  HEART: Heart size is normal. No pericardial effusion.  MEDIASTINUM AND STELLA: No lymphadenopathy.  CHEST WALL AND LOWER NECK: Within normal limits.    ABDOMEN AND PELVIS:  LIVER: Within normal limits.  BILE DUCTS: Normal caliber.  GALLBLADDER: Question of mild gallbladder wall thickening versus   pericholecystic fluid. This is very limited in evaluation on this   noncontrast study. Clinical correlation will determine the need for right   upper quadrant ultrasound.  SPLEEN: Within normal limits.  PANCREAS: Within normal limits.  ADRENALS: Within normal limits.  KIDNEYS/URETERS: Bilateral renal cysts.    BLADDER: Collapsed bladder.  REPRODUCTIVE ORGANS: Grossly unremarkable.    BOWEL: No bowel obstruction. Appendix not well visualized. No focal   inflammation in the right lower quadrant.  PERITONEUM/RETROPERITONEUM: No ascites. Nodularity along peritoneal   reflections posteriorly. Presacral edema. Minimal perihepatic fluid not   seen previously.  VESSELS: Mild vascular calcifications.  LYMPH NODES: Previously referenced lymphnodes are as follows:    Multiple retroperitoneal and mesenteric lymph nodes. For example, 4.6 x   2.5 cm periceliac lymph node on series 301 image 82, previously 3.9 x 2.4   cm.    Aortic caval lymph node on series 301 image 106 measuring 2.6 x 1.7 cm,   previously 2.4 x 1.5 cm.  ABDOMINAL WALL: Postsurgical changes in the anterior abdominal wall.  BONES: Degenerative changes of bone.    IMPRESSION:  Lymphadenopathy which appears increased when compared with 2024.   Nodularity along posterior peritoneal reflections and minimal perihepatic   fluid which were not seen previously.        < end of copied text >  < from: CT Renal Stone Hunt (24 @ 11:11) >    ACC: [id number] EXAM:  CT RENAL STONE MENDOZA   ORDERED BY: STANLEY DIAL     PROCEDURE DATE:  2024          INTERPRETATION:  CLINICAL INFORMATION: Left flank pain    COMPARISON: 2014 and 2014.    CONTRAST/COMPLICATIONS:  IV Contrast:NONE  Oral Contrast: NONE  Complications: None reported at time of study completion    PROCEDURE:  CT of the Abdomen and Pelvis was performed.  Sagittal and coronal reformats were performed.    FINDINGS:  LOWER CHEST: Basilar atelectasis. Right lowerlobe micronodule. Coronary   artery calcifications.    Please note that evaluation of the abdominal organs and vascular   structures is limited by lack of intravenous contrast.    LIVER: Within normal limits.  BILE DUCTS: Normal caliber.  GALLBLADDER: Within normal limits.  SPLEEN: Within normal limits.  PANCREAS: Within normal limits.  ADRENALS: Within normal limits.  KIDNEYS/URETERS: No renal stones or hydronephrosis. Renal hypodensities,   possibly cysts.    BLADDER: Within normal limits.  REPRODUCTIVE ORGANS: Prostate is not enlarged.    BOWEL: Hiatal hernia. No bowel obstruction. Appendix is absent.  PERITONEUM/RETROPERITONEUM: Within normal limits.  VESSELS: Atherosclerotic calcifications.  LYMPH NODES: Mesenteric and retroperitoneal adenopathy measuring up to   2.4 x 1.5 cm. 3.9 x 2.4 cm gastrohepatic mass.  ABDOMINAL WALL: Postsurgical changes.  BONES: Degenerative changes.    IMPRESSION:  No renal stones or hydronephrosis.    Gastrohepatic mass (3.9 x 2.4 cm), possibly enlarged lymph node.   Mesenteric and retroperitoneal adenopathy. Findings suspicious for   malignancy such as lymphoma.    --- End of Report ---        < end of copied text >  
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 LN/mesenteric LN involvements(under care Dr Mariana Fung [facility]), adm w rapidly progressive MARYSOL, Cr 5s on adm was 1.1 24 and 3 w Dr Fung   Pt had first presented w flank pain 2024 thought renal stone, but 24 CT renal stone hunt show sig mesenteric/retroperitoneal LADs up to 3.9cm), tx from [place] to [facility] for ?endoscopic bx(pt reports had "stomach bx", but path report in computer 24 perihepatic LN FNA c/w met poorly diff ca), Pt subsequent had additional left neck LN bx  First chemo FOLFOX w Dr Fung was planned for 24   Hgb stable since adm at 10  CT c/a/p--no sig above diaphragm findings, sig mesenteric/retroperitoneal LADs up to 4.6cm had been up to 3.9cm on 24 CT renal hunt. No hydronephrsis, andreas renal cysts seen  Seen by Renal, etiology of MARYSOL ?NSAIDs effect    -started on urgent HD , Cr appear stabilized. Cr stil high    Allergies:  Bactrim DS (Hives)      Medications:  acetaminophen     Tablet .. 650 milliGRAM(s) Oral every 6 hours PRN  albuterol/ipratropium for Nebulization 3 milliLiter(s) Nebulizer every 12 hours  aluminum hydroxide/magnesium hydroxide/simethicone Suspension 30 milliLiter(s) Oral every 4 hours PRN  buMETAnide 1 milliGRAM(s) Oral daily  chlorhexidine 4% Liquid 1 Application(s) Topical <User Schedule>  heparin   Injectable 5000 Unit(s) SubCutaneous every 8 hours  influenza   Vaccine 0.5 milliLiter(s) IntraMuscular once  labetalol 100 milliGRAM(s) Oral two times a day  melatonin 5 milliGRAM(s) Oral at bedtime  pantoprazole    Tablet 40 milliGRAM(s) Oral before breakfast  polyethylene glycol 3350 17 Gram(s) Oral daily  senna 2 Tablet(s) Oral at bedtime  simethicone 80 milliGRAM(s) Chew once  sodium chloride 0.9% lock flush 10 milliLiter(s) IV Push every 1 hour PRN  sodium chloride 3%  Inhalation 4 milliLiter(s) Inhalation every 12 hours  sucralfate 1 Gram(s) Oral two times a day  tamsulosin 0.4 milliGRAM(s) Oral at bedtime      PMHX/PSHX:  No pertinent past medical history    Incisional hernia    Gastric lymphoma    Anemia of chronic disease    No significant past surgical history    S/P appendectomy    S/P cholecystectomy        Family history:  Family history of coronary artery disease in father        Social History:     ROS:     General:  No wt loss, fevers, chills, night sweats, fatigue,   Eyes:  Good vision, no reported pain  ENT:  No sore throat, pain, runny nose, dysphagia  CV:  No pain, palpitations, hypo/hypertension  Resp:  No dyspnea, cough, tachypnea, wheezing  GI:  No pain, No nausea, No vomiting, No diarrhea, No constipation, No weight loss, No fever, No pruritis, No rectal bleeding, No tarry stools, No dysphagia,  :  No pain, bleeding, incontinence, nocturia  Muscle:  No pain, weakness  Neuro:  No weakness, tingling, memory problems  Psych:  No fatigue, insomnia, mood problems, depression  Endocrine:  No polyuria, polydipsia, cold/heat intolerance  Heme:  No petechiae, ecchymosis, easy bruisability  Skin:  No rash, tattoos, scars, edema      PHYSICAL EXAM:   Vital Signs:  Vital Signs Last 24 Hrs  T(C): 36.5 (2024 13:06), Max: 36.8 (2024 04:40)  T(F): 97.7 (2024 13:06), Max: 98.2 (2024 04:40)  HR: 73 (2024 13:06) (73 - 84)  BP: 145/91 (2024 13:06) (121/66 - 145/91)  BP(mean): --  RR: 19 (2024 13:06) (16 - 19)  SpO2: 93% (2024 13:06) (93% - 97%)    Parameters below as of 2024 13:06  Patient On (Oxygen Delivery Method): room air      Daily     Daily Weight in k.5 (2024 04:40)    GENERAL:  Appears stated age, well-groomed, well-nourished, no distress  HEENT:  NC/AT,  conjunctivae clear and pink, no thyromegaly, nodules, adenopathy, no JVD, sclera -anicteric  CHEST:  Full & symmetric excursion, no increased effort, breath sounds clear  HEART:  Regular rhythm, S1, S2, no murmur/rub/S3/S4, no abdominal bruit, no edema  ABDOMEN:  Soft, non-tender, non-distended, normoactive bowel sounds,  no masses ,no hepato-splenomegaly, no signs of chronic liver disease  EXTEREMITIES:  no cyanosis,clubbing or edema  SKIN:  No rash/erythema/ecchymoses/petechiae/wounds/abscess/warm/dry  NEURO:  Alert, oriented, no asterixis, no tremor, no encephalopathy    LABS:                        10.4   8.82  )-----------( 349      ( 2024 06:47 )             32.5         140  |  102  |  66[H]  ----------------------------<  119[H]  4.3   |  24  |  7.90[H]    Ca    8.9      2024 06:47  Phos  8.0       Mg     2.9         TPro  7.4  /  Alb  3.3  /  TBili  0.4  /  DBili  x   /  AST  16  /  ALT  26  /  AlkPhos  78      LIVER FUNCTIONS - ( 2024 06:47 )  Alb: 3.3 g/dL / Pro: 7.4 g/dL / ALK PHOS: 78 U/L / ALT: 26 U/L / AST: 16 U/L / GGT: x             Urinalysis Basic - ( 2024 06:47 )    Color: x / Appearance: x / SG: x / pH: x  Gluc: 119 mg/dL / Ketone: x  / Bili: x / Urobili: x   Blood: x / Protein: x / Nitrite: x   Leuk Esterase: x / RBC: x / WBC x   Sq Epi: x / Non Sq Epi: x / Bacteria: x          Imaging:  < from: CT Abdomen and Pelvis No Cont (24 @ 19:44) >    IMPRESSION:  Lymphadenopathy which appears increased when compared with 2024.   Nodularity along posterior peritoneal reflections and minimal perihepatic   fluid which were not seen previously.        --- End of Report ---    < end of copied text >              
[facility] INFECTIOUS DISEASE  Ellis Montgomery MD PhD, Melvi Mchugh MD, Karen Tyler MD, Florence Forbes MD, Jonathan Awad MD  and providing coverage with Robyn Carrera MD  Providing Infectious Disease Consultations at [facility], [facility], [facility]'s    Office# [phone] to schedule follow up appointments  Answering Service for urgent calls or New Consults [phone]  Cell# to text for urgent issues Ellis Montgomery [phone]     HPI:  65yo M with recently diagnosed gastric lymphoma  S/P Biopsy of gastric mass & neck Lymph node  with LN spread presents to the ED as recommended by his oncologist Dr. Andrea for elevated Creatinine. Pt called by doctor for elevated Cr at 5.3.  Baseline 1.1, increasing to 1.5-->3.0--> 5.3 on outpatient labs. Pt had chemo port placed and was not been drinking as much as he usually does. Per pt, urinated a small amount this AM, contributed it to decreased fluid intake. Pt endorsed mild flank pain, prescribed oxycodone by outpatient doctor. Adm     ED Vitals: BP: 160/90, HR: 74, Temp: 98F, RR: 18  Labs:  WBC 7.31, Hg 10, CO2 21, BUN/CR 58,5.3, Ca 8.3, eGFR 11, Mag 2.8, proBNP 593  UA: Pending  CT Chest: Mild mottled density in the left mainstem bronchus may represent secretions. No focal consolidation or discrete mass is seen. Regions of atelectasis or scarring in the left upper lobe/lingula. Atelectatic changes at the lung bases.  CT Abdomen/Pelvis: Lymphadenopathy which appears increased when compared with 2024. Nodularity along posterior peritoneal reflections and minimal perihepatic fluid which were not seen previously.  PT doing well and being evaluated for long term dialysis access. Morning of  reports he was drinking a hot cup of coffee and had his temperature taken orally. This measured at 100.3 with HR stable in the 80s and no reported new symptoms.        PAST MEDICAL & SURGICAL HISTORY:  Incisional hernia        Gastric lymphoma      Anemia of chronic disease      S/P appendectomy  2014      S/P cholecystectomy  15 years ago          Antimicrobials      Immunological  influenza   Vaccine 0.5 milliLiter(s) IntraMuscular once      Other  acetaminophen     Tablet .. 650 milliGRAM(s) Oral every 6 hours PRN  albuterol/ipratropium for Nebulization 3 milliLiter(s) Nebulizer every 12 hours  aluminum hydroxide/magnesium hydroxide/simethicone Suspension 30 milliLiter(s) Oral every 4 hours PRN  chlorhexidine 4% Liquid 1 Application(s) Topical <User Schedule>  heparin   Injectable 5000 Unit(s) SubCutaneous every 8 hours  labetalol 100 milliGRAM(s) Oral two times a day  melatonin 5 milliGRAM(s) Oral at bedtime  oxyCODONE    IR 5 milliGRAM(s) Oral every 6 hours PRN  pantoprazole    Tablet 40 milliGRAM(s) Oral before breakfast  polyethylene glycol 3350 17 Gram(s) Oral daily  senna 2 Tablet(s) Oral at bedtime  sodium chloride 0.9% lock flush 10 milliLiter(s) IV Push every 1 hour PRN  sodium chloride 3%  Inhalation 4 milliLiter(s) Inhalation every 12 hours  tamsulosin 0.4 milliGRAM(s) Oral at bedtime      Allergies    Bactrim DS (Hives)    Intolerances        SOCIAL HISTORY:  Lives at home with family  Recently retired  Active, ambulates independently and independent with ADLs (2024 22:40)      FAMILY HISTORY:  Family history of coronary artery disease in father  Father -  age 60 following MI        ROS:    EYES:  Negative  blurry vision or double vision  GASTROINTESTINAL:  Negative for nausea, vomiting, diarrhea  -otherwise negative except for subjective    Vital Signs Last 24 Hrs  T(C): 36.6 (2024 11:17), Max: 37.9 (2024 04:56)  T(F): 97.9 (2024 11:17), Max: 100.3 (2024 04:56)  HR: 79 (2024 11:17) (68 - 85)  BP: 127/78 (2024 11:17) (127/73 - 166/89)  BP(mean): --  RR: 19 (2024 11:17) (15 - 23)  SpO2: 94% (2024 11:17) (91% - 96%)    Parameters below as of 2024 11:17  Patient On (Oxygen Delivery Method): room air        PE:  In no distress  HEENT:  NC, PERRL, sclerae anicteric, conjunctivae clear, EOMI.  Sinuses nontender, no nasal exudate.  No buccal or pharyngeal lesions, erythema or exudate  Neck:  Supple, no adenopathy  Lungs:  No accessory muscle use, bilaterally clear to auscultation  Cor:  distant  Abd:  Symmetric, normoactive BS.  Soft, nontender, no masses, guarding or rebound.  Liver and spleen not enlarged  Extrem:  No cyanosis or edema  Skin:  No rashes.  Neuro: grossly intact  Musc: moving all limbs freely, no focal deficits        LABS:                        10.6   12.35 )-----------( 371      ( 2024 06:25 )             32.4       WBC Count: 12.35 K/uL (24 @ 06:25)  WBC Count: 8.48 K/uL (24 @ 08:19)  WBC Count: 7.07 K/uL (24 @ 06:28)  WBC Count: 8.82 K/uL (24 @ 06:15)  WBC Count: 7.74 K/uL (24 @ 07:12)  WBC Count: 8.57 K/uL (24 @ 10:50)  WBC Count: 8.26 K/uL (24 @ 07:05)          133[L]  |  101  |  88[H]  ----------------------------<  117[H]  5.6[H]   |  24  |  10.00[H]    Ca    9.1      2024 06:25  Phos  8.3       Mg     3.0         TPro  7.1  /  Alb  3.2[L]  /  TBili  0.4  /  DBili  x   /  AST  23  /  ALT  29  /  AlkPhos  87        Creatinine: 10.00 mg/dL (24 @ 06:25)  Creatinine: 9.10 mg/dL (24 @ 08:19)  Creatinine: 10.00 mg/dL (24 @ 06:28)  Creatinine: 11.00 mg/dL (24 @ 06:15)  Creatinine: 10.00 mg/dL (24 @ 07:12)  Creatinine: 8.90 mg/dL (24 @ 07:40)  Creatinine: 8.10 mg/dL (24 @ 15:39)  Creatinine: 8.30 mg/dL (24 @ 07:05)  Creatinine: 7.40 mg/dL (24 @ 17:20)      Urinalysis Basic - ( 2024 06:25 )    Color: x / Appearance: x / SG: x / pH: x  Gluc: 117 mg/dL / Ketone: x  / Bili: x / Urobili: x   Blood: x / Protein: x / Nitrite: x   Leuk Esterase: x / RBC: x / WBC x   Sq Epi: x / Non Sq Epi: x / Bacteria: x              MICROBIOLOGY:      RADIOLOGY & ADDITIONAL STUDIES:    --

## 2024-11-30 NOTE — PROGRESS NOTE ADULT - PROBLEM SELECTOR PLAN 6
Pt with recently diagnosed gastric lymphoma, port placed prior to admission for planned chemo. Follows Dr. Andrea  - CT Ab: Lymphadenopathy which appears increased when compared with 9/30/2024. Nodularity along posterior peritoneal reflections and minimal perihepatic fluid which were not seen previously.  - Microcytic anemia on labs  - iron studies noted, heme/onc following  - hx beta thal minor  Hematology -Dr lu /Deandre s/off Pt with elevated electrolytes likely in setting of [place]- RESOLVED  - K improved s/p Lokelma x2 over course of hospitalization  - Mag/Phos remain elevated  - HD MWF  - Nutritional eval  - Renal diet

## 2024-11-30 NOTE — PROGRESS NOTE ADULT - PROBLEM SELECTOR PLAN 1
Pt sent in to ED for elevated Cr (Baseline 1.1, increasing to 1.5-->3.0), endorsing decreased urination and flank pain  - Pt c/o flank pain (L), per chart review, also was endorsing pain during admission at [facility], prior to elevated Cr  - Cr 5.3 on admission, pt with prolonged NSAID use.    - FeNA 3.4, likely ATN from progression of prerenal MARYSOL MA  - s/p wise catheter placement in ED with minimal output - now removed  - CT Abdomen: Collapsed bladder. Bilateral renal cysts, no hydro. No obstruction.  - K elevated on admission now s/p LOKELMA x2 during hospitalization with improvement in K  - Metabolic Acidosis, S/P- NaHco3  - first session of HD 11/24 with 1L removal, 11/26 1L removal, 11/27 2.5 L removed; 11/29 2.5L removed  - Cr improved to 7.9 s/p HD yesterday  - Avoid nephrotoxic medications, Renal dose meds   - Renal diet  - Daily CMP  - PC placed 11/27  - Nephrology (Dr. Juárez) follow up - HD- continue bumex now PO 1mg daily  - CM/SW fallowing for outpatient HD arrangements - [facility] - will follow with Dr. Eugene Pt sent in to ED for elevated Cr (Baseline 1.1, increasing to 1.5-->3.0), endorsing decreased urination and flank pain  - Pt c/o flank pain (L), per chart review, also was endorsing pain during admission at [facility], prior to elevated Cr  - Cr 5.3 on admission, pt with prolonged NSAID use.    - FeNA 3.4, likely ATN from progression of prerenal MARYSOL MA  - s/p wise catheter placement in ED with minimal output - now removed  - CT Abdomen: Collapsed bladder. Bilateral renal cysts, no hydro. No obstruction.  - K elevated on admission now s/p LOKELMA x2 during hospitalization with improvement in K  - Metabolic Acidosis, S/P- NaHco3  - first session of HD 11/24 with 1L removal, 11/26 1L removal, 11/27 2.5 L removed; 11/29 2.5L removed  - Cr improved to 7.9 s/p HD yesterday  - Avoid nephrotoxic medications, Renal dose meds   - Renal diet  - Daily CMP  - PC placed 11/27  - Nephrology (Dr. Juárez) follow up - HD- continue Bumex now PO 1mg daily  - CM/SW fallowing for outpatient HD arrangements - [facility] on M/W F- will follow with Dr. Eugene

## 2024-11-30 NOTE — PROGRESS NOTE ADULT - PROBLEM SELECTOR PLAN 7
Heparin 5000U qd    Dispo  - Outpatient HD set up at [facility] MWF 5am, has spot starting 12/2  - plan for d/c to home today Pt endorsing ZHOU while walking, -improving , likely 2/2 Volume over load-RESOLVED  - CT Chest:  Mild mottled density in the left mainstem bronchus may represent secretions. No focal consolidation or discrete mass is seen. Regions of atelectasis or scarring in the left upper lobe/lingula. Atelectatic changes at the lung bases  - Pt saturating well on RA  - S/P Duoneb and hypertonic saline  - incentive spirometer use  - TTE WNL  - ZHOU improved

## 2024-11-30 NOTE — PROGRESS NOTE ADULT - ASSESSMENT
Acute kidney injury, with ATN  Anemia  Hyperkalemia  Metabolic acidosis  Urinary retention  HTN  Lymphoma      -MARYSOL likely due to ATN from NSAID use and poor po intake, has normal renal function at baseline  -SCr 1.1 at baseline with normal electrolytes  -Informed consent previously obtained. HD discussed up to and including death discussed. Informed consent obtained and he agrees to HD if necessary  -Advised patient to avoid all nsaids   -s/p IVF. Fluid overloaded   -s/p [facility]  -ICU team placed temp dialysis catheter. HD started 11/24/24; s/p HD x 3 thus far  -Urinating well s/p wise being removed; change IV bumex to PO; edema is improving  -flomax  -S/p PC by IR 11/27/24  -Renal indices are not showing signs of improvement despite urinating well  -Maintain HD MWF; next dialysis to be done on 12/2/24, hopefully outpatient  -Renal diet  -BP improving on labetalol; monitor  -ID kenaal noted  -WBC improved, afebrile      Patient is arranged for [facility] and to start outpatient on 12/2/24    DC planning per primary team

## 2024-12-01 VITALS
DIASTOLIC BLOOD PRESSURE: 63 MMHG | OXYGEN SATURATION: 93 % | TEMPERATURE: 98 F | SYSTOLIC BLOOD PRESSURE: 165 MMHG | HEART RATE: 82 BPM | RESPIRATION RATE: 20 BRPM

## 2024-12-01 DIAGNOSIS — K21.9 GASTRO-ESOPHAGEAL REFLUX DISEASE WITHOUT ESOPHAGITIS: ICD-10-CM

## 2024-12-01 LAB
ANION GAP SERPL CALC-SCNC: 10 MMOL/L — SIGNIFICANT CHANGE UP (ref 5–17)
BUN SERPL-MCNC: 70 MG/DL — HIGH (ref 7–23)
CALCIUM SERPL-MCNC: 10 MG/DL — SIGNIFICANT CHANGE UP (ref 8.5–10.1)
CHLORIDE SERPL-SCNC: 105 MMOL/L — SIGNIFICANT CHANGE UP (ref 96–108)
CO2 SERPL-SCNC: 24 MMOL/L — SIGNIFICANT CHANGE UP (ref 22–31)
CREAT SERPL-MCNC: 6.9 MG/DL — HIGH (ref 0.5–1.3)
EGFR: 8 ML/MIN/1.73M2 — LOW
GLUCOSE SERPL-MCNC: 112 MG/DL — HIGH (ref 70–99)
HCT VFR BLD CALC: 34.9 % — LOW (ref 39–50)
HGB BLD-MCNC: 11.2 G/DL — LOW (ref 13–17)
MAGNESIUM SERPL-MCNC: 2.9 MG/DL — HIGH (ref 1.6–2.6)
MCHC RBC-ENTMCNC: 19.7 PG — LOW (ref 27–34)
MCHC RBC-ENTMCNC: 32.1 G/DL — SIGNIFICANT CHANGE UP (ref 32–36)
MCV RBC AUTO: 61.3 FL — LOW (ref 80–100)
NRBC # BLD: 0 /100 WBCS — SIGNIFICANT CHANGE UP (ref 0–0)
PHOSPHATE SERPL-MCNC: 6.8 MG/DL — HIGH (ref 2.5–4.5)
PLATELET # BLD AUTO: 398 K/UL — SIGNIFICANT CHANGE UP (ref 150–400)
POTASSIUM SERPL-MCNC: 4 MMOL/L — SIGNIFICANT CHANGE UP (ref 3.5–5.3)
POTASSIUM SERPL-SCNC: 4 MMOL/L — SIGNIFICANT CHANGE UP (ref 3.5–5.3)
RBC # BLD: 5.69 M/UL — SIGNIFICANT CHANGE UP (ref 4.2–5.8)
RBC # FLD: 16.3 % — HIGH (ref 10.3–14.5)
SODIUM SERPL-SCNC: 139 MMOL/L — SIGNIFICANT CHANGE UP (ref 135–145)
WBC # BLD: 13.61 K/UL — HIGH (ref 3.8–10.5)
WBC # FLD AUTO: 13.61 K/UL — HIGH (ref 3.8–10.5)

## 2024-12-01 PROCEDURE — 71250 CT THORAX DX C-: CPT | Mod: 26

## 2024-12-01 PROCEDURE — 93970 EXTREMITY STUDY: CPT

## 2024-12-01 PROCEDURE — C1894: CPT

## 2024-12-01 PROCEDURE — 83615 LACTATE (LD) (LDH) ENZYME: CPT

## 2024-12-01 PROCEDURE — 83880 ASSAY OF NATRIURETIC PEPTIDE: CPT

## 2024-12-01 PROCEDURE — 71045 X-RAY EXAM CHEST 1 VIEW: CPT

## 2024-12-01 PROCEDURE — 36558 INSERT TUNNELED CV CATH: CPT

## 2024-12-01 PROCEDURE — 97530 THERAPEUTIC ACTIVITIES: CPT

## 2024-12-01 PROCEDURE — C1750: CPT

## 2024-12-01 PROCEDURE — 87086 URINE CULTURE/COLONY COUNT: CPT

## 2024-12-01 PROCEDURE — 71250 CT THORAX DX C-: CPT | Mod: MC

## 2024-12-01 PROCEDURE — 85025 COMPLETE CBC W/AUTO DIFF WBC: CPT

## 2024-12-01 PROCEDURE — 84300 ASSAY OF URINE SODIUM: CPT

## 2024-12-01 PROCEDURE — 82728 ASSAY OF FERRITIN: CPT

## 2024-12-01 PROCEDURE — 83550 IRON BINDING TEST: CPT

## 2024-12-01 PROCEDURE — 74176 CT ABD & PELVIS W/O CONTRAST: CPT | Mod: 26

## 2024-12-01 PROCEDURE — 83540 ASSAY OF IRON: CPT

## 2024-12-01 PROCEDURE — 83735 ASSAY OF MAGNESIUM: CPT

## 2024-12-01 PROCEDURE — 97116 GAIT TRAINING THERAPY: CPT

## 2024-12-01 PROCEDURE — 36415 COLL VENOUS BLD VENIPUNCTURE: CPT

## 2024-12-01 PROCEDURE — 84484 ASSAY OF TROPONIN QUANT: CPT

## 2024-12-01 PROCEDURE — 84540 ASSAY OF URINE/UREA-N: CPT

## 2024-12-01 PROCEDURE — 80053 COMPREHEN METABOLIC PANEL: CPT

## 2024-12-01 PROCEDURE — 84156 ASSAY OF PROTEIN URINE: CPT

## 2024-12-01 PROCEDURE — 82803 BLOOD GASES ANY COMBINATION: CPT

## 2024-12-01 PROCEDURE — 85730 THROMBOPLASTIN TIME PARTIAL: CPT

## 2024-12-01 PROCEDURE — 85027 COMPLETE CBC AUTOMATED: CPT

## 2024-12-01 PROCEDURE — 86803 HEPATITIS C AB TEST: CPT

## 2024-12-01 PROCEDURE — 94640 AIRWAY INHALATION TREATMENT: CPT

## 2024-12-01 PROCEDURE — 99261: CPT

## 2024-12-01 PROCEDURE — 94760 N-INVAS EAR/PLS OXIMETRY 1: CPT

## 2024-12-01 PROCEDURE — 80048 BASIC METABOLIC PNL TOTAL CA: CPT

## 2024-12-01 PROCEDURE — 81001 URINALYSIS AUTO W/SCOPE: CPT

## 2024-12-01 PROCEDURE — 74176 CT ABD & PELVIS W/O CONTRAST: CPT | Mod: MC

## 2024-12-01 PROCEDURE — 99285 EMERGENCY DEPT VISIT HI MDM: CPT

## 2024-12-01 PROCEDURE — 86706 HEP B SURFACE ANTIBODY: CPT

## 2024-12-01 PROCEDURE — 87340 HEPATITIS B SURFACE AG IA: CPT

## 2024-12-01 PROCEDURE — 83935 ASSAY OF URINE OSMOLALITY: CPT

## 2024-12-01 PROCEDURE — 84443 ASSAY THYROID STIM HORMONE: CPT

## 2024-12-01 PROCEDURE — 84133 ASSAY OF URINE POTASSIUM: CPT

## 2024-12-01 PROCEDURE — 84550 ASSAY OF BLOOD/URIC ACID: CPT

## 2024-12-01 PROCEDURE — 86704 HEP B CORE ANTIBODY TOTAL: CPT

## 2024-12-01 PROCEDURE — 97162 PT EVAL MOD COMPLEX 30 MIN: CPT

## 2024-12-01 PROCEDURE — C1769: CPT

## 2024-12-01 PROCEDURE — 93005 ELECTROCARDIOGRAM TRACING: CPT

## 2024-12-01 PROCEDURE — 93306 TTE W/DOPPLER COMPLETE: CPT

## 2024-12-01 PROCEDURE — 84100 ASSAY OF PHOSPHORUS: CPT

## 2024-12-01 PROCEDURE — 82570 ASSAY OF URINE CREATININE: CPT

## 2024-12-01 PROCEDURE — 76937 US GUIDE VASCULAR ACCESS: CPT

## 2024-12-01 PROCEDURE — 74018 RADEX ABDOMEN 1 VIEW: CPT

## 2024-12-01 PROCEDURE — 85610 PROTHROMBIN TIME: CPT

## 2024-12-01 RX ORDER — SUCRALFATE 1 G/1
1 TABLET ORAL
Qty: 56 | Refills: 0
Start: 2024-12-01 | End: 2024-12-14

## 2024-12-01 RX ADMIN — PANTOPRAZOLE SODIUM 40 MILLIGRAM(S): 40 TABLET, DELAYED RELEASE ORAL at 05:32

## 2024-12-01 RX ADMIN — CHLORHEXIDINE GLUCONATE 1 APPLICATION(S): 1.2 RINSE ORAL at 05:32

## 2024-12-01 RX ADMIN — LABETALOL 100 MILLIGRAM(S): 100 TABLET, FILM COATED ORAL at 05:31

## 2024-12-01 RX ADMIN — SUCRALFATE 1 GRAM(S): 1 TABLET ORAL at 05:31

## 2024-12-01 RX ADMIN — BUMETANIDE 1 MILLIGRAM(S): 1 TABLET ORAL at 05:32

## 2024-12-01 RX ADMIN — SUCRALFATE 1 GRAM(S): 1 TABLET ORAL at 00:30

## 2024-12-01 RX ADMIN — SUCRALFATE 1 GRAM(S): 1 TABLET ORAL at 12:25

## 2024-12-01 NOTE — PROGRESS NOTE ADULT - ASSESSMENT
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   - PPI 40mg IV BID  - Carafate 1g QID  - Zofran prn nausea  - IVF  - Advance diet as tolerated  - f/u Oncology  - Can be d/c on PPI 40mg PO BID for next 8 weeks   - Will follow with you    Tyler Brown M.D.  [facility]  [phone]

## 2024-12-01 NOTE — DISCHARGE NOTE NURSING/CASE MANAGEMENT/SOCIAL WORK - PATIENT PORTAL LINK FT
You can access the FollowMyHealth Patient Portal offered by [facility] by registering at the following website: [email or web address]. By joining [facility]’s FollowMyHealth portal, you will also be able to view your health information using other applications (apps) compatible with our system. 4 = No assist / stand by assistance

## 2024-12-01 NOTE — PROGRESS NOTE ADULT - SUBJECTIVE AND OBJECTIVE BOX
Patient is a 64y old  Male who presents with a chief complaint of MARYSOL (01 Dec 2024 12:21)    HPI:  Pt is a 65yo M with PMHx recently diagnosed gastric lymphoma  S/P Biopsy of gastric mass & neck Lymph node  with LN spread presents to the ED as recommended by his oncologist Dr. Andrea for elevated Creatinine. Pt called by doctor for elevated Cr at 5.3.  Baseline 1.1, increasing to 1.5-->3.0--> 5.3 on outpatient labs. Pt had chemo port placed today, has not been drinking as much as he usually does. Per pt, urinated a small amount this AM, contributed it to decreased fluid intake. Pt endorsing mild flank pain, prescribed oxycodone by outpatient doctor. Pt also endorsing new onset dyspnea on exertion. Pt has been taking Advil for 5 weeks for Lower Back pain. Pt had placement of Medi port as out pt   today     Denies fever, chills, chest pain, palpitations, abdominal pain, nausea, vomiting, diarrhea, constipation, or dysuria    ED Course:   Vitals: BP: 160/90, HR: 74, Temp: 98F, RR: 18  Labs:  WBC 7.31, Hg 10, CO2 21, BUN/CR 58,5.3, Ca 8.3, eGFR 11, Mag 2.8, proBNP 593  UA: Pending  CT Chest: Mild mottled density in the left mainstem bronchus may represent secretions. No focal consolidation or discrete mass is seen. Regions of atelectasis or scarring in the left upper lobe/lingula. Atelectatic changes at the lung bases.  CT Abdomen/Pelvis: Lymphadenopathy which appears increased when compared with 9/30/2024. Nodularity along posterior peritoneal reflections and minimal perihepatic fluid which were not seen previously.    EKG: NSR rate 68bpm. QTc 408ms  Received in the ED: 500cc NS bolus x1   (20 Nov 2024 22:40)    INTERVAL HPI:  11/21: Pt seen and examined at bedside. Pt was making minimal urine output overnight but has voided significantly more since than. He has been taking Advil for the last month at the very minimum, with  max of 6 pills/day for R. low back pain. However, the pain is not present currently. No acute c/o at this time.   11/22: Pt seen and examined at bedside. 1.3L UOP in the last 24 hours. Pt reports feeling well, shortness  of breath has improved a bit. No other concerns worsening MARYSOL , K stable   11/23:pt seen, examined, Cr increasing, Low h/h stable, High electrolytes.  11/24: Pt seen and examined, seated in chair. Pt reports feeling well, and states his shortness of breath when walking has improved. He still has intermittent lower back pain. Cr rising, today 10, nephro to start temporary HD today.  11/25: Pt seen and examined, seated in chair about to eat breakfast. Pt reports he felt a little nauseous during his first HD session yesterday, and stated he felt tired after but this morning he feels well. 1L removed yesterday. Cr still rising, 11 today. Likely will undergo another session of HD today. No concerns per patient.HD Rx today   11/26: Pt seen and examined, seated in chair. He reports feeling well this morning and denies any specific concerns. 1L removed with HD yesterday, plan for 2.5L removal today along with initiation of bumex. Plan for IR for permacath tomorrow, NPO after midnight tonight.  11/27: Pt seen and examined, seated in chair. S/p 3rd HD session yesterday which he tolerated well. Pt reports that he feels good, denies any concerns. Remains on Bumex with good UOP. Planned for IR procedure for PermCath  today .D/C Barba cath today-TOV , PC placed   11/28: pt seen, examined ,OOB to chair S/P PC placed, pt is voiding well, HD Rx on 11/29, Low grade fever 100.3  11/29: Pt seen and examined while receiving HD. Pt reports that he feels great today, and denies any concerns. b/l LE dopplers yesterday negative.  11/30: Pt seen and examined, seated in chair. Patient had some mild nausea with a small episode of vomiting this morning, got ZOFRAN and is feeling better. Pt does have hx of reflux for which he takes pepcid occasionally at home, also states he ate grapes which were only partially digested. Otherwise he's feeling well and denies other concerns. Plan for discharge today with outpatient HD to start Monday 12/2.  12/1:pt seen, examined, No complaints, D/C plan , Mild leukocytosis, tolerated all meals well.      OVERNIGHT EVENTS:    Home Medications:      MEDICATIONS  (STANDING):  albuterol/ipratropium for Nebulization 3 milliLiter(s) Nebulizer every 12 hours  buMETAnide 1 milliGRAM(s) Oral daily  chlorhexidine 4% Liquid 1 Application(s) Topical <User Schedule>  heparin   Injectable 5000 Unit(s) SubCutaneous every 8 hours  influenza   Vaccine 0.5 milliLiter(s) IntraMuscular once  labetalol 100 milliGRAM(s) Oral two times a day  melatonin 5 milliGRAM(s) Oral at bedtime  pantoprazole  Injectable 40 milliGRAM(s) IV Push two times a day  polyethylene glycol 3350 17 Gram(s) Oral daily  senna 2 Tablet(s) Oral at bedtime  sodium chloride 3%  Inhalation 4 milliLiter(s) Inhalation every 12 hours  sucralfate 1 Gram(s) Oral four times a day  tamsulosin 0.4 milliGRAM(s) Oral at bedtime    MEDICATIONS  (PRN):  acetaminophen     Tablet .. 650 milliGRAM(s) Oral every 6 hours PRN Mild Pain (1 - 3)  aluminum hydroxide/magnesium hydroxide/simethicone Suspension 30 milliLiter(s) Oral every 4 hours PRN Dyspepsia  metoclopramide Injectable 10 milliGRAM(s) IV Push every 4 hours PRN nausea and/or vomiting  sodium chloride 0.9% lock flush 10 milliLiter(s) IV Push every 1 hour PRN Pre/post blood products, medications, blood draw, and to maintain line patency      Allergies    Bactrim DS (Hives)    Intolerances        Social History:  Lives at home with family  Recently retired  Active, ambulates independently and independent with ADLs (20 Nov 2024 22:40)      REVIEW OF SYSTEMS: i feel much better   CONSTITUTIONAL: No fever, No chills, No fatigue, No myalgia, No Body ache, No Weakness  EYES: No eye pain,  No visual disturbances, No discharge, NO Redness  ENMT:  No ear pain, No nose bleed, No vertigo; No sinus pain, NO throat pain, No Congestion  NECK: No pain, No stiffness  RESPIRATORY: No cough, NO wheezing, No  hemoptysis, NO  shortness of breath  CARDIOVASCULAR: No chest pain, palpitations  GASTROINTESTINAL: No abdominal pain, NO epigastric pain. No nausea, No vomiting; No diarrhea, No constipation. [ x ] BM  GENITOURINARY: No dysuria, No frequency, No urgency, No hematuria, NO incontinence  NEUROLOGICAL: No headaches, No dizziness, No numbness, No tingling, No tremors, No weakness  EXT: No Swelling, No Pain, No Edema  SKIN:  [x  ] No itching, burning, rashes, or lesions   MUSCULOSKELETAL: No joint pain ,No Jt swelling; No muscle pain, No back pain, No extremity pain  PSYCHIATRIC: No depression,  No anxiety,  No mood swings ,No difficulty sleeping at night  PAIN SCALE: [ x ] None  [  ] Other-  ROS Unable to obtain due to - [  ] Dementia  [  ] Lethargy [  ] Drowsy [  ] Sedated [  ] non verbal  REST OF REVIEW Of SYSTEM - [ x] Normal     Vital Signs Last 24 Hrs  T(C): 36.8 (01 Dec 2024 11:40), Max: 37.2 (30 Nov 2024 21:09)  T(F): 98.2 (01 Dec 2024 11:40), Max: 99 (30 Nov 2024 21:09)  HR: 82 (01 Dec 2024 11:40) (82 - 83)  BP: 165/63 (01 Dec 2024 11:40) (151/82 - 166/82)  BP(mean): --  RR: 20 (01 Dec 2024 11:40) (20 - 20)  SpO2: 93% (01 Dec 2024 11:40) (91% - 93%)    Parameters below as of 01 Dec 2024 11:40  Patient On (Oxygen Delivery Method): room air      Finger Stick          [facility]  GENERAL:  [ x ] NAD , [ x ] well appearing, [  ] Agitated, [  ] Drowsy,  [  ] Lethargy, [  ] confused   HEAD:  [ x ] Normal, [  ] Other  EYES:  [ x ] EOMI, [ x ] PERRLA, [ x ] conjunctiva and sclera clear normal, [  ] Other,  [  ] Pallor,[  ] Discharge  ENMT:  [ x ] Normal, [ x ] Moist mucous membranes, [ x ] Good dentition, [ x ] No Thrush  NECK:  [ x ] Supple, [x  ] No JVD, [ x ] Normal thyroid, [  ] Lymphadenopathy [ x ] Other - healing site of IJ  CHEST/LUNG:  [ x ] Clear to auscultation bilaterally, [ x ] Breath Sounds equal B/L [  ] poor effort  [ x ] No rales, [ x ] No rhonchi  [ x ]  No wheezing,   HEART:  [ x ] Regular rate and rhythm, [  ] tachycardia, [  ] Bradycardia,  [  ] irregular  [ x ] No murmurs, No rubs, No gallops, [  ] PPM in place (Mfr:  )  ABDOMEN:  [ x ] Soft, [ x ] Nontender, [x  ] Nondistended, [ x ] No mass, [x  ] Bowel sounds present, [ x ] obese  NERVOUS SYSTEM:  [ x ] Alert & Oriented X3, [  ] Nonfocal  [  ] Confusion  [  ] Encephalopathic [  ] Sedated [  ] Unable to assess, [  ] Dementia [  ] Other-  EXTREMITIES: [  ] 2+ Peripheral Pulses, No clubbing, No cyanosis,  [ x ] trace  edema B/L lower EXT improving [  ] PVD stasis skin changes B/L Lower EXT, [  ] wound  LYMPH: No lymphadenopathy noted  SKIN:  [ x ] No rashes or lesions, [  ] Pressure Ulcers, [  ] ecchymosis, [  ] Skin Tears, [  ] Other    DIET: Diet, Renal Restrictions:   For patients receiving Renal Replacement - No Protein Restr, No Conc K, No Conc Phos, Low Sodium  Supplement Feeding Modality:  Oral  Suplena Cans or Servings Per Day:  1       Frequency:  Daily (11-22-24 @ 11:31)      LABS:                        11.2   13.61 )-----------( 398      ( 01 Dec 2024 05:47 )             34.9     01 Dec 2024 05:47    139    |  105    |  70     ----------------------------<  112    4.0     |  24     |  6.90     Ca    10.0       01 Dec 2024 05:47  Phos  6.8       01 Dec 2024 05:47  Mg     2.9       01 Dec 2024 05:47        Urinalysis Basic - ( 01 Dec 2024 05:47 )    Color: x / Appearance: x / SG: x / pH: x  Gluc: 112 mg/dL / Ketone: x  / Bili: x / Urobili: x   Blood: x / Protein: x / Nitrite: x   Leuk Esterase: x / RBC: x / WBC x   Sq Epi: x / Non Sq Epi: x / Bacteria: x    RADIOLOGY & ADDITIONAL TESTS:  < from: CT Chest No Cont (12.01.24 @ 00:10) >  CONTRAST/COMPLICATIONS:  IV Contrast: NONE  Oral Contrast: NONE      PROCEDURE:  CT of the Chest, Abdomen and Pelvis was performed.  Sagittal and coronal reformats were performed.    FINDINGS:  CHEST:  LUNGS AND LARGE AIRWAYS: Patent central airways. Patchy and nodular   opacities in the lower lobes left greater than right concerning for   multifocal infection.  PLEURA: Small left pleural effusion.  VESSELS: Aortic calcifications. Coronary artery calcifications.  HEART: Heart size is normal. Small pericardial effusion. Right and   left-sided central lines with catheter tips in the SVC.  MEDIASTINUM AND STELLA: No lymphadenopathy.  CHEST WALL AND LOWER NECK: Within normal limits.    ABDOMEN AND PELVIS:  LIVER: Within normal limits.  BILE DUCTS: Normal caliber.  GALLBLADDER: Within normal limits.  SPLEEN: Within normal limits.  PANCREAS: Within normal limits.  ADRENALS: Within normal limits.  KIDNEYS/URETERS: Mild bilateral hydronephrosis of the level of the upper   retroperitoneum without change.    BLADDER: Within normal limits.  REPRODUCTIVE ORGANS: Prostate within normal limits.    BOWEL: Stomach poorly evaluated the absence of intravenous contrast. No   bowel obstruction.  PERITONEUM/RETROPERITONEUM: Within normal limits.  VESSELS: Within normal limits.  LYMPH NODES: Gastrohepatic, silvana hepatis and upper retroperitoneal and   mesenteric adenopathy as well as thickening/nodularity along the   peritoneal reflections without change from recent prior study.  ABDOMINAL WALL: Within normal limits.  BONES: Degenerative changes.    IMPRESSION:  New Patchy and nodular opacities in the lower lobes, left greater than   right, concerning for multifocal infection.    Upper abdominal adenopathy without significant change.    Mild bilateral hydronephrosis without change.        < end of copied text >      HEALTH ISSUES - PROBLEM Dx:  MARYSOL (acute kidney injury)    Fever    Hypertension    Hyperkalemia    Dyspnea    Need for prophylactic measure    Gastric lymphoma      Consultant(s) Notes Reviewed:  [ x ] YES     Care Discussed with [X] Consultants  [ x ] Patient  [ x ] Family [  ] HCP [  ]   [ x ] Social Service  [x  ] RN, [  ] Physical Therapy,[  ] Palliative care team  DVT PPX: [  ] Lovenox, [ x ] S C Heparin, [  ] Coumadin, [  ] Xarelto, [  ] Eliquis, [  ] Pradaxa, [  ] IV Heparin drip, [  ] SCD [  ] Contraindication 2 to GI Bleed,[  ] Ambulation [  ] Contraindicated 2 to  bleed [  ] Contraindicated 2 to Brain Bleed  Advanced directive: [x  ] None, [  ] DNR/DNI

## 2024-12-01 NOTE — PROGRESS NOTE ADULT - SUBJECTIVE AND OBJECTIVE BOX
[facility]                             Nephrology and Hypertension                             Gustavo Oscar                                          [phone]     Patient is a 64y old  Male who presents with a chief complaint of MARYSOL (2024 14:23)     HPI:  Pt is a 65yo M with PMHx recently diagnosed gastric lymphoma  S/P Biopsy of gastric mass & neck Lymph node  with LN spread presents to the ED as recommended by his oncologist Dr. Andrea for elevated Creatinine. Pt called by doctor for elevated Cr at 5.3.  Baseline 1.1, increasing to 1.5-->3.0--> 5.3 on outpatient labs. Pt had chemo port placed today, has not been drinking as much as he usually does. Per pt, urinated a small amount this AM, contributed it to decreased fluid intake. Pt endorsing mild flank pain, prescribed oxycodone by outpatient doctor. Pt also endorsing new onset dyspnea on exertion. Pt has been taking Advil for 5 weeks for Lower Back pain. Pt had placement of Medi port as out pt today   Nephrology is c/s for Acute kidney injury. When I saw him, he is very anxious. He states he had been taking 6 tabs of advil every day for a few weeks for pain. He also endorsed not eating as much. Per discussion with RN frandy was not draining much overnight but over last few hours, has better uop. On labs review, Serum Creat was 1.1 in 2024, was 5.3 on admission, is >6 on repeat labs early this am with hyperkalemia. Was given lokelma . He has no sob, no dizziness, no N/V, no new pain.       N/V is improving  Able to tolerate liquids this morning  Did not try solids yet  Urinating well per patient       PAST MEDICAL & SURGICAL HISTORY:  Incisional hernia    Gastric lymphoma  Anemia of chronic disease  S/P appendectomy  2014  S/P cholecystectomy  15 years ago  FAMILY HISTORY:  Family history of coronary artery disease in father  Father -  age 60 following MI    Social History:Non smoker    MEDICATIONS  (STANDING):  albuterol/ipratropium for Nebulization 3 milliLiter(s) Nebulizer every 12 hours  buMETAnide 1 milliGRAM(s) Oral daily  chlorhexidine 4% Liquid 1 Application(s) Topical <User Schedule>  heparin   Injectable 5000 Unit(s) SubCutaneous every 8 hours  influenza   Vaccine 0.5 milliLiter(s) IntraMuscular once  labetalol 100 milliGRAM(s) Oral two times a day  melatonin 5 milliGRAM(s) Oral at bedtime  pantoprazole    Tablet 40 milliGRAM(s) Oral before breakfast  polyethylene glycol 3350 17 Gram(s) Oral daily  senna 2 Tablet(s) Oral at bedtime  sodium chloride 3%  Inhalation 4 milliLiter(s) Inhalation every 12 hours  tamsulosin 0.4 milliGRAM(s) Oral at bedtime    MEDICATIONS  (PRN):  acetaminophen     Tablet .. 650 milliGRAM(s) Oral every 6 hours PRN Mild Pain (1 - 3)  aluminum hydroxide/magnesium hydroxide/simethicone Suspension 30 milliLiter(s) Oral every 4 hours PRN Dyspepsia  sodium chloride 0.9% lock flush 10 milliLiter(s) IV Push every 1 hour PRN Pre/post blood products, medications, blood draw, and to maintain line patency        Allergies    Bactrim DS (Hives)    Intolerances         REVIEW OF SYSTEMS: as per HPI    Vital Signs Last 24 Hrs  T(C): 36.9 (01 Dec 2024 05:31), Max: 37.2 (2024 21:09)  T(F): 98.5 (01 Dec 2024 05:31), Max: 99 (2024 21:09)  HR: 83 (01 Dec 2024 05:31) (73 - 83)  BP: 166/82 (01 Dec 2024 05:31) (145/91 - 166/82)  BP(mean): --  RR: 20 (01 Dec 2024 05:31) (19 - 20)  SpO2: 93% (01 Dec 2024 05:31) (91% - 97%)    Parameters below as of 01 Dec 2024 05:31  Patient On (Oxygen Delivery Method): room air        PHYSICAL EXAM:    GENERAL: NAD, obese  HEAD:  Atraumatic, Normocephalic  EYES: EOMI, conjunctiva and sclera clear  ENMT: No Drainage from nares, No drainage from ears  NERVOUS SYSTEM:  Awake and Alert  CHEST/LUNG: Clear bilaterally, RCW port  EXTREMITIES:  trace edema  SKIN: No rashes No obvious ecchymosis        LABS:                                    11.2   13.61 )-----------( 398      ( 01 Dec 2024 05:47 )             34.9     12-    139  |  105  |  70[H]  ----------------------------<  112[H]  4.0   |  24  |  6.90[H]    Ca    10.0      01 Dec 2024 05:47  Phos  6.8     12  Mg     2.9         TPro  7.4  /  Alb  3.3  /  TBili  0.4  /  DBili  x   /  AST  16  /  ALT  26  /  AlkPhos  78        Urinalysis Basic - ( 01 Dec 2024 05:47 )    Color: x / Appearance: x / SG: x / pH: x  Gluc: 112 mg/dL / Ketone: x  / Bili: x / Urobili: x   Blood: x / Protein: x / Nitrite: x   Leuk Esterase: x / RBC: x / WBC x   Sq Epi: x / Non Sq Epi: x / Bacteria: x

## 2024-12-01 NOTE — DISCHARGE NOTE NURSING/CASE MANAGEMENT/SOCIAL WORK - NSDCVIVACCINE_GEN_ALL_CORE_FT
influenza, injectable, quadrivalent, preservative free; 26-Nov-2014 17:06; Dulce Hair (RN); [employer]; ; IntraMuscular; Deltoid Left.; 0.5 milliLiter(s);   Tdap; 08-Apr-2021 16:20; Lilly OlsonRN); [employer]; [id number] (Exp. Date: 10-Shekhar-2022); IntraMuscular; Deltoid Left.; 0.5 milliLiter(s); VIS (VIS Published: 09-May-2013, VIS Presented: 08-Apr-2021);

## 2024-12-01 NOTE — PROGRESS NOTE ADULT - ASSESSMENT
65yo M with recently diagnosed gastric lymphoma  S/P Biopsy of gastric mass & neck Lymph node  with LN spread presents to the ED as recommended by his oncologist Dr. Andrea for elevated Creatinine. Pt called by doctor for elevated Cr at 5.3.  Baseline 1.1, increasing to 1.5-->3.0--> 5.3 on outpatient labs. Pt had chemo port placed and was not been drinking as much as he usually does. Per pt, urinated a small amount this AM, contributed it to decreased fluid intake. Pt endorsed mild flank pain, prescribed oxycodone by outpatient doctor. Adm 11/20 for renal failure    ED Vitals: BP: 160/90, HR: 74, Temp: 98F, RR: 18  Labs:  WBC 7.31, Hg 10, CO2 21, BUN/CR 58,5.3, Ca 8.3, eGFR 11, Mag 2.8, proBNP 593  UA: Pending  CT Chest: Mild mottled density in the left mainstem bronchus may represent secretions. No focal consolidation or discrete mass is seen. Regions of atelectasis or scarring in the left upper lobe/lingula. Atelectatic changes at the lung bases.  CT Abdomen/Pelvis: Lymphadenopathy which appears increased when compared with 9/30/2024. Nodularity along posterior peritoneal reflections and minimal perihepatic fluid which were not seen previously.  PT doing well and being evaluated for long term dialysis access. Morning of 11/28 reports he was drinking a hot cup of coffee and had his temperature taken orally. This measured at 100.3 with HR stable in the 80s and no reported new symptoms.    11/30 had violent vomiting. CT w/ New Patchy and nodular opacities in the lower lobes, left greater than right, concerning for multifocal infection.  12/1 leukocytosis uptrending again    RECOMMENDATIONS  Suspect aspiration  Ok to d/c on ceftin 500mg BID to complete x5 day course    D/w patient  D/w Dr. Tyler  Pt can call [phone] for follow-up if needed    Please call with any further questions.  Karen Tyler M.D.  [facility] Division of Infectious Diseases [phone]  For after 5 P.M. and weekends, please call [phone]  Available on [employer] TEAMS

## 2024-12-01 NOTE — PROGRESS NOTE ADULT - ASSESSMENT
Acute kidney injury, with ATN  Anemia  Hyperkalemia  Metabolic acidosis  Urinary retention  HTN  Lymphoma      -MARYSOL likely due to ATN from NSAID use and poor po intake, has normal renal function at baseline  -SCr 1.1 at baseline with normal electrolytes  -Informed consent previously obtained. HD discussed up to and including death discussed. Informed consent obtained and he agrees to HD if necessary  -Advised patient to avoid all nsaids   -s/p IVF. Fluid overloaded   -s/p [facility]  -ICU team placed temp dialysis catheter. HD started 11/24/24; s/p HD x 3 thus far  -Urinating well s/p wise being removed; change IV bumex to PO; edema is improving  -flomax  -S/p PC by IR 11/27/24  -Renal indices are not showing signs of improvement yet despite urinating well; will continue to monitor outpatient  -Maintain HD MWF; next dialysis to be done on 12/2/24, hopefully outpatient  -Renal diet  -BP improving on labetalol; monitor  -ID eval noted  -WBC improved, afebrile  -N/V improving; CT a/p showing Lymphadenopathy      Patient is arranged for [facility] and to start outpatient on 12/2/24    DC planning per primary team

## 2024-12-01 NOTE — PROGRESS NOTE ADULT - ATTENDING COMMENTS
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 Andrea for elevated Creatinine- MARYSOL with MA   Pt seen, Examined, Case & Care plan d/w pt residents at detail.  Consults;  Renal -DR Juárez follow up , IR Tesio cath placement done Next HD on Friday   Hem-Dr lu /Dr Carrera s/off case  -First chemo FOLFOX w Dr Fung follow up as out pt  AM labs   PO diet-Nutritional eval done  OOB to chair   DVT ppx. -  Total care time is 60 minutes.
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 Andrea for elevated Creatinine- MARYSOL with MA   Pt seen, Examined, Case & Care plan d/w pt residents at detail.  Consults  Renal -DR ELLIS -Dr ELLIS  d/w ATN, MARYSOL MA -IVF -LR  Hem-Dr sun on case   AM labs   PO diet-Nutritional eval   OOB to chair   DVT ppx.   Total care time is 60 minutes.
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 Andrea for elevated Creatinine- MARYSOL with MA   Pt seen, Examined, Case & Care plan d/w pt residents at detail.  Consults;  Renal -DR ELLIS -Dr ELLIS  d/w ATN, MARYSOL MA --DC IVF. Fluid overloaded, start HD today.  Hem-Dr sun on case -First chemo FOLFOX w Dr Fung was planned for Monday 11/25/24 ,  HD Rx today  AM labs   PO diet-Nutritional eval   OOB to chair   DVT ppx.   Total care time is 60 minutes.
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 Andrea for elevated Creatinine- MARYSOL with MA   Pt seen, Examined, Case & Care plan d/w pt residents at detail.  Consults;  Renal -DR ELLIS -Dr ELLIS  d/w ATN, MARYSOL MA --DC IVF. Fluid overloaded,  HD today.  Hem-Dr sun on case -First chemo FOLFOX w Dr Fung was planned for Monday 11/25/24 ,  HD Rx today  AM labs   PO diet-Nutritional eval   OOB to chair   DVT ppx.   Total care time is 60 minutes.
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 Andrea for elevated Creatinine- MARYSOL with MA   Pt seen, Examined, Case & Care plan d/w pt residents at detail.  Consults;  Renal -DR Juárez  d/w Barba cath continue , PUF Rx today, plan for IR Tesio cath placement   Hem-Dr lu on case -First chemo FOLFOX w Dr Fung was planned for Monday 11/25/24 ,  AM labs   PO diet-Nutritional eval   OOB to chair   DVT ppx.   Total care time is 60 minutes.
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 Andrea for elevated Creatinine- MARYSOL with MA   Pt seen, Examined, Case & Care plan d/w pt residents at detail.  Consults  Renal -DR ELLIS -Dr ELLIS  d/w ATN, MARYSOL MA -IVF with naHco3  Hem-Dr lu saw pt  AM labs   PO diet-Nutritional eval   OOB to chair   DVT ppx.   Total care time is 60 minutes.
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 Andrea for elevated Creatinine- MARYSOL with MA   Pt seen, Examined, Case & Care plan d/w pt residents at detail.  Consults;  Renal -DR Juárez follow up , IR Tesio cath placement done Next HD on Friday   Hem-Dr lu /Dr Carrera s/off case  -First chemo FOLFOX w Dr Fung follow up as out pt  AM labs   PO diet-Nutritional eval done  OOB to chair   DVT ppx. -  Total care time is 60 minutes.
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 Andrea for elevated Creatinine- MARYSOL with MA   Pt seen, Examined, Case & Care plan d/w pt residents at detail.  Consults;  Renal -DR Juárez follow up , D/C homr today  , Phoaphate binder on d/c   Hem-Dr lu /Dr Carrera s/off case  -First chemo FOLFOX w Dr Fung follow up as out pt  GI- DR Interiano d/w stable for d/c home today CT scan results d/w GI  ID- Dr JUANY Tyler-OK for D/C with PO Abx  PO diet-Nutritional eval done  OOB to chair   DVT ppx. -  D/C home today   Total  d/c care time is 60 minutes.
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 Andrea for elevated Creatinine- MARYSOL with MA   Pt seen, Examined, Case & Care plan d/w pt residents at detail.  Consults  Renal -DR ELLIS -Dr Oscar d/w ATN, MARYSOL MA -IVF   AM labs   PO diet-Nutritional eval   OOB to chair   DVT ppx.   Total care time is 60 minutes.
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 Andrea for elevated Creatinine- MARYSOL with MA   Pt seen, Examined, Case & Care plan d/w pt residents at detail.  Consults;  Renal -DR Juárez follow up , D/C plan , Phoaphate binder on d/c   Hem-Dr lu /Dr Carrera s/off case  -First chemo FOLFOX w Dr Fung follow up as out pt  GI- DR Interiano called for N/V , Abd X ray to follow , PPI, Carafate, Compazine    AM labs   PO diet-Nutritional eval done  OOB to chair   DVT ppx. -  Total care time is 60 minutes.
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 Andrea for elevated Creatinine- MARYSOL with MA   Pt seen, Examined, Case & Care plan d/w pt residents at detail.  Consults;  Renal -DR Juárez  d/w ,Barba cath D/Rommel today TOV , IR Tesio cath placement done Next HD on Friday   Hem-Dr lu /Dr Carrera follow up  -First chemo FOLFOX w Dr Fung follow up as out pt  AM labs   PO diet-Nutritional eval   OOB to chair   DVT ppx. -Restart at 9 PM as per IR  Total care time is 60 minutes.

## 2024-12-01 NOTE — PROGRESS NOTE ADULT - PROBLEM SELECTOR PLAN 6
Pt with elevated electrolytes likely in setting of [place]- RESOLVED  - K improved s/p Lokelma x2 over course of hospitalization  - Mag/Phos remain elevated  - HD MWF as out pt  - Nutritional eval  - Renal diet d/w pt

## 2024-12-01 NOTE — PROGRESS NOTE ADULT - PROBLEM SELECTOR PROBLEM 1
MARYSOL (acute kidney injury)

## 2024-12-01 NOTE — DISCHARGE NOTE NURSING/CASE MANAGEMENT/SOCIAL WORK - FINANCIAL ASSISTANCE
[facility] provides services at a reduced cost to those who are determined to be eligible through [facility]’s financial assistance program. Information regarding [facility]’s financial assistance program can be found by going to [email or web address] or by calling [phone].

## 2024-12-01 NOTE — DISCHARGE NOTE NURSING/CASE MANAGEMENT/SOCIAL WORK - NSDCPEFALRISK_GEN_ALL_CORE
For information on Fall & Injury Prevention, visit: [email or web address] OR  [email or web address] OR  https://www.cdc.gov/steadi/patient.html

## 2024-12-01 NOTE — PROGRESS NOTE ADULT - SUBJECTIVE AND OBJECTIVE BOX
[facility], Division of Infectious Diseases  DANY Burris Y. Patel, S. Shah, G. [facility]  [phone]    Name: JESSE EATON  Age: 64y  Gender: Male  MRN: [id number]    Interval History:  ID reconsulted for PNA on CXR  No acute overnight events. Afebrile  Had vomiting yesterday, now better  Notes reviewed    Antibiotics:      Medications:  acetaminophen     Tablet .. 650 milliGRAM(s) Oral every 6 hours PRN  albuterol/ipratropium for Nebulization 3 milliLiter(s) Nebulizer every 12 hours  aluminum hydroxide/magnesium hydroxide/simethicone Suspension 30 milliLiter(s) Oral every 4 hours PRN  buMETAnide 1 milliGRAM(s) Oral daily  chlorhexidine 4% Liquid 1 Application(s) Topical <User Schedule>  heparin   Injectable 5000 Unit(s) SubCutaneous every 8 hours  influenza   Vaccine 0.5 milliLiter(s) IntraMuscular once  labetalol 100 milliGRAM(s) Oral two times a day  melatonin 5 milliGRAM(s) Oral at bedtime  metoclopramide Injectable 10 milliGRAM(s) IV Push every 4 hours PRN  pantoprazole  Injectable 40 milliGRAM(s) IV Push two times a day  polyethylene glycol 3350 17 Gram(s) Oral daily  senna 2 Tablet(s) Oral at bedtime  sodium chloride 0.9% lock flush 10 milliLiter(s) IV Push every 1 hour PRN  sodium chloride 3%  Inhalation 4 milliLiter(s) Inhalation every 12 hours  sucralfate 1 Gram(s) Oral four times a day  tamsulosin 0.4 milliGRAM(s) Oral at bedtime      Review of Systems:  A 10-point review of systems was obtained.   Review of systems otherwise negative except as previously noted.    Allergies: Bactrim DS (Hives)    For details regarding the patient's past medical history, social history, family history, and other miscellaneous elements, please refer the initial infectious diseases consultation and/or the admitting history and physical examination for this admission.    Objective:  Vitals:   T(C): 36.8 (12-01-24 @ 11:40), Max: 37.2 (11-30-24 @ 21:09)  HR: 82 (12-01-24 @ 11:40) (82 - 83)  BP: 165/63 (12-01-24 @ 11:40) (151/82 - 166/82)  RR: 20 (12-01-24 @ 11:40) (20 - 20)  SpO2: 93% (12-01-24 @ 11:40) (91% - 93%)    Physical Examination:  General: no acute distress  HEENT: NC/AT, EOMI,   Cardio: S1, S2 heard, RRR, no murmurs  Resp: breath sounds heard bilaterally, no rales, wheezes or rhonchi  Abd: soft, NT, ND,  Ext: no edema or cyanosis  Skin: warm, dry, no visible rash      Laboratory Studies:  CBC:                       11.2   13.61 )-----------( 398      ( 01 Dec 2024 05:47 )             34.9     CMP: 12-01    139  |  105  |  70[H]  ----------------------------<  112[H]  4.0   |  24  |  6.90[H]    Ca    10.0      01 Dec 2024 05:47  Phos  6.8     12-01  Mg     2.9     12-01    TPro  7.4  /  Alb  3.3  /  TBili  0.4  /  DBili  x   /  AST  16  /  ALT  26  /  AlkPhos  78  11-30    LIVER FUNCTIONS - ( 30 Nov 2024 06:47 )  Alb: 3.3 g/dL / Pro: 7.4 g/dL / ALK PHOS: 78 U/L / ALT: 26 U/L / AST: 16 U/L / GGT: x           Urinalysis Basic - ( 01 Dec 2024 05:47 )    Color: x / Appearance: x / SG: x / pH: x  Gluc: 112 mg/dL / Ketone: x  / Bili: x / Urobili: x   Blood: x / Protein: x / Nitrite: x   Leuk Esterase: x / RBC: x / WBC x   Sq Epi: x / Non Sq Epi: x / Bacteria: x        Microbiology: reviewed    Culture - Urine (collected 11-21-24 @ 07:30)  Source: Clean Catch  Final Report (11-22-24 @ 09:09):    No growth    Urinalysis with Rflx Culture (collected 11-21-24 @ 07:30)          Radiology: reviewed    < from: CT Chest No Cont (12.01.24 @ 00:10) >    ACC: [id number] EXAM:  CT ABDOMEN AND PELVIS   ORDERED BY: MANUEL FOSTER     ACC: [id number] EXAM:  CT CHEST   ORDERED BY: MANUEL FOSTER     PROCEDURE DATE:  12/01/2024          INTERPRETATION:  CLINICAL INFORMATION: Gastric lymphoma. Dyspnea,   vomiting.    COMPARISON: CT 11/20/2024    CONTRAST/COMPLICATIONS:  IV Contrast: NONE  Oral Contrast: NONE      PROCEDURE:  CT of the Chest, Abdomen and Pelvis was performed.  Sagittal and coronal reformats were performed.    FINDINGS:  CHEST:  LUNGS AND LARGE AIRWAYS: Patent central airways. Patchy and nodular   opacities in the lower lobes left greater than right concerning for   multifocal infection.  PLEURA: Small left pleural effusion.  VESSELS: Aortic calcifications. Coronary artery calcifications.  HEART: Heart size is normal. Small pericardial effusion. Right and   left-sided central lines with catheter tips in the SVC.  MEDIASTINUM AND STELLA: No lymphadenopathy.  CHEST WALL AND LOWER NECK: Within normal limits.    ABDOMEN AND PELVIS:  LIVER: Within normal limits.  BILE DUCTS: Normal caliber.  GALLBLADDER: Within normal limits.  SPLEEN: Within normal limits.  PANCREAS: Within normal limits.  ADRENALS: Within normal limits.  KIDNEYS/URETERS: Mild bilateral hydronephrosis of the level of the upper   retroperitoneum without change.    BLADDER: Within normal limits.  REPRODUCTIVE ORGANS: Prostate within normal limits.    BOWEL: Stomach poorly evaluated the absence of intravenous contrast. No   bowel obstruction.  PERITONEUM/RETROPERITONEUM: Within normal limits.  VESSELS: Within normal limits.  LYMPH NODES: Gastrohepatic, silvana hepatis and upper retroperitoneal and   mesenteric adenopathy as well as thickening/nodularity along the   peritoneal reflections without change from recent prior study.  ABDOMINAL WALL: Within normal limits.  BONES: Degenerative changes.    IMPRESSION:  New Patchy and nodular opacities in the lower lobes, left greater than   right, concerning for multifocal infection.    Upper abdominal adenopathy without significant change.    Mild bilateral hydronephrosis without change.        --- End of Report ---            ANTOINETTE BORGES MD; Attending Radiologist  This document has been electronically signed. Dec  1 2024  1:10PM    < end of copied text >

## 2024-12-01 NOTE — PROGRESS NOTE ADULT - PROBLEM SELECTOR PLAN 8
Heparin 5000U qd    Dispo  - Outpatient HD set up at [facility] MWF 5am, has spot starting 12/2  - plan for d/c to home today
Heparin 5000U qd    Dispo  - Outpatient HD set up at [facility] MWF 5am, has spot starting 12/2  - plan for d/c to home today

## 2024-12-01 NOTE — PROGRESS NOTE ADULT - SUBJECTIVE AND OBJECTIVE BOX
[facility]  Jhonny Garcia PA-C  [address] [place], [place] [id number] [phone]      INTERVAL HPI/OVERNIGHT EVENTS: notified last night that pt is increasingly nauseous with one episode of vomiting. Did not tolerate CT PO contrast     MEDICATIONS  (STANDING):  albuterol/ipratropium for Nebulization 3 milliLiter(s) Nebulizer every 12 hours  buMETAnide 1 milliGRAM(s) Oral daily  chlorhexidine 4% Liquid 1 Application(s) Topical <User Schedule>  heparin   Injectable 5000 Unit(s) SubCutaneous every 8 hours  influenza   Vaccine 0.5 milliLiter(s) IntraMuscular once  labetalol 100 milliGRAM(s) Oral two times a day  melatonin 5 milliGRAM(s) Oral at bedtime  pantoprazole  Injectable 40 milliGRAM(s) IV Push two times a day  polyethylene glycol 3350 17 Gram(s) Oral daily  senna 2 Tablet(s) Oral at bedtime  sodium chloride 3%  Inhalation 4 milliLiter(s) Inhalation every 12 hours  sucralfate 1 Gram(s) Oral four times a day  tamsulosin 0.4 milliGRAM(s) Oral at bedtime    MEDICATIONS  (PRN):  acetaminophen     Tablet .. 650 milliGRAM(s) Oral every 6 hours PRN Mild Pain (1 - 3)  aluminum hydroxide/magnesium hydroxide/simethicone Suspension 30 milliLiter(s) Oral every 4 hours PRN Dyspepsia  metoclopramide Injectable 10 milliGRAM(s) IV Push every 4 hours PRN nausea and/or vomiting  sodium chloride 0.9% lock flush 10 milliLiter(s) IV Push every 1 hour PRN Pre/post blood products, medications, blood draw, and to maintain line patency      Allergies    Bactrim DS (Hives)    Intolerances        ROS:   General:  No  fevers, chills, night sweats, fatigue,   Eyes:  Good vision, no reported pain  ENT:  No sore throat, pain, runny nose, dysphagia  CV:  No pain, palpitations, hypo/hypertension  Resp:  No dyspnea, cough, tachypnea, wheezing  GI:  No pain, No nausea, No vomiting, No diarrhea, No constipation, No weight loss, No fever, No pruritis, No rectal bleeding, No tarry stools, No dysphagia,  :  No pain, bleeding, incontinence, nocturia  Muscle:  No pain, weakness  Neuro:  No weakness, tingling, memory problems  Psych:  No fatigue, insomnia, mood problems, depression  Endocrine:  No polyuria, polydipsia, cold/heat intolerance  Heme:  No petechiae, ecchymosis, easy bruisability  Skin:  No rash, tattoos, scars, edema      PHYSICAL EXAM:   Vital Signs:  Vital Signs Last 24 Hrs  T(C): 36.8 (01 Dec 2024 11:40), Max: 37.2 (2024 21:09)  T(F): 98.2 (01 Dec 2024 11:40), Max: 99 (2024 21:09)  HR: 82 (01 Dec 2024 11:40) (73 - 83)  BP: 165/63 (01 Dec 2024 11:40) (145/91 - 166/82)  BP(mean): --  RR: 20 (01 Dec 2024 11:40) (19 - 20)  SpO2: 93% (01 Dec 2024 11:40) (91% - 93%)    Parameters below as of 01 Dec 2024 11:40  Patient On (Oxygen Delivery Method): room air      Daily     Daily Weight in k.5 (01 Dec 2024 05:31)    GENERAL:  Appears stated age,   HEENT:  NC/AT,    CHEST:  Full & symmetric excursion,   HEART:  Regular rhythm,  ABDOMEN:  Soft, non-tender, non-distended,  EXTEREMITIES:  no cyanosis  SKIN:  No rash  NEURO:  Alert,       LABS:                        11.2   13.61 )-----------( 398      ( 01 Dec 2024 05:47 )             34.9     12-    139  |  105  |  70[H]  ----------------------------<  112[H]  4.0   |  24  |  6.90[H]    Ca    10.0      01 Dec 2024 05:47  Phos  6.8     12-  Mg     2.9     12-    TPro  7.4  /  Alb  3.3  /  TBili  0.4  /  DBili  x   /  AST  16  /  ALT  26  /  AlkPhos  78        Urinalysis Basic - ( 01 Dec 2024 05:47 )    Color: x / Appearance: x / SG: x / pH: x  Gluc: 112 mg/dL / Ketone: x  / Bili: x / Urobili: x   Blood: x / Protein: x / Nitrite: x   Leuk Esterase: x / RBC: x / WBC x   Sq Epi: x / Non Sq Epi: x / Bacteria: x        RADIOLOGY & ADDITIONAL TESTS:

## 2024-12-01 NOTE — PROGRESS NOTE ADULT - PROVIDER SPECIALTY LIST ADULT
Heme/Onc
Nephrology
Heme/Onc
Heme/Onc
Nephrology
Heme/Onc
Infectious Disease
Internal Medicine
Nephrology
Gastroenterology
Heme/Onc
Infectious Disease
Internal Medicine
Internal Medicine
Nephrology
Internal Medicine

## 2024-12-01 NOTE — PROGRESS NOTE ADULT - REASON FOR ADMISSION
MARYSOL

## 2024-12-01 NOTE — PROGRESS NOTE ADULT - PROBLEM SELECTOR PLAN 1
Pt sent in to ED for elevated Cr (Baseline 1.1, increasing to 1.5-->3.0), endorsing decreased urination and flank pain  - Pt c/o flank pain (L), per chart review, also was endorsing pain during admission at [facility], prior to elevated Cr  - Cr 5.3 on admission, pt with prolonged NSAID use.    - FeNA 3.4, likely ATN from progression of prerenal MARYSOL MA, stable cr today  - s/p wise catheter placement in ED with minimal output - now removed  - CT Abdomen: Collapsed bladder. Bilateral renal cysts, no hydro. No obstruction.  - K elevated on admission now s/p LOKELMA x2 during hospitalization with improvement in K  - Metabolic Acidosis, S/P- NaHco3  - first session of HD 11/24 with 1L removal, 11/26 1L removal, 11/27 2.5 L removed; 11/29 2.5L removed  - Cr improved to 7.9 s/p HD yesterday  - Avoid nephrotoxic medications, Renal dose meds   - Renal diet  - Daily CMP  - PC placed 11/27  - Nephrology (Dr. Juárez) follow up - HD- continue Bumex now PO 1mg daily on d/c  - CM/SW fallowing for outpatient HD arrangements - Psychiatric on M/W F- will follow with Dr. Eugene

## 2024-12-01 NOTE — PROGRESS NOTE ADULT - PROBLEM SELECTOR PLAN 2
pt is afebrile    S/P  fever 11/28 am 100.3 -observe-resolved  Leukocytosis ? Reactive ? PNA ? Lymphoma   CT chest as above   ID Dr JUANY Tyler follow up -Start CCeftin 500 mg 2x day x 5 days on D/C   CBC as out pt

## 2024-12-01 NOTE — PROGRESS NOTE ADULT - PROBLEM SELECTOR PLAN 7
Pt endorsing ZHOU while walking, -improving , likely 2/2 Volume over load-RESOLVED  - CT Chest:  Mild mottled density in the left mainstem bronchus may represent secretions. No focal consolidation or discrete mass is seen. Regions of atelectasis or scarring in the left upper lobe/lingula. Atelectatic changes at the lung bases  - Pt saturating well on RA  - S/P Duoneb and hypertonic saline  - incentive spirometer use  - TTE WNL  - ZHOU improved

## 2024-12-01 NOTE — PROGRESS NOTE ADULT - PROBLEM SELECTOR PLAN 4
S/P N/V -likely 2/2 GERD -  Carafate 1 gm 4x day   IV PPI -Change to PO Protonix 40 mg 1 tab daily  Out pt GI follow up

## 2024-12-01 NOTE — PROGRESS NOTE ADULT - PROBLEM SELECTOR PLAN 3
stable BP  - labetalol 100mg BID with hold parameters as per nephro - BPs improved  - monitor routine hemodynamics at HD Rx

## 2024-12-01 NOTE — PROGRESS NOTE ADULT - PROBLEM SELECTOR PLAN 5
Pt with recently diagnosed gastric lymphoma, port placed prior to admission for planned chemo. Follows Dr. Andrea  - CT Ab: Lymphadenopathy which appears increased when compared with 9/30/2024. Nodularity along posterior peritoneal reflections and minimal perihepatic fluid which were not seen previously.  - Microcytic anemia on labs  - iron studies noted, heme/onc following  - hx beta thal minor  Hematology -Dr lu /Deandre s/off-Follow up with primary H/O

## 2025-03-03 ENCOUNTER — INPATIENT (INPATIENT)
Facility: HOSPITAL | Age: 65
LOS: 3 days | Discharge: HOME | DRG: 188 | End: 2025-03-07
Attending: GENERAL PRACTICE | Admitting: INTERNAL MEDICINE
Payer: COMMERCIAL

## 2025-03-03 VITALS
OXYGEN SATURATION: 100 % | TEMPERATURE: 98 F | SYSTOLIC BLOOD PRESSURE: 138 MMHG | HEART RATE: 85 BPM | WEIGHT: 211.42 LBS | HEIGHT: 67 IN | DIASTOLIC BLOOD PRESSURE: 88 MMHG | RESPIRATION RATE: 20 BRPM

## 2025-03-03 DIAGNOSIS — J90 PLEURAL EFFUSION, NOT ELSEWHERE CLASSIFIED: ICD-10-CM

## 2025-03-03 DIAGNOSIS — D63.8 ANEMIA IN OTHER CHRONIC DISEASES CLASSIFIED ELSEWHERE: ICD-10-CM

## 2025-03-03 DIAGNOSIS — K21.9 GASTRO-ESOPHAGEAL REFLUX DISEASE WITHOUT ESOPHAGITIS: ICD-10-CM

## 2025-03-03 DIAGNOSIS — Z98.89 OTHER SPECIFIED POSTPROCEDURAL STATES: Chronic | ICD-10-CM

## 2025-03-03 DIAGNOSIS — Z29.9 ENCOUNTER FOR PROPHYLACTIC MEASURES, UNSPECIFIED: ICD-10-CM

## 2025-03-03 DIAGNOSIS — Z90.49 ACQUIRED ABSENCE OF OTHER SPECIFIED PARTS OF DIGESTIVE TRACT: Chronic | ICD-10-CM

## 2025-03-03 DIAGNOSIS — I82.409 ACUTE EMBOLISM AND THROMBOSIS OF UNSPECIFIED DEEP VEINS OF UNSPECIFIED LOWER EXTREMITY: ICD-10-CM

## 2025-03-03 DIAGNOSIS — I10 ESSENTIAL (PRIMARY) HYPERTENSION: ICD-10-CM

## 2025-03-03 DIAGNOSIS — C85.99 NON-HODGKIN LYMPHOMA, UNSPECIFIED, EXTRANODAL AND SOLID ORGAN SITES: ICD-10-CM

## 2025-03-03 LAB
ALBUMIN SERPL ELPH-MCNC: 2.4 G/DL — LOW (ref 3.3–5)
ALP SERPL-CCNC: 101 U/L — SIGNIFICANT CHANGE UP (ref 40–120)
ALT FLD-CCNC: 26 U/L — SIGNIFICANT CHANGE UP (ref 12–78)
ANION GAP SERPL CALC-SCNC: 9 MMOL/L — SIGNIFICANT CHANGE UP (ref 5–17)
APPEARANCE UR: CLEAR — SIGNIFICANT CHANGE UP
APTT BLD: 27.4 SEC — SIGNIFICANT CHANGE UP (ref 24.5–35.6)
APTT BLD: 53.2 SEC — HIGH (ref 24.5–35.6)
AST SERPL-CCNC: 19 U/L — SIGNIFICANT CHANGE UP (ref 15–37)
BASOPHILS # BLD AUTO: 0.1 K/UL — SIGNIFICANT CHANGE UP (ref 0–0.2)
BASOPHILS NFR BLD AUTO: 1.3 % — SIGNIFICANT CHANGE UP (ref 0–2)
BILIRUB SERPL-MCNC: 0.4 MG/DL — SIGNIFICANT CHANGE UP (ref 0.2–1.2)
BILIRUB UR-MCNC: NEGATIVE — SIGNIFICANT CHANGE UP
BUN SERPL-MCNC: 13 MG/DL — SIGNIFICANT CHANGE UP (ref 7–23)
CALCIUM SERPL-MCNC: 9 MG/DL — SIGNIFICANT CHANGE UP (ref 8.5–10.1)
CHLORIDE SERPL-SCNC: 104 MMOL/L — SIGNIFICANT CHANGE UP (ref 96–108)
CO2 SERPL-SCNC: 22 MMOL/L — SIGNIFICANT CHANGE UP (ref 22–31)
COLOR SPEC: YELLOW — SIGNIFICANT CHANGE UP
CREAT SERPL-MCNC: 1.1 MG/DL — SIGNIFICANT CHANGE UP (ref 0.5–1.3)
DIFF PNL FLD: NEGATIVE — SIGNIFICANT CHANGE UP
EGFR: 75 ML/MIN/1.73M2 — SIGNIFICANT CHANGE UP
EGFR: 75 ML/MIN/1.73M2 — SIGNIFICANT CHANGE UP
EOSINOPHIL # BLD AUTO: 0.14 K/UL — SIGNIFICANT CHANGE UP (ref 0–0.5)
EOSINOPHIL NFR BLD AUTO: 1.9 % — SIGNIFICANT CHANGE UP (ref 0–6)
FLUAV AG NPH QL: SIGNIFICANT CHANGE UP
FLUBV AG NPH QL: SIGNIFICANT CHANGE UP
GLUCOSE SERPL-MCNC: 114 MG/DL — HIGH (ref 70–99)
GLUCOSE UR QL: NEGATIVE MG/DL — SIGNIFICANT CHANGE UP
HCT VFR BLD CALC: 25.5 % — LOW (ref 39–50)
HCT VFR BLD CALC: 25.7 % — LOW (ref 39–50)
HGB BLD-MCNC: 7.8 G/DL — LOW (ref 13–17)
HGB BLD-MCNC: 7.9 G/DL — LOW (ref 13–17)
IMM GRANULOCYTES NFR BLD AUTO: 1.1 % — HIGH (ref 0–0.9)
INR BLD: 1.18 RATIO — HIGH (ref 0.85–1.16)
KETONES UR-MCNC: NEGATIVE MG/DL — SIGNIFICANT CHANGE UP
LEUKOCYTE ESTERASE UR-ACNC: ABNORMAL
LYMPHOCYTES # BLD AUTO: 0.62 K/UL — LOW (ref 1–3.3)
LYMPHOCYTES # BLD AUTO: 8.2 % — LOW (ref 13–44)
MAGNESIUM SERPL-MCNC: 2.1 MG/DL — SIGNIFICANT CHANGE UP (ref 1.6–2.6)
MCHC RBC-ENTMCNC: 19.8 PG — LOW (ref 27–34)
MCHC RBC-ENTMCNC: 19.9 PG — LOW (ref 27–34)
MCHC RBC-ENTMCNC: 30.4 G/DL — LOW (ref 32–36)
MCHC RBC-ENTMCNC: 31 G/DL — LOW (ref 32–36)
MCV RBC AUTO: 63.9 FL — LOW (ref 80–100)
MCV RBC AUTO: 65.6 FL — LOW (ref 80–100)
MONOCYTES # BLD AUTO: 1.04 K/UL — HIGH (ref 0–0.9)
MONOCYTES NFR BLD AUTO: 13.8 % — SIGNIFICANT CHANGE UP (ref 2–14)
NEUTROPHILS # BLD AUTO: 5.55 K/UL — SIGNIFICANT CHANGE UP (ref 1.8–7.4)
NEUTROPHILS NFR BLD AUTO: 73.7 % — SIGNIFICANT CHANGE UP (ref 43–77)
NITRITE UR-MCNC: NEGATIVE — SIGNIFICANT CHANGE UP
NRBC BLD AUTO-RTO: 0 /100 WBCS — SIGNIFICANT CHANGE UP (ref 0–0)
NRBC BLD AUTO-RTO: 0 /100 WBCS — SIGNIFICANT CHANGE UP (ref 0–0)
NT-PROBNP SERPL-SCNC: 406 PG/ML — HIGH (ref 0–125)
PH UR: 6 — SIGNIFICANT CHANGE UP (ref 5–8)
PLATELET # BLD AUTO: 579 K/UL — HIGH (ref 150–400)
PLATELET # BLD AUTO: 619 K/UL — HIGH (ref 150–400)
POTASSIUM SERPL-MCNC: 4.6 MMOL/L — SIGNIFICANT CHANGE UP (ref 3.5–5.3)
POTASSIUM SERPL-SCNC: 4.6 MMOL/L — SIGNIFICANT CHANGE UP (ref 3.5–5.3)
PROT SERPL-MCNC: 6.8 G/DL — SIGNIFICANT CHANGE UP (ref 6–8.3)
PROT UR-MCNC: NEGATIVE MG/DL — SIGNIFICANT CHANGE UP
PROTHROM AB SERPL-ACNC: 13.8 SEC — HIGH (ref 9.9–13.4)
RBC # BLD: 3.92 M/UL — LOW (ref 4.2–5.8)
RBC # BLD: 3.99 M/UL — LOW (ref 4.2–5.8)
RBC # FLD: 21.9 % — HIGH (ref 10.3–14.5)
RBC # FLD: 22 % — HIGH (ref 10.3–14.5)
RSV RNA NPH QL NAA+NON-PROBE: SIGNIFICANT CHANGE UP
SARS-COV-2 RNA SPEC QL NAA+PROBE: SIGNIFICANT CHANGE UP
SODIUM SERPL-SCNC: 135 MMOL/L — SIGNIFICANT CHANGE UP (ref 135–145)
SP GR SPEC: 1.02 — SIGNIFICANT CHANGE UP (ref 1–1.03)
TROPONIN I, HIGH SENSITIVITY RESULT: 11.5 NG/L — SIGNIFICANT CHANGE UP
UROBILINOGEN FLD QL: 1 MG/DL — SIGNIFICANT CHANGE UP (ref 0.2–1)
WBC # BLD: 7.53 K/UL — SIGNIFICANT CHANGE UP (ref 3.8–10.5)
WBC # BLD: 8.06 K/UL — SIGNIFICANT CHANGE UP (ref 3.8–10.5)
WBC # FLD AUTO: 7.53 K/UL — SIGNIFICANT CHANGE UP (ref 3.8–10.5)
WBC # FLD AUTO: 8.06 K/UL — SIGNIFICANT CHANGE UP (ref 3.8–10.5)

## 2025-03-03 PROCEDURE — 93010 ELECTROCARDIOGRAM REPORT: CPT

## 2025-03-03 PROCEDURE — 99223 1ST HOSP IP/OBS HIGH 75: CPT | Mod: GC

## 2025-03-03 PROCEDURE — 99285 EMERGENCY DEPT VISIT HI MDM: CPT

## 2025-03-03 PROCEDURE — 93971 EXTREMITY STUDY: CPT | Mod: 26,LT

## 2025-03-03 PROCEDURE — 71045 X-RAY EXAM CHEST 1 VIEW: CPT | Mod: 26

## 2025-03-03 PROCEDURE — 74177 CT ABD & PELVIS W/CONTRAST: CPT | Mod: 26

## 2025-03-03 PROCEDURE — 71275 CT ANGIOGRAPHY CHEST: CPT | Mod: 26

## 2025-03-03 RX ORDER — HEPARIN SODIUM 1000 [USP'U]/ML
8000 INJECTION INTRAVENOUS; SUBCUTANEOUS ONCE
Refills: 0 | Status: COMPLETED | OUTPATIENT
Start: 2025-03-03 | End: 2025-03-03

## 2025-03-03 RX ORDER — LOSARTAN POTASSIUM 100 MG/1
50 TABLET, FILM COATED ORAL DAILY
Refills: 0 | Status: DISCONTINUED | OUTPATIENT
Start: 2025-03-03 | End: 2025-03-07

## 2025-03-03 RX ORDER — BUMETANIDE 1 MG/1
1 TABLET ORAL ONCE
Refills: 0 | Status: COMPLETED | OUTPATIENT
Start: 2025-03-03 | End: 2025-03-03

## 2025-03-03 RX ORDER — HEPARIN SODIUM 1000 [USP'U]/ML
INJECTION INTRAVENOUS; SUBCUTANEOUS
Qty: 25000 | Refills: 0 | Status: DISCONTINUED | OUTPATIENT
Start: 2025-03-03 | End: 2025-03-04

## 2025-03-03 RX ORDER — ONDANSETRON HCL/PF 4 MG/2 ML
4 VIAL (ML) INJECTION EVERY 8 HOURS
Refills: 0 | Status: DISCONTINUED | OUTPATIENT
Start: 2025-03-03 | End: 2025-03-07

## 2025-03-03 RX ORDER — HEPARIN SODIUM 1000 [USP'U]/ML
8000 INJECTION INTRAVENOUS; SUBCUTANEOUS EVERY 6 HOURS
Refills: 0 | Status: DISCONTINUED | OUTPATIENT
Start: 2025-03-03 | End: 2025-03-04

## 2025-03-03 RX ORDER — MELATONIN 5 MG
3 TABLET ORAL AT BEDTIME
Refills: 0 | Status: DISCONTINUED | OUTPATIENT
Start: 2025-03-03 | End: 2025-03-07

## 2025-03-03 RX ORDER — HEPARIN SODIUM 1000 [USP'U]/ML
4000 INJECTION INTRAVENOUS; SUBCUTANEOUS EVERY 6 HOURS
Refills: 0 | Status: DISCONTINUED | OUTPATIENT
Start: 2025-03-03 | End: 2025-03-04

## 2025-03-03 RX ORDER — LABETALOL HYDROCHLORIDE 200 MG/1
100 TABLET, FILM COATED ORAL
Refills: 0 | Status: DISCONTINUED | OUTPATIENT
Start: 2025-03-03 | End: 2025-03-07

## 2025-03-03 RX ADMIN — LABETALOL HYDROCHLORIDE 100 MILLIGRAM(S): 200 TABLET, FILM COATED ORAL at 17:15

## 2025-03-03 RX ADMIN — HEPARIN SODIUM 2000 UNIT(S)/HR: 1000 INJECTION INTRAVENOUS; SUBCUTANEOUS at 19:37

## 2025-03-03 RX ADMIN — HEPARIN SODIUM 1800 UNIT(S)/HR: 1000 INJECTION INTRAVENOUS; SUBCUTANEOUS at 16:32

## 2025-03-03 RX ADMIN — BUMETANIDE 1 MILLIGRAM(S): 1 TABLET ORAL at 17:15

## 2025-03-03 RX ADMIN — HEPARIN SODIUM 1800 UNIT(S)/HR: 1000 INJECTION INTRAVENOUS; SUBCUTANEOUS at 11:25

## 2025-03-03 RX ADMIN — HEPARIN SODIUM 8000 UNIT(S): 1000 INJECTION INTRAVENOUS; SUBCUTANEOUS at 11:28

## 2025-03-03 RX ADMIN — HEPARIN SODIUM 4000 UNIT(S): 1000 INJECTION INTRAVENOUS; SUBCUTANEOUS at 20:39

## 2025-03-03 RX ADMIN — HEPARIN SODIUM 1800 UNIT(S)/HR: 1000 INJECTION INTRAVENOUS; SUBCUTANEOUS at 19:35

## 2025-03-03 NOTE — ED PROVIDER NOTE - CARE PLAN
Principal Discharge DX:	Large pleural effusion  Secondary Diagnosis:	Acute deep vein thrombosis (DVT) of upper extremity   1

## 2025-03-03 NOTE — CONSULT NOTE ADULT - ASSESSMENT
Patient is a 64y old  Male who presents with a chief complaint of LUE DVT, pleural effusion (03 Mar 2025 12:33)      #Dyspnea  - CT Angio PE --> large L sided pleural effusion  - US LUE --> DVT LUE extending into RIJ and R subclavian  - Patient previously on bumex  - Recent chest port for HD removed likely provoking DVT  - Likely thoracentesis 3/4/25    #Ischemia   - No clear evidence of acute ischemia  - Troponin negative   - EKG: NSR  - Continue to monitor for signs or symptoms of ischemia     #Arrhythmia  - EKG: NSR, unchanged from prior EKG  - Monitor and replete lytes, keep K>4, Mg>2.    #Volume Status  - Pro bnp 406  - Echo from 11/2024 EF 65%  - Will repeat TTE, follow up results   - Continue Fluid restriction  - Strict I/Os, daily weights    #HTN  - BP stable currently  - Continue home meds: labetalol 100 bid, losartan 50 daily  - Continue to monitor hemodynamics     - Other cardiovascular workup will depend on clinical course.  - All other workup per primary team.  - Will continue to follow.       Patient is a 64y old  Male who presents with a chief complaint of LUE DVT, pleural effusion (03 Mar 2025 12:33)      #Dyspnea  - CT Angio PE --> large L sided pleural effusion  - US LUE --> DVT LUE extending into RIJ and R subclavian  - Patient previously on bumex, would give 1mg IV now  - Recent chest port for HD removed likely provoking DVT  - Likely thoracentesis 3/4/25    #Ischemia   - No clear evidence of acute ischemia  - Troponin negative   - EKG: NSR  - Continue to monitor for signs or symptoms of ischemia     #Arrhythmia  - EKG: NSR, unchanged from prior EKG  - Monitor and replete lytes, keep K>4, Mg>2.    #Volume Status  - Pro bnp 406  - Echo from 11/2024 EF 65%  - Will repeat TTE, follow up results   - Continue Fluid restriction  - Strict I/Os, daily weights    #HTN  - BP stable currently  - Continue home meds: labetalol 100 bid, losartan 50 daily  - Continue to monitor hemodynamics     - Other cardiovascular workup will depend on clinical course.  - All other workup per primary team.  - Will continue to follow.       Patient is a 64y old  Male who presents with a chief complaint of LUE DVT, pleural effusion (03 Mar 2025 12:33    #Dyspnea  - CT Angio PE --> large L sided pleural effusion  - US LUE --> DVT LUE extending into RIJ and R subclavian  - Patient previously on bumex, would give 1mg IV now  - Recent chest port for HD removed likely provoking DVT  - Likely thoracentesis 3/4/25    #Ischemia   - No clear evidence of acute ischemia  - Troponin negative   - EKG: NSR  - Continue to monitor for signs or symptoms of ischemia     #Arrhythmia  - EKG: NSR, unchanged from prior EKG  - Monitor and replete lytes, keep K>4, Mg>2.    #Volume Status  - Pro bnp 406  - Echo from 11/2024 EF 65%  - Will repeat TTE, follow up results   - Continue Fluid restriction  - Strict I/Os, daily weights    #HTN  - BP stable currently  - Continue home meds: labetalol 100 bid, losartan 50 daily  - Continue to monitor hemodynamics     - Other cardiovascular workup will depend on clinical course.  - All other workup per primary team.  - Will continue to follow.

## 2025-03-03 NOTE — ED ADULT NURSE NOTE - OBJECTIVE STATEMENT
64 yr yr old male arrives to ED c/o hx of sob but worsening the past several days, Pt notes to be tachypneic during conversation but no obstruction to airway, Pt also notes to have left arm edema, extremity warm to touch, + pulses and sensation, cough noted with white sputum,  Afshan MORENO

## 2025-03-03 NOTE — H&P ADULT - NSHPPHYSICALEXAM_GEN_ALL_CORE
Physical Exam  General: awake, no apparent distress, moist mucous membranes  Neck: Supple, no lymphadenopathy  Cardiac: regular rate, no murmur  Respiratory: CTAB, no accessory muscle use. decreased bs L>R  Abdomen: Soft, nontender not distended, no HSM,  bowel sounds present  Extremities: LUE edema   Skin: No rash. Warm and well perfused, cap refill<2 seconds. R chest medport placed.   Neurologic: alert, oriented, CN intact, motor and sensation grossly intact

## 2025-03-03 NOTE — ED PROVIDER NOTE - CLINICAL SUMMARY MEDICAL DECISION MAKING FREE TEXT BOX
64M with PMh of GI lymphoma on chemo, HTN who presents with shortness of breath. patient reports symptoms for past 5 days, worsening sob and ZHOU. denies chest pain or fevers. also noticed left arm swelling as well during this time. he has had h/o anemia, last transfusion about 2 months ago, has been getting iron infusions. no AC use. previously had severe MARYSOL and was on dialysis but now reports kidneys have recovered and no longer on dialysis    plan for labs, cardiac monitoring, imaging, medications. will need to assess kidney function, possible MARYSOL or fluid overload. will doppler arm for possible DVT

## 2025-03-03 NOTE — H&P ADULT - PROBLEM SELECTOR PLAN 5
chronic   -BP stable   -on home labetalol 100mg BID   -monitor hemodynamics chronic   -BP stable   -on home labetalol 100mg BID and losartan 50mg daily   -monitor hemodynamics

## 2025-03-03 NOTE — H&P ADULT - PROBLEM SELECTOR PLAN 2
likely 2/2 Volume over load  - CTA chest/abd/pelvis: Large left pleural effusion markedly increased since 11/30/2024 with total atelectasis left lower lobe and partial atelectasis left upper lobe. New mediastinal and left axillary lymphadenopathy. Inflammatory changes associated with left subclavian vein consistent with known DVT. Upper abdominal lymphadenopathy similar to 11/30/2024. Mildly increased pelvic lymphadenopathy. Mesenteric lymphadenopathy and mesenteric fat stranding similar to prior examination. No pulmonary embolus. Limited evaluation segmental and subsegmental branches  -CXR  showed progressive opacification of the left chest compatible with significant left pleural effusion with associated compressive atelectatic change. There is suggestion of some right CP angle blunting. Increased perihilar interstitial markings also noted on the residual left chest and slight prominence on the right. Port-A-Cath as before. Borderline cardiomegaly may be present.. Regional osseous structures are unchanged.   -pro-  - Pt saturating well on RA  - on butenide 1mg daily   - prior TTE (11/2024) EF 65%   - f/u repeat TTE likely 2/2 to possible Volume over load   - CTA chest/abd/pelvis: Large left pleural effusion markedly increased since 11/30/2024 with total atelectasis left lower lobe and partial atelectasis left upper lobe. New mediastinal and left axillary lymphadenopathy. Inflammatory changes associated with left subclavian vein consistent with known DVT. Upper abdominal lymphadenopathy similar to 11/30/2024. Mildly increased pelvic lymphadenopathy. Mesenteric lymphadenopathy and mesenteric fat stranding similar to prior examination. No pulmonary embolus. Limited evaluation segmental and subsegmental branches  -CXR  showed progressive opacification of the left chest compatible with significant left pleural effusion with associated compressive atelectatic change. There is suggestion of some right CP angle blunting. Increased perihilar interstitial markings also noted on the residual left chest and slight prominence on the right. Port-A-Cath as before. Borderline cardiomegaly may be present.. Regional osseous structures are unchanged.   -pro-  - Pt saturating well on RA  - previously on butenide 1mg daily but pt states he was recently told to stopped   - prior TTE (11/2024) EF 65%   - f/u repeat TTE likely 2/2 to possible Volume over load vs hx of malignancy   - CTA chest/abd/pelvis: Large left pleural effusion markedly increased since 11/30/2024 with total atelectasis left lower lobe and partial atelectasis left upper lobe. New mediastinal and left axillary lymphadenopathy. Inflammatory changes associated with left subclavian vein consistent with known DVT. Upper abdominal lymphadenopathy similar to 11/30/2024. Mildly increased pelvic lymphadenopathy. Mesenteric lymphadenopathy and mesenteric fat stranding similar to prior examination. No pulmonary embolus. Limited evaluation segmental and subsegmental branches  -CXR  showed progressive opacification of the left chest compatible with significant left pleural effusion with associated compressive atelectatic change. There is suggestion of some right CP angle blunting. Increased perihilar interstitial markings also noted on the residual left chest and slight prominence on the right. Port-A-Cath as before. Borderline cardiomegaly may be present.. Regional osseous structures are unchanged.   -pro-  - Pt saturating well on RA  - previously on butenide 1mg daily but pt states he was recently told to stopped   - prior TTE (11/2024) EF 65%   - f/u repeat TTE  -cardio () consulted   -pulm () consulted - possible thora tomorrow npo after midnight

## 2025-03-03 NOTE — CONSULT NOTE ADULT - ATTENDING COMMENTS
64 year old male with poorly differentiated metastatic (known cervical and axillary lymphadenopathy) GI lymphoma on chemo, HTN, and anemia presented to ED with SOB.    - short of breath on exam, and found to have large left side pleural effusion  - can try Bumex 1 mg iv x 1  - favor thoracentesis tomorrow  - echo with normal LV function in 2024  - no sign of acute ischemia and troponin is negative.  - also found to have LUE DVT extending into the RIJ and right subclavian vein, now on ac with heparin gtt  - cont home anti hypertensives  - trend creatinine and electrolytes. Keep K>4, mg>2  - Other cardiovascular workup will depend on clinical course.  - Will continue to follow.

## 2025-03-03 NOTE — H&P ADULT - HISTORY OF PRESENT ILLNESS
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  Vascular consulted regarding left upper arm swelling. Pt first noticed swelling of his left arm 1 week ago, however, denies any pain, numbness, tingling, weakness of the extremity. Pt recently completed his fifth round of chemo. He also received a blood transfusion 2 weeks ago. Pt sees Oncologist Dr. Fung. Patient with prior MARYSOL 11/2024, requiring initiation of HD with left IJ tunneled catheter which was removed 2 weeks ago. Pt with right medport in place.     ED course  Vitals: T 97.5 , HR 85 , /88 , RR 20 , SpO2 100% on RA  Significant labs: Hgb 7.8, MCV 65.6, ProBNP 406 UA normal   Imaging: CXR: progressive opacification of the left chest compatible with significant left pleural effusion with associated compressive atelectatic change. There is suggestion of some right CP angle blunting. Increased perihilar interstitial markings also noted on the residual left chest and slight prominence on the right. Port-A-Cath as before. Borderline cardiomegaly may be present.. Regional osseous structures are unchanged.   UE doppler: Extensive left upper extremity DVT extending proximally to involve the right internal jugular and subclavian veins. Left cervical lymphadenopathy with abnormal morphology suggesting   malignancy.  CTA/CT abd/pelvix: Large left pleural effusion markedly increased since 11/30/2024 with total atelectasis left lower lobe and partial atelectasis left upper lobe. New mediastinal and left axillary lymphadenopathy. Inflammatory changes associated with left subclavian vein consistent with known DVT. Upper abdominal lymphadenopathy similar to 11/30/2024. Mildly increased pelvic lymphadenopathy. Mesenteric lymphadenopathy and mesenteric fat stranding similar to prior   examination. No pulmonary embolus. 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 Pt states he started having left upper arm swelling since last wednesday that has been worsening. denies any pain, numbness, tingling, weakness of the extremity.  Pt recently completed his fifth round of chemo. He also received a blood transfusions in January. He states that last week  he was supposed to get chemo but his Hgb was 7.2 and he received an iron transfusion. He states he wasnt able to get chemo during that time. He has been feeling SOB for the last few weeks as well. He was recently started on losartan. He states he only takes losartan and labetalol and was told to stop taking bumex. He sees Oncologist Dr. Fung. Patient with prior MARYSOL 11/2024, requiring initiation of HD with left IJ tunneled catheter which was removed 2 weeks ago. Pt with right medport in place.     ED course  Vitals: T 97.5 , HR 85 , /88 , RR 20 , SpO2 100% on RA  Significant labs: Hgb 7.8, MCV 65.6, ProBNP 406 UA normal   Imaging: CXR: progressive opacification of the left chest compatible with significant left pleural effusion with associated compressive atelectatic change. There is suggestion of some right CP angle blunting. Increased perihilar interstitial markings also noted on the residual left chest and slight prominence on the right. Port-A-Cath as before. Borderline cardiomegaly may be present.. Regional osseous structures are unchanged.   UE doppler: Extensive left upper extremity DVT extending proximally to involve the right internal jugular and subclavian veins. Left cervical lymphadenopathy with abnormal morphology suggesting   malignancy.  CTA/CT abd/pelvix: Large left pleural effusion markedly increased since 11/30/2024 with total atelectasis left lower lobe and partial atelectasis left upper lobe. New mediastinal and left axillary lymphadenopathy. Inflammatory changes associated with left subclavian vein consistent with known DVT. Upper abdominal lymphadenopathy similar to 11/30/2024. Mildly increased pelvic lymphadenopathy. Mesenteric lymphadenopathy and mesenteric fat stranding similar to prior   examination. No pulmonary embolus. Limited evaluation segmental and subsegmental branches  EKG: NSR 78BPM qtc 412   In ED patient given: heparin 800U inital bolus, heparin drip      64 year old male with pmhx with poorly differentiated metastatic (known cervical and axillary lymphadenopathy) GI carcinoma on chemo, HTN, and anemia presented to ED with SOB. Pt states he started having left upper arm swelling since last wednesday that has been worsening. denies any pain, numbness, tingling, weakness of the extremity.  Pt recently completed his fifth round of chemo. He also received a blood transfusions in January. He states that last week  he was supposed to get chemo but his Hgb was 7.2 and he received an iron transfusion. He states he wasnt able to get chemo during that time. He has been feeling SOB for the last few weeks as well. He was recently started on losartan. He states he only takes losartan and labetalol and was told to stop taking bumex. He sees Oncologist Dr. Fung. Patient with prior MARYSOL 11/2024, requiring initiation of HD with left IJ tunneled catheter which was removed 2 weeks ago. Pt with right medport in place.     ED course  Vitals: T 97.5 , HR 85 , /88 , RR 20 , SpO2 100% on RA  Significant labs: Hgb 7.8, MCV 65.6, ProBNP 406 UA normal   Imaging: CXR: progressive opacification of the left chest compatible with significant left pleural effusion with associated compressive atelectatic change. There is suggestion of some right CP angle blunting. Increased perihilar interstitial markings also noted on the residual left chest and slight prominence on the right. Port-A-Cath as before. Borderline cardiomegaly may be present.. Regional osseous structures are unchanged.   UE doppler: Extensive left upper extremity DVT extending proximally to involve the right internal jugular and subclavian veins. Left cervical lymphadenopathy with abnormal morphology suggesting   malignancy.  CTA/CT abd/pelvix: Large left pleural effusion markedly increased since 11/30/2024 with total atelectasis left lower lobe and partial atelectasis left upper lobe. New mediastinal and left axillary lymphadenopathy. Inflammatory changes associated with left subclavian vein consistent with known DVT. Upper abdominal lymphadenopathy similar to 11/30/2024. Mildly increased pelvic lymphadenopathy. Mesenteric lymphadenopathy and mesenteric fat stranding similar to prior   examination. No pulmonary embolus. Limited evaluation segmental and subsegmental branches  EKG: NSR 78BPM qtc 412   In ED patient given: heparin 800U inital bolus, heparin drip

## 2025-03-03 NOTE — CONSULT NOTE ADULT - ASSESSMENT
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 Pt found to have LUE DVT extending into the L IJ. subclavia, and axillary veins, with L cervical lymphadenopathy.    - no acute vascular surgical intervention at this time  - compression, elevation of LUE   - Recommend anticoagulation if no contraindication from Oncology/ Primary team   - D/W Dr. Garsia who agrees with above plan

## 2025-03-03 NOTE — ED ADULT TRIAGE NOTE - TEMPERATURE IN FAHRENHEIT (DEGREES F)
Procedure Name: Laceration Repair    Indication: Reduce risk of infection    Location: 2 cm L eyebrow laceration. V-shaped laceration R upper lip ~2 cm, superficial linear laceration R lower lip, 2 cm linear laceration L upper lip involving the vermilion border.    Pre-Procedure Diagnosis: Laceration    Post-Procedure Diagnosis: Repaired Laceration    Verbal consent was obtained before procedure started.    PROCEDURE:    The laceration was irrigated using sterile saline. Local anesthesia was achieved using 7cc of  Lidocaine 1% without epinephrine. 10 interrupted sutures were placed.    L eyebrow laceration: three 5,0 nylon sutures    R upper lip v-shaped laceration R upper lip: six 5,0 vicryl sutures    L upper lip linear laceration: two 5,0 nylon and two 5,0 vicryl sutures    Estimated blood loss was less than 0.5 mL. Wound was left open to air. The patient tolerated the procedure well without complications. Sutures to be removed in 5 days.    Peyton Beard  Emergency Medicine Resident, PGY-2  [facility]    
97.5

## 2025-03-03 NOTE — H&P ADULT - NSHPSOCIALHISTORY_GEN_ALL_CORE
Tobacco: denies  EtOH: denies  Recreational drug use: denies  Lives with: family  Ambulates: w/o assistance   ADLs: independent Tobacco: former smoker quit 15 yrs ago   EtOH: denies  Recreational drug use: denies  Lives with: brother   Ambulates: w/o assistance   ADLs: independent

## 2025-03-03 NOTE — CARE COORDINATION ASSESSMENT. - CURRENT OCCUPATION, OR IF RETIRED, PREVIOUS OCCUPATION OF PATIENT
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

## 2025-03-03 NOTE — H&P ADULT - PROBLEM SELECTOR PLAN 1
-pt presenting w/ left UE swelling 1 wk ago and SOB   -UE doppler : Extensive left upper extremity DVT extending proximally to involve the right internal jugular and subclavian veins. Left cervical lymphadenopathy with abnormal morphology suggesting malignancy.  -CTA showed no PE   -vascular surg consulted- no acute surgical intervention at this time   -c/w heparin drip

## 2025-03-03 NOTE — H&P ADULT - ATTENDING COMMENTS
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 Pt admitted for LUE DVT and pleural effusion    Pt started on hep gtt, vasc consulted, will cont to f/u recs.  Care discussed with Dr. Damico in Pulm, plan for thoracentesis tomorrow for pleural effusion.  Heme-Onc consulted, will cont to f/u recs.  Pt satting well on RA.      Kentrell Cartagena, Attending Physician 64 year old male with pmhx with poorly differentiated metastatic (known cervical and axillary lymphadenopathy) GI carcinoma on chemo, HTN, and anemia presenting w/ left upper arm swelling and SOB. Pt admitted for LUE DVT and pleural effusion    Pt started on hep gtt, vasc consulted, will cont to f/u recs.  Care discussed with Dr. Damico in Pulm, plan for thoracentesis tomorrow for pleural effusion.  Heme-Onc consulted, will cont to f/u recs.  Pt satting well on RA.      Kentrell Cartagena, Attending Physician

## 2025-03-03 NOTE — ED ADULT NURSE NOTE - CHIEF COMPLAINT QUOTE
I'm having SOB for a week now and this morning was much worse. Hx of cancer w/ anemia. Pt get chronic transfusions. Pt does not currently appear in distress, VS stable.

## 2025-03-03 NOTE — ED PROVIDER NOTE - OBJECTIVE STATEMENT
64M with PMh of GI lymphoma on chemo, HTN who presents with shortness of breath. patient reports symptoms for past 5 days, worsening sob and ZHOU. denies chest pain or fevers. also noticed left arm swelling as well during this time. he has had h/o anemia, last transfusion about 2 months ago, has been getting iron infusions. no AC use. previously had severe MARYSOL and was on dialysis but now reports kidneys have recovered and no longer on dialysis

## 2025-03-03 NOTE — ED ADULT TRIAGE NOTE - CHIEF COMPLAINT QUOTE
I'm having SOB for a week now and this morning was much worse. Hx of cancer w/ anemia. Pt get chronic transfusions. I'm having SOB for a week now and this morning was much worse. Hx of cancer w/ anemia. Pt get chronic transfusions. Pt does not currently appear in distress, VS stable.

## 2025-03-03 NOTE — ED PROVIDER NOTE - PROGRESS NOTE DETAILS
patient found to have large left sided pleural effusion likely the cause of his symptoms, at rest his o2 sat is stable. also found to have large left sided arm DVT. vascular consulted, heparin started, will require admission for IV heparin and may require thoracentesis for the effusion

## 2025-03-03 NOTE — H&P ADULT - PROBLEM SELECTOR PLAN 3
Pt hx of gastric lymphoma, rt medport in place. Follows w/ Dr. Fung  - CT Ab: Upper abdominal lymphadenopathy similar to 11/30/2024. Mildly increased pelvic lymphadenopathy. Mesenteric lymphadenopathy and mesenteric fat stranding similar to prior examination.   - Microcytic anemia on labs  - completed multiple rounds of chemo   - hx beta thal minor  -heme/onc () consulted Pt hx of gastric carcinoma, rt medport in place. Follows w/ Dr. Fung  - CT Ab: Upper abdominal lymphadenopathy similar to 11/30/2024. Mildly increased pelvic lymphadenopathy. Mesenteric lymphadenopathy and mesenteric fat stranding similar to prior examination.   - Microcytic anemia on labs  - completed multiple rounds of chemo   - hx beta thal minor  -heme/onc () consulted

## 2025-03-03 NOTE — H&P ADULT - PROBLEM SELECTOR PLAN 4
Hgb 7.8 today as per chart review baseline appears to be in 9s   -no signs of acute blood loss   -as per pt he received blood transfusion 2 wks ago   -likely due to chronic disease   -type and screen ordered   -trend Hgb will transfuse if less than 7   -heme/onc consulted Hgb 7.8 today as per chart review baseline appears to be in 9s   -no signs of acute blood loss   -as per pt he received blood transfusion in January   -likely due to chronic disease   -type and screen ordered   -trend Hgb will transfuse if less than 7   -heme/onc consulted

## 2025-03-03 NOTE — CONSULT NOTE ADULT - ASSESSMENT
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 Pt states he started having left upper arm swelling since last wednesday that has been worsening. denies any pain, numbness, tingling, weakness of the extremity.    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 Pt states he started having left upper arm swelling since last wednesday that has been worsening. denies any pain, numbness, tingling, weakness of the extremity.    dvt  dyspnea  effusions  mets ca  htn  OA  Obesity    plan for IR Pigtail - left side - concern for mets - malignant pleural eff -   I moustapha  on Diuresis as per cardio and medicine recs  I and O  TTE  cvs rx regimen  monitor VS and HD and Sat  goal sat > 88 pct  tele monitoring  BP control  dietary discretion  US doppler with DVT  CT neg for PE  pt follows with [facility]

## 2025-03-03 NOTE — CARE COORDINATION ASSESSMENT. - NSCAREPROVIDERS_GEN_ALL_CORE_FT
CARE PROVIDERS:  Accepting Physician: Kentrell Cartagena  Administration: Karel Blackwood  Administration: Rosibel Mcdaniel  Admitting: Kentrell Cartagena  Attending: Kentrell Cartagena  Case Management: Elmer Knight  Consultant: Matti Crawford  Consultant: Delores Evans  Consultant: Tali Wharton  Covering Nurse: Terrence Dickey  Covering Team: Jose Tyler  ED Attending: Jerrod Enriquez  ED Nurse: Saravanan Sanz  Nurse: Keegan Nieves  Oncology: Scarlet Pickett  Outpatient Provider: Jhonny Hunter  PCA/Nursing Assistant: Kim Tanner  Primary Team: Berenice Muñoz  : Wendy Fernandez  Student: Gabriela Gregorio// Supp. Assoc.: Renetta Urbina

## 2025-03-03 NOTE — H&P ADULT - ASSESSMENT
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 Pt admitted for LUE DVT and pleural effusion.  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 Pt admitted for LUE DVT and pleural effusion.  64 year old male with pmhx with poorly differentiated metastatic (known cervical and axillary lymphadenopathy) GI carcinoma on chemo, HTN, and anemia presenting w/ left upper arm swelling and SOB. Pt admitted for LUE DVT and pleural effusion.

## 2025-03-03 NOTE — ED ADULT NURSE REASSESSMENT NOTE - NS ED NURSE REASSESS COMMENT FT1
patient's IV site bleeding- dressing changed, IV site cleaned, tightened the extension tube to the IV, patient given pillow, made comfortable.

## 2025-03-03 NOTE — ED PROVIDER NOTE - PHYSICAL EXAMINATION
Gen: Awake, Alert, NAD  Head:  NC/AT  Eyes:  PERRL, EOMI, Conjunctiva pink, no scleral icterus  ENT:  no exudates, no erythema, uvula midline, TMs clear bilaterally, moist mucus membranes  Neck: supple, nontender, no meningismus, no JVD, trachea midline  Cardiac/CV:  S1 S2, RRR, no murmurs  Chest: nontender, no crepitus right chest port for chemo  Respiratory/Pulm:  CTAB, good air movement, normal resp effort, no wheezes/stridor/retractions/rales/rhonchi  Gastrointestinal/Abdomen:  Soft, nontender, nondistended, no rebound/guarding    Ext:  warm, well perfused, moving all extremities spontaneously, no cyanosis, no erythema, distal pulses intact, left arm diffuse swelling  Skin: intact, no rash, no petechiae, no ecchymosis  Neuro:  AAOx3, sensation intact, motor 5/5 x 4 extremities, clear speech

## 2025-03-03 NOTE — CARE COORDINATION ASSESSMENT. - OTHER PERTINENT REFERRAL INFORMATION
Sw met with Pt at bedside. Pt is A & O x4 and able to make his needs known. Sw introduced self and role and pt expressed verbal understanding. Pt was recently diagnosed with gastric lymphoma on 11/20/24.  Pt lives in a pvt home with brother with 2-3 TORREY and none inside. Pt was Indep w/ ADLS PTA and has no hx of DME, ANOOP or HC and pt would like to return home as soon as possible.

## 2025-03-04 DIAGNOSIS — C16.9 MALIGNANT NEOPLASM OF STOMACH, UNSPECIFIED: ICD-10-CM

## 2025-03-04 LAB
ANION GAP SERPL CALC-SCNC: 11 MMOL/L — SIGNIFICANT CHANGE UP (ref 5–17)
APTT BLD: 30 SEC — SIGNIFICANT CHANGE UP (ref 24.5–35.6)
APTT BLD: 77 SEC — HIGH (ref 24.5–35.6)
BUN SERPL-MCNC: 16 MG/DL — SIGNIFICANT CHANGE UP (ref 7–23)
CALCIUM SERPL-MCNC: 8.9 MG/DL — SIGNIFICANT CHANGE UP (ref 8.5–10.1)
CHLORIDE SERPL-SCNC: 103 MMOL/L — SIGNIFICANT CHANGE UP (ref 96–108)
CO2 SERPL-SCNC: 23 MMOL/L — SIGNIFICANT CHANGE UP (ref 22–31)
CREAT SERPL-MCNC: 1.3 MG/DL — SIGNIFICANT CHANGE UP (ref 0.5–1.3)
EGFR: 61 ML/MIN/1.73M2 — SIGNIFICANT CHANGE UP
EGFR: 61 ML/MIN/1.73M2 — SIGNIFICANT CHANGE UP
GLUCOSE SERPL-MCNC: 105 MG/DL — HIGH (ref 70–99)
HCT VFR BLD CALC: 24.6 % — LOW (ref 39–50)
HGB BLD-MCNC: 7.7 G/DL — LOW (ref 13–17)
LDH SERPL L TO P-CCNC: 195 U/L — SIGNIFICANT CHANGE UP (ref 50–242)
MCHC RBC-ENTMCNC: 20.1 PG — LOW (ref 27–34)
MCHC RBC-ENTMCNC: 31.3 G/DL — LOW (ref 32–36)
MCV RBC AUTO: 64.2 FL — LOW (ref 80–100)
NRBC BLD AUTO-RTO: 0 /100 WBCS — SIGNIFICANT CHANGE UP (ref 0–0)
PLATELET # BLD AUTO: 578 K/UL — HIGH (ref 150–400)
POTASSIUM SERPL-MCNC: 4.4 MMOL/L — SIGNIFICANT CHANGE UP (ref 3.5–5.3)
POTASSIUM SERPL-SCNC: 4.4 MMOL/L — SIGNIFICANT CHANGE UP (ref 3.5–5.3)
RBC # BLD: 3.83 M/UL — LOW (ref 4.2–5.8)
RBC # FLD: 21.8 % — HIGH (ref 10.3–14.5)
SODIUM SERPL-SCNC: 137 MMOL/L — SIGNIFICANT CHANGE UP (ref 135–145)
WBC # BLD: 9.27 K/UL — SIGNIFICANT CHANGE UP (ref 3.8–10.5)
WBC # FLD AUTO: 9.27 K/UL — SIGNIFICANT CHANGE UP (ref 3.8–10.5)

## 2025-03-04 PROCEDURE — 99233 SBSQ HOSP IP/OBS HIGH 50: CPT

## 2025-03-04 PROCEDURE — 99233 SBSQ HOSP IP/OBS HIGH 50: CPT | Mod: GC

## 2025-03-04 PROCEDURE — 32557 INSERT CATH PLEURA W/ IMAGE: CPT | Mod: LT

## 2025-03-04 PROCEDURE — 76942 ECHO GUIDE FOR BIOPSY: CPT | Mod: 26

## 2025-03-04 PROCEDURE — 88305 TISSUE EXAM BY PATHOLOGIST: CPT | Mod: 26

## 2025-03-04 PROCEDURE — 88112 CYTOPATH CELL ENHANCE TECH: CPT | Mod: 26

## 2025-03-04 PROCEDURE — 71045 X-RAY EXAM CHEST 1 VIEW: CPT | Mod: 26

## 2025-03-04 RX ORDER — HEPARIN SODIUM 1000 [USP'U]/ML
8000 INJECTION INTRAVENOUS; SUBCUTANEOUS EVERY 6 HOURS
Refills: 0 | Status: DISCONTINUED | OUTPATIENT
Start: 2025-03-04 | End: 2025-03-06

## 2025-03-04 RX ORDER — HEPARIN SODIUM 1000 [USP'U]/ML
INJECTION INTRAVENOUS; SUBCUTANEOUS
Qty: 25000 | Refills: 0 | Status: DISCONTINUED | OUTPATIENT
Start: 2025-03-04 | End: 2025-03-06

## 2025-03-04 RX ORDER — HEPARIN SODIUM 1000 [USP'U]/ML
4000 INJECTION INTRAVENOUS; SUBCUTANEOUS EVERY 6 HOURS
Refills: 0 | Status: DISCONTINUED | OUTPATIENT
Start: 2025-03-04 | End: 2025-03-06

## 2025-03-04 RX ADMIN — LABETALOL HYDROCHLORIDE 100 MILLIGRAM(S): 200 TABLET, FILM COATED ORAL at 05:05

## 2025-03-04 RX ADMIN — LABETALOL HYDROCHLORIDE 100 MILLIGRAM(S): 200 TABLET, FILM COATED ORAL at 17:05

## 2025-03-04 RX ADMIN — HEPARIN SODIUM 2000 UNIT(S)/HR: 1000 INJECTION INTRAVENOUS; SUBCUTANEOUS at 02:24

## 2025-03-04 RX ADMIN — HEPARIN SODIUM 2000 UNIT(S)/HR: 1000 INJECTION INTRAVENOUS; SUBCUTANEOUS at 02:21

## 2025-03-04 RX ADMIN — HEPARIN SODIUM 1800 UNIT(S)/HR: 1000 INJECTION INTRAVENOUS; SUBCUTANEOUS at 19:11

## 2025-03-04 RX ADMIN — LOSARTAN POTASSIUM 50 MILLIGRAM(S): 100 TABLET, FILM COATED ORAL at 05:05

## 2025-03-04 RX ADMIN — HEPARIN SODIUM 1800 UNIT(S)/HR: 1000 INJECTION INTRAVENOUS; SUBCUTANEOUS at 18:04

## 2025-03-04 RX ADMIN — HEPARIN SODIUM 2000 UNIT(S)/HR: 1000 INJECTION INTRAVENOUS; SUBCUTANEOUS at 07:24

## 2025-03-04 NOTE — PROGRESS NOTE ADULT - PROBLEM SELECTOR PLAN 4
Hgb 7.8 today as per chart review baseline appears to be in 9s   -no signs of acute blood loss   -as per pt he received blood transfusion in January   -likely due to chronic disease   -type and screen ordered   -trend Hgb will transfuse if less than 7   -heme/onc consulted Hgb 7.8 today as per chart review baseline appears to be in 9s   -no signs of acute blood loss   - pt missed last wk chemo sec to anemia   - pt would like blood transfusion prior to dc,  to help him for next chemo - fu hb  - likely due to chronic disease   -type and screen ordered   -heme/onc consulted

## 2025-03-04 NOTE — PRE PROCEDURE NOTE - PRE PROCEDURE EVALUATION
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 Pt states he started having left upper arm swelling since last wednesday that has been worsening.  Found to have significant left pleural effusion concerning for mets and pulmonary team is recommending IR pigtail.  Heparin drip held at 5am.    Allergies: Bactrim DS (Hives)    Medications (Abx/Cardiac/Anticoagulation/Blood Products)  buMETAnide Injectable: 1 milliGRAM(s) IV Push (03-03 @ 17:15)  heparin   Injectable: 8000 Unit(s) IV Push (03-03 @ 11:28)  heparin  Infusion.: 2000 Unit(s)/Hr IV Continuous (03-04 @ 02:25)  labetalol: 100 milliGRAM(s) Oral (03-04 @ 05:05)  losartan: 50 milliGRAM(s) Oral (03-04 @ 05:05)    Data:    T(C): 36.7  HR: 90  BP: 127/78  RR: 18  SpO2: 93%    Exam  General: No acute distress  Chest: Non labored breathing  Abdomen: Non-distended  Extremities: No swelling, warm    -WBC 9.27 / HgB 7.7 / Hct 24.6 / Plt 578  -Na 137 / Cl 103 / BUN 16 / Glucose 105  -K 4.4 / CO2 23 / Cr 1.30  -ALT -- / Alk Phos -- / T.Bili --  -INR1.18    Imaging: reviewed    Plan: 64y Male presents for left chest pigtail  -Risks/Benefits/alternatives explained with the patient and/or healthcare proxy and witnessed informed consent obtained.

## 2025-03-04 NOTE — PROGRESS NOTE ADULT - SUBJECTIVE AND OBJECTIVE BOX
Patient is a 64y old  Male who presents with a chief complaint of LUE DVT, pleural effusion (04 Mar 2025 10:26)    pt seen and examine today mild sob , no cp , no fever going ir guided effusion drain .   INTERVAL HPI/OVERNIGHT EVENTS:     T(C): 36.7 (03-04-25 @ 04:25), Max: 37.3 (03-03-25 @ 11:43)  HR: 90 (03-04-25 @ 04:25) (86 - 93)  BP: 127/78 (03-04-25 @ 04:25) (111/69 - 141/78)  RR: 18 (03-04-25 @ 04:25) (18 - 19)  SpO2: 93% (03-04-25 @ 04:25) (93% - 99%)  Wt(kg): --  I&O's Summary    03 Mar 2025 07:01  -  04 Mar 2025 07:00  --------------------------------------------------------  IN: 0 mL / OUT: 690 mL / NET: -690 mL        REVIEW OF SYSTEMS:  CONSTITUTIONAL: No fever, weight loss, or fatigue  EYES: No eye pain, visual disturbances, or discharge  ENMT:  No difficulty hearing, tinnitus, vertigo; No sinus or throat pain  NECK: No pain or stiffness  BREASTS: No pain, no masses  RESPIRATORY: No cough, wheezing, chills+  shortness of breath  CARDIOVASCULAR: No chest pain, palpitations, dizziness, or leg swelling  GASTROINTESTINAL: No abdominal or epigastric pain. No nausea, vomiting, or hematemesis; No diarrhea or constipation.  GENITOURINARY: No dysuria, frequency, hematuria, or incontinence  NEUROLOGICAL: No headaches, memory loss, loss of strength, numbness, or tremors  SKIN: No itching, burning, rashes, or lesions   MUSCULOSKELETAL: No joint pain or swelling; No muscle, back, or extremity pain    PHYSICAL EXAM:  GENERAL: NAD,   HEAD:  Atraumatic, Normocephalic  EYES: EOMI, PERRLA, conjunctiva and sclera clear  ENM ; Moist mucous membranes  NECK: Supple, No JVD  NERVOUS SYSTEM:  Alert & Oriented X3; Motor Strength 5/5 B/L upper and lower extremities; DTRs 2+ intact and symmetric  CHEST/LUNG:  rt percussion   No rales, rhonchi, wheezing, lt side low air entery    HEART: Regular rate and rhythm; No murmurs,  no tachy   ABDOMEN: Soft, Nontender, Nondistended; Bowel sounds present  EXTREMITIES:  2+ Peripheral Pulses, No clubbing, cyanosis, or edema  SKIN: No rashes or lesions    MEDICATIONS  (STANDING):  heparin  Infusion.  Unit(s)/Hr (18 mL/Hr) IV Continuous <Continuous>  labetalol 100 milliGRAM(s) Oral two times a day  losartan 50 milliGRAM(s) Oral daily    MEDICATIONS  (PRN):  heparin   Injectable 8000 Unit(s) IV Push every 6 hours PRN For aPTT less than 40  heparin   Injectable 4000 Unit(s) IV Push every 6 hours PRN For aPTT between 40 - 57  melatonin 3 milliGRAM(s) Oral at bedtime PRN Insomnia  ondansetron Injectable 4 milliGRAM(s) IV Push every 8 hours PRN Nausea and/or Vomiting      LABS:                        7.7    9.27  )-----------( 578      ( 04 Mar 2025 05:57 )             24.6     03-04    137  |  103  |  16  ----------------------------<  105[H]  4.4   |  23  |  1.30    Ca    8.9      04 Mar 2025 05:57  Mg     2.1     03-03    TPro  6.8  /  Alb  2.4[L]  /  TBili  0.4  /  DBili  x   /  AST  19  /  ALT  26  /  AlkPhos  101  03-03    PT/INR - ( 03 Mar 2025 08:10 )   PT: 13.8 sec;   INR: 1.18 ratio         PTT - ( 04 Mar 2025 09:45 )  PTT:30.0 sec  Urinalysis Basic - ( 04 Mar 2025 05:57 )    Color: x / Appearance: x / SG: x / pH: x  Gluc: 105 mg/dL / Ketone: x  / Bili: x / Urobili: x   Blood: x / Protein: x / Nitrite: x   Leuk Esterase: x / RBC: x / WBC x   Sq Epi: x / Non Sq Epi: x / Bacteria: x                    RADIOLOGY & ADDITIONAL TESTS:    Imaging Personally Reviewed:            Patient is a 64y old  Male who presents with a chief complaint of LUE DVT, pleural effusion (04 Mar 2025 10:26)    INTERVAL HPI/OVERNIGHT EVENTS:   pt seen and examine today mild sob , no cp , no fever, going ir guided effusion drain .     T(C): 36.7 (03-04-25 @ 04:25), Max: 37.3 (03-03-25 @ 11:43)  HR: 90 (03-04-25 @ 04:25) (86 - 93)  BP: 127/78 (03-04-25 @ 04:25) (111/69 - 141/78)  RR: 18 (03-04-25 @ 04:25) (18 - 19)  SpO2: 93% (03-04-25 @ 04:25) (93% - 99%)  Wt(kg): --  I&O's Summary    03 Mar 2025 07:01  -  04 Mar 2025 07:00  --------------------------------------------------------  IN: 0 mL / OUT: 690 mL / NET: -690 mL        REVIEW OF SYSTEMS:  CONSTITUTIONAL: No fever, weight loss, or fatigue  EYES: No eye pain, visual disturbances, or discharge  ENMT:  No difficulty hearing, tinnitus, vertigo; No sinus or throat pain  NECK: No pain or stiffness  BREASTS: No pain, no masses  RESPIRATORY: No cough, wheezing, chills, +  shortness of breath  CARDIOVASCULAR: No chest pain, palpitations, dizziness, or leg swelling  GASTROINTESTINAL: No abdominal or epigastric pain. No nausea, vomiting, or hematemesis; No diarrhea or constipation.  GENITOURINARY: No dysuria, frequency, hematuria, or incontinence  NEUROLOGICAL: No headaches, memory loss, loss of strength, numbness, or tremors  SKIN: No itching, burning, rashes, or lesions   MUSCULOSKELETAL: No joint pain or swelling; No muscle, back, or extremity pain    PHYSICAL EXAM:  GENERAL: NAD,   HEAD:  Atraumatic, Normocephalic  NERVOUS SYSTEM:  Alert & Oriented X3; Motor Strength 5/5 B/L upper and lower extremities; DTRs 2+ intact and symmetric  CHEST/LUNG: Left lung fields very minimal lung entry. Right lung fields CTA.   HEART: Regular rate and rhythm; No murmurs,  no tachy   ABDOMEN: Soft, Nontender, Nondistended; Bowel sounds present  EXTREMITIES:  2+ Peripheral Pulses, No clubbing, cyanosis, or edema  SKIN: No rashes or lesions    MEDICATIONS  (STANDING):  heparin  Infusion.  Unit(s)/Hr (18 mL/Hr) IV Continuous <Continuous>  labetalol 100 milliGRAM(s) Oral two times a day  losartan 50 milliGRAM(s) Oral daily    MEDICATIONS  (PRN):  heparin   Injectable 8000 Unit(s) IV Push every 6 hours PRN For aPTT less than 40  heparin   Injectable 4000 Unit(s) IV Push every 6 hours PRN For aPTT between 40 - 57  melatonin 3 milliGRAM(s) Oral at bedtime PRN Insomnia  ondansetron Injectable 4 milliGRAM(s) IV Push every 8 hours PRN Nausea and/or Vomiting      LABS:                        7.7    9.27  )-----------( 578      ( 04 Mar 2025 05:57 )             24.6     03-04    137  |  103  |  16  ----------------------------<  105[H]  4.4   |  23  |  1.30    Ca    8.9      04 Mar 2025 05:57  Mg     2.1     03-03    TPro  6.8  /  Alb  2.4[L]  /  TBili  0.4  /  DBili  x   /  AST  19  /  ALT  26  /  AlkPhos  101  03-03    PT/INR - ( 03 Mar 2025 08:10 )   PT: 13.8 sec;   INR: 1.18 ratio         PTT - ( 04 Mar 2025 09:45 )  PTT:30.0 sec  Urinalysis Basic - ( 04 Mar 2025 05:57 )    Color: x / Appearance: x / SG: x / pH: x  Gluc: 105 mg/dL / Ketone: x  / Bili: x / Urobili: x   Blood: x / Protein: x / Nitrite: x   Leuk Esterase: x / RBC: x / WBC x   Sq Epi: x / Non Sq Epi: x / Bacteria: x                    RADIOLOGY & ADDITIONAL TESTS:    Imaging Personally Reviewed:            Patient is a 64y old  Male who presents with a chief complaint of LUE DVT, pleural effusion (04 Mar 2025 10:26)    INTERVAL HPI/OVERNIGHT EVENTS: pt seen and examine today mild sob , no cp , no fever, going ir guided effusion drain .     T(C): 36.7 (03-04-25 @ 04:25), Max: 37.3 (03-03-25 @ 11:43)  HR: 90 (03-04-25 @ 04:25) (86 - 93)  BP: 127/78 (03-04-25 @ 04:25) (111/69 - 141/78)  RR: 18 (03-04-25 @ 04:25) (18 - 19)  SpO2: 93% (03-04-25 @ 04:25) (93% - 99%)  Wt(kg): --  I&O's Summary    03 Mar 2025 07:01  -  04 Mar 2025 07:00  --------------------------------------------------------  IN: 0 mL / OUT: 690 mL / NET: -690 mL        REVIEW OF SYSTEMS:  CONSTITUTIONAL: No fever, weight loss, or fatigue  EYES: No eye pain, visual disturbances, or discharge  ENMT:  No difficulty hearing, tinnitus, vertigo; No sinus or throat pain  NECK: No pain or stiffness  BREASTS: No pain, no masses  RESPIRATORY: No cough, wheezing, chills, +  shortness of breath  CARDIOVASCULAR: No chest pain, palpitations, dizziness, or leg swelling  GASTROINTESTINAL: No abdominal or epigastric pain. No nausea, vomiting, or hematemesis; No diarrhea or constipation.  GENITOURINARY: No dysuria, frequency, hematuria, or incontinence  NEUROLOGICAL: No headaches, memory loss, loss of strength, numbness, or tremors  SKIN: No itching, burning, rashes, or lesions   MUSCULOSKELETAL: No joint pain or swelling; No muscle, back, or extremity pain    PHYSICAL EXAM:  GENERAL: NAD,   HEAD:  Atraumatic, Normocephalic  NERVOUS SYSTEM:  Alert & Oriented X3; Motor Strength 5/5 B/L upper and lower extremities; DTRs 2+ intact and symmetric  CHEST/LUNG: Left lung fields very minimal lung entry. Right lung fields CTA.   HEART: Regular rate and rhythm; No murmurs,  no tachy   ABDOMEN: Soft, Nontender, Nondistended; Bowel sounds present  EXTREMITIES:  2+ Peripheral Pulses, No clubbing, cyanosis, or edema  SKIN: No rashes or lesions    MEDICATIONS  (STANDING):  heparin  Infusion.  Unit(s)/Hr (18 mL/Hr) IV Continuous <Continuous>  labetalol 100 milliGRAM(s) Oral two times a day  losartan 50 milliGRAM(s) Oral daily    MEDICATIONS  (PRN):  heparin   Injectable 8000 Unit(s) IV Push every 6 hours PRN For aPTT less than 40  heparin   Injectable 4000 Unit(s) IV Push every 6 hours PRN For aPTT between 40 - 57  melatonin 3 milliGRAM(s) Oral at bedtime PRN Insomnia  ondansetron Injectable 4 milliGRAM(s) IV Push every 8 hours PRN Nausea and/or Vomiting      LABS:                        7.7    9.27  )-----------( 578      ( 04 Mar 2025 05:57 )             24.6     03-04    137  |  103  |  16  ----------------------------<  105[H]  4.4   |  23  |  1.30    Ca    8.9      04 Mar 2025 05:57  Mg     2.1     03-03    TPro  6.8  /  Alb  2.4[L]  /  TBili  0.4  /  DBili  x   /  AST  19  /  ALT  26  /  AlkPhos  101  03-03    PT/INR - ( 03 Mar 2025 08:10 )   PT: 13.8 sec;   INR: 1.18 ratio         PTT - ( 04 Mar 2025 09:45 )  PTT:30.0 sec  Urinalysis Basic - ( 04 Mar 2025 05:57 )    Color: x / Appearance: x / SG: x / pH: x  Gluc: 105 mg/dL / Ketone: x  / Bili: x / Urobili: x   Blood: x / Protein: x / Nitrite: x   Leuk Esterase: x / RBC: x / WBC x   Sq Epi: x / Non Sq Epi: x / Bacteria: x                    RADIOLOGY & ADDITIONAL TESTS:    Imaging Personally Reviewed:

## 2025-03-04 NOTE — PROGRESS NOTE ADULT - SUBJECTIVE AND OBJECTIVE BOX
Date/Time Patient Seen:  		  Referring MD:   Data Reviewed	       Patient is a 64y old  Male who presents with a chief complaint of LUE DVT, pleural effusion (03 Mar 2025 16:39)      Subjective/HPI     PAST MEDICAL & SURGICAL HISTORY:  No pertinent past medical history    Incisional hernia  2015    Gastric lymphoma    Anemia of chronic disease    No significant past surgical history    S/P appendectomy  November 17th 2014    S/P cholecystectomy  15 years ago          Medication list         MEDICATIONS  (STANDING):  heparin  Infusion.  Unit(s)/Hr (18 mL/Hr) IV Continuous <Continuous>  labetalol 100 milliGRAM(s) Oral two times a day  losartan 50 milliGRAM(s) Oral daily    MEDICATIONS  (PRN):  heparin   Injectable 8000 Unit(s) IV Push every 6 hours PRN For aPTT less than 40  heparin   Injectable 4000 Unit(s) IV Push every 6 hours PRN For aPTT between 40 - 57  melatonin 3 milliGRAM(s) Oral at bedtime PRN Insomnia  ondansetron Injectable 4 milliGRAM(s) IV Push every 8 hours PRN Nausea and/or Vomiting         Vitals log        ICU Vital Signs Last 24 Hrs  T(C): 36.7 (04 Mar 2025 04:25), Max: 37.3 (03 Mar 2025 11:43)  T(F): 98 (04 Mar 2025 04:25), Max: 99.2 (03 Mar 2025 11:43)  HR: 90 (04 Mar 2025 04:25) (85 - 93)  BP: 127/78 (04 Mar 2025 04:25) (111/69 - 141/78)  BP(mean): --  ABP: --  ABP(mean): --  RR: 18 (04 Mar 2025 04:25) (18 - 20)  SpO2: 93% (04 Mar 2025 04:25) (93% - 100%)    O2 Parameters below as of 04 Mar 2025 04:25  Patient On (Oxygen Delivery Method): room air                 Input and Output:  I&O's Detail    03 Mar 2025 07:01  -  04 Mar 2025 04:57  --------------------------------------------------------  IN:  Total IN: 0 mL    OUT:    Voided (mL): 690 mL  Total OUT: 690 mL    Total NET: -690 mL          Lab Data                        7.9    8.06  )-----------( 619      ( 03 Mar 2025 16:14 )             25.5     03-03    135  |  104  |  13  ----------------------------<  114[H]  4.6   |  22  |  1.10    Ca    9.0      03 Mar 2025 08:10  Mg     2.1     03-03    TPro  6.8  /  Alb  2.4[L]  /  TBili  0.4  /  DBili  x   /  AST  19  /  ALT  26  /  AlkPhos  101  03-03            Review of Systems	      Objective     Physical Examination    heart s1s2  lung dc BS  head nc  head at      Pertinent Lab findings & Imaging      Jameson:  NO   Adequate UO     I&O's Detail    03 Mar 2025 07:01  -  04 Mar 2025 04:57  --------------------------------------------------------  IN:  Total IN: 0 mL    OUT:    Voided (mL): 690 mL  Total OUT: 690 mL    Total NET: -690 mL               Discussed with:     Cultures:	        Radiology

## 2025-03-04 NOTE — CASE MANAGEMENT PROGRESS NOTE - NSCMPROGRESSNOTE_GEN_ALL_CORE
Patient discussed during interdisciplinary rounds. Patient admitted with LUE DVT and pleural effusion. Per Vascular and Interventional Radiology note, patient is pending left chest pigtail today 3/4/25. Discharge disposition pending hospital course. Referral initiated for [facility] and will be sent prior to discharge if appropriate and patient in agreement. CM remains available.

## 2025-03-04 NOTE — PROGRESS NOTE ADULT - SUBJECTIVE AND OBJECTIVE BOX
[facility] -- Charles Martinez, Andrei Mccall, Olga, Eron Pablo: Office # [phone]    Follow Up: LUE DVT, pleural effusion     Subjective/Observations: Patient awake, alert, OOB to chair. Denies chest pain, palpitations and dizziness. Denies any difficulty breathing. No orthopnea and PND. Tolerating room air. Plan for thoracentesis today. Heparin gtt on hold     REVIEW OF SYSTEMS: All other review of systems are negative unless indicated above    PAST MEDICAL & SURGICAL HISTORY:  Incisional hernia  2015      Gastric lymphoma      Anemia of chronic disease      S/P appendectomy  November 17th 2014      S/P cholecystectomy  15 years ago      MEDICATIONS  (STANDING):  heparin  Infusion.  Unit(s)/Hr (18 mL/Hr) IV Continuous <Continuous>  labetalol 100 milliGRAM(s) Oral two times a day  losartan 50 milliGRAM(s) Oral daily    MEDICATIONS  (PRN):  heparin   Injectable 8000 Unit(s) IV Push every 6 hours PRN For aPTT less than 40  heparin   Injectable 4000 Unit(s) IV Push every 6 hours PRN For aPTT between 40 - 57  melatonin 3 milliGRAM(s) Oral at bedtime PRN Insomnia  ondansetron Injectable 4 milliGRAM(s) IV Push every 8 hours PRN Nausea and/or Vomiting    Allergies    Bactrim DS (Hives)    Intolerances      Vital Signs Last 24 Hrs  T(C): 36.7 (04 Mar 2025 04:25), Max: 37.3 (03 Mar 2025 11:43)  T(F): 98 (04 Mar 2025 04:25), Max: 99.2 (03 Mar 2025 11:43)  HR: 90 (04 Mar 2025 04:25) (86 - 93)  BP: 127/78 (04 Mar 2025 04:25) (111/69 - 141/78)  BP(mean): --  RR: 18 (04 Mar 2025 04:25) (18 - 19)  SpO2: 93% (04 Mar 2025 04:25) (93% - 99%)    Parameters below as of 04 Mar 2025 04:25  Patient On (Oxygen Delivery Method): room air      I&O's Summary    03 Mar 2025 07:01  -  04 Mar 2025 07:00  --------------------------------------------------------  IN: 0 mL / OUT: 690 mL / NET: -690 mL          TELE:  70-80s   PHYSICAL EXAM:  Constitutional: NAD, awake and alert  HEENT: Moist Mucous Membranes, Anicteric  Pulmonary: Non-labored, breath sounds are clear bilaterally, Diminished at bases No wheezing, rales or rhonchi  Cardiovascular: Regular, S1 and S2, No murmurs, No rubs, gallops or clicks  Gastrointestinal:  soft, nontender, nondistended   Lymph: + Le UE peripheral edema. No lymphadenopathy.   Skin: No visible rashes or ulcers.  Psych:  Mood & affect appropriate      LABS: All Labs Reviewed:                        7.7    9.27  )-----------( 578      ( 04 Mar 2025 05:57 )             24.6                         7.9    8.06  )-----------( 619      ( 03 Mar 2025 16:14 )             25.5                         7.8    7.53  )-----------( 579      ( 03 Mar 2025 08:10 )             25.7     04 Mar 2025 05:57    137    |  103    |  16     ----------------------------<  105    4.4     |  23     |  1.30   03 Mar 2025 08:10    135    |  104    |  13     ----------------------------<  114    4.6     |  22     |  1.10     Ca    8.9        04 Mar 2025 05:57  Ca    9.0        03 Mar 2025 08:10  Mg     2.1       03 Mar 2025 08:10    TPro  6.8    /  Alb  2.4    /  TBili  0.4    /  DBili  x      /  AST  19     /  ALT  26     /  AlkPhos  101    03 Mar 2025 08:10   LIVER FUNCTIONS - ( 03 Mar 2025 08:10 )  Alb: 2.4 g/dL / Pro: 6.8 g/dL / ALK PHOS: 101 U/L / ALT: 26 U/L / AST: 19 U/L / GGT: x           PT/INR - ( 03 Mar 2025 08:10 )   PT: 13.8 sec;   INR: 1.18 ratio         PTT - ( 04 Mar 2025 01:48 )  PTT:77.0 secTroponin I, High Sensitivity Result: 11.5 ng/L (03-03-25 @ 08:10)    12 Lead ECG:   Ventricular Rate 78 BPM    Atrial Rate 78 BPM    P-R Interval 134 ms    QRS Duration 90 ms    Q-T Interval 362 ms    QTC Calculation(Bazett) 412 ms    P Axis 29 degrees    R Axis 3 degrees    T Axis 5 degrees    Diagnosis Line Normal sinus rhythm  Confirmed by milton Espinoza ([id number]) on 3/3/2025 1:49:09 PM (03-03-25 @ 07:34)      TRANSTHORACIC ECHOCARDIOGRAM REPORT  ________________________________________________________________________________                                      _______       Pt. Name:       JESSE EATON Study Date:    11/23/2024  MRN:      [id number]                   YOB: 1960  Accession #:    [id number]                  Age:           64 years  Account#:       [id number]                 Gender:        M  Heart Rate:                                Height:        66.93 in (170.00 cm)  Rhythm:                                    Weight:        227.07 lb (103.00 kg)  Blood Pressure: 167/87 mmHg                BSA/BMI:       2.13 m² / 35.64 kg/m²  ________________________________________________________________________________________  Referring Physician:    [id number] David Tyler  Interpreting Physician: Lilli Pablo MD  Primary Sonographer:    Kennedy Eugene    CPT:               ECHO TTE WO CON COMP W DOPP - 65772.m  Indication(s):     Dyspnea, unspecified- R06.00  Procedure:         Transthoracic echocardiogram with 2-D, M-mode and complete                     spectral and color flow Doppler.  Ordering Location: [facility]  Admission Status:  Inpatient  Study Information: Image quality for this study is technically difficult.    _______________________________________________________________________________________     CONCLUSIONS:      1. Technically difficult image quality.   2. Left ventricular cavity is normal in size. Left ventricular systolic function is normal with an ejection fraction of 65 % by David's method of disks.   3. Normal right ventricular cavity size and normal right ventricular systolic function.   4. Trileaflet aortic valve with normal systolic excursion.   5. Structurally normal mitral valve with normal leaflet excursion.   6. Trace mitral regurgitation.   7. Tricuspid valve was not well visualized.   8. The inferior vena cava is normal in size measuring 1.50 cm in diameter, (normal <2.1cm) with normal inspiratory collapse (normal >50%) consistent with normal right atrial pressure (~3, range 0-5mmHg).   9. No pericardial effusion seen.    ________________________________________________________________________________________  FINDINGS:     Left Ventricle:  The left ventricular cavity is normal in size. Left ventricular systolic function is normal with a calculated ejection fraction of 65 % by the David's biplane method of disks.     Right Ventricle:  The right ventricular cavity is normal in size and right ventricular systolic function is normal. Tricuspid annular plane systolic excursion (TAPSE) is 3.4 cm (normal >=1.7 cm).     Left Atrium:  The left atrium is normal in size with an indexed volume of 33.72 ml/m².     Right Atrium:  The right atrium is normal in size.     Interatrial Septum:  The interatrial septum appears intact.     Aortic Valve:  The aortic valve appears trileaflet with normal systolic excursion. There is no evidence of aortic regurgitation.     Mitral Valve:  Structurally normal mitral valve with normal leaflet excursion. There is trace mitral regurgitation.     Tricuspid Valve:  The tricuspid valve was not well visualized. There is insufficient tricuspid regurgitation detected to calculate pulmonary artery systolic pressure.    Pulmonic Valve:  The pulmonic valve was not well visualized.     Aorta:  The aortic root at the sinuses of Valsalva is normal in size, measuring 3.10 cm (indexed 1.45 cm/m²). The ascending aorta is normal in size, measuring 3.10 cm (indexed 1.45 cm/m²). The aortic arch diameter is normal in size, measuring 3.0 cm (indexed 1.41 cm/m²).     Pericardium:  No pericardial effusion seen.     Systemic Veins:  The inferior vena cava is normal in size measuring 1.50 cm in diameter, (normal <2.1cm) with normal inspiratory collapse (normal >50%) consistent with normal right atrial pressure (~3, range 0-5mmHg).  ____________________________________________________________________  QUANTITATIVE DATA:  Left Ventricle Measurements: (Indexed to BSA)     IVSd (2D):   1.1 cm  LVPWd (2D):  0.9 cm  LVIDd (2D):  5.7 cm  LVIDs (2D):  3.9 cm  LV Mass:     218 g  102.1 g/m²  LV Vol d, MOD A2C: 109.0 ml 51.11 ml/m²  LV Vol d, MOD A4C: 119.0 ml 55.80 ml/m²  LV Vol d, MOD BP:  120.0 ml 56.28 ml/m²  LV Vol s, MOD A2C: 38.9 ml  18.24 ml/m²  LV Vol s, MOD A4C: 41.2 ml  19.32 ml/m²  LV Vol s, MOD BP:  42.1 ml  19.75 ml/m²  LVOT SV MOD BP:    77.9 ml  LV EF% MOD BP:     65 %     MV E Vmax:    1.39 m/s  MV A Vmax:    1.07 m/s  MV E/A:       1.30  e' lateral:   11.70 cm/s  e' medial:    10.20 cm/s  E/e' lateral: 11.88  E/e' medial:  13.63  E/e' Average: 12.69  MV DT:        165 msec    Aorta Measurements: (Normal range) (Indexed to BSA)     Ao Root d     3.10 cm (3.1 - 3.7 cm) 1.45 cm/m²  Ao Asc d, 2D: 3.10  Ao Asc prox:  3.10 cm                1.45 cm/m²  Ao Arch:      3.0 cm            Left Atrium Measurements: (Indexed to BSA)  LA Diam 2D: 4.10 cm         Right Ventricle Measurements:     TAPSE:            3.4 cm  RV Base (RVID1):  3.3 cm  RV Mid (RVID2):  2.8 cm  RV Major (RVID3): 8.3 cm       LVOT / RVOT/ Qp/Qs Data: (Indexed to BSA)  LVOT Diameter:  2.30 cm  LVOT Area:      4.15 cm²  LVOT Vmax:      1.40 m/s  LVOT Vmn:       0.922 m/s  LVOT VTI:       25.50 cm  LVOT peak grad: 8 mmHg  LVOT mean grad: 4.0 mmHg  LVOT SV:        105.9 ml  49.68 ml/m²    Aortic Valve Measurements:  AV Vmax:                2.0 m/s  AV Peak Gradient:       16.8 mmHg  AV Mean Gradient:       9.0 mmHg  AV VTI:                 39.1 cm  AV VTI Ratio:           0.65  AoVEOA, Contin:        2.71 cm²  AoV EOA, Contin i:      1.27 cm²/m²  AoV Dimensionless Index 0.65    Mitral Valve Measurements:     MV E Vmax: 1.4 m/s  MV A Vmax: 1.1 m/s  MV E/A:    1.3       Tricuspid Valve Measurements:     RA Pressure: 3 mmHg    ________________________________________________________________________________________  Electronically signed on 11/23/2024 at 3:33:11 PM by Lilli Pablo MD         *** Final ***

## 2025-03-04 NOTE — PROGRESS NOTE ADULT - PROBLEM SELECTOR PLAN 1
-pt presenting w/ left UE swelling 1 wk ago and SOB   -UE doppler : Extensive left upper extremity DVT extending proximally to involve the right internal jugular and subclavian veins. Left cervical lymphadenopathy with abnormal morphology suggesting malignancy.  -CTA showed no PE   -vascular surg consulted- no acute surgical intervention at this time   -c/w heparin drip hold for ir guided drain will resume post procedure -pt presenting w/ left UE swelling 1 wk ago and SOB   -UE doppler : Extensive left upper extremity DVT extending proximally to involve the right internal jugular and subclavian veins. Left cervical lymphadenopathy with abnormal morphology suggesting malignancy.  -CTA showed no PE   -vascular surg consulted- no acute surgical intervention at this time   -c/w heparin drip, hold for ir guided drain will resume post procedure -pt presenting w/ left UE swelling 1 wk ago and SOB   -UE doppler : Extensive left upper extremity DVT extending proximally to involve the right internal jugular and subclavian veins. Left cervical lymphadenopathy with abnormal morphology suggesting malignancy.  -CTA showed no PE   -vascular surg consulted- no acute surgical intervention at this time   -c/w heparin drip, hold for ir guided drain will resume post procedure at 6pm.

## 2025-03-04 NOTE — PROVIDER CONTACT NOTE (OTHER) - SITUATION
left chest tube in place with orders to clamp when [id number]. Adequate rapid drainage, output now 2400, clamped tubing at 1300.

## 2025-03-04 NOTE — PROGRESS NOTE ADULT - ATTENDING COMMENTS
pt seen and examine today see  above plan - 64 year old male with pmhx with poorly differentiated metastatic (known cervical and axillary lymphadenopathy) GI lymphoma on chemo  every 2 wk  - missed  last wk chemo    sec to   anemia    hx blood transfusion  , HTN, presenting w/ left upper arm swelling and SOB   as after removing   chemo port  from lt side of chest / placed new rt chest port  . Pt admitted for LUE DVT and  lt pleural effusion possible sec malignancy   Pt started on hep gtt,  hold 5  am for  Dr. Damico in Pulm, plan for thoracentesis  today  pleural effusion  by ir guided  .

## 2025-03-04 NOTE — CONSULT NOTE ADULT - SUBJECTIVE AND OBJECTIVE BOX
All records reviewed.    HPI:  65 yo man w met poorly diff gastric ca (neck/axillary/abdomen LN mets, under care Dr [facility], on FOLFOX chemo [id number], Dx'd 2024, adm [facility] 2024 w MARYSOL req temporary HD, has planned f/u  PETCT next week outpatient)  Pt had left chest/neck HD catheter removed ~2.5wks ago, reports procedure had some difficulty, noted left neck sl swelling 2 days ago and then left arm swelling x past week  Came to ER for the increased left arm swelling, also w increased SOB from baseline  Pt was due for chemo last week but Hgb only 7s and held, instead had IV iron (told needs Hgb >8 for chemo)Reports cancer has been responding to chemo  On adm,  Duplex sig for extensive LUE DVT subclavian, IM, axillary, brachial veins and basilic superficial v thrombosis. Ulnar vein not visualized  CTA c, a/p-compared w 24 larage left effusion, increased since last image, trace right effusion. No PE. Mediastinal LADs up to 2.8x1.2cm, inflamm changes surrounding left subclavian vein. Andreas renal cysts and subcentimeter hypodensities, stabble andreas mild-mod hydronephrosis. small ascites. Mesenteric/upper ab domen LADs and mild pelvic LADs to 1.3cm.    Started on IV heparin  Seen by Pulm, for IR thoracentesis today    CBC w microcytic anemia, on adm had 7.8 mcv 65.6    PAST MEDICAL & SURGICAL HISTORY:  Incisional hernia        Gastric ca w LN involvements      Anemia of chronic disease      S/P appendectomy  2014      S/P cholecystectomy  15 years ago          Review of System:  see above    MEDICATIONS  (STANDING):  heparin  Infusion.  Unit(s)/Hr (18 mL/Hr) IV Continuous <Continuous>  labetalol 100 milliGRAM(s) Oral two times a day  losartan 50 milliGRAM(s) Oral daily    MEDICATIONS  (PRN):  heparin   Injectable 8000 Unit(s) IV Push every 6 hours PRN For aPTT less than 40  heparin   Injectable 4000 Unit(s) IV Push every 6 hours PRN For aPTT between 40 - 57  melatonin 3 milliGRAM(s) Oral at bedtime PRN Insomnia  ondansetron Injectable 4 milliGRAM(s) IV Push every 8 hours PRN Nausea and/or Vomiting      Allergies    Bactrim DS (Hives)    Intolerances        SOCIAL HISTORY:  denies    FAMILY HISTORY:  Family history of coronary artery disease in father  Father -  age 60 following MI        Vital Signs Last 24 Hrs  T(C): 36.7 (04 Mar 2025 04:25), Max: 37 (03 Mar 2025 16:50)  T(F): 98 (04 Mar 2025 04:25), Max: 98.6 (03 Mar 2025 16:50)  HR: 90 (04 Mar 2025 04:25) (88 - 93)  BP: 127/78 (04 Mar 2025 04:25) (111/69 - 141/78)  BP(mean): --  RR: 18 (04 Mar 2025 04:25) (18 - 19)  SpO2: 93% (04 Mar 2025 04:25) (93% - 96%)    Parameters below as of 04 Mar 2025 04:25  Patient On (Oxygen Delivery Method): room air        PHYSICAL EXAM:      General:up in chair,in no acute distress  Eyes:sclera anicteric, pupils equal and EOMI  ENMT:buccal mucosa moist  Neck:supple, trachea midline  Lungs:clear, no wheeze/rhonchi  Cardiovascular:regular rate and rhythm, S1 S2  Abdomen:soft, nontender, no organomegaly present, bowel sounds normal. Pouchy  Neurological:alert and oriented x3, Cranial Nerves II-XII grossly intact  Skin:no increased ecchymosis/petechiae/purpura  Lymph Nodes:no palpable cervical/supraclavicular lymph nodes enlargements  Extremities:no cyanosis/clubbing. 1+ left arm edema        LABS:                        7.7    9.27  )-----------( 578      ( 04 Mar 2025 05:57 )             24.6     03-04 @ 05:57  WBC9.27  RBC3.83 Hgb7.7 Hct24.6  MCV64.2  [id number]  Auto Neutro-- Band-- Auto Lymph-- Atypical Lymph-- Reactive Lymph-- Auto Mono-- Auto Eos-- Auto Baso--        03-03 @ 16:14  WBC8.06  RBC3.99 Hgb7.9 Hct25.5  MCV63.9  [id number]  Auto Neutro-- Band-- Auto Lymph-- Atypical Lymph-- Reactive Lymph-- Auto Mono-- Auto Eos-- Auto Baso--         @ 08:10  WBC7.53  RBC3.92 Hgb7.8 Hct25.7  MCV65.6  [id number]  Auto Neutro-- Band-- Auto Lymph-- Atypical Lymph-- Reactive Lymph-- Auto Mono-- Auto Eos-- Auto Baso--              137  |  103  |  16  ----------------------------<  105[H]  4.4   |  23  |  1.30    Ca    8.9      04 Mar 2025 05:57  Mg     2.1     -    TPro  6.8  /  Alb  2.4[L]  /  TBili  0.4  /  DBili  x   /  AST  19  /  ALT  26  /  AlkPhos  101  03-03    03-04 @ 09:45  PT-- INR--  PTT30.0  - @ 01:48  PT-- INR--  PTT77.0   @ 16:14  PT-- INR--  PTT53.2  - @ 08:10  PT13.8 INR1.18  PTT27.4    Urinalysis Basic - ( 04 Mar 2025 05:57 )    Color: x / Appearance: x / SG: x / pH: x  Gluc: 105 mg/dL / Ketone: x  / Bili: x / Urobili: x   Blood: x / Protein: x / Nitrite: x   Leuk Esterase: x / RBC: x / WBC x   Sq Epi: x / Non Sq Epi: x / Bacteria: x        PERIPHERAL BLOOD SMEAR REVIEW:      RADIOLOGY & ADDITIONAL STUDIES:  < from: CT Angio Chest PE Protocol w/ IV Cont (25 @ 10:24) >    ACC: [id number] EXAM:  CT ABDOMEN AND PELVIS IC   ORDERED BY:  ELIJAH GONZALEZ     ACC: [id number] EXAM:  CT ANGIO CHEST PULM ART WAWIC   ORDERED BY:  ELIJAH GONZALEZ     PROCEDURE DATE:  2025          INTERPRETATION:  CLINICAL INFORMATION: 64-year-old man with history of   gastric lymphoma, presenting with shortness of breath, pleural effusion,   and left arm swelling    COMPARISON: CT 2024    CONTRAST/COMPLICATIONS:  IV Contrast: Omnipaque 350  90 cc administered   10 cc discarded  Oral Contrast:NONE  .    PROCEDURE:  CT Angiography of the Chest was performed followed by portal venous phase   imaging of the Abdomen and Pelvis.  Sagittal and coronal reformats were performed as well as 3D (MIP)   reconstructions.    FINDINGS:  CHEST:  LUNGS AND LARGE AIRWAYS: Patent central airways. Total atelectasis left   lower lobe and partial atelectasis left upper lobe.  PLEURA: Large left pleural effusion. Increased since 2024 trace   right pleural effusion. Patchy atelectasis residual left upper lobe  VESSELS: No pulmonary embolus. Limited evaluation segmental and   subsegmental branches. Right-sided central line ending in SVC again   noted. Interval removal of left-sided central line.  HEART: Heart size is normal. Trace pericardial effusion. Coronary artery   calcification.  MEDIASTINUM AND STELLA: Enlarged prevascular lymph nodes, for instance   measuring 2.3 x 1.2 cm (301:38  Prominent subcarinal lymph node approximately 2.8 x 1.2 cm. Right-sided   paratracheal lymph node 2 x 1.8 cm. All new since 2024  CHEST WALL AND LOWER NECK: Multiple small left axillary lymph nodes.   Inflammatory changes surrounding left subclavian vein. Consistent with   previously demonstrated DVT on ultrasound    ABDOMEN AND PELVIS:  LIVER: Within normal limits.  BILE DUCTS: Normal caliber.  GALLBLADDER: Within normal limits.  SPLEEN: Within normal limits.  PANCREAS: Within normal limits.  ADRENALS: Within normal limits.  KIDNEYS/URETERS: Bilateral cysts and subcentimeter hypodensities. Mild to   moderate bilateral hydronephrosis unchanged. Nondilated ureters traced to   the urinary bladder.    BLADDER: Bladder wall thickening which may be due to under distention.  REPRODUCTIVE ORGANS: Normal-sized prostate.    BOWEL: No bowel obstruction. Appendix not visualized. No evidence   appendicitis.  PERITONEUM/RETROPERITONEUM: Small ascites. Mesenteric lymphadenopathy   with increased attenuation mesenteric fat. Thickening perirenal fascia.  VESSELS: Within normal limits.  LYMPH NODES: Upper abdominal lymphadenopathy unchanged. Mild increase in   pelvic lymphadenopathy, for instance left external iliac lymph nodes   measuring up to 13 x 11 mm (304:82), right external iliac lymph node 2.2   x 1 cm (304:100).  ABDOMINAL WALL: Within normal limits.  MUSCULOSKELETAL.: New calcification anterior right psoas muscle.   Degenerative change.    IMPRESSION:  Large left pleural effusion markedly increased since 2024 with   total atelectasis left lower lobe and partial atelectasis left upper lobe.  New mediastinal and left axillary lymphadenopathy.  Inflammatory changes associated with left subclavian vein consistent with   known DVT.  Upper abdominal lymphadenopathy similar to 2024.  Mildly increased pelvic lymphadenopathy.  Mesenteric lymphadenopathy and mesenteric fat stranding similar to prior   examination  No pulmonary embolus. Limited evaluation segmental and subsegmental   branches        --- End of Report ---      < end of copied text >  < from: US Duplex Venous Upper Ext Ltd, Left (25 @ 09:13) >  ACC: [id number] EXAM:  US DPLX UPR EXT VEINS LTD LT   ORDERED BY:  ELIJAH GONZALEZ     PROCEDURE DATE:  2025          INTERPRETATION:  CLINICAL INFORMATION: 64-year-old man with left arm   swelling for 5 days. Shortness of breath    COMPARISON: None available.    TECHNIQUE: Duplex sonography of the LEFT UPPER extremity veins with color   and spectral Doppler, with and without compression.    FINDINGS:  Thrombus, largely occlusive, is identified in the left subclavian,   internal jugular, axillary,brachial, and basilic (superficial) veins.   The left brachial vein is patent. The ulnar vein is not visualized. The   cephalic vein (superficial vein) is patent and without thrombus.    Evaluation of the right internal jugular and subclavian veins is   unremarkable. Enlarged left cervical lymph nodes with abnormal morphology   are visualized, for instance measuring up to 2.8 x 1.6 x 2 cm, suggestive   of malignancy.    IMPRESSION:  Extensive left upper extremity DVT extending proximally to involve the   right internal jugular and subclavian veins.    Left cervical lymphadenopathy with abnormal morphology suggesting   malignancy.        --- End of Report ---    < end of copied text >  
Vascular Attending:  Dr. 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  Vascular consulted regarding left upper arm swelling. Pt first noticed swelling of his left arm 1 week ago, however, denies any pain, numbness, tingling, weakness of the extremity. Pt recently completed his fifth round of chemo. He also received a blood transfusion 2 weeks ago. Pt sees Oncologist Dr. Fung.   Vascular hx: Patient with prior MARYSOL 2024, requiring initiation of HD with left IJ tunneled catheter which was removed 2 weeks ago. Pt with right medport in place.     PAST MEDICAL & SURGICAL HISTORY:  Incisional hernia        Gastric lymphoma      Anemia of chronic disease      S/P appendectomy  2014      S/P cholecystectomy  15 years ago          REVIEW OF SYSTEMS- as above, otherwise negative       General:	    Skin/Breast:  	  Ophthalmologic:  	  ENMT:	    Respiratory and Thorax:  	  Cardiovascular:	    Gastrointestinal:	    Genitourinary:	    Musculoskeletal:	    Neurological:	    Psychiatric:	    Hematology/Lymphatics:	    Endocrine:	    Allergic/Immunologic:	    MEDICATIONS  (STANDING):  heparin   Injectable 8000 Unit(s) IV Push once  heparin  Infusion.  Unit(s)/Hr (18 mL/Hr) IV Continuous <Continuous>    MEDICATIONS  (PRN):  heparin   Injectable 8000 Unit(s) IV Push every 6 hours PRN For aPTT less than 40  heparin   Injectable 4000 Unit(s) IV Push every 6 hours PRN For aPTT between 40 - 57      Allergies    Bactrim DS (Hives)    Intolerances        SOCIAL HISTORY:      Vital Signs Last 24 Hrs  T(C): 36.4 (03 Mar 2025 06:52), Max: 36.4 (03 Mar 2025 06:52)  T(F): 97.5 (03 Mar 2025 06:52), Max: 97.5 (03 Mar 2025 06:52)  HR: 85 (03 Mar 2025 06:52) (85 - 85)  BP: 138/88 (03 Mar 2025 06:52) (138/88 - 138/88)  BP(mean): --  RR: 20 (03 Mar 2025 08:27) (20 - 20)  SpO2: 98% (03 Mar 2025 08:27) (98% - 100%)    Parameters below as of 03 Mar 2025 08:27  Patient On (Oxygen Delivery Method): room air        PHYSICAL EXAM:      Constitutional: NAD, Well appearing. Alert and oriented X3     Extremities: LUE swelling and tenderness, most prominent in bicep area. Skin intact, no discoloration. Motor and sensory intact.     Pulses: LUE brachial and radial pulses intact       LABS:                        7.8    7.53  )-----------( 579      ( 03 Mar 2025 08:10 )             25.7         135  |  104  |  13  ----------------------------<  114[H]  4.6   |  22  |  1.10    Ca    9.0      03 Mar 2025 08:10  Mg     2.1     -    TPro  6.8  /  Alb  2.4[L]  /  TBili  0.4  /  DBili  x   /  AST  19  /  ALT  26  /  AlkPhos  101  -    PT/INR - ( 03 Mar 2025 08:10 )   PT: 13.8 sec;   INR: 1.18 ratio         PTT - ( 03 Mar 2025 08:10 )  PTT:27.4 sec  Urinalysis Basic - ( 03 Mar 2025 09:50 )    Color: Yellow / Appearance: Clear / S.016 / pH: x  Gluc: x / Ketone: Negative mg/dL  / Bili: Negative / Urobili: 1.0 mg/dL   Blood: x / Protein: Negative mg/dL / Nitrite: Negative   Leuk Esterase: Trace / RBC: 1 /HPF / WBC 4 /HPF   Sq Epi: x / Non Sq Epi: x / Bacteria: Negative /HPF        RADIOLOGY & ADDITIONAL STUDIES    < from:  Duplex Venous Upper Ext Ltd, Left (25 @ 09:13) >  PROCEDURE DATE:  2025          INTERPRETATION:  CLINICAL INFORMATION: 64-year-old man with left arm   swelling for 5 days. Shortness of breath    COMPARISON: None available.    TECHNIQUE: Duplex sonography of the LEFT UPPER extremity veins with color   and spectral Doppler, with and without compression.    FINDINGS:  Thrombus, largely occlusive, is identified in the left subclavian,   internal jugular, axillary,brachial, and basilic (superficial) veins.   The left brachial vein is patent. The ulnar vein is not visualized. The   cephalic vein (superficial vein) is patent and without thrombus.    Evaluation of the right internal jugular and subclavian veins is   unremarkable. Enlarged left cervical lymph nodes with abnormal morphology   are visualized, for instance measuring up to 2.8 x 1.6 x 2 cm, suggestive   of malignancy.    IMPRESSION:  Extensive left upper extremity DVT extending proximally to involve the   right internal jugular and subclavian veins.    Left cervical lymphadenopathy with abnormal morphology suggesting   malignancy.    < end of copied text >    
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 Pt states he started having left upper arm swelling since last wednesday that has been worsening. denies any pain, numbness, tingling, weakness of the extremity.  Pt recently completed his fifth round of chemo. He also received a blood transfusions in January. He states that last week  he was supposed to get chemo but his Hgb was 7.2 and he received an iron transfusion. He states he wasnt able to get chemo during that time. He has been feeling SOB for the last few weeks as well. He was recently started on losartan. He states he only takes losartan and labetalol and was told to stop taking bumex. He sees Oncologist Dr. Fung. Patient with prior MARYSOL 2024, requiring initiation of HD with left IJ tunneled catheter which was removed 2 weeks ago. Pt with right medport in place.     ED course  Vitals: T 97.5 , HR 85 , /88 , RR 20 , SpO2 100% on RA  Significant labs: Hgb 7.8, MCV 65.6, ProBNP 406 UA normal   Imaging: CXR: progressive opacification of the left chest compatible with significant left pleural effusion with associated compressive atelectatic change. There is suggestion of some right CP angle blunting. Increased perihilar interstitial markings also noted on the residual left chest and slight prominence on the right. Port-A-Cath as before. Borderline cardiomegaly may be present.. Regional osseous structures are unchanged.   UE doppler: Extensive left upper extremity DVT extending proximally to involve the right internal jugular and subclavian veins. Left cervical lymphadenopathy with abnormal morphology suggesting   malignancy.  CTA/CT abd/pelvix: Large left pleural effusion markedly increased since 2024 with total atelectasis left lower lobe and partial atelectasis left upper lobe. New mediastinal and left axillary lymphadenopathy. Inflammatory changes associated with left subclavian vein consistent with known DVT. Upper abdominal lymphadenopathy similar to 2024. Mildly increased pelvic lymphadenopathy. Mesenteric lymphadenopathy and mesenteric fat stranding similar to prior   examination. No pulmonary embolus. Limited evaluation segmental and subsegmental branches  EKG: NSR 78BPM qtc 412   In ED patient given: heparin 800U inital bolus, heparin drip      (03 Mar 2025 12:33)        PAST MEDICAL & SURGICAL HISTORY:  Incisional hernia  2015      Gastric lymphoma      Anemia of chronic disease      S/P appendectomy  2014      S/P cholecystectomy  15 years ago                ECHO  FINDINGS:  TTE 2024 --> EF 65%      MEDICATIONS  (STANDING):  heparin  Infusion.  Unit(s)/Hr (18 mL/Hr) IV Continuous <Continuous>  labetalol 100 milliGRAM(s) Oral two times a day  losartan 50 milliGRAM(s) Oral daily    MEDICATIONS  (PRN):  heparin   Injectable 8000 Unit(s) IV Push every 6 hours PRN For aPTT less than 40  heparin   Injectable 4000 Unit(s) IV Push every 6 hours PRN For aPTT between 40 - 57  melatonin 3 milliGRAM(s) Oral at bedtime PRN Insomnia      FAMILY HISTORY:  Family history of coronary artery disease in father  Father -  age 60 following MI      Denies Family history of CAD or early MI    ROS:  Constitutional: denies fever, chills  HEENT: denies blurry vision, difficulty hearing  Respiratory: + SOB, +ZHOU, no cough  Cardiovascular: denies CP, palpitations, orthopnea, PND, LE edema  Gastrointestinal: denies nausea, vomiting, abdominal pain  Genitourinary: denies urinary changes  Skin: Denies rashes, itching  Neurologic: denies headache, weakness, dizziness  Hematology/Oncology: denies bleeding, easy bruising  ROS negative except as noted above      SOCIAL HISTORY:    No tobacco, Alcohol or Drug use    Vital Signs Last 24 Hrs  T(C): 36.8 (03 Mar 2025 15:10), Max: 37.3 (03 Mar 2025 11:43)  T(F): 98.2 (03 Mar 2025 15:10), Max: 99.2 (03 Mar 2025 11:43)  HR: 91 (03 Mar 2025 15:10) (85 - 91)  BP: 141/78 (03 Mar 2025 15:10) (120/63 - 141/78)  RR: 19 (03 Mar 2025 15:10) (18 - 20)  SpO2: 96% (03 Mar 2025 15:10) (96% - 100%)    Parameters below as of 03 Mar 2025 15:10  Patient On (Oxygen Delivery Method): room air        Physical Exam:  General: Well developed, well nourished, NAD  HEENT: NCAT, PERRLA, EOMI bl, moist mucous membranes   Neck: Supple, nontender, no mass  Neurology: A&Ox3, nonfocal, sensation intact   Respiratory: Decreased breath sounds L>R  CV: RRR, +S1/S2, no murmurs, rubs or gallops  Abdominal: Soft, NT, ND +BSx4, no palpable masses  Extremities: No C/C/E, + peripheral pulses  MSK: Normal ROM, no joint erythema or warmth, no joint swelling   Heme: No obvious ecchymosis or petechiae   Skin: warm, dry, normal color      ECG: NSR    I&O's Detail      LABS:                        7.8    7.53  )-----------( 579      ( 03 Mar 2025 08:10 )             25.7     03-    135  |  104  |  13  ----------------------------<  114[H]  4.6   |  22  |  1.10    Ca    9.0      03 Mar 2025 08:10  Mg     2.1     03-03    TPro  6.8  /  Alb  2.4[L]  /  TBili  0.4  /  DBili  x   /  AST  19  /  ALT  26  /  AlkPhos  101  03-03        PT/INR - ( 03 Mar 2025 08:10 )   PT: 13.8 sec;   INR: 1.18 ratio         PTT - ( 03 Mar 2025 08:10 )  PTT:27.4 sec  Urinalysis Basic - ( 03 Mar 2025 09:50 )    Color: Yellow / Appearance: Clear / S.016 / pH: x  Gluc: x / Ketone: Negative mg/dL  / Bili: Negative / Urobili: 1.0 mg/dL   Blood: x / Protein: Negative mg/dL / Nitrite: Negative   Leuk Esterase: Trace / RBC: 1 /HPF / WBC 4 /HPF   Sq Epi: x / Non Sq Epi: x / Bacteria: Negative /HPF      I&O's Summary    BNP  RADIOLOGY & ADDITIONAL STUDIES:
Date/Time Patient Seen:  		  Referring MD:   Data Reviewed	       Patient is a 64y old  Male who presents with a chief complaint of LUE DVT, pleural effusion (03 Mar 2025 15:38)      Subjective/HPI   Outpatient Providers	Heme/Onc-       Patient Identity:  · Birth Sex	Male  · Patient's Preferred Pronoun	Him/He     History of Present Illness:  Reason for Admission: LUE DVT, pleural effusion  History of Present Illness:   64 year old male with pmhx with poorly differentiated metastatic (known cervical and axillary lymphadenopathy) GI lymphoma on chemo, HTN, and anemia presented to ED with SOB. Pt states he started having left upper arm swelling since last wednesday that has been worsening. denies any pain, numbness, tingling, weakness of the extremity.  Pt recently completed his fifth round of chemo. He also received a blood transfusions in January. He states that last week  he was supposed to get chemo but his Hgb was 7.2 and he received an iron transfusion. He states he wasnt able to get chemo during that time. He has been feeling SOB for the last few weeks as well. He was recently started on losartan. He states he only takes losartan and labetalol and was told to stop taking bumex. He sees Oncologist Dr. Fung. Patient with prior MARYSOL 2024, requiring initiation of HD with left IJ tunneled catheter which was removed 2 weeks ago. Pt with right medport in place.     ED course  Vitals: T 97.5 , HR 85 , /88 , RR 20 , SpO2 100% on RA  Significant labs: Hgb 7.8, MCV 65.6, ProBNP 406 UA normal   Imaging: CXR: progressive opacification of the left chest compatible with significant left pleural effusion with associated compressive atelectatic change. There is suggestion of some right CP angle blunting. Increased perihilar interstitial markings also noted on the residual left chest and slight prominence on the right. Port-A-Cath as before. Borderline cardiomegaly may be present.. Regional osseous structures are unchanged.   UE doppler: Extensive left upper extremity DVT extending proximally to involve the right internal jugular and subclavian veins. Left cervical lymphadenopathy with abnormal morphology suggesting   malignancy.  CTA/CT abd/pelvix: Large left pleural effusion markedly increased since 2024 with total atelectasis left lower lobe and partial atelectasis left upper lobe. New mediastinal and left axillary lymphadenopathy. Inflammatory changes associated with left subclavian vein consistent with known DVT. Upper abdominal lymphadenopathy similar to 2024. Mildly increased pelvic lymphadenopathy. Mesenteric lymphadenopathy and mesenteric fat stranding similar to prior   examination. No pulmonary embolus. Limited evaluation segmental and subsegmental branches  EKG: NSR 78BPM qtc 412   In ED patient given: heparin 800U inital bolus, heparin drip            Review of Systems:  Other Review of Systems: All other review of systems negative, except as noted in HPI      Allergies and Intolerances:        Allergies:  	Bactrim DS: Drug, Hives    Home Medications:   * Patient Currently Takes Medications as of 03-Mar-2025 13:51 documented in Structured Notes  · 	labetalol 100 mg oral tablet: Last Dose Taken:  , 1 tab(s) orally 2 times a day  · 	losartan 50 mg oral tablet: Last Dose Taken:  , 1 tab(s) orally once a day    Patient History:    Past Medical, Past Surgical, and Family History:  PAST MEDICAL HISTORY:  Anemia of chronic disease     Gastric lymphoma     Incisional hernia .     PAST SURGICAL HISTORY:  S/P appendectomy 2014    S/P cholecystectomy 15 years ago.     FAMILY HISTORY:  Family history of coronary artery disease in father, Father -  age 60 following MI.     Social History:  · Substance use	No  · Social History (marital status, living situation, occupation, and sexual history)	Tobacco: former smoker quit 15 yrs ago   EtOH: denies  Recreational drug use: denies  Lives with: brother   Ambulates: w/o assistance   ADLs: independent     Tobacco Screening:  · Core Measure Site	Yes  · Has the patient used tobacco in the past 30 days?	No    Risk Assessment:    Present on Admission:  Deep Venous Thrombosis	yes  Pulmonary Embolus	no     HIV Screening:  · In accordance with [place] State law, we offer every patient who comes to our ED an HIV test. Would you like to be tested today?	Opt out     Hepatitis C Test Questions:  · In accordance with [place] [employer] Law, we offer every patient a Hepatitis C test. Would you like to be tested today?	Previously negative    PAST MEDICAL & SURGICAL HISTORY:  No pertinent past medical history    Incisional hernia      Gastric lymphoma    Anemia of chronic disease    No significant past surgical history    S/P appendectomy  2014    S/P cholecystectomy  15 years ago          Medication list         MEDICATIONS  (STANDING):  buMETAnide Injectable 1 milliGRAM(s) IV Push once  heparin  Infusion.  Unit(s)/Hr (18 mL/Hr) IV Continuous <Continuous>  labetalol 100 milliGRAM(s) Oral two times a day  losartan 50 milliGRAM(s) Oral daily    MEDICATIONS  (PRN):  heparin   Injectable 8000 Unit(s) IV Push every 6 hours PRN For aPTT less than 40  heparin   Injectable 4000 Unit(s) IV Push every 6 hours PRN For aPTT between 40 - 57  melatonin 3 milliGRAM(s) Oral at bedtime PRN Insomnia         Vitals log        ICU Vital Signs Last 24 Hrs  T(C): 36.8 (03 Mar 2025 15:10), Max: 37.3 (03 Mar 2025 11:43)  T(F): 98.2 (03 Mar 2025 15:10), Max: 99.2 (03 Mar 2025 11:43)  HR: 91 (03 Mar 2025 15:10) (85 - 91)  BP: 141/78 (03 Mar 2025 15:10) (120/63 - 141/78)  BP(mean): --  ABP: --  ABP(mean): --  RR: 19 (03 Mar 2025 15:10) (18 - 20)  SpO2: 96% (03 Mar 2025 15:10) (96% - 100%)    O2 Parameters below as of 03 Mar 2025 15:10  Patient On (Oxygen Delivery Method): room air                 Input and Output:  I&O's Detail      Lab Data                        7.8    7.53  )-----------( 579      ( 03 Mar 2025 08:10 )             25.7     03-03    135  |  104  |  13  ----------------------------<  114[H]  4.6   |  22  |  1.10    Ca    9.0      03 Mar 2025 08:10  Mg     2.1     03-03    TPro  6.8  /  Alb  2.4[L]  /  TBili  0.4  /  DBili  x   /  AST  19  /  ALT  26  /  AlkPhos  101  03-03            Review of Systems	  sob  aranda  weakness  cough      Objective     Physical Examination  heart s1s2  lung dc BS  head nc  head at  abd soft  verbal  alert  cn grossly int        Pertinent Lab findings & Imaging      Barba:  NO   Adequate UO     I&O's Detail           Discussed with:     Cultures:	        Radiology        ACC: [id number] EXAM:  CT ABDOMEN AND PELVIS IC   ORDERED BY:  ELIJAH GONZALEZ     ACC: [id number] EXAM:  CT ANGIO CHEST PULM ART [facility]   ORDERED BY:  ELIJAH GONZALEZ     PROCEDURE DATE:  2025          INTERPRETATION:  CLINICAL INFORMATION: 64-year-old man with history of   gastric lymphoma, presenting with shortness of breath, pleural effusion,   and left arm swelling    COMPARISON: CT 2024    CONTRAST/COMPLICATIONS:  IV Contrast: Omnipaque 350  90 cc administered   10 cc discarded  Oral Contrast: NONE  .    PROCEDURE:  CT Angiography of the Chest was performed followed by portal venous phase   imaging of the Abdomen and Pelvis.  Sagittal and coronal reformats were performed as well as 3D (MIP)   reconstructions.    FINDINGS:  CHEST:  LUNGS AND LARGE AIRWAYS: Patent central airways. Total atelectasis left   lower lobe and partial atelectasis left upper lobe.  PLEURA: Large left pleural effusion. Increased since 2024 trace   right pleural effusion. Patchy atelectasis residual left upper lobe  VESSELS: No pulmonary embolus. Limited evaluation segmental and   subsegmental branches. Right-sided central line ending in SVC again   noted. Interval removal of left-sided central line.  HEART: Heart size is normal. Trace pericardial effusion. Coronary artery   calcification.  MEDIASTINUM AND STELLA: Enlarged prevascular lymph nodes, for instance   measuring 2.3 x 1.2 cm (301:38  Prominent subcarinal lymph node approximately 2.8 x 1.2 cm. Right-sided   paratracheal lymph node 2 x 1.8 cm. All new since 2024  CHEST WALL AND LOWER NECK: Multiple small left axillary lymph nodes.   Inflammatory changes surrounding left subclavian vein. Consistent with   previously demonstrated DVT on ultrasound    ABDOMEN AND PELVIS:  LIVER: Within normal limits.  BILE DUCTS: Normal caliber.  GALLBLADDER: Within normal limits.  SPLEEN: Within normal limits.  PANCREAS: Within normal limits.  ADRENALS: Within normal limits.  KIDNEYS/URETERS: Bilateral cysts and subcentimeter hypodensities. Mild to   moderate bilateral hydronephrosis unchanged. Nondilated ureters traced to   the urinary bladder.    BLADDER: Bladder wall thickening which may be due to under distention.  REPRODUCTIVE ORGANS: Normal-sized prostate.    BOWEL: No bowel obstruction. Appendix not visualized. No evidence   appendicitis.  PERITONEUM/RETROPERITONEUM: Small ascites. Mesenteric lymphadenopathy   with increased attenuation mesenteric fat. Thickening perirenal fascia.  VESSELS: Within normal limits.  LYMPH NODES: Upper abdominal lymphadenopathy unchanged. Mild increase in   pelvic lymphadenopathy, for instance left external iliac lymph nodes   measuring up to 13 x 11 mm (304:82), right external iliac lymph node 2.2   x 1 cm (304:100).  ABDOMINAL WALL: Within normal limits.  MUSCULOSKELETAL.: New calcification anterior right psoas muscle.   Degenerative change.    IMPRESSION:  Large left pleural effusion markedly increased since 2024 with   total atelectasis left lower lobe and partial atelectasis left upper lobe.  New mediastinal and left axillary lymphadenopathy.  Inflammatory changes associated with left subclavian vein consistent with   known DVT.  Upper abdominal lymphadenopathy similar to 2024.  Mildly increased pelvic lymphadenopathy.  Mesenteric lymphadenopathy and mesenteric fat stranding similar to prior   examination  No pulmonary embolus. Limited evaluation segmental and subsegmental   branches        --- End of Report ---            DARIELA MARMOLEJO MD; Attending Radiologist  This document has been electronically signed. Mar  3 2025 11:05AM

## 2025-03-04 NOTE — CONSULT NOTE ADULT - ASSESSMENT
65 yo man w met poorly diff gastric ca (neck/axillary/abdomen LN mets, under care Dr [facility], on FOLFOX chemo [id number], Dx'd 11/2024, adm [facility] 12/2024 w MARYSOL req temporary HD, has planned f/u  PETCT next week outpatient)  Pt had left chest/neck HD catheter removed ~2.5wks ago, reports procedure had some difficulty, noted left neck sl swelling 2 days ago and then left arm swelling x past week  Came to ER for the increased left arm swelling, also w increased SOB from baseline  Pt was due for chemo last week but Hgb only 7s and held, instead had IV iron (told needs Hgb >8 for chemo)Reports cancer has been responding to chemo  On adm,  Duplex sig for extensive LUE DVT subclavian, IM, axillary, brachial veins and basilic superficial v thrombosis. Ulnar vein not visualized  CTA c, a/p-compared w 12/1/24 larage left effusion, increased since last image, trace right effusion. No PE. Mediastinal LADs up to 2.8x1.2cm, inflamm changes surrounding left subclavian vein. Doug renal cysts and subcentimeter hypodensities, stabble odug mild-mod hydronephrosis. small ascites. Mesenteric/upper ab domen LADs and mild pelvic LADs to 1.3cm.  Right chest infusoport in place    Started on IV heparin  Seen by Pulm, for IR thoracentesis today    CBC w microcytic anemia, on adm had 7.8 mcv 65.6    -LUE extensive DVT and supericial thrombosis-suspect assoc w the previous HD catheter, w baseline hyper coagulable state due to met gastric ca. Did have difficulty in removing the catheter 2/5wks ago and subsequently w progressive left neck adn arm symptoms  -continue IV heparin, once more stable and w symptoms improvement can discharge on Eliquis. Likely will need long term anticoaulation  -after discharge pt will continue followup w Dr Mckeon    -SOB w increased large left effusion-suspect due to met gastric ca-seen by Pulm, for thoracentesis. Will followup     -anemia-microcytic, had received IV iron as outpatinet w own Heme/Onc recently. Pt had microcytic MCV during last admission but iron studies not deficient, more c/w anemia of chronic ds/inflamamtion  -check iron studies, B12, folate for anemia eval  -pt reports would like transfusion PRBC to incr Hgb to ?8 so can continue outpatient chemotherapy. Will monitor and transfuse as needed    -met gastric ca w local and distal LN involvements-to continue Heme/Onc followup w own doctor Dr Mckeon upon discharge    discussed w pt  thank you, will follow w you

## 2025-03-04 NOTE — PROGRESS NOTE ADULT - PROBLEM SELECTOR PLAN 3
Pt hx of gastric lymphoma, rt medport in place. Follows w/ Dr. Fung  - CT Ab: Upper abdominal lymphadenopathy similar to 11/30/2024. Mildly increased pelvic lymphadenopathy. Mesenteric lymphadenopathy and mesenteric fat stranding similar to prior examination.   - completed multiple rounds of chemo  - but pt missed last wk chemo sec to anemia   - pt would like blood transfusion prior to dc  as help him for next chemo - fu hb    - hx beta thal minor  -heme/onc () consulted Pt hx of gastric lymphoma, rt medport in place. Follows w/ Dr. Fung  - CT Ab: Upper abdominal lymphadenopathy similar to 11/30/2024. Mildly increased pelvic lymphadenopathy. Mesenteric lymphadenopathy and mesenteric fat stranding similar to prior examination.   - completed multiple rounds of chemo  - but pt missed last wk chemo sec to anemia   - pt would like blood transfusion prior to dc,  to help him for next chemo - fu hb  - hx beta thal minor  -heme/onc () consulted

## 2025-03-04 NOTE — PROGRESS NOTE ADULT - PROBLEM SELECTOR PLAN 2
likely 2/2 to possible Volume over load vs hx of malignancy lymphoma   - CTA chest/abd/pelvis: Large left pleural effusion markedly increased since 11/30/2024 with total atelectasis left lower lobe and partial atelectasis left upper lobe. New mediastinal and left axillary lymphadenopathy. Inflammatory changes associated with left subclavian vein consistent with known DVT. Upper abdominal lymphadenopathy similar to 11/30/2024. Mildly increased pelvic lymphadenopathy. Mesenteric lymphadenopathy and mesenteric fat stranding similar to prior examination. No pulmonary embolus. Limited evaluation segmental and subsegmental branches  -CXR  showed progressive opacification of the left chest compatible with significant left pleural effusion with associated compressive atelectatic change. There is suggestion of some right CP angle blunting. Increased perihilar interstitial markings also noted on the residual left chest and slight prominence on the right. Port-A-Cath as before. Borderline cardiomegaly may be present.. Regional osseous structures are unchanged.   -pro-  - Pt saturating well on RA  - previously on butenide 1mg daily but pt states he was recently told to stopped   - prior TTE (11/2024) EF 65%   - f/u repeat TTE  -cardio () consulted   -pulm () consulted - possible thoracentesis likely 2/2 to possible Volume over load vs hx of malignancy lymphoma   - CTA chest/abd/pelvis: Large left pleural effusion markedly increased since 11/30/2024 with total atelectasis left lower lobe and partial atelectasis left upper lobe. New mediastinal and left axillary lymphadenopathy. Inflammatory changes associated with left subclavian vein consistent with known DVT. Upper abdominal lymphadenopathy similar to 11/30/2024. Mildly increased pelvic lymphadenopathy. Mesenteric lymphadenopathy and mesenteric fat stranding similar to prior examination. No pulmonary embolus. Limited evaluation segmental and subsegmental branches  -CXR  showed progressive opacification of the left chest compatible with significant left pleural effusion with associated compressive atelectatic change. There is suggestion of some right CP angle blunting. Increased perihilar interstitial markings also noted on the residual left chest and slight prominence on the right. Port-A-Cath as before. Borderline cardiomegaly may be present.. Regional osseous structures are unchanged.   -pro-  - Pt saturating well on RA  - previously on butenide 1mg daily but pt states he was recently told to stopped   - prior TTE (11/2024) EF 65%   - f/u repeat TTE  -cardio () consulted: patient received 1 dose of bumex 1mg IV yesterday   -pulm () consulted - possible thoracentesis likely 2/2 to possible Volume over load vs hx of malignancy lymphoma   - CTA chest/abd/pelvis: Large left pleural effusion markedly increased since 11/30/2024 with total atelectasis left lower lobe and partial atelectasis left upper lobe. New mediastinal and left axillary lymphadenopathy. Inflammatory changes associated with left subclavian vein consistent with known DVT. Upper abdominal lymphadenopathy similar to 11/30/2024. Mildly increased pelvic lymphadenopathy. Mesenteric lymphadenopathy and mesenteric fat stranding similar to prior examination. No pulmonary embolus. Limited evaluation segmental and subsegmental branches  -CXR  showed progressive opacification of the left chest compatible with significant left pleural effusion with associated compressive atelectatic change. There is suggestion of some right CP angle blunting. Increased perihilar interstitial markings also noted on the residual left chest and slight prominence on the right. Port-A-Cath as before. Borderline cardiomegaly may be present.. Regional osseous structures are unchanged.   -pro-  - Pt saturating well on RA  - previously on butenide 1mg daily but pt states he was recently told to stopped   - prior TTE (11/2024) EF 65%   - f/u repeat TTE  -cardio () consulted: patient received 1 dose of bumex 1mg IV yesterday   -pulm () consulted - thoracentesis 03/04

## 2025-03-05 ENCOUNTER — RESULT REVIEW (OUTPATIENT)
Age: 65
End: 2025-03-05

## 2025-03-05 LAB
ALBUMIN FLD-MCNC: 2.8 G/DL — SIGNIFICANT CHANGE UP
ALBUMIN SERPL ELPH-MCNC: 2.4 G/DL — LOW (ref 3.3–5)
ALP SERPL-CCNC: 85 U/L — SIGNIFICANT CHANGE UP (ref 40–120)
ALT FLD-CCNC: 21 U/L — SIGNIFICANT CHANGE UP (ref 12–78)
ANION GAP SERPL CALC-SCNC: 11 MMOL/L — SIGNIFICANT CHANGE UP (ref 5–17)
APTT BLD: 45.2 SEC — HIGH (ref 24.5–35.6)
APTT BLD: 54.1 SEC — HIGH (ref 24.5–35.6)
APTT BLD: 60.8 SEC — HIGH (ref 24.5–35.6)
APTT BLD: 63.6 SEC — HIGH (ref 24.5–35.6)
AST SERPL-CCNC: 18 U/L — SIGNIFICANT CHANGE UP (ref 15–37)
B PERT IGG+IGM PNL SER: ABNORMAL
BASOPHILS # BLD AUTO: 0.07 K/UL — SIGNIFICANT CHANGE UP (ref 0–0.2)
BASOPHILS NFR BLD AUTO: 0.9 % — SIGNIFICANT CHANGE UP (ref 0–2)
BILIRUB SERPL-MCNC: 0.4 MG/DL — SIGNIFICANT CHANGE UP (ref 0.2–1.2)
BUN SERPL-MCNC: 17 MG/DL — SIGNIFICANT CHANGE UP (ref 7–23)
CALCIUM SERPL-MCNC: 8.9 MG/DL — SIGNIFICANT CHANGE UP (ref 8.5–10.1)
CHLORIDE SERPL-SCNC: 103 MMOL/L — SIGNIFICANT CHANGE UP (ref 96–108)
CO2 SERPL-SCNC: 22 MMOL/L — SIGNIFICANT CHANGE UP (ref 22–31)
COLOR FLD: SIGNIFICANT CHANGE UP
CREAT SERPL-MCNC: 1.2 MG/DL — SIGNIFICANT CHANGE UP (ref 0.5–1.3)
EGFR: 68 ML/MIN/1.73M2 — SIGNIFICANT CHANGE UP
EGFR: 68 ML/MIN/1.73M2 — SIGNIFICANT CHANGE UP
EOSINOPHIL # BLD AUTO: 0.11 K/UL — SIGNIFICANT CHANGE UP (ref 0–0.5)
EOSINOPHIL # FLD: 0 % — SIGNIFICANT CHANGE UP
EOSINOPHIL NFR BLD AUTO: 1.3 % — SIGNIFICANT CHANGE UP (ref 0–6)
FERRITIN SERPL-MCNC: 943 NG/ML — HIGH (ref 30–400)
FLUID INTAKE SUBSTANCE CLASS: SIGNIFICANT CHANGE UP
FOLATE SERPL-MCNC: >20 NG/ML — SIGNIFICANT CHANGE UP
FOLATE+VIT B12 SERBLD-IMP: 0 % — SIGNIFICANT CHANGE UP
GLUCOSE FLD-MCNC: 23 MG/DL — SIGNIFICANT CHANGE UP
GLUCOSE SERPL-MCNC: 109 MG/DL — HIGH (ref 70–99)
GRAM STN FLD: SIGNIFICANT CHANGE UP
HCT VFR BLD CALC: 23.3 % — LOW (ref 39–50)
HCT VFR BLD CALC: 24.4 % — LOW (ref 39–50)
HGB BLD-MCNC: 7.5 G/DL — LOW (ref 13–17)
HGB BLD-MCNC: 7.8 G/DL — LOW (ref 13–17)
IMM GRANULOCYTES NFR BLD AUTO: 0.9 % — SIGNIFICANT CHANGE UP (ref 0–0.9)
IRON SATN MFR SERPL: 20 UG/DL — LOW (ref 45–165)
IRON SATN MFR SERPL: 9 % — LOW (ref 16–55)
LDH SERPL L TO P-CCNC: 332 U/L — SIGNIFICANT CHANGE UP
LYMPHOCYTES # BLD AUTO: 0.74 K/UL — LOW (ref 1–3.3)
LYMPHOCYTES # BLD AUTO: 9 % — LOW (ref 13–44)
LYMPHOCYTES # FLD: 19 % — SIGNIFICANT CHANGE UP
MCHC RBC-ENTMCNC: 20.4 PG — LOW (ref 27–34)
MCHC RBC-ENTMCNC: 20.4 PG — LOW (ref 27–34)
MCHC RBC-ENTMCNC: 32 G/DL — SIGNIFICANT CHANGE UP (ref 32–36)
MCHC RBC-ENTMCNC: 32.2 G/DL — SIGNIFICANT CHANGE UP (ref 32–36)
MCV RBC AUTO: 63.3 FL — LOW (ref 80–100)
MCV RBC AUTO: 63.7 FL — LOW (ref 80–100)
MESOTHL CELL # FLD: SIGNIFICANT CHANGE UP
MONOCYTES # BLD AUTO: 0.96 K/UL — HIGH (ref 0–0.9)
MONOCYTES NFR BLD AUTO: 11.7 % — SIGNIFICANT CHANGE UP (ref 2–14)
MONOS+MACROS # FLD: 63 % — SIGNIFICANT CHANGE UP
NEUTROPHILS # BLD AUTO: 6.25 K/UL — SIGNIFICANT CHANGE UP (ref 1.8–7.4)
NEUTROPHILS NFR BLD AUTO: 76.2 % — SIGNIFICANT CHANGE UP (ref 43–77)
NEUTROPHILS-BODY FLUID: 18 % — SIGNIFICANT CHANGE UP
NON-GYNECOLOGICAL CYTOLOGY STUDY: SIGNIFICANT CHANGE UP
NRBC # FLD: 0 % — SIGNIFICANT CHANGE UP
NRBC BLD AUTO-RTO: 0 /100 WBCS — SIGNIFICANT CHANGE UP (ref 0–0)
NRBC BLD AUTO-RTO: 0 /100 WBCS — SIGNIFICANT CHANGE UP (ref 0–0)
OTHER CELLS FLD MANUAL: 0 % — SIGNIFICANT CHANGE UP
PLATELET # BLD AUTO: 507 K/UL — HIGH (ref 150–400)
PLATELET # BLD AUTO: 543 K/UL — HIGH (ref 150–400)
POTASSIUM SERPL-MCNC: 4.2 MMOL/L — SIGNIFICANT CHANGE UP (ref 3.5–5.3)
POTASSIUM SERPL-SCNC: 4.2 MMOL/L — SIGNIFICANT CHANGE UP (ref 3.5–5.3)
PROT FLD-MCNC: 4.4 G/DL — SIGNIFICANT CHANGE UP
PROT SERPL-MCNC: 6.7 G/DL — SIGNIFICANT CHANGE UP (ref 6–8.3)
RBC # BLD: 3.68 M/UL — LOW (ref 4.2–5.8)
RBC # BLD: 3.83 M/UL — LOW (ref 4.2–5.8)
RBC # FLD: 21.3 % — HIGH (ref 10.3–14.5)
RBC # FLD: 21.4 % — HIGH (ref 10.3–14.5)
RCV VOL RI: SIGNIFICANT CHANGE UP CELLS/UL
SODIUM SERPL-SCNC: 136 MMOL/L — SIGNIFICANT CHANGE UP (ref 135–145)
SPECIMEN SOURCE: SIGNIFICANT CHANGE UP
TIBC SERPL-MCNC: 223 UG/DL — SIGNIFICANT CHANGE UP (ref 220–430)
TOTAL NUCLEATED CELL COUNT, BODY FLUID: 4076 CELLS/UL — SIGNIFICANT CHANGE UP
TUBE TYPE: SIGNIFICANT CHANGE UP
UIBC SERPL-MCNC: 203 UG/DL — SIGNIFICANT CHANGE UP (ref 110–370)
VIT B12 SERPL-MCNC: >2000 PG/ML — HIGH (ref 232–1245)
WBC # BLD: 8.2 K/UL — SIGNIFICANT CHANGE UP (ref 3.8–10.5)
WBC # BLD: 8.3 K/UL — SIGNIFICANT CHANGE UP (ref 3.8–10.5)
WBC # FLD AUTO: 8.2 K/UL — SIGNIFICANT CHANGE UP (ref 3.8–10.5)
WBC # FLD AUTO: 8.3 K/UL — SIGNIFICANT CHANGE UP (ref 3.8–10.5)
WBC COUNT.: 2486 CELLS/UL — SIGNIFICANT CHANGE UP

## 2025-03-05 PROCEDURE — 99232 SBSQ HOSP IP/OBS MODERATE 35: CPT

## 2025-03-05 PROCEDURE — 99233 SBSQ HOSP IP/OBS HIGH 50: CPT

## 2025-03-05 PROCEDURE — 93306 TTE W/DOPPLER COMPLETE: CPT | Mod: 26

## 2025-03-05 RX ADMIN — HEPARIN SODIUM 2000 UNIT(S)/HR: 1000 INJECTION INTRAVENOUS; SUBCUTANEOUS at 08:01

## 2025-03-05 RX ADMIN — HEPARIN SODIUM 4000 UNIT(S): 1000 INJECTION INTRAVENOUS; SUBCUTANEOUS at 00:18

## 2025-03-05 RX ADMIN — HEPARIN SODIUM 2000 UNIT(S)/HR: 1000 INJECTION INTRAVENOUS; SUBCUTANEOUS at 07:30

## 2025-03-05 RX ADMIN — Medication 3 MILLIGRAM(S): at 21:12

## 2025-03-05 RX ADMIN — LABETALOL HYDROCHLORIDE 100 MILLIGRAM(S): 200 TABLET, FILM COATED ORAL at 05:47

## 2025-03-05 RX ADMIN — HEPARIN SODIUM 2200 UNIT(S)/HR: 1000 INJECTION INTRAVENOUS; SUBCUTANEOUS at 21:10

## 2025-03-05 RX ADMIN — LOSARTAN POTASSIUM 50 MILLIGRAM(S): 100 TABLET, FILM COATED ORAL at 05:47

## 2025-03-05 RX ADMIN — HEPARIN SODIUM 2000 UNIT(S)/HR: 1000 INJECTION INTRAVENOUS; SUBCUTANEOUS at 07:06

## 2025-03-05 RX ADMIN — HEPARIN SODIUM 2200 UNIT(S)/HR: 1000 INJECTION INTRAVENOUS; SUBCUTANEOUS at 13:36

## 2025-03-05 RX ADMIN — LABETALOL HYDROCHLORIDE 100 MILLIGRAM(S): 200 TABLET, FILM COATED ORAL at 21:12

## 2025-03-05 RX ADMIN — HEPARIN SODIUM 4000 UNIT(S): 1000 INJECTION INTRAVENOUS; SUBCUTANEOUS at 13:42

## 2025-03-05 RX ADMIN — HEPARIN SODIUM 2000 UNIT(S)/HR: 1000 INJECTION INTRAVENOUS; SUBCUTANEOUS at 00:14

## 2025-03-05 RX ADMIN — HEPARIN SODIUM 2200 UNIT(S)/HR: 1000 INJECTION INTRAVENOUS; SUBCUTANEOUS at 19:23

## 2025-03-05 NOTE — PROGRESS NOTE ADULT - ATTENDING COMMENTS
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 Pt admitted for LUE DVT and pleural effusion.     feeling better.   + L CT  + Heparin gtt    A/P:  LUE DVT     - cont heparin gtt. change to NOAC     - seen by vascular, no intervention needed at this time.     L pleural effusion    - s/p pleurex catheter placement     - fluid analysis pending    - pulm following     - CTA chest no PE    gastric lymphoma   anemia of chronic disease     - right chest port present prior to admission    - follows with heme outpatient     - h/o beta thalassmia minor. iron studies pending.     HTN    - cont labetalol 100mg BID and losartan 50mg daily     - ECHO : LVEF 61 %     on heparin gtt

## 2025-03-05 NOTE — PROGRESS NOTE ADULT - PROBLEM SELECTOR PLAN 3
Pt hx of gastric lymphoma, rt medport in place. Follows w/ Dr. Fung  - CT Ab: Upper abdominal lymphadenopathy similar to 11/30/2024. Mildly increased pelvic lymphadenopathy. Mesenteric lymphadenopathy and mesenteric fat stranding similar to prior examination.   - completed multiple rounds of chemo  - but pt missed last wk chemo sec to anemia (requires hgb >8)   - pt would like blood transfusion prior to dc,  to help him for next chemo  - hx beta thal minor  -heme/onc () consulted

## 2025-03-05 NOTE — PROGRESS NOTE ADULT - SUBJECTIVE AND OBJECTIVE BOX
[INTERVAL HX: ]  Patient seen and examined;  Chart reviewed and events noted;     no CP, no SOB  s/p L pleurex  breathing better    [MEDICATIONS]  MEDICATIONS  (STANDING):  chlorhexidine 2% Cloths 1 Application(s) Topical daily  heparin  Infusion.  Unit(s)/Hr (18 mL/Hr) IV Continuous <Continuous>  labetalol 100 milliGRAM(s) Oral two times a day  losartan 50 milliGRAM(s) Oral daily    MEDICATIONS  (PRN):  heparin   Injectable 8000 Unit(s) IV Push every 6 hours PRN For aPTT less than 40  heparin   Injectable 4000 Unit(s) IV Push every 6 hours PRN For aPTT between 40 - 57  melatonin 3 milliGRAM(s) Oral at bedtime PRN Insomnia  ondansetron Injectable 4 milliGRAM(s) IV Push every 8 hours PRN Nausea and/or Vomiting      [VITALS]  Vital Signs Last 24 Hrs  T(C): 37 (05 Mar 2025 20:24), Max: 37.4 (05 Mar 2025 05:18)  T(F): 98.6 (05 Mar 2025 20:24), Max: 99.3 (05 Mar 2025 05:18)  HR: 88 (05 Mar 2025 20:24) (80 - 88)  BP: 107/60 (05 Mar 2025 20:24) (104/61 - 133/78)  BP(mean): --  RR: 18 (05 Mar 2025 20:24) (18 - 21)  SpO2: 95% (05 Mar 2025 20:24) (95% - 97%)    Parameters below as of 05 Mar 2025 20:24  Patient On (Oxygen Delivery Method): room air      [WT/HT]  Daily     Daily Weight in k.7 (05 Mar 2025 05:18)  [VENT]      [PHYSICAL EXAM]  GEN: NAD  HEENT: normocephalic and atraumatic. EOMI. PERRL.    NECK: Supple.  No lymphadenopathy   LUNGS: Clear to auscultation.  HEART: Regular rate and rhythm,  no MRG  ABDOMEN: Soft, nontender, and nondistended.  Positive bowel sounds.    : No CVA tenderness  EXTREMITIES: Without edema.  NEUROLOGIC: grossly intact.  PSYCHIATRIC: Appropriate affect .  SKIN: No rash     [LABS:]                        7.8    8.20  )-----------( 543      ( 05 Mar 2025 06:24 )             24.4         136  |  103  |  17  ----------------------------<  109[H]  4.2   |  22  |  1.20    Ca    8.9      05 Mar 2025 06:24    TPro  6.7  /  Alb  2.4[L]  /  TBili  0.4  /  DBili  x   /  AST  18  /  ALT  21  /  AlkPhos  85      PTT - ( 05 Mar 2025 19:38 )  PTT:60.8 sec      Iron - Total Binding Capacity.: 223 ug/dL [220 - 430] (25 @ 06:24)    Ferritin: 943 ng/mL *H* [30 - 400] (25 @ 06:24)    Vitamin B12, Serum: >2000 pg/mL *H* [232 - 1245] (25 @ 06:24)    Folate, Serum: >20.0 ng/mL (25 @ 06:24)    Iron - Total Binding Capacity.: 241 ug/dL [220 - 430] (24 @ 07:05)    Ferritin: 257 ng/mL [30 - 400] (24 @ 07:05)    Vitamin B12, Serum: 1506 pg/mL *H* [200 - 900] (24 @ 19:53)    Ferritin: 140 ng/mL [30 - 400] (24 @ 19:53)    Folate, Serum: 12.6 ng/mL [3.1 - 17.5] (24 @ 19:53)      Urinalysis Basic - ( 05 Mar 2025 06:24 )    Color: x / Appearance: x / SG: x / pH: x  Gluc: 109 mg/dL / Ketone: x  / Bili: x / Urobili: x   Blood: x / Protein: x / Nitrite: x   Leuk Esterase: x / RBC: x / WBC x   Sq Epi: x / Non Sq Epi: x / Bacteria: x        Culture - Fungal, Body Fluid (collected 04 Mar 2025 11:35)  Source: Pleural Fl Pleural Fluid  Preliminary Report (05 Mar 2025 08:50):    Testing in progress    Culture - Body Fluid with Gram Stain (collected 04 Mar 2025 11:35)  Source: Pleural Fl Pleural Fluid  Gram Stain (05 Mar 2025 06:03):    polymorphonuclear leukocytes seen    No organisms seen    by cytocentrifuge    Urinalysis with Rflx Culture (collected 03 Mar 2025 09:50)            Culture - Fungal, Body Fluid (collected 04 Mar 2025 11:35)  Source: Pleural Fl Pleural Fluid  Preliminary Report (05 Mar 2025 08:50):    Testing in progress    Culture - Body Fluid with Gram Stain (collected 04 Mar 2025 11:35)  Source: Pleural Fl Pleural Fluid  Gram Stain (05 Mar 2025 06:03):    polymorphonuclear leukocytes seen    No organisms seen    by cytocentrifuge    Urinalysis with Rflx Culture (collected 03 Mar 2025 09:50)        [RADIOLOGY STUDIES:]

## 2025-03-05 NOTE — PROGRESS NOTE ADULT - SUBJECTIVE AND OBJECTIVE BOX
Patient is a 64y old  Male who presents with a chief complaint of LUE DVT, pleural effusion (04 Mar 2025 10:26)    INTERVAL HPI/OVERNIGHT EVENTS: Patient was seen by bedside. Patient endorses slight relief with SOB from thoracentesis and pigtail placement yesterday.     Vital Signs Last 24 Hrs  T(C): 37.4 (05 Mar 2025 05:18), Max: 37.4 (05 Mar 2025 05:18)  T(F): 99.3 (05 Mar 2025 05:18), Max: 99.3 (05 Mar 2025 05:18)  HR: 85 (05 Mar 2025 05:18) (83 - 97)  BP: 133/78 (05 Mar 2025 05:45) (105/66 - 133/78)  BP(mean): --  RR: 19 (05 Mar 2025 05:18) (19 - 23)  SpO2: 96% (05 Mar 2025 05:18) (93% - 97%)    Parameters below as of 05 Mar 2025 05:18  Patient On (Oxygen Delivery Method): room air    PHYSICAL EXAM:  GENERAL: NAD,   HEAD:  Atraumatic, Normocephalic  NERVOUS SYSTEM:  Alert & Oriented X3; Motor Strength 5/5 B/L upper and lower extremities; DTRs 2+ intact and symmetric  CHEST/LUNG: Left lung fields very minimal lung entry, even after procedure. Right lung fields CTA.   HEART: Regular rate and rhythm; No murmurs,  no tachy   ABDOMEN: Soft, Nontender, Nondistended; Bowel sounds present  EXTREMITIES:  Left upper extremity swollen. Lower extremities: 2+ Peripheral Pulses, No clubbing, cyanosis, or edema  SKIN: No rashes or lesions    MEDICATIONS  (STANDING):  heparin  Infusion.  Unit(s)/Hr (18 mL/Hr) IV Continuous <Continuous>  labetalol 100 milliGRAM(s) Oral two times a day  losartan 50 milliGRAM(s) Oral daily    MEDICATIONS  (PRN):  heparin   Injectable 8000 Unit(s) IV Push every 6 hours PRN For aPTT less than 40  heparin   Injectable 4000 Unit(s) IV Push every 6 hours PRN For aPTT between 40 - 57  melatonin 3 milliGRAM(s) Oral at bedtime PRN Insomnia  ondansetron Injectable 4 milliGRAM(s) IV Push every 8 hours PRN Nausea and/or Vomiting      LABS:  cret                        7.8    8.20  )-----------( 543      ( 05 Mar 2025 06:24 )             24.4     03-05    136  |  103  |  17  ----------------------------<  109[H]  4.2   |  22  |  1.20    Ca    8.9      05 Mar 2025 06:24    TPro  6.7  /  Alb  2.4[L]  /  TBili  0.4  /  DBili  x   /  AST  18  /  ALT  21  /  AlkPhos  85  03-05    PTT - ( 05 Mar 2025 05:46 )  PTT:63.6 sec

## 2025-03-05 NOTE — PROGRESS NOTE ADULT - PROBLEM SELECTOR PLAN 2
likely 2/2 to possible Volume over load vs hx of malignancy lymphoma   - CTA chest/abd/pelvis: Large left pleural effusion markedly increased since 11/30/2024 with total atelectasis left lower lobe and partial atelectasis left upper lobe. New mediastinal and left axillary lymphadenopathy. Inflammatory changes associated with left subclavian vein consistent with known DVT. Upper abdominal lymphadenopathy similar to 11/30/2024. Mildly increased pelvic lymphadenopathy. Mesenteric lymphadenopathy and mesenteric fat stranding similar to prior examination. No pulmonary embolus. Limited evaluation segmental and subsegmental branches  -CXR  showed progressive opacification of the left chest compatible with significant left pleural effusion with associated compressive atelectatic change. There is suggestion of some right CP angle blunting. Increased perihilar interstitial markings also noted on the residual left chest and slight prominence on the right. Port-A-Cath as before. Borderline cardiomegaly may be present.. Regional osseous structures are unchanged.   -pro-  - Pt saturating well on RA  - previously on butenide 1mg daily but pt states he was recently told to stopped   - patient received 1 dose of bumex 1mg IV in the ED   - prior TTE (11/2024) EF 65%   - f/u repeat TTE  - thoracentesis 03/04: drained 3.1L of serosanguinous fluid, repeat x-ray shows minimal change in chest x-ray  - recommend spirometry   - cardio (Dr. Espinoza) consulted  - pulm (Dr. Damico) consulted

## 2025-03-05 NOTE — CASE MANAGEMENT PROGRESS NOTE - NSCMPROGRESSNOTE_GEN_ALL_CORE
Patient discussed during interdisciplinary rounds. Patient remains acute on a heparin drip and a left chest wall tube. Patient is pending TTE.  remains available for discharge needs.

## 2025-03-05 NOTE — PROGRESS NOTE ADULT - SUBJECTIVE AND OBJECTIVE BOX
Date/Time Patient Seen:  		  Referring MD:   Data Reviewed	       Patient is a 64y old  Male who presents with a chief complaint of LUE DVT, pleural effusion (04 Mar 2025 12:59)      Subjective/HPI     PAST MEDICAL & SURGICAL HISTORY:  No pertinent past medical history    Incisional hernia  2015    Gastric lymphoma    Anemia of chronic disease    No significant past surgical history    S/P appendectomy  November 17th 2014    S/P cholecystectomy  15 years ago          Medication list         MEDICATIONS  (STANDING):  heparin  Infusion.  Unit(s)/Hr (18 mL/Hr) IV Continuous <Continuous>  labetalol 100 milliGRAM(s) Oral two times a day  losartan 50 milliGRAM(s) Oral daily    MEDICATIONS  (PRN):  heparin   Injectable 8000 Unit(s) IV Push every 6 hours PRN For aPTT less than 40  heparin   Injectable 4000 Unit(s) IV Push every 6 hours PRN For aPTT between 40 - 57  melatonin 3 milliGRAM(s) Oral at bedtime PRN Insomnia  ondansetron Injectable 4 milliGRAM(s) IV Push every 8 hours PRN Nausea and/or Vomiting         Vitals log        ICU Vital Signs Last 24 Hrs  T(C): 36.7 (04 Mar 2025 20:35), Max: 36.7 (04 Mar 2025 20:35)  T(F): 98.1 (04 Mar 2025 20:35), Max: 98.1 (04 Mar 2025 20:35)  HR: 85 (04 Mar 2025 20:35) (83 - 97)  BP: 105/68 (04 Mar 2025 20:35) (102/72 - 129/79)  BP(mean): --  ABP: --  ABP(mean): --  RR: 20 (04 Mar 2025 20:35) (20 - 24)  SpO2: 93% (04 Mar 2025 20:35) (93% - 97%)    O2 Parameters below as of 04 Mar 2025 20:35  Patient On (Oxygen Delivery Method): room air                 Input and Output:  I&O's Detail    03 Mar 2025 07:01  -  04 Mar 2025 07:00  --------------------------------------------------------  IN:  Total IN: 0 mL    OUT:    Voided (mL): 690 mL  Total OUT: 690 mL    Total NET: -690 mL      04 Mar 2025 07:01  -  05 Mar 2025 05:10  --------------------------------------------------------  IN:  Total IN: 0 mL    OUT:    VAC (Vacuum Assisted Closure) System (mL): 3100 mL  Total OUT: 3100 mL    Total NET: -3100 mL          Lab Data                        7.5    8.30  )-----------( 507      ( 04 Mar 2025 23:54 )             23.3     03-04    137  |  103  |  16  ----------------------------<  105[H]  4.4   |  23  |  1.30    Ca    8.9      04 Mar 2025 05:57  Mg     2.1     03-03    TPro  6.8  /  Alb  2.4[L]  /  TBili  0.4  /  DBili  x   /  AST  19  /  ALT  26  /  AlkPhos  101  03-03            Review of Systems	      Objective     Physical Examination    heart s1s2  lung dc BS  head nc  head at      Pertinent Lab findings & Imaging      Jameson:  NO   Adequate UO     I&O's Detail    03 Mar 2025 07:01  -  04 Mar 2025 07:00  --------------------------------------------------------  IN:  Total IN: 0 mL    OUT:    Voided (mL): 690 mL  Total OUT: 690 mL    Total NET: -690 mL      04 Mar 2025 07:01  -  05 Mar 2025 05:10  --------------------------------------------------------  IN:  Total IN: 0 mL    OUT:    VAC (Vacuum Assisted Closure) System (mL): 3100 mL  Total OUT: 3100 mL    Total NET: -3100 mL               Discussed with:     Cultures:	        Radiology

## 2025-03-05 NOTE — PROGRESS NOTE ADULT - SUBJECTIVE AND OBJECTIVE BOX
[facility] [place] [facility] -- Charles Martinez, Cal, Andrei, Olga, Eron Pablo: Office # [phone]    Follow Up: LUE DVT, pleural effusion     Subjective/Observations: Patient awake, alert, OOB to chair. Denies chest pain, palpitations and dizziness. Denies any difficulty breathing. No orthopnea and PND. Tolerating room air. S/p pig tail drain. Continuing on Heparin gtt.     REVIEW OF SYSTEMS: All other review of systems are negative unless indicated above    PAST MEDICAL & SURGICAL HISTORY:  Incisional hernia  2015      Gastric lymphoma      Anemia of chronic disease      S/P appendectomy  November 17th 2014      S/P cholecystectomy  15 years ago      MEDICATIONS  (STANDING):  heparin  Infusion.  Unit(s)/Hr (18 mL/Hr) IV Continuous <Continuous>  labetalol 100 milliGRAM(s) Oral two times a day  losartan 50 milliGRAM(s) Oral daily    MEDICATIONS  (PRN):  heparin   Injectable 8000 Unit(s) IV Push every 6 hours PRN For aPTT less than 40  heparin   Injectable 4000 Unit(s) IV Push every 6 hours PRN For aPTT between 40 - 57  melatonin 3 milliGRAM(s) Oral at bedtime PRN Insomnia  ondansetron Injectable 4 milliGRAM(s) IV Push every 8 hours PRN Nausea and/or Vomiting    Allergies    Bactrim DS (Hives)    Intolerances      Vital Signs Last 24 Hrs  T(C): 37.4 (05 Mar 2025 05:18), Max: 37.4 (05 Mar 2025 05:18)  T(F): 99.3 (05 Mar 2025 05:18), Max: 99.3 (05 Mar 2025 05:18)  HR: 85 (05 Mar 2025 05:18) (83 - 97)  BP: 133/78 (05 Mar 2025 05:45) (102/72 - 133/78)  BP(mean): --  RR: 19 (05 Mar 2025 05:18) (19 - 24)  SpO2: 96% (05 Mar 2025 05:18) (93% - 97%)    Parameters below as of 05 Mar 2025 05:18  Patient On (Oxygen Delivery Method): room air      I&O's Summary    04 Mar 2025 07:01  -  05 Mar 2025 07:00  --------------------------------------------------------  IN: 0 mL / OUT: 3550 mL / NET: -3550 mL    TELE:  80-90s   PHYSICAL EXAM:  Constitutional: NAD, awake and alert  HEENT: Moist Mucous Membranes, Anicteric  Pulmonary: Non-labored, breath sounds are clear bilaterally, Diminished at bases No wheezing, rales or rhonchi, Lt pig tail drain to chest tube drainage   Cardiovascular: Regular, S1 and S2, No murmurs, No rubs, gallops or clicks  Gastrointestinal:  soft, nontender, nondistended   Lymph: + Le UE peripheral edema. No lymphadenopathy.   Skin: No visible rashes or ulcers.  Psych:  Mood & affect appropriate      LABS: All Labs Reviewed:                        7.8    8.20  )-----------( 543      ( 05 Mar 2025 06:24 )             24.4                         7.5    8.30  )-----------( 507      ( 04 Mar 2025 23:54 )             23.3                         7.7    9.27  )-----------( 578      ( 04 Mar 2025 05:57 )             24.6     05 Mar 2025 06:24    136    |  103    |  17     ----------------------------<  109    4.2     |  22     |  1.20   04 Mar 2025 05:57    137    |  103    |  16     ----------------------------<  105    4.4     |  23     |  1.30   03 Mar 2025 08:10    135    |  104    |  13     ----------------------------<  114    4.6     |  22     |  1.10     Ca    8.9        05 Mar 2025 06:24  Ca    8.9        04 Mar 2025 05:57  Ca    9.0        03 Mar 2025 08:10  Mg     2.1       03 Mar 2025 08:10    TPro  6.7    /  Alb  2.4    /  TBili  0.4    /  DBili  x      /  AST  18     /  ALT  21     /  AlkPhos  85     05 Mar 2025 06:24  TPro  6.8    /  Alb  2.4    /  TBili  0.4    /  DBili  x      /  AST  19     /  ALT  26     /  AlkPhos  101    03 Mar 2025 08:10   LIVER FUNCTIONS - ( 05 Mar 2025 06:24 )  Alb: 2.4 g/dL / Pro: 6.7 g/dL / ALK PHOS: 85 U/L / ALT: 21 U/L / AST: 18 U/L / GGT: x           PTT - ( 05 Mar 2025 05:46 )  PTT:63.6 secTroponin I, High Sensitivity Result: 11.5 ng/L (03-03-25 @ 08:10)    12 Lead ECG:   Ventricular Rate 78 BPM    Atrial Rate 78 BPM    P-R Interval 134 ms    QRS Duration 90 ms    Q-T Interval 362 ms    QTC Calculation(Bazett) 412 ms    P Axis 29 degrees    R Axis 3 degrees    T Axis 5 degrees    Diagnosis Line Normal sinus rhythm  Confirmed by milton Espinoza ([id number]) on 3/3/2025 1:49:09 PM (03-03-25 @ 07:34)      TRANSTHORACIC ECHOCARDIOGRAM REPORT  ________________________________________________________________________________                                      _______       Pt. Name:       JESSE NAJERALEROY Study Date:    11/23/2024  MRN:      [id number]                   YOB: 1960  Accession #:    [id number]                  Age:           64 years  Account#:       [id number]                 Gender:        M  Heart Rate:                                Height:        66.93 in (170.00 cm)  Rhythm:                                    Weight:        227.07 lb (103.00 kg)  Blood Pressure: 167/87 mmHg                BSA/BMI:       2.13 m² / 35.64 kg/m²  ________________________________________________________________________________________  Referring Physician:    [id number] David Tyler  Interpreting Physician: Lilli Pablo MD  Primary Sonographer:    Kennedy Eugene    CPT:               ECHO TTE WO CON COMP W DOPP - 62487.m  Indication(s):     Dyspnea, unspecified- R06.00  Procedure:         Transthoracic echocardiogram with 2-D, M-mode and complete                     spectral and color flow Doppler.  Ordering Location: [facility]  Admission Status:  Inpatient  Study Information: Image quality for this study is technically difficult.    _______________________________________________________________________________________     CONCLUSIONS:      1. Technically difficult image quality.   2. Left ventricular cavity is normal in size. Left ventricular systolic function is normal with an ejection fraction of 65 % by David's method of disks.   3. Normal right ventricular cavity size and normal right ventricular systolic function.   4. Trileaflet aortic valve with normal systolic excursion.   5. Structurally normal mitral valve with normal leaflet excursion.   6. Trace mitral regurgitation.   7. Tricuspid valve was not well visualized.   8. The inferior vena cava is normal in size measuring 1.50 cm in diameter, (normal <2.1cm) with normal inspiratory collapse (normal >50%) consistent with normal right atrial pressure (~3, range 0-5mmHg).   9. No pericardial effusion seen.    ________________________________________________________________________________________  FINDINGS:     Left Ventricle:  The left ventricular cavity is normal in size. Left ventricular systolic function is normal with a calculated ejection fraction of 65 % by the David's biplane method of disks.     Right Ventricle:  The right ventricular cavity is normal in size and right ventricular systolic function is normal. Tricuspid annular plane systolic excursion (TAPSE) is 3.4 cm (normal >=1.7 cm).     Left Atrium:  The left atrium is normal in size with an indexed volume of 33.72 ml/m².     Right Atrium:  The right atrium is normal in size.     Interatrial Septum:  The interatrial septum appears intact.     Aortic Valve:  The aortic valve appears trileaflet with normal systolic excursion. There is no evidence of aortic regurgitation.     Mitral Valve:  Structurally normal mitral valve with normal leaflet excursion. There is trace mitral regurgitation.     Tricuspid Valve:  The tricuspid valve was not well visualized. There is insufficient tricuspid regurgitation detected to calculate pulmonary artery systolic pressure.    Pulmonic Valve:  The pulmonic valve was not well visualized.     Aorta:  The aortic root at the sinuses of Valsalva is normal in size, measuring 3.10 cm (indexed 1.45 cm/m²). The ascending aorta is normal in size, measuring 3.10 cm (indexed 1.45 cm/m²). The aortic arch diameter is normal in size, measuring 3.0 cm (indexed 1.41 cm/m²).     Pericardium:  No pericardial effusion seen.     Systemic Veins:  The inferior vena cava is normal in size measuring 1.50 cm in diameter, (normal <2.1cm) with normal inspiratory collapse (normal >50%) consistent with normal right atrial pressure (~3, range 0-5mmHg).  ____________________________________________________________________  QUANTITATIVE DATA:  Left Ventricle Measurements: (Indexed to BSA)     IVSd (2D):   1.1 cm  LVPWd (2D):  0.9 cm  LVIDd (2D):  5.7 cm  LVIDs (2D):  3.9 cm  LV Mass:     218 g  102.1 g/m²  LV Vol d, MOD A2C: 109.0 ml 51.11 ml/m²  LV Vol d, MOD A4C: 119.0 ml 55.80 ml/m²  LV Vol d, MOD BP:  120.0 ml 56.28 ml/m²  LV Vol s, MOD A2C: 38.9 ml  18.24 ml/m²  LV Vol s, MOD A4C: 41.2 ml  19.32 ml/m²  LV Vol s, MOD BP:  42.1 ml  19.75 ml/m²  LVOT SV MOD BP:    77.9 ml  LV EF% MOD BP:     65 %     MV E Vmax:    1.39 m/s  MV A Vmax:    1.07 m/s  MV E/A:       1.30  e' lateral:   11.70 cm/s  e' medial:    10.20 cm/s  E/e' lateral: 11.88  E/e' medial:  13.63  E/e' Average: 12.69  MV DT:        165 msec    Aorta Measurements: (Normal range) (Indexed to BSA)     Ao Root d     3.10 cm (3.1 - 3.7 cm) 1.45 cm/m²  Ao Asc d, 2D: 3.10  Ao Asc prox:  3.10 cm                1.45 cm/m²  Ao Arch:      3.0 cm            Left Atrium Measurements: (Indexed to BSA)  LA Diam 2D: 4.10 cm         Right Ventricle Measurements:     TAPSE:            3.4 cm  RV Base (RVID1):  3.3 cm  RV Mid (RVID2):  2.8 cm  RV Major (RVID3): 8.3 cm       LVOT / RVOT/ Qp/Qs Data: (Indexed to BSA)  LVOT Diameter:  2.30 cm  LVOT Area:      4.15 cm²  LVOT Vmax:      1.40 m/s  LVOT Vmn:       0.922 m/s  LVOT VTI:       25.50 cm  LVOT peak grad: 8 mmHg  LVOT mean grad: 4.0 mmHg  LVOT SV:        105.9 ml  49.68 ml/m²    Aortic Valve Measurements:  AV Vmax:                2.0 m/s  AV Peak Gradient:       16.8 mmHg  AV Mean Gradient:       9.0 mmHg  AV VTI:                 39.1 cm  AV VTI Ratio:           0.65  AoVEOA, Contin:        2.71 cm²  AoV EOA, Contin i:      1.27 cm²/m²  AoV Dimensionless Index 0.65    Mitral Valve Measurements:     MV E Vmax: 1.4 m/s  MV A Vmax: 1.1 m/s  TAJ E/A:    1.3       Tricuspid Valve Measurements:     RA Pressure: 3 mmHg    ________________________________________________________________________________________  Electronically signed on 11/23/2024 at 3:33:11 PM by Lilli Pablo MD         *** Final ***

## 2025-03-05 NOTE — PATIENT PROFILE ADULT - FALL HARM RISK - HARM RISK INTERVENTIONS
Assistance with ambulation/Assistance OOB with selected safe patient handling equipment/Communicate Risk of Fall with Harm to all staff/Monitor gait and stability/Reinforce activity limits and safety measures with patient and family/Tailored Fall Risk Interventions/Use of alarms - bed, chair and/or voice tab/Visual Cue: Yellow wristband and red socks/Bed in lowest position, wheels locked, appropriate side rails in place/Call bell, personal items and telephone in reach/Instruct patient to call for assistance before getting out of bed or chair/Non-slip footwear when patient is out of bed/[place] to call system/Physically safe environment - no spills, clutter or unnecessary equipment/Purposeful Proactive Rounding/Room/bathroom lighting operational, light cord in reach

## 2025-03-05 NOTE — PATIENT PROFILE ADULT - MST SCORE
Detail Level: Detailed Quality 431: Preventive Care And Screening: Unhealthy Alcohol Use - Screening: Patient not identified as an unhealthy alcohol user when screened for unhealthy alcohol use using a systematic screening method Quality 130: Documentation Of Current Medications In The Medical Record: Current Medications Documented Quality 226: Preventive Care And Screening: Tobacco Use: Screening And Cessation Intervention: Patient screened for tobacco use and is an ex/non-smoker 0

## 2025-03-05 NOTE — PROGRESS NOTE ADULT - PROBLEM SELECTOR PLAN 4
Hgb 7.8 today as per chart review baseline appears to be in 9s   - no signs of acute blood loss   - pt missed last wk chemo sec to anemia   - pt would like blood transfusion prior to dc,  to help him for next chemo   - likely due to chronic disease   - iron studies ordered, f/u results, perhaps will benefit from IV iron   - type and screen ordered   - heme/onc consulted

## 2025-03-06 LAB
ALBUMIN SERPL ELPH-MCNC: 2.4 G/DL — LOW (ref 3.3–5)
ALP SERPL-CCNC: 84 U/L — SIGNIFICANT CHANGE UP (ref 40–120)
ALT FLD-CCNC: 18 U/L — SIGNIFICANT CHANGE UP (ref 12–78)
ANION GAP SERPL CALC-SCNC: 8 MMOL/L — SIGNIFICANT CHANGE UP (ref 5–17)
APTT BLD: 68.5 SEC — HIGH (ref 24.5–35.6)
AST SERPL-CCNC: 16 U/L — SIGNIFICANT CHANGE UP (ref 15–37)
BASOPHILS # BLD AUTO: 0.07 K/UL — SIGNIFICANT CHANGE UP (ref 0–0.2)
BASOPHILS NFR BLD AUTO: 0.8 % — SIGNIFICANT CHANGE UP (ref 0–2)
BILIRUB SERPL-MCNC: 0.3 MG/DL — SIGNIFICANT CHANGE UP (ref 0.2–1.2)
BUN SERPL-MCNC: 19 MG/DL — SIGNIFICANT CHANGE UP (ref 7–23)
CALCIUM SERPL-MCNC: 9.1 MG/DL — SIGNIFICANT CHANGE UP (ref 8.5–10.1)
CHLORIDE SERPL-SCNC: 103 MMOL/L — SIGNIFICANT CHANGE UP (ref 96–108)
CO2 SERPL-SCNC: 23 MMOL/L — SIGNIFICANT CHANGE UP (ref 22–31)
CREAT SERPL-MCNC: 1.2 MG/DL — SIGNIFICANT CHANGE UP (ref 0.5–1.3)
EGFR: 68 ML/MIN/1.73M2 — SIGNIFICANT CHANGE UP
EGFR: 68 ML/MIN/1.73M2 — SIGNIFICANT CHANGE UP
EOSINOPHIL # BLD AUTO: 0.15 K/UL — SIGNIFICANT CHANGE UP (ref 0–0.5)
EOSINOPHIL NFR BLD AUTO: 1.7 % — SIGNIFICANT CHANGE UP (ref 0–6)
FERRITIN SERPL-MCNC: 989 NG/ML — HIGH (ref 30–400)
FOLATE SERPL-MCNC: 18.1 NG/ML — SIGNIFICANT CHANGE UP
GLUCOSE SERPL-MCNC: 104 MG/DL — HIGH (ref 70–99)
HCT VFR BLD CALC: 23.7 % — LOW (ref 39–50)
HGB BLD-MCNC: 7.5 G/DL — LOW (ref 13–17)
IMM GRANULOCYTES NFR BLD AUTO: 1 % — HIGH (ref 0–0.9)
IRON SATN MFR SERPL: 19 UG/DL — LOW (ref 45–165)
IRON SATN MFR SERPL: 9 % — LOW (ref 16–55)
LYMPHOCYTES # BLD AUTO: 0.77 K/UL — LOW (ref 1–3.3)
LYMPHOCYTES # BLD AUTO: 8.6 % — LOW (ref 13–44)
MCHC RBC-ENTMCNC: 20.1 PG — LOW (ref 27–34)
MCHC RBC-ENTMCNC: 31.6 G/DL — LOW (ref 32–36)
MCV RBC AUTO: 63.5 FL — LOW (ref 80–100)
MONOCYTES # BLD AUTO: 1.25 K/UL — HIGH (ref 0–0.9)
MONOCYTES NFR BLD AUTO: 14 % — SIGNIFICANT CHANGE UP (ref 2–14)
NEUTROPHILS # BLD AUTO: 6.61 K/UL — SIGNIFICANT CHANGE UP (ref 1.8–7.4)
NEUTROPHILS NFR BLD AUTO: 73.9 % — SIGNIFICANT CHANGE UP (ref 43–77)
NRBC BLD AUTO-RTO: 0 /100 WBCS — SIGNIFICANT CHANGE UP (ref 0–0)
PH FLD: 7.7 — SIGNIFICANT CHANGE UP
PLATELET # BLD AUTO: 532 K/UL — HIGH (ref 150–400)
POTASSIUM SERPL-MCNC: 4.1 MMOL/L — SIGNIFICANT CHANGE UP (ref 3.5–5.3)
POTASSIUM SERPL-SCNC: 4.1 MMOL/L — SIGNIFICANT CHANGE UP (ref 3.5–5.3)
PROT SERPL-MCNC: 6.2 G/DL — SIGNIFICANT CHANGE UP (ref 6–8.3)
RBC # BLD: 3.73 M/UL — LOW (ref 4.2–5.8)
RBC # FLD: 21.2 % — HIGH (ref 10.3–14.5)
SODIUM SERPL-SCNC: 134 MMOL/L — LOW (ref 135–145)
TIBC SERPL-MCNC: 212 UG/DL — LOW (ref 220–430)
TRANSFERRIN SERPL-MCNC: 162 MG/DL — LOW (ref 200–360)
UIBC SERPL-MCNC: 193 UG/DL — SIGNIFICANT CHANGE UP (ref 110–370)
VIT B12 SERPL-MCNC: >2000 PG/ML — HIGH (ref 232–1245)
WBC # BLD: 8.94 K/UL — SIGNIFICANT CHANGE UP (ref 3.8–10.5)
WBC # FLD AUTO: 8.94 K/UL — SIGNIFICANT CHANGE UP (ref 3.8–10.5)

## 2025-03-06 PROCEDURE — 99233 SBSQ HOSP IP/OBS HIGH 50: CPT

## 2025-03-06 PROCEDURE — 99232 SBSQ HOSP IP/OBS MODERATE 35: CPT

## 2025-03-06 PROCEDURE — 93971 EXTREMITY STUDY: CPT | Mod: 26,LT

## 2025-03-06 RX ORDER — APIXABAN 2.5 MG/1
10 TABLET, FILM COATED ORAL EVERY 12 HOURS
Refills: 0 | Status: DISCONTINUED | OUTPATIENT
Start: 2025-03-06 | End: 2025-03-07

## 2025-03-06 RX ORDER — APIXABAN 2.5 MG/1
2 TABLET, FILM COATED ORAL
Qty: 74 | Refills: 0
Start: 2025-03-06

## 2025-03-06 RX ADMIN — Medication 1 APPLICATION(S): at 12:35

## 2025-03-06 RX ADMIN — LABETALOL HYDROCHLORIDE 100 MILLIGRAM(S): 200 TABLET, FILM COATED ORAL at 21:09

## 2025-03-06 RX ADMIN — APIXABAN 10 MILLIGRAM(S): 2.5 TABLET, FILM COATED ORAL at 17:42

## 2025-03-06 RX ADMIN — Medication 3 MILLIGRAM(S): at 21:07

## 2025-03-06 RX ADMIN — HEPARIN SODIUM 2200 UNIT(S)/HR: 1000 INJECTION INTRAVENOUS; SUBCUTANEOUS at 03:25

## 2025-03-06 RX ADMIN — HEPARIN SODIUM 2200 UNIT(S)/HR: 1000 INJECTION INTRAVENOUS; SUBCUTANEOUS at 08:09

## 2025-03-06 RX ADMIN — HEPARIN SODIUM 2200 UNIT(S)/HR: 1000 INJECTION INTRAVENOUS; SUBCUTANEOUS at 07:09

## 2025-03-06 RX ADMIN — LOSARTAN POTASSIUM 50 MILLIGRAM(S): 100 TABLET, FILM COATED ORAL at 05:17

## 2025-03-06 RX ADMIN — LABETALOL HYDROCHLORIDE 100 MILLIGRAM(S): 200 TABLET, FILM COATED ORAL at 05:17

## 2025-03-06 NOTE — PROGRESS NOTE ADULT - PROBLEM SELECTOR PLAN 7
DVT ppx: on heparin drip- hold since 5 am for ir guided drain drain lt effusion
DVT ppx: on heparin drip
DVT ppx: on heparin drip

## 2025-03-06 NOTE — DISCHARGE NOTE PROVIDER - PROVIDER TOKENS
PROVIDER:[TOKEN:[[id number]]] PROVIDER:[TOKEN:[[id number]]],PROVIDER:[TOKEN:[[id number]],FOLLOWUP:[1 week]]

## 2025-03-06 NOTE — PROGRESS NOTE ADULT - ATTENDING COMMENTS
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 Pt admitted for LUE DVT and pleural effusion.     feeling better.   + L CT removed at bedside   + Heparin gtt    A/P:  LUE DVT     - cont heparin gtt. change to NOAC on discharge. sent eliquis to vivo    - seen by vascular, no intervention needed at this time.     L pleural effusion    - s/p pleurex catheter placement     - fluid analysis pending    - pulm following     - CTA chest no PE    gastric lymphoma   anemia of chronic disease     - right chest port present prior to admission    - follows with heme outpatient     - h/o beta thalassmia minor.     - hgb dropped today. discussed with heme, will transfuse 1 unit prbc.     HTN    - cont labetalol 100mg BID and losartan 50mg daily     - ECHO : LVEF 61 %     on heparin gtt . 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 Pt admitted for LUE DVT and pleural effusion.     feeling better.   + L CT removed at bedside   + Heparin gtt    A/P:  LUE DVT   Acute L gastrocnemius deep venous thrombosis.    - sono: Extensive left upper extremity DVT extending proximally to involve the right internal jugular and subclavian veins.    - cont heparin gtt. change to NOAC on discharge. sent eliquis to vivo    - seen by vascular, no intervention needed at this time for left upper extremity DVT     L pleural effusion    - s/p pleurex catheter placement     - fluid analysis exudative.     - pulm following     - CTA chest no PE    gastric lymphoma   anemia of chronic disease     - right chest port present prior to admission    - follows with heme outpatient     - h/o beta thalassmia minor.     - hgb dropped today. discussed with heme, will transfuse 1 unit prbc. will require to be above 8 to treat with chemo     HTN    - cont labetalol 100mg BID and losartan 50mg daily     - ECHO : LVEF 61 %     on heparin gtt . 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 Pt admitted for LUE DVT and pleural effusion.     feeling better.   + L CT removed at bedside   + Heparin gtt    A/P:  LUE DVT   Acute L gastrocnemius deep venous thrombosis.    - sono: Extensive left upper extremity DVT extending proximally to involve the right internal jugular and subclavian veins.    - cont heparin gtt. change to NOAC on discharge. sent eliquis to vivo    - seen by vascular, no intervention needed at this time for left upper extremity DVT     L pleural effusion    - s/p pleurex catheter placement     - fluid analysis exudative.     - pulm following     - CTA chest no PE    gastric carcinoma   anemia of chronic disease     - right chest port present prior to admission    - follows with heme outpatient     - h/o beta thalassmia minor.     - hgb dropped today. discussed with heme, will transfuse 1 unit prbc. will require to be above 8 to treat with chemo     HTN    - cont labetalol 100mg BID and losartan 50mg daily     - ECHO : LVEF 61 %     on heparin gtt . 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 Pt admitted for LUE DVT and pleural effusion.     feeling better.   + L CT removed at bedside   + Heparin gtt    A/P:  LUE DVT   Acute L gastrocnemius deep venous thrombosis.    - sono: Extensive left upper extremity DVT extending proximally to involve the right internal jugular and subclavian veins.    - cont heparin gtt. change to NOAC on discharge. sent eliquis to vivo    - seen by vascular, no intervention needed at this time for left upper extremity DVT     L pleural effusion    - s/p pleurex catheter placement     - fluid analysis exudative. pleural fluid showed malignant cells (met carcinoma). patient is aware and has a follow up with outpatient heme for PET scan.     - pulm following     - CTA chest no PE    gastric carcinoma   anemia of chronic disease     - right chest port present prior to admission    - follows with heme outpatient     - h/o beta thalassmia minor.     - hgb dropped today. discussed with heme, will transfuse 1 unit prbc. will require to be above 8 to treat with chemo     HTN    - cont labetalol 100mg BID and losartan 50mg daily     - ECHO : LVEF 61 %     on heparin gtt . will transition to eliquis.

## 2025-03-06 NOTE — DISCHARGE NOTE PROVIDER - NSDCMRMEDTOKEN_GEN_ALL_CORE_FT
Eliquis Starter Pack for Treatment of DVT and PE 5 mg oral tablet: 2 tab(s) orally 2 times a day Please take 10mg (2 tablets) twice a day for 7 days, and then 5mg (1 tablet) twice a day thereafter. MDD: 40mg  labetalol 100 mg oral tablet: 1 tab(s) orally 2 times a day  losartan 50 mg oral tablet: 1 tab(s) orally once a day

## 2025-03-06 NOTE — PROGRESS NOTE ADULT - PROBLEM SELECTOR PLAN 5
chronic   -BP stable   -on home labetalol 100mg BID and losartan 50mg daily
chronic   -BP stable   -on home labetalol 100mg BID and losartan 50mg daily
chronic   -BP stable   -on home labetalol 100mg BID and losartan 50mg daily- bp stable

## 2025-03-06 NOTE — PROGRESS NOTE ADULT - SUBJECTIVE AND OBJECTIVE BOX
Patient is a 64y old  Male who presents with a chief complaint of LUE DVT, pleural effusion (04 Mar 2025 10:26)    --incomplete--    INTERVAL HPI/OVERNIGHT EVENTS: Patient was seen by bedside. Endorses feels slightly better, and breathing a little better, as he's been able to walk around a little more. Brings up slight left leg dull pain when prompted. Denies any other concerns.     Vital Signs Last 24 Hrs  T(C): 37.2 (06 Mar 2025 04:25), Max: 37.2 (06 Mar 2025 04:25)  T(F): 98.9 (06 Mar 2025 04:25), Max: 98.9 (06 Mar 2025 04:25)  HR: 88 (06 Mar 2025 04:25) (80 - 88)  BP: 121/76 (06 Mar 2025 04:25) (104/61 - 121/76)  BP(mean): --  RR: 18 (06 Mar 2025 04:25) (18 - 21)  SpO2: 95% (06 Mar 2025 04:25) (95% - 97%)    Parameters below as of 06 Mar 2025 04:25  Patient On (Oxygen Delivery Method): room air      PHYSICAL EXAM:  GENERAL: NAD,   HEAD:  Atraumatic, Normocephalic  NERVOUS SYSTEM:  Alert & Oriented X3; Motor Strength 5/5 B/L upper and lower extremities; DTRs 2+ intact and symmetric  CHEST/LUNG: Left lung fields very minimal lung entry, even after procedure. Right lung fields CTA.   HEART: Regular rate and rhythm; No murmurs,  no tachy   ABDOMEN: Soft, Nontender, Nondistended; Bowel sounds present  EXTREMITIES:  Left upper extremity swollen. Lower extremities: 2+ Peripheral Pulses, No clubbing, cyanosis, or edema  SKIN: No rashes or lesions    MEDICATIONS  (STANDING):  heparin  Infusion.  Unit(s)/Hr (18 mL/Hr) IV Continuous <Continuous>  labetalol 100 milliGRAM(s) Oral two times a day  losartan 50 milliGRAM(s) Oral daily    MEDICATIONS  (PRN):  heparin   Injectable 8000 Unit(s) IV Push every 6 hours PRN For aPTT less than 40  heparin   Injectable 4000 Unit(s) IV Push every 6 hours PRN For aPTT between 40 - 57  melatonin 3 milliGRAM(s) Oral at bedtime PRN Insomnia  ondansetron Injectable 4 milliGRAM(s) IV Push every 8 hours PRN Nausea and/or Vomiting      LABS:  cret                        7.5    8.94  )-----------( 532      ( 06 Mar 2025 05:40 )             23.7     03-06    134[L]  |  103  |  19  ----------------------------<  104[H]  4.1   |  23  |  1.20    Ca    9.1      06 Mar 2025 05:40    TPro  6.2  /  Alb  2.4[L]  /  TBili  0.3  /  DBili  x   /  AST  16  /  ALT  18  /  AlkPhos  84  03-06    PTT - ( 06 Mar 2025 03:02 )  PTT:68.5 sec Patient is a 64y old  Male who presents with a chief complaint of LUE DVT, pleural effusion (04 Mar 2025 10:26)    INTERVAL HPI/OVERNIGHT EVENTS: Patient was seen by bedside. Endorses feels slightly better, and breathing a little better, as he's been able to walk around a little more. Brings up slight left leg dull pain when prompted. Denies any other concerns.     Vital Signs Last 24 Hrs  T(C): 37.2 (06 Mar 2025 04:25), Max: 37.2 (06 Mar 2025 04:25)  T(F): 98.9 (06 Mar 2025 04:25), Max: 98.9 (06 Mar 2025 04:25)  HR: 88 (06 Mar 2025 04:25) (80 - 88)  BP: 121/76 (06 Mar 2025 04:25) (104/61 - 121/76)  BP(mean): --  RR: 18 (06 Mar 2025 04:25) (18 - 21)  SpO2: 95% (06 Mar 2025 04:25) (95% - 97%)    Parameters below as of 06 Mar 2025 04:25  Patient On (Oxygen Delivery Method): room air      PHYSICAL EXAM:  GENERAL: NAD,   HEAD:  Atraumatic, Normocephalic  NERVOUS SYSTEM:  Alert & Oriented X3; Motor Strength 5/5 B/L upper and lower extremities; DTRs 2+ intact and symmetric  CHEST/LUNG: Left lung fields very minimal lung entry, even after procedure. Right lung fields CTA.   HEART: Regular rate and rhythm; No murmurs,  no tachy   ABDOMEN: Soft, Nontender, Nondistended; Bowel sounds present  EXTREMITIES:  Left upper extremity swollen. Lower extremities: 2+ Peripheral Pulses, No clubbing, cyanosis, or edema  SKIN: No rashes or lesions    MEDICATIONS  (STANDING):  heparin  Infusion.  Unit(s)/Hr (18 mL/Hr) IV Continuous <Continuous>  labetalol 100 milliGRAM(s) Oral two times a day  losartan 50 milliGRAM(s) Oral daily    MEDICATIONS  (PRN):  heparin   Injectable 8000 Unit(s) IV Push every 6 hours PRN For aPTT less than 40  heparin   Injectable 4000 Unit(s) IV Push every 6 hours PRN For aPTT between 40 - 57  melatonin 3 milliGRAM(s) Oral at bedtime PRN Insomnia  ondansetron Injectable 4 milliGRAM(s) IV Push every 8 hours PRN Nausea and/or Vomiting      LABS:  cret                        7.5    8.94  )-----------( 532      ( 06 Mar 2025 05:40 )             23.7     03-06    134[L]  |  103  |  19  ----------------------------<  104[H]  4.1   |  23  |  1.20    Ca    9.1      06 Mar 2025 05:40    TPro  6.2  /  Alb  2.4[L]  /  TBili  0.3  /  DBili  x   /  AST  16  /  ALT  18  /  AlkPhos  84  03-06    PTT - ( 06 Mar 2025 03:02 )  PTT:68.5 sec

## 2025-03-06 NOTE — DISCHARGE NOTE PROVIDER - HOSPITAL COURSE
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 Pt states he started having left upper arm swelling since last wednesday that has been worsening. denies any pain, numbness, tingling, weakness of the extremity.  Pt recently completed his fifth round of chemo. He also received a blood transfusions in January. He states that last week  he was supposed to get chemo but his Hgb was 7.2 and he received an iron transfusion. He states he wasnt able to get chemo during that time. He has been feeling SOB for the last few weeks as well. He was recently started on losartan. He states he only takes losartan and labetalol and was told to stop taking bumex. He sees Oncologist Dr. Fung. Patient with prior MARYSOL 11/2024, requiring initiation of HD with left IJ tunneled catheter which was removed 2 weeks ago. Pt with right medport in place.     ED course  Vitals: T 97.5 , HR 85 , /88 , RR 20 , SpO2 100% on RA  Significant labs: Hgb 7.8, MCV 65.6, ProBNP 406 UA normal   Imaging: CXR: progressive opacification of the left chest compatible with significant left pleural effusion with associated compressive atelectatic change. There is suggestion of some right CP angle blunting. Increased perihilar interstitial markings also noted on the residual left chest and slight prominence on the right. Port-A-Cath as before. Borderline cardiomegaly may be present.. Regional osseous structures are unchanged.   UE doppler: Extensive left upper extremity DVT extending proximally to involve the right internal jugular and subclavian veins. Left cervical lymphadenopathy with abnormal morphology suggesting   malignancy.  CTA/CT abd/pelvix: Large left pleural effusion markedly increased since 11/30/2024 with total atelectasis left lower lobe and partial atelectasis left upper lobe. New mediastinal and left axillary lymphadenopathy. Inflammatory changes associated with left subclavian vein consistent with known DVT. Upper abdominal lymphadenopathy similar to 11/30/2024. Mildly increased pelvic lymphadenopathy. Mesenteric lymphadenopathy and mesenteric fat stranding similar to prior   examination. No pulmonary embolus. Limited evaluation segmental and subsegmental branches  EKG: NSR 78BPM qtc 412   In ED patient given: heparin 800U inital bolus, heparin drip      (03 Mar 2025 12:33)      ---  HOSPITAL COURSE/PERTINENT LABS/PROCEDURES PERFORMED/PENDING TESTS:  Patient was admitted for increasing large left pleural effusion. Patient was treated with thoracentesis 03/04: drained 3.1L of serosanguinous fluid, repeat x-ray showed minimal change in chest x-ray. Pleural studies showed exudative effusion likely due to malignancy. Patient's hemoglobin was low and trended throughout admission. 1 unit of packed red blood cells was transfused.    Imaging showed/cultures showed ______. Patient's home medications for were continued inpatient. Patient underwent ___. Physical therapy evaluated the patient and recommended -----. Social work/case management followed the patient case. Patient is stable for discharge to [[place]].     ---  PATIENT CONDITION:  - stable    ---  Vitals:    PHYSICAL EXAM ON DAY OF DISCHARGE:    ---  CONSULTANTS:     ---  ADVANCED CARE PLANNING:  - Code status:      - MOLST completed:      [  ] NO     [  ] YES    ---  TIME SPENT:  I, the attending physician, was physically present for the key portions of the evaluation and management (E/M) service provided. The total amount of time spent reviewing the hospital notes, laboratory values, imaging findings, assessing/counseling the patient, discussing with consultant physicians, social work, nursing staff was -- minutes FROM ADMISSION H+P:   HPI:  64 year old male with pmhx with poorly differentiated metastatic (known cervical and axillary lymphadenopathy) GI carcinoma on chemo, HTN, and anemia presented to ED with SOB. Pt states he started having left upper arm swelling since last wednesday that has been worsening. denies any pain, numbness, tingling, weakness of the extremity.  Pt recently completed his fifth round of chemo. He also received a blood transfusions in January. He states that last week  he was supposed to get chemo but his Hgb was 7.2 and he received an iron transfusion. He states he wasnt able to get chemo during that time. He has been feeling SOB for the last few weeks as well. He was recently started on losartan. He states he only takes losartan and labetalol and was told to stop taking bumex. He sees Oncologist Dr. Fung. Patient with prior MARYSOL 11/2024, requiring initiation of HD with left IJ tunneled catheter which was removed 2 weeks ago. Pt with right medport in place.     ED course  Vitals: T 97.5 , HR 85 , /88 , RR 20 , SpO2 100% on RA  Significant labs: Hgb 7.8, MCV 65.6, ProBNP 406 UA normal   Imaging: CXR: progressive opacification of the left chest compatible with significant left pleural effusion with associated compressive atelectatic change. There is suggestion of some right CP angle blunting. Increased perihilar interstitial markings also noted on the residual left chest and slight prominence on the right. Port-A-Cath as before. Borderline cardiomegaly may be present.. Regional osseous structures are unchanged.   UE doppler: Extensive left upper extremity DVT extending proximally to involve the right internal jugular and subclavian veins. Left cervical lymphadenopathy with abnormal morphology suggesting   malignancy.  CTA/CT abd/pelvix: Large left pleural effusion markedly increased since 11/30/2024 with total atelectasis left lower lobe and partial atelectasis left upper lobe. New mediastinal and left axillary lymphadenopathy. Inflammatory changes associated with left subclavian vein consistent with known DVT. Upper abdominal lymphadenopathy similar to 11/30/2024. Mildly increased pelvic lymphadenopathy. Mesenteric lymphadenopathy and mesenteric fat stranding similar to prior   examination. No pulmonary embolus. Limited evaluation segmental and subsegmental branches  EKG: NSR 78BPM qtc 412   In ED patient given: heparin 800U inital bolus, heparin drip      (03 Mar 2025 12:33)      ---  HOSPITAL COURSE/PERTINENT LABS/PROCEDURES PERFORMED/PENDING TESTS:  Patient was admitted for increasing large left pleural effusion. Patient was treated with thoracentesis 03/04: drained 3.1L of serosanguinous fluid, repeat x-ray showed minimal change in chest x-ray. Pleural studies showed exudative effusion likely due to malignancy. Patient's hemoglobin was low and trended throughout admission. 1 unit of packed red blood cells was transfused.    Patient's home medications for were continued inpatient. Physical therapy evaluated the patient and recommended -----. Social work/case management followed the patient case. Patient is stable for discharge to home.     ---  PATIENT CONDITION:  - stable    ---  Vitals:    PHYSICAL EXAM ON DAY OF DISCHARGE:    ---  CONSULTANTS:     ---  ADVANCED CARE PLANNING:  - Code status:      - MOLST completed:      [  ] NO     [  ] YES    ---  TIME SPENT:  I, the attending physician, was physically present for the key portions of the evaluation and management (E/M) service provided. The total amount of time spent reviewing the hospital notes, laboratory values, imaging findings, assessing/counseling the patient, discussing with consultant physicians, social work, nursing staff was -- minutes FROM ADMISSION H+P:   HPI:  64 year old male with pmhx with poorly differentiated metastatic (known cervical and axillary lymphadenopathy) GI carcinoma on chemo, HTN, and anemia presented to ED with SOB. Pt states he started having left upper arm swelling since last wednesday that has been worsening. denies any pain, numbness, tingling, weakness of the extremity.  Pt recently completed his fifth round of chemo. He also received a blood transfusions in January. He states that last week  he was supposed to get chemo but his Hgb was 7.2 and he received an iron transfusion. He states he wasnt able to get chemo during that time. He has been feeling SOB for the last few weeks as well. He was recently started on losartan. He states he only takes losartan and labetalol and was told to stop taking bumex. He sees Oncologist Dr. Fung. Patient with prior MARYSOL 11/2024, requiring initiation of HD with left IJ tunneled catheter which was removed 2 weeks ago. Pt with right medport in place.     ED course  Vitals: T 97.5 , HR 85 , /88 , RR 20 , SpO2 100% on RA  Significant labs: Hgb 7.8, MCV 65.6, ProBNP 406 UA normal   Imaging: CXR: progressive opacification of the left chest compatible with significant left pleural effusion with associated compressive atelectatic change. There is suggestion of some right CP angle blunting. Increased perihilar interstitial markings also noted on the residual left chest and slight prominence on the right. Port-A-Cath as before. Borderline cardiomegaly may be present. Regional osseous structures are unchanged.   UE doppler: Extensive left upper extremity DVT extending proximally to involve the right internal jugular and subclavian veins. Left cervical lymphadenopathy with abnormal morphology suggesting malignancy.  CTA/CT abd/pelvix: Large left pleural effusion markedly increased since 11/30/2024 with total atelectasis left lower lobe and partial atelectasis left upper lobe. New mediastinal and left axillary lymphadenopathy. Inflammatory changes associated with left subclavian vein consistent with known DVT. Upper abdominal lymphadenopathy similar to 11/30/2024. Mildly increased pelvic lymphadenopathy. Mesenteric lymphadenopathy and mesenteric fat stranding similar to prior   examination. No pulmonary embolus. Limited evaluation segmental and subsegmental branches  EKG: NSR 78BPM qtc 412   In ED patient given: heparin 800U inital bolus, heparin drip      (03 Mar 2025 12:33)      ---  HOSPITAL COURSE/PERTINENT LABS/PROCEDURES PERFORMED/PENDING TESTS:  Patient was admitted for increasing large left pleural effusion. Patient was treated with thoracentesis 03/04: drained 3.1L of serosanguinous fluid, repeat x-ray showed minimal change in chest x-ray. Pleural studies showed exudative effusion likely due to malignancy. Patient's hemoglobin was low and trended throughout admission. 1 unit of packed red blood cells was transfused.  Patient was also found to have extensive DVT in his left upper extremity. Patient was on heparin gtt. Patient was complaining of left calf pain. Ordered doppler for the left lower extremity, which was also positive for a DVT as well. Then transitioned to oral eliquis.     Patient's home medications for were continued inpatient. Social work/case management followed the patient case. Patient is stable for discharge to home.     ---  PATIENT CONDITION:  - stable    ---  Vitals:    PHYSICAL EXAM ON DAY OF DISCHARGE:    ---  CONSULTANTS:   Heme/onc: Dr. Ailyn Hu   Cardio: [facility]   Pulm: Dr. Damico    ---  ADVANCED CARE PLANNING:  - Code status:      - MOLST completed:      [  ] NO     [  ] YES    ---  TIME SPENT:  I, the attending physician, was physically present for the key portions of the evaluation and management (E/M) service provided. The total amount of time spent reviewing the hospital notes, laboratory values, imaging findings, assessing/counseling the patient, discussing with consultant physicians, social work, nursing staff was -- minutes FROM ADMISSION H+P:   HPI:  64 year old male with pmhx with poorly differentiated metastatic (known cervical and axillary lymphadenopathy) GI carcinoma on chemo, HTN, and anemia presented to ED with SOB. Pt states he started having left upper arm swelling since last wednesday that has been worsening. denies any pain, numbness, tingling, weakness of the extremity.  Pt recently completed his fifth round of chemo. He also received a blood transfusions in January. He states that last week  he was supposed to get chemo but his Hgb was 7.2 and he received an iron transfusion. He states he wasnt able to get chemo during that time. He has been feeling SOB for the last few weeks as well. He was recently started on losartan. He states he only takes losartan and labetalol and was told to stop taking bumex. He sees Oncologist Dr. Fung. Patient with prior MARYSOL 11/2024, requiring initiation of HD with left IJ tunneled catheter which was removed 2 weeks ago. Pt with right medport in place.     ED course  Vitals: T 97.5 , HR 85 , /88 , RR 20 , SpO2 100% on RA  Significant labs: Hgb 7.8, MCV 65.6, ProBNP 406 UA normal   Imaging: CXR: progressive opacification of the left chest compatible with significant left pleural effusion with associated compressive atelectatic change. There is suggestion of some right CP angle blunting. Increased perihilar interstitial markings also noted on the residual left chest and slight prominence on the right. Port-A-Cath as before. Borderline cardiomegaly may be present. Regional osseous structures are unchanged.   UE doppler: Extensive left upper extremity DVT extending proximally to involve the right internal jugular and subclavian veins. Left cervical lymphadenopathy with abnormal morphology suggesting malignancy.  CTA/CT abd/pelvix: Large left pleural effusion markedly increased since 11/30/2024 with total atelectasis left lower lobe and partial atelectasis left upper lobe. New mediastinal and left axillary lymphadenopathy. Inflammatory changes associated with left subclavian vein consistent with known DVT. Upper abdominal lymphadenopathy similar to 11/30/2024. Mildly increased pelvic lymphadenopathy. Mesenteric lymphadenopathy and mesenteric fat stranding similar to prior   examination. No pulmonary embolus. Limited evaluation segmental and subsegmental branches  EKG: NSR 78BPM qtc 412   In ED patient given: heparin 800U inital bolus, heparin drip      (03 Mar 2025 12:33)      ---  HOSPITAL COURSE/PERTINENT LABS/PROCEDURES PERFORMED/PENDING TESTS:  Patient was admitted for increasing large left pleural effusion. Patient was treated with thoracentesis 03/04: drained 3.1L of serosanguinous fluid, repeat x-ray showed minimal change in chest x-ray. Pleural studies showed exudative effusion likely due to malignancy. Patient was made aware and will follow up with hematology for further management (i.e PET scan) Patient's hemoglobin was low and trended throughout admission. He reported weakness with ambulation and 1 unit of packed red blood cells was transfused for symptomatic anemia. Patient improved clinically.   Patient was also found to have extensive DVT in his left upper extremity. Suspect provoked with underlying malignancy and recent HD catheter. Patient was on heparin gtt. Patient was complaining of left calf pain. Ordered doppler for the left lower extremity, which was also positive for a DVT as well. Then transitioned to oral eliquis.     Patient's home medications for were continued inpatient. Social work/case management followed the patient case. Patient is stable for discharge to home.     ---  PATIENT CONDITION:  - stable    ---  Vitals:    PHYSICAL EXAM ON DAY OF DISCHARGE:    ---  CONSULTANTS:   Heme/onc: Dr. Ailyn Hu   Cardio: [facility]   Pulm: Dr. Damico    ---  TIME SPENT:  I, the attending physician, was physically present for the key portions of the evaluation and management (E/M) service provided. The total amount of time spent reviewing the hospital notes, laboratory values, imaging findings, assessing/counseling the patient, discussing with consultant physicians, social work, nursing staff was 45 minutes

## 2025-03-06 NOTE — DISCHARGE NOTE PROVIDER - POSTFACE STATEMENT FOR MINUTES SPENT
Medication reconciliation completed.  Reviewed Medication list and confirmed med allergies with patient; confirmed with Dr. [facility].  minutes on the discharge service.

## 2025-03-06 NOTE — PROGRESS NOTE ADULT - PROBLEM SELECTOR PLAN 4
Hgb 7.8 today as per chart review baseline appears to be in 9s   - no signs of acute blood loss   - pt missed last wk chemo sec to anemia   - pt would like blood transfusion prior to dc,  to help him for next chemo   - likely due to chronic disease   - iron studies ordered, f/u results, perhaps will benefit from IV iron   - type and screen ordered   - heme/onc consulted Hgb 7.8 today as per chart review baseline appears to be in 9s   - no signs of acute blood loss; anemia likely due to chronic disease   - pt missed last wk chemo sec to anemia   - pt would like blood transfusion prior to dc,  to help him for next chemo   - iron studies ordered, heme/onc noted no treatable minerals or vitamins. patient received an iron transfusion last week, and endorses it did not help much   - type and screen ordered  - ordered one unit blood 03/06  - heme/onc consulted

## 2025-03-06 NOTE — PROGRESS NOTE ADULT - SUBJECTIVE AND OBJECTIVE BOX
Interval History:  received 1 unit PRBC today  breathing stable  Chart reviewed and events noted;   Overnight events:    MEDICATIONS  (STANDING):  apixaban 10 milliGRAM(s) Oral every 12 hours  chlorhexidine 2% Cloths 1 Application(s) Topical daily  labetalol 100 milliGRAM(s) Oral two times a day  losartan 50 milliGRAM(s) Oral daily    MEDICATIONS  (PRN):  melatonin 3 milliGRAM(s) Oral at bedtime PRN Insomnia  ondansetron Injectable 4 milliGRAM(s) IV Push every 8 hours PRN Nausea and/or Vomiting      Vital Signs Last 24 Hrs  T(C): 36.8 (06 Mar 2025 17:30), Max: 37.2 (06 Mar 2025 04:25)  T(F): 98.2 (06 Mar 2025 17:30), Max: 98.9 (06 Mar 2025 04:25)  HR: 88 (06 Mar 2025 17:30) (88 - 90)  BP: 100/65 (06 Mar 2025 17:30) (100/65 - 121/76)  BP(mean): --  RR: 18 (06 Mar 2025 17:30) (18 - 18)  SpO2: 93% (06 Mar 2025 17:30) (91% - 95%)    Parameters below as of 06 Mar 2025 17:30  Patient On (Oxygen Delivery Method): room air        PHYSICAL EXAM  General: adult in NAD  HEENT: clear oropharynx, anicteric sclera, pink conjunctivae  Neck: supple  CV: normal S1S2 with no murmur rubs or gallops  Lungs: clear to auscultation, no wheezes, no rhales  Abdomen: soft non-tender non-distended, no hepato/splenomegaly  Ext: no clubbing cyanosis or edema  Skin: no rashes and no petichiae  Neuro: alert and oriented X3 no focal deficits      LABS:  CBC Full  -  ( 06 Mar 2025 05:40 )  WBC Count : 8.94 K/uL  RBC Count : 3.73 M/uL  Hemoglobin : 7.5 g/dL  Hematocrit : 23.7 %  Platelet Count - Automated : 532 K/uL  Mean Cell Volume : 63.5 fl  Mean Cell Hemoglobin : 20.1 pg  Mean Cell Hemoglobin Concentration : 31.6 g/dL  Auto Neutrophil # : x  Auto Lymphocyte # : x  Auto Monocyte # : x  Auto Eosinophil # : x  Auto Basophil # : x  Auto Neutrophil % : x  Auto Lymphocyte % : x  Auto Monocyte % : x  Auto Eosinophil % : x  Auto Basophil % : x    03-06    134[L]  |  103  |  19  ----------------------------<  104[H]  4.1   |  23  |  1.20    Ca    9.1      06 Mar 2025 05:40    TPro  6.2  /  Alb  2.4[L]  /  TBili  0.3  /  DBili  x   /  AST  16  /  ALT  18  /  AlkPhos  84  03-06    PTT - ( 06 Mar 2025 03:02 )  PTT:68.5 sec    fe studies  Iron - Total Binding Capacity.: 212 ug/dL (03-06 @ 05:40)  Ferritin: 989 ng/mL (03-06 @ 05:40)  Iron - Total Binding Capacity.: 223 ug/dL (03-05 @ 06:24)  Ferritin: 943 ng/mL (03-05 @ 06:24)      WBC trend  8.94 K/uL (03-06-25 @ 05:40)  8.20 K/uL (03-05-25 @ 06:24)  8.30 K/uL (03-04-25 @ 23:54)  9.27 K/uL (03-04-25 @ 05:57)      Hgb trend  7.5 g/dL (03-06-25 @ 05:40)  7.8 g/dL (03-05-25 @ 06:24)  7.5 g/dL (03-04-25 @ 23:54)  7.7 g/dL (03-04-25 @ 05:57)      plt trend  532 K/uL (03-06-25 @ 05:40)  543 K/uL (03-05-25 @ 06:24)  507 K/uL (03-04-25 @ 23:54)  578 K/uL (03-04-25 @ 05:57)        RADIOLOGY & ADDITIONAL STUDIES:    Cytopathology - Non Gyn Report (03.04.25 @ 12:47)   Cytopathology - Non Gyn Report:   ACCESSION No: [id number]   Patient: JESSE EATON   Accession: [id number]   Collected Date/Time: 3/4/2025 12:47 EST   Received Date/Time: 3/4/2025 18:49 EST   Non-Gynecologic Report - Auth (Verified)   Specimen(s) Submitted   PLEURAL FLUID   Final Diagnosis   PLEURAL FLUID   POSITIVE FOR MALIGNANT CELLS.   Metastatic poorly differentiated carcinoma.   Cytology slides and cell block show a hypercellular specimen composed of   single lying and rare groups of malignant cells with enlarged,   eccentric   placed nuclei containing prominent nucleoli, irregular nuclear contours   and moderate cytoplasm, in a background of reactive mesothelial cells.   In summary:   Compatible with metastatic carcinoma of upper GI,

## 2025-03-06 NOTE — DISCHARGE NOTE PROVIDER - NSDCCPCAREPLAN_GEN_ALL_CORE_FT
PRINCIPAL DISCHARGE DIAGNOSIS  Diagnosis: Large pleural effusion  Assessment and Plan of Treatment: You were treated with thoracentesis procedure to drain the fluid.         SECONDARY DISCHARGE DIAGNOSES  Diagnosis: Acute deep vein thrombosis (DVT) of upper extremity  Assessment and Plan of Treatment:      PRINCIPAL DISCHARGE DIAGNOSIS  Diagnosis: Large pleural effusion  Assessment and Plan of Treatment: You were treated with thoracentesis procedure to drain the fluid in your lungs. This explains your sensation of short of breath on admission. The studies done on the fluid showed cancer cells in the fluid, hence it is likely secondary to your current carcinoma.   We recommend following up with your heme/onc provider and receiving a PET scan.          SECONDARY DISCHARGE DIAGNOSES  Diagnosis: Acute deep vein thrombosis (DVT) of upper extremity  Assessment and Plan of Treatment: You were diagnosed with extensive clots in your left upper arm and left lower leg. You were treated with IV anticoagulant medication called heparin. You were taken off this medication and transitioned to eliquis.  Please START taking 10mg TWO TIMES A DAY for ONE WEEK   AFTER one week, START taking 5mg TWO TIMES A DAY for at least a month. Your heme/onc doctor will decide how long they want to keep you on that dose.

## 2025-03-06 NOTE — PROGRESS NOTE ADULT - PROBLEM/PLAN-7
REASON FOR ORIGINAL CONSULT:  Abnormal ECG/Palpitations    Primary physician: Dr. Garcia        Ms Sutton is a 44 year old female with the following CV problems: Previous Cardiac Work up:      Palpitations    NIDDM     HTN    Smoking    ECG showing Early Repolarization    CHOLESTEROL (mg/dL)   Date Value   03/28/2018 200 (H)     HDL (mg/dL)   Date Value   03/28/2018 58     CHOL/HDL (no units)   Date Value   03/28/2018 3.4     TRIGLYCERIDE (mg/dL)   Date Value   03/28/2018 84     CALCULATED LDL (mg/dL)   Date Value   03/28/2018 125          1. ECG: SR; Early Repolarization  2. Echo: 07/17/2018  Normal left ventricular size, systolic function and wall thickness, with no regional wall motion abnormalities.  LVEF, 67 %. Mildly increased left ventricular filling pressure.  Normal right ventricular size and systolic function.  No significant valvular abnormalities.  No prior studies available for direct comaprison  3. Stress Test:NA  4. Coronary CT Angiogram:08/09/2018  1. Right dominant coronary anatomy with mild coronary atherosclerosis and  no coronary stenosis.  2. Mildly dilated right ventricle  3. CACS=1  5. Holter/Event Monitor: 08/08/2018  The patient was enrolled for 31 days during which 8 total transmissions were posted.   The initial Baseline transmission shows SINUS RHYTHM   The high average heart rate seen for the monitored period was 100 BPM.   The low average heart rate seen for the monitored period was 50 BPM.   1 manually-triggered recording(s) posted.   No Pauses noted for 3 seconds or longer.   For this report the ECG findings include: SINUS BRADYCARDIA, SINUS TACHYCARDIA, SINUS RHYTHM          Interval Changes:   Vanessa returns today for a follow up after having event monitor, coronary CTA and echo which all were unrevealing. She is doing well.   She denies further chest pain, shortness of breath, palpitations, PND, orthopnea or claudication.   She is following better, heart healthy diet and walks 
15 min twice a day.   She decreased smoking to 4 cig per day.     HPI:  Mrs Sutton is a 44 year old female with past medical history of NIDDM and HTN who recently presented to ED with lightheadedness and palpitations and was referred to Cardiology after having initial negative evaluation. She states that she was at work on 06/25 when she suddenly felt as if her heart started racing. She had associated tunneled vision. She works as a  and has a sedentary job. On that same day, she was also feeling nauseous and had one episode of vomiting in the morning. She states she ate at work that day and stayed hydrated despite feeling bad that same day. She walks for exercise and she states that she will occasionally will experience chest discomfort lasting for seconds. She denies shortness of breath, PND, orthopnea or claudication.   She is accompanied by her fiance today who states that Vanessa has been under a lot stress planning a wedding as well as being involved in buying land up [place].     PMH:  Patient Active Problem List   Diagnosis   • Depression   • Obesity   • Chronic headache   • Type 2 diabetes mellitus without complication (CMS/HCC)   • History of thyroid disease   • Microalbuminuria   • High risk medication use   • Chronic migraine without aura without status migrainosus, not intractable           MEDICATIONS  Current Outpatient Prescriptions   Medication Sig Dispense Refill   • cephALEXin (KEFLEX) 500 MG capsule Take 1 capsule by mouth 2 times daily for 7 days. 14 capsule 0   • sumatriptan (IMITREX) 100 MG tablet Take 1/2 tablet by mouth at onset of migraine. May repeat after 1 hours if needed. 10 tablet 1   • omeprazole (PRILOSEC) 40 MG capsule Take 1 capsule by mouth daily. 30 capsule 2   • ondansetron (ZOFRAN ODT) 4 MG disintegrating tablet Place 1 tablet onto the tongue 2 times daily as needed for Nausea. Can sub pill 20 tablet 0   • metFORMIN (GLUCOPHAGE) 500 MG tablet Take 1 tablet by 
mouth 2 times daily (with meals). 60 tablet 2   • lisinopril (ZESTRIL) 5 MG tablet Take 1 tablet by mouth daily. 30 tablet 5   • naproxen (NAPROSYN) 500 MG tablet Take 1 tablet by mouth 2 times daily as needed for Pain. 60 tablet 2   • ONETOUCH VERIO 1 each daily. Test blood sugar 1 times daily as directed. Diagnosis: diabetes. Meter: OneTouch Verio. 100 strip 2   • ONETOUCH DELICA LANCETS 33G Misc Test blood sugars once daily. 50 each 2     No current facility-administered medications for this visit.        SOCIAL:   Engaged.  Tobacco 0.5 ppd x 23 years  Drinks beer. On the weekend she can have 5-6 beers  She denies illicit drugs  No energy drinks. 1-2 cups of coffee per day    FAMILY HISTORY:    No h/o CAD or SCD    Family History   Problem Relation Age of Onset   • Diabetes Mother    • Stroke Mother    • Cancer Sister         melenoma       ALLERGY:    ALLERGIES:  No Known Allergies    PHYSICAL EXAM  Vitals:    08/13/18 1621   BP: 110/76   Pulse: 64   Weight: 78 kg   Height: 5' 9\" (1.753 m)     General: appears stated age, no acute distress  Eyes: conjunctiva without exudates or hemorrhage. Pupils symmetric.  Neck: No JVD. No thyroid enlargement  Cardiac: normal S1 and S2,regular rate and rhythm. No murmurs, rubs or gallups. Pedal pulses 2/2 bilaterally. Lower extremities without evidence of edema. Carotid pulses 2/2 bilaterally. No bruits auscultated  Pulmonary: Lungs clear toauscultation bilaterally. No rales, rhonchi or wheezes. No evidence of accessory muscle use. Good respiratory effort.  Abdomen: soft, nontender. No masses palpated. No hepatosplenomegaly.   Skin: warm without evidence of rashes, lesions or ulcerations  Psych: normal mood and affect. Alert and oriented x 3.       Review of Systems   Constitutional:    Negative for fever, chills, diaphoresis, activity change, appetite change, fatigue and unexpected weight change.   HENT:    Negative for hearing loss, nosebleeds, congestion and neck pain.  
  Eyes:    Negative for photophobia and visual disturbance.   Respiratory:    Negative for apnea, cough, choking, chest tightness, wheezing and stridor.  negative for shortness of breath   Cardiovascular:    Negative for near-syncope, syncope. No report of leg swelling. No palpitations, lightheadedness, chest discomfort  Gastrointestinal:    Negative for vomiting, abdominal pain, diarrhea, constipation, blood in stool, abdominal distention and anal bleeding.   Genitourinary:    Negative for dysuria, frequency, hematuria and difficulty urinating.   Musculoskeletal:    Negative for myalgias, back pain, joint swelling, or gait problem.  Skin:    Negative for color change, pallor, rash and wound.   Neurological:    Negative for dizziness, vision changes, seizures, numbness, new motor or sensory deficits or headaches.   Hematological:    Negative for adenopathy. Does not bruise/bleed easily.   Psychiatric/Behavioral:    Negative for hallucinations, behavioral problems, confusion, sleep disturbance, decreased concentration and agitation. No report of mood problem.         LAB DATA AND IMAGING:      CHOLESTEROL (mg/dL)   Date Value   03/28/2018 200 (H)     HDL (mg/dL)   Date Value   03/28/2018 58     CHOL/HDL (no units)   Date Value   03/28/2018 3.4     TRIGLYCERIDE (mg/dL)   Date Value   03/28/2018 84     CALCULATED LDL (mg/dL)   Date Value   03/28/2018 125       Creatinine (mg/dL)   Date Value   07/10/2018 0.97 (H)     AST/SGOT (Units/L)   Date Value   07/10/2018 11     ALT/SGPT (Units/L)   Date Value   07/10/2018 20     No results found for: GGTP  ALK PHOSPHATASE (Units/L)   Date Value   07/10/2018 46     TOTAL BILIRUBIN (mg/dL)   Date Value   07/10/2018 0.8       ASSESSMENT AND PLAN:  Mrs Sutton is a 44 year old female with past medical history of NIDDM who recently presented to ED with lightheadedness and palpitations and was referred to Cardiology after having initial negative evaluation.    1. 
Lightheadedness/palpitations/chest discomfort  -I reassured today her that her cardiac evaluation has been unrevealing  -initial ED work up negative except for ECG showing early repolarization  -Event monitor showed mainly SR/No significant arrhythmia  -Coronary CTA shows minimal atherosclerotic plaque; no obstructive CAD  -Echo shows normal biventricular function/ No significant valvular disease  -she was counseled on quitting smoking; she agrees and understands    2.Hyperlipidemia  -; Not at goal; we have again discussed starting stating but she would like to think about it at home and re discuss with her PCP  -she has indication for statin given that she is a Diabetic    3. HTN  -at goal; cont Lisinopril   -advised to stop smoking    It was a pleasure meeting Ms Sutton again today. She agrees with and understands the plan. She will follow u with me as needed.       
DISPLAY PLAN FREE TEXT

## 2025-03-06 NOTE — PROGRESS NOTE ADULT - PROBLEM SELECTOR PLAN 2
likely 2/2 to possible Volume over load vs hx of malignancy lymphoma   - CTA chest/abd/pelvis: Large left pleural effusion markedly increased since 11/30/2024 with total atelectasis left lower lobe and partial atelectasis left upper lobe. New mediastinal and left axillary lymphadenopathy. Inflammatory changes associated with left subclavian vein consistent with known DVT. Upper abdominal lymphadenopathy similar to 11/30/2024. Mildly increased pelvic lymphadenopathy. Mesenteric lymphadenopathy and mesenteric fat stranding similar to prior examination. No pulmonary embolus. Limited evaluation segmental and subsegmental branches  -CXR  showed progressive opacification of the left chest compatible with significant left pleural effusion with associated compressive atelectatic change. There is suggestion of some right CP angle blunting. Increased perihilar interstitial markings also noted on the residual left chest and slight prominence on the right. Port-A-Cath as before. Borderline cardiomegaly may be present.. Regional osseous structures are unchanged.   -pro-  - Pt saturating well on RA  - previously on butenide 1mg daily but pt states he was recently told to stopped   - patient received 1 dose of bumex 1mg IV in the ED   - prior TTE (11/2024) EF 65%   - f/u repeat TTE  - thoracentesis 03/04: drained 3.1L of serosanguinous fluid, repeat x-ray shows minimal change in chest x-ray  - recommend spirometry   - cardio (Dr. Espinoza) consulted  - pulm (Dr. Damico) consulted likely 2/2 to possible Volume over load vs hx of malignancy lymphoma   - CTA chest/abd/pelvis: Large left pleural effusion markedly increased since 11/30/2024 with total atelectasis left lower lobe and partial atelectasis left upper lobe. New mediastinal and left axillary lymphadenopathy. Inflammatory changes associated with left subclavian vein consistent with known DVT. Upper abdominal lymphadenopathy similar to 11/30/2024. Mildly increased pelvic lymphadenopathy. Mesenteric lymphadenopathy and mesenteric fat stranding similar to prior examination. No pulmonary embolus. Limited evaluation segmental and subsegmental branches  -CXR  showed progressive opacification of the left chest compatible with significant left pleural effusion with associated compressive atelectatic change. There is suggestion of some right CP angle blunting. Increased perihilar interstitial markings also noted on the residual left chest and slight prominence on the right. Port-A-Cath as before. Borderline cardiomegaly may be present.. Regional osseous structures are unchanged.   - pro-  - Pt saturating well on RA  - previously on butenide 1mg daily but pt states he was recently told to stopped   - patient received 1 dose of bumex 1mg IV in the ED   - prior TTE (11/2024) EF 65%   - TTE 03/06: LVEF: 61% aortic valve sclerosis. trace MR.   - thoracentesis 03/04: drained 3.1L of serosanguinous fluid, repeat x-ray shows minimal change in chest x-ray  - cytopathology: positive for malignant cells compatible with the metastatic carcinoma  - recommend spirometry   - cardio (Dr. Espinoza) consulted  - pulm (Dr. Damico) consulted

## 2025-03-06 NOTE — PROGRESS NOTE ADULT - PROBLEM SELECTOR PLAN 6
chronic   -qtc 416 on 03/03, can give zofran prn if pt is nauseous
chronic   -qtc 416 can give zofran prn if pt is nauseous
chronic   -qtc 416 on 03/03, can give zofran prn if pt is nauseous

## 2025-03-06 NOTE — DISCHARGE NOTE PROVIDER - CARE PROVIDERS DIRECT ADDRESSES
,DirectAddress_Unknown ,DirectAddress_Unknown,[email or web address]

## 2025-03-06 NOTE — DISCHARGE NOTE PROVIDER - CARE PROVIDER_API CALL
Mulugeta Delcid.  Physician Assistant Services  [address] [place] [address]  Phone: [phone]  Fax: [phone]  Follow Up Time:    Mulugeta Delcid  Physician Assistant Services  [address] [place] [address]  Phone: [phone]  Fax: [phone]  Follow Up Time:     Mariana Fung  Internal Medicine  [address] [facility], [facility] [address]  [place] [address]  Phone: [phone]  Fax: [phone]  Follow Up Time: 1 week

## 2025-03-06 NOTE — PROGRESS NOTE ADULT - PROBLEM SELECTOR PLAN 1
-pt presenting w/ left UE swelling 1 wk ago and SOB   -UE doppler : Extensive left upper extremity DVT extending proximally to involve the right internal jugular and subclavian veins. Left cervical lymphadenopathy with abnormal morphology suggesting malignancy.  -CTA showed no PE   -vascular surg consulted- no acute surgical intervention at this time   -c/w heparin drip -pt presenting w/ left UE swelling 1 wk ago and SOB   -UE doppler : Extensive left upper extremity DVT extending proximally to involve the right internal jugular and subclavian veins. Left cervical lymphadenopathy with abnormal morphology suggesting malignancy.  -CTA showed no PE   -vascular surg consulted- no acute surgical intervention at this time   -patient also endorses left leg calf pain, ordered venous doppler of left leg. this would not  if positive, but would provide a reason for pain and clarify timeline of (possible) clots   -c/w heparin drip, will consider transitioning to eliquis prior to dc

## 2025-03-06 NOTE — PROGRESS NOTE ADULT - SUBJECTIVE AND OBJECTIVE BOX
[facility] -- Charles Martinez, Andrei Mccall, Olga, Eron Pablo: Office # [phone]    Follow Up: LUE DVT, pleural effusion     Subjective/Observations: Patient awake, alert, OOB to chair. Denies chest pain, palpitations and dizziness. Denies any difficulty breathing. No orthopnea and PND. Tolerating room air. S/p pig tail drain removal. Continuing on Heparin gtt.     REVIEW OF SYSTEMS: All other review of systems are negative unless indicated above    PAST MEDICAL & SURGICAL HISTORY:  Incisional hernia  2015    Gastric lymphoma      Anemia of chronic disease    S/P appendectomy  November 17th 2014      S/P cholecystectomy  15 years ago    MEDICATIONS  (STANDING):  chlorhexidine 2% Cloths 1 Application(s) Topical daily  heparin  Infusion.  Unit(s)/Hr (18 mL/Hr) IV Continuous <Continuous>  labetalol 100 milliGRAM(s) Oral two times a day  losartan 50 milliGRAM(s) Oral daily    MEDICATIONS  (PRN):  heparin   Injectable 8000 Unit(s) IV Push every 6 hours PRN For aPTT less than 40  heparin   Injectable 4000 Unit(s) IV Push every 6 hours PRN For aPTT between 40 - 57  melatonin 3 milliGRAM(s) Oral at bedtime PRN Insomnia  ondansetron Injectable 4 milliGRAM(s) IV Push every 8 hours PRN Nausea and/or Vomiting    Allergies    Bactrim DS (Hives)    Intolerances      Vital Signs Last 24 Hrs  T(C): 37.2 (06 Mar 2025 04:25), Max: 37.2 (06 Mar 2025 04:25)  T(F): 98.9 (06 Mar 2025 04:25), Max: 98.9 (06 Mar 2025 04:25)  HR: 88 (06 Mar 2025 04:25) (80 - 88)  BP: 121/76 (06 Mar 2025 04:25) (104/61 - 121/76)  BP(mean): --  RR: 18 (06 Mar 2025 04:25) (18 - 21)  SpO2: 95% (06 Mar 2025 04:25) (95% - 97%)    Parameters below as of 06 Mar 2025 04:25  Patient On (Oxygen Delivery Method): room air      I&O's Summary    05 Mar 2025 07:01  -  06 Mar 2025 07:00  --------------------------------------------------------  IN: 464 mL / OUT: 990 mL / NET: -526 mL          TELE: Not on telemetry   PHYSICAL EXAM:  Constitutional: NAD, awake and alert  HEENT: Moist Mucous Membranes, Anicteric  Pulmonary: Non-labored, breath sounds are clear bilaterally, Diminished at bases No wheezing, rales or rhonchi, Lt pig tail drain to chest tube drainage   Cardiovascular: Regular, S1 and S2, No murmurs, No rubs, gallops or clicks  Gastrointestinal:  soft, nontender, nondistended   Lymph: + Le UE peripheral edema. No lymphadenopathy.   Skin: No visible rashes or ulcers.  Psych:  Mood & affect appropriate      LABS: All Labs Reviewed:                        7.5    8.94  )-----------( 532      ( 06 Mar 2025 05:40 )             23.7                         7.8    8.20  )-----------( 543      ( 05 Mar 2025 06:24 )             24.4                         7.5    8.30  )-----------( 507      ( 04 Mar 2025 23:54 )             23.3     06 Mar 2025 05:40    134    |  103    |  19     ----------------------------<  104    4.1     |  23     |  1.20   05 Mar 2025 06:24    136    |  103    |  17     ----------------------------<  109    4.2     |  22     |  1.20   04 Mar 2025 05:57    137    |  103    |  16     ----------------------------<  105    4.4     |  23     |  1.30     Ca    9.1        06 Mar 2025 05:40  Ca    8.9        05 Mar 2025 06:24  Ca    8.9        04 Mar 2025 05:57    TPro  6.2    /  Alb  2.4    /  TBili  0.3    /  DBili  x      /  AST  16     /  ALT  18     /  AlkPhos  84     06 Mar 2025 05:40  TPro  6.7    /  Alb  2.4    /  TBili  0.4    /  DBili  x      /  AST  18     /  ALT  21     /  AlkPhos  85     05 Mar 2025 06:24   LIVER FUNCTIONS - ( 06 Mar 2025 05:40 )  Alb: 2.4 g/dL / Pro: 6.2 g/dL / ALK PHOS: 84 U/L / ALT: 18 U/L / AST: 16 U/L / GGT: x           PTT - ( 06 Mar 2025 03:02 )  PTT:68.5 secTroponin I, High Sensitivity Result: 11.5 ng/L (03-03-25 @ 08:10)    12 Lead ECG:   Ventricular Rate 78 BPM    Atrial Rate 78 BPM    P-R Interval 134 ms    QRS Duration 90 ms    Q-T Interval 362 ms    QTC Calculation(Bazett) 412 ms    P Axis 29 degrees    R Axis 3 degrees    T Axis 5 degrees    Diagnosis Line Normal sinus rhythm  Confirmed by milton Espinoza ([id number]) on 3/3/2025 1:49:09 PM (03-03-25 @ 07:34)      TRANSTHORACIC ECHOCARDIOGRAM REPORT  ________________________________________________________________________________                                      _______       Pt. Name:       JESSE NAJERALEROY Study Date:    3/5/2025  MRN:    [id number]                   YOB: 1960  Accession #:    [id number]                  Age:           64 years  Account#:       [id number]                 Gender:        M  Heart Rate:                                Height:        66.93 in (170.00 cm)  Rhythm:                                    Weight:        211.64 lb (96.00 kg)  Blood Pressure: 133/78 mmHg                BSA/BMI:       2.07 m² / 33.22 kg/m²  ________________________________________________________________________________________  Referring Physician:    [id number] Kentrell Cartagena  Interpreting Physician: Lilli Pablo MD  Primary Sonographer:    Emperatriz AVILA    CPT:               ECHO TTE WO CON COMP W DOPP - 95294.m  Indication(s):     Dyspnea, unspecified - R06.00  Procedure:         Transthoracic echocardiogram with 2-D, M-mode and complete                     spectral and color flow Doppler.  Ordering Location: [facility]  Admission Status:  Inpatient  Study Information: Image quality for this study is technically difficult.    _______________________________________________________________________________________     CONCLUSIONS:      1. Technically difficult image quality.   2. Left ventricular cavity is normal in size. Left ventricular wall thickness is normal. Left ventricular systolic function is normal with an ejection fraction of 61 % by David's method of disks. There are no regional wall motion abnormalities seen.   3. Normal right ventricular cavity size and normal right ventricular systolic function.   4. Fibrocalcific aortic valve sclerosis without stenosis.   5. Trace mitral regurgitation.   6. Tricuspid valve was not well visualized.   7. The inferior vena cava is normal in size measuring 2.20 cm in diameter, (normal <2.1cm) withnormal inspiratory collapse (normal >50%) consistent with normal right atrial pressure (~3, range 0-5mmHg).   8. Small pericardial effusion with no echocardiographic evidence of tamponade physiology.    ________________________________________________________________________________________  FINDINGS:     Left Ventricle:  The left ventricular cavity is normal in size. Left ventricular wall thickness is normal. Left ventricular systolic function is normal with a calculated ejection fraction of 61 % by the David's biplane method of disks. There are no regional wall motion abnormalities seen. There is normal left ventricular diastolic function, with normal left ventricular filling pressure.     Right Ventricle:  The right ventricular cavity isnormal in size and right ventricular systolic function is normal.     Left Atrium:  The left atrium is normal in size with an indexed volume of 23.10 ml/m².     Right Atrium:  The right atrium is normal in size with an indexed volume of 16.77 ml/m².     Aortic Valve:  There is fibrocalcific aortic valve sclerosis without stenosis. There is no evidence of aortic regurgitation.     Mitral Valve:  Structurally normal mitral valve with normal leaflet excursion. There is no mitral valve stenosis. There is trace mitral regurgitation.     Tricuspid Valve:  The tricuspid valve was not well visualized. There is insufficient tricuspid regurgitation detected to calculate pulmonary artery systolic pressure.     Pulmonic Valve:  The pulmonic valve was notwell visualized. There is trace pulmonic regurgitation.     Pericardium:  There is a small pericardial effusion with no echocardiographic evidence of tamponade physiology.     Systemic Veins:  The inferior vena cava is normal in size measuring 2.20 cm in diameter, (normal <2.1cm) with normal inspiratory collapse (normal >50%) consistent with normal right atrial pressure (~3, range 0-5mmHg).  ____________________________________________________________________  QUANTITATIVE DATA:  Left Ventricle Measurements: (Indexed to BSA)     IVSd (2D):   1.0 cm  LVPWd (2D):  1.2 cm  LVIDd (2D):  4.5 cm  LVIDs (2D):  3.0 cm  LV Mass:     181 g  87.2 g/m²  BiPlane LV EF%: 61 %     MV E Vmax:    0.80 m/s  MV A Vmax:    0.78 m/s  MV E/A:       1.03  e' lateral:   11.40 cm/s  e' medial:    7.40 cm/s  E/e' lateral: 6.99  E/e' medial:  10.77  E/e' Average: 8.48  MV DT:        229 msec    Aorta Measurements: (Normal range) (Indexed to BSA)     Ao Root d 3.00 cm (3.1 - 3.7 cm) 1.45 cm/m²            Left AtriumMeasurements: (Indexed to BSA)  LA Diam 2D:        3.00 cm  LA Vol s, MOD A4C: 41.10 ml.  LA Vol s, MOD A2C: 56.30 ml.  LA Vol s, MOD BP:  47.80 ml  23.10 ml/m²         Right Ventricle Measurements: Right Atrial Measurements:     RV Base (RVID1): 3.2cm       RA Vol:            34.70 ml                                RA Vol s, MOD A4C  34.7 ml                                RA Vol Index:      16.77 ml/m²                                RA Area s, MOD A4C 14.1 cm²       LVOT / RVOT/ Qp/Qs Data: (Indexed to BSA)  LVOT Diameter,s: 2.00 cm  LVOT Area:       3.14 cm²    Mitral Valve Measurements:     MV E Vmax: 0.8 m/s  MV A Vmax: 0.8 m/s  MV E/A:    1.0       Tricuspid Valve Measurements:     RA Pressure: 3 mmHg    ________________________________________________________________________________________  Electronically signed on 3/5/2025 at 2:33:47 PM by Lilli Pablo MD         *** Final ***

## 2025-03-06 NOTE — CASE MANAGEMENT PROGRESS NOTE - NSCMPROGRESSNOTE_GEN_ALL_CORE
Patient discussed during interdisciplinary rounds. Patient remains acute on heparin drip. Chest tube removed this morning. CM met with patient to discuss [facility] services and patient declines at this time. CM remains available if needs arise.

## 2025-03-06 NOTE — PROGRESS NOTE ADULT - PROBLEM SELECTOR PLAN 3
Pt hx of gastric lymphoma, rt medport in place. Follows w/ Dr. Fung  - CT Ab: Upper abdominal lymphadenopathy similar to 11/30/2024. Mildly increased pelvic lymphadenopathy. Mesenteric lymphadenopathy and mesenteric fat stranding similar to prior examination.   - completed multiple rounds of chemo  - but pt missed last wk chemo sec to anemia (requires hgb >8)   - pt would like blood transfusion prior to dc,  to help him for next chemo  - hx beta thal minor  -heme/onc () consulted Pt hx of gastric lymphoma, rt medport in place. Follows w/ Dr. Fung  - CT Ab: Upper abdominal lymphadenopathy similar to 11/30/2024. Mildly increased pelvic lymphadenopathy. Mesenteric lymphadenopathy and mesenteric fat stranding similar to prior examination.   - completed multiple rounds of chemo  - but pt missed last wk chemo sec to anemia (requires hgb >8)   - pt would like blood transfusion prior to dc,  to help him for next chemo, ordered one unit today 03/06  - hx beta thal minor  -heme/onc () consulted

## 2025-03-06 NOTE — PROGRESS NOTE ADULT - SUBJECTIVE AND OBJECTIVE BOX
Date/Time Patient Seen:  		  Referring MD:   Data Reviewed	       Patient is a 64y old  Male who presents with a chief complaint of LUE DVT, pleural effusion (05 Mar 2025 22:00)      Subjective/HPI     PAST MEDICAL & SURGICAL HISTORY:  No pertinent past medical history    Incisional hernia  2015    Gastric lymphoma    Anemia of chronic disease    No significant past surgical history    S/P appendectomy  November 17th 2014    S/P cholecystectomy  15 years ago          Medication list         MEDICATIONS  (STANDING):  chlorhexidine 2% Cloths 1 Application(s) Topical daily  heparin  Infusion.  Unit(s)/Hr (18 mL/Hr) IV Continuous <Continuous>  labetalol 100 milliGRAM(s) Oral two times a day  losartan 50 milliGRAM(s) Oral daily    MEDICATIONS  (PRN):  heparin   Injectable 8000 Unit(s) IV Push every 6 hours PRN For aPTT less than 40  heparin   Injectable 4000 Unit(s) IV Push every 6 hours PRN For aPTT between 40 - 57  melatonin 3 milliGRAM(s) Oral at bedtime PRN Insomnia  ondansetron Injectable 4 milliGRAM(s) IV Push every 8 hours PRN Nausea and/or Vomiting         Vitals log        ICU Vital Signs Last 24 Hrs  T(C): 37.2 (06 Mar 2025 04:25), Max: 37.4 (05 Mar 2025 05:18)  T(F): 98.9 (06 Mar 2025 04:25), Max: 99.3 (05 Mar 2025 05:18)  HR: 88 (06 Mar 2025 04:25) (80 - 88)  BP: 121/76 (06 Mar 2025 04:25) (104/61 - 133/78)  BP(mean): --  ABP: --  ABP(mean): --  RR: 18 (06 Mar 2025 04:25) (18 - 21)  SpO2: 95% (06 Mar 2025 04:25) (95% - 97%)    O2 Parameters below as of 06 Mar 2025 04:25  Patient On (Oxygen Delivery Method): room air                 Input and Output:  I&O's Detail    04 Mar 2025 07:01  -  05 Mar 2025 07:00  --------------------------------------------------------  IN:  Total IN: 0 mL    OUT:    VAC (Vacuum Assisted Closure) System (mL): 3100 mL    Voided (mL): 450 mL  Total OUT: 3550 mL    Total NET: -3550 mL      05 Mar 2025 07:01  -  06 Mar 2025 05:16  --------------------------------------------------------  IN:  Total IN: 0 mL    OUT:    VAC (Vacuum Assisted Closure) System (mL): 40 mL    Voided (mL): 950 mL  Total OUT: 990 mL    Total NET: -990 mL          Lab Data                        7.8    8.20  )-----------( 543      ( 05 Mar 2025 06:24 )             24.4     03-05    136  |  103  |  17  ----------------------------<  109[H]  4.2   |  22  |  1.20    Ca    8.9      05 Mar 2025 06:24    TPro  6.7  /  Alb  2.4[L]  /  TBili  0.4  /  DBili  x   /  AST  18  /  ALT  21  /  AlkPhos  85  03-05            Review of Systems	      Objective     Physical Examination    heart s1s2  lung dc BS  head nc  head at  left ct    Pertinent Lab findings & Imaging      Jaemson:  NO   Adequate UO     I&O's Detail    04 Mar 2025 07:01  -  05 Mar 2025 07:00  --------------------------------------------------------  IN:  Total IN: 0 mL    OUT:    VAC (Vacuum Assisted Closure) System (mL): 3100 mL    Voided (mL): 450 mL  Total OUT: 3550 mL    Total NET: -3550 mL      05 Mar 2025 07:01  -  06 Mar 2025 05:16  --------------------------------------------------------  IN:  Total IN: 0 mL    OUT:    VAC (Vacuum Assisted Closure) System (mL): 40 mL    Voided (mL): 950 mL  Total OUT: 990 mL    Total NET: -990 mL               Discussed with:     Cultures:	        Radiology

## 2025-03-07 VITALS
OXYGEN SATURATION: 93 % | TEMPERATURE: 97 F | HEART RATE: 88 BPM | DIASTOLIC BLOOD PRESSURE: 69 MMHG | RESPIRATION RATE: 18 BRPM | SYSTOLIC BLOOD PRESSURE: 116 MMHG

## 2025-03-07 LAB
ALBUMIN SERPL ELPH-MCNC: 2.2 G/DL — LOW (ref 3.3–5)
ALP SERPL-CCNC: 83 U/L — SIGNIFICANT CHANGE UP (ref 40–120)
ALT FLD-CCNC: 22 U/L — SIGNIFICANT CHANGE UP (ref 12–78)
ANION GAP SERPL CALC-SCNC: 10 MMOL/L — SIGNIFICANT CHANGE UP (ref 5–17)
APTT BLD: 30.6 SEC — SIGNIFICANT CHANGE UP (ref 24.5–35.6)
AST SERPL-CCNC: 19 U/L — SIGNIFICANT CHANGE UP (ref 15–37)
BASOPHILS # BLD AUTO: 0.09 K/UL — SIGNIFICANT CHANGE UP (ref 0–0.2)
BASOPHILS NFR BLD AUTO: 1 % — SIGNIFICANT CHANGE UP (ref 0–2)
BILIRUB SERPL-MCNC: 0.5 MG/DL — SIGNIFICANT CHANGE UP (ref 0.2–1.2)
BUN SERPL-MCNC: 15 MG/DL — SIGNIFICANT CHANGE UP (ref 7–23)
CALCIUM SERPL-MCNC: 8.9 MG/DL — SIGNIFICANT CHANGE UP (ref 8.5–10.1)
CHLORIDE SERPL-SCNC: 102 MMOL/L — SIGNIFICANT CHANGE UP (ref 96–108)
CO2 SERPL-SCNC: 23 MMOL/L — SIGNIFICANT CHANGE UP (ref 22–31)
CREAT SERPL-MCNC: 1.2 MG/DL — SIGNIFICANT CHANGE UP (ref 0.5–1.3)
EGFR: 68 ML/MIN/1.73M2 — SIGNIFICANT CHANGE UP
EGFR: 68 ML/MIN/1.73M2 — SIGNIFICANT CHANGE UP
EOSINOPHIL # BLD AUTO: 0.15 K/UL — SIGNIFICANT CHANGE UP (ref 0–0.5)
EOSINOPHIL NFR BLD AUTO: 1.6 % — SIGNIFICANT CHANGE UP (ref 0–6)
GLUCOSE SERPL-MCNC: 96 MG/DL — SIGNIFICANT CHANGE UP (ref 70–99)
HCT VFR BLD CALC: 27.4 % — LOW (ref 39–50)
HGB BLD-MCNC: 8.8 G/DL — LOW (ref 13–17)
IMM GRANULOCYTES NFR BLD AUTO: 1.4 % — HIGH (ref 0–0.9)
LYMPHOCYTES # BLD AUTO: 0.81 K/UL — LOW (ref 1–3.3)
LYMPHOCYTES # BLD AUTO: 8.8 % — LOW (ref 13–44)
MCHC RBC-ENTMCNC: 21.2 PG — LOW (ref 27–34)
MCHC RBC-ENTMCNC: 32.1 G/DL — SIGNIFICANT CHANGE UP (ref 32–36)
MCV RBC AUTO: 66 FL — LOW (ref 80–100)
MONOCYTES # BLD AUTO: 1.24 K/UL — HIGH (ref 0–0.9)
MONOCYTES NFR BLD AUTO: 13.4 % — SIGNIFICANT CHANGE UP (ref 2–14)
NEUTROPHILS # BLD AUTO: 6.83 K/UL — SIGNIFICANT CHANGE UP (ref 1.8–7.4)
NEUTROPHILS NFR BLD AUTO: 73.8 % — SIGNIFICANT CHANGE UP (ref 43–77)
NRBC BLD AUTO-RTO: 0 /100 WBCS — SIGNIFICANT CHANGE UP (ref 0–0)
PLATELET # BLD AUTO: 492 K/UL — HIGH (ref 150–400)
POTASSIUM SERPL-MCNC: 4.2 MMOL/L — SIGNIFICANT CHANGE UP (ref 3.5–5.3)
POTASSIUM SERPL-SCNC: 4.2 MMOL/L — SIGNIFICANT CHANGE UP (ref 3.5–5.3)
PROT SERPL-MCNC: 6.7 G/DL — SIGNIFICANT CHANGE UP (ref 6–8.3)
RBC # BLD: 4.15 M/UL — LOW (ref 4.2–5.8)
RBC # FLD: 22.9 % — HIGH (ref 10.3–14.5)
SODIUM SERPL-SCNC: 135 MMOL/L — SIGNIFICANT CHANGE UP (ref 135–145)
WBC # BLD: 9.25 K/UL — SIGNIFICANT CHANGE UP (ref 3.8–10.5)
WBC # FLD AUTO: 9.25 K/UL — SIGNIFICANT CHANGE UP (ref 3.8–10.5)

## 2025-03-07 PROCEDURE — 87015 SPECIMEN INFECT AGNT CONCNTJ: CPT

## 2025-03-07 PROCEDURE — 93005 ELECTROCARDIOGRAM TRACING: CPT

## 2025-03-07 PROCEDURE — 86850 RBC ANTIBODY SCREEN: CPT

## 2025-03-07 PROCEDURE — 83550 IRON BINDING TEST: CPT

## 2025-03-07 PROCEDURE — 84157 ASSAY OF PROTEIN OTHER: CPT

## 2025-03-07 PROCEDURE — 88305 TISSUE EXAM BY PATHOLOGIST: CPT

## 2025-03-07 PROCEDURE — 82945 GLUCOSE OTHER FLUID: CPT

## 2025-03-07 PROCEDURE — 86900 BLOOD TYPING SEROLOGIC ABO: CPT

## 2025-03-07 PROCEDURE — 83986 ASSAY PH BODY FLUID NOS: CPT

## 2025-03-07 PROCEDURE — 83615 LACTATE (LD) (LDH) ENZYME: CPT

## 2025-03-07 PROCEDURE — 85610 PROTHROMBIN TIME: CPT

## 2025-03-07 PROCEDURE — 74177 CT ABD & PELVIS W/CONTRAST: CPT | Mod: MC

## 2025-03-07 PROCEDURE — 85025 COMPLETE CBC W/AUTO DIFF WBC: CPT

## 2025-03-07 PROCEDURE — 71275 CT ANGIOGRAPHY CHEST: CPT | Mod: MC

## 2025-03-07 PROCEDURE — 32557 INSERT CATH PLEURA W/ IMAGE: CPT

## 2025-03-07 PROCEDURE — 85730 THROMBOPLASTIN TIME PARTIAL: CPT

## 2025-03-07 PROCEDURE — 81001 URINALYSIS AUTO W/SCOPE: CPT

## 2025-03-07 PROCEDURE — 87075 CULTR BACTERIA EXCEPT BLOOD: CPT

## 2025-03-07 PROCEDURE — 82042 OTHER SOURCE ALBUMIN QUAN EA: CPT

## 2025-03-07 PROCEDURE — 76942 ECHO GUIDE FOR BIOPSY: CPT

## 2025-03-07 PROCEDURE — 86923 COMPATIBILITY TEST ELECTRIC: CPT

## 2025-03-07 PROCEDURE — 87102 FUNGUS ISOLATION CULTURE: CPT

## 2025-03-07 PROCEDURE — C1729: CPT

## 2025-03-07 PROCEDURE — 82728 ASSAY OF FERRITIN: CPT

## 2025-03-07 PROCEDURE — 84484 ASSAY OF TROPONIN QUANT: CPT

## 2025-03-07 PROCEDURE — 93971 EXTREMITY STUDY: CPT

## 2025-03-07 PROCEDURE — 82607 VITAMIN B-12: CPT

## 2025-03-07 PROCEDURE — 84466 ASSAY OF TRANSFERRIN: CPT

## 2025-03-07 PROCEDURE — 82746 ASSAY OF FOLIC ACID SERUM: CPT

## 2025-03-07 PROCEDURE — 80048 BASIC METABOLIC PNL TOTAL CA: CPT

## 2025-03-07 PROCEDURE — 93306 TTE W/DOPPLER COMPLETE: CPT

## 2025-03-07 PROCEDURE — 36430 TRANSFUSION BLD/BLD COMPNT: CPT

## 2025-03-07 PROCEDURE — 96374 THER/PROPH/DIAG INJ IV PUSH: CPT

## 2025-03-07 PROCEDURE — 36415 COLL VENOUS BLD VENIPUNCTURE: CPT

## 2025-03-07 PROCEDURE — 83540 ASSAY OF IRON: CPT

## 2025-03-07 PROCEDURE — 99232 SBSQ HOSP IP/OBS MODERATE 35: CPT

## 2025-03-07 PROCEDURE — 87637 SARSCOV2&INF A&B&RSV AMP PRB: CPT

## 2025-03-07 PROCEDURE — 80053 COMPREHEN METABOLIC PANEL: CPT

## 2025-03-07 PROCEDURE — 87070 CULTURE OTHR SPECIMN AEROBIC: CPT

## 2025-03-07 PROCEDURE — 89051 BODY FLUID CELL COUNT: CPT

## 2025-03-07 PROCEDURE — 87205 SMEAR GRAM STAIN: CPT

## 2025-03-07 PROCEDURE — 99285 EMERGENCY DEPT VISIT HI MDM: CPT | Mod: 25

## 2025-03-07 PROCEDURE — 83735 ASSAY OF MAGNESIUM: CPT

## 2025-03-07 PROCEDURE — 99239 HOSP IP/OBS DSCHRG MGMT >30: CPT

## 2025-03-07 PROCEDURE — 85027 COMPLETE CBC AUTOMATED: CPT

## 2025-03-07 PROCEDURE — 71045 X-RAY EXAM CHEST 1 VIEW: CPT

## 2025-03-07 PROCEDURE — 86901 BLOOD TYPING SEROLOGIC RH(D): CPT

## 2025-03-07 PROCEDURE — 83880 ASSAY OF NATRIURETIC PEPTIDE: CPT

## 2025-03-07 PROCEDURE — 88112 CYTOPATH CELL ENHANCE TECH: CPT

## 2025-03-07 PROCEDURE — P9016: CPT

## 2025-03-07 PROCEDURE — C1769: CPT

## 2025-03-07 RX ADMIN — APIXABAN 10 MILLIGRAM(S): 2.5 TABLET, FILM COATED ORAL at 05:12

## 2025-03-07 RX ADMIN — LOSARTAN POTASSIUM 50 MILLIGRAM(S): 100 TABLET, FILM COATED ORAL at 10:06

## 2025-03-07 NOTE — PROGRESS NOTE ADULT - NS ATTEND AMEND GEN_ALL_CORE FT
64y old  Male who presents with a chief complaint of LUE DVT, pleural effusion (03 Mar 2025 12:33    - Pt p/w short of breath, found to have large left side pleural effusion  - BNP:  <--406  - CT Angio PE --> large L sided pleural effusion  - Patient previously on bumex, s/p Bumex 1mg IV   - S/p 8.5Fr pigtail placed to left chest wall 3/4/25 550 ml removed, now drain is removed 3/6   - US LUE --> DVT LUE extending into RIJ and R subclavian  - Recent chest port for HD removed likely provoking DVT  - Continue Heparin gtt. Eventual NOAC   - EKG: NSR  - Troponin negative   - No evidence of any active ischemia   - Pro   - Echo from 11/2024 EF 65%  - Repeat TTE showed normal LV & RV size and function EF 61%, Trace MR  - No evidence of any meaningful volume overload   - Continue Fluid restriction  - Strict I/Os, daily weights  - -120s   - Continue labetalol 100 bid, losartan 50 daily  - Hemoglobin <--7.5, <--7.8, <--7.5  - Hematocrit <--23.7, <--24.4, <--23.3  - Pt would like transfusion before d/c so he can get chemo next week.
64y old  Male who presents with a chief complaint of LUE DVT, pleural effusion (03 Mar 2025 12:33    - Pt p/w short of breath  - CT Angio PE --> large L sided pleural effusion  - Patient previously on bumex, s/p Bumex 1mg IV   - Plan for thoracentesis 3/4/25    - US LUE --> DVT LUE extending into RIJ and R subclavian  - Recent chest port for HD removed likely provoking DVT    - No evidence of any active ischemia   - Echo from 11/2024 EF 65%  - Will repeat TTE, follow up results     - Continue labetalol 100 bid, losartan 50 daily    - Monitor and replete lytes, keep K>4, Mg>2.  - Further cardiac workup will depend on clinical course.
64y old  Male who presents with a chief complaint of LUE DVT, pleural effusion (03 Mar 2025 12:33    - Pt p/w short of breath, found to have large left side pleural effusion  - BNP:  <--406  - CT Angio PE --> large L sided pleural effusion  - Patient previously on bumex, s/p Bumex 1mg IV   - S/p 8.5Fr pigtail placed to left chest wall 3/4/25 550 ml removed. Now attached to chest tube drainage     - US LUE --> DVT LUE extending into RIJ and R subclavian  - Recent chest port for HD removed likely provoking DVT  - Continue Heparin gtt. Eventual NOAC     - EKG: NSR  - Troponin negative   - No evidence of any active ischemia     - Pro bnp 406  - Echo from 11/2024 EF 65%  - Will repeat TTE, follow up results   - Continue Fluid restriction  - Strict I/Os, daily weights    - -130s   - Continue labetalol 100 bid, losartan 50 daily
64y old  Male who presents with a chief complaint of LUE DVT, pleural effusion     p/w short of breath, found to have large left side pleural effusion  - CT Angio PE --> large L sided pleural effusion  - Patient previously on bumex, s/p Bumex 1mg IV   - S/p 8.5Fr pigtail placed to left chest wall 3/4/25 550 ml removed, now drain is removed 3/6   - US LUE --> DVT LUE extending into RIJ and R subclavian  - Recent chest port for HD removed likely provoking DVT  - on eliquis 10mg po BID   - Repeat TTE showed normal LV & RV size and function EF 61%, Trace MR  - BP soft would decrease losartan

## 2025-03-07 NOTE — PROGRESS NOTE ADULT - PROVIDER SPECIALTY LIST ADULT
Heme/Onc
Pulmonology
Cardiology
Cardiology
Heme/Onc
Cardiology
Cardiology
Pulmonology
Hospitalist

## 2025-03-07 NOTE — DISCHARGE NOTE NURSING/CASE MANAGEMENT/SOCIAL WORK - FINANCIAL ASSISTANCE
[facility] provides services at a reduced cost to those who are determined to be eligible through [facility]’s financial assistance program. Information regarding [facility]’s financial assistance program can be found by going to [email or web address] or by calling [phone].

## 2025-03-07 NOTE — CASE MANAGEMENT PROGRESS NOTE - NSCMPROGRESSNOTE_GEN_ALL_CORE
Per MD, patient is medically cleared for discharge home today.  CM met with patient to discuss discharge disposition to home without homecare services. Patient states he is independent with disease management, medication management, scheduling all necessary follow up MD appointment, ambulation, and ADLs. Discharge notice signed and copy given to patient.  Family to transport patient home. Patient verbalized understanding of the transition plan and is in agreement. CM answered all questions to the best of my abilities. CM remains available throughout hospital stay.   Per MD, patient is medically cleared for discharge home today.  CM met with patient to discuss discharge disposition to home without homecare services as patient is declining [facility] services at this time. Patient states he is independent with disease management, medication management, scheduling all necessary follow up MD appointment, ambulation, and ADLs. Discharge notice signed and copy given to patient.  Family to transport patient home. Patient verbalized understanding of the transition plan and is in agreement. CM answered all questions to the best of my abilities. CM remains available throughout hospital stay.

## 2025-03-07 NOTE — DISCHARGE NOTE NURSING/CASE MANAGEMENT/SOCIAL WORK - PATIENT PORTAL LINK FT
You can access the FollowMyHealth Patient Portal offered by [facility] by registering at the following website: [email or web address]. By joining [employer]’s FollowMyHealth portal, you will also be able to view your health information using other applications (apps) compatible with our system.

## 2025-03-07 NOTE — PROGRESS NOTE ADULT - REASON FOR ADMISSION
LUE DVT, pleural effusion

## 2025-03-07 NOTE — PROGRESS NOTE ADULT - SUBJECTIVE AND OBJECTIVE BOX
Date/Time Patient Seen:  		  Referring MD:   Data Reviewed	       Patient is a 64y old  Male who presents with a chief complaint of LUE DVT, pleural effusion (06 Mar 2025 18:25)      Subjective/HPI     PAST MEDICAL & SURGICAL HISTORY:  No pertinent past medical history    Incisional hernia  2015    Gastric lymphoma    Anemia of chronic disease    No significant past surgical history    S/P appendectomy  November 17th 2014    S/P cholecystectomy  15 years ago          Medication list         MEDICATIONS  (STANDING):  apixaban 10 milliGRAM(s) Oral every 12 hours  chlorhexidine 2% Cloths 1 Application(s) Topical daily  labetalol 100 milliGRAM(s) Oral two times a day  losartan 50 milliGRAM(s) Oral daily    MEDICATIONS  (PRN):  melatonin 3 milliGRAM(s) Oral at bedtime PRN Insomnia  ondansetron Injectable 4 milliGRAM(s) IV Push every 8 hours PRN Nausea and/or Vomiting         Vitals log        ICU Vital Signs Last 24 Hrs  T(C): 37.3 (07 Mar 2025 04:58), Max: 37.6 (06 Mar 2025 19:57)  T(F): 99.1 (07 Mar 2025 04:58), Max: 99.7 (06 Mar 2025 19:57)  HR: 77 (07 Mar 2025 04:58) (77 - 90)  BP: 102/56 (07 Mar 2025 04:58) (100/65 - 118/60)  BP(mean): --  ABP: --  ABP(mean): --  RR: 18 (07 Mar 2025 04:58) (18 - 18)  SpO2: 92% (07 Mar 2025 04:58) (91% - 93%)    O2 Parameters below as of 07 Mar 2025 04:58  Patient On (Oxygen Delivery Method): room air                 Input and Output:  I&O's Detail    05 Mar 2025 07:01  -  06 Mar 2025 07:00  --------------------------------------------------------  IN:    Heparin Infusion: 264 mL    Oral Fluid: 200 mL  Total IN: 464 mL    OUT:    VAC (Vacuum Assisted Closure) System (mL): 40 mL    Voided (mL): 950 mL  Total OUT: 990 mL    Total NET: -526 mL      06 Mar 2025 07:01  -  07 Mar 2025 05:28  --------------------------------------------------------  IN:  Total IN: 0 mL    OUT:    Voided (mL): 250 mL  Total OUT: 250 mL    Total NET: -250 mL          Lab Data                        7.5    8.94  )-----------( 532      ( 06 Mar 2025 05:40 )             23.7     03-06    134[L]  |  103  |  19  ----------------------------<  104[H]  4.1   |  23  |  1.20    Ca    9.1      06 Mar 2025 05:40    TPro  6.2  /  Alb  2.4[L]  /  TBili  0.3  /  DBili  x   /  AST  16  /  ALT  18  /  AlkPhos  84  03-06            Review of Systems	      Objective     Physical Examination    heart s1s2  lung dc BS  head nc  head at      Pertinent Lab findings & Imaging      Jameson:  NO   Adequate UO     I&O's Detail    05 Mar 2025 07:01  -  06 Mar 2025 07:00  --------------------------------------------------------  IN:    Heparin Infusion: 264 mL    Oral Fluid: 200 mL  Total IN: 464 mL    OUT:    VAC (Vacuum Assisted Closure) System (mL): 40 mL    Voided (mL): 950 mL  Total OUT: 990 mL    Total NET: -526 mL      06 Mar 2025 07:01  -  07 Mar 2025 05:28  --------------------------------------------------------  IN:  Total IN: 0 mL    OUT:    Voided (mL): 250 mL  Total OUT: 250 mL    Total NET: -250 mL               Discussed with:     Cultures:	        Radiology

## 2025-03-07 NOTE — DISCHARGE NOTE NURSING/CASE MANAGEMENT/SOCIAL WORK - NSDCPEFALRISK_GEN_ALL_CORE
For information on Fall & Injury Prevention, visit: [email or web address] OR  [email or web address] OR  https://www.cdc.gov/steadi/patient.html

## 2025-03-07 NOTE — DISCHARGE NOTE NURSING/CASE MANAGEMENT/SOCIAL WORK - NSTOBACCONEVERSMOKERY/N_GEN_A
Bevely Rubinstein  is a  Established patient  ,39 y.o.   male here for evaluation of the following chief complaint(s):    CP        SUBJECTIVE/OBJECTIVE:  HPI : h/o  Cp, covid, ashly now here  For FU on HV    Review of Systems    neg    Vitals:    01/25/21 1636   BP: (!) 120/52   Pulse: 58   Weight: 242 lb 3.2 oz (109.9 kg)   Height: 6' 3\" (1.905 m)     BP (!) 120/52   Pulse 58   Ht 6' 3\" (1.905 m)   Wt 242 lb 3.2 oz (109.9 kg)   BMI 30.27 kg/m²   No flowsheet data found. Wt Readings from Last 3 Encounters:   01/25/21 242 lb 3.2 oz (109.9 kg)   01/17/21 235 lb 11.2 oz (106.9 kg)   03/04/20 147 lb (66.7 kg)     Body mass index is 30.27 kg/m². Physical Exam     Neck: JVD      Lungs : clear    Cardio : Si and S2 audilble      Ext: edema      All pertinent data reviewed      Meds : reviewed         Tests ordered    Stress cardiolite    ASSESSMENT/PLAN:    - bradycradia  stable    - CP  ETT  For  competence    An electronic signature was used to authenticate this note.     --Tobi Mcadams MD
No

## 2025-03-07 NOTE — DISCHARGE NOTE NURSING/CASE MANAGEMENT/SOCIAL WORK - NSDCVIVACCINE_GEN_ALL_CORE_FT
Is This A New Presentation, Or A Follow-Up?: Skin Lesions Have Your Skin Lesions Been Treated?: not been treated influenza, injectable, quadrivalent, preservative free; 26-Nov-2014 17:06; Dulce Hair (RN); [employer]; ; IntraMuscular; Deltoid Left.; 0.5 milliLiter(s);   Tdap; 08-Apr-2021 16:20; Lilly OlsonRN); [employer]; [id number] (Exp. Date: 10-Shekhar-2022); IntraMuscular; Deltoid Left.; 0.5 milliLiter(s); VIS (VIS Published: 09-May-2013, VIS Presented: 08-Apr-2021);

## 2025-03-07 NOTE — PROGRESS NOTE ADULT - SUBJECTIVE AND OBJECTIVE BOX
[facility] -- Charles Martinez Pannella, Patel, Savella Goodger, Cohen  Office # [phone]      Follow Up:  DVT pleural effusion     Subjective/Observations:   No events overnight resting comfortably in bed.  No complaints of chest pain, dyspnea, or palpitations reported. No signs of orthopnea or PND.  remains on room air     REVIEW OF SYSTEMS: All other review of systems is negative unless indicated above    PAST MEDICAL & SURGICAL HISTORY:  Incisional hernia        Gastric lymphoma      Anemia of chronic disease      S/P appendectomy  2014      S/P cholecystectomy  15 years ago          MEDICATIONS  (STANDING):  apixaban 10 milliGRAM(s) Oral every 12 hours  chlorhexidine 2% Cloths 1 Application(s) Topical daily  labetalol 100 milliGRAM(s) Oral two times a day  losartan 50 milliGRAM(s) Oral daily    MEDICATIONS  (PRN):  melatonin 3 milliGRAM(s) Oral at bedtime PRN Insomnia  ondansetron Injectable 4 milliGRAM(s) IV Push every 8 hours PRN Nausea and/or Vomiting      Allergies    Bactrim DS (Hives)    Intolerances        Vital Signs Last 24 Hrs  T(C): 37.3 (07 Mar 2025 04:58), Max: 37.6 (06 Mar 2025 19:57)  T(F): 99.1 (07 Mar 2025 04:58), Max: 99.7 (06 Mar 2025 19:57)  HR: 77 (07 Mar 2025 04:58) (77 - 90)  BP: 102/56 (07 Mar 2025 04:58) (100/65 - 118/60)  BP(mean): --  RR: 18 (07 Mar 2025 04:58) (18 - 18)  SpO2: 92% (07 Mar 2025 04:58) (91% - 93%)    Parameters below as of 07 Mar 2025 04:58  Patient On (Oxygen Delivery Method): room air        I&O's Summary    06 Mar 2025 07:01  -  07 Mar 2025 07:00  --------------------------------------------------------  IN: 0 mL / OUT: 250 mL / NET: -250 mL          PHYSICAL EXAM:  TELE: off tele   Constitutional: NAD, awake and alert, well-developed  HEENT: Moist Mucous Membranes, Anicteric  Pulmonary: Non-labored, breath sounds are clear bilaterally, No wheezing, crackles or rhonchi  Cardiovascular: Regular, S1 and S2 nl, No murmurs, rubs, gallops or clicks  Gastrointestinal: Bowel Sounds present, soft, nontender.   Lymph: No lymphadenopathy. No peripheral edema.  Skin: No visible rashes or ulcers.  Psych:  Mood & affect appropriate    LABS: All Labs Reviewed:                        8.8    9.25  )-----------( 492      ( 07 Mar 2025 06:10 )             27.4                         7.5    8.94  )-----------( 532      ( 06 Mar 2025 05:40 )             23.7                         7.8    8.20  )-----------( 543      ( 05 Mar 2025 06:24 )             24.4     07 Mar 2025 06:10    135    |  102    |  15     ----------------------------<  96     4.2     |  23     |  1.20   06 Mar 2025 05:40    134    |  103    |  19     ----------------------------<  104    4.1     |  23     |  1.20   05 Mar 2025 06:24    136    |  103    |  17     ----------------------------<  109    4.2     |  22     |  1.20     Ca    8.9        07 Mar 2025 06:10  Ca    9.1        06 Mar 2025 05:40  Ca    8.9        05 Mar 2025 06:24    TPro  6.7    /  Alb  2.2    /  TBili  0.5    /  DBili  x      /  AST  19     /  ALT  22     /  AlkPhos  83     07 Mar 2025 06:10  TPro  6.2    /  Alb  2.4    /  TBili  0.3    /  DBili  x      /  AST  16     /  ALT  18     /  AlkPhos  84     06 Mar 2025 05:40  TPro  6.7    /  Alb  2.4    /  TBili  0.4    /  DBili  x      /  AST  18     /  ALT  21     /  AlkPhos  85     05 Mar 2025 06:24    PTT - ( 07 Mar 2025 06:10 )  PTT:30.6 sec         EC Lead ECG:   Ventricular Rate 78 BPM    Atrial Rate 78 BPM    P-R Interval 134 ms    QRS Duration 90 ms    Q-T Interval 362 ms    QTC Calculation(Bazett) 412 ms    P Axis 29 degrees    R Axis 3 degrees    T Axis 5 degrees    Diagnosis Line Normal sinus rhythm  Confirmed by milton Espinoza ([id number]) on 3/3/2025 1:49:09 PM (25 @ 07:34)      TRANSTHORACIC ECHOCARDIOGRAM REPORT  ________________________________________________________________________________                                      _______       Pt. Name:       JESSE EATON Study Date:    3/5/2025  MRN:    [id number]                   YOB: 1960  Accession #:    [id number]                  Age:           64 years  Account#:       [id number]                 Gender:        M  Heart Rate:                                Height:        66.93 in (170.00 cm)  Rhythm:                                    Weight:        211.64 lb (96.00 kg)  Blood Pressure: 133/78 mmHg                BSA/BMI:       2.07 m² / 33.22 kg/m²  ________________________________________________________________________________________  Referring Physician:    [id number] Kentrell Cartagena  Interpreting Physician: Lilli Pablo MD  Primary Sonographer:    Emperatriz AVILA    CPT:               ECHO TTE WO CON COMP W DOPP - 56867.m  Indication(s):     Dyspnea, unspecified - R06.00  Procedure:         Transthoracic echocardiogram with 2-D, M-mode and complete                     spectral and color flow Doppler.  Ordering Location: [facility]  Admission Status:  Inpatient  Study Information: Image quality for this study is technically difficult.    _______________________________________________________________________________________     CONCLUSIONS:      1. Technically difficult image quality.   2. Left ventricular cavity is normal in size. Left ventricular wall thickness is normal. Left ventricular systolic function is normal with an ejection fraction of 61 % by David's method of disks. There are no regional wall motion abnormalities seen.   3. Normal right ventricular cavity size and normal right ventricular systolic function.   4. Fibrocalcific aortic valve sclerosis without stenosis.   5. Trace mitral regurgitation.   6. Tricuspid valve was not well visualized.   7. The inferior vena cava is normal in size measuring 2.20 cm in diameter, (normal <2.1cm) withnormal inspiratory collapse (normal >50%) consistent with normal right atrial pressure (~3, range 0-5mmHg).   8. Small pericardial effusion with no echocardiographic evidence of tamponade physiology.    ________________________________________________________________________________________  FINDINGS:     Left Ventricle:  The left ventricular cavity is normal in size. Left ventricular wall thickness is normal. Left ventricular systolic function is normal with a calculated ejection fraction of 61 % by the David's biplane method of disks. There are no regional wall motion abnormalities seen. There is normal left ventricular diastolic function, with normal left ventricular filling pressure.     Right Ventricle:  The right ventricular cavity isnormal in size and right ventricular systolic function is normal.     Left Atrium:  The left atrium is normal in size with an indexed volume of 23.10 ml/m².     Right Atrium:  The right atrium is normal in size with an indexed volume of 16.77 ml/m².     Aortic Valve:  There is fibrocalcific aortic valve sclerosis without stenosis. There is no evidence of aortic regurgitation.     Mitral Valve:  Structurally normal mitral valve with normal leaflet excursion. There is no mitral valve stenosis. There is trace mitral regurgitation.     Tricuspid Valve:  The tricuspid valve was not well visualized. There is insufficient tricuspid regurgitation detected to calculate pulmonary artery systolic pressure.     Pulmonic Valve:  The pulmonic valve was notwell visualized. There is trace pulmonic regurgitation.     Pericardium:  There is a small pericardial effusion with no echocardiographic evidence of tamponade physiology.     Systemic Veins:  The inferior vena cava is normal in size measuring 2.20 cm in diameter, (normal <2.1cm) with normal inspiratory collapse (normal >50%) consistent with normal right atrial pressure (~3, range 0-5mmHg).  ____________________________________________________________________  QUANTITATIVE DATA:  Left Ventricle Measurements: (Indexed to BSA)     IVSd (2D):   1.0 cm  LVPWd (2D):  1.2 cm  LVIDd (2D):  4.5 cm  LVIDs (2D):  3.0 cm  LV Mass:     181 g  87.2 g/m²  BiPlane LV EF%: 61 %     MV E Vmax:    0.80 m/s  MV A Vmax:    0.78 m/s  MV E/A:       1.03  e' lateral:   11.40 cm/s  e' medial:    7.40 cm/s  E/e' lateral: 6.99  E/e' medial:  10.77  E/e' Average: 8.48  MV DT:        229 msec    Aorta Measurements: (Normal range) (Indexed to BSA)     Ao Root d 3.00 cm (3.1 - 3.7 cm) 1.45 cm/m²            Left AtriumMeasurements: (Indexed to BSA)  LA Diam 2D:        3.00 cm  LA Vol s, MOD A4C: 41.10 ml.  LA Vol s, MOD A2C: 56.30 ml.  LA Vol s, MOD BP:  47.80 ml  23.10 ml/m²         Right Ventricle Measurements: Right Atrial Measurements:     RV Base (RVID1): 3.2cm       RA Vol:            34.70 ml                                RA Vol s, MOD A4C  34.7 ml                                RA Vol Index:      16.77 ml/m²                                RA Area s, MOD A4C 14.1 cm²       LVOT / RVOT/ Qp/Qs Data: (Indexed to BSA)  LVOT Diameter,s: 2.00 cm  LVOT Area:       3.14 cm²    Mitral Valve Measurements:     MV E Vmax: 0.8 m/s  MV A Vmax: 0.8 m/s  MV E/A:    1.0       Tricuspid Valve Measurements:     RA Pressure: 3 mmHg    ________________________________________________________________________________________  Electronically signed on 3/5/2025 at 2:33:47 PM by Lilli Pablo MD         *** Final ***      Radiology:

## 2025-03-10 LAB
CULTURE RESULTS: SIGNIFICANT CHANGE UP
SPECIMEN SOURCE: SIGNIFICANT CHANGE UP

## 2025-03-19 ENCOUNTER — INPATIENT (INPATIENT)
Facility: HOSPITAL | Age: 65
LOS: 3 days | Discharge: HOME | DRG: 204 | End: 2025-03-23
Attending: STUDENT IN AN ORGANIZED HEALTH CARE EDUCATION/TRAINING PROGRAM | Admitting: STUDENT IN AN ORGANIZED HEALTH CARE EDUCATION/TRAINING PROGRAM
Payer: COMMERCIAL

## 2025-03-19 VITALS
SYSTOLIC BLOOD PRESSURE: 123 MMHG | HEART RATE: 84 BPM | HEIGHT: 67 IN | DIASTOLIC BLOOD PRESSURE: 75 MMHG | WEIGHT: 205.91 LBS | TEMPERATURE: 97 F | OXYGEN SATURATION: 98 % | RESPIRATION RATE: 16 BRPM

## 2025-03-19 DIAGNOSIS — Z90.49 ACQUIRED ABSENCE OF OTHER SPECIFIED PARTS OF DIGESTIVE TRACT: Chronic | ICD-10-CM

## 2025-03-19 DIAGNOSIS — J90 PLEURAL EFFUSION, NOT ELSEWHERE CLASSIFIED: ICD-10-CM

## 2025-03-19 DIAGNOSIS — I21.4 NON-ST ELEVATION (NSTEMI) MYOCARDIAL INFARCTION: ICD-10-CM

## 2025-03-19 DIAGNOSIS — I10 ESSENTIAL (PRIMARY) HYPERTENSION: ICD-10-CM

## 2025-03-19 DIAGNOSIS — Z91.89 OTHER SPECIFIED PERSONAL RISK FACTORS, NOT ELSEWHERE CLASSIFIED: ICD-10-CM

## 2025-03-19 DIAGNOSIS — D63.8 ANEMIA IN OTHER CHRONIC DISEASES CLASSIFIED ELSEWHERE: ICD-10-CM

## 2025-03-19 DIAGNOSIS — I82.409 ACUTE EMBOLISM AND THROMBOSIS OF UNSPECIFIED DEEP VEINS OF UNSPECIFIED LOWER EXTREMITY: ICD-10-CM

## 2025-03-19 DIAGNOSIS — Z98.89 OTHER SPECIFIED POSTPROCEDURAL STATES: Chronic | ICD-10-CM

## 2025-03-19 DIAGNOSIS — R06.02 SHORTNESS OF BREATH: ICD-10-CM

## 2025-03-19 LAB
ALBUMIN SERPL ELPH-MCNC: 2.5 G/DL — LOW (ref 3.3–5)
ALP SERPL-CCNC: 84 U/L — SIGNIFICANT CHANGE UP (ref 40–120)
ALT FLD-CCNC: 31 U/L — SIGNIFICANT CHANGE UP (ref 12–78)
ANION GAP SERPL CALC-SCNC: 5 MMOL/L — SIGNIFICANT CHANGE UP (ref 5–17)
APTT BLD: 31.1 SEC — SIGNIFICANT CHANGE UP (ref 24.5–35.6)
APTT BLD: 54.5 SEC — HIGH (ref 24.5–35.6)
AST SERPL-CCNC: 122 U/L — HIGH (ref 15–37)
BASOPHILS # BLD AUTO: 0.05 K/UL — SIGNIFICANT CHANGE UP (ref 0–0.2)
BASOPHILS NFR BLD AUTO: 0.6 % — SIGNIFICANT CHANGE UP (ref 0–2)
BILIRUB SERPL-MCNC: 0.3 MG/DL — SIGNIFICANT CHANGE UP (ref 0.2–1.2)
BUN SERPL-MCNC: 13 MG/DL — SIGNIFICANT CHANGE UP (ref 7–23)
CALCIUM SERPL-MCNC: 9.2 MG/DL — SIGNIFICANT CHANGE UP (ref 8.5–10.1)
CHLORIDE SERPL-SCNC: 106 MMOL/L — SIGNIFICANT CHANGE UP (ref 96–108)
CO2 SERPL-SCNC: 26 MMOL/L — SIGNIFICANT CHANGE UP (ref 22–31)
CREAT SERPL-MCNC: 1 MG/DL — SIGNIFICANT CHANGE UP (ref 0.5–1.3)
EGFR: 84 ML/MIN/1.73M2 — SIGNIFICANT CHANGE UP
EGFR: 84 ML/MIN/1.73M2 — SIGNIFICANT CHANGE UP
EOSINOPHIL # BLD AUTO: 0.11 K/UL — SIGNIFICANT CHANGE UP (ref 0–0.5)
EOSINOPHIL NFR BLD AUTO: 1.2 % — SIGNIFICANT CHANGE UP (ref 0–6)
FLUAV AG NPH QL: SIGNIFICANT CHANGE UP
FLUBV AG NPH QL: SIGNIFICANT CHANGE UP
GLUCOSE SERPL-MCNC: 127 MG/DL — HIGH (ref 70–99)
HCT VFR BLD CALC: 24.6 % — LOW (ref 39–50)
HCT VFR BLD CALC: 27.4 % — LOW (ref 39–50)
HGB BLD-MCNC: 7.7 G/DL — LOW (ref 13–17)
HGB BLD-MCNC: 8.5 G/DL — LOW (ref 13–17)
IMM GRANULOCYTES NFR BLD AUTO: 1.1 % — HIGH (ref 0–0.9)
INR BLD: 1.45 RATIO — HIGH (ref 0.85–1.16)
LYMPHOCYTES # BLD AUTO: 0.56 K/UL — LOW (ref 1–3.3)
LYMPHOCYTES # BLD AUTO: 6.3 % — LOW (ref 13–44)
MCHC RBC-ENTMCNC: 20.5 PG — LOW (ref 27–34)
MCHC RBC-ENTMCNC: 20.6 PG — LOW (ref 27–34)
MCHC RBC-ENTMCNC: 31 G/DL — LOW (ref 32–36)
MCHC RBC-ENTMCNC: 31.3 G/DL — LOW (ref 32–36)
MCV RBC AUTO: 65.6 FL — LOW (ref 80–100)
MCV RBC AUTO: 66.5 FL — LOW (ref 80–100)
MONOCYTES # BLD AUTO: 1.01 K/UL — HIGH (ref 0–0.9)
MONOCYTES NFR BLD AUTO: 11.3 % — SIGNIFICANT CHANGE UP (ref 2–14)
NEUTROPHILS # BLD AUTO: 7.13 K/UL — SIGNIFICANT CHANGE UP (ref 1.8–7.4)
NEUTROPHILS NFR BLD AUTO: 79.5 % — HIGH (ref 43–77)
NRBC BLD AUTO-RTO: 0 /100 WBCS — SIGNIFICANT CHANGE UP (ref 0–0)
NRBC BLD AUTO-RTO: 0 /100 WBCS — SIGNIFICANT CHANGE UP (ref 0–0)
NT-PROBNP SERPL-SCNC: 1315 PG/ML — HIGH (ref 0–125)
PLATELET # BLD AUTO: 551 K/UL — HIGH (ref 150–400)
PLATELET # BLD AUTO: 568 K/UL — HIGH (ref 150–400)
POTASSIUM SERPL-MCNC: 4.2 MMOL/L — SIGNIFICANT CHANGE UP (ref 3.5–5.3)
POTASSIUM SERPL-SCNC: 4.2 MMOL/L — SIGNIFICANT CHANGE UP (ref 3.5–5.3)
PROCALCITONIN SERPL-MCNC: 0.12 NG/ML — HIGH
PROT SERPL-MCNC: 6.4 G/DL — SIGNIFICANT CHANGE UP (ref 6–8.3)
PROTHROM AB SERPL-ACNC: 16.9 SEC — HIGH (ref 9.9–13.4)
RBC # BLD: 3.75 M/UL — LOW (ref 4.2–5.8)
RBC # BLD: 4.12 M/UL — LOW (ref 4.2–5.8)
RBC # FLD: 22.8 % — HIGH (ref 10.3–14.5)
RBC # FLD: 23.3 % — HIGH (ref 10.3–14.5)
RSV RNA NPH QL NAA+NON-PROBE: SIGNIFICANT CHANGE UP
SARS-COV-2 RNA SPEC QL NAA+PROBE: SIGNIFICANT CHANGE UP
SODIUM SERPL-SCNC: 137 MMOL/L — SIGNIFICANT CHANGE UP (ref 135–145)
TROPONIN I, HIGH SENSITIVITY RESULT: HIGH NG/L
WBC # BLD: 8.15 K/UL — SIGNIFICANT CHANGE UP (ref 3.8–10.5)
WBC # BLD: 8.96 K/UL — SIGNIFICANT CHANGE UP (ref 3.8–10.5)
WBC # FLD AUTO: 8.15 K/UL — SIGNIFICANT CHANGE UP (ref 3.8–10.5)
WBC # FLD AUTO: 8.96 K/UL — SIGNIFICANT CHANGE UP (ref 3.8–10.5)

## 2025-03-19 PROCEDURE — 99222 1ST HOSP IP/OBS MODERATE 55: CPT

## 2025-03-19 PROCEDURE — 71275 CT ANGIOGRAPHY CHEST: CPT | Mod: 26

## 2025-03-19 PROCEDURE — 99291 CRITICAL CARE FIRST HOUR: CPT

## 2025-03-19 PROCEDURE — 71045 X-RAY EXAM CHEST 1 VIEW: CPT | Mod: 26

## 2025-03-19 PROCEDURE — 83020 HEMOGLOBIN ELECTROPHORESIS: CPT | Mod: 26

## 2025-03-19 PROCEDURE — 93010 ELECTROCARDIOGRAM REPORT: CPT

## 2025-03-19 RX ORDER — HEPARIN SODIUM 1000 [USP'U]/ML
7500 INJECTION INTRAVENOUS; SUBCUTANEOUS EVERY 6 HOURS
Refills: 0 | Status: DISCONTINUED | OUTPATIENT
Start: 2025-03-19 | End: 2025-03-21

## 2025-03-19 RX ORDER — TICAGRELOR 90 MG/1
180 TABLET ORAL ONCE
Refills: 0 | Status: COMPLETED | OUTPATIENT
Start: 2025-03-19 | End: 2025-03-19

## 2025-03-19 RX ORDER — TICAGRELOR 90 MG/1
90 TABLET ORAL EVERY 12 HOURS
Refills: 0 | Status: DISCONTINUED | OUTPATIENT
Start: 2025-03-20 | End: 2025-03-21

## 2025-03-19 RX ORDER — HEPARIN SODIUM 1000 [USP'U]/ML
INJECTION INTRAVENOUS; SUBCUTANEOUS
Qty: 25000 | Refills: 0 | Status: DISCONTINUED | OUTPATIENT
Start: 2025-03-19 | End: 2025-03-21

## 2025-03-19 RX ORDER — LOSARTAN POTASSIUM 100 MG/1
1 TABLET, FILM COATED ORAL
Refills: 0 | DISCHARGE

## 2025-03-19 RX ORDER — LOSARTAN POTASSIUM 100 MG/1
50 TABLET, FILM COATED ORAL DAILY
Refills: 0 | Status: DISCONTINUED | OUTPATIENT
Start: 2025-03-19 | End: 2025-03-23

## 2025-03-19 RX ORDER — INFLUENZA A VIRUS A/IDAHO/07/2018 (H1N1) ANTIGEN (MDCK CELL DERIVED, PROPIOLACTONE INACTIVATED, INFLUENZA A VIRUS A/INDIANA/08/2018 (H3N2) ANTIGEN (MDCK CELL DERIVED, PROPIOLACTONE INACTIVATED), INFLUENZA B VIRUS B/SINGAPORE/INFTT-16-0610/2016 ANTIGEN (MDCK CELL DERIVED, PROPIOLACTONE INACTIVATED), INFLUENZA B VIRUS B/IOWA/06/2017 ANTIGEN (MDCK CELL DERIVED, PROPIOLACTONE INACTIVATED) 15; 15; 15; 15 UG/.5ML; UG/.5ML; UG/.5ML; UG/.5ML
0.5 INJECTION, SUSPENSION INTRAMUSCULAR ONCE
Refills: 0 | Status: DISCONTINUED | OUTPATIENT
Start: 2025-03-19 | End: 2025-03-23

## 2025-03-19 RX ORDER — ASPIRIN 325 MG
324 TABLET ORAL ONCE
Refills: 0 | Status: COMPLETED | OUTPATIENT
Start: 2025-03-19 | End: 2025-03-19

## 2025-03-19 RX ORDER — ASPIRIN 325 MG
81 TABLET ORAL DAILY
Refills: 0 | Status: DISCONTINUED | OUTPATIENT
Start: 2025-03-20 | End: 2025-03-21

## 2025-03-19 RX ORDER — HEPARIN SODIUM 1000 [USP'U]/ML
3500 INJECTION INTRAVENOUS; SUBCUTANEOUS EVERY 6 HOURS
Refills: 0 | Status: DISCONTINUED | OUTPATIENT
Start: 2025-03-19 | End: 2025-03-21

## 2025-03-19 RX ORDER — LABETALOL HYDROCHLORIDE 200 MG/1
100 TABLET, FILM COATED ORAL
Refills: 0 | Status: DISCONTINUED | OUTPATIENT
Start: 2025-03-19 | End: 2025-03-23

## 2025-03-19 RX ADMIN — Medication 324 MILLIGRAM(S): at 07:28

## 2025-03-19 RX ADMIN — HEPARIN SODIUM 1900 UNIT(S)/HR: 1000 INJECTION INTRAVENOUS; SUBCUTANEOUS at 19:16

## 2025-03-19 RX ADMIN — TICAGRELOR 180 MILLIGRAM(S): 90 TABLET ORAL at 10:11

## 2025-03-19 RX ADMIN — LABETALOL HYDROCHLORIDE 100 MILLIGRAM(S): 200 TABLET, FILM COATED ORAL at 17:58

## 2025-03-19 RX ADMIN — HEPARIN SODIUM 1900 UNIT(S)/HR: 1000 INJECTION INTRAVENOUS; SUBCUTANEOUS at 17:56

## 2025-03-19 RX ADMIN — HEPARIN SODIUM 3500 UNIT(S): 1000 INJECTION INTRAVENOUS; SUBCUTANEOUS at 17:58

## 2025-03-19 RX ADMIN — Medication 1000 MILLILITER(S): at 06:03

## 2025-03-19 RX ADMIN — Medication 1000 MILLILITER(S): at 07:06

## 2025-03-19 RX ADMIN — HEPARIN SODIUM 1700 UNIT(S)/HR: 1000 INJECTION INTRAVENOUS; SUBCUTANEOUS at 07:28

## 2025-03-19 RX ADMIN — LOSARTAN POTASSIUM 50 MILLIGRAM(S): 100 TABLET, FILM COATED ORAL at 09:11

## 2025-03-19 RX ADMIN — HEPARIN SODIUM 1900 UNIT(S)/HR: 1000 INJECTION INTRAVENOUS; SUBCUTANEOUS at 22:07

## 2025-03-19 NOTE — ED PROVIDER NOTE - CARE PROVIDER_API CALL
Timothy Espinoza  Cardiovascular Disease  [address] [place] [address]  Phone: [phone]  Fax: [phone]  Follow Up Time: Urgent

## 2025-03-19 NOTE — CONSULT NOTE ADULT - ASSESSMENT
64M with PMH of Poorly differentiated metastatic GI carcinoma (known cervical and axillary lymphadenopathy), HTN, Anemia, DVT (on eliquis), Pleural effusions presented to ED for SOB x2 weeks. SOB  worse with exertion    pleural eff  mets ca  weakness  anemia  ascites  dyspnea  htn  dvt   64M with PMH of Poorly differentiated metastatic GI carcinoma (known cervical and axillary lymphadenopathy), HTN, Anemia, DVT (on eliquis), Pleural effusions presented to ED for SOB x2 weeks. SOB  worse with exertion    pleural eff  mets ca  weakness  anemia  ascites  dyspnea  htn  dvt    ct neg for pe - eff - atelectasis - evidence of mets disease  onc eval  cardio eval  trend trops  ACS eval  I moustapha  plan for rpt thoracentesis on left side - will need to HOLD Hep - 4 - 6 hrs prior to procedure - plan for 3 20 25 - with IR  fio2 support as needed - goal sat > 88 pct  cvs rx regimen and BP control  tele monitoring  HD support and monitoring

## 2025-03-19 NOTE — H&P ADULT - PROBLEM SELECTOR PLAN 1
-EKG: NSR @ 76 bpm, no acute ST elevations  -Troponin 18,749, trend to peak  -Heparin ggt, aspirin+plavix load (started by ED)  -Dr. Contreras contacted by ED who recommended no acute intervention  -Cardiology consulted, Dr. Barr aware, recommended medical management  -Monitor PTTs via nomogram protocol  -Obtain TTE -EKG: NSR @ 76 bpm, no acute ST elevations  -Troponin 18,749, trend to peak  -Heparin ggt, aspirin load given in ED  -Ticagrelor load recommended by cardiology (ordered)  -Dr. Contreras contacted by ED who recommended no acute intervention  -Cardiology consulted, Dr. Soto aware, recommended medical management  -Monitor PTTs via nomogram protocol  -Obtain TTE -EKG: NSR @ 76 bpm, no acute ST elevations  -Troponin 18,749, trend to peak  -Heparin ggt, aspirin load given in ED  -Ticagrelor load recommended by cardiology (ordered)  -Dr. Contreras contacted by ED who recommended no acute intervention  -Cardiology consulted, Dr. Soto aware, recommended medical management  -Monitor PTTs via nomogram protocol  -TTE 03/06: LVEF: 61% aortic valve sclerosis. trace MR -EKG: NSR @ 76 bpm, no acute ST elevations  -Troponin 18,749, trend to peak  -Heparin ggt, aspirin and ticagrelor load given  -Continue DAPT  -Dr. Contreras contacted by ED who recommended no acute intervention  -Cardiology consulted, appreciate recs  -TTE 03/06: LVEF: 61% aortic valve sclerosis. trace MR

## 2025-03-19 NOTE — H&P ADULT - PROBLEM SELECTOR PLAN 3
-Patient was admitted ~2 weeks ago for LUE DVT and L pleural effusion in which pleurex catheter was placed  -CTA Chest PE Protocol:: Moderate multiloculated LEFT pleural effusion diminished from 3/3/2025 and produces subtotal LLL passive atelectasis. Moderate dependent RIGHT pleural effusion, increased. -Patient was admitted ~2 weeks ago for LUE DVT and L pleural effusion in which pleurex catheter was placed  -CTA Chest PE Protocol:: Moderate multiloculated LEFT pleural effusion diminished from 3/3/2025 and produces subtotal LLL passive atelectasis. Moderate dependent RIGHT pleural effusion, increased.  -Likely malignant in nature  -Patient currently saturating well on room air -CTA Chest PE Protocol:: Moderate multiloculated LEFT pleural effusion diminished from 3/3/2025 and produces subtotal LLL passive atelectasis. Moderate dependent RIGHT pleural effusion, increased.  -Likely malignant in nature  -Patient currently saturating well on room air  -Patient was admitted ~2 weeks ago for LUE DVT and L pleural effusion in which pleurex catheter was placed -CTA Chest PE Protocol:: Moderate multiloculated LEFT pleural effusion diminished from 3/3/2025 and produces subtotal LLL passive atelectasis. Moderate dependent RIGHT pleural effusion, increased.  -Likely malignant in nature  -Patient currently saturating well on room air  -Plan for thoracentesis 3/20 at 10AM, hold heparin ggt 4-6 hours prior  -Patient was admitted ~2 weeks ago for LUE DVT and L pleural effusion in which pleurex catheter was placed

## 2025-03-19 NOTE — CONSULT NOTE ADULT - SUBJECTIVE AND OBJECTIVE BOX
Patient is a 64y old  Male who presents with a chief complaint of     HPI:64M PMH poorly differentiated metastatic (known cervical and axillary lymphadenopathy) GI carcinoma on chemo, HTN, and anemia, recent admission for PLEF and DVT of LUE currently on Eliquis p/w SOB that has worsened over past 2 weeks. Denies chest pain. Does endorse a chronic cough. Denies any worsening leg swelling or fevers    Vitals wnl. Labs significant for Hgb 8.5 (baseline ~7-8), platelets 568, Troponin 19673 (previously normal). CTA PE protocol negative for PE. Admitted to medicine for management of NSTEMI.  Started on heparin ggt,       Interval HPI: Pt seen and examined. Pt states since his discharge in early March, he has had "good and bad days" in regard to his SOB. It has worsened over the weekend and is now present with minimal exertion, ie. going to restroom. He denies Cp and palpitations. He does not have an outside Cardiologist but planned to make appt with Dr Soto. He has never had MI, stroke or stent placed. He had a 3.1L L effusion drained on his last admission. He is currently undergoing chemotherapy for GI carcinoma and was due for a PET scan tmrw. He is frustrated with the lack of progress in general. At rest, his SOB resolves. He is speaking in full sentences.     PAST MEDICAL & SURGICAL HISTORY:  Incisional hernia        Gastric lymphoma      Anemia of chronic disease      S/P appendectomy  2014      S/P cholecystectomy  15 years ago                ECHO  FINDINGS:      MEDICATIONS  (STANDING):  heparin  Infusion.  Unit(s)/Hr (17 mL/Hr) IV Continuous <Continuous>  labetalol 100 milliGRAM(s) Oral two times a day  losartan 50 milliGRAM(s) Oral daily    MEDICATIONS  (PRN):  heparin   Injectable 7500 Unit(s) IV Push every 6 hours PRN For aPTT less than 40  heparin   Injectable 3500 Unit(s) IV Push every 6 hours PRN For aPTT between 40 - 57      FAMILY HISTORY:  Family history of coronary artery disease in father  Father -  age 60 following MI      Denies Family history of CAD or early MI    ROS:  Constitutional: denies fever, chills  HEENT: denies blurry vision, difficulty hearing  Respiratory: + SOB, +ZHOU, +Cough.   Cardiovascular: denies CP, palpitations, orthopnea, PND, LE edema  Gastrointestinal: denies nausea, vomiting, abdominal pain  Genitourinary: denies urinary changes  Skin: Denies rashes, itching  Neurologic: denies headache, weakness, dizziness  Hematology/Oncology: denies bleeding, easy bruising  ROS negative except as noted above      SOCIAL HISTORY:    No tobacco, Alcohol or Ddrug use    Vital Signs Last 24 Hrs  T(C): 36.7 (19 Mar 2025 07:34), Max: 36.7 (19 Mar 2025 07:34)  T(F): 98 (19 Mar 2025 07:34), Max: 98 (19 Mar 2025 07:34)  HR: 78 (19 Mar 2025 09:13) (76 - 84)  BP: 112/66 (19 Mar 2025 09:13) (100/60 - 123/75)  BP(mean): --  RR: 18 (19 Mar 2025 09:13) (16 - 18)  SpO2: 98% (19 Mar 2025 09:13) (97% - 98%)    Parameters below as of 19 Mar 2025 09:13  Patient On (Oxygen Delivery Method): room air        Physical Exam:  General: Well developed, well nourished, NAD  HEENT: NCAT, PERRLA, EOMI bl, moist mucous membranes   Neck: Supple, nontender, no mass  Neurology: A&Ox3, nonfocal, sensation intact   Respiratory: Decreased BS BL at lung bases, L>R. No accessory m. use. speaking in full sentences.   CV: RRR, +S1/S2, no murmurs, rubs or gallops  Abdominal: Soft, NT, ND +BSx4, no palpable masses  Extremities: Trace LE edema.   MSK: Normal ROM, no joint erythema or warmth, no joint swelling   Heme: No obvious ecchymosis or petechiae   Skin: warm, dry, normal color      ECG:    I&O's Detail      LABS:                        8.5    8.96  )-----------( 568      ( 19 Mar 2025 06:05 )             27.4     03-19    137  |  106  |  13  ----------------------------<  127[H]  4.2   |  26  |  1.00    Ca    9.2      19 Mar 2025 06:05    TPro  6.4  /  Alb  2.5[L]  /  TBili  0.3  /  DBili  x   /  AST  122[H]  /  ALT  31  /  AlkPhos  84  03-19        PT/INR - ( 19 Mar 2025 06:05 )   PT: 16.9 sec;   INR: 1.45 ratio         PTT - ( 19 Mar 2025 06:05 )  PTT:31.1 sec  Urinalysis Basic - ( 19 Mar 2025 06:05 )    Color: x / Appearance: x / SG: x / pH: x  Gluc: 127 mg/dL / Ketone: x  / Bili: x / Urobili: x   Blood: x / Protein: x / Nitrite: x   Leuk Esterase: x / RBC: x / WBC x   Sq Epi: x / Non Sq Epi: x / Bacteria: x      I&O's Summary    BNP  RADIOLOGY & ADDITIONAL STUDIES:

## 2025-03-19 NOTE — H&P ADULT - PROBLEM SELECTOR PLAN 5
-C/w labetalol 100mg BID  -C/w losartan 50mg daily -Likely anemia of chronic disease  -Hgb 8.5  -Maintain Hgb >8 in setting of ACS -Hgb 8.5  -Maintain Hgb >8 in setting of ACS -Last Hgb 7.7, ordered for 1 unit pRBC  -Maintain Hgb >8 in setting of ACS

## 2025-03-19 NOTE — CONSULT NOTE ADULT - ASSESSMENT
INCOMPLETE NOTE.  Documentation in Progress  PT SEEN AND EVALUATED.   FULL/ADDITIONAL RECOMMENDATIONS TO FOLLOW   ***************************************************************    [ASSESSMENT and  PLAN]      63 yo man w met poorly diff gastric ca (neck/axillary/abdomen LN mets, under care Dr [facility], on FOLFOX chemo [id number], Dx'd 11/2024, adm [facility] 12/2024 w [facility] req temporary HD, has planned f/u  PETCT next week outpatient)  Pt had left chest/neck HD catheter removed ~2.5wks ago, reports procedure had some difficulty, noted left neck sl swelling 2 days ago and then left arm swelling x past week  Came to ER for the increased left arm swelling, also w increased SOB from baseline  Pt was due for chemo last week but Hgb only 7s and held, instead had IV iron (told needs Hgb >8 for chemo)Reports cancer has been responding to chemo  On adm,  Duplex sig for extensive LUE DVT subclavian, IM, axillary, brachial veins and basilic superficial v thrombosis. Ulnar vein not visualized  CTA c, a/p-compared w 12/1/24 larage left effusion, increased since last image, trace right effusion. No PE. Mediastinal LADs up to 2.8x1.2cm, inflamm changes surrounding left subclavian vein. Doug renal cysts and subcentimeter hypodensities, stabble doug mild-mod hydronephrosis. small ascites. Mesenteric/upper ab domen LADs and mild pelvic LADs to 1.3cm.  Right chest infusoport in place    Started on IV heparin  Seen by Pulm, for IR thoracentesis today    CBC w microcytic anemia, on adm had 7.8 mcv 65.6    -LUE extensive DVT and supericial thrombosis-suspect assoc w the previous HD catheter, w baseline hyper coagulable state due to met gastric ca. Did have difficulty in removing the catheter 2/5wks ago and subsequently w progressive left neck adn arm symptoms  -continue IV heparin, once more stable and w symptoms improvement can discharge on Eliquis. Likely will need long term anticoaulation  -after discharge pt will continue followup w Dr Mckeon    -SOB w increased large left effusion-suspect due to met gastric ca-seen by Pulm, for thoracentesis. Will followup     -anemia-microcytic, had received IV iron as outpatinet w own Heme/Onc recently. Pt had microcytic MCV during last admission but iron studies not deficient, more c/w anemia of chronic ds/inflamamtion  -check iron studies, B12, folate for anemia eval  -pt reports would like transfusion PRBC to incr Hgb to ?8 so can continue outpatient chemotherapy. 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 Pt had microcytic MCV during last admission but iron studies not deficient, more c/w anemia of chronic ds/inflamamtion    -pt reports would like transfusion PRBC to incr Hgb to ?8 so can continue outpatient chemotherapy. Will monitor and transfuse as needed    Follow CBC     Transfuse PRBC as clinically indicated.      Transfuse PRBC if Hgb <7.0 or if symptomatic.   Check Anemia studies.      Ferritin, Iron studies     B12, Folate     ESR, CRP  Check FOBT     Consider GI eval if FOBT+    DVT Prophylaxis   xxxx SQ Lovenox or SQ heparin    xxxx Pt on anti-coagulation as above      xxxx  AC Contraindicated. Recommend SCD        Discussed plan of care with patient in detail. Opportunity given for questions and discussion.   Pt expressed understanding of the treatment plan. Risks, benefits and alternatives discussed in detail.   Questions or concerns all addressed and answered to their satisfaction, and in lay terms.       Discussed with  xxxxxxx.    Discussed with  xxxxxxx.    Discussed with  xxxxxxx.    > xxxxxx minutes spent in direct patient care, examining and counseling patient,  reviewing  the notes, lab data/ imaging , discussion with multidisciplinary team.     Thank you for consulting us.      INCOMPLETE NOTE.  Documentation in Progress  PT SEEN AND EVALUATED.   FULL/ADDITIONAL RECOMMENDATIONS TO FOLLOW   ***************************************************************    [ASSESSMENT and  PLAN]      65 yo man w met poorly diff gastric ca (neck/axillary/abdomen LN mets, under care Dr [facility], on FOLFOX chemo [id number], Dx'd 11/2024, adm [facility] 12/2024 w [facility] req temporary HD, has planned f/u    Patient now readmitted for dyspnea.    Early Mar 2025 Admission   LUE DVT LSCV, GARCIA, LAV, LBV, LSBV  Large L effusion s/p thora, with malig cells.   Pt had left chest/neck HD catheter removed Feb 2025, reports procedure had some difficulty, noted left neck sl swelling 2 days ago and then left arm swelling x past week  Early Mar 2025 Came to ER for the increased left arm swelling, also w increased SOB from baseline  Planned chemo 1wk PTA held for low Hgb 7, instead had IV iron (told needs Hgb >8 for chemo)  Reported cancer has been responding to chemo  Duplex sig for extensive LUE DVT subclavian, IM, axillary, brachial veins and basilic superficial v thrombosis. Ulnar vein not visualized  CTA c, a/p-compared w 12/1/24 larage left effusion, increased since last image, trace right effusion. No PE. Mediastinal LADs up to 2.8x1.2cm, inflamm changes surrounding left subclavian vein. Doug renal cysts and subcentimeter hypodensities, stabble doug mild-mod hydronephrosis. small ascites. Mesenteric/upper abdomen LADs and mild pelvic LADs to 1.3cm.  Right chest infusoport in place  Started on IV heparin for LUE DVT. ==>ELiquis  s/p eval Pulm, s/p L pigtail cath placement 03/04/25  CBC w microcytic anemia, on adm had 7.8 mcv 65.6. s/p IV iron outpt.   LUE extensive DVT and supericial thrombosis-suspect assoc w the previous HD catheter, w baseline hyper coagulable state due to met gastric ca.   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 Pt had microcytic MCV during last admission but iron studies not deficient, more c/w anemia of chronic ds/inflamamtion    -pt reports would like transfusion PRBC to incr Hgb to ?8 so can continue outpatient chemotherapy. Will monitor and transfuse as needed    Follow CBC     Transfuse PRBC as clinically indicated.      Transfuse PRBC if Hgb <7.0 or if symptomatic.   Check Anemia studies.      Ferritin, Iron studies     B12, Folate     ESR, CRP  Check FOBT     Consider GI eval if FOBT+    DVT Prophylaxis   xxxx SQ Lovenox or SQ heparin    xxxx Pt on anti-coagulation as above      xxxx  AC Contraindicated. Recommend SCD        Discussed plan of care with patient in detail. Opportunity given for questions and discussion.   Pt expressed understanding of the treatment plan. Risks, benefits and alternatives discussed in detail.   Questions or concerns all addressed and answered to their satisfaction, and in lay terms.       Discussed with  xxxxxxx.    Discussed with  xxxxxxx.    Discussed with  xxxxxxx.    > xxxxxx minutes spent in direct patient care, examining and counseling patient,  reviewing  the notes, lab data/ imaging , discussion with multidisciplinary team.     Thank you for consulting us.      [ASSESSMENT and  PLAN]  C16.7     Poorly differentiated gastric cancer  C77.1     Lymph node metastasis  R18.0     Malignant ascites  J91.0     Malignant pleural effusion  D63.8    Anemia due to chronic disease  D50.0    Iron deficiency anemia  D75.83  Reactive thrombocytosis  I21.4      NSTEMI    63 yo man w met poorly diff gastric ca (neck/axillary/abdomen LN mets, under care Dr [facility], on FOLFOX chemo [id number], Dx'd 11/2024, adm [facility] 12/2024 w [facility] req temporary HD, has planned f/u    Patient now readmitted for dyspnea.  Found to have worse right effusion, NSTEMI    Repeat CT scan with moderate dependent layering right pleural effusion increased from previous.  Moderate multiloculated left pleural effusion with pleural pseudotumor, diminished from previous and produces sup total LLL passive atelectasis.  IAN interlobular septal thickening.  Diffuse mediastinal LN not changed from 3/3/25.  No pulmonary thromboembolism.  Abdominal ascites.    Early Mar 2025 Admission   LUE DVT LSCV, GARCIA, LAV, LBV, LSBV  Large L effusion s/p thora, with malig cells.   Pt had left chest/neck HD catheter removed Feb 2025, reports procedure had some difficulty, noted left neck sl swelling 2 days ago and then left arm swelling x past week  Early Mar 2025 Came to ER for the increased left arm swelling, also w increased SOB from baseline  Planned chemo 1wk PTA held for low Hgb 7, instead had IV iron (told needs Hgb >8 for chemo)  Reported cancer has been responding to chemo  Duplex sig for extensive LUE DVT subclavian, IM, axillary, brachial veins and basilic superficial v thrombosis. Ulnar vein not visualized  CTA c, a/p-compared w 12/1/24 larage left effusion, increased since last image, trace right effusion. No PE. Mediastinal LADs up to 2.8x1.2cm, inflamm changes surrounding left subclavian vein. Andreas renal cysts and subcentimeter hypodensities, stabble andreas mild-mod hydronephrosis. small ascites. Mesenteric/upper abdomen LADs and mild pelvic LADs to 1.3cm.  Right chest infusoport in place  Started on IV heparin for LUE DVT. ==>ELiquis  s/p eval Pulm, s/p L pigtail cath placement 03/04/25  CBC w microcytic anemia, on adm had 7.8 mcv 65.6. s/p IV iron outpt.   LUE extensive DVT and supericial thrombosis-suspect assoc w the previous HD catheter, w baseline hyper coagulable state due to met gastric ca.   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    R effusion  eval for thoracentesis    ANEMIA  Microcytic anemia.  Anemia of chronic disease  HOLD transfusion,   ?  Thalassemia trait.  Consider checking hemoglobin electrophoresis  Supplement folic acid after checking levels  Check B12 folate levels  Previous iron studies post IV iron showed elevated levels.    Follow CBC     Transfuse PRBC as clinically indicated.      Transfuse PRBC if Hgb <7.0 or if symptomatic.   Check FOBT     Consider GI eval if FOBT+    DVT LUE  Resume anticoagulation once no procedures planned  If possible need for additional procedures anticoagulation with heparin or Lovenox pending completion procedures.    CAD/NSTEMI  Cardiology Evaluation appreciated.  On aspirin  On heparin drip  On labetalol and losartan      met gastric ca w local and distal LN involvements  to continue Heme/Onc followup w own doctor Dr Mckeon upon discharge    discussed w pt  thank you, will follow w you    Discussed plan of care with patient in detail. Opportunity given for questions and discussion.   Pt expressed understanding of the treatment plan. Risks, benefits and alternatives discussed in detail.   Questions or concerns all addressed and answered to their satisfaction, and in lay terms.     > 60 minutes spent in direct patient care, examining and counseling patient,  reviewing  the notes, lab data/ imaging , discussion with multidisciplinary team.     Thank you for consulting us.

## 2025-03-19 NOTE — H&P ADULT - NSHPPHYSICALEXAM_GEN_ALL_CORE
T(C): 36.7 (03-19-25 @ 07:34), Max: 36.7 (03-19-25 @ 07:34)  HR: 78 (03-19-25 @ 09:13) (76 - 84)  BP: 112/66 (03-19-25 @ 09:13) (100/60 - 123/75)  RR: 18 (03-19-25 @ 09:13) (16 - 18)  SpO2: 98% (03-19-25 @ 09:13) (97% - 98%)    CONSTITUTIONAL: Well groomed, no apparent distress  EYES: PERRLA and symmetric, EOMI, No conjunctival or scleral injection, non-icteric  ENMT: Oral mucosa with moist membranes. Normal dentition; no pharyngeal injection or exudates             NECK: Supple, symmetric and without tracheal deviation   RESP: No respiratory distress, no use of accessory muscles; CTA b/l, no WRR  CV: RRR, +S1S2, no MRG; no JVD; no peripheral edema  GI: Abdomen is mildly distended, no tenderness to palpation  SKIN: No rashes or ulcers noted; no subcutaneous nodules or induration palpable  PSYCH: Appropriate insight/judgment; A+O x 3, mood and affect appropriate, recent/remote memory intact

## 2025-03-19 NOTE — H&P ADULT - ASSESSMENT
64M PMH poorly differentiated metastatic (known cervical and axillary lymphadenopathy) GI carcinoma on chemo, HTN, and anemia, recent admission for PLEF and DVT of LUE currently on Eliquis p/w SOB that has worsened over past 2 weeks. Denies chest pain. Does endorse a chronic cough. Denies any worsening leg swelling or fevers    Vitals wnl. Labs significant for Hgb 8.5 (baseline ~7-8), platelets 568, Troponin 33950 (previously normal). CTA PE protocol negative for PE. Admitted to medicine for management of NSTEMI. 64M PMH poorly differentiated metastatic (known cervical and axillary lymphadenopathy) GI carcinoma on chemo, HTN, and anemia, recent admission for PLEF and DVT of LUE currently on Eliquis p/w SOB that has worsened over past 2 weeks. Denies chest pain. Does endorse a chronic cough. Denies any worsening leg swelling or fevers    Vitals wnl. Labs significant for Hgb 8.5 (baseline ~7-8), platelets 568, Troponin 75812 (previously normal). CTA PE protocol negative for PE. Admitted to medicine for management of NSTEMI.  Started on heparin ggt,  64M with PMH of poorly differentiated metastatic GI carcinoma (known cervical and axillary lymphadenopathy), HTN, Anemia, DVT (on eliquis) presented to ED for SOB x2 weeks. Patient states SOB is worse on exertion. Denies chest pain. No history of diabetes.     Patient was admitted ~2 weeks ago for LUE DVT and L pleural effusion in which pleurex catheter was placed.     Chemo-    Vitals wnl. Labs significant for Hgb 8.5 (baseline ~7-8), platelets 568, Troponin 75841 (previously normal). CTA PE protocol negative for PE. Admitted to medicine for management of NSTEMI.  Started on heparin ggt,  64M with PMH of Poorly differentiated metastatic GI carcinoma (known cervical and axillary lymphadenopathy), HTN, Anemia, DVT (on eliquis), Pleural effusions presented to ED for SOB x2 weeks. Of note, patient was admitted ~2 weeks ago for LUE DVT and L pleural effusion in which pleurex catheter was placed. Vitals wnl. Labs significant for Hgb 8.5 (baseline ~7-8), platelets 568, Troponin 18318 (previously normal). CTA PE protocol negative for PE. Admitted to medicine for management of NSTEMI. 64M with PMH of Poorly differentiated metastatic GI carcinoma (known cervical and axillary lymphadenopathy), HTN, Anemia, DVT (on eliquis), Pleural effusions presented to ED for SOB x2 weeks. Of note, patient was admitted ~2 weeks ago for LUE DVT and L pleural effusion in which pleurex catheter was placed. Vitals wnl. Labs significant for Hgb 8.5 (baseline ~7-8), platelets 568, Troponin 29466 (previously normal). EKG: NSR @ 76 bpm, no acute ST elevations. CTA PE protocol negative for PE. Admitted to medicine for NSTEMI.

## 2025-03-19 NOTE — H&P ADULT - PROBLEM SELECTOR PLAN 2
-As per Heme/Onc note from 3/6, patient under care Dr. [facility], on FOLFOX chemo [id number], Dx'd 11/2024  -CTA Chest PE Protocol: Mediastinal lymphadenopathy is not significantly changed from 3/3/2025.  While differences in arm position limits interval axillary assessment, LEFT axillary lymphadenopathy (now w/ surrounding fat   stranding) appears progressed from 3/3/2025. Punctate T8, T10 and T11 (sagittal 602/68, 67, 75) sclerotic foci,   developed from 12/1/2024, must be viewed with suspicion. Abdominal ascites, developed  -Heme/Onc consult placed -CTA Chest PE Protocol: Mediastinal lymphadenopathy is not significantly changed from 3/3/2025.  While differences in arm position limits interval axillary assessment, LEFT axillary lymphadenopathy (now w/ surrounding fat stranding) appears progressed from 3/3/2025. Punctate T8, T10 and T11 (sagittal 602/68, 67, 75) sclerotic foci, developed from 12/1/2024, must be viewed with suspicion. Abdominal ascites, developed  -As per Heme/Onc note from 3/6, patient under care Dr. [facility], on FOLFOX chemo [id number], Dx'd 11/2024  -Heme/Onc consult placed

## 2025-03-19 NOTE — CONSULT NOTE ADULT - SUBJECTIVE AND OBJECTIVE BOX
Date/Time Patient Seen:  		  Referring MD:   Data Reviewed	       Patient is a 64y old  Male who presents with a chief complaint of     Subjective/HPI   History of Present Illness:   64M with PMH of Poorly differentiated metastatic GI carcinoma (known cervical and axillary lymphadenopathy), HTN, Anemia, DVT (on eliquis), Pleural effusions presented to ED for SOB x2 weeks. SOB  worse with exertion. Denies chest pain. No history of diabetes. Father  of MI. Patient states he was supposed to have PET scan tomorrow and start chemo next week.    Of note, patient was admitted ~2 weeks ago for LUE DVT and L pleural effusion in which pleurex catheter was placed.    PMH- as stated above  PSH- appendectomy, hernia repair  Meds- reviewed and reconciled  Allergies- bactrim (hives)  SH- Smoked 1.5 packs/day for ~30 years (quit 15 years ago), no alcohol use, takes cannabis gummies  FH- mother: CHF, father:  of MI        Allergies and Intolerances:        Allergies:  	Bactrim DS: Drug, Hives    Home Medications:   * Patient Currently Takes Medications as of 06-Mar-2025 15:44 documented in Structured Notes  · 	Eliquis Starter Pack for Treatment of DVT and PE 5 mg oral tablet: 2 tab(s) orally 2 times a day Please take 10mg (2 tablets) twice a day for 7 days, and then 5mg (1 tablet) twice a day thereafter. MDD: 40mg  · 	labetalol 100 mg oral tablet: 1 tab(s) orally 2 times a day  · 	losartan 50 mg oral tablet: 1 tab(s) orally once a day    Patient History:    Social History:  · Substance use	Yes  · Substance use	cannabis gummies     Tobacco Screening:  · Core Measure Site	Yes  · Has the patient used tobacco in the past 30 days?	No    Risk Assessment:    Present on Admission:  Deep Venous Thrombosis	yes  Pulmonary Embolus	no     HIV Screening:  · In accordance with [place] State law, we offer every patient who comes to our ED an HIV test. Would you like to be tested today?	Opt out     Hepatitis C Test Questions:  · In accordance with [place] State Law, we offer every patient a Hepatitis C test. Would you like to be tested today?	Previously negative    PAST MEDICAL & SURGICAL HISTORY:  No pertinent past medical history    Incisional hernia      Gastric lymphoma    Anemia of chronic disease    No significant past surgical history    S/P appendectomy  2014    S/P cholecystectomy  15 years ago          Medication list         MEDICATIONS  (STANDING):  heparin  Infusion.  Unit(s)/Hr (17 mL/Hr) IV Continuous <Continuous>  labetalol 100 milliGRAM(s) Oral two times a day  losartan 50 milliGRAM(s) Oral daily    MEDICATIONS  (PRN):  heparin   Injectable 7500 Unit(s) IV Push every 6 hours PRN For aPTT less than 40  heparin   Injectable 3500 Unit(s) IV Push every 6 hours PRN For aPTT between 40 - 57         Vitals log        ICU Vital Signs Last 24 Hrs  T(C): 36.6 (19 Mar 2025 12:52), Max: 36.7 (19 Mar 2025 07:34)  T(F): 97.9 (19 Mar 2025 12:52), Max: 98 (19 Mar 2025 07:34)  HR: 80 (19 Mar 2025 12:52) (66 - 84)  BP: 120/79 (19 Mar 2025 12:52) (100/60 - 162/88)  BP(mean): --  ABP: --  ABP(mean): --  RR: 20 (19 Mar 2025 12:52) (16 - 20)  SpO2: 94% (19 Mar 2025 12:52) (94% - 100%)    O2 Parameters below as of 19 Mar 2025 12:52  Patient On (Oxygen Delivery Method): nasal cannula  O2 Flow (L/min): 2               Input and Output:  I&O's Detail      Lab Data                        8.5    8.96  )-----------( 568      ( 19 Mar 2025 06:05 )             27.4     03-19    137  |  106  |  13  ----------------------------<  127[H]  4.2   |  26  |  1.00    Ca    9.2      19 Mar 2025 06:05    TPro  6.4  /  Alb  2.5[L]  /  TBili  0.3  /  DBili  x   /  AST  122[H]  /  ALT  31  /  AlkPhos  84  03-19            Review of Systems	  sob  aranda  weakness  alert  verbal  on o2 support      Objective     Physical Examination  heart s1s2  lung dc bS  head nc  head at  abd soft  cn grossly int  o2 support        Pertinent Lab findings & Imaging      Jameson:  NO   Adequate UO     I&O's Detail           Discussed with:     Cultures:	        Radiology      ACC: [id number] EXAM:  CT ANGIO CHEST PULM ART [facility]   ORDERED BY: FANTA OLIVEIRA     PROCEDURE DATE:  2025          INTERPRETATION:  CLINICAL INFORMATION: Metastatic GI cancer. Shortness of   breath    COMPARISON: 3/3/2025    CONTRAST/COMPLICATIONS:  IV Contrast: Omnipaque 350  70 cc administered   30 cc discarded  Oral Contrast: NONE    PROCEDURE:  CT of the Chest was performed.  Sagittal and coronal reformats were performed.    FINDINGS:  LUNGS AND LARGE AIRWAYS: Minimal respiratory motion unsharpness .Patent   central airways. Moderate dependent/layering RIGHT pleural effusion,   increased from 3/3/2025. Moderate multiloculated LEFT pleural effusion   (w/ pleural pseudotumor), diminished from 3/3/2025 and produces subtotal   LLL passive atelectasis. IAN interlobular septal thickening. No   suspicious pulmonary lesions  PLEURA: See above.  VESSELS: No aortic dilatation/dissection or pulmonary thromboembolism.   Coronary artery calcification. Minimal inflammatory changes around left   subclavian vein, c/w  previously DVT  HEART: Normal sized heart. Small pericardial effusion.  MEDIASTINUM AND STELLA: Diffuse mediastinal lymphadenopathy is not   significantly changed from 3/3/2025. (RIGHT paratracheal LN 2.5 x 2.2 cm,   301/36). While differences in arm position limits interval axillary   assessment, LEFT axillary lymphadenopathy (now w/ surrounding fat   stranding) appears progressed from 3/3/2025 (largest axillary LN 2.3 x   1.7 cm, 301/27.  CHEST WALL AND LOWER NECK: Trace gynecomastia  VISUALIZED UPPER ABDOMEN: Bilateral renal cysts and Gerota's fascia   edema/thickening (L>R), unchanged. Gastrohepatic/periportal   lymphadenopathy and adrenal nodularity, not significantly changed. RIGHT   perihepatic ascites, developed. Fluid dilated cisterna chyli, unchanged   (301/120).  BONES: Thoracic spondylosis. Scattered sclerotic osseous foci. Punctate   T10 and T11 (sagittal 602/68, 67) sclerotic foci, developed from   2024.    IMPRESSION:  1.  No pulmonary thromboembolism.  2.  Moderate multiloculated LEFT pleural effusion diminished from   3/3/2025 and produces subtotal LLL passive atelectasis. Moderate   dependent RIGHT pleural effusion, increased.  3.  Mediastinal lymphadenopathy is not significantly changed from   3/3/2025.  While differences in arm position limits interval axillary   assessment, LEFT axillary lymphadenopathy (now w/ surrounding fat   stranding) appears progressed from 3/3/2025  4.  Punctate T8, T10 and T11 (sagittal 602/68, 67, 75) sclerotic foci,   developed from 2024, must be viewed with suspicion.  5.  Abdominal ascites, developed        --- End of Report ---            GUICHO CORCORAN MD; Attending Radiologist  This document has been electronically signed. Mar 19 2025  6:59AM

## 2025-03-19 NOTE — CONSULT NOTE ADULT - CRITICAL CARE ATTENDING COMMENT
64M PMH poorly differentiated metastatic (known cervical and axillary lymphadenopathy) GI carcinoma on chemo, HTN, and anemia, recent admission for PLEF and DVT of LUE currently on Eliquis p/w SOB that has worsened over past 2 weeks. Cardiology consulted for SOB 2/2 Likely both NSTEMI and BL pleural effusions.    metastatic gi malignancy undergoing chemotherapy, with associated anemia  recent dvt on ac  presents with progressive aranda, with troponins elevated in the 19k-24k range  no chest discomfort and no definite ischemic sxs at all  ekg without acute st changes  hemodynamically stable at present  lvef was preserved on recent echo  had an extensive discussion regarding appropriate management of apparent nstemi  given his metastatic malignancy, anemia, his lack of acute ischemic sxs and overall stability at present, FOR NOW, will manage his nstemi conservatively  if his ef drops, or if he becomes unstable, we can reconsider this approach  would consider cath if circumstances change, but only if heme/onc believes that his cancer prognosis does not warrant a conservative approach, and that the need for mandatory dapt etc would not be an obstacle to his onc care  cont dapt, heparin gtt for now     dyspnea may in part be from effusions which may require thoracentesis again, reimage as appropriate    Upon my evaluation, this patient is at high risk for imminent or life threatening deterioration due to hypoxia, ischemia and other active medical issues which require my direct attention, intervention, and personal management.  I have personally spent >35 minutes  of critical care time exclusive of time spent on separate billing procedures. This includes review of laboratory data, radiology results, discussion with primary team\patient, and monitoring for potential decompensation Interventions were performed as documented above.

## 2025-03-19 NOTE — CARE COORDINATION ASSESSMENT. - NSDCPLANSERVICES_GEN_ALL_CORE
CM discussed home care services for a visiting nurse. Home care choice list provided. Patient is declining home care services for a visiting nurse at this time./Home Care

## 2025-03-19 NOTE — ED PROVIDER NOTE - CARE PLAN
Principal Discharge DX:	Shortness of breath   1 Principal Discharge DX:	Shortness of breath  Secondary Diagnosis:	Elevated troponin

## 2025-03-19 NOTE — ED ADULT NURSE NOTE - NSFALLHARMRISKINTERV_ED_ALL_ED

## 2025-03-19 NOTE — CARE COORDINATION ASSESSMENT. - ASSESSMENT CONCERNS TO BE ADDRESSED
Per EMR: 64M with PMH of Poorly differentiated metastatic GI carcinoma (known cervical and axillary lymphadenopathy), HTN, Anemia, DVT (on eliquis), Pleural effusions presented to ED for SOB x2 weeks. Of note, patient was admitted ~2 weeks ago for LUE DVT and L pleural effusion in which pleurex catheter was placed and removed prior to discharge. Admitted to medicine for NSTEMI./discharge planning

## 2025-03-19 NOTE — CONSULT NOTE ADULT - SUBJECTIVE AND OBJECTIVE BOX
INCOMPLETE NOTE.  Documentation in Progress  PT SEEN AND EVALUATED.   FULL/ADDITIONAL RECOMMENDATIONS TO FOLLOW   ***************************************************************    Patient is a 64y old  Male who presents with a chief complaint of   HPI:  64M with PMH of Poorly differentiated metastatic GI carcinoma (known cervical and axillary lymphadenopathy), HTN, Anemia, DVT (on eliquis), Pleural effusions presented to ED for SOB x2 weeks. SOB  worse with exertion. Denies chest pain. No history of diabetes. Father  of MI. Patient states he was supposed to have PET scan tomorrow and start chemo next week.    Of note, patient was admitted ~2 weeks ago for LUE DVT and L pleural effusion in which pleurex catheter was placed.    PMH- as stated above  PSH- appendectomy, hernia repair  Meds- reviewed and reconciled  Allergies- bactrim (hives)  SH- Smoked 1.5 packs/day for ~30 years (quit 15 years ago), no alcohol use, takes cannabis gummies  FH- mother: CHF, father:  of MI (19 Mar 2025 07:39)    PAST MEDICAL & SURGICAL HISTORY:  Incisional hernia        Gastric lymphoma      Anemia of chronic disease      S/P appendectomy  2014      S/P cholecystectomy  15 years ago         HEALTH ISSUES - PROBLEM Dx:  NSTEMI (non-ST elevation myocardial infarction)    Hypertension    Deep vein thrombosis (DVT)    Carcinoma of gastrointestinal tract    Pleural effusion    Anemia of chronic disease    Other specified personal risk factors, not elsewhere classified          Shortness of breath [R06.02]    No pertinent past medical history [992658384]    Incisional hernia [553.21]    Gastric lymphoma [C85.99]    Anemia of chronic disease [D63.8]    No significant past surgical history [204992342]    S/P appendectomy [V45.89]    S/P cholecystectomy [V45.79]    Elevated troponin [R79.89]      FAMILY HISTORY:  Family history of coronary artery disease in father  Father -  age 60 following MI        [SOCIAL HISTORY: ]     smoking:  none currently     EtOH:  none currently     illicit drugs:  none currently     occupation:  Retired     marital status:       Other:       [ALLERGIES/INTOLERANCES:]  Allergies    Bactrim DS (Hives)    Intolerances        [MEDICATIONS]  MEDICATIONS  (STANDING):  heparin  Infusion.  Unit(s)/Hr (17 mL/Hr) IV Continuous <Continuous>  labetalol 100 milliGRAM(s) Oral two times a day  losartan 50 milliGRAM(s) Oral daily    MEDICATIONS  (PRN):  heparin   Injectable 7500 Unit(s) IV Push every 6 hours PRN For aPTT less than 40  heparin   Injectable 3500 Unit(s) IV Push every 6 hours PRN For aPTT between 40 - 57      [REVIEW OF SYSTEMS: ]  CONSTITUTIONAL: normal, no fever, no shakes, no chills   EYES: No eye pain, no visual disturbances, no discharge  ENMT:  no discharge  NECK: No pain, no stiffness  BREASTS: No pain, no masses, no nipple discharge  RESPIRATORY: No cough, no wheezing, no chills, no hemoptysis; No shortness of breath  CARDIOVASCULAR: No chest pain, no palpitations, no dizziness, no leg swelling  GASTROINTESTINAL: No abdominal, no epigastric pain. No nausea, no vomiting, no hematemesis; No diarrhea , no constipation. No melena, no hematochezia.  GENITOURINARY: No dysuria, no frequency, no hematuria, no incontinence  NEUROLOGICAL: No headaches, no memory loss, no loss of strength, no numbness, no tremors  SKIN: No itching, no burning, no rashes, no lesions   LYMPH NODES: No enlarged glands  ENDOCRINE: No heat or cold intolerance; No hair loss  MUSCULOSKELETAL: No joint pain or swelling; No muscle, no back, no extremity pain  PSYCHIATRIC: No depression, no anxiety, no mood swings, no difficulty sleeping  HEME/LYMPH: No easy bruising, no bleeding gums    [VITALS SIGNS 24hrs]  Vital Signs Last 24 Hrs  T(C): 36.6 (19 Mar 2025 12:52), Max: 36.7 (19 Mar 2025 07:34)  T(F): 97.9 (19 Mar 2025 12:52), Max: 98 (19 Mar 2025 07:34)  HR: 80 (19 Mar 2025 12:52) (66 - 84)  BP: 120/79 (19 Mar 2025 12:52) (100/60 - 162/88)  BP(mean): --  RR: 20 (19 Mar 2025 12:52) (16 - 20)  SpO2: 94% (19 Mar 2025 12:52) (94% - 100%)    Parameters below as of 19 Mar 2025 12:52  Patient On (Oxygen Delivery Method): nasal cannula  O2 Flow (L/min): 2    Daily Height in cm: 170.18 (19 Mar 2025 05:28)    Daily     I&O's Summary      [PHYSICAL EXAM]  GEN:   HEENT: normocephalic and atraumatic. EOMI. PERRL.    NECK: Supple.  No lymphadenopathy   LUNGS: Clear to auscultation.  HEART: S1S2 Regular rate and rhythm, no MRG  ABDOMEN: Soft, nontender, and nondistended.  Positive bowel sounds.    : No CVA tenderness  EXTREMITIES: Without edema.  NEUROLOGIC: grossly intact.  PSYCHIATRIC: Appropriate affect .  SKIN: No rash     [LABS: ]                        8.5    8.96  )-----------( 568      ( 19 Mar 2025 06:05 )             27.4     CBC Full  -  ( 19 Mar 2025 06:05 )  WBC Count : 8.96 K/uL  RBC Count : 4.12 M/uL  Hemoglobin : 8.5 g/dL  Hematocrit : 27.4 %  Platelet Count - Automated : 568 K/uL  Mean Cell Volume : 66.5 fl  Mean Cell Hemoglobin : 20.6 pg  Mean Cell Hemoglobin Concentration : 31.0 g/dL  Auto Neutrophil # : x  Auto Lymphocyte # : x  Auto Monocyte # : x  Auto Eosinophil # : x  Auto Basophil # : x  Auto Neutrophil % : x  Auto Lymphocyte % : x  Auto Monocyte % : x  Auto Eosinophil % : x  Auto Basophil % : x    03-19    137  |  106  |  13  ----------------------------<  127[H]  4.2   |  26  |  1.00    Ca    9.2      19 Mar 2025 06:05    TPro  6.4  /  Alb  2.5[L]  /  TBili  0.3  /  DBili  x   /  AST  122[H]  /  ALT  31  /  AlkPhos  84  03-19    PT/INR - ( 19 Mar 2025 06:05 )   PT: 16.9 sec;   INR: 1.45 ratio         PTT - ( 19 Mar 2025 06:05 )  PTT:31.1 sec  LIVER FUNCTIONS - ( 19 Mar 2025 06:05 )  Alb: 2.5 g/dL / Pro: 6.4 g/dL / ALK PHOS: 84 U/L / ALT: 31 U/L / AST: 122 U/L / GGT: x               Urinalysis Basic - ( 19 Mar 2025 06:05 )    Color: x / Appearance: x / SG: x / pH: x  Gluc: 127 mg/dL / Ketone: x  / Bili: x / Urobili: x   Blood: x / Protein: x / Nitrite: x   Leuk Esterase: x / RBC: x / WBC x   Sq Epi: x / Non Sq Epi: x / Bacteria: x          CBC TREND (5 Days)  WBC Count: 8.96 K/uL ( @ 06:05)    Hemoglobin: 8.5 g/dL ( @ 06:05)    Hematocrit: 27.4 % ( @ :05)    Platelet Count - Automated: 568 K/uL ( @ 06:05)        Ferritin: 989 ng/mL ( @ 05:40)  Ferritin: 943 ng/mL ( @ :24)  Ferritin: 257 ng/mL ( @ 07:05)  Ferritin: 140 ng/mL ( @ 19:53)    Iron Total: 19 ug/dL ( @ 05:40)  Iron Total: 20 ug/dL (:24)  Iron Total: 17 ug/dL ( @ 07:05)     Vitamin B12, Serum: >2000 pg/mL ( @ 05:40)  Vitamin B12, Serum: >2000 pg/mL ( @ 06:24)  Vitamin B12, Serum: 1506 pg/mL ( @ 19:53)     Folate, Serum: 18.1 ng/mL ( @ 05:40)  Folate, Serum: >20.0 ng/mL ( @ :24)  Folate, Serum: 12.6 ng/mL ( @ 19:53)                              [MICROBIOLOGY /  VIROLOGY:]          [PATHOLOGY]       [RADIOLOGY & ADDITIONAL STUDIES:]   CT Angio Chest PE Protocol w/ IV Cont:   ACC: [id number] EXAM:  CT ANGIO CHEST PULM ART [facility]   ORDERED BY: FANTA OLIVEIRA     PROCEDURE DATE:  2025          INTERPRETATION:  CLINICAL INFORMATION: Metastatic GI cancer. Shortness of   breath    COMPARISON: 3/3/2025    CONTRAST/COMPLICATIONS:  IV Contrast: Omnipaque 350  70 cc administered   30 cc discarded  Oral Contrast: NONE    PROCEDURE:  CT of the Chest was performed.  Sagittal and coronal reformats were performed.    FINDINGS:  LUNGS AND LARGE AIRWAYS: Minimal respiratory motion unsharpness .Patent   central airways. Moderate dependent/layering RIGHT pleural effusion,   increased from 3/3/2025. Moderate multiloculated LEFT pleural effusion   (w/ pleural pseudotumor), diminished from 3/3/2025 and produces subtotal   LLL passive atelectasis. IAN interlobular septal thickening. No   suspicious pulmonary lesions  PLEURA: See above.  VESSELS: No aortic dilatation/dissection or pulmonary thromboembolism.   Coronary artery calcification. Minimal inflammatory changes around left   subclavian vein, c/w  previously DVT  HEART: Normal sized heart. Small pericardial effusion.  MEDIASTINUM AND STELLA: Diffuse mediastinal lymphadenopathy is not   significantly changed from 3/3/2025. (RIGHT paratracheal LN 2.5 x 2.2 cm,   301/36). While differences in arm position limits interval axillary   assessment, LEFT axillary lymphadenopathy (now w/ surrounding fat   stranding) appears progressed from 3/3/2025 (largest axillary LN 2.3 x   1.7 cm, 301/27.  CHEST WALL AND LOWER NECK: Trace gynecomastia  VISUALIZED UPPER ABDOMEN: Bilateral renal cysts and Gerota's fascia   edema/thickening (L>R), unchanged. Gastrohepatic/periportal   lymphadenopathy and adrenal nodularity, not significantly changed. RIGHT   perihepatic ascites, developed. Fluid dilated cisterna chyli, unchanged   (301/120).  BONES: Thoracic spondylosis. Scattered sclerotic osseous foci. Punctate   T10 and T11 (sagittal 602/68, 67) sclerotic foci, developed from   2024.    IMPRESSION:  1.  No pulmonary thromboembolism.  2.  Moderate multiloculated LEFT pleural effusion diminished from   3/3/2025 and produces subtotal LLL passive atelectasis. Moderate   dependent RIGHT pleural effusion, increased.  3.  Mediastinal lymphadenopathy is not significantly changed from   3/3/2025.  While differences in arm position limits interval axillary   assessment, LEFT axillary lymphadenopathy (now w/ surrounding fat   stranding) appears progressed from 3/3/2025  4.  Punctate T8, T10 and T11 (sagittal 602/68, 67, 75) sclerotic foci,   developed from 2024, must be viewed with suspicion.  5.  Abdominal ascites, developed        --- End of Report ---            GUICHO CORCORAN MD; Attending Radiologist  This document has been electronically signed. Mar 19 2025  6:59AM (25 @ 06:26)     Patient is a 64y old  Male who presents with a chief complaint of     HPI:  64M with PMH of Poorly differentiated metastatic GI carcinoma (known cervical and axillary lymphadenopathy), HTN, Anemia, DVT (on eliquis), Pleural effusions presented to ED for SOB x2 weeks. SOB  worse with exertion. Denies chest pain. No history of diabetes. Father  of MI. Patient states he was supposed to have PET scan tomorrow and start chemo next week.    Of note, patient was admitted ~2 weeks ago for LUE DVT and L pleural effusion in which pleurex catheter was placed.    PMH- as stated above  PSH- appendectomy, hernia repair  Meds- reviewed and reconciled  Allergies- bactrim (hives)  SH- Smoked 1.5 packs/day for ~30 years (quit 15 years ago), no alcohol use, takes cannabis gummies  FH- mother: CHF, father:  of MI (19 Mar 2025 07:39)    PAST MEDICAL & SURGICAL HISTORY:  Incisional hernia   Gastric lymphoma  Anemia of chronic disease  S/P appendectomy 2014  S/P cholecystectomy 15 years ago     HEALTH ISSUES - PROBLEM Dx:  NSTEMI (non-ST elevation myocardial infarction)  Hypertension  Deep vein thrombosis (DVT)  Carcinoma of gastrointestinal tract  Pleural effusion  Anemia of chronic disease  Other specified personal risk factors, not elsewhere classified    Shortness of breath [R06.02]  No pertinent past medical history [[id number]]  Incisional hernia [553.21]  Gastric lymphoma [C85.99]  Anemia of chronic disease [D63.8]  No significant past surgical history [[id number]]  S/P appendectomy [V45.89]  S/P cholecystectomy [V45.79]  Elevated troponin [R79.89]      FAMILY HISTORY:  Family history of coronary artery disease in father  Father -  age 60 following MI      [SOCIAL HISTORY: ]     smoking:  none currently     EtOH:  none currently     illicit drugs:  none currently     occupation:  Retired     marital status:       Other:       [ALLERGIES/INTOLERANCES:]  Allergies     Bactrim DS (Hives)  Intolerances      [MEDICATIONS]  MEDICATIONS  (STANDING):  heparin  Infusion.  Unit(s)/Hr (17 mL/Hr) IV Continuous <Continuous>  labetalol 100 milliGRAM(s) Oral two times a day  losartan 50 milliGRAM(s) Oral daily      MEDICATIONS  (PRN):  heparin   Injectable 7500 Unit(s) IV Push every 6 hours PRN For aPTT less than 40  heparin   Injectable 3500 Unit(s) IV Push every 6 hours PRN For aPTT between 40 - 57      [REVIEW OF SYSTEMS: ]  CONSTITUTIONAL: normal, no fever, no shakes, no chills   EYES: No eye pain, no visual disturbances, no discharge  ENMT:  no discharge  NECK: No pain, no stiffness  BREASTS: No pain, no masses, no nipple discharge  RESPIRATORY: No cough, no wheezing, no chills, no hemoptysis; +shortness of breath  CARDIOVASCULAR: No chest pain, no palpitations, no dizziness, no leg swelling  GASTROINTESTINAL: No abdominal, no epigastric pain. No nausea, no vomiting, no hematemesis; No diarrhea , no constipation. No melena, no hematochezia.  GENITOURINARY: No dysuria, no frequency, no hematuria, no incontinence  NEUROLOGICAL: No headaches, no memory loss, no loss of strength, no numbness, no tremors  SKIN: No itching, no burning, no rashes, no lesions   LYMPH NODES: No enlarged glands  ENDOCRINE: No heat or cold intolerance; No hair loss  MUSCULOSKELETAL: No joint pain or swelling; No muscle, no back, no extremity pain  PSYCHIATRIC: No depression, no anxiety, no mood swings, no difficulty sleeping  HEME/LYMPH: No easy bruising, no bleeding gums    [VITALS SIGNS 24hrs]  Vital Signs Last 24 Hrs  T(C): 36.6 (19 Mar 2025 12:52), Max: 36.7 (19 Mar 2025 07:34)  T(F): 97.9 (19 Mar 2025 12:52), Max: 98 (19 Mar 2025 07:34)  HR: 80 (19 Mar 2025 12:52) (66 - 84)  BP: 120/79 (19 Mar 2025 12:52) (100/60 - 162/88)  BP(mean): --  RR: 20 (19 Mar 2025 12:52) (16 - 20)  SpO2: 94% (19 Mar 2025 12:52) (94% - 100%)    Parameters below as of 19 Mar 2025 12:52  Patient On (Oxygen Delivery Method): nasal cannula  O2 Flow (L/min): 2    Daily Height in cm: 170.18 (19 Mar 2025 05:28)    Daily     I&O's Summary    [PHYSICAL EXAM]  GEN:   HEENT: normocephalic and atraumatic. EOMI. PERRL.    NECK: Supple.  No lymphadenopathy   LUNGS: dec BS  HEART: S1S2 Regular rate and rhythm, no MRG  ABDOMEN: Soft, nontender, and nondistended.  Positive bowel sounds.    : No CVA tenderness  EXTREMITIES: Without edema.  NEUROLOGIC: grossly intact.  PSYCHIATRIC: Appropriate affect .  SKIN: No rash     [LABS: ]                        8.5    8.96  )-----------( 568      ( 19 Mar 2025 06:05 )             27.4     CBC Full  -  ( 19 Mar 2025 06:05 )  WBC Count : 8.96 K/uL  RBC Count : 4.12 M/uL  Hemoglobin : 8.5 g/dL  Hematocrit : 27.4 %  Platelet Count - Automated : 568 K/uL  Mean Cell Volume : 66.5 fl  Mean Cell Hemoglobin : 20.6 pg  Mean Cell Hemoglobin Concentration : 31.0 g/dL  Auto Neutrophil # : x  Auto Lymphocyte # : x  Auto Monocyte # : x  Auto Eosinophil # : x  Auto Basophil # : x  Auto Neutrophil % : x  Auto Lymphocyte % : x  Auto Monocyte % : x  Auto Eosinophil % : x  Auto Basophil % : x    03-  137  |  106  |  13  ----------------------------<  127[H]  4.2   |  26  |  1.00  Ca    9.2      19 Mar 2025 06:05  TPro  6.4  /  Alb  2.5[L]  /  TBili  0.3  /  DBili  x   /  AST  122[H]  /  ALT  31  /  AlkPhos  84  03-19  PT/INR - ( 19 Mar 2025 06:05 )   PT: 16.9 sec;   INR: 1.45 ratio    PTT - ( 19 Mar 2025 06:05 )  PTT:31.1 sec  LIVER FUNCTIONS - ( 19 Mar 2025 06:05 )  Alb: 2.5 g/dL / Pro: 6.4 g/dL / ALK PHOS: 84 U/L / ALT: 31 U/L / AST: 122 U/L / GGT: x           Urinalysis Basic - ( 19 Mar 2025 06:05 )  Color: x / Appearance: x / SG: x / pH: x  Gluc: 127 mg/dL / Ketone: x  / Bili: x / Urobili: x   Blood: x / Protein: x / Nitrite: x   Leuk Esterase: x / RBC: x / WBC x   Sq Epi: x / Non Sq Epi: x / Bacteria: x    CBC TREND (5 Days)  WBC Count: 8.96 K/uL ( @ 06:05)  Hemoglobin: 8.5 g/dL ( @ 06:05)  Hematocrit: 27.4 % ( @ 06:05)  Platelet Count - Automated: 568 K/uL ( 06:05)      Ferritin: 989 ng/mL ( 05:40)  Ferritin: 943 ng/mL (:24)  Ferritin: 257 ng/mL ( 07:05)  Ferritin: 140 ng/mL ( 19:53)    Iron Total: 19 ug/dL (:40)  Iron Total: 20 ug/dL (:24)  Iron Total: 17 ug/dL ( 07:05)     Vitamin B12, Serum: >2000 pg/mL ( 05:40)  Vitamin B12, Serum: >2000 pg/mL (:24)  Vitamin B12, Serum: 1506 pg/mL ( 19:53)     Folate, Serum: 18.1 ng/mL (:40)  Folate, Serum: >20.0 ng/mL (:24)  Folate, Serum: 12.6 ng/mL ( 19:53)       [MICROBIOLOGY /  VIROLOGY:]      [PATHOLOGY]    ACCESSION No:  [id number]  Patient:     JESSE EATON  Accession:                             [id number]  Collected Date/Time:                   3/4/2025 12:47 EST  Received Date/Time:                    3/4/2025 18:49 EST  Non-Gynecologic Report - Auth (Verified)  Specimen(s) Submitted: PLEURAL FLUID  Final Diagnosis: PLEURAL FLUID  POSITIVE FOR MALIGNANT CELLS. Metastatic poorly  differentiated  carcinoma.  Cytology slides and cell block show a hypercellular specimen composed of single lying and rare groups of malignant cells with enlarged, eccentric placed  nuclei containing prominent nucleoli,  irregular nuclear contours and moderate cytoplasm, in a background of reactive mesothelial cells.   In summary: Compatible with metastatic carcinoma of upper GI, as was diagnosed in specimen: [id number]   Screened by: Fidelia Benavidez CT(ASCP)   Verified by: Maxwell Abdalla M.D. (Electronic Signature)  Reported on: 25 15:39 EST, [employer] [facility], [place] [address]  Phone: [phone]   Fax: [phone]  [facility], [address] [place] [address], [place] [address]  _________________________________________________________________  Clinical Information  LUE DVT, pleural effusion.  Gross Description  Received: 60  ml of cloudy fluid in Cyto[employer]  Prepared: 1 ThinPrep slide, 1 smear, 1 cell block           [RADIOLOGY & ADDITIONAL STUDIES:]     CT Angio Chest PE Protocol w/ IV Cont:   ACC: [id number] EXAM:  CT ANGIO CHEST PULM ART [facility]   ORDERED BY: FANTA OLIVEIRA   PROCEDURE DATE:  2025    INTERPRETATION:  CLINICAL INFORMATION: Metastatic GI cancer. Shortness of breath  COMPARISON: 3/3/2025  FINDINGS:  LUNGS AND LARGE AIRWAYS: Minimal respiratory motion unsharpness .Patent central airways. Moderate dependent/layering RIGHT pleural effusion, increased from 3/3/2025. Moderate multiloculated LEFT pleural effusion (w/ pleural pseudotumor), diminished from 3/3/2025 and produces subtotal LLL passive atelectasis. IAN interlobular septal thickening. No suspicious pulmonary lesions  PLEURA: See above.  VESSELS: No aortic dilatation/dissection or pulmonary thromboembolism.   Coronary artery calcification. Minimal inflammatory changes around left subclavian vein, c/w  previously DVT  HEART: Normal sized heart. Small pericardial effusion.  MEDIASTINUM AND STELLA: Diffuse mediastinal lymphadenopathy is not significantly changed from 3/3/2025. (RIGHT paratracheal LN 2.5 x 2.2 cm, 301/36). While differences in arm position limits interval axillary assessment, LEFT axillary lymphadenopathy (now w/ surrounding fat stranding) appears progressed from 3/3/2025 (largest axillary LN 2.3 x 1.7 cm, 301/27.  CHEST WALL AND LOWER NECK: Trace gynecomastia  VISUALIZED UPPER ABDOMEN: Bilateral renal cysts and Gerota's fascia edema/thickening (L>R), unchanged. Gastrohepatic/periportal lymphadenopathy and adrenal nodularity, not significantly changed. RIGHT   perihepatic ascites, developed. Fluid dilated cisterna chyli, unchanged (301/120).  BONES: Thoracic spondylosis. Scattered sclerotic osseous foci. Punctate T10 and T11 (sagittal 602/68, 67) sclerotic foci, developed from 2024.  IMPRESSION:  1.  No pulmonary thromboembolism.  2.  Moderate multiloculated LEFT pleural effusion diminished from 3/3/2025 and produces subtotal LLL passive atelectasis. Moderate dependent RIGHT pleural effusion, increased.  3.  Mediastinal lymphadenopathy is not significantly changed from 3/3/2025.  While differences in arm position limits interval axillary assessment, LEFT axillary lymphadenopathy (now w/ surrounding fat stranding) appears progressed from 3/3/2025  4.  Punctate T8, T10 and T11 (sagittal 602/68, 67, 75) sclerotic foci, developed from 2024, must be viewed with suspicion.  5.  Abdominal ascites, developed  --- End of Report ---  GUICHO CORCORAN MD; Attending Radiologist  This document has been electronically signed. Mar 19 2025  6:59AM (25 @ 06:26)      ACCESSION No:  [id number]  Patient:     JESSE EATON  Accession:                             [id number]  Collected Date/Time:                   10/1/2024 13:07 EDT  Received Date/Time:                    10/2/2024 13:07 EDT  Addendum Report - Auth (Verified)  Addendum  The full reference lab report from [facility] is on the next page of this Addendum.  If you are unable to view the report:   1. View from Reference Lab Portal  2. View in [employer] Result Viewer  3. In [facility] for Hospital Patients Only:  Click on the "Patient Window" tab after selecting the appropriate patient visit  4. Call Pathology Dept. at [phone] for a faxed report  Verified by: Javier Mcbride MD  (Electronic Signature)  Reported on: 11/15/24 16:56 EST, [facility] [facility] [place] [address]  Phone: [phone]   Fax: [phone]  _________________________________________________________________  *** Image not supported for this output type ***  Addendum Report - Auth (Verified)    Addendum  Additional immunostains results:  CK7 ( repeat ): Positive  CK19: Positive  CA 19.9: Focally positive  INI-1: Positive  Negative reactivity is seen with Calretinin, WT1, CD30, OCT 3/4, PLAP, , PSA, and PSAP.  In summary the tumor cells are positive for CK7, AE1/AE3, CDX2, CK19 and CA 19.9.  Findings are not diagnostic. Upper GI including  (but not limited to) pancreato-biliary should be considered among the possible primary sites of origin.  Foundation results submitted separately.    PD-L1 Immunohistochemistry  Antibody: [place] PDL1 ()  Tissue type: Â Lymph node  Block: 1B    Result: Combined Positive Score (CPS): 3  PD-L1 Immunohistochemistry  Antibody: [place] PDL1 ()  Result: Tumor Proportion Score (TPS*)  Â  8%  *TPS: The percentage of viable tumor cells showing partial or complete membrane staining at any intensity    Verified by: Javier Mcbride MD  (Electronic Signature)  Reported on: 10/30/24 13:02 EDT, [facility],  [facility]. [address] [address]  Phone: [phone]   Fax: [phone]  _________________________________________________________________  Surgical Pathology Report - Auth (Verified)    Specimen(s) Submitted  1-Perihepatic lymph node FNB  Final Diagnosis  1.  Perihepatic lymph node, FNB:  -   Metastatic poorly-differentiated carcinoma.  See note.  Note: Immunostain results.  AE1/AE3 and CDX2: Positive tumor cells.   CK7 ( minimal focally positive) CK20, TTF-1, Hepar, DOG1, NKX3.1, arginase, synaptophysin, chromogranin, GATA3, SOX10, PAX8, p40 : Negative tumor cells.   Comment: Diagnosis confirmed upon review at the daily QA/GI intradepartmental    conference.  Dr. Soni notified via secure email.  Additional immunostains and Foundation pending.  Verified by: Javier Mcbride MD (Electronic Signature)  Reported on: 10/09/24 09:43 EDT, [facility] [place] [address]  Phone: [phone]   Fax: [phone]  _________________________________________________________________    Gross Description  Received:  In formalin labeled  "perihepatic lymph node FNB"  Size:  Multiple cores each measuring 0.1 to 0.5 cm  Description:  Tan soft tissue cores admixed with blood clot  Submitted:  Entirely in two cassettes: 1A-1B      SHUBHAM Greenwood ([facility]) 10/02/2024 06:53 PM    Disclaimer  In addition to other data that may appear on the specimen containers, all  labels have been inspected to confirm the presence of the patient's name  and date of birth.   Patient is a 64y old  Male who presents with a chief complaint of     HPI:  64M with PMH of Poorly differentiated metastatic GI carcinoma (known cervical and axillary lymphadenopathy), HTN, Anemia, DVT (on eliquis), Pleural effusions presented to ED for SOB x2 weeks. SOB  worse with exertion. Denies chest pain. No history of diabetes. Father  of MI. Patient states he was supposed to have PET scan tomorrow and start chemo next week.    Of note, patient was admitted ~2 weeks ago for LUE DVT and L pleural effusion in which pleurex catheter was placed.    PMH- as stated above  PSH- appendectomy, hernia repair  Meds- reviewed and reconciled  Allergies- bactrim (hives)  SH- Smoked 1.5 packs/day for ~30 years (quit 15 years ago), no alcohol use, takes cannabis gummies  FH- mother: CHF, father:  of MI (19 Mar 2025 07:39)    PAST MEDICAL & SURGICAL HISTORY:  Incisional hernia   Gastric lymphoma  Anemia of chronic disease  S/P appendectomy 2014  S/P cholecystectomy 15 years ago     HEALTH ISSUES - PROBLEM Dx:  NSTEMI (non-ST elevation myocardial infarction)  Hypertension  Deep vein thrombosis (DVT)  Carcinoma of gastrointestinal tract  Pleural effusion  Anemia of chronic disease  Other specified personal risk factors, not elsewhere classified    Shortness of breath [R06.02]  No pertinent past medical history [106585907]  Incisional hernia [553.21]  Gastric lymphoma [C85.99]  Anemia of chronic disease [D63.8]  No significant past surgical history [676232343]  S/P appendectomy [V45.89]  S/P cholecystectomy [V45.79]  Elevated troponin [R79.89]      FAMILY HISTORY:  Family history of coronary artery disease in father  Father -  age 60 following MI      [SOCIAL HISTORY: ]     smoking:  none currently     EtOH:  none currently     illicit drugs:  none currently     occupation:  Retired     marital status:       Other:       [ALLERGIES/INTOLERANCES:]  Allergies     Bactrim DS (Hives)  Intolerances      [MEDICATIONS]  MEDICATIONS  (STANDING):  heparin  Infusion.  Unit(s)/Hr (17 mL/Hr) IV Continuous <Continuous>  labetalol 100 milliGRAM(s) Oral two times a day  losartan 50 milliGRAM(s) Oral daily      MEDICATIONS  (PRN):  heparin   Injectable 7500 Unit(s) IV Push every 6 hours PRN For aPTT less than 40  heparin   Injectable 3500 Unit(s) IV Push every 6 hours PRN For aPTT between 40 - 57      [REVIEW OF SYSTEMS: ]  CONSTITUTIONAL: normal, no fever, no shakes, no chills   EYES: No eye pain, no visual disturbances, no discharge  ENMT:  no discharge  NECK: No pain, no stiffness  BREASTS: No pain, no masses, no nipple discharge  RESPIRATORY: No cough, no wheezing, no chills, no hemoptysis; +shortness of breath  CARDIOVASCULAR: No chest pain, no palpitations, no dizziness, no leg swelling  GASTROINTESTINAL: No abdominal, no epigastric pain. No nausea, no vomiting, no hematemesis; No diarrhea , no constipation. No melena, no hematochezia.  GENITOURINARY: No dysuria, no frequency, no hematuria, no incontinence  NEUROLOGICAL: No headaches, no memory loss, no loss of strength, no numbness, no tremors  SKIN: No itching, no burning, no rashes, no lesions   LYMPH NODES: No enlarged glands  ENDOCRINE: No heat or cold intolerance; No hair loss  MUSCULOSKELETAL: No joint pain or swelling; No muscle, no back, no extremity pain  PSYCHIATRIC: No depression, no anxiety, no mood swings, no difficulty sleeping  HEME/LYMPH: No easy bruising, no bleeding gums    [VITALS SIGNS 24hrs]  Vital Signs Last 24 Hrs  T(C): 36.6 (19 Mar 2025 12:52), Max: 36.7 (19 Mar 2025 07:34)  T(F): 97.9 (19 Mar 2025 12:52), Max: 98 (19 Mar 2025 07:34)  HR: 80 (19 Mar 2025 12:52) (66 - 84)  BP: 120/79 (19 Mar 2025 12:52) (100/60 - 162/88)  BP(mean): --  RR: 20 (19 Mar 2025 12:52) (16 - 20)  SpO2: 94% (19 Mar 2025 12:52) (94% - 100%)    Parameters below as of 19 Mar 2025 12:52  Patient On (Oxygen Delivery Method): nasal cannula  O2 Flow (L/min): 2    Daily Height in cm: 170.18 (19 Mar 2025 05:28)    Daily     I&O's Summary    [PHYSICAL EXAM]  GEN:   HEENT: normocephalic and atraumatic. EOMI. PERRL.    NECK: Supple.  No lymphadenopathy   LUNGS: dec BS  HEART: S1S2 Regular rate and rhythm, no MRG  ABDOMEN: Soft, nontender, and nondistended.  Positive bowel sounds.    : No CVA tenderness  EXTREMITIES: Without edema.  NEUROLOGIC: grossly intact.  PSYCHIATRIC: Appropriate affect .  SKIN: No rash     [LABS: ]                        8.5    8.96  )-----------( 568      ( 19 Mar 2025 06:05 )             27.4     CBC Full  -  ( 19 Mar 2025 06:05 )  WBC Count : 8.96 K/uL  RBC Count : 4.12 M/uL  Hemoglobin : 8.5 g/dL  Hematocrit : 27.4 %  Platelet Count - Automated : 568 K/uL  Mean Cell Volume : 66.5 fl  Mean Cell Hemoglobin : 20.6 pg  Mean Cell Hemoglobin Concentration : 31.0 g/dL  Auto Neutrophil # : x  Auto Lymphocyte # : x  Auto Monocyte # : x  Auto Eosinophil # : x  Auto Basophil # : x  Auto Neutrophil % : x  Auto Lymphocyte % : x  Auto Monocyte % : x  Auto Eosinophil % : x  Auto Basophil % : x    03-  137  |  106  |  13  ----------------------------<  127[H]  4.2   |  26  |  1.00  Ca    9.2      19 Mar 2025 06:05  TPro  6.4  /  Alb  2.5[L]  /  TBili  0.3  /  DBili  x   /  AST  122[H]  /  ALT  31  /  AlkPhos  84  03-19  PT/INR - ( 19 Mar 2025 06:05 )   PT: 16.9 sec;   INR: 1.45 ratio    PTT - ( 19 Mar 2025 06:05 )  PTT:31.1 sec  LIVER FUNCTIONS - ( 19 Mar 2025 06:05 )  Alb: 2.5 g/dL / Pro: 6.4 g/dL / ALK PHOS: 84 U/L / ALT: 31 U/L / AST: 122 U/L / GGT: x           Urinalysis Basic - ( 19 Mar 2025 06:05 )  Color: x / Appearance: x / SG: x / pH: x  Gluc: 127 mg/dL / Ketone: x  / Bili: x / Urobili: x   Blood: x / Protein: x / Nitrite: x   Leuk Esterase: x / RBC: x / WBC x   Sq Epi: x / Non Sq Epi: x / Bacteria: x    CBC TREND (5 Days)  WBC Count: 8.96 K/uL ( @ 06:05)  Hemoglobin: 8.5 g/dL ( @ 06:05)  Hematocrit: 27.4 % ( @ 06:05)  Platelet Count - Automated: 568 K/uL ( 06:05)      Ferritin: 989 ng/mL ( 05:40)  Ferritin: 943 ng/mL (:24)  Ferritin: 257 ng/mL ( 07:05)  Ferritin: 140 ng/mL ( 19:53)    Iron Total: 19 ug/dL (:40)  Iron Total: 20 ug/dL (:24)  Iron Total: 17 ug/dL ( 07:05)     Vitamin B12, Serum: >2000 pg/mL ( 05:40)  Vitamin B12, Serum: >2000 pg/mL (:24)  Vitamin B12, Serum: 1506 pg/mL ( 19:53)     Folate, Serum: 18.1 ng/mL (:40)  Folate, Serum: >20.0 ng/mL (:24)  Folate, Serum: 12.6 ng/mL ( 19:53)       [MICROBIOLOGY /  VIROLOGY:]      [PATHOLOGY]    ACCESSION No:  [id number]  Patient:     JESSE EATON  Accession:                             [id number]  Collected Date/Time:                   3/4/2025 12:47 EST  Received Date/Time:                    3/4/2025 18:49 EST  Non-Gynecologic Report - Auth (Verified)  Specimen(s) Submitted: PLEURAL FLUID  Final Diagnosis: PLEURAL FLUID  POSITIVE FOR MALIGNANT CELLS. Metastatic poorly  differentiated  carcinoma.  Cytology slides and cell block show a hypercellular specimen composed of single lying and rare groups of malignant cells with enlarged, eccentric placed  nuclei containing prominent nucleoli,  irregular nuclear contours and moderate cytoplasm, in a background of reactive mesothelial cells.   In summary: Compatible with metastatic carcinoma of upper GI, as was diagnosed in specimen: [id number]   Screened by: Fidelia Benavidez CT(ASCP)   Verified by: Maxwell Abdalla M.D. (Electronic Signature)  Reported on: 25 15:39 EST, [facility], [place] [address]  Phone: [phone]   Fax: [phone]  [facility], [address] [place] [address], [place] [address]  _________________________________________________________________  Clinical Information  LUE DVT, pleural effusion.  Gross Description  Received: 60  ml of cloudy fluid in CytoLyt  Prepared: 1 ThinPrep slide, 1 smear, 1 cell block           [RADIOLOGY & ADDITIONAL STUDIES:]     CT Angio Chest PE Protocol w/ IV Cont:   ACC: [id number] EXAM:  CT ANGIO CHEST PULM ART [facility]   ORDERED BY: FANTA OLIVEIRA   PROCEDURE DATE:  2025    INTERPRETATION:  CLINICAL INFORMATION: Metastatic GI cancer. Shortness of breath  COMPARISON: 3/3/2025  FINDINGS:  LUNGS AND LARGE AIRWAYS: Minimal respiratory motion unsharpness .Patent central airways. Moderate dependent/layering RIGHT pleural effusion, increased from 3/3/2025. Moderate multiloculated LEFT pleural effusion (w/ pleural pseudotumor), diminished from 3/3/2025 and produces subtotal LLL passive atelectasis. IAN interlobular septal thickening. No suspicious pulmonary lesions  PLEURA: See above.  VESSELS: No aortic dilatation/dissection or pulmonary thromboembolism.   Coronary artery calcification. Minimal inflammatory changes around left subclavian vein, c/w  previously DVT  HEART: Normal sized heart. Small pericardial effusion.  MEDIASTINUM AND STELLA: Diffuse mediastinal lymphadenopathy is not significantly changed from 3/3/2025. (RIGHT paratracheal LN 2.5 x 2.2 cm, 301/36). While differences in arm position limits interval axillary assessment, LEFT axillary lymphadenopathy (now w/ surrounding fat stranding) appears progressed from 3/3/2025 (largest axillary LN 2.3 x 1.7 cm, 301/27.  CHEST WALL AND LOWER NECK: Trace gynecomastia  VISUALIZED UPPER ABDOMEN: Bilateral renal cysts and Gerota's fascia edema/thickening (L>R), unchanged. Gastrohepatic/periportal lymphadenopathy and adrenal nodularity, not significantly changed. RIGHT   perihepatic ascites, developed. Fluid dilated cisterna chyli, unchanged (301/120).  BONES: Thoracic spondylosis. Scattered sclerotic osseous foci. Punctate T10 and T11 (sagittal 602/68, 67) sclerotic foci, developed from 2024.  IMPRESSION:  1.  No pulmonary thromboembolism.  2.  Moderate multiloculated LEFT pleural effusion diminished from 3/3/2025 and produces subtotal LLL passive atelectasis. Moderate dependent RIGHT pleural effusion, increased.  3.  Mediastinal lymphadenopathy is not significantly changed from 3/3/2025.  While differences in arm position limits interval axillary assessment, LEFT axillary lymphadenopathy (now w/ surrounding fat stranding) appears progressed from 3/3/2025  4.  Punctate T8, T10 and T11 (sagittal 602/68, 67, 75) sclerotic foci, developed from 2024, must be viewed with suspicion.  5.  Abdominal ascites, developed  --- End of Report ---  GUICHO CORCORAN MD; Attending Radiologist  This document has been electronically signed. Mar 19 2025  6:59AM (25 @ 06:26)      ACCESSION No:  [id number]  Patient:     JESSE EATON  Accession:                             [id number]  Collected Date/Time:                   10/1/2024 13:07 EDT  Received Date/Time:                    10/2/2024 13:07 EDT  Addendum Report - Auth (Verified)  Addendum  The full reference lab report from [facility] is on the next page of this Addendum.  If you are unable to view the report:   1. View from Reference Lab Portal  2. View in [place] Result Viewer  3. In [facility] for [facility] Patients Only:  Click on the "Patient Window" tab after selecting the appropriate patient visit  4. Call Pathology Dept. at [phone] for a faxed report  Verified by: Javier Mcbride MD  (Electronic Signature)  Reported on: 11/15/24 16:56 EST, [facility] [facility] [place] [address]  Phone: [phone]   Fax: [phone]  _________________________________________________________________  *** Image not supported for this output type ***  Addendum Report - Auth (Verified)    Addendum  Additional immunostains results:  CK7 ( repeat ): Positive  CK19: Positive  CA 19.9: Focally positive  INI-1: Positive  Negative reactivity is seen with Calretinin, WT1, CD30, OCT 3/4, PLAP, , PSA, and PSAP.  In summary the tumor cells are positive for CK7, AE1/AE3, CDX2, CK19 and CA 19.9.  Findings are not diagnostic. Upper GI including  (but not limited to) pancreato-biliary should be considered among the possible primary sites of origin.  Foundation results submitted separately.    PD-L1 Immunohistochemistry  Antibody: [place] PDL1 ()  Tissue type: Â Lymph node  Block: 1B    Result: Combined Positive Score (CPS): 3  PD-L1 Immunohistochemistry  Antibody: [place] PDL1 ()  Result: Tumor Proportion Score (TPS*)  Â  8%  *TPS: The percentage of viable tumor cells showing partial or complete membrane staining at any intensity    Verified by: Javier Mcbride MD  (Electronic Signature)  Reported on: 10/30/24 13:02 EDT, [facility] [employer],  [facility]. [address] [address]  Phone: [phone]   Fax: [phone]  _________________________________________________________________  Surgical Pathology Report - Auth (Verified)    Specimen(s) Submitted  1-Perihepatic lymph node FNB  Final Diagnosis  1.  Perihepatic lymph node, FNB:  -   Metastatic poorly-differentiated carcinoma.  See note.  Note: Immunostain results.  AE1/AE3 and CDX2: Positive tumor cells.   CK7 ( minimal focally positive) CK20, TTF-1, Hepar, DOG1, NKX3.1, arginase, synaptophysin, chromogranin, GATA3, SOX10, PAX8, p40 : Negative tumor cells.   Comment: Diagnosis confirmed upon review at the daily QA/GI intradepartmental    conference.  Dr. Soni notified via secure email.  Additional immunostains and Foundation pending.  Verified by: Javier Mcbride MD (Electronic Signature)  Reported on: 10/09/24 09:43 EDT, [facility] [place] [address]  Phone: [phone]   Fax: [phone]  _________________________________________________________________    Gross Description  Received:  In formalin labeled  "perihepatic lymph node FNB"  Size:  Multiple cores each measuring 0.1 to 0.5 cm  Description:  Tan soft tissue cores admixed with blood clot  Submitted:  Entirely in two cassettes: 1A-1B      SHUBHAM Greenwood ([facility]) 10/02/2024 06:53 PM    Disclaimer  In addition to other data that may appear on the specimen containers, all  labels have been inspected to confirm the presence of the patient's name  and date of birth.

## 2025-03-19 NOTE — ED PROVIDER NOTE - PROGRESS NOTE DETAILS
trop 18,000: ECG sent to cath attg Dr. Contreras. He agrees that initial ECG does not meet stemi criteria. Rpt ECG ordered rpt ECG does not meet stemi criteria. pt updated about results thus far: states he has no chest pain currently but does feel some SOB. awaiting CT results at this time no PE on CT. endorsed to dr villatoro for tele admission; agree with full dose asa and heparin w/o bolus (since pt is on eliquis) at this time. pt updated about plan and is amenable

## 2025-03-19 NOTE — CONSULT NOTE ADULT - CONSULT REASON
sob  aranda  weakness  pleural eff  ascites
NSTEMI
C16.7     Poorly differentiated gastric cancer  C77.1     Lymph node metastasis  R18.0     Malignant ascites  J91.0     Malignant pleural effusion  D63.8    Anemia due to chronic disease  D50.0    Iron deficiency anemia  D75.83  Reactive thrombocytosis  I21.4      NSTEMI

## 2025-03-19 NOTE — CARE COORDINATION ASSESSMENT. - OTHER PERTINENT DISCHARGE PLANNING INFORMATION:
CM met with the patient at the bedside and explained role of CM and transition planning. Patient verbalized understanding. Patient lives with his brother in a private home. 3-4 steps to enter. Independent with ADL's/ambulation/medication management/driving PTA. No DME or home care services PTA. No home O2 PTA. Patient has a right chest wall infusaport. Patient stated his next Chemo session is 3/25/25. Oncologist is Dr. Mariana Fung. CM provided direct contact/resource folder and remains available.

## 2025-03-19 NOTE — ED ADULT TRIAGE NOTE - AS TEMP SITE
oral Complex Repair And Transposition Flap Text: The defect edges were debeveled with a #15 scalpel blade.  The primary defect was closed partially with a complex linear closure.  Given the location of the remaining defect, shape of the defect and the proximity to free margins a transposition flap was deemed most appropriate for complete closure of the defect.  Using a sterile surgical marker, an appropriate advancement flap was drawn incorporating the defect and placing the expected incisions within the relaxed skin tension lines where possible.    The area thus outlined was incised deep to adipose tissue with a #15 scalpel blade.  The skin margins were undermined to an appropriate distance in all directions utilizing iris scissors.

## 2025-03-19 NOTE — ED PROVIDER NOTE - NSFOLLOWUPINSTRUCTIONS_ED_ALL_ED_FT
You were seen today in the emergency room for shortness of breath    You need to follow up with your primary care doctor and a cardiologist in the next 48-72 hours    If you develop any new or worsening symptoms you need to return immediately to the emergency department. If you experience any of the following please come right back to the emergency room: chest pain that becomes much worse with walking up stairs or exercising, uncontrollable nausea and vomiting, severe chest pain that will not go away, passing out, new persistent numbness and/or weakness

## 2025-03-19 NOTE — CARE COORDINATION ASSESSMENT. - REASON FOR CONSULT
Readmission/discharge planning/patient meets high risk criteria (i.e.: STARS admit., readmit w/in 30 days)

## 2025-03-19 NOTE — ED ADULT NURSE NOTE - OBJECTIVE STATEMENT
64 y.o male presents to ED with chief complaint of SOB. Pt states they are experiencing SOB with a cough. 64 y.o male presents to ED with chief complaint of SOB. Pt states they were here a couple of weeks ago for the same symptoms and had fluid removed from the lungs. Pt they are now experiencing SOB with a cough. Pt denies chest pain at this time.

## 2025-03-19 NOTE — CONSULT NOTE ADULT - ASSESSMENT
64M PMH poorly differentiated metastatic (known cervical and axillary lymphadenopathy) GI carcinoma on chemo, HTN, and anemia, recent admission for PLEF and DVT of LUE currently on Eliquis p/w SOB that has worsened over past 2 weeks. Cardiology consulted for SOB 2/2 Likely both NSTEMI and BL pleural effusions.    CHARTING IN PROGRESS    #NSTEMI  -Pt presents with worsening ZHOU last his last admission.  -EKG: NSR w. PACs.   -Troponin 59370, trend to peak.  -Interventional Cardiology notified, Dr. Urias, awaiting recommendations.   -ASA loaded, now on hep gtt. Would Brilinta load.   -CTA Negative for PE  -monitor for new or worsening sx of ischemia    #Volume  -CTA: Moderate multiloculated LEFT pleural effusion diminished from 3/3/2025 and produces subtotal LLL passive atelectasis. Moderate dependent RIGHT pleural effusion, increased.  Abdominal ascites, developed  -Suspect malignant effusion that has reaccumulated given exudative effusion effusion with malignant cells on prior tap  -Pt required chest tube on last admission and had a L thoracentesis on 3/4 that drained 3.1L of fluid  -cytopathology: positive for malignant cells compatible with the metastatic carcinoma  -TTE 03/06: LVEF: 61% aortic valve sclerosis. trace MR.  -pt needs pulmonology eval for thoracentesis vs pigtail eventually    #Arrhythmia  -EKG: NSR w. PACs.   - Monitor and replete lytes, keep K>4, Mg>2.    #HTN  - BP stable currently 112/66  - Continue home meds: losartan, labetalol.  - Continue to monitor hemodynamics     - Other cardiovascular workup will depend on clinical course.  - All other workup per primary team.  - Will continue to follow.       64M PMH poorly differentiated metastatic (known cervical and axillary lymphadenopathy) GI carcinoma on chemo, HTN, and anemia, recent admission for PLEF and DVT of LUE currently on Eliquis p/w SOB that has worsened over past 2 weeks. Cardiology consulted for SOB 2/2 Likely both NSTEMI and BL pleural effusions.    #NSTEMI  -Pt presents with worsening ZOHU last his last admission.  -Extensive discussion with patient Dr. Soto and myself at bedside. Since pt is undergoing chemotherapy treatment, with known underlying anemia of chronic dz, and requiring thoracentesis in recent past, will continue with medical management of ACS at this time. He certaintly has other reasons for his SOB 2/2 bilateral pleural effusions.   -EKG: NSR w. PACs.   -Troponin 93752 -> 24,257. trend to peak  -Interventional Cardiology notified, Dr. Contreras, will continue with medical management for now.  -ASA and Brilinta loaded, now on hep gtt.  -Continue DAPT  -CTA Negative for PE  -Check TTE, want to see if there is a acute reduction in systolic function, which may lean towards cardiac cath.   -monitor for new or worsening sx of ischemia    #Volume  -CTA: Moderate multiloculated LEFT pleural effusion diminished from 3/3/2025 and produces subtotal LLL passive atelectasis. Moderate dependent RIGHT pleural effusion, increased.  Abdominal ascites, developed  -Suspect malignant effusion that has reaccumulated given exudative effusion effusion with malignant cells on prior tap  -Pt required chest tube on last admission and had a L thoracentesis on 3/4 that drained 3.1L of fluid  -cytopathology: positive for malignant cells compatible with the metastatic carcinoma  -TTE 03/06: LVEF: 61% aortic valve sclerosis. trace MR.  -pt needs pulmonology eval for thoracentesis vs pigtail eventually    #Arrhythmia  -EKG: NSR w. PACs.   - Monitor and replete lytes, keep K>4, Mg>2.    #HTN  - BP stable currently 112/66  - Continue home meds: losartan, labetalol.  - Continue to monitor hemodynamics     - Other cardiovascular workup will depend on clinical course.  - All other workup per primary team.  - Will continue to follow.

## 2025-03-19 NOTE — H&P ADULT - HISTORY OF PRESENT ILLNESS
64M with PMH of Poorly differentiated metastatic GI carcinoma (known cervical and axillary lymphadenopathy), HTN, Anemia, DVT (on eliquis), Pleural effusions presented to ED for SOB x2 weeks. SOB  worse with exertion. Denies chest pain. No history of diabetes. Father  of MI. Patient states he was supposed to have PET scan tomorrow and start chemo next week.    Of note, patient was admitted ~2 weeks ago for LUE DVT and L pleural effusion in which pleurex catheter was placed.    PMH- as stated above  PSH- appendectomy, hernia repair  Meds- reviewed and reconciled  Allergies- bactrim (hives)  SH- Smoked 1.5 packs/day for ~30 years (quit 15 years ago), no alcohol use, takes cannabis gummies  FH- mother: CHF, father:  of MI

## 2025-03-19 NOTE — ED PROVIDER NOTE - CLINICAL SUMMARY MEDICAL DECISION MAKING FREE TEXT BOX
64M PMH poorly differentiated metastatic (known cervical and axillary lymphadenopathy) GI carcinoma on chemo, HTN, and anemia, recent admission for PLEF and DVT of LUE currently on Eliquis p/w SOB that has worsened over past 2 weeks. Denies chest pain. Does endorse a chronic cough. Denies any worsening leg swelling or fevers    Gen: NAD, non-toxic appearing, awake alert   HEENT: normal conjunctiva, oral mucosa moist  Lung: CTAB, no respiratory distress, no wheezes/rhonchi/rales B/L, speaking in full sentences  CV: RRR  Abd: soft, NT, ND, no guarding, no rigidity, no rebound tenderness  MSK: no visible deformities, ROM normal in UE/LE, 2+ radial pulses B/L  Neuro: No focal sensory or motor deficits  Skin: Warm, well perfused, no leg swelling    DDx includes but not limited to: pna vs PLEF vs PE vs ACS vs brisa vs lyte derangements vs pna vs viral uri  Plan: cardiac labs, cta chest, reassess symptoms    See Progress Notes for further updates on ED Course

## 2025-03-19 NOTE — CARE COORDINATION ASSESSMENT. - NSCAREPROVIDERS_GEN_ALL_CORE_FT
CARE PROVIDERS:  Accepting Physician: Oswaldo Snowden  Access Services: Giselle Baltazar  Administration: Norah Arguelles  Admitting: Oswaldo Snowden  Attending: Oswaldo Snowden  Cardiology Technician: Mary Joaquin  Case Management: Elmer Knight  Case Management: Laura Carroll  Consultant: Hon Deandre  Consultant: Ellis Soto  Consultant: Garfield Damico  Consultant: Jaiden Urias  Consultant: Agustín Smith  Consultant: Skyla Salguero  ED Attending: Ernst Hobbs  ED Nurse: Tray Dos Santos  Nurse: Vale Cotto  Nurse: Victoria Watson  Oncology: Scarlet Pickett  Ordered: ServiceAccount, [facility]  Outpatient Provider: Jhonny Hunter  Override: Victoria Watson  Override: Tracy Brennan  Override: Lady Kelly  Override: Dary Adame  PCA/Nursing Assistant: Lady Kelly  PCA/Nursing Assistant: Kim Tanner  Primary Team: Jerrod Enriquez  Research: Luis E Angela  Research: Paz Santos// Supp. Assoc.: Renetta Urbina

## 2025-03-19 NOTE — ED ADULT NURSE NOTE - NSFALLUNIVINTERV_ED_ALL_ED
Bed/Stretcher in lowest position, wheels locked, appropriate side rails in place/Call bell, personal items and telephone in reach/Instruct patient to call for assistance before getting out of bed/chair/stretcher/Non-slip footwear applied when patient is off stretcher/[place] to call system/Physically safe environment - no spills, clutter or unnecessary equipment/Purposeful proactive rounding/Room/bathroom lighting operational, light cord in reach

## 2025-03-20 ENCOUNTER — RESULT REVIEW (OUTPATIENT)
Age: 65
End: 2025-03-20

## 2025-03-20 LAB
ALBUMIN FLD-MCNC: 2.4 G/DL — SIGNIFICANT CHANGE UP
ALBUMIN SERPL ELPH-MCNC: 2.2 G/DL — LOW (ref 3.3–5)
ALP SERPL-CCNC: 85 U/L — SIGNIFICANT CHANGE UP (ref 40–120)
ALT FLD-CCNC: 30 U/L — SIGNIFICANT CHANGE UP (ref 12–78)
ANION GAP SERPL CALC-SCNC: 7 MMOL/L — SIGNIFICANT CHANGE UP (ref 5–17)
APTT BLD: 49.3 SEC — HIGH (ref 24.5–35.6)
APTT BLD: 69.5 SEC — HIGH (ref 24.5–35.6)
AST SERPL-CCNC: 81 U/L — HIGH (ref 15–37)
B PERT IGG+IGM PNL SER: ABNORMAL
BILIRUB SERPL-MCNC: 0.4 MG/DL — SIGNIFICANT CHANGE UP (ref 0.2–1.2)
BUN SERPL-MCNC: 12 MG/DL — SIGNIFICANT CHANGE UP (ref 7–23)
CALCIUM SERPL-MCNC: 9 MG/DL — SIGNIFICANT CHANGE UP (ref 8.5–10.1)
CHLORIDE SERPL-SCNC: 105 MMOL/L — SIGNIFICANT CHANGE UP (ref 96–108)
CO2 SERPL-SCNC: 26 MMOL/L — SIGNIFICANT CHANGE UP (ref 22–31)
COLOR FLD: SIGNIFICANT CHANGE UP
CREAT SERPL-MCNC: 1.1 MG/DL — SIGNIFICANT CHANGE UP (ref 0.5–1.3)
CRP SERPL-MCNC: 101 MG/L — HIGH
EGFR: 75 ML/MIN/1.73M2 — SIGNIFICANT CHANGE UP
EGFR: 75 ML/MIN/1.73M2 — SIGNIFICANT CHANGE UP
EOSINOPHIL # FLD: 2 % — SIGNIFICANT CHANGE UP
ERYTHROCYTE [SEDIMENTATION RATE] IN BLOOD: 60 MM/HR — HIGH (ref 0–20)
FLUID INTAKE SUBSTANCE CLASS: SIGNIFICANT CHANGE UP
FOLATE SERPL-MCNC: >20 NG/ML — SIGNIFICANT CHANGE UP
GLUCOSE FLD-MCNC: 76 MG/DL — SIGNIFICANT CHANGE UP
GLUCOSE SERPL-MCNC: 111 MG/DL — HIGH (ref 70–99)
GRAM STN FLD: SIGNIFICANT CHANGE UP
HCT VFR BLD CALC: 27.4 % — LOW (ref 39–50)
HGB BLD-MCNC: 8.6 G/DL — LOW (ref 13–17)
LDH SERPL L TO P-CCNC: 319 U/L — SIGNIFICANT CHANGE UP
LDH SERPL L TO P-CCNC: 385 U/L — HIGH (ref 50–242)
LYMPHOCYTES # FLD: 40 % — SIGNIFICANT CHANGE UP
MAGNESIUM SERPL-MCNC: 2 MG/DL — SIGNIFICANT CHANGE UP (ref 1.6–2.6)
MCHC RBC-ENTMCNC: 21.2 PG — LOW (ref 27–34)
MCHC RBC-ENTMCNC: 31.4 G/DL — LOW (ref 32–36)
MCV RBC AUTO: 67.5 FL — LOW (ref 80–100)
MESOTHL CELL # FLD: SIGNIFICANT CHANGE UP
MONOS+MACROS # FLD: 28 % — SIGNIFICANT CHANGE UP
NEUTROPHILS-BODY FLUID: 30 % — SIGNIFICANT CHANGE UP
NRBC BLD AUTO-RTO: 0 /100 WBCS — SIGNIFICANT CHANGE UP (ref 0–0)
PH FLD: 7.5 — SIGNIFICANT CHANGE UP
PHOSPHATE SERPL-MCNC: 3.3 MG/DL — SIGNIFICANT CHANGE UP (ref 2.5–4.5)
PLATELET # BLD AUTO: 471 K/UL — HIGH (ref 150–400)
POTASSIUM SERPL-MCNC: 4.3 MMOL/L — SIGNIFICANT CHANGE UP (ref 3.5–5.3)
POTASSIUM SERPL-SCNC: 4.3 MMOL/L — SIGNIFICANT CHANGE UP (ref 3.5–5.3)
PROT FLD-MCNC: 3.8 G/DL — SIGNIFICANT CHANGE UP
PROT SERPL-MCNC: 6.2 G/DL — SIGNIFICANT CHANGE UP (ref 6–8.3)
RBC # BLD: 4.06 M/UL — LOW (ref 4.2–5.8)
RBC # FLD: 23.5 % — HIGH (ref 10.3–14.5)
RCV VOL RI: SIGNIFICANT CHANGE UP CELLS/UL
SODIUM SERPL-SCNC: 138 MMOL/L — SIGNIFICANT CHANGE UP (ref 135–145)
SPECIMEN SOURCE: SIGNIFICANT CHANGE UP
TOTAL NUCLEATED CELL COUNT, BODY FLUID: 206 CELLS/UL — SIGNIFICANT CHANGE UP
TUBE TYPE: SIGNIFICANT CHANGE UP
VIT B12 SERPL-MCNC: >2000 PG/ML — HIGH (ref 232–1245)
WBC # BLD: 10.13 K/UL — SIGNIFICANT CHANGE UP (ref 3.8–10.5)
WBC # FLD AUTO: 10.13 K/UL — SIGNIFICANT CHANGE UP (ref 3.8–10.5)
WBC COUNT.: 200 CELLS/UL — SIGNIFICANT CHANGE UP

## 2025-03-20 PROCEDURE — 88342 IMHCHEM/IMCYTCHM 1ST ANTB: CPT | Mod: 26

## 2025-03-20 PROCEDURE — 88305 TISSUE EXAM BY PATHOLOGIST: CPT | Mod: 26

## 2025-03-20 PROCEDURE — 88341 IMHCHEM/IMCYTCHM EA ADD ANTB: CPT | Mod: 26

## 2025-03-20 PROCEDURE — 99233 SBSQ HOSP IP/OBS HIGH 50: CPT | Mod: GC

## 2025-03-20 PROCEDURE — 71045 X-RAY EXAM CHEST 1 VIEW: CPT | Mod: 26

## 2025-03-20 PROCEDURE — 93306 TTE W/DOPPLER COMPLETE: CPT | Mod: 26

## 2025-03-20 PROCEDURE — 99233 SBSQ HOSP IP/OBS HIGH 50: CPT

## 2025-03-20 PROCEDURE — 88112 CYTOPATH CELL ENHANCE TECH: CPT | Mod: 26

## 2025-03-20 PROCEDURE — 76705 ECHO EXAM OF ABDOMEN: CPT | Mod: 26

## 2025-03-20 PROCEDURE — 32555 ASPIRATE PLEURA W/ IMAGING: CPT | Mod: LT

## 2025-03-20 RX ADMIN — HEPARIN SODIUM 1900 UNIT(S)/HR: 1000 INJECTION INTRAVENOUS; SUBCUTANEOUS at 00:50

## 2025-03-20 RX ADMIN — HEPARIN SODIUM 1900 UNIT(S)/HR: 1000 INJECTION INTRAVENOUS; SUBCUTANEOUS at 19:55

## 2025-03-20 RX ADMIN — LABETALOL HYDROCHLORIDE 100 MILLIGRAM(S): 200 TABLET, FILM COATED ORAL at 05:03

## 2025-03-20 RX ADMIN — LOSARTAN POTASSIUM 50 MILLIGRAM(S): 100 TABLET, FILM COATED ORAL at 05:03

## 2025-03-20 RX ADMIN — HEPARIN SODIUM 1900 UNIT(S)/HR: 1000 INJECTION INTRAVENOUS; SUBCUTANEOUS at 15:09

## 2025-03-20 RX ADMIN — TICAGRELOR 90 MILLIGRAM(S): 90 TABLET ORAL at 05:03

## 2025-03-20 RX ADMIN — LABETALOL HYDROCHLORIDE 100 MILLIGRAM(S): 200 TABLET, FILM COATED ORAL at 17:51

## 2025-03-20 RX ADMIN — HEPARIN SODIUM 2100 UNIT(S)/HR: 1000 INJECTION INTRAVENOUS; SUBCUTANEOUS at 22:45

## 2025-03-20 NOTE — PROGRESS NOTE ADULT - SUBJECTIVE AND OBJECTIVE BOX
[facility] -- Charles Martinez Pannella, Patel, Savella, Goodger, Cohen  Office # [phone]    Follow Up:  NSTEMI     Subjective/Observations: seen and examined, awake, alert, OOB to chair,  denies chest pain, c/o dyspnea, on O2 via NC, admits to  feeling depressed of his current medical issue, NAD    REVIEW OF SYSTEMS: All other review of systems is negative unless indicated above  PAST MEDICAL & SURGICAL HISTORY:  Incisional hernia  2015      Gastric lymphoma      Anemia of chronic disease      S/P appendectomy  November 17th 2014      S/P cholecystectomy  15 years ago        MEDICATIONS  (STANDING):  aspirin enteric coated 81 milliGRAM(s) Oral daily  heparin  Infusion.  Unit(s)/Hr (17 mL/Hr) IV Continuous <Continuous>  influenza   Vaccine 0.5 milliLiter(s) IntraMuscular once  labetalol 100 milliGRAM(s) Oral two times a day  losartan 50 milliGRAM(s) Oral daily  ticagrelor 90 milliGRAM(s) Oral every 12 hours    MEDICATIONS  (PRN):  heparin   Injectable 7500 Unit(s) IV Push every 6 hours PRN For aPTT less than 40  heparin   Injectable 3500 Unit(s) IV Push every 6 hours PRN For aPTT between 40 - 57    Allergies    Bactrim DS (Hives)    Intolerances      Vital Signs Last 24 Hrs  T(C): 36.8 (20 Mar 2025 05:32), Max: 37.2 (19 Mar 2025 22:05)  T(F): 98.3 (20 Mar 2025 05:32), Max: 98.9 (19 Mar 2025 22:05)  HR: 76 (20 Mar 2025 05:32) (66 - 92)  BP: 120/68 (20 Mar 2025 05:32) (110/64 - 162/88)  BP(mean): --  RR: 18 (20 Mar 2025 05:32) (18 - 20)  SpO2: 98% (20 Mar 2025 05:32) (93% - 100%)    Parameters below as of 20 Mar 2025 05:32  Patient On (Oxygen Delivery Method): nasal cannula  O2 Flow (L/min): 3    I&O's Summary    19 Mar 2025 07:01  -  20 Mar 2025 07:00  --------------------------------------------------------  IN: 0 mL / OUT: 1000 mL / NET: -1000 mL          TELE: SR 70-80's   PHYSICAL EXAM:  Constitutional: NAD, awake and alert  HEENT: Moist Mucous Membranes, Anicteric  Pulmonary: Non-labored, breath sounds diminished L>R, No wheezing, rales or rhonchi  Cardiovascular: Regular, S1 and S2, No murmurs, rubs, gallops or clicks  Gastrointestinal: Bowel Sounds present, soft, nontender.   Lymph: No peripheral edema. No lymphadenopathy.  Skin: No visible rashes or ulcers.  Psych:  Mood & affect appropriate  LABS: All Labs Reviewed:                        8.6    10.13 )-----------( 471      ( 20 Mar 2025 07:46 )             27.4                         7.7    8.15  )-----------( 551      ( 19 Mar 2025 13:52 )             24.6                         8.5    8.96  )-----------( 568      ( 19 Mar 2025 06:05 )             27.4     20 Mar 2025 07:46    138    |  105    |  12     ----------------------------<  111    4.3     |  26     |  1.10   19 Mar 2025 06:05    137    |  106    |  13     ----------------------------<  127    4.2     |  26     |  1.00     Ca    9.0        20 Mar 2025 07:46  Ca    9.2        19 Mar 2025 06:05  Phos  3.3       20 Mar 2025 07:46  Mg     2.0       20 Mar 2025 07:46    TPro  6.2    /  Alb  2.2    /  TBili  0.4    /  DBili  x      /  AST  81     /  ALT  30     /  AlkPhos  85     20 Mar 2025 07:46  TPro  6.4    /  Alb  2.5    /  TBili  0.3    /  DBili  x      /  AST  122    /  ALT  31     /  AlkPhos  84     19 Mar 2025 06:05    PT/INR - ( 19 Mar 2025 06:05 )   PT: 16.9 sec;   INR: 1.45 ratio         PTT - ( 19 Mar 2025 23:42 )  PTT:69.5 sec      12 Lead ECG:   Ventricular Rate 76 BPM    Atrial Rate 76 BPM    P-R Interval 156 ms    QRS Duration 98 ms    Q-T Interval 384 ms    QTC Calculation(Bazett) 432 ms    P Axis 69 degrees    R Axis 7 degrees    T Axis -12 degrees    Diagnosis Line Sinus rhythm with premature atrial complexes  Inferior infarct (cited on or before 19-MAR-2025)  Cannot rule out Anterior infarct (cited on or before 19-MAR-2025)  Abnormal ECG  When compared with ECG of 19-MAR-2025 05:43, (Unconfirmed)  premature atrial complexes are now present  Confirmed by Ellis Soto MD ([id number]) on 3/19/2025 1:14:14 PM (03-19-25 @ 06:39)      TRANSTHORACIC ECHOCARDIOGRAM REPORT  ________________________________________________________________________________                                      _______       Pt. Name:       JESSE EATON Study Date:    3/5/2025  MRN:    [id number]                   YOB: 1960  Accession #:    [id number]                  Age:           64 years  Account#:       [id number]                 Gender:        M  Heart Rate:                                Height:        66.93 in (170.00 cm)  Rhythm:                                    Weight:        211.64 lb (96.00 kg)  Blood Pressure: 133/78 mmHg                BSA/BMI:       2.07 m² / 33.22 kg/m²  ________________________________________________________________________________________  Referring Physician:    [id number] Kentrell Cartagena  Interpreting Physician: Lilli Pablo MD  Primary Sonographer:    Emperatriz AVILA    CPT:               ECHO TTE WO CON COMP W DOPP - 99134.m  Indication(s):     Dyspnea, unspecified - R06.00  Procedure:         Transthoracic echocardiogram with 2-D, M-mode and complete                     spectral and color flow Doppler.  Ordering Location: [facility]  Admission Status:  Inpatient  Study Information: Image quality for this study is technically difficult.    _______________________________________________________________________________________     CONCLUSIONS:      1. Technically difficult image quality.   2. Left ventricular cavity is normal in size. Left ventricular wall thickness is normal. Left ventricular systolic function is normal with an ejection fraction of 61 % by David's method of disks. There are no regional wall motion abnormalities seen.   3. Normal right ventricular cavity size and normal right ventricular systolic function.   4. Fibrocalcific aortic valve sclerosis without stenosis.   5. Trace mitral regurgitation.   6. Tricuspid valve was not well visualized.   7. The inferior vena cava is normal in size measuring 2.20 cm in diameter, (normal <2.1cm) withnormal inspiratory collapse (normal >50%) consistent with normal right atrial pressure (~3, range 0-5mmHg).   8. Small pericardial effusion with no echocardiographic evidence of tamponade physiology.    ________________________________________________________________________________________  FINDINGS:     Left Ventricle:  The left ventricular cavity is normal in size. Left ventricular wall thickness is normal. Left ventricular systolic function is normal with a calculated ejection fraction of 61 % by the David's biplane method of disks. There are no regional wall motion abnormalities seen. There is normal left ventricular diastolic function, with normal left ventricular filling pressure.     Right Ventricle:  The right ventricular cavity isnormal in size and right ventricular systolic function is normal.     Left Atrium:  The left atrium is normal in size with an indexed volume of 23.10 ml/m².     Right Atrium:  The right atrium is normal in size with an indexed volume of 16.77 ml/m².     Aortic Valve:  There is fibrocalcific aortic valve sclerosis without stenosis. There is no evidence of aortic regurgitation.     Mitral Valve:  Structurally normal mitral valve with normal leaflet excursion. There is no mitral valve stenosis. There is trace mitral regurgitation.     Tricuspid Valve:  The tricuspid valve was not well visualized. There is insufficient tricuspid regurgitation detected to calculate pulmonary artery systolic pressure.     Pulmonic Valve:  The pulmonic valve was notwell visualized. There is trace pulmonic regurgitation.     Pericardium:  There is a small pericardial effusion with no echocardiographic evidence of tamponade physiology.     Systemic Veins:  The inferior vena cava is normal in size measuring 2.20 cm in diameter, (normal <2.1cm) with normal inspiratory collapse (normal >50%) consistent with normal right atrial pressure (~3, range 0-5mmHg).  ____________________________________________________________________  QUANTITATIVE DATA:  Left Ventricle Measurements: (Indexed to BSA)     IVSd (2D):   1.0 cm  LVPWd (2D):  1.2 cm  LVIDd (2D):  4.5 cm  LVIDs (2D):  3.0 cm  LV Mass:     181 g  87.2 g/m²  BiPlane LV EF%: 61 %     MV E Vmax:    0.80 m/s  MV A Vmax:    0.78 m/s  MV E/A:       1.03  e' lateral:   11.40 cm/s  e' medial:    7.40 cm/s  E/e' lateral: 6.99  E/e' medial:  10.77  E/e' Average: 8.48  MV DT:        229 msec    Aorta Measurements: (Normal range) (Indexed to BSA)     Ao Root d 3.00 cm (3.1 - 3.7 cm) 1.45 cm/m²            Left AtriumMeasurements: (Indexed to BSA)  LA Diam 2D:        3.00 cm  LA Vol s, MOD A4C: 41.10 ml.  LA Vol s, MOD A2C: 56.30 ml.  LA Vol s, MOD BP:  47.80 ml  23.10 ml/m²         Right Ventricle Measurements: Right Atrial Measurements:     RV Base (RVID1): 3.2cm       RA Vol:            34.70 ml                                RA Vol s, MOD A4C  34.7 ml                                RA Vol Index:      16.77 ml/m²                                RA Area s, MOD A4C 14.1 cm²       LVOT / RVOT/ Qp/Qs Data: (Indexed to BSA)  LVOT Diameter,s: 2.00 cm  LVOT Area:       3.14 cm²    Mitral Valve Measurements:     MV E Vmax: 0.8 m/s  MV A Vmax: 0.8 m/s  MV E/A:    1.0       Tricuspid Valve Measurements:     RA Pressure: 3 mmHg    ________________________________________________________________________________________  Electronically signed on 3/5/2025 at 2:33:47 PM by Lilli Pablo MD         *** Final ***

## 2025-03-20 NOTE — PROGRESS NOTE ADULT - SUBJECTIVE AND OBJECTIVE BOX
Interval History:  s/p thoracentesis.  feelruddy hollowaydean better  Chart reviewed and events noted;   Overnight events:    MEDICATIONS  (STANDING):  aspirin enteric coated 81 milliGRAM(s) Oral daily  heparin  Infusion.  Unit(s)/Hr (17 mL/Hr) IV Continuous <Continuous>  influenza   Vaccine 0.5 milliLiter(s) IntraMuscular once  labetalol 100 milliGRAM(s) Oral two times a day  losartan 50 milliGRAM(s) Oral daily  ticagrelor 90 milliGRAM(s) Oral every 12 hours    MEDICATIONS  (PRN):  heparin   Injectable 7500 Unit(s) IV Push every 6 hours PRN For aPTT less than 40  heparin   Injectable 3500 Unit(s) IV Push every 6 hours PRN For aPTT between 40 - 57      Vital Signs Last 24 Hrs  T(C): 36.4 (20 Mar 2025 19:23), Max: 36.9 (20 Mar 2025 00:30)  T(F): 97.5 (20 Mar 2025 19:23), Max: 98.4 (20 Mar 2025 00:30)  HR: 80 (20 Mar 2025 19:23) (66 - 84)  BP: 101/61 (20 Mar 2025 19:23) (101/61 - 127/85)  BP(mean): --  RR: 20 (20 Mar 2025 19:23) (18 - 23)  SpO2: 96% (20 Mar 2025 19:23) (96% - 100%)    Parameters below as of 20 Mar 2025 19:23  Patient On (Oxygen Delivery Method): nasal cannula  O2 Flow (L/min): 3      PHYSICAL EXAM  General: adult in NAD  HEENT: clear oropharynx, anicteric sclera, pink conjunctivae  Neck: supple  CV: normal S1S2 with no murmur rubs or gallops  Lungs: clear to auscultation, no wheezes, no rhales  Abdomen: soft non-tender non-distended, no hepato/splenomegaly  Ext: no clubbing cyanosis or edema  Skin: no rashes and no petichiae  Neuro: alert and oriented X3 no focal deficits      LABS:  CBC Full  -  ( 20 Mar 2025 07:46 )  WBC Count : 10.13 K/uL  RBC Count : 4.06 M/uL  Hemoglobin : 8.6 g/dL  Hematocrit : 27.4 %  Platelet Count - Automated : 471 K/uL  Mean Cell Volume : 67.5 fl  Mean Cell Hemoglobin : 21.2 pg  Mean Cell Hemoglobin Concentration : 31.4 g/dL  Auto Neutrophil # : x  Auto Lymphocyte # : x  Auto Monocyte # : x  Auto Eosinophil # : x  Auto Basophil # : x  Auto Neutrophil % : x  Auto Lymphocyte % : x  Auto Monocyte % : x  Auto Eosinophil % : x  Auto Basophil % : x    03-20    138  |  105  |  12  ----------------------------<  111[H]  4.3   |  26  |  1.10    Ca    9.0      20 Mar 2025 07:46  Phos  3.3     03-20  Mg     2.0     03-20    TPro  6.2  /  Alb  2.2[L]  /  TBili  0.4  /  DBili  x   /  AST  81[H]  /  ALT  30  /  AlkPhos  85  03-20    PT/INR - ( 19 Mar 2025 06:05 )   PT: 16.9 sec;   INR: 1.45 ratio         PTT - ( 20 Mar 2025 21:55 )  PTT:49.3 sec    fe studies      WBC trend  10.13 K/uL (03-20-25 @ 07:46)  8.15 K/uL (03-19-25 @ 13:52)  8.96 K/uL (03-19-25 @ 06:05)      Hgb trend  8.6 g/dL (03-20-25 @ 07:46)  7.7 g/dL (03-19-25 @ 13:52)  8.5 g/dL (03-19-25 @ 06:05)      plt trend  471 K/uL (03-20-25 @ 07:46)  551 K/uL (03-19-25 @ 13:52)  568 K/uL (03-19-25 @ 06:05)        RADIOLOGY & ADDITIONAL STUDIES:

## 2025-03-20 NOTE — PROGRESS NOTE ADULT - ASSESSMENT
64M PMH poorly differentiated metastatic (known cervical and axillary lymphadenopathy) GI carcinoma on chemo, HTN, and anemia, recent admission for PLEF and DVT of LUE currently on Eliquis p/w SOB that has worsened over past 2 weeks. Cardiology consulted for SOB 2/2 Likely both NSTEMI and BL pleural effusions.    NSTEMI  - p/w worsening ZHOU, elev trop     - EKG: NSR w/ PACs.   - Troponin elev 71390, peaked at 24,257 now downtrending 43221.5   - IC notified, Dr. Contreras, will continue with medical management for now.  - s/p ASA and Brilinta loaded,  on hep gtt currently on hold pre thora   - Continue ASA 81 mg PO daily  - continue brillinta 90 mg PO BID   - no anginal complaints during my exam   - monitor for new or worsening sx of ischemia    - Dr. Soto had an extensive discussion regarding appropriate management of apparent NSTEMI, given his metastatic malignancy, anemia, his lack of acute ischemic sxs and overall stability at present, will manage his NSTEMI conservatively    - CTA Negative for PE  - TTE (3/06) LVEF: 61% aortic valve sclerosis, trace MR.  - f/u TTE , if there is an reduction in his EF will reconsider cardiac cath   - CTA: Moderate multiloculated LEFT pleural effusion diminished from 3/3/2025 and produces subtotal LLL passive atelectasis. Moderate dependent RIGHT pleural effusion, increased.  Abdominal ascites, developed  - Suspect malignant effusion that has reaccumulated given exudative effusion effusion with malignant cells on prior tap  - Pt required chest tube on last admission and had a L thoracentesis on 3/4 that drained 3.1L of fluid  - cytopathology: positive for malignant cells compatible with the metastatic carcinoma  - pulmonology following plan for repeat Left thoracentesis today with IR     - Patient is at high risk for procedure given NSTEMI, currently managed medically but benefit of procedure outweighs the risk. OK to proceed with the planned procedure.    - would consider cath if heme/onc believes that his cancer prognosis does not warrant a conservative approach, and that the need for mandatory DAPT  etc would not be an obstacle to his oncologic care    - BP, HR stable controlled  - Continue home meds: losartan, labetalol.  - Continue to monitor hemodynamics   - Monitor and replete Lytes. Keep K > 4 and Mg > 2    - may benefit from psych consult, appears depressed/ overwhelm with overall medical issues  - Will continue to follow.    PATRICE RossWEI  Nurse Practitioner - Cardiology   call TEAMS

## 2025-03-20 NOTE — PRE PROCEDURE NOTE - PRE PROCEDURE EVALUATION
Interventional Radiology    HPI: 64y Male with PMH of Poorly differentiated metastatic GI carcinoma (known cervical and axillary lymphadenopathy), HTN, Anemia, DVT (on eliquis), pleural effusions presented to ED for SOB x2 weeks.  Pt required L chest tube on last admission on 3/4 that drained 3.1L of fluid.  Found to have pleural effusions again and pulmonary requesting repeat thoracentesis on left side.  Heparin drip held since 4am.  Per cardiology, "patient is at high risk for procedure given NSTEMI, currently managed medically but benefit of procedure outweighs the risk. OK to proceed with the planned procedure."  Also discussed with Dr. Damico who is in agreement that patient will benefit from thoracentesis.     Allergies: Bactrim DS (Hives)    Medications (Abx/Cardiac/Anticoagulation/Blood Products)  aspirin  chewable: 324 milliGRAM(s) Oral (03-19 @ 07:28)  heparin  Infusion.: 1900 Unit(s)/Hr IV Continuous (03-19 @ 22:08)  labetalol: 100 milliGRAM(s) Oral (03-20 @ 05:03)  losartan: 50 milliGRAM(s) Oral (03-20 @ 05:03)  ticagrelor: 180 milliGRAM(s) Oral (03-19 @ 10:11)  ticagrelor: 90 milliGRAM(s) Oral (03-20 @ 05:03)    Data:    T(C): 36.8  HR: 76  BP: 120/68  RR: 18  SpO2: 98%    Exam  General: No acute distress  Chest: Non labored breathing  Abdomen: Non-distended  Extremities: No swelling, warm    -WBC 10.13 / HgB 8.6 / Hct 27.4 / Plt 471  -Na 138 / Cl 105 / BUN 12 / Glucose 111  -K 4.3 / CO2 26 / Cr 1.10  -ALT 30 / Alk Phos 85 / T.Bili 0.4  -INR1.45    Imaging: reviewed    Plan: 64y Male presents for left thoracentesis  -Risks/Benefits/alternatives explained with the patient and/or healthcare proxy and witnessed informed consent obtained.

## 2025-03-20 NOTE — PROGRESS NOTE ADULT - SUBJECTIVE AND OBJECTIVE BOX
Date/Time Patient Seen:  		  Referring MD:   Data Reviewed	       Patient is a 64y old  Male who presents with a chief complaint of     Subjective/HPI     PAST MEDICAL & SURGICAL HISTORY:  No pertinent past medical history    Incisional hernia  2015    Gastric lymphoma    Anemia of chronic disease    No significant past surgical history    S/P appendectomy  November 17th 2014    S/P cholecystectomy  15 years ago          Medication list         MEDICATIONS  (STANDING):  aspirin enteric coated 81 milliGRAM(s) Oral daily  heparin  Infusion.  Unit(s)/Hr (17 mL/Hr) IV Continuous <Continuous>  influenza   Vaccine 0.5 milliLiter(s) IntraMuscular once  labetalol 100 milliGRAM(s) Oral two times a day  losartan 50 milliGRAM(s) Oral daily  ticagrelor 90 milliGRAM(s) Oral every 12 hours    MEDICATIONS  (PRN):  heparin   Injectable 7500 Unit(s) IV Push every 6 hours PRN For aPTT less than 40  heparin   Injectable 3500 Unit(s) IV Push every 6 hours PRN For aPTT between 40 - 57         Vitals log        ICU Vital Signs Last 24 Hrs  T(C): 36.9 (20 Mar 2025 00:30), Max: 37.2 (19 Mar 2025 22:05)  T(F): 98.4 (20 Mar 2025 00:30), Max: 98.9 (19 Mar 2025 22:05)  HR: 66 (20 Mar 2025 00:30) (66 - 92)  BP: 122/74 (20 Mar 2025 00:30) (100/60 - 162/88)  BP(mean): --  ABP: --  ABP(mean): --  RR: 18 (20 Mar 2025 00:30) (16 - 20)  SpO2: 96% (20 Mar 2025 00:30) (93% - 100%)    O2 Parameters below as of 20 Mar 2025 00:30  Patient On (Oxygen Delivery Method): nasal cannula  O2 Flow (L/min): 4               Input and Output:  I&O's Detail    19 Mar 2025 07:01  -  20 Mar 2025 05:02  --------------------------------------------------------  IN:  Total IN: 0 mL    OUT:    Voided (mL): 600 mL  Total OUT: 600 mL    Total NET: -600 mL          Lab Data                        7.7    8.15  )-----------( 551      ( 19 Mar 2025 13:52 )             24.6     03-19    137  |  106  |  13  ----------------------------<  127[H]  4.2   |  26  |  1.00    Ca    9.2      19 Mar 2025 06:05    TPro  6.4  /  Alb  2.5[L]  /  TBili  0.3  /  DBili  x   /  AST  122[H]  /  ALT  31  /  AlkPhos  84  03-19            Review of Systems	      Objective     Physical Examination    heart s1s2  lung dc BS  head nc  head at      Pertinent Lab findings & Imaging      Jameson:  NO   Adequate UO     I&O's Detail    19 Mar 2025 07:01  -  20 Mar 2025 05:02  --------------------------------------------------------  IN:  Total IN: 0 mL    OUT:    Voided (mL): 600 mL  Total OUT: 600 mL    Total NET: -600 mL               Discussed with:     Cultures:	        Radiology

## 2025-03-20 NOTE — PROGRESS NOTE ADULT - ASSESSMENT
64M with PMH of Poorly differentiated metastatic GI carcinoma (known cervical and axillary lymphadenopathy), HTN, Anemia, DVT (on eliquis), Pleural effusions presented to ED for SOB x2 weeks. Of note, patient was admitted ~2 weeks ago for LUE DVT and L pleural effusion in which pleurex catheter was placed. Vitals wnl. Labs significant for Hgb 8.5 (baseline ~7-8), platelets 568, Troponin 52933 (previously normal). EKG: NSR @ 76 bpm, no acute ST elevations. CTA PE protocol negative for PE. Admitted to medicine for NSTEMI. 64M with PMH of Poorly differentiated metastatic GI carcinoma (known cervical and axillary lymphadenopathy), HTN, Anemia, DVT (on eliquis), Pleural effusions presented to ED for SOB x2 weeks. Of note, patient was admitted ~2 weeks ago for LUE DVT and L pleural effusion in which pleurex catheter was placed. Vitals wnl. Labs significant for Hgb 8.5 (baseline ~7-8), platelets 568, Troponin 62881 (previously normal). EKG: NSR @ 76 bpm, no acute ST elevations. CTA PE protocol negative for PE. Admitted to medicine for NSTEMI.

## 2025-03-20 NOTE — PROGRESS NOTE ADULT - SUBJECTIVE AND OBJECTIVE BOX
Patient is a 64y old  Male who presents with a chief complaint of     Subjective:  INTERVAL HPI/OVERNIGHT EVENTS: Patient seen and examined at bedside. No overnight events occurred. Patient has no complaints at this time. Denies fevers, chills, headache, lightheadedness, chest pain, dyspnea, abdominal pain, n/v/d/c.    MEDICATIONS  (STANDING):  aspirin enteric coated 81 milliGRAM(s) Oral daily  heparin  Infusion.  Unit(s)/Hr (17 mL/Hr) IV Continuous <Continuous>  influenza   Vaccine 0.5 milliLiter(s) IntraMuscular once  labetalol 100 milliGRAM(s) Oral two times a day  losartan 50 milliGRAM(s) Oral daily  ticagrelor 90 milliGRAM(s) Oral every 12 hours    MEDICATIONS  (PRN):  heparin   Injectable 7500 Unit(s) IV Push every 6 hours PRN For aPTT less than 40  heparin   Injectable 3500 Unit(s) IV Push every 6 hours PRN For aPTT between 40 - 57      Allergies    Bactrim DS (Hives)    Intolerances        REVIEW OF SYSTEMS:  CONSTITUTIONAL: No fever or chills  HEENT:  No headache, no sore throat  RESPIRATORY: No cough, wheezing, or shortness of breath  CARDIOVASCULAR: No chest pain, palpitations  GASTROINTESTINAL: No abd pain, nausea, vomiting, or diarrhea  GENITOURINARY: No dysuria, frequency, or hematuria  NEUROLOGICAL: no focal weakness or dizziness  MUSCULOSKELETAL: no myalgias     Objective:  Vital Signs Last 24 Hrs  T(C): 36.8 (20 Mar 2025 05:32), Max: 37.2 (19 Mar 2025 22:05)  T(F): 98.3 (20 Mar 2025 05:32), Max: 98.9 (19 Mar 2025 22:05)  HR: 76 (20 Mar 2025 05:32) (66 - 92)  BP: 120/68 (20 Mar 2025 05:32) (110/64 - 162/88)  BP(mean): --  RR: 18 (20 Mar 2025 05:32) (18 - 20)  SpO2: 98% (20 Mar 2025 05:32) (93% - 100%)    Parameters below as of 20 Mar 2025 05:32  Patient On (Oxygen Delivery Method): nasal cannula  O2 Flow (L/min): 3      GENERAL: NAD, lying in bed comfortably  HEAD:  Atraumatic, Normocephalic  EYES: EOMI, PERRLA, conjunctiva and sclera clear  ENT: Moist mucous membranes  NECK: Supple, No JVD  CHEST/LUNG: Clear to auscultation bilaterally; No rales, rhonchi, wheezing, or rubs. Unlabored respirations  HEART: Regular rate and rhythm; No murmurs, rubs, or gallops  ABDOMEN: Bowel sounds present; Soft, Nontender, Nondistended. No hepatomegaly  EXTREMITIES:  2+ Peripheral Pulses, brisk capillary refill. No clubbing, cyanosis, or edema  NERVOUS SYSTEM:  Alert & Oriented X3, speech clear. No deficits   MSK: FROM all 4 extremities, full and equal strength  SKIN: No rashes or lesions    LABS:                        8.6    10.13 )-----------( 471      ( 20 Mar 2025 07:46 )             27.4     CBC Full  -  ( 20 Mar 2025 07:46 )  WBC Count : 10.13 K/uL  Hemoglobin : 8.6 g/dL  Hematocrit : 27.4 %  Platelet Count - Automated : 471 K/uL  Mean Cell Volume : 67.5 fl  Mean Cell Hemoglobin : 21.2 pg  Mean Cell Hemoglobin Concentration : 31.4 g/dL  Auto Neutrophil # : x  Auto Lymphocyte # : x  Auto Monocyte # : x  Auto Eosinophil # : x  Auto Basophil # : x  Auto Neutrophil % : x  Auto Lymphocyte % : x  Auto Monocyte % : x  Auto Eosinophil % : x  Auto Basophil % : x    20 Mar 2025 07:46    138    |  105    |  12     ----------------------------<  111    4.3     |  26     |  1.10     Ca    9.0        20 Mar 2025 07:46  Phos  3.3       20 Mar 2025 07:46  Mg     2.0       20 Mar 2025 07:46    TPro  6.2    /  Alb  2.2    /  TBili  0.4    /  DBili  x      /  AST  81     /  ALT  30     /  AlkPhos  85     20 Mar 2025 07:46    PT/INR - ( 19 Mar 2025 06:05 )   PT: 16.9 sec;   INR: 1.45 ratio         PTT - ( 19 Mar 2025 23:42 )  PTT:69.5 sec  Urinalysis Basic - ( 20 Mar 2025 07:46 )    Color: x / Appearance: x / SG: x / pH: x  Gluc: 111 mg/dL / Ketone: x  / Bili: x / Urobili: x   Blood: x / Protein: x / Nitrite: x   Leuk Esterase: x / RBC: x / WBC x   Sq Epi: x / Non Sq Epi: x / Bacteria: x      CAPILLARY BLOOD GLUCOSE              RADIOLOGY & ADDITIONAL TESTS:    Personally reviewed.     Consultant(s) Notes Reviewed:  [x] YES  [ ] NO     ***CHARTING IN PROGRESS***  Patient is a 64y old  Male who presents with a chief complaint of     Subjective:  INTERVAL HPI/OVERNIGHT EVENTS: Patient seen and examined at bedside. No overnight events occurred. Patient has no complaints at this time. Denies fevers, chills, headache, lightheadedness, chest pain, dyspnea, abdominal pain, n/v/d/c.    MEDICATIONS  (STANDING):  aspirin enteric coated 81 milliGRAM(s) Oral daily  heparin  Infusion.  Unit(s)/Hr (17 mL/Hr) IV Continuous <Continuous>  influenza   Vaccine 0.5 milliLiter(s) IntraMuscular once  labetalol 100 milliGRAM(s) Oral two times a day  losartan 50 milliGRAM(s) Oral daily  ticagrelor 90 milliGRAM(s) Oral every 12 hours    MEDICATIONS  (PRN):  heparin   Injectable 7500 Unit(s) IV Push every 6 hours PRN For aPTT less than 40  heparin   Injectable 3500 Unit(s) IV Push every 6 hours PRN For aPTT between 40 - 57      Allergies    Bactrim DS (Hives)    Intolerances        REVIEW OF SYSTEMS:  CONSTITUTIONAL: No fever or chills  HEENT:  No headache, no sore throat  RESPIRATORY: No cough, wheezing, or shortness of breath  CARDIOVASCULAR: No chest pain, palpitations  GASTROINTESTINAL: No abd pain, nausea, vomiting, or diarrhea  GENITOURINARY: No dysuria, frequency, or hematuria  NEUROLOGICAL: no focal weakness or dizziness  MUSCULOSKELETAL: no myalgias     Objective:  Vital Signs Last 24 Hrs  T(C): 36.8 (20 Mar 2025 05:32), Max: 37.2 (19 Mar 2025 22:05)  T(F): 98.3 (20 Mar 2025 05:32), Max: 98.9 (19 Mar 2025 22:05)  HR: 76 (20 Mar 2025 05:32) (66 - 92)  BP: 120/68 (20 Mar 2025 05:32) (110/64 - 162/88)  BP(mean): --  RR: 18 (20 Mar 2025 05:32) (18 - 20)  SpO2: 98% (20 Mar 2025 05:32) (93% - 100%)    Parameters below as of 20 Mar 2025 05:32  Patient On (Oxygen Delivery Method): nasal cannula  O2 Flow (L/min): 3      GENERAL: NAD, lying in bed comfortably  HEAD:  Atraumatic, Normocephalic  EYES: EOMI, PERRLA, conjunctiva and sclera clear  ENT: Moist mucous membranes  NECK: Supple, No JVD  CHEST/LUNG: Clear to auscultation bilaterally; No rales, rhonchi, wheezing, or rubs. Unlabored respirations  HEART: Regular rate and rhythm; No murmurs, rubs, or gallops  ABDOMEN: Bowel sounds present; Soft, Nontender, Nondistended. No hepatomegaly  EXTREMITIES:  2+ Peripheral Pulses, brisk capillary refill. No clubbing, cyanosis, or edema  NERVOUS SYSTEM:  Alert & Oriented X3, speech clear. No deficits   MSK: FROM all 4 extremities, full and equal strength  SKIN: No rashes or lesions    LABS:                        8.6    10.13 )-----------( 471      ( 20 Mar 2025 07:46 )             27.4     CBC Full  -  ( 20 Mar 2025 07:46 )  WBC Count : 10.13 K/uL  Hemoglobin : 8.6 g/dL  Hematocrit : 27.4 %  Platelet Count - Automated : 471 K/uL  Mean Cell Volume : 67.5 fl  Mean Cell Hemoglobin : 21.2 pg  Mean Cell Hemoglobin Concentration : 31.4 g/dL  Auto Neutrophil # : x  Auto Lymphocyte # : x  Auto Monocyte # : x  Auto Eosinophil # : x  Auto Basophil # : x  Auto Neutrophil % : x  Auto Lymphocyte % : x  Auto Monocyte % : x  Auto Eosinophil % : x  Auto Basophil % : x    20 Mar 2025 07:46    138    |  105    |  12     ----------------------------<  111    4.3     |  26     |  1.10     Ca    9.0        20 Mar 2025 07:46  Phos  3.3       20 Mar 2025 07:46  Mg     2.0       20 Mar 2025 07:46    TPro  6.2    /  Alb  2.2    /  TBili  0.4    /  DBili  x      /  AST  81     /  ALT  30     /  AlkPhos  85     20 Mar 2025 07:46    PT/INR - ( 19 Mar 2025 06:05 )   PT: 16.9 sec;   INR: 1.45 ratio         PTT - ( 19 Mar 2025 23:42 )  PTT:69.5 sec  Urinalysis Basic - ( 20 Mar 2025 07:46 )    Color: x / Appearance: x / SG: x / pH: x  Gluc: 111 mg/dL / Ketone: x  / Bili: x / Urobili: x   Blood: x / Protein: x / Nitrite: x   Leuk Esterase: x / RBC: x / WBC x   Sq Epi: x / Non Sq Epi: x / Bacteria: x      CAPILLARY BLOOD GLUCOSE              RADIOLOGY & ADDITIONAL TESTS:    Personally reviewed.     Consultant(s) Notes Reviewed:  [x] YES  [ ] NO     ***CHARTING IN PROGRESS***  Patient is a 64y old  Male who presents with a chief complaint of     Subjective:  INTERVAL HPI/OVERNIGHT EVENTS: Patient seen and examined at bedside. No overnight events occurred. Patient is in no acute distress, c/o shortness of breath and increased effort of breathing. Denies fevers, chills, headache, lightheadedness, fatigue, chest pain, abdominal pain, n/v/d/c. Dyspnea is seen on minimal exertion and at rest.    MEDICATIONS  (STANDING):  aspirin enteric coated 81 milliGRAM(s) Oral daily  heparin  Infusion.  Unit(s)/Hr (17 mL/Hr) IV Continuous <Continuous>  influenza   Vaccine 0.5 milliLiter(s) IntraMuscular once  labetalol 100 milliGRAM(s) Oral two times a day  losartan 50 milliGRAM(s) Oral daily  ticagrelor 90 milliGRAM(s) Oral every 12 hours    MEDICATIONS  (PRN):  heparin   Injectable 7500 Unit(s) IV Push every 6 hours PRN For aPTT less than 40  heparin   Injectable 3500 Unit(s) IV Push every 6 hours PRN For aPTT between 40 - 57      Allergies    Bactrim DS (Hives)    Intolerances        REVIEW OF SYSTEMS:  CONSTITUTIONAL: No fever/chills or fatigue   HEENT:  No headache, no sore throat  RESPIRATORY: No wheezing. Endorses SOB and cough  CARDIOVASCULAR: No chest pain, palpitations  GASTROINTESTINAL: No abd pain, nausea, vomiting, or diarrhea  GENITOURINARY: No dysuria, frequency, or hematuria  NEUROLOGICAL: no focal weakness, dizziness, numbness or paresthesia  MUSCULOSKELETAL: no myalgias. Endorses LUE swelling    Objective:  Vital Signs Last 24 Hrs  T(C): 36.8 (20 Mar 2025 05:32), Max: 37.2 (19 Mar 2025 22:05)  T(F): 98.3 (20 Mar 2025 05:32), Max: 98.9 (19 Mar 2025 22:05)  HR: 76 (20 Mar 2025 05:32) (66 - 92)  BP: 120/68 (20 Mar 2025 05:32) (110/64 - 162/88)  BP(mean): --  RR: 18 (20 Mar 2025 05:32) (18 - 20)  SpO2: 98% (20 Mar 2025 05:32) (93% - 100%)    Parameters below as of 20 Mar 2025 05:32  Patient On (Oxygen Delivery Method): nasal cannula  O2 Flow (L/min): 5      GENERAL: NAD, lying in bed comfortably  HEAD:  Atraumatic, Normocephalic  EYES: EOMI, PERRLA, conjunctiva and sclera clear  ENT: Moist mucous membranes  NECK: Supple, No JVD  CHEST/LUNG: Decreased breath sounds, No rales, rhonchi, wheezing, or rubs. Unlabored respirations, no difficulty speaking between breaths.   HEART: Regular rate and rhythm; No murmurs, rubs, or gallops  ABDOMEN: Rigid, distended, nontender,   EXTREMITIES:  2+ Peripheral Pulses, brisk capillary refill. No clubbing, cyanosis. Significant diffuse swelling in the LUE.  NERVOUS SYSTEM:  Alert & Oriented X3, speech clear. No deficits   MSK: FROM all 4 extremities, full and equal strength  SKIN: No rashes or lesions    LABS:                        8.6    10.13 )-----------( 471      ( 20 Mar 2025 07:46 )             27.4     CBC Full  -  ( 20 Mar 2025 07:46 )  WBC Count : 10.13 K/uL  Hemoglobin : 8.6 g/dL  Hematocrit : 27.4 %  Platelet Count - Automated : 471 K/uL  Mean Cell Volume : 67.5 fl  Mean Cell Hemoglobin : 21.2 pg  Mean Cell Hemoglobin Concentration : 31.4 g/dL  Auto Neutrophil # : x  Auto Lymphocyte # : x  Auto Monocyte # : x  Auto Eosinophil # : x  Auto Basophil # : x  Auto Neutrophil % : x  Auto Lymphocyte % : x  Auto Monocyte % : x  Auto Eosinophil % : x  Auto Basophil % : x    20 Mar 2025 07:46    138    |  105    |  12     ----------------------------<  111    4.3     |  26     |  1.10     Ca    9.0        20 Mar 2025 07:46  Phos  3.3       20 Mar 2025 07:46  Mg     2.0       20 Mar 2025 07:46    TPro  6.2    /  Alb  2.2    /  TBili  0.4    /  DBili  x      /  AST  81     /  ALT  30     /  AlkPhos  85     20 Mar 2025 07:46    PT/INR - ( 19 Mar 2025 06:05 )   PT: 16.9 sec;   INR: 1.45 ratio         PTT - ( 19 Mar 2025 23:42 )  PTT:69.5 sec  Urinalysis Basic - ( 20 Mar 2025 07:46 )    Color: x / Appearance: x / SG: x / pH: x  Gluc: 111 mg/dL / Ketone: x  / Bili: x / Urobili: x   Blood: x / Protein: x / Nitrite: x   Leuk Esterase: x / RBC: x / WBC x   Sq Epi: x / Non Sq Epi: x / Bacteria: x      CAPILLARY BLOOD GLUCOSE              RADIOLOGY & ADDITIONAL TESTS:    Personally reviewed.     Consultant(s) Notes Reviewed:  [x] YES  [ ] NO     Patient is a 64y old  Male who presents with a chief complaint of SOB    Subjective:  INTERVAL HPI/OVERNIGHT EVENTS: Patient seen and examined at bedside. No overnight events occurred. Pt c/o shortness of breath and increased effort of breathing, NC O2 increased from 3L to 5L. Endorses dyspnea on minimal exertion and at rest. Denies fevers, chills, headache, lightheadedness, fatigue, chest pain, abdominal pain, n/v/d/c. Pt for L thora by IR later today.     TELE: SR 87. Range 60 - 90.     MEDICATIONS  (STANDING):  aspirin enteric coated 81 milliGRAM(s) Oral daily  heparin  Infusion.  Unit(s)/Hr (17 mL/Hr) IV Continuous <Continuous>  influenza   Vaccine 0.5 milliLiter(s) IntraMuscular once  labetalol 100 milliGRAM(s) Oral two times a day  losartan 50 milliGRAM(s) Oral daily  ticagrelor 90 milliGRAM(s) Oral every 12 hours    MEDICATIONS  (PRN):  heparin   Injectable 7500 Unit(s) IV Push every 6 hours PRN For aPTT less than 40  heparin   Injectable 3500 Unit(s) IV Push every 6 hours PRN For aPTT between 40 - 57      Allergies    Bactrim DS (Hives)    Intolerances        REVIEW OF SYSTEMS:  CONSTITUTIONAL: No fever/chills or fatigue   HEENT:  No headache, no sore throat  RESPIRATORY: Endorses SOB and cough. No wheezing.   CARDIOVASCULAR: No chest pain, palpitations  GASTROINTESTINAL: No abd pain, nausea, vomiting, or diarrhea  GENITOURINARY: No dysuria, frequency, or hematuria  NEUROLOGICAL: no focal weakness, dizziness, numbness or paresthesia  MUSCULOSKELETAL: no myalgias. Endorses LUE swelling    Objective:  Vital Signs Last 24 Hrs  T(C): 36.8 (20 Mar 2025 05:32), Max: 37.2 (19 Mar 2025 22:05)  T(F): 98.3 (20 Mar 2025 05:32), Max: 98.9 (19 Mar 2025 22:05)  HR: 76 (20 Mar 2025 05:32) (66 - 92)  BP: 120/68 (20 Mar 2025 05:32) (110/64 - 162/88)  BP(mean): --  RR: 18 (20 Mar 2025 05:32) (18 - 20)  SpO2: 98% (20 Mar 2025 05:32) (93% - 100%)    Parameters below as of 20 Mar 2025 05:32  Patient On (Oxygen Delivery Method): nasal cannula  O2 Flow (L/min): 5      GENERAL: NAD, lying in bed comfortably  HEAD:  Atraumatic, Normocephalic  EYES: EOMI, PERRLA, conjunctiva and sclera clear  ENT: Moist mucous membranes  NECK: Supple, No JVD  CHEST/LUNG: Decreased breath sounds L>R. No rales, rhonchi, wheezing, or rubs. Supplemental O2 @5L.   HEART: RRR; No murmurs, rubs, or gallops  ABDOMEN: tense, distended, nontender,   EXTREMITIES:  1-2+ edema LUE. 1+ edema BLE. Palpable peripheral pulses, brisk capillary refill. No clubbing, cyanosis.   NERVOUS SYSTEM:  Alert & Oriented X3, speech clear. No deficits   MSK: FROM all 4 extremities, full and equal strength  SKIN: No rashes or lesions    LABS:                        8.6    10.13 )-----------( 471      ( 20 Mar 2025 07:46 )             27.4     CBC Full  -  ( 20 Mar 2025 07:46 )  WBC Count : 10.13 K/uL  Hemoglobin : 8.6 g/dL  Hematocrit : 27.4 %  Platelet Count - Automated : 471 K/uL  Mean Cell Volume : 67.5 fl  Mean Cell Hemoglobin : 21.2 pg  Mean Cell Hemoglobin Concentration : 31.4 g/dL  Auto Neutrophil # : x  Auto Lymphocyte # : x  Auto Monocyte # : x  Auto Eosinophil # : x  Auto Basophil # : x  Auto Neutrophil % : x  Auto Lymphocyte % : x  Auto Monocyte % : x  Auto Eosinophil % : x  Auto Basophil % : x    20 Mar 2025 07:46    138    |  105    |  12     ----------------------------<  111    4.3     |  26     |  1.10     Ca    9.0        20 Mar 2025 07:46  Phos  3.3       20 Mar 2025 07:46  Mg     2.0       20 Mar 2025 07:46    TPro  6.2    /  Alb  2.2    /  TBili  0.4    /  DBili  x      /  AST  81     /  ALT  30     /  AlkPhos  85     20 Mar 2025 07:46    PT/INR - ( 19 Mar 2025 06:05 )   PT: 16.9 sec;   INR: 1.45 ratio         PTT - ( 19 Mar 2025 23:42 )  PTT:69.5 sec  Urinalysis Basic - ( 20 Mar 2025 07:46 )    Color: x / Appearance: x / SG: x / pH: x  Gluc: 111 mg/dL / Ketone: x  / Bili: x / Urobili: x   Blood: x / Protein: x / Nitrite: x   Leuk Esterase: x / RBC: x / WBC x   Sq Epi: x / Non Sq Epi: x / Bacteria: x      CAPILLARY BLOOD GLUCOSE              RADIOLOGY & ADDITIONAL TESTS:    Personally reviewed.     Consultant(s) Notes Reviewed:  [x] YES  [ ] NO

## 2025-03-20 NOTE — PROGRESS NOTE ADULT - PROBLEM SELECTOR PLAN 3
-CTA Chest PE Protocol:: Moderate multiloculated LEFT pleural effusion diminished from 3/3/2025 and produces subtotal LLL passive atelectasis. Moderate dependent RIGHT pleural effusion, increased.  -Likely malignant in nature  -Patient currently saturating well on room air  -Plan for thoracentesis 3/20 at 10AM, hold heparin ggt 4-6 hours prior  -Patient was admitted ~2 weeks ago for LUE DVT and L pleural effusion in which pleurex catheter was placed -CTA Chest PE Protocol:: Moderate multiloculated LEFT pleural effusion diminished from 3/3/2025 and produces subtotal LLL passive atelectasis. Moderate dependent RIGHT pleural effusion, increased.  -Likely malignant in nature  -Patient currently saturating well on room air  -Plan for thoracentesis 3/20 at 10AM, hold heparin ggt 4-6 hours prior  -Patient was admitted ~2 weeks ago for LUE DVT and L pleural effusion in which pleurex catheter was placed  -High risk patient in setting of NSTEMI, medically managed, now off hepain gtt since 4am. However benefit of thoracentesis outweigh risks. RCRI score is 1. Patient is currently hemodynamically stable. Patient is medically optimized at this time. Routine hemodynamic monitoring is suggested. -CTA Chest PE Protocol:: Moderate multiloculated LEFT pleural effusion diminished from 3/3/2025 and produces subtotal LLL passive atelectasis. Moderate dependent RIGHT pleural effusion, increased.  -Likely malignant in nature  -Patient currently saturating well on room air  -Supplemental O2 via NC @ 5 L   -Plan for thoracentesis 3/20 at 10AM, hold heparin ggt 4-6 hours prior  -Patient was admitted ~2 weeks ago for LUE DVT and L pleural effusion in which pleurex catheter was placed  -High risk patient in setting of NSTEMI, medically managed, now off hepain gtt since 4am. However benefit of thoracentesis outweigh risks. RCRI score is 1. Patient is currently hemodynamically stable. Patient is medically optimized at this time. Routine hemodynamic monitoring is suggested. - CTA Chest PE Protocol: Moderate multiloculated LEFT pleural effusion diminished from 3/3/2025 and produces subtotal LLL passive atelectasis. Moderate dependent RIGHT pleural effusion, increased.  - Likely malignant in nature  - Patient on supplemental O2 via NC @ 5 L   - Plan for thoracentesis 3/20 at 10AM, hold heparin gtt 4-6 hours prior  - Patient was admitted ~2 weeks ago for LUE DVT and L pleural effusion in which pleurex catheter was placed  - High risk patient in setting of NSTEMI, medically managed, now off hepain gtt since 4am. However benefit of thoracentesis outweigh risks. RCRI score is 1. Patient is currently hemodynamically stable. Patient is medically optimized at this time. Routine hemodynamic monitoring is suggested.   - To resume heparin gtt after IR prisca

## 2025-03-20 NOTE — PROGRESS NOTE ADULT - PROBLEM SELECTOR PLAN 2
-CTA Chest PE Protocol: Mediastinal lymphadenopathy is not significantly changed from 3/3/2025.  While differences in arm position limits interval axillary assessment, LEFT axillary lymphadenopathy (now w/ surrounding fat stranding) appears progressed from 3/3/2025. Punctate T8, T10 and T11 (sagittal 602/68, 67, 75) sclerotic foci, developed from 12/1/2024, must be viewed with suspicion. Abdominal ascites, developed  -As per Heme/Onc note from 3/6, patient under care Dr. [facility], on FOLFOX chemo [id number], Dx'd 11/2024  -Heme/Onc consult placed -CTA Chest PE Protocol: Mediastinal lymphadenopathy is not significantly changed from 3/3/2025.  While differences in arm position limits interval axillary assessment, LEFT axillary lymphadenopathy (now w/ surrounding fat stranding) appears progressed from 3/3/2025. Punctate T8, T10 and T11 (sagittal 602/68, 67, 75) sclerotic foci, developed from 12/1/2024, must be viewed with suspicion. Abdominal ascites, developed  -Abdominal U/S to assess for ascites  -As per Heme/Onc note from 3/6, patient under care Dr. [facility], on FOLFOX chemo [id number], Dx'd 11/2024  -Heme/Onc consulted, recs appreciated Known for poorly diff met gastric ca  - CTA Chest PE Protocol: Mediastinal LAD not significantly changed from 3/3/2025. Lt axillary LAD progressed from 3/3/2025. Suspicion punctate T8, T10 and T11 sclerotic foci, developed from 12/1/2024. Abdominal ascites.   -Abdominal U/S to assess for ascites  -As per Heme/Onc note from 3/6, patient under care Dr. [facility], on FOLFOX chemo [id number]   - per pt, received his last chemo on Tuesday 3/18/25   - per pt, due for his PET scan later in 3/2025  - Heme/Onc consulted, recs appreciated

## 2025-03-20 NOTE — PROGRESS NOTE ADULT - PROBLEM SELECTOR PLAN 4
-C/w heparin ggt  -Hold home eliquis -C/w heparin ggt following completion of thoracentesis  -Hold home eliquis - C/W heparin gtt following completion of thoracentesis  - Hold home eliquis   - can resume eliquis after d/c home  - Heme/onc following

## 2025-03-20 NOTE — PROGRESS NOTE ADULT - PROBLEM SELECTOR PLAN 5
-Last Hgb 7.7, ordered for 1 unit pRBC  -Maintain Hgb >8 in setting of ACS -Hgb 7.7 on 3/19, s/p 1 unit pRBC  -Repeat H&H: 8.6 & 27.4  -Maintain Hgb >8 in setting of ACS

## 2025-03-20 NOTE — PROCEDURE NOTE - ADDITIONAL PROCEDURE DETAILS
560cc of cloudy serosanguineous pleuritic fluid removed from left thorax under sterile conditions and ultrasound guidance.  Post procedure CXR was ordered. 560cc of cloudy serosanguineous pleuritic fluid removed from left thorax under sterile conditions and ultrasound guidance.  Post procedure CXR was ordered.    Of note, loculations noted on ultrasound and discussed with Dr. Damico.  Likely unable to fully drain secondary to the loculations.

## 2025-03-20 NOTE — PROGRESS NOTE ADULT - ASSESSMENT
64M with PMH of Poorly differentiated metastatic GI carcinoma (known cervical and axillary lymphadenopathy), HTN, Anemia, DVT (on eliquis), Pleural effusions presented to ED for SOB x2 weeks. SOB  worse with exertion    pleural eff  mets ca  weakness  anemia  ascites  dyspnea  htn  dvt    ct neg for pe - eff - atelectasis - evidence of mets disease  onc eval  cardio eval  trend trops  ACS eval  I moustapha  plan for rpt thoracentesis on left side - will need to HOLD Hep - 4 - 6 hrs prior to procedure - plan for 3 20 25 - with IR  fio2 support as needed - goal sat > 88 pct  cvs rx regimen and BP control  tele monitoring  HD support and monitoring

## 2025-03-20 NOTE — PROGRESS NOTE ADULT - ASSESSMENT
[ASSESSMENT and  PLAN]  C16.7     Poorly differentiated gastric cancer  C77.1     Lymph node metastasis  R18.0     Malignant ascites  J91.0     Malignant pleural effusion  D63.8    Anemia due to chronic disease  D50.0    Iron deficiency anemia  D75.83  Reactive thrombocytosis  I21.4      NSTEMI    63 yo man w met poorly diff gastric ca (neck/axillary/abdomen LN mets, under care Dr [facility], on FOLFOX chemo t2hldfd, Dx'd 11/2024, adm [facility] 12/2024 w MARYSOL req temporary HD, has planned f/u    Patient now readmitted for dyspnea.  Found to have worse right effusion, NSTEMI    Repeat CT scan with moderate dependent layering right pleural effusion increased from previous.  Moderate multiloculated left pleural effusion with pleural pseudotumor, diminished from previous and produces sup total LLL passive atelectasis.  IAN interlobular septal thickening.  Diffuse mediastinal LN not changed from 3/3/25.  No pulmonary thromboembolism.  Abdominal ascites.    Early Mar 2025 Admission   LUE DVT LSCV, GARCIA, LAV, LBV, LSBV  Large L effusion s/p thora, with malig cells.   Pt had left chest/neck HD catheter removed Feb 2025, reports procedure had some difficulty, noted left neck sl swelling 2 days ago and then left arm swelling x past week  Early Mar 2025 Came to ER for the increased left arm swelling, also w increased SOB from baseline  Planned chemo 1wk PTA held for low Hgb 7, instead had IV iron (told needs Hgb >8 for chemo)  Reported cancer has been responding to chemo  Duplex sig for extensive LUE DVT subclavian, IM, axillary, brachial veins and basilic superficial v thrombosis. Ulnar vein not visualized  CTA c, a/p-compared w 12/1/24 larage left effusion, increased since last image, trace right effusion. No PE. Mediastinal LADs up to 2.8x1.2cm, inflamm changes surrounding left subclavian vein. Andreas renal cysts and subcentimeter hypodensities, stabble andreas mild-mod hydronephrosis. small ascites. Mesenteric/upper abdomen LADs and mild pelvic LADs to 1.3cm.  Right chest infusoport in place  Started on IV heparin for LUE DVT. ==>ELiquis  s/p eval Pulm, s/p L pigtail cath placement 03/04/25  CBC w microcytic anemia, on adm had 7.8 mcv 65.6. s/p IV iron outpt.   LUE extensive DVT and supericial thrombosis-suspect assoc w the previous HD catheter, w baseline hyper coagulable state due to met gastric ca.   Did have difficulty in removing the catheter 2/5wks ago and subsequently w progressive left neck adn arm symptoms        RECOMMENDATIONS    s/p thoracentesis    Follow CBC and transfuse as indicated      CAD/NSTEMI  Cardiology Evaluation appreciated.  On aspirin  On heparin drip  On labetalol and losartan      met gastric ca w local and distal LN involvements  to continue Heme/Onc followup w own doctor Dr Mckeon upon discharge  no further onc evaluation required at present  please call if additional evaluation required  will followup with outside oncologist on discharge    discussed w pt and son

## 2025-03-20 NOTE — PROGRESS NOTE ADULT - PROBLEM SELECTOR PLAN 1
-EKG: NSR @ 76 bpm, no acute ST elevations  -Troponin 18,749, trend to peak  -Heparin ggt, aspirin and ticagrelor load given  -Continue DAPT  -Dr. Contreras contacted by ED who recommended no acute intervention  -Cardiology consulted, appreciate recs  -TTE 03/06: LVEF: 61% aortic valve sclerosis. trace MR -EKG: NSR @ 76 bpm, no acute ST elevations  -Troponin 18,749 on admission, [place] [address] downtrending on last lab  -Heparin ggt, aspirin and ticagrelor load given  -Continue DAPT  -Dr. Contreras contacted by ED who recommended no acute intervention  -Cardiology consulted, appreciate recs  -TTE 03/06: LVEF: 61% aortic valve sclerosis. trace MR - EKG: NSR @ 76 bpm, no acute ST elevations  - Elevated troponin on admission. Peaked to ~24k and downtrended   -TTE 03/06: LVEF: 61% aortic valve sclerosis. trace MR   - Heparin gtt, and aspirin / ticagrelor load given  - Continue DAPT  - hep gtt transiently held for L thora. Resume after L thora  - Interventional cardio (Dr. Contreras) recs no acute intervention   -Cardiology consulted, appreciate recs

## 2025-03-20 NOTE — PROGRESS NOTE ADULT - ATTENDING COMMENTS
64M with PMH of Poorly differentiated metastatic GI carcinoma (known cervical and axillary lymphadenopathy), HTN, Anemia, DVT (on eliquis), Pleural effusions presented to ED for SOB x2 weeks. Of note, patient was admitted ~2 weeks ago for LUE DVT and L pleural effusion in which pleurex catheter was placed. Vitals wnl. Labs significant for Hgb 8.5 (baseline ~7-8), platelets 568, Troponin 63465 (previously normal). EKG: NSR @ 76 bpm, no acute ST elevations. CTA PE protocol negative for PE. Admitted to medicine for NSTEMI. Cardio Dr Mccall following, recs appreciated. Continue heparin gtt, ASA, Ticagrelor. Also found to have acute hypoxic respiratory failure 2/2 pleural effusion. Plan for L thoracentesis today - will hold heparin gtt prior. Anemia of chronic disease - Maintain Hgb >8 in setting of ACS. 64M with PMH of Poorly differentiated metastatic GI carcinoma (known cervical and axillary lymphadenopathy), HTN, Anemia, DVT (on eliquis), Pleural effusions presented to ED for SOB x2 weeks. Of note, patient was admitted ~2 weeks ago for LUE DVT and L pleural effusion in which pleurex catheter was placed. Vitals wnl. Labs significant for Hgb 8.5 (baseline ~7-8), platelets 568, Troponin 85938 (previously normal). EKG: NSR @ 76 bpm, no acute ST elevations. CTA PE protocol negative for PE. Admitted to medicine for NSTEMI. Check TTE. Cardio Dr Mccall following, recs appreciated. Continue heparin gtt, ASA, Ticagrelor. Continue home meds: losartan, labetalol. Also found to have acute hypoxic respiratory failure 2/2 pleural effusion. Plan for L thoracentesis today - will hold heparin gtt prior. Anemia of chronic disease - Maintain Hgb >8 in setting of ACS.

## 2025-03-21 LAB
ALBUMIN SERPL ELPH-MCNC: 2.1 G/DL — LOW (ref 3.3–5)
ALP SERPL-CCNC: 78 U/L — SIGNIFICANT CHANGE UP (ref 40–120)
ALT FLD-CCNC: 23 U/L — SIGNIFICANT CHANGE UP (ref 12–78)
ANION GAP SERPL CALC-SCNC: 8 MMOL/L — SIGNIFICANT CHANGE UP (ref 5–17)
APTT BLD: 48.4 SEC — HIGH (ref 24.5–35.6)
AST SERPL-CCNC: 44 U/L — HIGH (ref 15–37)
BASOPHILS # BLD AUTO: 0.01 K/UL — SIGNIFICANT CHANGE UP (ref 0–0.2)
BASOPHILS NFR BLD AUTO: 0.1 % — SIGNIFICANT CHANGE UP (ref 0–2)
BILIRUB SERPL-MCNC: 0.3 MG/DL — SIGNIFICANT CHANGE UP (ref 0.2–1.2)
BUN SERPL-MCNC: 15 MG/DL — SIGNIFICANT CHANGE UP (ref 7–23)
CALCIUM SERPL-MCNC: 8.7 MG/DL — SIGNIFICANT CHANGE UP (ref 8.5–10.1)
CHLORIDE SERPL-SCNC: 105 MMOL/L — SIGNIFICANT CHANGE UP (ref 96–108)
CO2 SERPL-SCNC: 24 MMOL/L — SIGNIFICANT CHANGE UP (ref 22–31)
CREAT SERPL-MCNC: 0.97 MG/DL — SIGNIFICANT CHANGE UP (ref 0.5–1.3)
EGFR: 87 ML/MIN/1.73M2 — SIGNIFICANT CHANGE UP
EGFR: 87 ML/MIN/1.73M2 — SIGNIFICANT CHANGE UP
EOSINOPHIL # BLD AUTO: 0.02 K/UL — SIGNIFICANT CHANGE UP (ref 0–0.5)
EOSINOPHIL NFR BLD AUTO: 0.3 % — SIGNIFICANT CHANGE UP (ref 0–6)
GLUCOSE SERPL-MCNC: 102 MG/DL — HIGH (ref 70–99)
HCT VFR BLD CALC: 27 % — LOW (ref 39–50)
HEMOGLOBIN INTERPRETATION: SIGNIFICANT CHANGE UP
HGB A MFR BLD: 96.2 % — SIGNIFICANT CHANGE UP (ref 95.8–98)
HGB A2 MFR BLD: 3.8 % — HIGH (ref 2–3.2)
HGB BLD-MCNC: 8.6 G/DL — LOW (ref 13–17)
IMM GRANULOCYTES NFR BLD AUTO: 0.5 % — SIGNIFICANT CHANGE UP (ref 0–0.9)
LYMPHOCYTES # BLD AUTO: 0.47 K/UL — LOW (ref 1–3.3)
LYMPHOCYTES # BLD AUTO: 6.4 % — LOW (ref 13–44)
MAGNESIUM SERPL-MCNC: 1.9 MG/DL — SIGNIFICANT CHANGE UP (ref 1.6–2.6)
MCHC RBC-ENTMCNC: 21.3 PG — LOW (ref 27–34)
MCHC RBC-ENTMCNC: 31.9 G/DL — LOW (ref 32–36)
MCV RBC AUTO: 67 FL — LOW (ref 80–100)
MONOCYTES # BLD AUTO: 1.03 K/UL — HIGH (ref 0–0.9)
MONOCYTES NFR BLD AUTO: 14 % — SIGNIFICANT CHANGE UP (ref 2–14)
NEUTROPHILS # BLD AUTO: 5.81 K/UL — SIGNIFICANT CHANGE UP (ref 1.8–7.4)
NEUTROPHILS NFR BLD AUTO: 78.7 % — HIGH (ref 43–77)
NRBC BLD AUTO-RTO: 0 /100 WBCS — SIGNIFICANT CHANGE UP (ref 0–0)
OB PNL STL: NEGATIVE — SIGNIFICANT CHANGE UP
PHOSPHATE SERPL-MCNC: 3.3 MG/DL — SIGNIFICANT CHANGE UP (ref 2.5–4.5)
PLATELET # BLD AUTO: 465 K/UL — HIGH (ref 150–400)
POTASSIUM SERPL-MCNC: 4.2 MMOL/L — SIGNIFICANT CHANGE UP (ref 3.5–5.3)
POTASSIUM SERPL-SCNC: 4.2 MMOL/L — SIGNIFICANT CHANGE UP (ref 3.5–5.3)
PROT SERPL-MCNC: 6.3 G/DL — SIGNIFICANT CHANGE UP (ref 6–8.3)
RBC # BLD: 4.03 M/UL — LOW (ref 4.2–5.8)
RBC # FLD: 23.2 % — HIGH (ref 10.3–14.5)
SODIUM SERPL-SCNC: 137 MMOL/L — SIGNIFICANT CHANGE UP (ref 135–145)
WBC # BLD: 7.38 K/UL — SIGNIFICANT CHANGE UP (ref 3.8–10.5)
WBC # FLD AUTO: 7.38 K/UL — SIGNIFICANT CHANGE UP (ref 3.8–10.5)

## 2025-03-21 PROCEDURE — 99233 SBSQ HOSP IP/OBS HIGH 50: CPT | Mod: GC

## 2025-03-21 PROCEDURE — 99233 SBSQ HOSP IP/OBS HIGH 50: CPT

## 2025-03-21 RX ORDER — BUMETANIDE 1 MG/1
1 TABLET ORAL
Refills: 0 | Status: COMPLETED | OUTPATIENT
Start: 2025-03-21 | End: 2025-03-22

## 2025-03-21 RX ORDER — APIXABAN 2.5 MG/1
5 TABLET, FILM COATED ORAL
Refills: 0 | Status: DISCONTINUED | OUTPATIENT
Start: 2025-03-21 | End: 2025-03-23

## 2025-03-21 RX ORDER — BUMETANIDE 1 MG/1
1 TABLET ORAL ONCE
Refills: 0 | Status: DISCONTINUED | OUTPATIENT
Start: 2025-03-21 | End: 2025-03-21

## 2025-03-21 RX ORDER — ACETAMINOPHEN 500 MG/5ML
1000 LIQUID (ML) ORAL ONCE
Refills: 0 | Status: COMPLETED | OUTPATIENT
Start: 2025-03-21 | End: 2025-03-21

## 2025-03-21 RX ORDER — CLOPIDOGREL BISULFATE 75 MG/1
300 TABLET, FILM COATED ORAL ONCE
Refills: 0 | Status: COMPLETED | OUTPATIENT
Start: 2025-03-21 | End: 2025-03-21

## 2025-03-21 RX ORDER — DEXTROMETHORPHAN HBR, GUAIFENESIN 200 MG/10ML
100 LIQUID ORAL EVERY 6 HOURS
Refills: 0 | Status: DISCONTINUED | OUTPATIENT
Start: 2025-03-21 | End: 2025-03-23

## 2025-03-21 RX ORDER — CLOPIDOGREL BISULFATE 75 MG/1
75 TABLET, FILM COATED ORAL DAILY
Refills: 0 | Status: DISCONTINUED | OUTPATIENT
Start: 2025-03-22 | End: 2025-03-23

## 2025-03-21 RX ORDER — BENZONATATE 100 MG
100 CAPSULE ORAL THREE TIMES A DAY
Refills: 0 | Status: DISCONTINUED | OUTPATIENT
Start: 2025-03-21 | End: 2025-03-23

## 2025-03-21 RX ADMIN — Medication 81 MILLIGRAM(S): at 07:40

## 2025-03-21 RX ADMIN — APIXABAN 5 MILLIGRAM(S): 2.5 TABLET, FILM COATED ORAL at 17:54

## 2025-03-21 RX ADMIN — Medication 400 MILLIGRAM(S): at 17:53

## 2025-03-21 RX ADMIN — DEXTROMETHORPHAN HBR, GUAIFENESIN 100 MILLIGRAM(S): 200 LIQUID ORAL at 12:14

## 2025-03-21 RX ADMIN — HEPARIN SODIUM 2300 UNIT(S)/HR: 1000 INJECTION INTRAVENOUS; SUBCUTANEOUS at 07:18

## 2025-03-21 RX ADMIN — APIXABAN 5 MILLIGRAM(S): 2.5 TABLET, FILM COATED ORAL at 12:14

## 2025-03-21 RX ADMIN — HEPARIN SODIUM 2100 UNIT(S)/HR: 1000 INJECTION INTRAVENOUS; SUBCUTANEOUS at 07:14

## 2025-03-21 RX ADMIN — Medication 100 MILLIGRAM(S): at 14:27

## 2025-03-21 RX ADMIN — LABETALOL HYDROCHLORIDE 100 MILLIGRAM(S): 200 TABLET, FILM COATED ORAL at 17:54

## 2025-03-21 RX ADMIN — DEXTROMETHORPHAN HBR, GUAIFENESIN 100 MILLIGRAM(S): 200 LIQUID ORAL at 23:38

## 2025-03-21 RX ADMIN — HEPARIN SODIUM 3500 UNIT(S): 1000 INJECTION INTRAVENOUS; SUBCUTANEOUS at 07:41

## 2025-03-21 RX ADMIN — BUMETANIDE 1 MILLIGRAM(S): 1 TABLET ORAL at 12:15

## 2025-03-21 RX ADMIN — CLOPIDOGREL BISULFATE 300 MILLIGRAM(S): 75 TABLET, FILM COATED ORAL at 17:58

## 2025-03-21 RX ADMIN — Medication 100 MILLIGRAM(S): at 22:09

## 2025-03-21 RX ADMIN — TICAGRELOR 90 MILLIGRAM(S): 90 TABLET ORAL at 05:40

## 2025-03-21 RX ADMIN — LABETALOL HYDROCHLORIDE 100 MILLIGRAM(S): 200 TABLET, FILM COATED ORAL at 05:40

## 2025-03-21 RX ADMIN — HEPARIN SODIUM 2300 UNIT(S)/HR: 1000 INJECTION INTRAVENOUS; SUBCUTANEOUS at 07:27

## 2025-03-21 RX ADMIN — LOSARTAN POTASSIUM 50 MILLIGRAM(S): 100 TABLET, FILM COATED ORAL at 05:40

## 2025-03-21 RX ADMIN — DEXTROMETHORPHAN HBR, GUAIFENESIN 100 MILLIGRAM(S): 200 LIQUID ORAL at 17:54

## 2025-03-21 NOTE — PROGRESS NOTE ADULT - SUBJECTIVE AND OBJECTIVE BOX
Patient is a 64y old  Male who presents with a chief complaint of NSTEMI, AHRF, pleural effusion (20 Mar 2025 11:35)      TELE:     INTERVAL HPI/OVERNIGHT EVENTS: No acute overnight events. Pt seen and examined at the bedside this AM.     MEDICATIONS  (STANDING):  apixaban 5 milliGRAM(s) Oral two times a day  benzonatate 100 milliGRAM(s) Oral three times a day  buMETAnide Injectable 1 milliGRAM(s) IV Push two times a day  clopidogrel Tablet 300 milliGRAM(s) Oral once  guaiFENesin Oral Liquid (Sugar-Free) 100 milliGRAM(s) Oral every 6 hours  influenza   Vaccine 0.5 milliLiter(s) IntraMuscular once  labetalol 100 milliGRAM(s) Oral two times a day  losartan 50 milliGRAM(s) Oral daily    MEDICATIONS  (PRN):      Allergies    Bactrim DS (Hives)    Intolerances        REVIEW OF SYSTEMS:  CONSTITUTIONAL: No fever or chills  HEENT:  No headache, no sore throat  RESPIRATORY: No cough, wheezing, or shortness of breath  CARDIOVASCULAR: No chest pain, palpitations  GASTROINTESTINAL: No abd pain, nausea, vomiting, or diarrhea  GENITOURINARY: No dysuria, frequency, or hematuria  NEUROLOGICAL: no focal weakness or dizziness  MUSCULOSKELETAL: no myalgias     Vital Signs Last 24 Hrs  T(C): 36.6 (21 Mar 2025 11:35), Max: 37.1 (21 Mar 2025 05:09)  T(F): 97.9 (21 Mar 2025 11:35), Max: 98.7 (21 Mar 2025 05:09)  HR: 81 (21 Mar 2025 11:35) (80 - 85)  BP: 118/67 (21 Mar 2025 11:35) (101/61 - 127/85)  BP(mean): --  RR: 19 (21 Mar 2025 11:35) (19 - 23)  SpO2: 94% (21 Mar 2025 11:35) (94% - 100%)    Parameters below as of 21 Mar 2025 11:35  Patient On (Oxygen Delivery Method): room air        PHYSICAL EXAM:  GENERAL: NAD  HEENT:  anicteric, moist mucous membranes  CHEST/LUNG:  CTA b/l, no rales, wheezes, or rhonchi  HEART:  RRR, S1, S2  ABDOMEN:  BS+, soft, nontender, nondistended  EXTREMITIES: no edema, cyanosis, or calf tenderness  NERVOUS SYSTEM: answers questions and follows commands appropriately    LABS:                        8.6    7.38  )-----------( 465      ( 21 Mar 2025 05:37 )             27.0     CBC Full  -  ( 21 Mar 2025 05:37 )  WBC Count : 7.38 K/uL  Hemoglobin : 8.6 g/dL  Hematocrit : 27.0 %  Platelet Count - Automated : 465 K/uL  Mean Cell Volume : 67.0 fl  Mean Cell Hemoglobin : 21.3 pg  Mean Cell Hemoglobin Concentration : 31.9 g/dL  Auto Neutrophil # : x  Auto Lymphocyte # : x  Auto Monocyte # : x  Auto Eosinophil # : x  Auto Basophil # : x  Auto Neutrophil % : x  Auto Lymphocyte % : x  Auto Monocyte % : x  Auto Eosinophil % : x  Auto Basophil % : x    21 Mar 2025 05:37    137    |  105    |  15     ----------------------------<  102    4.2     |  24     |  0.97     Ca    8.7        21 Mar 2025 05:37  Phos  3.3       21 Mar 2025 05:37  Mg     1.9       21 Mar 2025 05:37    TPro  6.3    /  Alb  2.1    /  TBili  0.3    /  DBili  x      /  AST  44     /  ALT  23     /  AlkPhos  78     21 Mar 2025 05:37    PTT - ( 21 Mar 2025 05:37 )  PTT:48.4 sec  Urinalysis Basic - ( 21 Mar 2025 05:37 )    Color: x / Appearance: x / SG: x / pH: x  Gluc: 102 mg/dL / Ketone: x  / Bili: x / Urobili: x   Blood: x / Protein: x / Nitrite: x   Leuk Esterase: x / RBC: x / WBC x   Sq Epi: x / Non Sq Epi: x / Bacteria: x      CAPILLARY BLOOD GLUCOSE            Culture - Fungal, Body Fluid (collected 03-20-25 @ 12:46)  Source: Pleural Fl Pleural Fluid  Preliminary Report (03-21-25 @ 09:24):    Testing in progress    Culture - Body Fluid with Gram Stain (collected 03-20-25 @ 12:46)  Source: Pleural Fl Pleural Fluid  Gram Stain (03-20-25 @ 21:48):    polymorphonuclear leukocytes seen    No organisms seen    by cytocentrifuge        RADIOLOGY & ADDITIONAL TESTS:  Personally reviewed.     Consultant(s) Notes Reviewed:  [x] YES  [ ] NO Patient is a 64y old  Male who presents with a chief complaint of NSTEMI, AHRF, pleural effusion (20 Mar 2025 11:35)      TELE:     INTERVAL HPI/OVERNIGHT EVENTS: No acute overnight events. Pt seen and examined at the bedside this AM. Pt was in no acute distress but c/o continued shortness of breath with exertion.  Pt remained off O2 throughout the night with no issue but when he moved to the chair this morning he experienced worsening SOB. C/o productive cough caused by itching sensation in the airway for 3 weeks now with white, foamy sputum. Pt has a stable appetite and is voiding/defecating without issue. Pt denies chest pain, lightheadedness, n/v.    MEDICATIONS  (STANDING):  apixaban 5 milliGRAM(s) Oral two times a day  benzonatate 100 milliGRAM(s) Oral three times a day  buMETAnide Injectable 1 milliGRAM(s) IV Push two times a day  clopidogrel Tablet 300 milliGRAM(s) Oral once  guaiFENesin Oral Liquid (Sugar-Free) 100 milliGRAM(s) Oral every 6 hours  influenza   Vaccine 0.5 milliLiter(s) IntraMuscular once  labetalol 100 milliGRAM(s) Oral two times a day  losartan 50 milliGRAM(s) Oral daily    MEDICATIONS  (PRN):      Allergies    Bactrim DS (Hives)    Intolerances        REVIEW OF SYSTEMS:  CONSTITUTIONAL: No fever/chills or fatigue  HEENT:  No headache, no sore throat  RESPIRATORY: No wheezing. Endorses SOB, cough  CARDIOVASCULAR: No chest pain, palpitations  GASTROINTESTINAL: No abd pain, nausea, vomiting, or diarrhea  GENITOURINARY: No dysuria, frequency, or hematuria  NEUROLOGICAL: no focal weakness or dizziness  MUSCULOSKELETAL: no myalgias     Vital Signs Last 24 Hrs  T(C): 36.6 (21 Mar 2025 11:35), Max: 37.1 (21 Mar 2025 05:09)  T(F): 97.9 (21 Mar 2025 11:35), Max: 98.7 (21 Mar 2025 05:09)  HR: 81 (21 Mar 2025 11:35) (80 - 85)  BP: 118/67 (21 Mar 2025 11:35) (101/61 - 127/85)  BP(mean): --  RR: 19 (21 Mar 2025 11:35) (19 - 23)  SpO2: 94% (21 Mar 2025 11:35) (94% - 100%)    Parameters below as of 21 Mar 2025 11:35  Patient On (Oxygen Delivery Method): room air        PHYSICAL EXAM:  GENERAL: NAD  HEENT:  anicteric, moist mucous membranes  CHEST/LUNG:  CTA b/l, no rales, wheezes, or rhonchi. Baseline breathing is unlabored, no accessory muscle use  HEART:  RRR, S1, S2  ABDOMEN:  Firm, distended, nontender  EXTREMITIES: 1+ edema BLE (R>L), 2+ edema LUE, no cyanosis, or calf tenderness  NERVOUS SYSTEM: answers questions and follows commands appropriately, equal muscle 5/5 strength b/l, intact sensory b/l    LABS:                        8.6    7.38  )-----------( 465      ( 21 Mar 2025 05:37 )             27.0     CBC Full  -  ( 21 Mar 2025 05:37 )  WBC Count : 7.38 K/uL  Hemoglobin : 8.6 g/dL  Hematocrit : 27.0 %  Platelet Count - Automated : 465 K/uL  Mean Cell Volume : 67.0 fl  Mean Cell Hemoglobin : 21.3 pg  Mean Cell Hemoglobin Concentration : 31.9 g/dL  Auto Neutrophil # : x  Auto Lymphocyte # : x  Auto Monocyte # : x  Auto Eosinophil # : x  Auto Basophil # : x  Auto Neutrophil % : x  Auto Lymphocyte % : x  Auto Monocyte % : x  Auto Eosinophil % : x  Auto Basophil % : x    21 Mar 2025 05:37    137    |  105    |  15     ----------------------------<  102    4.2     |  24     |  0.97     Ca    8.7        21 Mar 2025 05:37  Phos  3.3       21 Mar 2025 05:37  Mg     1.9       21 Mar 2025 05:37    TPro  6.3    /  Alb  2.1    /  TBili  0.3    /  DBili  x      /  AST  44     /  ALT  23     /  AlkPhos  78     21 Mar 2025 05:37    PTT - ( 21 Mar 2025 05:37 )  PTT:48.4 sec  Urinalysis Basic - ( 21 Mar 2025 05:37 )    Color: x / Appearance: x / SG: x / pH: x  Gluc: 102 mg/dL / Ketone: x  / Bili: x / Urobili: x   Blood: x / Protein: x / Nitrite: x   Leuk Esterase: x / RBC: x / WBC x   Sq Epi: x / Non Sq Epi: x / Bacteria: x      CAPILLARY BLOOD GLUCOSE            Culture - Fungal, Body Fluid (collected 03-20-25 @ 12:46)  Source: Pleural Fl Pleural Fluid  Preliminary Report (03-21-25 @ 09:24):    Testing in progress    Culture - Body Fluid with Gram Stain (collected 03-20-25 @ 12:46)  Source: Pleural Fl Pleural Fluid  Gram Stain (03-20-25 @ 21:48):    polymorphonuclear leukocytes seen    No organisms seen    by cytocentrifuge        RADIOLOGY & ADDITIONAL TESTS:  Personally reviewed.     Consultant(s) Notes Reviewed:  [x] YES  [ ] NO Patient is a 64y old  Male who presents with a chief complaint of NSTEMI, AHRF, pleural effusion (20 Mar 2025 11:35)      TELE:     INTERVAL HPI/OVERNIGHT EVENTS: No acute overnight events. Pt seen and examined at the bedside this AM. Pt was in no acute distress but c/o continued shortness of breath with exertion.  Pt remained off O2 throughout the night with no issue but when he moved to the chair this morning he experienced worsening SOB. C/o productive cough caused by itching sensation in the airway for 3 weeks now with white, foamy sputum. Pt has a stable appetite and is voiding/defecating without issue. Pt denies chest pain, lightheadedness, n/v.    MEDICATIONS  (STANDING):  apixaban 5 milliGRAM(s) Oral two times a day  benzonatate 100 milliGRAM(s) Oral three times a day  buMETAnide Injectable 1 milliGRAM(s) IV Push two times a day  clopidogrel Tablet 300 milliGRAM(s) Oral once  guaiFENesin Oral Liquid (Sugar-Free) 100 milliGRAM(s) Oral every 6 hours  influenza   Vaccine 0.5 milliLiter(s) IntraMuscular once  labetalol 100 milliGRAM(s) Oral two times a day  losartan 50 milliGRAM(s) Oral daily    MEDICATIONS  (PRN):      Allergies    Bactrim DS (Hives)    Intolerances        REVIEW OF SYSTEMS:  CONSTITUTIONAL: No fever/chills or fatigue  HEENT:  No headache, no sore throat  RESPIRATORY: No wheezing. Endorses SOB, cough  CARDIOVASCULAR: No chest pain, palpitations  GASTROINTESTINAL: No abd pain, nausea, vomiting, or diarrhea  GENITOURINARY: No dysuria, frequency, or hematuria  NEUROLOGICAL: no focal weakness or dizziness  MUSCULOSKELETAL: no myalgias     Vital Signs Last 24 Hrs  T(C): 36.6 (21 Mar 2025 11:35), Max: 37.1 (21 Mar 2025 05:09)  T(F): 97.9 (21 Mar 2025 11:35), Max: 98.7 (21 Mar 2025 05:09)  HR: 81 (21 Mar 2025 11:35) (80 - 85)  BP: 118/67 (21 Mar 2025 11:35) (101/61 - 127/85)  BP(mean): --  RR: 19 (21 Mar 2025 11:35) (19 - 23)  SpO2: 94% (21 Mar 2025 11:35) (94% - 100%)    Parameters below as of 21 Mar 2025 11:35  Patient On (Oxygen Delivery Method): room air        PHYSICAL EXAM:  GENERAL: NAD  HEENT:  anicteric, moist mucous membranes  CHEST/LUNG:  CTA b/l, no rales, wheezes, or rhonchi. Baseline breathing is unlabored, no accessory muscle use  HEART:  RRR, S1, S2  ABDOMEN:  Firm, distended, nontender  EXTREMITIES: 1+ edema BLE (R>L), 2+ edema LUE, no cyanosis, or calf tenderness  NERVOUS SYSTEM: answers questions and follows commands appropriately, equal muscle 5/5 strength b/l, intact sensory b/l    LABS:                        8.6    7.38  )-----------( 465      ( 21 Mar 2025 05:37 )             27.0     CBC Full  -  ( 21 Mar 2025 05:37 )  WBC Count : 7.38 K/uL  Hemoglobin : 8.6 g/dL  Hematocrit : 27.0 %  Platelet Count - Automated : 465 K/uL  Mean Cell Volume : 67.0 fl  Mean Cell Hemoglobin : 21.3 pg  Mean Cell Hemoglobin Concentration : 31.9 g/dL  Auto Neutrophil # : x  Auto Lymphocyte # : x  Auto Monocyte # : x  Auto Eosinophil # : x  Auto Basophil # : x  Auto Neutrophil % : x  Auto Lymphocyte % : x  Auto Monocyte % : x  Auto Eosinophil % : x  Auto Basophil % : x    21 Mar 2025 05:37    137    |  105    |  15     ----------------------------<  102    4.2     |  24     |  0.97     Ca    8.7        21 Mar 2025 05:37  Phos  3.3       21 Mar 2025 05:37  Mg     1.9       21 Mar 2025 05:37    TPro  6.3    /  Alb  2.1    /  TBili  0.3    /  DBili  x      /  AST  44     /  ALT  23     /  AlkPhos  78     21 Mar 2025 05:37    PTT - ( 21 Mar 2025 05:37 )  PTT:48.4 sec  Urinalysis Basic - ( 21 Mar 2025 05:37 )    Color: x / Appearance: x / SG: x / pH: x  Gluc: 102 mg/dL / Ketone: x  / Bili: x / Urobili: x   Blood: x / Protein: x / Nitrite: x   Leuk Esterase: x / RBC: x / WBC x   Sq Epi: x / Non Sq Epi: x / Bacteria: x      CAPILLARY BLOOD GLUCOSE            Culture - Fungal, Body Fluid (collected 03-20-25 @ 12:46)  Source: Pleural Fl Pleural Fluid  Preliminary Report (03-21-25 @ 09:24):    Testing in progress    Culture - Body Fluid with Gram Stain (collected 03-20-25 @ 12:46)  Source: Pleural Fl Pleural Fluid  Gram Stain (03-20-25 @ 21:48):    polymorphonuclear leukocytes seen    No organisms seen    by cytocentrifuge        RADIOLOGY & ADDITIONAL TESTS: CONCLUSIONS:      1. Left ventricular wall thickness is normal. Left ventricular systolic function is normal with an ejection fraction visually estimated at 60 to 65 %. There are no regional wall motion abnormalities seen.   2. Normal right ventricular cavity size, with normal wall thickness, and normal right ventricular systolic function.   3. Mild mitral regurgitation.   4. Fibrocalcific aortic valve sclerosis without stenosis.   5. The inferior vena cava is dilated measuring 2.44 cm in diameter, (dilated >2.1cm) with normal inspiratory collapse (normal >50%) consistent with mildly elevated right atrial pressure (~8, range 5-10mmHg).   6. Small pericardial effusion.   7. There is mild (grade 1) left ventricular diastolic dysfunction.   8. Compared to the transthoracic echocardiogram performed on 3/5/2025, there have been no significant interval changes.   9. Pulmonary artery systolic pressure could not be estimated.    ________________________________________________________________________________________  FINDINGS:     Left Ventricle:  Left ventricular wall thickness is normal. Left ventricular systolic function is normal with an ejection fraction visually estimated at 60 to 65%. There are no regional wall motion abnormalities seen. There is mild (grade 1) left ventricular diastolic dysfunction.     Right Ventricle:  The right ventricular cavity is normal in size, with normal wall thickness and right ventricular systolic function is normal.     Aortic Valve:  There is fibrocalcific aortic valve sclerosis without stenosis. There is no evidence of aortic regurgitation.     Mitral Valve:  Structurally normal mitral valve with normal leaflet excursion. There is mild mitral regurgitation.     Tricuspid Valve:  Structurally normal tricuspid valve with normal leaflet excursion. There is insufficient tricuspid regurgitation detected to calculate pulmonary artery systolic pressure.     Pulmonic Valve:  The pulmonic valve was not well visualized.     Pericardium:  There is a small pericardial effusion.     Systemic Veins:  The inferior vena cava is dilated measuring 2.44 cm in diameter, (dilated >2.1cm) with normal inspiratory collapse (normal >50%) consistent with mildly elevated right atrial pressure (~8, range 5-10mmHg).  ____________________________________________________________________  QUANTITATIVE DATA:  Left Ventricle Measurements: (Indexed to BSA)     Visualized LV EF%: 60 to 65%     MV E Vmax: 1.03 m/s  MV A Vmax: 1.33 m/s  MV E/A:    0.77  MV DT:     183 msec             Right Ventricle Measurements:     RV Base (RVID1): 2.8 cm    Mitral Valve Measurements:     MV E Vmax: 1.0 m/s  MV A Vmax: 1.3 m/s  MV E/A:    0.8       Tricuspid Valve Measurements:     RA Pressure: 8 mmHg      Personally reviewed.     Consultant(s) Notes Reviewed:  [x] YES  [ ] NO

## 2025-03-21 NOTE — PROGRESS NOTE ADULT - PROBLEM SELECTOR PLAN 3
·  Problem: Pleural effusion.   ·  Plan: - CTA Chest PE Protocol: Moderate multiloculated LEFT pleural effusion diminished from 3/3/2025 and produces subtotal LLL passive atelectasis. Moderate dependent RIGHT pleural effusion, increased.  - Patient was admitted ~2 weeks ago for LUE DVT and L pleural effusion in which pleurex catheter was placed.  - Likely malignant in nature  - Patient on supplemental O2 via NC @ 5 L   - s/p thoracentesis 3/20. 560cc of yellow serosanguinous fluid.  - Bumetanide twice IV today for symptom relief

## 2025-03-21 NOTE — PROGRESS NOTE ADULT - ASSESSMENT
64M with PMH of Poorly differentiated metastatic GI carcinoma (known cervical and axillary lymphadenopathy), HTN, Anemia, DVT (on eliquis), Pleural effusions presented to ED for SOB x2 weeks. SOB  worse with exertion    pleural eff  mets ca  weakness  anemia  ascites  dyspnea  htn  dvt    560 cc - malignant pleural eff - left side -   vs noted  fio2 wean as tolerated  on Hep gtt    ct neg for pe - eff - atelectasis - evidence of mets disease  onc eval  cardio eval  trend trops  ACS eval  I moustapha  fio2 support as needed - goal sat > 88 pct  cvs rx regimen and BP control  tele monitoring  HD support and monitoring

## 2025-03-21 NOTE — CASE MANAGEMENT PROGRESS NOTE - NSCMPROGRESSNOTE_GEN_ALL_CORE
Discussed patient on rounds this morning and chart reviewed. Patient remains acute on a Heparin gtt. Patient is s/p left thoracentesis on 3/20/25. Currently on 5LNC. Bedside RN to titrate as tolerated. Patient does not have home O2. Referral sent to [facility] to check patient eligibility for home O2. CM remains available.

## 2025-03-21 NOTE — PROGRESS NOTE ADULT - PROBLEM SELECTOR PLAN 1
·  Problem: NSTEMI (non-ST elevation myocardial infarction).   ·  Plan: - EKG: NSR @ 76 bpm, no acute ST elevations  - Elevated troponin on admission. Peaked to ~24k and downtrended   -TTE 03/06: LVEF: 61% aortic valve sclerosis. trace MR   - s/p Heparin gtt, and DAPT with aspirin / ticagrelor   - Continue on Eliquis and plavix therapy   - Interventional cardio (Dr. Contreras) recs no acute intervention   - Repeat TTE today; if EV is low, consider cardiac cath   -Per cardio, consider cardiac cath if cancer prognosis does not warrant a conservative approach ·  Problem: NSTEMI (non-ST elevation myocardial infarction).   ·  Plan: - EKG: NSR @ 76 bpm, no acute ST elevations  - Elevated troponin on admission. Peaked to ~24k and downtrended   -TTE 03/06: LVEF: 61% aortic valve sclerosis. trace MR   - s/p Heparin gtt, and DAPT with aspirin / ticagrelor   - Continue on Eliquis and plavix therapy   - Interventional cardio (Dr. Contreras) recs no acute intervention   - Repeat TTE if EV is low, consider cardiac cath   -Per cardio, consider cardiac cath if cancer prognosis does not warrant a conservative approach

## 2025-03-21 NOTE — PHARMACOTHERAPY INTERVENTION NOTE - COMMENTS
65 yo M p/w NSTEMI currently ordered for a heparin infusion, ticagrelor 90 mg BID and aspirin 81 mg daily. Patient to be transitioned back to home Eliquis 5 mg BID,  recommended initiation Eliquis within 1 hour of stopping heparin infusion. Additionally recommended switching antiplatelet to clopidogrel from ticagrelor due to less bleeding risk in combination with Eliquis. Dr Cox discussed changes with cardio who agreed and also recommended discontinuation of aspirin. Order entered for plavix load of 300 mg this evening and maintenance dose of 75 mg to start 3/22.

## 2025-03-21 NOTE — PROGRESS NOTE ADULT - SUBJECTIVE AND OBJECTIVE BOX
[facility] -- Charles Martinez, Andrei Mccall, Olga, Eron Pablo: Office # [phone]    Follow Up:  NSTEMI    Subjective/Observations: Patient seen and examined. Patient awake, alert, resting in side of bed. No complaints of chest pain, dyspnea, palpitations or dizziness. Tolerating O2 via nasal cannula. + ZHOU, + slight orthopnea. On Heparin gtt.     REVIEW OF SYSTEMS: All other review of systems are negative unless indicated above    PAST MEDICAL & SURGICAL HISTORY:  Incisional hernia  2015      Gastric lymphoma      Anemia of chronic disease      S/P appendectomy  November 17th 2014      S/P cholecystectomy  15 years ago    MEDICATIONS  (STANDING):  aspirin enteric coated 81 milliGRAM(s) Oral daily  heparin  Infusion.  Unit(s)/Hr (17 mL/Hr) IV Continuous <Continuous>  influenza   Vaccine 0.5 milliLiter(s) IntraMuscular once  labetalol 100 milliGRAM(s) Oral two times a day  losartan 50 milliGRAM(s) Oral daily  ticagrelor 90 milliGRAM(s) Oral every 12 hours    MEDICATIONS  (PRN):  heparin   Injectable 7500 Unit(s) IV Push every 6 hours PRN For aPTT less than 40  heparin   Injectable 3500 Unit(s) IV Push every 6 hours PRN For aPTT between 40 - 57    Allergies    Bactrim DS (Hives)    Intolerances      Vital Signs Last 24 Hrs  T(C): 37.1 (21 Mar 2025 05:09), Max: 37.1 (21 Mar 2025 05:09)  T(F): 98.7 (21 Mar 2025 05:09), Max: 98.7 (21 Mar 2025 05:09)  HR: 85 (21 Mar 2025 05:09) (80 - 85)  BP: 127/77 (21 Mar 2025 05:09) (101/61 - 127/85)  BP(mean): --  RR: 19 (21 Mar 2025 05:09) (19 - 23)  SpO2: 94% (21 Mar 2025 05:09) (94% - 100%)    Parameters below as of 21 Mar 2025 05:09  Patient On (Oxygen Delivery Method): nasal cannula  O2 Flow (L/min): 5    I&O's Summary    20 Mar 2025 07:01  -  21 Mar 2025 07:00  --------------------------------------------------------  IN: 0 mL / OUT: 960 mL / NET: -960 mL          TELE: SR 60-80s  PHYSICAL EXAM:  Constitutional: NAD, awake and alert  HEENT: Moist Mucous Membranes, Anicteric  Pulmonary: Non-labored, breath sounds are clear bilaterally, Diminished at bases No wheezing, rales or rhonchi  Cardiovascular: Regular, S1 and S2, No murmurs, No rubs, gallops or clicks  Gastrointestinal:  soft, nontender, nondistended   Lymph: +1 peripheral edema. No lymphadenopathy.   Skin: No visible rashes or ulcers.  Psych:  Mood & affect appropriate      LABS: All Labs Reviewed:                        8.6    7.38  )-----------( 465      ( 21 Mar 2025 05:37 )             27.0                         8.6    10.13 )-----------( 471      ( 20 Mar 2025 07:46 )             27.4                         7.7    8.15  )-----------( 551      ( 19 Mar 2025 13:52 )             24.6     21 Mar 2025 05:37    137    |  105    |  15     ----------------------------<  102    4.2     |  24     |  0.97   20 Mar 2025 07:46    138    |  105    |  12     ----------------------------<  111    4.3     |  26     |  1.10   19 Mar 2025 06:05    137    |  106    |  13     ----------------------------<  127    4.2     |  26     |  1.00     Ca    8.7        21 Mar 2025 05:37  Ca    9.0        20 Mar 2025 07:46  Ca    9.2        19 Mar 2025 06:05  Phos  3.3       21 Mar 2025 05:37  Phos  3.3       20 Mar 2025 07:46  Mg     1.9       21 Mar 2025 05:37  Mg     2.0       20 Mar 2025 07:46    TPro  6.3    /  Alb  2.1    /  TBili  0.3    /  DBili  x      /  AST  44     /  ALT  23     /  AlkPhos  78     21 Mar 2025 05:37  TPro  6.2    /  Alb  2.2    /  TBili  0.4    /  DBili  x      /  AST  81     /  ALT  30     /  AlkPhos  85     20 Mar 2025 07:46  TPro  6.4    /  Alb  2.5    /  TBili  0.3    /  DBili  x      /  AST  122    /  ALT  31     /  AlkPhos  84     19 Mar 2025 06:05   LIVER FUNCTIONS - ( 21 Mar 2025 05:37 )  Alb: 2.1 g/dL / Pro: 6.3 g/dL / ALK PHOS: 78 U/L / ALT: 23 U/L / AST: 44 U/L / GGT: x           PTT - ( 21 Mar 2025 05:37 )  PTT:48.4 secTroponin I, High Sensitivity Result: 16703.5 ng/L (03-19-25 @ 13:52)  Troponin I, High Sensitivity Result: 22907.0 ng/L (03-19-25 @ 09:19)  Troponin I, High Sensitivity Result: 63129.9 ng/L (03-19-25 @ 06:05)    12 Lead ECG:   Ventricular Rate 76 BPM    Atrial Rate 76 BPM    P-R Interval 156 ms    QRS Duration 98 ms    Q-T Interval 384 ms    QTC Calculation(Bazett) 432 ms    P Axis 69 degrees    R Axis 7 degrees    T Axis -12 degrees    Diagnosis Line Sinus rhythm with premature atrial complexes  Inferior infarct (cited on or before 19-MAR-2025)  Cannot rule out Anterior infarct (cited on or before 19-MAR-2025)  Abnormal ECG  When compared with ECG of 19-MAR-2025 05:43, (Unconfirmed)  premature atrial complexes are now present  Confirmed by Ellis Soto MD ([id number]) on 3/19/2025 1:14:14 PM (03-19-25 @ 06:39)      TRANSTHORACIC ECHOCARDIOGRAM REPORT  ________________________________________________________________________________                                      _______       Pt. Name:       JESSE EATON Study Date:    3/5/2025  MRN:    [id number]                   YOB: 1960  Accession #:    [id number]                  Age:           64 years  Account#:       [id number]                 Gender:        M  Heart Rate:                                Height:        66.93 in (170.00 cm)  Rhythm:                                    Weight:        211.64 lb (96.00 kg)  Blood Pressure: 133/78 mmHg                BSA/BMI:       2.07 m² / 33.22 kg/m²  ________________________________________________________________________________________  Referring Physician:    [id number] Kentrell Cartagena  Interpreting Physician: Lilli Pablo MD  Primary Sonographer:    Emperatriz AVILA    CPT:               ECHO TTE WO CON COMP W DOPP - 67822.m  Indication(s):     Dyspnea, unspecified - R06.00  Procedure:         Transthoracic echocardiogram with 2-D, M-mode and complete                     spectral and color flow Doppler.  Ordering Location: [facility]  Admission Status:  Inpatient  Study Information: Image quality for this study is technically difficult.    _______________________________________________________________________________________     CONCLUSIONS:      1. Technically difficult image quality.   2. Left ventricular cavity is normal in size. Left ventricular wall thickness is normal. Left ventricular systolic function is normal with an ejection fraction of 61 % by David's method of disks. There are no regional wall motion abnormalities seen.   3. Normal right ventricular cavity size and normal right ventricular systolic function.   4. Fibrocalcific aortic valve sclerosis without stenosis.   5. Trace mitral regurgitation.   6. Tricuspid valve was not well visualized.   7. The inferior vena cava is normal in size measuring 2.20 cm in diameter, (normal <2.1cm) withnormal inspiratory collapse (normal >50%) consistent with normal right atrial pressure (~3, range 0-5mmHg).   8. Small pericardial effusion with no echocardiographic evidence of tamponade physiology.    ________________________________________________________________________________________  FINDINGS:     Left Ventricle:  The left ventricular cavity is normal in size. Left ventricular wall thickness is normal. Left ventricular systolic function is normal with a calculated ejection fraction of 61 % by the David's biplane method of disks. There are no regional wall motion abnormalities seen. There is normal left ventricular diastolic function, with normal left ventricular filling pressure.     Right Ventricle:  The right ventricular cavity isnormal in size and right ventricular systolic function is normal.     Left Atrium:  The left atrium is normal in size with an indexed volume of 23.10 ml/m².     Right Atrium:  The right atrium is normal in size with an indexed volume of 16.77 ml/m².     Aortic Valve:  There is fibrocalcific aortic valve sclerosis without stenosis. There is no evidence of aortic regurgitation.     Mitral Valve:  Structurally normal mitral valve with normal leaflet excursion. There is no mitral valve stenosis. There is trace mitral regurgitation.     Tricuspid Valve:  The tricuspid valve was not well visualized. There is insufficient tricuspid regurgitation detected to calculate pulmonary artery systolic pressure.     Pulmonic Valve:  The pulmonic valve was notwell visualized. There is trace pulmonic regurgitation.     Pericardium:  There is a small pericardial effusion with no echocardiographic evidence of tamponade physiology.     Systemic Veins:  The inferior vena cava is normal in size measuring 2.20 cm in diameter, (normal <2.1cm) with normal inspiratory collapse (normal >50%) consistent with normal right atrial pressure (~3, range 0-5mmHg).  ____________________________________________________________________  QUANTITATIVE DATA:  Left Ventricle Measurements: (Indexed to BSA)     IVSd (2D):   1.0 cm  LVPWd (2D):  1.2 cm  LVIDd (2D):  4.5 cm  LVIDs (2D):  3.0 cm  LV Mass:     181 g  87.2 g/m²  BiPlane LV EF%: 61 %     MV E Vmax:    0.80 m/s  MV A Vmax:    0.78 m/s  MV E/A:       1.03  e' lateral:   11.40 cm/s  e' medial:    7.40 cm/s  E/e' lateral: 6.99  E/e' medial:  10.77  E/e' Average: 8.48  MV DT:        229 msec    Aorta Measurements: (Normal range) (Indexed to BSA)     Ao Root d 3.00 cm (3.1 - 3.7 cm) 1.45 cm/m²            Left AtriumMeasurements: (Indexed to BSA)  LA Diam 2D:        3.00 cm  LA Vol s, MOD A4C: 41.10 ml.  LA Vol s, MOD A2C: 56.30 ml.  LA Vol s, MOD BP:  47.80 ml  23.10 ml/m²         Right Ventricle Measurements: Right Atrial Measurements:     RV Base (RVID1): 3.2cm       RA Vol:            34.70 ml                                RA Vol s, MOD A4C  34.7 ml                                RA Vol Index:      16.77 ml/m²                                RA Area s, MOD A4C 14.1 cm²       LVOT / RVOT/ Qp/Qs Data: (Indexed to BSA)  LVOT Diameter,s: 2.00 cm  LVOT Area:       3.14 cm²    Mitral Valve Measurements:     MV E Vmax: 0.8 m/s  MV A Vmax: 0.8 m/s  MV E/A:    1.0       Tricuspid Valve Measurements:     RA Pressure: 3 mmHg    ________________________________________________________________________________________  Electronically signed on 3/5/2025 at 2:33:47 PM by Lilli Pablo MD         *** Final ***

## 2025-03-21 NOTE — PROGRESS NOTE ADULT - SUBJECTIVE AND OBJECTIVE BOX
Date/Time Patient Seen:  		  Referring MD:   Data Reviewed	       Patient is a 64y old  Male who presents with a chief complaint of NSTEMI, AHRF, pleural effusion (20 Mar 2025 11:35)      Subjective/HPI     PAST MEDICAL & SURGICAL HISTORY:  No pertinent past medical history    Incisional hernia  2015    Gastric lymphoma    Anemia of chronic disease    No significant past surgical history    S/P appendectomy  November 17th 2014    S/P cholecystectomy  15 years ago          Medication list         MEDICATIONS  (STANDING):  aspirin enteric coated 81 milliGRAM(s) Oral daily  heparin  Infusion.  Unit(s)/Hr (17 mL/Hr) IV Continuous <Continuous>  influenza   Vaccine 0.5 milliLiter(s) IntraMuscular once  labetalol 100 milliGRAM(s) Oral two times a day  losartan 50 milliGRAM(s) Oral daily  ticagrelor 90 milliGRAM(s) Oral every 12 hours    MEDICATIONS  (PRN):  heparin   Injectable 7500 Unit(s) IV Push every 6 hours PRN For aPTT less than 40  heparin   Injectable 3500 Unit(s) IV Push every 6 hours PRN For aPTT between 40 - 57         Vitals log        ICU Vital Signs Last 24 Hrs  T(C): 36.4 (20 Mar 2025 19:23), Max: 36.8 (20 Mar 2025 05:32)  T(F): 97.5 (20 Mar 2025 19:23), Max: 98.3 (20 Mar 2025 05:32)  HR: 80 (20 Mar 2025 19:23) (76 - 84)  BP: 101/61 (20 Mar 2025 19:23) (101/61 - 127/85)  BP(mean): --  ABP: --  ABP(mean): --  RR: 20 (20 Mar 2025 19:23) (18 - 23)  SpO2: 96% (20 Mar 2025 19:23) (96% - 100%)    O2 Parameters below as of 20 Mar 2025 19:23  Patient On (Oxygen Delivery Method): nasal cannula  O2 Flow (L/min): 3               Input and Output:  I&O's Detail    19 Mar 2025 07:01  -  20 Mar 2025 07:00  --------------------------------------------------------  IN:  Total IN: 0 mL    OUT:    Voided (mL): 1000 mL  Total OUT: 1000 mL    Total NET: -1000 mL      20 Mar 2025 07:01  -  21 Mar 2025 05:06  --------------------------------------------------------  IN:  Total IN: 0 mL    OUT:    Other (mL): 560 mL  Total OUT: 560 mL    Total NET: -560 mL          Lab Data                        8.6    10.13 )-----------( 471      ( 20 Mar 2025 07:46 )             27.4     03-20    138  |  105  |  12  ----------------------------<  111[H]  4.3   |  26  |  1.10    Ca    9.0      20 Mar 2025 07:46  Phos  3.3     03-20  Mg     2.0     03-20    TPro  6.2  /  Alb  2.2[L]  /  TBili  0.4  /  DBili  x   /  AST  81[H]  /  ALT  30  /  AlkPhos  85  03-20            Review of Systems	      Objective     Physical Examination    heart s1s2  lung dec BS  head nc  head at    Pertinent Lab findings & Imaging      Jameson:  NO   Adequate UO     I&O's Detail    19 Mar 2025 07:01  -  20 Mar 2025 07:00  --------------------------------------------------------  IN:  Total IN: 0 mL    OUT:    Voided (mL): 1000 mL  Total OUT: 1000 mL    Total NET: -1000 mL      20 Mar 2025 07:01  -  21 Mar 2025 05:06  --------------------------------------------------------  IN:  Total IN: 0 mL    OUT:    Other (mL): 560 mL  Total OUT: 560 mL    Total NET: -560 mL               Discussed with:     Cultures:	        Radiology

## 2025-03-21 NOTE — PROGRESS NOTE ADULT - PROBLEM SELECTOR PLAN 4
·  Problem: Deep vein thrombosis (DVT).   ·  Plan: - Start home eliquis s/p heparin gtt  - Heme/onc following.

## 2025-03-21 NOTE — PROGRESS NOTE ADULT - PROBLEM SELECTOR PLAN 2
·  Problem: Carcinoma of gastrointestinal tract.   ·  Plan: Known for poorly diff met gastric ca  - CTA Chest PE Protocol: Mediastinal LAD not significantly changed from 3/3/2025. Lt axillary LAD progressed from 3/3/2025. Suspicion punctate T8, T10 and T11 sclerotic foci, developed from 12/1/2024. Abdominal ascites.   -Abdominal U/S showed no evidence of ascites  -As per Heme/Onc note from 3/6, patient under care [facility], on FOLFOX chemo [id number]   - per pt, received his last chemo on Tuesday 3/18/25   - per pt, due for his PET scan later in 3/2025  - per pt, outpatient Heme/onc appointment this monday   - Heme/Onc consulted, recs appreciated.

## 2025-03-21 NOTE — PROGRESS NOTE ADULT - ASSESSMENT
64M with PMH of Poorly differentiated metastatic GI carcinoma (known cervical and axillary lymphadenopathy), HTN, Anemia, DVT (on eliquis), Pleural effusions presented to ED for SOB x2 weeks. Of note, patient was admitted ~2 weeks ago for LUE DVT and L pleural effusion in which pleurex catheter was placed. Vitals wnl. Labs significant for Hgb 8.5 (baseline ~7-8), platelets 568, Troponin 60697 (previously normal). EKG: NSR @ 76 bpm, no acute ST elevations. CTA PE protocol negative for PE. Admitted to medicine for NSTEMI and shortness of breath.

## 2025-03-21 NOTE — PROGRESS NOTE ADULT - ATTENDING COMMENTS
64M with PMH of Poorly differentiated metastatic GI carcinoma (known cervical and axillary lymphadenopathy), HTN, Anemia, DVT (on eliquis), Pleural effusions presented to ED for SOB x2 weeks. Of note, patient was admitted ~2 weeks ago for LUE DVT and L pleural effusion in which pleurex catheter was placed. Vitals wnl. Labs significant for Hgb 8.5 (baseline ~7-8), platelets 568, Troponin 00930 (previously normal). EKG: NSR @ 76 bpm, no acute ST elevations. CTA PE protocol negative for PE. Admitted to medicine for NSTEMI. Check TTE. Cardio Dr Mccall following, recs appreciated. Heparin gtt to be completed today. Switch back to Eliquis. Discussed with Dr Pablo cardio - stop ASA, will switch to Plavix given higher bleeding risk for Brilinta and Eliquis (discussed with pharmacy). Continue home meds: losartan, labetalol.   Also found to have acute hypoxic respiratory failure 2/2 pleural effusion. s/p L thoracentesis 560 cc - malignant pleural eff. Start Bumex 1mg IV BID - discussed with cardio, will check Cr in AM prior to dosing.   Patient has onc appt on Monday would like to attend for PET scan and further management - will dose Bumex today. Spo2 >90% on RA, will check with ambulation. Reports feeling depressed but declines SSRI/psych consult.   Discussed with family at bedside, aware and in agreement with above  Anemia of chronic disease - Maintain Hgb >8 in setting of ACS.  Continue conservative management -  patient aware to follow up with cardio outpatient for possible cardiac cath further management but would like to speak to his onc team first to determine next steps in cancer management.

## 2025-03-21 NOTE — PROGRESS NOTE ADULT - ASSESSMENT
64M PMH poorly differentiated metastatic (known cervical and axillary lymphadenopathy) GI carcinoma on chemo, HTN, and anemia, recent admission for PLEF and DVT of LUE currently on Eliquis p/w SOB that has worsened over past 2 weeks. Cardiology consulted for SOB 2/2 Likely both NSTEMI and BL pleural effusions.    NSTEMI  - p/w worsening ZHOU, elev trop   - EKG: NSR w/ PACs.   - Troponin elev 53669, peaked at 24,257 now downtrending  - IC notified, Dr. Contreras, will continue with medical management for now.  - s/p ASA and Brilinta loaded,   - On hep gtt  - Continue ASA 81 mg PO daily  - Continue Brilinta 90 mg PO BID   - Monitor for new or worsening sx of ischemia  - Dr. Soto had an extensive discussion regarding appropriate management of apparent NSTEMI, given his metastatic malignancy, anemia, his lack of acute ischemic sxs and overall stability at present  - Would consider cath if heme/onc believes that his cancer prognosis does not warrant a conservative approach, and that the need for mandatory DAPT  etc would not be an obstacle to his oncologic care  - - F/u TTE , if there is an reduction in his EF, consider cath     - CTA Negative for PE  - TTE (3/06) LVEF: 61% aortic valve sclerosis, trace MR.  - Mild vol ol given ZHOU, mild orthopnea.   - Would trial Lasix 40 mg IV x  1     - CTA: Moderate multiloculated LEFT pleural effusion diminished from 3/3/2025 and produces subtotal LLL passive atelectasis. Moderate dependent RIGHT pleural effusion, increased.  Abdominal ascites, developed  - Suspect malignant effusion that has reaccumulated given exudative effusion with malignant cells on prior tap  - Pt required chest tube on last admission and had a L thoracentesis on 3/4 that drained 3.1L of fluid  - Cytopathology: positive for malignant cells compatible with the metastatic carcinoma  - Pulmonology following. s/p Left thoracentesis 3/20 560 ml     - BP stable and controlled   - Continue losartan, labetalol.    - May benefit from psych consult, appears depressed/ overwhelm with overall medical issues  - Monitor and replete lytes, keep K>4, Mg>2.  - Will continue to follow.    Jolene Dalal, MS FNP, AGACNP  Nurse Practitioner- Cardiology   Please call on TEAMS     64M PMH poorly differentiated metastatic (known cervical and axillary lymphadenopathy) GI carcinoma on chemo, HTN, and anemia, recent admission for PLEF and DVT of LUE currently on Eliquis p/w SOB that has worsened over past 2 weeks. Cardiology consulted for SOB 2/2 Likely both NSTEMI and BL pleural effusions.    NSTEMI  - p/w worsening ZHOU, elev trop   - EKG: NSR w/ PACs.   - Troponin elev 02638, peaked at 24,257 now downtrending  - IC notified, Dr. Contreras, will continue with medical management for now.  - s/p ASA and Brilinta loaded,   - On hep gtt  - Continue ASA 81 mg PO daily  - Continue Brilinta 90 mg PO BID   - Monitor for new or worsening sx of ischemia  - Dr. Soto had an extensive discussion regarding appropriate management of apparent NSTEMI, given his metastatic malignancy, anemia, his lack of acute ischemic sxs and overall stability at present  - Would consider cath if heme/onc believes that his cancer prognosis does not warrant a conservative approach, and that the need for mandatory DAPT  etc would not be an obstacle to his oncologic care  - - F/u TTE , if there is an reduction in his EF, consider cath     - CTA Negative for PE  - TTE (3/06) LVEF: 61% aortic valve sclerosis, trace MR.  - Mild vol ol given ZHOU, mild orthopnea.   - Would trial Bumex 1 mg IV  x  1     - CTA: Moderate multiloculated LEFT pleural effusion diminished from 3/3/2025 and produces subtotal LLL passive atelectasis. Moderate dependent RIGHT pleural effusion, increased.  Abdominal ascites, developed  - Suspect malignant effusion that has reaccumulated given exudative effusion with malignant cells on prior tap  - Pt required chest tube on last admission and had a L thoracentesis on 3/4 that drained 3.1L of fluid  - Cytopathology: positive for malignant cells compatible with the metastatic carcinoma  - Pulmonology following. s/p Left thoracentesis 3/20 560 ml     - BP stable and controlled   - Continue losartan, labetalol.    - May benefit from psych consult, appears depressed/ overwhelm with overall medical issues  - Monitor and replete lytes, keep K>4, Mg>2.  - Will continue to follow.    Jolene Dalal, MS FNP, AGACNP  Nurse Practitioner- Cardiology   Please call on TEAMS     64M PMH poorly differentiated metastatic (known cervical and axillary lymphadenopathy) GI carcinoma on chemo, HTN, and anemia, recent admission for PLEF and DVT of LUE currently on Eliquis p/w SOB that has worsened over past 2 weeks. Cardiology consulted for SOB 2/2 Likely both NSTEMI and BL pleural effusions.    NSTEMI  - p/w worsening ZHOU, elev trop   - EKG: NSR w/ PACs.   - Troponin elev 96266, peaked at 24,257 now downtrending  - IC notified, Dr. Contreras, will continue with medical management for now.  - s/p ASA and Brilinta loaded,   - On hep gtt  - Continue ASA 81 mg PO daily  - Continue Brilinta 90 mg PO BID   - Monitor for new or worsening sx of ischemia  - Dr. Soto had an extensive discussion regarding appropriate management of apparent NSTEMI, given his metastatic malignancy, anemia, his lack of acute ischemic sxs and overall stability at present  - Would consider cath if heme/onc believes that his cancer prognosis does not warrant a conservative approach, and that the need for mandatory DAPT  etc would not be an obstacle to his oncologic care  - TTE w/ normal LV & RV size and function EF 60-65%, Mild MR, small pericardial effusion, Grade 1 DD    - CTA Negative for PE  - TTE (3/06) LVEF: 61% aortic valve sclerosis, trace MR.  - Mild vol ol given ZHOU, mild orthopnea.   - Would trial Bumex 1 mg IV  x  1     - CTA: Moderate multiloculated LEFT pleural effusion diminished from 3/3/2025 and produces subtotal LLL passive atelectasis. Moderate dependent RIGHT pleural effusion, increased.  Abdominal ascites, developed  - Suspect malignant effusion that has reaccumulated given exudative effusion with malignant cells on prior tap  - Pt required chest tube on last admission and had a L thoracentesis on 3/4 that drained 3.1L of fluid  - Cytopathology: positive for malignant cells compatible with the metastatic carcinoma  - Pulmonology following. s/p Left thoracentesis 3/20 560 ml     - BP stable and controlled   - Continue losartan, labetalol.    - May benefit from psych consult, appears depressed/ overwhelm with overall medical issues  - Monitor and replete lytes, keep K>4, Mg>2.  - Will continue to follow.    Jolene Dalal, MS FNP, AGACNP  Nurse Practitioner- Cardiology   Please call on TEAMS

## 2025-03-22 ENCOUNTER — TRANSCRIPTION ENCOUNTER (OUTPATIENT)
Age: 65
End: 2025-03-22

## 2025-03-22 LAB
ALBUMIN SERPL ELPH-MCNC: 2.2 G/DL — LOW (ref 3.3–5)
ALP SERPL-CCNC: 82 U/L — SIGNIFICANT CHANGE UP (ref 40–120)
ALT FLD-CCNC: 25 U/L — SIGNIFICANT CHANGE UP (ref 12–78)
ANION GAP SERPL CALC-SCNC: 8 MMOL/L — SIGNIFICANT CHANGE UP (ref 5–17)
AST SERPL-CCNC: 31 U/L — SIGNIFICANT CHANGE UP (ref 15–37)
BASOPHILS # BLD AUTO: 0.03 K/UL — SIGNIFICANT CHANGE UP (ref 0–0.2)
BASOPHILS NFR BLD AUTO: 0.4 % — SIGNIFICANT CHANGE UP (ref 0–2)
BILIRUB SERPL-MCNC: 0.4 MG/DL — SIGNIFICANT CHANGE UP (ref 0.2–1.2)
BUN SERPL-MCNC: 17 MG/DL — SIGNIFICANT CHANGE UP (ref 7–23)
CALCIUM SERPL-MCNC: 9.3 MG/DL — SIGNIFICANT CHANGE UP (ref 8.5–10.1)
CHLORIDE SERPL-SCNC: 102 MMOL/L — SIGNIFICANT CHANGE UP (ref 96–108)
CO2 SERPL-SCNC: 26 MMOL/L — SIGNIFICANT CHANGE UP (ref 22–31)
CREAT SERPL-MCNC: 1.1 MG/DL — SIGNIFICANT CHANGE UP (ref 0.5–1.3)
EGFR: 75 ML/MIN/1.73M2 — SIGNIFICANT CHANGE UP
EGFR: 75 ML/MIN/1.73M2 — SIGNIFICANT CHANGE UP
EOSINOPHIL # BLD AUTO: 0.06 K/UL — SIGNIFICANT CHANGE UP (ref 0–0.5)
EOSINOPHIL NFR BLD AUTO: 0.7 % — SIGNIFICANT CHANGE UP (ref 0–6)
GLUCOSE SERPL-MCNC: 115 MG/DL — HIGH (ref 70–99)
HCT VFR BLD CALC: 28.3 % — LOW (ref 39–50)
HGB BLD-MCNC: 8.8 G/DL — LOW (ref 13–17)
IMM GRANULOCYTES NFR BLD AUTO: 0.7 % — SIGNIFICANT CHANGE UP (ref 0–0.9)
LYMPHOCYTES # BLD AUTO: 0.49 K/UL — LOW (ref 1–3.3)
LYMPHOCYTES # BLD AUTO: 6 % — LOW (ref 13–44)
MAGNESIUM SERPL-MCNC: 2.1 MG/DL — SIGNIFICANT CHANGE UP (ref 1.6–2.6)
MCHC RBC-ENTMCNC: 21.1 PG — LOW (ref 27–34)
MCHC RBC-ENTMCNC: 31.1 G/DL — LOW (ref 32–36)
MCV RBC AUTO: 67.9 FL — LOW (ref 80–100)
MONOCYTES # BLD AUTO: 1 K/UL — HIGH (ref 0–0.9)
MONOCYTES NFR BLD AUTO: 12.3 % — SIGNIFICANT CHANGE UP (ref 2–14)
NEUTROPHILS # BLD AUTO: 6.52 K/UL — SIGNIFICANT CHANGE UP (ref 1.8–7.4)
NEUTROPHILS NFR BLD AUTO: 79.9 % — HIGH (ref 43–77)
NRBC BLD AUTO-RTO: 0 /100 WBCS — SIGNIFICANT CHANGE UP (ref 0–0)
PHOSPHATE SERPL-MCNC: 3.2 MG/DL — SIGNIFICANT CHANGE UP (ref 2.5–4.5)
PLATELET # BLD AUTO: 481 K/UL — HIGH (ref 150–400)
POTASSIUM SERPL-MCNC: 3.9 MMOL/L — SIGNIFICANT CHANGE UP (ref 3.5–5.3)
POTASSIUM SERPL-SCNC: 3.9 MMOL/L — SIGNIFICANT CHANGE UP (ref 3.5–5.3)
PROT SERPL-MCNC: 6.6 G/DL — SIGNIFICANT CHANGE UP (ref 6–8.3)
RBC # BLD: 4.17 M/UL — LOW (ref 4.2–5.8)
RBC # FLD: 23.1 % — HIGH (ref 10.3–14.5)
SODIUM SERPL-SCNC: 136 MMOL/L — SIGNIFICANT CHANGE UP (ref 135–145)
WBC # BLD: 8.16 K/UL — SIGNIFICANT CHANGE UP (ref 3.8–10.5)
WBC # FLD AUTO: 8.16 K/UL — SIGNIFICANT CHANGE UP (ref 3.8–10.5)

## 2025-03-22 PROCEDURE — 99233 SBSQ HOSP IP/OBS HIGH 50: CPT

## 2025-03-22 PROCEDURE — 93010 ELECTROCARDIOGRAM REPORT: CPT

## 2025-03-22 PROCEDURE — 99233 SBSQ HOSP IP/OBS HIGH 50: CPT | Mod: GC

## 2025-03-22 PROCEDURE — 71045 X-RAY EXAM CHEST 1 VIEW: CPT | Mod: 26

## 2025-03-22 RX ORDER — BUMETANIDE 1 MG/1
1 TABLET ORAL ONCE
Refills: 0 | Status: DISCONTINUED | OUTPATIENT
Start: 2025-03-22 | End: 2025-03-22

## 2025-03-22 RX ORDER — BUMETANIDE 1 MG/1
1 TABLET ORAL ONCE
Refills: 0 | Status: COMPLETED | OUTPATIENT
Start: 2025-03-22 | End: 2025-03-22

## 2025-03-22 RX ADMIN — BUMETANIDE 1 MILLIGRAM(S): 1 TABLET ORAL at 19:40

## 2025-03-22 RX ADMIN — Medication 100 MILLIGRAM(S): at 11:24

## 2025-03-22 RX ADMIN — Medication 100 MILLIGRAM(S): at 05:34

## 2025-03-22 RX ADMIN — LABETALOL HYDROCHLORIDE 100 MILLIGRAM(S): 200 TABLET, FILM COATED ORAL at 05:35

## 2025-03-22 RX ADMIN — DEXTROMETHORPHAN HBR, GUAIFENESIN 100 MILLIGRAM(S): 200 LIQUID ORAL at 17:11

## 2025-03-22 RX ADMIN — APIXABAN 5 MILLIGRAM(S): 2.5 TABLET, FILM COATED ORAL at 05:35

## 2025-03-22 RX ADMIN — BUMETANIDE 1 MILLIGRAM(S): 1 TABLET ORAL at 13:17

## 2025-03-22 RX ADMIN — DEXTROMETHORPHAN HBR, GUAIFENESIN 100 MILLIGRAM(S): 200 LIQUID ORAL at 05:35

## 2025-03-22 RX ADMIN — BUMETANIDE 1 MILLIGRAM(S): 1 TABLET ORAL at 05:35

## 2025-03-22 RX ADMIN — LOSARTAN POTASSIUM 50 MILLIGRAM(S): 100 TABLET, FILM COATED ORAL at 05:35

## 2025-03-22 RX ADMIN — Medication 100 MILLIGRAM(S): at 21:14

## 2025-03-22 RX ADMIN — CLOPIDOGREL BISULFATE 75 MILLIGRAM(S): 75 TABLET, FILM COATED ORAL at 11:24

## 2025-03-22 RX ADMIN — APIXABAN 5 MILLIGRAM(S): 2.5 TABLET, FILM COATED ORAL at 17:11

## 2025-03-22 RX ADMIN — LABETALOL HYDROCHLORIDE 100 MILLIGRAM(S): 200 TABLET, FILM COATED ORAL at 17:11

## 2025-03-22 RX ADMIN — DEXTROMETHORPHAN HBR, GUAIFENESIN 100 MILLIGRAM(S): 200 LIQUID ORAL at 11:24

## 2025-03-22 RX ADMIN — DEXTROMETHORPHAN HBR, GUAIFENESIN 100 MILLIGRAM(S): 200 LIQUID ORAL at 23:26

## 2025-03-22 NOTE — PROGRESS NOTE ADULT - PROBLEM SELECTOR PLAN 7
·  Problem: Other specified personal risk factors, not elsewhere classified.   ·  Plan: Diet: Cardiac  DVT PPX: Eliquis BID  Code: Full.      - Productive cough  Plan: Robitussin Q6h, Benzonatate TID
Diet: Cardiac  DVT PPX: Heparin ggt  Code: Full
·  Problem: Other specified personal risk factors, not elsewhere classified.   ·  Plan: Diet: Cardiac  DVT PPX: Eliquis BID  Code: Full.      - Productive cough  Plan: Robitussin Q6h, Benzonatate TID

## 2025-03-22 NOTE — PROGRESS NOTE ADULT - SUBJECTIVE AND OBJECTIVE BOX
Date/Time Patient Seen:  		  Referring MD:   Data Reviewed	       Patient is a 64y old  Male who presents with a chief complaint of NSTEMI, AHRF, pleural effusion (21 Mar 2025 11:48)      Subjective/HPI     PAST MEDICAL & SURGICAL HISTORY:  No pertinent past medical history    Incisional hernia  2015    Gastric lymphoma    Anemia of chronic disease    No significant past surgical history    S/P appendectomy  November 17th 2014    S/P cholecystectomy  15 years ago          Medication list         MEDICATIONS  (STANDING):  apixaban 5 milliGRAM(s) Oral two times a day  benzonatate 100 milliGRAM(s) Oral three times a day  clopidogrel Tablet 75 milliGRAM(s) Oral daily  guaiFENesin Oral Liquid (Sugar-Free) 100 milliGRAM(s) Oral every 6 hours  influenza   Vaccine 0.5 milliLiter(s) IntraMuscular once  labetalol 100 milliGRAM(s) Oral two times a day  losartan 50 milliGRAM(s) Oral daily    MEDICATIONS  (PRN):         Vitals log        ICU Vital Signs Last 24 Hrs  T(C): 36.5 (22 Mar 2025 05:13), Max: 37.2 (21 Mar 2025 19:25)  T(F): 97.7 (22 Mar 2025 05:13), Max: 99 (21 Mar 2025 19:25)  HR: 80 (22 Mar 2025 05:13) (76 - 81)  BP: 123/68 (22 Mar 2025 05:13) (100/63 - 129/80)  BP(mean): --  ABP: --  ABP(mean): --  RR: 18 (22 Mar 2025 05:13) (18 - 19)  SpO2: 94% (22 Mar 2025 05:13) (94% - 98%)    O2 Parameters below as of 22 Mar 2025 05:13  Patient On (Oxygen Delivery Method): nasal cannula  O2 Flow (L/min): 5               Input and Output:  I&O's Detail      Lab Data                        8.6    7.38  )-----------( 465      ( 21 Mar 2025 05:37 )             27.0     03-21    137  |  105  |  15  ----------------------------<  102[H]  4.2   |  24  |  0.97    Ca    8.7      21 Mar 2025 05:37  Phos  3.3     03-21  Mg     1.9     03-21    TPro  6.3  /  Alb  2.1[L]  /  TBili  0.3  /  DBili  x   /  AST  44[H]  /  ALT  23  /  AlkPhos  78  03-21            Review of Systems	      Objective     Physical Examination    heart s1s2  lung dec BS  head nc  head at  on RA    Pertinent Lab findings & Imaging      Barba:  NO   Adequate UO     I&O's Detail           Discussed with:     Cultures:	        Radiology                             Melolabial Interpolation Flap Text: A decision was made to reconstruct the defect utilizing an interpolation axial flap and a staged reconstruction.  A telfa template was made of the defect.  This telfa template was then used to outline the melolabial interpolation flap.  The donor area for the pedicle flap was then injected with anesthesia.  The flap was excised through the skin and subcutaneous tissue down to the layer of the underlying musculature.  The pedicle flap was carefully excised within this deep plane to maintain its blood supply.  The edges of the donor site were undermined.   The donor site was closed in a primary fashion.  The pedicle was then rotated into position and sutured.  Once the tube was sutured into place, adequate blood supply was confirmed with blanching and refill.  The pedicle was then wrapped with xeroform gauze and dressed appropriately with a telfa and gauze bandage to ensure continued blood supply and protect the attached pedicle.

## 2025-03-22 NOTE — PROGRESS NOTE ADULT - ASSESSMENT
64M with PMH of Poorly differentiated metastatic GI carcinoma (known cervical and axillary lymphadenopathy), HTN, Anemia, DVT (on eliquis), Pleural effusions presented to ED for SOB x2 weeks. SOB  worse with exertion    pleural eff  mets ca  weakness  anemia  ascites  dyspnea  htn  dvt    560 cc - malignant pleural eff - left side -   on RA  heme occult NEG  on Eliquis    ct neg for pe - eff - atelectasis - evidence of mets disease  onc eval  cardio eval  trend trops  ACS eval  I moustapha  fio2 support as needed - goal sat > 88 pct  cvs rx regimen and BP control  tele monitoring  HD support and monitoring

## 2025-03-22 NOTE — PROGRESS NOTE ADULT - SUBJECTIVE AND OBJECTIVE BOX
[facility] -- Charles Martinez, Andrei Mccall, Olga, Eron Pablo: Office # [phone]    Follow Up:  NSTEMI    Subjective/Observations: Patient seen and examined. Patient awake, alert, resting in side of bed. No complaints of chest pain, dyspnea, palpitations or dizziness. Tolerating O2 via nasal cannula. + ZHOU. Overnight ricki to 40s     REVIEW OF SYSTEMS: All other review of systems are negative unless indicated above    PAST MEDICAL & SURGICAL HISTORY:  Incisional hernia  2015      Gastric lymphoma      Anemia of chronic disease      S/P appendectomy  November 17th 2014      S/P cholecystectomy  15 years ago      MEDICATIONS  (STANDING):  apixaban 5 milliGRAM(s) Oral two times a day  benzonatate 100 milliGRAM(s) Oral three times a day  clopidogrel Tablet 75 milliGRAM(s) Oral daily  guaiFENesin Oral Liquid (Sugar-Free) 100 milliGRAM(s) Oral every 6 hours  influenza   Vaccine 0.5 milliLiter(s) IntraMuscular once  labetalol 100 milliGRAM(s) Oral two times a day  losartan 50 milliGRAM(s) Oral daily    MEDICATIONS  (PRN):    Allergies    Bactrim DS (Hives)    Intolerances      Vital Signs Last 24 Hrs  T(C): 36.5 (22 Mar 2025 05:13), Max: 37.2 (21 Mar 2025 19:25)  T(F): 97.7 (22 Mar 2025 05:13), Max: 99 (21 Mar 2025 19:25)  HR: 80 (22 Mar 2025 05:13) (76 - 81)  BP: 123/68 (22 Mar 2025 05:13) (100/63 - 129/80)  BP(mean): --  RR: 18 (22 Mar 2025 05:13) (18 - 19)  SpO2: 94% (22 Mar 2025 05:13) (94% - 98%)    Parameters below as of 22 Mar 2025 05:13  Patient On (Oxygen Delivery Method): nasal cannula  O2 Flow (L/min): 5    I&O's Summary      TELE: SR 70-80s nonsustained 40s 2.9 sec pause Mobitz type 2?   PHYSICAL EXAM:  Constitutional: NAD, awake and alert  HEENT: Moist Mucous Membranes, Anicteric  Pulmonary: Non-labored, breath sounds are clear bilaterally, Diminished at bases No wheezing, rales or rhonchi  Cardiovascular: Regular, S1 and S2, No murmurs, No rubs, gallops or clicks  Gastrointestinal:  soft, nontender, nondistended   Lymph: +1 peripheral edema. No lymphadenopathy.   Skin: No visible rashes or ulcers.  Psych:  Mood & affect appropriate      LABS: All Labs Reviewed:                        8.6    7.38  )-----------( 465      ( 21 Mar 2025 05:37 )             27.0                         8.6    10.13 )-----------( 471      ( 20 Mar 2025 07:46 )             27.4                         7.7    8.15  )-----------( 551      ( 19 Mar 2025 13:52 )             24.6     21 Mar 2025 05:37    137    |  105    |  15     ----------------------------<  102    4.2     |  24     |  0.97   20 Mar 2025 07:46    138    |  105    |  12     ----------------------------<  111    4.3     |  26     |  1.10     Ca    8.7        21 Mar 2025 05:37  Ca    9.0        20 Mar 2025 07:46  Phos  3.3       21 Mar 2025 05:37  Phos  3.3       20 Mar 2025 07:46  Mg     1.9       21 Mar 2025 05:37  Mg     2.0       20 Mar 2025 07:46    TPro  6.3    /  Alb  2.1    /  TBili  0.3    /  DBili  x      /  AST  44     /  ALT  23     /  AlkPhos  78     21 Mar 2025 05:37  TPro  6.2    /  Alb  2.2    /  TBili  0.4    /  DBili  x      /  AST  81     /  ALT  30     /  AlkPhos  85     20 Mar 2025 07:46   LIVER FUNCTIONS - ( 21 Mar 2025 05:37 )  Alb: 2.1 g/dL / Pro: 6.3 g/dL / ALK PHOS: 78 U/L / ALT: 23 U/L / AST: 44 U/L / GGT: x           PTT - ( 21 Mar 2025 05:37 )  PTT:48.4 secTroponin I, High Sensitivity Result: 63439.5 ng/L (03-19-25 @ 13:52)  Troponin I, High Sensitivity Result: 20149.0 ng/L (03-19-25 @ 09:19)  Troponin I, High Sensitivity Result: 97877.9 ng/L (03-19-25 @ 06:05)    12 Lead ECG:   Ventricular Rate 77 BPM    Atrial Rate 77 BPM    P-R Interval 162 ms    QRS Duration 100 ms    Q-T Interval 372 ms    QTC Calculation(Bazett) 420 ms    P Axis 63 degrees    R Axis -14 degrees    T Axis -16 degrees    Diagnosis Line Normal sinus rhythm  Low voltage QRS  Inferior infarct (cited on or before 19-MAR-2025)  Abnormal ECG    Confirmed by Emily Littlejohn ([id number]) on 3/22/2025 7:11:56 AM (03-22-25 @ 03:33)      TRANSTHORACIC ECHOCARDIOGRAM REPORT  ________________________________________________________________________________                                      _______       Pt. Name:       JESSE EATON Study Date:    3/20/2025  MRN:     [id number]                   YOB: 1960  Accession #:    [id number]                  Age:           64 years  Account#:       [id number]                 Gender:        M  Heart Rate:                                Height:        66.93in (170.00 cm)  Rhythm:                                    Weight:        205.03 lb (93.00 kg)  Blood Pressure: 106/64 mmHg                BSA/BMI:       2.04 m² / 32.18 kg/m²  ________________________________________________________________________________________  Referring Physician:    [id number] Oswaldo Snowden  Interpreting Physician: Alberto Gilliam M.D.  Primary Sonographer:    Emperatriz AVILA    CPT:               ECHO TTE WO CON COMP W DOPP - 50288.m  Indication(s):     Chest pain, unspecified - R07.9  Procedure:         Transthoracic echocardiogram with 2-D, M-mode and complete                     spectral and color flow Doppler.  Ordering Location: [facility]  Admission Status:  Inpatient    _______________________________________________________________________________________     CONCLUSIONS:      1. Left ventricular wall thickness is normal. Left ventricular systolic function is normal with an ejection fraction visually estimated at 60 to 65 %. There are no regional wall motion abnormalities seen.   2. Normal right ventricular cavity size, with normal wall thickness, and normal right ventricular systolic function.   3. Mild mitral regurgitation.   4. Fibrocalcific aortic valve sclerosis without stenosis.   5. The inferior vena cava is dilated measuring 2.44 cm in diameter, (dilated >2.1cm) with normal inspiratory collapse (normal >50%) consistent with mildly elevated right atrial pressure (~8, range 5-10mmHg).   6. Small pericardial effusion.   7. There is mild (grade 1) left ventricular diastolic dysfunction.   8. Compared to the transthoracic echocardiogram performed on 3/5/2025, there have been no significant interval changes.   9. Pulmonary artery systolic pressure could not be estimated.    ________________________________________________________________________________________  FINDINGS:     Left Ventricle:  Left ventricular wall thickness is normal. Left ventricular systolic function is normal with an ejection fraction visually estimated at 60 to 65%. There are no regional wall motion abnormalities seen. There is mild (grade 1) left ventricular diastolic dysfunction.     Right Ventricle:  The right ventricular cavity is normal in size, with normal wall thickness and right ventricular systolic function is normal.     Aortic Valve:  There is fibrocalcific aortic valve sclerosis without stenosis. There is no evidence of aortic regurgitation.     Mitral Valve:  Structurally normal mitral valve with normal leaflet excursion. There is mild mitral regurgitation.     Tricuspid Valve:  Structurally normal tricuspid valve with normal leaflet excursion. There is insufficient tricuspid regurgitation detected to calculate pulmonary artery systolic pressure.     Pulmonic Valve:  The pulmonic valve was not well visualized.     Pericardium:  There is a small pericardial effusion.     Systemic Veins:  The inferior vena cava is dilated measuring 2.44 cm in diameter, (dilated >2.1cm) with normal inspiratory collapse (normal >50%) consistent with mildly elevated right atrial pressure (~8, range 5-10mmHg).  ____________________________________________________________________  QUANTITATIVE DATA:  Left Ventricle Measurements: (Indexed to BSA)     Visualized LV EF%: 60 to 65%     MV E Vmax: 1.03 m/s  MV A Vmax: 1.33 m/s  MV E/A:    0.77  MV DT:     183 msec             Right Ventricle Measurements:     RV Base (RVID1): 2.8 cm    Mitral Valve Measurements:     MV E Vmax: 1.0 m/s  MV A Vmax: 1.3 m/s  MV E/A:    0.8       Tricuspid Valve Measurements:     RA Pressure: 8 mmHg    ________________________________________________________________________________________  Electronically signed on 3/21/2025 at 11:40:35 AM by Alberto Gilliam M.D.         *** Final ***   [facility] -- Charles Martinez, Cal, Andrei, Olga, Eron Pablo: Office # [phone]    Follow Up:  NSTEMI    Subjective/Observations: Patient seen and examined. Patient awake, alert, resting in side of bed. No complaints of chest pain, dyspnea, palpitations or dizziness. Tolerating O2 via nasal cannula. + ZHOU. Overnight ricki to 40s. Pt emotionally labile, upset at present. Reports he was light headed with walking, but O2 helps     REVIEW OF SYSTEMS: All other review of systems are negative unless indicated above    PAST MEDICAL & SURGICAL HISTORY:  Incisional hernia  2015      Gastric lymphoma      Anemia of chronic disease      S/P appendectomy  November 17th 2014      S/P cholecystectomy  15 years ago      MEDICATIONS  (STANDING):  apixaban 5 milliGRAM(s) Oral two times a day  benzonatate 100 milliGRAM(s) Oral three times a day  clopidogrel Tablet 75 milliGRAM(s) Oral daily  guaiFENesin Oral Liquid (Sugar-Free) 100 milliGRAM(s) Oral every 6 hours  influenza   Vaccine 0.5 milliLiter(s) IntraMuscular once  labetalol 100 milliGRAM(s) Oral two times a day  losartan 50 milliGRAM(s) Oral daily    MEDICATIONS  (PRN):    Allergies    Bactrim DS (Hives)    Intolerances      Vital Signs Last 24 Hrs  T(C): 36.5 (22 Mar 2025 05:13), Max: 37.2 (21 Mar 2025 19:25)  T(F): 97.7 (22 Mar 2025 05:13), Max: 99 (21 Mar 2025 19:25)  HR: 80 (22 Mar 2025 05:13) (76 - 81)  BP: 123/68 (22 Mar 2025 05:13) (100/63 - 129/80)  BP(mean): --  RR: 18 (22 Mar 2025 05:13) (18 - 19)  SpO2: 94% (22 Mar 2025 05:13) (94% - 98%)    Parameters below as of 22 Mar 2025 05:13  Patient On (Oxygen Delivery Method): nasal cannula  O2 Flow (L/min): 5    I&O's Summary      TELE: SR 70-80s nonsustained 40s 2.9 sec pause Mobitz type 2?   PHYSICAL EXAM:  Constitutional: NAD, awake and alert  HEENT: Moist Mucous Membranes, Anicteric  Pulmonary: Non-labored, breath sounds are clear bilaterally, Diminished at bases No wheezing, rales or rhonchi  Cardiovascular: Regular, S1 and S2, No murmurs, No rubs, gallops or clicks  Gastrointestinal:  soft, nontender, nondistended   Lymph: +1 peripheral edema. No lymphadenopathy.   Skin: No visible rashes or ulcers.  Psych:  Mood & affect appropriate      LABS: All Labs Reviewed:                        8.6    7.38  )-----------( 465      ( 21 Mar 2025 05:37 )             27.0                         8.6    10.13 )-----------( 471      ( 20 Mar 2025 07:46 )             27.4                         7.7    8.15  )-----------( 551      ( 19 Mar 2025 13:52 )             24.6     21 Mar 2025 05:37    137    |  105    |  15     ----------------------------<  102    4.2     |  24     |  0.97   20 Mar 2025 07:46    138    |  105    |  12     ----------------------------<  111    4.3     |  26     |  1.10     Ca    8.7        21 Mar 2025 05:37  Ca    9.0        20 Mar 2025 07:46  Phos  3.3       21 Mar 2025 05:37  Phos  3.3       20 Mar 2025 07:46  Mg     1.9       21 Mar 2025 05:37  Mg     2.0       20 Mar 2025 07:46    TPro  6.3    /  Alb  2.1    /  TBili  0.3    /  DBili  x      /  AST  44     /  ALT  23     /  AlkPhos  78     21 Mar 2025 05:37  TPro  6.2    /  Alb  2.2    /  TBili  0.4    /  DBili  x      /  AST  81     /  ALT  30     /  AlkPhos  85     20 Mar 2025 07:46   LIVER FUNCTIONS - ( 21 Mar 2025 05:37 )  Alb: 2.1 g/dL / Pro: 6.3 g/dL / ALK PHOS: 78 U/L / ALT: 23 U/L / AST: 44 U/L / GGT: x           PTT - ( 21 Mar 2025 05:37 )  PTT:48.4 secTroponin I, High Sensitivity Result: 71584.5 ng/L (03-19-25 @ 13:52)  Troponin I, High Sensitivity Result: 88135.0 ng/L (03-19-25 @ 09:19)  Troponin I, High Sensitivity Result: 61651.9 ng/L (03-19-25 @ 06:05)    12 Lead ECG:   Ventricular Rate 77 BPM    Atrial Rate 77 BPM    P-R Interval 162 ms    QRS Duration 100 ms    Q-T Interval 372 ms    QTC Calculation(Bazett) 420 ms    P Axis 63 degrees    R Axis -14 degrees    T Axis -16 degrees    Diagnosis Line Normal sinus rhythm  Low voltage QRS  Inferior infarct (cited on or before 19-MAR-2025)  Abnormal ECG    Confirmed by Emily Littlejohn ([id number]) on 3/22/2025 7:11:56 AM (03-22-25 @ 03:33)      TRANSTHORACIC ECHOCARDIOGRAM REPORT  ________________________________________________________________________________                                      _______       Pt. Name:       JESSE BENSON ANGELITO Study Date:    3/20/2025  MRN:     [id number]                   YOB: 1960  Accession #:    [id number]                  Age:           64 years  Account#:       [id number]                 Gender:        M  Heart Rate:                                Height:        66.93in (170.00 cm)  Rhythm:                                    Weight:        205.03 lb (93.00 kg)  Blood Pressure: 106/64 mmHg                BSA/BMI:       2.04 m² / 32.18 kg/m²  ________________________________________________________________________________________  Referring Physician:    [id number] Oswaldo Snowden  Interpreting Physician: Alberto Gilliam M.D.  Primary Sonographer:    Emperatriz AVILA    CPT:               ECHO TTE WO CON COMP W DOPP - 23891.m  Indication(s):     Chest pain, unspecified - R07.9  Procedure:         Transthoracic echocardiogram with 2-D, M-mode and complete                     spectral and color flow Doppler.  Ordering Location: [facility]  Admission Status:  Inpatient    _______________________________________________________________________________________     CONCLUSIONS:      1. Left ventricular wall thickness is normal. Left ventricular systolic function is normal with an ejection fraction visually estimated at 60 to 65 %. There are no regional wall motion abnormalities seen.   2. Normal right ventricular cavity size, with normal wall thickness, and normal right ventricular systolic function.   3. Mild mitral regurgitation.   4. Fibrocalcific aortic valve sclerosis without stenosis.   5. The inferior vena cava is dilated measuring 2.44 cm in diameter, (dilated >2.1cm) with normal inspiratory collapse (normal >50%) consistent with mildly elevated right atrial pressure (~8, range 5-10mmHg).   6. Small pericardial effusion.   7. There is mild (grade 1) left ventricular diastolic dysfunction.   8. Compared to the transthoracic echocardiogram performed on 3/5/2025, there have been no significant interval changes.   9. Pulmonary artery systolic pressure could not be estimated.    ________________________________________________________________________________________  FINDINGS:     Left Ventricle:  Left ventricular wall thickness is normal. Left ventricular systolic function is normal with an ejection fraction visually estimated at 60 to 65%. There are no regional wall motion abnormalities seen. There is mild (grade 1) left ventricular diastolic dysfunction.     Right Ventricle:  The right ventricular cavity is normal in size, with normal wall thickness and right ventricular systolic function is normal.     Aortic Valve:  There is fibrocalcific aortic valve sclerosis without stenosis. There is no evidence of aortic regurgitation.     Mitral Valve:  Structurally normal mitral valve with normal leaflet excursion. There is mild mitral regurgitation.     Tricuspid Valve:  Structurally normal tricuspid valve with normal leaflet excursion. There is insufficient tricuspid regurgitation detected to calculate pulmonary artery systolic pressure.     Pulmonic Valve:  The pulmonic valve was not well visualized.     Pericardium:  There is a small pericardial effusion.     Systemic Veins:  The inferior vena cava is dilated measuring 2.44 cm in diameter, (dilated >2.1cm) with normal inspiratory collapse (normal >50%) consistent with mildly elevated right atrial pressure (~8, range 5-10mmHg).  ____________________________________________________________________  QUANTITATIVE DATA:  Left Ventricle Measurements: (Indexed to BSA)     Visualized LV EF%: 60 to 65%     MV E Vmax: 1.03 m/s  MV A Vmax: 1.33 m/s  MV E/A:    0.77  MV DT:     183 msec             Right Ventricle Measurements:     RV Base (RVID1): 2.8 cm    Mitral Valve Measurements:     MV E Vmax: 1.0 m/s  MV A Vmax: 1.3 m/s  MV E/A:    0.8       Tricuspid Valve Measurements:     RA Pressure: 8 mmHg    ________________________________________________________________________________________  Electronically signed on 3/21/2025 at 11:40:35 AM by Alberto Gilliam M.D.         *** Final ***   [facility] -- Charles Martinez, Cal, Andrei, Olga, Eron Pablo: Office # [phone]    Follow Up:  NSTEMI    Subjective/Observations: Patient seen and examined. Patient awake, alert, resting in side of bed. No complaints of chest pain, dyspnea, palpitations or dizziness. Tolerating O2 via nasal cannula. + ZHOU. Overnight ricki to 40s. Pt emotionally labile, upset at present. Reports he was light headed with walking, but O2 helps     REVIEW OF SYSTEMS: All other review of systems are negative unless indicated above    PAST MEDICAL & SURGICAL HISTORY:  Incisional hernia  2015      Gastric lymphoma      Anemia of chronic disease      S/P appendectomy  November 17th 2014      S/P cholecystectomy  15 years ago      MEDICATIONS  (STANDING):  apixaban 5 milliGRAM(s) Oral two times a day  benzonatate 100 milliGRAM(s) Oral three times a day  clopidogrel Tablet 75 milliGRAM(s) Oral daily  guaiFENesin Oral Liquid (Sugar-Free) 100 milliGRAM(s) Oral every 6 hours  influenza   Vaccine 0.5 milliLiter(s) IntraMuscular once  labetalol 100 milliGRAM(s) Oral two times a day  losartan 50 milliGRAM(s) Oral daily    MEDICATIONS  (PRN):    Allergies    Bactrim DS (Hives)    Intolerances      Vital Signs Last 24 Hrs  T(C): 36.5 (22 Mar 2025 05:13), Max: 37.2 (21 Mar 2025 19:25)  T(F): 97.7 (22 Mar 2025 05:13), Max: 99 (21 Mar 2025 19:25)  HR: 80 (22 Mar 2025 05:13) (76 - 81)  BP: 123/68 (22 Mar 2025 05:13) (100/63 - 129/80)  BP(mean): --  RR: 18 (22 Mar 2025 05:13) (18 - 19)  SpO2: 94% (22 Mar 2025 05:13) (94% - 98%)    Parameters below as of 22 Mar 2025 05:13  Patient On (Oxygen Delivery Method): nasal cannula  O2 Flow (L/min): 5    I&O's Summary      TELE: SR 70-80s nonsustained 40s 2.9 sec pause Mobitz type 2?   PHYSICAL EXAM:  Constitutional: NAD, awake and alert  HEENT: Moist Mucous Membranes, Anicteric  Pulmonary: Non-labored, breath sounds are clear bilaterally, Diminished at bases No wheezing, rales or rhonchi  Cardiovascular: Regular, S1 and S2, No murmurs, No rubs, gallops or clicks  Gastrointestinal:  soft, nontender, nondistended   Lymph: +1 peripheral edema. No lymphadenopathy.   Skin: No visible rashes or ulcers.  Psych:  Mood & affect appropriate      LABS: All Labs Reviewed:                        8.6    7.38  )-----------( 465      ( 21 Mar 2025 05:37 )             27.0                         8.6    10.13 )-----------( 471      ( 20 Mar 2025 07:46 )             27.4                         7.7    8.15  )-----------( 551      ( 19 Mar 2025 13:52 )             24.6     21 Mar 2025 05:37    137    |  105    |  15     ----------------------------<  102    4.2     |  24     |  0.97   20 Mar 2025 07:46    138    |  105    |  12     ----------------------------<  111    4.3     |  26     |  1.10     Ca    8.7        21 Mar 2025 05:37  Ca    9.0        20 Mar 2025 07:46  Phos  3.3       21 Mar 2025 05:37  Phos  3.3       20 Mar 2025 07:46  Mg     1.9       21 Mar 2025 05:37  Mg     2.0       20 Mar 2025 07:46    TPro  6.3    /  Alb  2.1    /  TBili  0.3    /  DBili  x      /  AST  44     /  ALT  23     /  AlkPhos  78     21 Mar 2025 05:37  TPro  6.2    /  Alb  2.2    /  TBili  0.4    /  DBili  x      /  AST  81     /  ALT  30     /  AlkPhos  85     20 Mar 2025 07:46   LIVER FUNCTIONS - ( 21 Mar 2025 05:37 )  Alb: 2.1 g/dL / Pro: 6.3 g/dL / ALK PHOS: 78 U/L / ALT: 23 U/L / AST: 44 U/L / GGT: x           PTT - ( 21 Mar 2025 05:37 )  PTT:48.4 secTroponin I, High Sensitivity Result: 02071.5 ng/L (03-19-25 @ 13:52)  Troponin I, High Sensitivity Result: 76465.0 ng/L (03-19-25 @ 09:19)  Troponin I, High Sensitivity Result: 88629.9 ng/L (03-19-25 @ 06:05)    12 Lead ECG:   Ventricular Rate 77 BPM    Atrial Rate 77 BPM    P-R Interval 162 ms    QRS Duration 100 ms    Q-T Interval 372 ms    QTC Calculation(Bazett) 420 ms    P Axis 63 degrees    R Axis -14 degrees    T Axis -16 degrees    Diagnosis Line Normal sinus rhythm  Low voltage QRS  Inferior infarct (cited on or before 19-MAR-2025)  Abnormal ECG    Confirmed by Emily Littlejohn ([id number]) on 3/22/2025 7:11:56 AM (03-22-25 @ 03:33)      TRANSTHORACIC ECHOCARDIOGRAM REPORT  ________________________________________________________________________________                                      _______       Pt. Name:       JESSE BENSON ANGELITO Study Date:    3/20/2025  MRN:     [id number]                   YOB: 1960  Accession #:    [id number]                  Age:           64 years  Account#:       [id number]                 Gender:        M  Heart Rate:                                Height:        66.93in (170.00 cm)  Rhythm:                                    Weight:        205.03 lb (93.00 kg)  Blood Pressure: 106/64 mmHg                BSA/BMI:       2.04 m² / 32.18 kg/m²  ________________________________________________________________________________________  Referring Physician:    [id number] Oswaldo Snowden  Interpreting Physician: Alberto Gilliam M.D.  Primary Sonographer:    Emperatriz AVILA    CPT:               ECHO TTE WO CON COMP W DOPP - 50324.m  Indication(s):     Chest pain, unspecified - R07.9  Procedure:         Transthoracic echocardiogram with 2-D, M-mode and complete                     spectral and color flow Doppler.  Ordering Location: [facility]  Admission Status:  Inpatient    _______________________________________________________________________________________     CONCLUSIONS:      1. Left ventricular wall thickness is normal. Left ventricular systolic function is normal with an ejection fraction visually estimated at 60 to 65 %. There are no regional wall motion abnormalities seen.   2. Normal right ventricular cavity size, with normal wall thickness, and normal right ventricular systolic function.   3. Mild mitral regurgitation.   4. Fibrocalcific aortic valve sclerosis without stenosis.   5. The inferior vena cava is dilated measuring 2.44 cm in diameter, (dilated >2.1cm) with normal inspiratory collapse (normal >50%) consistent with mildly elevated right atrial pressure (~8, range 5-10mmHg).   6. Small pericardial effusion.   7. There is mild (grade 1) left ventricular diastolic dysfunction.   8. Compared to the transthoracic echocardiogram performed on 3/5/2025, there have been no significant interval changes.   9. Pulmonary artery systolic pressure could not be estimated.    ________________________________________________________________________________________  FINDINGS:     Left Ventricle:  Left ventricular wall thickness is normal. Left ventricular systolic function is normal with an ejection fraction visually estimated at 60 to 65%. There are no regional wall motion abnormalities seen. There is mild (grade 1) left ventricular diastolic dysfunction.     Right Ventricle:  The right ventricular cavity is normal in size, with normal wall thickness and right ventricular systolic function is normal.     Aortic Valve:  There is fibrocalcific aortic valve sclerosis without stenosis. There is no evidence of aortic regurgitation.     Mitral Valve:  Structurally normal mitral valve with normal leaflet excursion. There is mild mitral regurgitation.     Tricuspid Valve:  Structurally normal tricuspid valve with normal leaflet excursion. There is insufficient tricuspid regurgitation detected to calculate pulmonary artery systolic pressure.     Pulmonic Valve:  The pulmonic valve was not well visualized.     Pericardium:  There is a small pericardial effusion.     Systemic Veins:  The inferior vena cava is dilated measuring 2.44 cm in diameter, (dilated >2.1cm) with normal inspiratory collapse (normal >50%) consistent with mildly elevated right atrial pressure (~8, range 5-10mmHg).  ____________________________________________________________________  QUANTITATIVE DATA:  Left Ventricle Measurements: (Indexed to BSA)     Visualized LV EF%: 60 to 65%     MV E Vmax: 1.03 m/s  MV A Vmax: 1.33 m/s  MV E/A:    0.77  MV DT:     183 msec             Right Ventricle Measurements:     RV Base (RVID1): 2.8 cm    Mitral Valve Measurements:     MV E Vmax: 1.0 m/s  MV A Vmax: 1.3 m/s  MV E/A:    0.8       Tricuspid Valve Measurements:     RA Pressure: 8 mmHg    ________________________________________________________________________________________  Electronically signed on 3/21/2025 at 11:40:35 AM by Alberto Gilliam M.D.         *** Final ***

## 2025-03-22 NOTE — DISCHARGE NOTE PROVIDER - NSDCMRMEDTOKEN_GEN_ALL_CORE_FT
Eliquis Starter Pack for Treatment of DVT and PE 5 mg oral tablet: 2 tab(s) orally 2 times a day Please take 10mg (2 tablets) twice a day for 7 days, and then 5mg (1 tablet) twice a day thereafter. MDD: 40mg  labetalol 100 mg oral tablet: 1 tab(s) orally 2 times a day  losartan 50 mg oral tablet: 1 tab(s) orally once a day   apixaban 5 mg oral tablet: 1 tab(s) orally 2 times a day  bumetanide 2 mg oral tablet: 1 tab(s) orally once a day  clopidogrel 75 mg oral tablet: 1 tab(s) orally once a day  labetalol 100 mg oral tablet: 1 tab(s) orally 2 times a day  Lipitor 40 mg oral tablet: 1 tab(s) orally once a day  losartan 50 mg oral tablet: 1 tab(s) orally once a day

## 2025-03-22 NOTE — PROGRESS NOTE ADULT - PROBLEM SELECTOR PROBLEM 7
Other specified personal risk factors, not elsewhere classified

## 2025-03-22 NOTE — DISCHARGE NOTE PROVIDER - NSDCCPCAREPLAN_GEN_ALL_CORE_FT
PRINCIPAL DISCHARGE DIAGNOSIS  Diagnosis: NSTEMI (non-ST elevation myocardial infarction)  Assessment and Plan of Treatment:       SECONDARY DISCHARGE DIAGNOSES  Diagnosis: Pleural effusion  Assessment and Plan of Treatment:     Diagnosis: Anemia  Assessment and Plan of Treatment:      PRINCIPAL DISCHARGE DIAGNOSIS  Diagnosis: NSTEMI (non-ST elevation myocardial infarction)  Assessment and Plan of Treatment: You were admitted for NSTEMI and were placed on a heparin drip for 48 hours where your troponin levels downtrended.   - PLEASE continue your home eliquis and START plavix 75mg once daily. The medication has been sent to your pharmacy with instructions.   Please follow up with your PCP and cardiologist within one week after discharge for further management.      SECONDARY DISCHARGE DIAGNOSES  Diagnosis: Pleural effusion  Assessment and Plan of Treatment: You were found to have pleural effusions and underwent thoracentesis. You were also given IV diuretics.   - PLEASE START ____  Please follow up with your PCP and cardiologist within one week after discharge for further management.    Diagnosis: Anemia  Assessment and Plan of Treatment: You were found to be anemic on admission and were given 1 unit PRBC.   Please follow up with your PCP and heme/onc within one week after discharge for further management.    Diagnosis: Carcinoma of gastrointestinal tract  Assessment and Plan of Treatment: You havw a history of gastric cancer. You were given 1 unit PRBC during your admission.   Please follow up with your Heme onc within one week after discharge for further management including pet scan and chemo.     PRINCIPAL DISCHARGE DIAGNOSIS  Diagnosis: NSTEMI (non-ST elevation myocardial infarction)  Assessment and Plan of Treatment: You were admitted for NSTEMI and were placed on a heparin drip for 48 hours where your troponin levels downtrended.   - PLEASE continue your home eliquis and START plavix 75mg once daily. Also start lipitor 40mg once daily.   The medication has been sent to your pharmacy with instructions.   Please follow up with your PCP and cardiologist within one week after discharge for further management.      SECONDARY DISCHARGE DIAGNOSES  Diagnosis: Pleural effusion  Assessment and Plan of Treatment: You were found to have pleural effusions and underwent thoracentesis. You were also given IV diuretics.   - PLEASE START bumex 2mg once daily. The medication has been sent to your pharmacy with instructions.   SEEK IMMEDIATE MEDICAL CARE IF YOU HAVE ANY OF THE FOLLOWING SYMPTOMS: shortness of breath, change in mental status, chest pain, lightheadedness/dizziness/fainting, or worsening of symptoms including not being able to conduct normal physical activity.   Please follow up with your PCP and cardiologist within one week after discharge for further management.      Diagnosis: Anemia  Assessment and Plan of Treatment: You were found to be anemic on admission and were given 1 unit PRBC. Your hemoglobin levels on discharge was 8.6.   Please follow up with your PCP and heme/onc within one week after discharge for further management.    Diagnosis: Carcinoma of gastrointestinal tract  Assessment and Plan of Treatment: You havw a history of gastric cancer. You were given 1 unit PRBC during your admission.   Please follow up with your Heme onc within one week after discharge for further management including pet scan and chemo.     PRINCIPAL DISCHARGE DIAGNOSIS  Diagnosis: NSTEMI (non-ST elevation myocardial infarction)  Assessment and Plan of Treatment: You were admitted for NSTEMI and were placed on a heparin drip for 48 hours where your troponin levels downtrended.   - PLEASE continue your home eliquis and START plavix 75mg once daily. Also start lipitor 40mg once daily.   The medication has been sent to your pharmacy with instructions.   Please follow up with your PCP and cardiologist within one week after discharge for further management.  For now you were treated with medical management. After seeing your hematologist and determining next steps with chemo / cancer treatment - you should follow up with cardiology to determine if you need a cardiac cath and possible additional cardiac medications.      SECONDARY DISCHARGE DIAGNOSES  Diagnosis: Pleural effusion  Assessment and Plan of Treatment: You were found to have pleural effusions and underwent thoracentesis. You were also given IV diuretics. The effusion is likely from the cancer.  Cytopathology: positive for malignant cells compatible with the metastatic carcinoma  - PLEASE START bumex 2mg once daily. The medication has been sent to your pharmacy with instructions.   CHECK YOUR KIDNEY FUNCTION (blood work) in 1 week to ensure your creatine remains stable while on diuretics  SEEK IMMEDIATE MEDICAL CARE IF YOU HAVE ANY OF THE FOLLOWING SYMPTOMS: shortness of breath, change in mental status, chest pain, lightheadedness/dizziness/fainting, or worsening of symptoms including not being able to conduct normal physical activity.   Please follow up with your PCP and cardiologist within one week after discharge for further management.      Diagnosis: Anemia  Assessment and Plan of Treatment: You were found to be anemic on admission and were given 1 unit PRBC. Your hemoglobin levels on discharge was 8.6.   Please follow up with your PCP and heme/onc within one week after discharge for further management.    Diagnosis: Carcinoma of gastrointestinal tract  Assessment and Plan of Treatment: You have a history of gastric cancer.   Please follow up with your Heme onc at your appointment tomorrow for further management including pet scan and chemo.

## 2025-03-22 NOTE — DISCHARGE NOTE PROVIDER - HOSPITAL COURSE
HPI:  64M with PMH of Poorly differentiated metastatic GI carcinoma (known cervical and axillary lymphadenopathy), HTN, Anemia, DVT (on eliquis), Pleural effusions presented to ED for SOB x2 weeks. SOB  worse with exertion. Denies chest pain. No history of diabetes. Father  of MI. Patient states he was supposed to have PET scan tomorrow and start chemo next week.  Of note, patient was admitted ~2 weeks ago for LUE DVT and L pleural effusion in which pleurex catheter was placed.  PMH- as stated above  PSH- appendectomy, hernia repair  Meds- reviewed and reconciled  Allergies- bactrim (hives)  SH- Smoked 1.5 packs/day for ~30 years (quit 15 years ago), no alcohol use, takes cannabis gummies  FH- mother: CHF, father:  of MI (19 Mar 2025 07:39)    - CTA Chest PE Protocol: Mediastinal LAD not significantly changed from 3/3/2025. Lt axillary LAD progressed from 3/3/2025. Suspicion punctate T8, T10 and T11 sclerotic foci, developed from 2024. Abdominal ascites.   ---  HOSPITAL COURSE: Patient admitted to telemetry floor for the management for NSTEMI and pleural effusion. Pulmonology was consulted. Thoracentesis was performed and obtained 560cc of pleural fluid from left thorax. Patient was started on trial of Bumex IV and was able to be weaned off of supplemental O2 without recurring symptoms. Patient was continued on .... Cardiology and interventional cardiology was consulted. EKG showing NSR at 76bpm. Trops elevated to [address] and then downtrended. Patient was  aspirin and brillinta loaded and started on heparin gtt. s/p Heparin gtt was switched to Plavix loaded and continued on plavix and Eliquis. TTE obtained shows LVEF 60-65%, no regional wall motion abnormalities, mild MR, mildly elevated right atrial pressure, small pericardial effusion, mild (grade 1) left ventricular diastolic dysfunction. Patient with hx of poorly differentiated metastatic gastric cancer on FOLFOX [id number]. Extensive discussion held with patient regarding the need for cardiac catheterization if cancer prognosis does not warrant a conservative approach. Patient also found with anemia hgb 7.7 on admission and was given 1 unit of pRBCs due to NSTEMI. Hgb improved to 8.8 and stable throughout hospital stay.     Pt seen and examined on day of discharge. Patient is medically optimized for discharge to home with close outpatient followup.    PHYSICAL EXAM ON DAY OF DISCHARGE:  The patient was seen and examined on the day of discharge. Please see progress note from day of discharge for further information.     ---  CONSULTANTS:   Cardio Dr. Charles Rivera/Onc Dr. Carrera   Pulmonology Dr. Damico     ---  TIME SPENT:  I, the attending physician, was physically present for the key portions of the evaluation and management (E/M) service provided. The total amount of time spent reviewing the hospital notes, laboratory values, imaging findings, assessing/counseling the patient, discussing with consultant physicians, social work, nursing staff was -- minutes    ---  Primary care provider was made aware of plan for discharge:      [  ] NO     [  ] YES   HPI:  64M with PMH of Poorly differentiated metastatic GI carcinoma (known cervical and axillary lymphadenopathy), HTN, Anemia, DVT (on eliquis), Pleural effusions presented to ED for SOB x2 weeks. SOB  worse with exertion. Denies chest pain. No history of diabetes. Father  of MI. Patient states he was supposed to have PET scan tomorrow and start chemo next week.  Of note, patient was admitted ~2 weeks ago for LUE DVT and L pleural effusion in which pleurex catheter was placed.  PMH- as stated above  PSH- appendectomy, hernia repair  Meds- reviewed and reconciled  Allergies- bactrim (hives)  SH- Smoked 1.5 packs/day for ~30 years (quit 15 years ago), no alcohol use, takes cannabis gummies  FH- mother: CHF, father:  of MI (19 Mar 2025 07:39)    - CTA Chest PE Protocol: Mediastinal LAD not significantly changed from 3/3/2025. Lt axillary LAD progressed from 3/3/2025. Suspicion punctate T8, T10 and T11 sclerotic foci, developed from 2024. Abdominal ascites.   ---  HOSPITAL COURSE: Patient admitted to telemetry floor for the management for NSTEMI and pleural effusion. Pulmonology was consulted. Thoracentesis was performed and obtained 560cc of pleural fluid from left thorax. Patient was started on trial of Bumex IV and was able to be weaned off of supplemental O2 without recurring symptoms. Patient was continued on .... Cardiology and interventional cardiology was consulted. EKG showing NSR at 76bpm. Trops elevated to [address] and then downtrended. Patient was  aspirin and brillinta loaded and started on heparin gtt. s/p Heparin gtt was switched to Plavix loaded and continued on plavix and Eliquis. TTE obtained shows LVEF 60-65%, no regional wall motion abnormalities, mild MR, mildly elevated right atrial pressure, small pericardial effusion, mild (grade 1) left ventricular diastolic dysfunction. Patient with hx of poorly differentiated metastatic gastric cancer on FOLFOX [id number]. Extensive discussion held with patient regarding the need for cardiac catheterization if cancer prognosis does not warrant a conservative approach. Patient also found with anemia hgb 7.7 on admission and was given 1 unit of pRBCs due to NSTEMI. Hgb improved to 8.8 and stable throughout hospital stay. Pt seen and examined on day of discharge. Patient is medically optimized for discharge to home with close outpatient followup.    Vital Signs Last 24 Hrs  T(C): 36.8 (23 Mar 2025 05:15), Max: 37.4 (22 Mar 2025 20:08)  T(F): 98.3 (23 Mar 2025 05:15), Max: 99.4 (22 Mar 2025 20:08)  HR: 90 (23 Mar 2025 06:00) (80 - 90)  BP: 117/67 (23 Mar 2025 06:00) (104/62 - 127/75)  BP(mean): --  RR: 19 (23 Mar 2025 06:00) (18 - 19)  SpO2: 94% (23 Mar 2025 06:00) (93% - 94%)    Parameters below as of 23 Mar 2025 06:00  Patient On (Oxygen Delivery Method): room air      GENERAL: NAD, sitting on chair comfortably   HEENT: anicteric, moist mucus membranes, + cervical lymphadenopathy   CHEST/LUNG: mild crackles RLL, unlabored breathing, no wheezing or rhonchi   HEART: Regular rate and rhythm; No murmurs, rubs, or gallops  ABDOMEN: Soft, Nontender, Nondistended  EXTREMITIES: No clubbing, cyanosis. +trace LLE pitting edema   NERVOUS SYSTEM:  Alert & Oriented X3, speech clear. No deficits   SKIN: No rashes or lesions    ---  CONSULTANTS:   Cardio Dr. Charles Rivera/Onc Dr. Carrera   Pulmonology Dr. Damico     ---  TIME SPENT:  I, the attending physician, was physically present for the key portions of the evaluation and management (E/M) service provided. The total amount of time spent reviewing the hospital notes, laboratory values, imaging findings, assessing/counseling the patient, discussing with consultant physicians, social work, nursing staff was -- minutes    ---  Primary care provider was made aware of plan for discharge:      [  ] NO     [  ] YES   HPI:  64M with PMH of Poorly differentiated metastatic GI carcinoma (known cervical and axillary lymphadenopathy), HTN, Anemia, DVT (on eliquis), Pleural effusions presented to ED for SOB x2 weeks. SOB  worse with exertion. Denies chest pain. No history of diabetes. Father  of MI. Patient states he was supposed to have PET scan tomorrow and start chemo next week.  Of note, patient was admitted ~2 weeks ago for LUE DVT and L pleural effusion in which pleurex catheter was placed.  PMH- as stated above  PSH- appendectomy, hernia repair  Meds- reviewed and reconciled  Allergies- bactrim (hives)  SH- Smoked 1.5 packs/day for ~30 years (quit 15 years ago), no alcohol use, takes cannabis gummies  FH- mother: CHF, father:  of MI (19 Mar 2025 07:39)    - CTA Chest PE Protocol: Mediastinal LAD not significantly changed from 3/3/2025. Lt axillary LAD progressed from 3/3/2025. Suspicion punctate T8, T10 and T11 sclerotic foci, developed from 2024. Abdominal ascites.   ---  HOSPITAL COURSE: Patient admitted to telemetry floor for the management for NSTEMI and pleural effusion. Pulmonology was consulted. Thoracentesis was performed and obtained 560cc of pleural fluid from left thorax. Patient was started on trial of Bumex IV and was able to be weaned off of supplemental O2 without recurring symptoms. Patient was continued on bumex IV medications while inpatient which was transitioned to PO on discharge. Cardiology and interventional cardiology was consulted. EKG showing NSR at 76bpm. Trops elevated to [address] and then downtrended. Patient was  aspirin and brillinta loaded and started on heparin gtt. s/p Heparin gtt was switched to Plavix loaded and continued on plavix and Eliquis. TTE obtained shows LVEF 60-65%, no regional wall motion abnormalities, mild MR, mildly elevated right atrial pressure, small pericardial effusion, mild (grade 1) left ventricular diastolic dysfunction. Patient with hx of poorly differentiated metastatic gastric cancer on FOLFOX [id number]. Extensive discussion held with patient regarding the need for cardiac catheterization if cancer prognosis does not warrant a conservative approach. Patient also found with anemia hgb 7.7 on admission and was given 1 unit of pRBCs due to NSTEMI. Hgb improved to 8.8 and stable throughout hospital stay. Pt seen and examined on day of discharge. Patient is medically optimized for discharge to home with close outpatient followup.    Vital Signs Last 24 Hrs  T(C): 36.8 (23 Mar 2025 05:15), Max: 37.4 (22 Mar 2025 20:08)  T(F): 98.3 (23 Mar 2025 05:15), Max: 99.4 (22 Mar 2025 20:08)  HR: 90 (23 Mar 2025 06:00) (80 - 90)  BP: 117/67 (23 Mar 2025 06:00) (104/62 - 127/75)  BP(mean): --  RR: 19 (23 Mar 2025 06:00) (18 - 19)  SpO2: 94% (23 Mar 2025 06:00) (93% - 94%)    Parameters below as of 23 Mar 2025 06:00  Patient On (Oxygen Delivery Method): room air      GENERAL: NAD, sitting on chair comfortably   HEENT: anicteric, moist mucus membranes, + cervical lymphadenopathy   CHEST/LUNG: mild crackles RLL, unlabored breathing, no wheezing or rhonchi   HEART: Regular rate and rhythm; No murmurs, rubs, or gallops  ABDOMEN: Soft, Nontender, Nondistended  EXTREMITIES: No clubbing, cyanosis. +trace LLE pitting edema   NERVOUS SYSTEM:  Alert & Oriented X3, speech clear. No deficits   SKIN: No rashes or lesions    ---  CONSULTANTS:   Cardio Dr. Charles Rivera/Onc Dr. Carrear   Pulmonology Dr. Damico     ---  TIME SPENT:  I, the attending physician, was physically present for the key portions of the evaluation and management (E/M) service provided. The total amount of time spent reviewing the hospital notes, laboratory values, imaging findings, assessing/counseling the patient, discussing with consultant physicians, social work, nursing staff was -- minutes    ---  Primary care provider was made aware of plan for discharge:      [  ] NO     [  ] YES   HPI:  64M with PMH of Poorly differentiated metastatic GI carcinoma (known cervical and axillary lymphadenopathy), HTN, Anemia, DVT (on eliquis), Pleural effusions presented to ED for SOB x2 weeks. SOB  worse with exertion. Denies chest pain. No history of diabetes. Father  of MI. Patient states he was supposed to have PET scan tomorrow and start chemo next week.  Of note, patient was admitted ~2 weeks ago for LUE DVT and L pleural effusion in which pleurex catheter was placed.  PMH- as stated above  PSH- appendectomy, hernia repair  Meds- reviewed and reconciled  Allergies- bactrim (hives)  SH- Smoked 1.5 packs/day for ~30 years (quit 15 years ago), no alcohol use, takes cannabis gummies  FH- mother: CHF, father:  of MI (19 Mar 2025 07:39)    - CTA Chest PE Protocol: Mediastinal LAD not significantly changed from 3/3/2025. Lt axillary LAD progressed from 3/3/2025. Suspicion punctate T8, T10 and T11 sclerotic foci, developed from 2024. Abdominal ascites.   ---  HOSPITAL COURSE: Patient admitted to telemetry floor for the management for NSTEMI and pleural effusion. Pulmonology was consulted. Thoracentesis was performed and obtained 560cc of pleural fluid from left thorax. Patient was started on trial of Bumex IV and was able to be weaned off of supplemental O2 without recurring symptoms. Patient was continued on bumex IV medications while inpatient which was transitioned to PO on discharge. Cardiology and interventional cardiology was consulted. EKG showing NSR at 76bpm. [place] elevated to [address] and then downtrended. Patient was  aspirin and brillinta loaded and started on heparin gtt. s/p Heparin gtt was switched to Plavix loaded and continued on plavix and Eliquis. TTE obtained shows LVEF 60-65%, no regional wall motion abnormalities, mild MR, mildly elevated right atrial pressure, small pericardial effusion, mild (grade 1) left ventricular diastolic dysfunction. Patient with hx of poorly differentiated metastatic gastric cancer on FOLFOX [id number]. Extensive discussion held with patient regarding the need for cardiac catheterization if cancer prognosis does not warrant a conservative approach. Patient also found with anemia hgb 7.7 on admission and was given 1 unit of pRBCs due to NSTEMI. Hgb improved to 8.8 and stable throughout hospital stay. Pt seen and examined on day of discharge. Patient is medically optimized for discharge to home with close outpatient followup.    Vital Signs Last 24 Hrs  T(C): 36.8 (23 Mar 2025 05:15), Max: 37.4 (22 Mar 2025 20:08)  T(F): 98.3 (23 Mar 2025 05:15), Max: 99.4 (22 Mar 2025 20:08)  HR: 90 (23 Mar 2025 06:00) (80 - 90)  BP: 117/67 (23 Mar 2025 06:00) (104/62 - 127/75)  BP(mean): --  RR: 19 (23 Mar 2025 06:00) (18 - 19)  SpO2: 94% (23 Mar 2025 06:00) (93% - 94%)    Parameters below as of 23 Mar 2025 06:00  Patient On (Oxygen Delivery Method): room air      GENERAL: NAD, sitting on chair comfortably   HEENT: anicteric, moist mucus membranes, + cervical lymphadenopathy   CHEST/LUNG: mild crackles RLL, unlabored breathing, no wheezing or rhonchi   HEART: Regular rate and rhythm; No murmurs, rubs, or gallops  ABDOMEN: Soft, Nontender, Nondistended  EXTREMITIES: No clubbing, cyanosis. +trace LLE pitting edema   NERVOUS SYSTEM:  Alert & Oriented X3, speech clear. No deficits   SKIN: No rashes or lesions    ---  CONSULTANTS:   Cardio Dr. Charles Rivera/Onc Dr. Carrera   Pulmonology Dr. Damico

## 2025-03-22 NOTE — PROGRESS NOTE ADULT - ASSESSMENT
64M with PMH of Poorly differentiated metastatic GI carcinoma (known cervical and axillary lymphadenopathy), HTN, Anemia, DVT (on eliquis), Pleural effusions presented to ED for SOB x2 weeks. Of note, patient was admitted ~2 weeks ago for LUE DVT and L pleural effusion in which pleurex catheter was placed. Vitals wnl. Labs significant for Hgb 8.5 (baseline ~7-8), platelets 568, Troponin 00350 (previously normal). EKG: NSR @ 76 bpm, no acute ST elevations. CTA PE protocol negative for PE. Admitted to medicine for NSTEMI and shortness of breath.

## 2025-03-22 NOTE — PROGRESS NOTE ADULT - PROBLEM SELECTOR PLAN 3
·  Problem: Pleural effusion.   ·  Plan: - CTA Chest PE Protocol: Moderate multiloculated LEFT pleural effusion diminished from 3/3/2025 and produces subtotal LLL passive atelectasis. Moderate dependent RIGHT pleural effusion, increased.  - Patient was admitted ~2 weeks ago for LUE DVT and L pleural effusion in which pleurex catheter was placed.  - Likely malignant in nature  - Patient on supplemental O2 via NC @ 5 L   - s/p thoracentesis 3/20. 560cc of yellow serosanguinous fluid.  - Bumetanide twice IV yesterday, will consider continuing Bumex pending cr levels

## 2025-03-22 NOTE — PROGRESS NOTE ADULT - PROBLEM SELECTOR PLAN 6
·  Problem: Hypertension.   ·  Plan: -C/w labetalol 100mg BID  - C/w losartan 50mg daily.  - Pt is hemodynamically stable
·  Problem: Hypertension.   ·  Plan: -C/w labetalol 100mg BID  -C/w losartan 50mg daily.  -Pt is hemodynamically stable
-C/w labetalol 100mg BID  -C/w losartan 50mg daily

## 2025-03-22 NOTE — PROGRESS NOTE ADULT - PROBLEM SELECTOR PLAN 1
·  Problem: NSTEMI (non-ST elevation myocardial infarction).   ·  Plan: - EKG: NSR @ 76 bpm, no acute ST elevations  - Elevated troponin on admission. Peaked to ~24k and downtrended   -TTE 03/06: LVEF: 61% aortic valve sclerosis. trace MR   - s/p Heparin gtt, and DAPT with aspirin / ticagrelor   - Continue on Eliquis and plavix therapy   - Interventional cardio (Dr. Contreras) recs no acute intervention   - Repeat TTE if EV is low, consider cardiac cath   -Per cardio, consider cardiac cath if cancer prognosis does not warrant a conservative approach

## 2025-03-22 NOTE — DISCHARGE NOTE PROVIDER - EXTENDED VTE YES NO FOR MLM ENOXAPARIN
Please review lab orders sign and close encounter. Shawna Navarro MA/JF    Lipid/BP appt 6/15/17   ,

## 2025-03-22 NOTE — PROGRESS NOTE ADULT - PROBLEM SELECTOR PROBLEM 2
Carcinoma of gastrointestinal tract

## 2025-03-22 NOTE — DISCHARGE NOTE PROVIDER - PROVIDER TOKENS
PROVIDER:[TOKEN:[[id number]]],FREE:[LAST:[Dr. malave],PHONE:[(   )    -],FAX:[(   )    -],ADDRESS:[Your heme/oncologist]],PROVIDER:[TOKEN:[[id number]]]

## 2025-03-22 NOTE — DISCHARGE NOTE PROVIDER - CARE PROVIDERS DIRECT ADDRESSES
,DirectAddress_Unknown,DirectAddress_Unknown,[email or web address]

## 2025-03-22 NOTE — PROGRESS NOTE ADULT - PROBLEM SELECTOR PLAN 2
·  Problem: Carcinoma of gastrointestinal tract.   ·  Plan: Known for poorly diff met gastric ca  - CTA Chest PE Protocol: Mediastinal LAD not significantly changed from 3/3/2025. Lt axillary LAD progressed from 3/3/2025. Suspicion punctate T8, T10 and T11 sclerotic foci, developed from 12/1/2024. Abdominal ascites.   -Abdominal U/S showed no evidence of ascites  -As per Heme/Onc note from 3/6, patient under care Dr. [facility], on FOLFOX chemo [id number]   - per pt, received his last chemo on Tuesday 3/18/25   - per pt, due for his PET scan later in 3/2025  - per pt, outpatient Heme/onc appointment this monday   - Heme/Onc consulted, recs appreciated.

## 2025-03-22 NOTE — PROGRESS NOTE ADULT - ATTENDING COMMENTS
64M with PMH of Poorly differentiated metastatic GI carcinoma (known cervical and axillary lymphadenopathy), HTN, Anemia, DVT (on eliquis), Pleural effusions presented to ED for SOB x2 weeks. Of note, patient was admitted ~2 weeks ago for LUE DVT and L pleural effusion in which pleurex catheter was placed. Vitals wnl.  Admitted to [facility] for NSTEMI. TTE performed. Cardio Dr Littlejohn following, recs appreciated. Heparin gtt completed, now on Eliquis (home med) and Plavix. Continue home meds: losartan, labetalol.   Also found to have acute hypoxic respiratory failure 2/2 pleural effusion. s/p L thoracentesis 560 cc - malignant pleural eff. Now on RA with appropriate Spo2. Continue Bumex 1mg IV BID (3/21 and 3/22). Reports improvement in SOB and ZHOU.  Patient has onc appt on Monday would like to attend for PET scan and further management - Will continue IV Bumex today for better symptom control. Spo2 >90% on RA, will check with ambulation. Reports feeling depressed but declines SSRI/psych consult.   Discussed with brother at bedside, aware and in agreement with above  Anemia of chronic disease - Maintain Hgb >8 in setting of ACS.  Continue conservative management -  patient aware to follow up with cardio outpatient for possible cardiac cath further management but would like to speak to his onc team first to determine next steps in cancer management.

## 2025-03-22 NOTE — CHART NOTE - NSCHARTNOTEFT_GEN_A_CORE
Called by RN for patient with bradycardia noted on telemetry monitoring. Per tele, patient noted to be bradycardic to recorded 34bpm, remained around 40s and stabilized to 70s on monitoring. Per RN patient asymptomatic, denying any acute complaints; endorses feeling better than he did yesterday. Patient arousable from sleep and answering questions appropriately with RN when asked.    T(C): 36.9 (03-22-25 @ 02:45), Max: 37.2 (03-21-25 @ 19:25)  HR: 76 (03-22-25 @ 02:45) (76 - 85)  BP: 129/80 (03-22-25 @ 02:45) (100/63 - 129/80)  RR: 19 (03-22-25 @ 02:45) (19 - 19)  SpO2: 95% (03-22-25 @ 02:45) (94% - 98%)  Wt(kg): --    Assessment/Plan  64M with PMH of Poorly differentiated metastatic GI carcinoma (known cervical and axillary lymphadenopathy), HTN, Anemia, DVT (on eliquis), Pleural effusions presented to ED for SOB x2 weeks. Admitted for NSTEMI and shortness of breath. Called by RN for bradycardia noted on telemetry.    - Tele strip noted to have what appears to be second-degree heart block (Mobitz Type I), given increasing AK interval and dropped beat (placed in chart for reference)  - Per RN patient asymptomatic during this time  - STAT EKG showing NSR with occasional PVCs, sinus @ 60s-70s  - Magnesium and phosphorus ordered for AM  - Cardiology following -> f/u recs  - RN to call if changes

## 2025-03-22 NOTE — DISCHARGE NOTE PROVIDER - CARE PROVIDER_API CALL
Mulugeta Delcid  Physician Assistant Services  [address] [place] [address]  Phone: [phone]  Fax: [phone]  Follow Up Time:     Dr. malave,   [facility] heme/oncologist  Phone: (   )    -  Fax: (   )    -  Follow Up Time:     Ellis Soto  Cardiovascular Disease  [address] [place] [address]  Phone: [phone]  Fax: [phone]  Follow Up Time:

## 2025-03-22 NOTE — PROGRESS NOTE ADULT - PROBLEM SELECTOR PLAN 5
·  Problem: Anemia of chronic disease.   ·  Plan: -Hgb 7.7 on 3/19, s/p 1 unit pRBC  - Repeat H&H: 8.6 & 27.4  - Maintain Hgb >8 in setting of ACS.

## 2025-03-22 NOTE — PROGRESS NOTE ADULT - TIME BILLING
Reviewing chart notes and data, reviewing telemetry monitor records, face to face time counseling the patient, communicating with cardio Dr Littlejohn continue IV bumex, coordinating care with SW/CM at [facility].   Pt seen and examined. Case discussed with resident. Agree with assessment and plan above (edited by me in detail)
Reviewing chart notes and data, reviewing telemetry monitor records, face to face time counseling the patient, communicating with cardio Dr Pablo, coordinating care with SW/CM at [facility].   Pt seen and examined. Case discussed with resident. Agree with assessment and plan above (edited by me in detail)
Reviewing chart notes and data, reviewing telemetry monitor records, face to face time counseling the patient, coordinating care with SW/CM at [facility].   Pt seen and examined. Case discussed with resident. Agree with assessment and plan above (edited by me in detail)

## 2025-03-22 NOTE — PROGRESS NOTE ADULT - SUBJECTIVE AND OBJECTIVE BOX
Patient is a 64y old  Male who presents with a chief complaint of NSTEMI, AHRF, pleural effusion (21 Mar 2025 11:48)      Subjective:  INTERVAL HPI/OVERNIGHT EVENTS: Patient seen and examined at bedside. No overnight events occurred. Overnight patient was noted to be bradycardic to 34bpm nonsustaining and improving to 70s. Patient was asymptomatic, was noted to have 2nd degree AV block (Mobitz Type 1). EKG showing NSR with occasional PVCs, sinus @ 60s-70s. This morning patient stated that he walked in the hallway without any difficulty, denies chest pain, palpitations, SOB, ZHOU, n/v. Endorses mild lightheadedness after receiving morning medications. Continued cough improving with Robitussin and tessalon perles.     MEDICATIONS  (STANDING):  apixaban 5 milliGRAM(s) Oral two times a day  benzonatate 100 milliGRAM(s) Oral three times a day  clopidogrel Tablet 75 milliGRAM(s) Oral daily  guaiFENesin Oral Liquid (Sugar-Free) 100 milliGRAM(s) Oral every 6 hours  influenza   Vaccine 0.5 milliLiter(s) IntraMuscular once  labetalol 100 milliGRAM(s) Oral two times a day  losartan 50 milliGRAM(s) Oral daily    MEDICATIONS  (PRN):      Allergies    Bactrim DS (Hives)    Intolerances        REVIEW OF SYSTEMS: as per HPI     Objective:  Vital Signs Last 24 Hrs  T(C): 36.3 (22 Mar 2025 11:43), Max: 37.2 (21 Mar 2025 19:25)  T(F): 97.3 (22 Mar 2025 11:43), Max: 99 (21 Mar 2025 19:25)  HR: 84 (22 Mar 2025 11:43) (76 - 84)  BP: 107/67 (22 Mar 2025 11:43) (100/63 - 129/80)  BP(mean): --  RR: 18 (22 Mar 2025 11:43) (18 - 19)  SpO2: 94% (22 Mar 2025 11:43) (94% - 98%)    Parameters below as of 22 Mar 2025 05:13  Patient On (Oxygen Delivery Method): nasal cannula  O2 Flow (L/min): 5      GENERAL: NAD, sitting on chair comfortably   HEENT: anicteric, moist mucus membranes, + cervical lymphadenopathy   CHEST/LUNG: mild crackles RLL, unlabored breathing, no wheezing or rhonchi   HEART: Regular rate and rhythm; No murmurs, rubs, or gallops  ABDOMEN: Soft, Nontender, Nondistended  EXTREMITIES: No clubbing, cyanosis. +trace LLE pitting edema   NERVOUS SYSTEM:  Alert & Oriented X3, speech clear. No deficits   SKIN: No rashes or lesions    LABS:                        8.8    8.16  )-----------( 481      ( 22 Mar 2025 09:00 )             28.3     CBC Full  -  ( 22 Mar 2025 09:00 )  WBC Count : 8.16 K/uL  Hemoglobin : 8.8 g/dL  Hematocrit : 28.3 %  Platelet Count - Automated : 481 K/uL  Mean Cell Volume : 67.9 fl  Mean Cell Hemoglobin : 21.1 pg  Mean Cell Hemoglobin Concentration : 31.1 g/dL  Auto Neutrophil # : x  Auto Lymphocyte # : x  Auto Monocyte # : x  Auto Eosinophil # : x  Auto Basophil # : x  Auto Neutrophil % : x  Auto Lymphocyte % : x  Auto Monocyte % : x  Auto Eosinophil % : x  Auto Basophil % : x    22 Mar 2025 09:00    136    |  102    |  17     ----------------------------<  115    3.9     |  26     |  1.10     Ca    9.3        22 Mar 2025 09:00  Phos  3.2       22 Mar 2025 09:00  Mg     2.1       22 Mar 2025 09:00    TPro  6.6    /  Alb  2.2    /  TBili  0.4    /  DBili  x      /  AST  31     /  ALT  25     /  AlkPhos  82     22 Mar 2025 09:00    PTT - ( 21 Mar 2025 05:37 )  PTT:48.4 sec  Urinalysis Basic - ( 22 Mar 2025 09:00 )    Color: x / Appearance: x / SG: x / pH: x  Gluc: 115 mg/dL / Ketone: x  / Bili: x / Urobili: x   Blood: x / Protein: x / Nitrite: x   Leuk Esterase: x / RBC: x / WBC x   Sq Epi: x / Non Sq Epi: x / Bacteria: x      CAPILLARY BLOOD GLUCOSE            Culture - Fungal, Body Fluid (collected 03-20-25 @ 12:46)  Source: Pleural Fl Pleural Fluid  Preliminary Report (03-22-25 @ 07:42):    No growth    Culture - Body Fluid with Gram Stain (collected 03-20-25 @ 12:46)  Source: Pleural Fl Pleural Fluid  Gram Stain (03-20-25 @ 21:48):    polymorphonuclear leukocytes seen    No organisms seen    by cytocentrifuge  Preliminary Report (03-21-25 @ 16:17):    No growth        RADIOLOGY & ADDITIONAL TESTS:    Personally reviewed.     Consultant(s) Notes Reviewed:  [x] YES  [ ] NO

## 2025-03-23 VITALS
RESPIRATION RATE: 19 BRPM | SYSTOLIC BLOOD PRESSURE: 110 MMHG | TEMPERATURE: 98 F | OXYGEN SATURATION: 95 % | DIASTOLIC BLOOD PRESSURE: 65 MMHG | HEART RATE: 83 BPM

## 2025-03-23 LAB
A1C WITH ESTIMATED AVERAGE GLUCOSE RESULT: 5.4 % — SIGNIFICANT CHANGE UP (ref 4–5.6)
ALBUMIN SERPL ELPH-MCNC: 2.3 G/DL — LOW (ref 3.3–5)
ALP SERPL-CCNC: 84 U/L — SIGNIFICANT CHANGE UP (ref 40–120)
ALT FLD-CCNC: 27 U/L — SIGNIFICANT CHANGE UP (ref 12–78)
ANION GAP SERPL CALC-SCNC: 8 MMOL/L — SIGNIFICANT CHANGE UP (ref 5–17)
AST SERPL-CCNC: 27 U/L — SIGNIFICANT CHANGE UP (ref 15–37)
BILIRUB SERPL-MCNC: 0.3 MG/DL — SIGNIFICANT CHANGE UP (ref 0.2–1.2)
BUN SERPL-MCNC: 21 MG/DL — SIGNIFICANT CHANGE UP (ref 7–23)
CALCIUM SERPL-MCNC: 9 MG/DL — SIGNIFICANT CHANGE UP (ref 8.5–10.1)
CHLORIDE SERPL-SCNC: 103 MMOL/L — SIGNIFICANT CHANGE UP (ref 96–108)
CHOLEST SERPL-MCNC: 186 MG/DL — SIGNIFICANT CHANGE UP
CO2 SERPL-SCNC: 25 MMOL/L — SIGNIFICANT CHANGE UP (ref 22–31)
CREAT SERPL-MCNC: 1.1 MG/DL — SIGNIFICANT CHANGE UP (ref 0.5–1.3)
EGFR: 75 ML/MIN/1.73M2 — SIGNIFICANT CHANGE UP
EGFR: 75 ML/MIN/1.73M2 — SIGNIFICANT CHANGE UP
ESTIMATED AVERAGE GLUCOSE: 108 MG/DL — SIGNIFICANT CHANGE UP (ref 68–114)
GLUCOSE SERPL-MCNC: 100 MG/DL — HIGH (ref 70–99)
HCT VFR BLD CALC: 27.3 % — LOW (ref 39–50)
HDLC SERPL-MCNC: 32 MG/DL — LOW
HGB BLD-MCNC: 8.6 G/DL — LOW (ref 13–17)
LIPID PNL WITH DIRECT LDL SERPL: 129 MG/DL — HIGH
MAGNESIUM SERPL-MCNC: 2.1 MG/DL — SIGNIFICANT CHANGE UP (ref 1.6–2.6)
MCHC RBC-ENTMCNC: 20.9 PG — LOW (ref 27–34)
MCHC RBC-ENTMCNC: 31.5 G/DL — LOW (ref 32–36)
MCV RBC AUTO: 66.4 FL — LOW (ref 80–100)
NON HDL CHOLESTEROL: 153 MG/DL — HIGH
NRBC BLD AUTO-RTO: 0 /100 WBCS — SIGNIFICANT CHANGE UP (ref 0–0)
PHOSPHATE SERPL-MCNC: 3 MG/DL — SIGNIFICANT CHANGE UP (ref 2.5–4.5)
PLATELET # BLD AUTO: 448 K/UL — HIGH (ref 150–400)
POTASSIUM SERPL-MCNC: 3.7 MMOL/L — SIGNIFICANT CHANGE UP (ref 3.5–5.3)
POTASSIUM SERPL-SCNC: 3.7 MMOL/L — SIGNIFICANT CHANGE UP (ref 3.5–5.3)
PROT SERPL-MCNC: 6.7 G/DL — SIGNIFICANT CHANGE UP (ref 6–8.3)
RBC # BLD: 4.11 M/UL — LOW (ref 4.2–5.8)
RBC # FLD: 23.5 % — HIGH (ref 10.3–14.5)
SODIUM SERPL-SCNC: 136 MMOL/L — SIGNIFICANT CHANGE UP (ref 135–145)
TRIGL SERPL-MCNC: 133 MG/DL — SIGNIFICANT CHANGE UP
WBC # BLD: 7.4 K/UL — SIGNIFICANT CHANGE UP (ref 3.8–10.5)
WBC # FLD AUTO: 7.4 K/UL — SIGNIFICANT CHANGE UP (ref 3.8–10.5)

## 2025-03-23 PROCEDURE — C1729: CPT

## 2025-03-23 PROCEDURE — 82272 OCCULT BLD FECES 1-3 TESTS: CPT

## 2025-03-23 PROCEDURE — 84157 ASSAY OF PROTEIN OTHER: CPT

## 2025-03-23 PROCEDURE — 83735 ASSAY OF MAGNESIUM: CPT

## 2025-03-23 PROCEDURE — 99291 CRITICAL CARE FIRST HOUR: CPT

## 2025-03-23 PROCEDURE — 86900 BLOOD TYPING SEROLOGIC ABO: CPT

## 2025-03-23 PROCEDURE — 88305 TISSUE EXAM BY PATHOLOGIST: CPT

## 2025-03-23 PROCEDURE — 85652 RBC SED RATE AUTOMATED: CPT

## 2025-03-23 PROCEDURE — 87015 SPECIMEN INFECT AGNT CONCNTJ: CPT

## 2025-03-23 PROCEDURE — P9016: CPT

## 2025-03-23 PROCEDURE — 82607 VITAMIN B-12: CPT

## 2025-03-23 PROCEDURE — 82042 OTHER SOURCE ALBUMIN QUAN EA: CPT

## 2025-03-23 PROCEDURE — 83880 ASSAY OF NATRIURETIC PEPTIDE: CPT

## 2025-03-23 PROCEDURE — 89051 BODY FLUID CELL COUNT: CPT

## 2025-03-23 PROCEDURE — 84100 ASSAY OF PHOSPHORUS: CPT

## 2025-03-23 PROCEDURE — 80061 LIPID PANEL: CPT

## 2025-03-23 PROCEDURE — 85730 THROMBOPLASTIN TIME PARTIAL: CPT

## 2025-03-23 PROCEDURE — 99239 HOSP IP/OBS DSCHRG MGMT >30: CPT | Mod: GC

## 2025-03-23 PROCEDURE — 85025 COMPLETE CBC W/AUTO DIFF WBC: CPT

## 2025-03-23 PROCEDURE — 85610 PROTHROMBIN TIME: CPT

## 2025-03-23 PROCEDURE — 83615 LACTATE (LD) (LDH) ENZYME: CPT

## 2025-03-23 PROCEDURE — 87070 CULTURE OTHR SPECIMN AEROBIC: CPT

## 2025-03-23 PROCEDURE — 83986 ASSAY PH BODY FLUID NOS: CPT

## 2025-03-23 PROCEDURE — 36430 TRANSFUSION BLD/BLD COMPNT: CPT

## 2025-03-23 PROCEDURE — 88112 CYTOPATH CELL ENHANCE TECH: CPT

## 2025-03-23 PROCEDURE — 86140 C-REACTIVE PROTEIN: CPT

## 2025-03-23 PROCEDURE — 71045 X-RAY EXAM CHEST 1 VIEW: CPT

## 2025-03-23 PROCEDURE — 87075 CULTR BACTERIA EXCEPT BLOOD: CPT

## 2025-03-23 PROCEDURE — 86923 COMPATIBILITY TEST ELECTRIC: CPT

## 2025-03-23 PROCEDURE — 82746 ASSAY OF FOLIC ACID SERUM: CPT

## 2025-03-23 PROCEDURE — 87205 SMEAR GRAM STAIN: CPT

## 2025-03-23 PROCEDURE — 84484 ASSAY OF TROPONIN QUANT: CPT

## 2025-03-23 PROCEDURE — 32555 ASPIRATE PLEURA W/ IMAGING: CPT

## 2025-03-23 PROCEDURE — 87637 SARSCOV2&INF A&B&RSV AMP PRB: CPT

## 2025-03-23 PROCEDURE — 99233 SBSQ HOSP IP/OBS HIGH 50: CPT

## 2025-03-23 PROCEDURE — 87102 FUNGUS ISOLATION CULTURE: CPT

## 2025-03-23 PROCEDURE — 93306 TTE W/DOPPLER COMPLETE: CPT

## 2025-03-23 PROCEDURE — 83036 HEMOGLOBIN GLYCOSYLATED A1C: CPT

## 2025-03-23 PROCEDURE — 93005 ELECTROCARDIOGRAM TRACING: CPT

## 2025-03-23 PROCEDURE — 85027 COMPLETE CBC AUTOMATED: CPT

## 2025-03-23 PROCEDURE — 88341 IMHCHEM/IMCYTCHM EA ADD ANTB: CPT

## 2025-03-23 PROCEDURE — 80053 COMPREHEN METABOLIC PANEL: CPT

## 2025-03-23 PROCEDURE — 76705 ECHO EXAM OF ABDOMEN: CPT

## 2025-03-23 PROCEDURE — 83020 HEMOGLOBIN ELECTROPHORESIS: CPT

## 2025-03-23 PROCEDURE — 88342 IMHCHEM/IMCYTCHM 1ST ANTB: CPT

## 2025-03-23 PROCEDURE — 71275 CT ANGIOGRAPHY CHEST: CPT | Mod: MC

## 2025-03-23 PROCEDURE — 82945 GLUCOSE OTHER FLUID: CPT

## 2025-03-23 PROCEDURE — 84145 PROCALCITONIN (PCT): CPT

## 2025-03-23 PROCEDURE — 86850 RBC ANTIBODY SCREEN: CPT

## 2025-03-23 PROCEDURE — 86901 BLOOD TYPING SEROLOGIC RH(D): CPT

## 2025-03-23 PROCEDURE — 36415 COLL VENOUS BLD VENIPUNCTURE: CPT

## 2025-03-23 PROCEDURE — 96360 HYDRATION IV INFUSION INIT: CPT

## 2025-03-23 RX ORDER — CLOPIDOGREL BISULFATE 75 MG/1
1 TABLET, FILM COATED ORAL
Qty: 30 | Refills: 0
Start: 2025-03-23 | End: 2025-04-21

## 2025-03-23 RX ORDER — BUMETANIDE 1 MG/1
1 TABLET ORAL ONCE
Refills: 0 | Status: COMPLETED | OUTPATIENT
Start: 2025-03-23 | End: 2025-03-23

## 2025-03-23 RX ORDER — BUMETANIDE 1 MG/1
1 TABLET ORAL
Qty: 30 | Refills: 0
Start: 2025-03-23 | End: 2025-04-21

## 2025-03-23 RX ORDER — ATORVASTATIN CALCIUM 80 MG/1
1 TABLET, FILM COATED ORAL
Qty: 30 | Refills: 0
Start: 2025-03-23 | End: 2025-04-21

## 2025-03-23 RX ORDER — BUMETANIDE 1 MG/1
2 TABLET ORAL ONCE
Refills: 0 | Status: COMPLETED | OUTPATIENT
Start: 2025-03-23 | End: 2025-03-23

## 2025-03-23 RX ORDER — APIXABAN 2.5 MG/1
1 TABLET, FILM COATED ORAL
Qty: 0 | Refills: 0 | DISCHARGE
Start: 2025-03-23

## 2025-03-23 RX ORDER — BUMETANIDE 1 MG/1
1 TABLET ORAL ONCE
Refills: 0 | Status: DISCONTINUED | OUTPATIENT
Start: 2025-03-23 | End: 2025-03-23

## 2025-03-23 RX ADMIN — Medication 100 MILLIGRAM(S): at 11:39

## 2025-03-23 RX ADMIN — APIXABAN 5 MILLIGRAM(S): 2.5 TABLET, FILM COATED ORAL at 16:56

## 2025-03-23 RX ADMIN — DEXTROMETHORPHAN HBR, GUAIFENESIN 100 MILLIGRAM(S): 200 LIQUID ORAL at 11:38

## 2025-03-23 RX ADMIN — LABETALOL HYDROCHLORIDE 100 MILLIGRAM(S): 200 TABLET, FILM COATED ORAL at 06:18

## 2025-03-23 RX ADMIN — CLOPIDOGREL BISULFATE 75 MILLIGRAM(S): 75 TABLET, FILM COATED ORAL at 11:39

## 2025-03-23 RX ADMIN — BUMETANIDE 1 MILLIGRAM(S): 1 TABLET ORAL at 12:18

## 2025-03-23 RX ADMIN — LOSARTAN POTASSIUM 50 MILLIGRAM(S): 100 TABLET, FILM COATED ORAL at 06:19

## 2025-03-23 RX ADMIN — LABETALOL HYDROCHLORIDE 100 MILLIGRAM(S): 200 TABLET, FILM COATED ORAL at 16:56

## 2025-03-23 RX ADMIN — DEXTROMETHORPHAN HBR, GUAIFENESIN 100 MILLIGRAM(S): 200 LIQUID ORAL at 06:18

## 2025-03-23 RX ADMIN — BUMETANIDE 2 MILLIGRAM(S): 1 TABLET ORAL at 16:56

## 2025-03-23 RX ADMIN — Medication 100 MILLIGRAM(S): at 06:18

## 2025-03-23 RX ADMIN — APIXABAN 5 MILLIGRAM(S): 2.5 TABLET, FILM COATED ORAL at 06:18

## 2025-03-23 RX ADMIN — BUMETANIDE 1 MILLIGRAM(S): 1 TABLET ORAL at 11:38

## 2025-03-23 NOTE — PROGRESS NOTE ADULT - ASSESSMENT
64M with PMH of Poorly differentiated metastatic GI carcinoma (known cervical and axillary lymphadenopathy), HTN, Anemia, DVT (on eliquis), Pleural effusions presented to ED for SOB x2 weeks. SOB  worse with exertion    pleural eff  mets ca  weakness  anemia  ascites  dyspnea  htn  dvt    560 cc - malignant pleural eff - left side -   dc planning  follow up with Dr Hammond - [facility] office - [facility]     ct neg for pe - eff - atelectasis - evidence of mets disease  onc eval  cardio eval  trend trops  ACS eval  I moustapha  fio2 support as needed - goal sat > 88 pct  cvs rx regimen and BP control  tele monitoring  HD support and monitoring

## 2025-03-23 NOTE — DISCHARGE NOTE NURSING/CASE MANAGEMENT/SOCIAL WORK - FINANCIAL ASSISTANCE
[facility] provides services at a reduced cost to those who are determined to be eligible through [facility]’s financial assistance program. Information regarding [facility]’s financial assistance program can be found by going to [email or web address] or by calling [phone].

## 2025-03-23 NOTE — PROGRESS NOTE ADULT - SUBJECTIVE AND OBJECTIVE BOX
Date/Time Patient Seen:  		  Referring MD:   Data Reviewed	       Patient is a 64y old  Male who presents with a chief complaint of NSTEMI, AHRF 2/2 pleural effusion  (22 Mar 2025 14:39)      Subjective/HPI     PAST MEDICAL & SURGICAL HISTORY:  No pertinent past medical history    Incisional hernia  2015    Gastric lymphoma    Anemia of chronic disease    No significant past surgical history    S/P appendectomy  November 17th 2014    S/P cholecystectomy  15 years ago          Medication list         MEDICATIONS  (STANDING):  apixaban 5 milliGRAM(s) Oral two times a day  benzonatate 100 milliGRAM(s) Oral three times a day  clopidogrel Tablet 75 milliGRAM(s) Oral daily  guaiFENesin Oral Liquid (Sugar-Free) 100 milliGRAM(s) Oral every 6 hours  influenza   Vaccine 0.5 milliLiter(s) IntraMuscular once  labetalol 100 milliGRAM(s) Oral two times a day  losartan 50 milliGRAM(s) Oral daily    MEDICATIONS  (PRN):         Vitals log        ICU Vital Signs Last 24 Hrs  T(C): 36.8 (23 Mar 2025 05:15), Max: 37.4 (22 Mar 2025 20:08)  T(F): 98.3 (23 Mar 2025 05:15), Max: 99.4 (22 Mar 2025 20:08)  HR: 90 (23 Mar 2025 06:00) (80 - 90)  BP: 117/67 (23 Mar 2025 06:00) (104/62 - 127/75)  BP(mean): --  ABP: --  ABP(mean): --  RR: 19 (23 Mar 2025 06:00) (18 - 19)  SpO2: 94% (23 Mar 2025 06:00) (93% - 94%)    O2 Parameters below as of 23 Mar 2025 06:00  Patient On (Oxygen Delivery Method): room air                 Input and Output:  I&O's Detail    22 Mar 2025 07:01  -  23 Mar 2025 07:00  --------------------------------------------------------  IN:  Total IN: 0 mL    OUT:    Voided (mL): 2400 mL  Total OUT: 2400 mL    Total NET: -2400 mL          Lab Data                        8.8    8.16  )-----------( 481      ( 22 Mar 2025 09:00 )             28.3     03-22    136  |  102  |  17  ----------------------------<  115[H]  3.9   |  26  |  1.10    Ca    9.3      22 Mar 2025 09:00  Phos  3.2     03-22  Mg     2.1     03-22    TPro  6.6  /  Alb  2.2[L]  /  TBili  0.4  /  DBili  x   /  AST  31  /  ALT  25  /  AlkPhos  82  03-22            Review of Systems	      Objective     Physical Examination    heart s1s2  lung dc bS  head nc  head at  abd soft  verbal  alert  on RA      Pertinent Lab findings & Imaging      Jameson:  NO   Adequate UO     I&O's Detail    22 Mar 2025 07:01  -  23 Mar 2025 07:00  --------------------------------------------------------  IN:  Total IN: 0 mL    OUT:    Voided (mL): 2400 mL  Total OUT: 2400 mL    Total NET: -2400 mL               Discussed with:     Cultures:	        Radiology

## 2025-03-23 NOTE — DISCHARGE NOTE NURSING/CASE MANAGEMENT/SOCIAL WORK - NSDCVIVACCINE_GEN_ALL_CORE_FT
influenza, injectable, quadrivalent, preservative free; 26-Nov-2014 17:06; Dulce Hair (RN); [employer]; ; IntraMuscular; Deltoid Left.; 0.5 milliLiter(s);   Tdap; 08-Apr-2021 16:20; Lilly OlsonRN); [employer]; [id number] (Exp. Date: 10-Shehkar-2022); IntraMuscular; Deltoid Left.; 0.5 milliLiter(s); VIS (VIS Published: 09-May-2013, VIS Presented: 08-Apr-2021);

## 2025-03-23 NOTE — PROGRESS NOTE ADULT - NS ATTEND AMEND GEN_ALL_CORE FT
64M PMH poorly differentiated metastatic (known cervical and axillary lymphadenopathy) GI carcinoma on chemo, HTN, and anemia, recent admission for PLEF and DVT of LUE currently on Eliquis p/w SOB that has worsened over past 2 weeks. Cardiology consulted for SOB 2/2 Likely both NSTEMI and BL pleural effusions.        - Troponin elev 39819, peaked at 24,257 now downtrending 87473.5   - s/p ASA and Brilinta loaded,  on hep gtt currently on hold pre thora   - Continue ASA 81 mg PO daily  - continue brillinta 90 mg PO BID   - no anginal complaints during my exam   - Dr. Soto had an extensive discussion regarding appropriate management of apparent NSTEMI, given his metastatic malignancy, anemia, his lack of acute ischemic sxs and overall stability at present, will manage his NSTEMI conservatively  - f/u TTE , if there is an reduction in his EF will reconsider cardiac cath   - pulmonology following plan for repeat Left thoracentesis today with IR   - Patient optimized for procedure.  Is having an nSTEMI that is being medically managed, but should not preclude thoracentesis with local anesthesia.  NO cardiac contraindication to proceeding with procedeure.      - Continue home meds: losartan, labetalol.
64M PMH poorly differentiated metastatic (known cervical and axillary lymphadenopathy) GI carcinoma on chemo, HTN, and anemia, recent admission for PLEF and DVT of LUE currently on Eliquis p/w SOB that has worsened over past 2 weeks. Cardiology consulted for SOB 2/2 Likely both NSTEMI and BL pleural effusions.    NSTEMI  - EKG: NSR w/ PACs.   - Troponin elev 50865, peaked at 24,257 now downtrending  - IC notified, Dr. Contreras, will continue with medical management for now.  - s/p ASA and Brilinta loaded,   - On Plavix +Eliquis now  - Monitor for new or worsening sx of ischemia  - Dr. Soto had an extensive discussion regarding appropriate management of apparent NSTEMI, given his metastatic malignancy, anemia, his lack of acute ischemic sxs and overall stability at present  - Would consider cath if heme/onc believes that his cancer prognosis does not warrant a conservative approach, and that the need for mandatory DAPT  etc would not be an obstacle to his oncologic care    - CTA Negative for PE  - TTE (3/06) LVEF: 61% aortic valve sclerosis, trace MR.  - Mild vol ol given ZHOU, mild orthopnea. LE edema persists.   - S/p IV Bumex 3/21 and 3/22 AM, 3/23  - To be given 2mg IV Bumex today. Can give additional dose this afternoon as patient would like to see his oncologist tomorrow as OP    - CTA: Moderate multiloculated LEFT pleural effusion diminished from 3/3/2025 and produces subtotal LLL passive atelectasis. Moderate dependent RIGHT pleural effusion, increased.  Abdominal ascites, developed  - Suspect malignant effusion that has reaccumulated given exudative effusion with malignant cells on prior tap  - Pt required chest tube on last admission and had a L thoracentesis on 3/4 that drained 3.1L of fluid  - Cytopathology: positive for malignant cells compatible with the metastatic carcinoma  - Pulmonology following. s/p Left thoracentesis 3/20 560 ml.
64M PMH poorly differentiated metastatic (known cervical and axillary lymphadenopathy) GI carcinoma on chemo, HTN, and anemia, recent admission for PLEF and DVT of LUE currently on Eliquis p/w SOB that has worsened over past 2 weeks. Cardiology consulted for SOB 2/2 Likely both NSTEMI and BL pleural effusions.    NSTEMI  - EKG: NSR w/ PACs.   - Troponin elev 09304, peaked at 24,257 now downtrending  - IC notified, Dr. Contreras, will continue with medical management for now.  - s/p ASA and Brilinta loaded,   - On Plavix +Eliquis now  - Monitor for new or worsening sx of ischemia  - Dr. Soto had an extensive discussion regarding appropriate management of apparent NSTEMI, given his metastatic malignancy, anemia, his lack of acute ischemic sxs and overall stability at present  - Would consider cath if heme/onc believes that his cancer prognosis does not warrant a conservative approach, and that the need for mandatory DAPT  etc would not be an obstacle to his oncologic care    - CTA Negative for PE  - TTE (3/06) LVEF: 61% aortic valve sclerosis, trace MR.  - Mild vol ol given ZHOU, mild orthopnea.   - S/p IV Bumex 3/21 and 3/22 AM. Would repeat dose this afternoon if labs show normal Cr (still pending)    - CTA: Moderate multiloculated LEFT pleural effusion diminished from 3/3/2025 and produces subtotal LLL passive atelectasis. Moderate dependent RIGHT pleural effusion, increased.  Abdominal ascites, developed  - Suspect malignant effusion that has reaccumulated given exudative effusion with malignant cells on prior tap  - Pt required chest tube on last admission and had a L thoracentesis on 3/4 that drained 3.1L of fluid  - Cytopathology: positive for malignant cells compatible with the metastatic carcinoma  - Pulmonology following. s/p Left thoracentesis 3/20 560 ml.
I have personally seen and examined the patient in detail.  I have spoken to the provider regarding the assessment and plan of care.  I have made changes to the note accordingly.    64M PMH poorly differentiated metastatic (known cervical and axillary lymphadenopathy) GI carcinoma on chemo, HTN, and anemia, recent admission for PLEF and DVT of LUE currently on Eliquis p/w SOB that has worsened over past 2 weeks. Cardiology consulted for SOB 2/2 Likely both NSTEMI and BL pleural effusions.    NSTEMI  - EKG: NSR w/ PACs.   - Troponin elev 46235, peaked at 24,257 now downtrending  - IC notified, Dr. Contreras, will continue with medical management for now.  - s/p ASA and Brilinta loaded,   - On hep gtt  - Continue ASA 81 mg PO daily  - Continue Brilinta 90 mg PO BID   - Monitor for new or worsening sx of ischemia  - Dr. Soto had an extensive discussion regarding appropriate management of apparent NSTEMI, given his metastatic malignancy, anemia, his lack of acute ischemic sxs and overall stability at present  - Would consider cath if heme/onc believes that his cancer prognosis does not warrant a conservative approach, and that the need for mandatory DAPT  etc would not be an obstacle to his oncologic care    - CTA Negative for PE  - TTE (3/06) LVEF: 61% aortic valve sclerosis, trace MR.  - Mild vol ol given ZHOU, mild orthopnea.   - Would trial Bumex 1 mg IV  x  1     - CTA: Moderate multiloculated LEFT pleural effusion diminished from 3/3/2025 and produces subtotal LLL passive atelectasis. Moderate dependent RIGHT pleural effusion, increased.  Abdominal ascites, developed  - Suspect malignant effusion that has reaccumulated given exudative effusion with malignant cells on prior tap  - Pt required chest tube on last admission and had a L thoracentesis on 3/4 that drained 3.1L of fluid  - Cytopathology: positive for malignant cells compatible with the metastatic carcinoma  - Pulmonology following. s/p Left thoracentesis 3/20 560 ml

## 2025-03-23 NOTE — DISCHARGE NOTE NURSING/CASE MANAGEMENT/SOCIAL WORK - PATIENT PORTAL LINK FT
You can access the FollowMyHealth Patient Portal offered by [facility] by registering at the following website: [email or web address]. By joining [facility]’s FollowMyHealth portal, you will also be able to view your health information using other applications (apps) compatible with our system.

## 2025-03-23 NOTE — PROGRESS NOTE ADULT - ASSESSMENT
64M PMH poorly differentiated metastatic (known cervical and axillary lymphadenopathy) GI carcinoma on chemo, HTN, and anemia, recent admission for PLEF and DVT of LUE currently on Eliquis p/w SOB that has worsened over past 2 weeks. Cardiology consulted for SOB 2/2 Likely both NSTEMI and BL pleural effusions.    NSTEMI  - p/w worsening ZHOU, elev trop   - EKG: NSR w/ PACs.   - Troponin elev 20395, peaked at 24,257 now downtrending  - IC notified, Dr. Contreras, will continue with medical management for now.  - s/p ASA and Brilinta loaded. S/p heparin gtt. Now s/p Plavix 300 mg and is on daily Plavix and Eliquis   - Dr. Soto had an extensive discussion regarding appropriate management of apparent NSTEMI, given his metastatic malignancy, anemia, his lack of acute ischemic sxs and overall stability at present  - Would consider cath if heme/onc believes that his cancer prognosis does not warrant a conservative approach, and that the need for mandatory DAPT  etc would not be an obstacle to his oncologic care  - TTE w/ normal LV & RV size and function EF 60-65%, Mild MR, small pericardial effusion, Grade 1 DD  - Tele w/  SR 70-90s nonsustained 100s     - CTA Negative for PE  - TTE (3/06) LVEF: 61% aortic valve sclerosis, trace MR.  - Mild vol ol on examination, +LE edema   - S/p Bumex 1 mg IV  x  2 doses 3/21, 3/22.  - Would give Bumex 1 mg x 2  3/23     - CTA: Moderate multiloculated LEFT pleural effusion diminished from 3/3/2025 and produces subtotal LLL passive atelectasis. Moderate dependent RIGHT pleural effusion, increased.  Abdominal ascites, developed  - Suspect malignant effusion that has reaccumulated given exudative effusion with malignant cells on prior tap  - Pt required chest tube on last admission and had a L thoracentesis on 3/4 that drained 3.1L of fluid  - Cytopathology: positive for malignant cells compatible with the metastatic carcinoma  - Pulmonology following. s/p Left thoracentesis 3/20 560 ml     - BP stable and controlled   - Continue losartan, labetalol.    - Monitor and replete lytes, keep K>4, Mg>2.  - Will continue to follow.    Jolene Dalal MS FNP, AGACNP  Nurse Practitioner- Cardiology   Please call on TEAMS

## 2025-03-23 NOTE — PROGRESS NOTE ADULT - PROVIDER SPECIALTY LIST ADULT
Cardiology
Cardiology
Hospitalist
Pulmonology
Cardiology
Cardiology
Heme/Onc
Pulmonology
Hospitalist
Hospitalist

## 2025-03-23 NOTE — PROGRESS NOTE ADULT - SUBJECTIVE AND OBJECTIVE BOX
[facility] -- Charles Martinez, Andrei Mccall Savella, Eron Pablo: Office # [phone]      Follow Up:  NSTEMI    Subjective/Observations: Patient seen and examined. Patient awake, alert, resting in side of bed. No complaints of chest pain, dyspnea, palpitations or dizziness. Tolerating room air. Continues with LE edema     REVIEW OF SYSTEMS: All other review of systems are negative unless indicated above    PAST MEDICAL & SURGICAL HISTORY:  Incisional hernia  2015      Gastric lymphoma      Anemia of chronic disease      S/P appendectomy  November 17th 2014      S/P cholecystectomy  15 years ago    MEDICATIONS  (STANDING):  apixaban 5 milliGRAM(s) Oral two times a day  benzonatate 100 milliGRAM(s) Oral three times a day  buMETAnide Injectable 1 milliGRAM(s) IV Push once  buMETAnide Injectable 1 milliGRAM(s) IV Push once  clopidogrel Tablet 75 milliGRAM(s) Oral daily  guaiFENesin Oral Liquid (Sugar-Free) 100 milliGRAM(s) Oral every 6 hours  influenza   Vaccine 0.5 milliLiter(s) IntraMuscular once  labetalol 100 milliGRAM(s) Oral two times a day  losartan 50 milliGRAM(s) Oral daily    MEDICATIONS  (PRN):    Allergies    Bactrim DS (Hives)    Intolerances      Vital Signs Last 24 Hrs  T(C): 36.8 (23 Mar 2025 05:15), Max: 37.4 (22 Mar 2025 20:08)  T(F): 98.3 (23 Mar 2025 05:15), Max: 99.4 (22 Mar 2025 20:08)  HR: 90 (23 Mar 2025 06:00) (80 - 90)  BP: 117/67 (23 Mar 2025 06:00) (104/62 - 127/75)  BP(mean): --  RR: 19 (23 Mar 2025 06:00) (18 - 19)  SpO2: 94% (23 Mar 2025 06:00) (93% - 94%)    Parameters below as of 23 Mar 2025 06:00  Patient On (Oxygen Delivery Method): room air      I&O's Summary    22 Mar 2025 07:01  -  23 Mar 2025 07:00  --------------------------------------------------------  IN: 0 mL / OUT: 2400 mL / NET: -2400 mL    TELE: SR 70-90s nonsustained 100s   PHYSICAL EXAM:  Constitutional: NAD, awake and alert  HEENT: Moist Mucous Membranes, Anicteric  Pulmonary: Non-labored, breath sounds are clear bilaterally, Diminished at bases No wheezing, rales or rhonchi  Cardiovascular: Regular, S1 and S2, No murmurs, No rubs, gallops or clicks  Gastrointestinal:  soft, nontender, nondistended   Lymph: +1 peripheral edema. No lymphadenopathy.   Skin: No visible rashes or ulcers.  Psych:  Mood & affect appropriate      LABS: All Labs Reviewed:                        8.6    7.40  )-----------( 448      ( 23 Mar 2025 07:10 )             27.3                         8.8    8.16  )-----------( 481      ( 22 Mar 2025 09:00 )             28.3                         8.6    7.38  )-----------( 465      ( 21 Mar 2025 05:37 )             27.0     23 Mar 2025 07:10    136    |  103    |  21     ----------------------------<  100    3.7     |  25     |  1.10   22 Mar 2025 09:00    136    |  102    |  17     ----------------------------<  115    3.9     |  26     |  1.10   21 Mar 2025 05:37    137    |  105    |  15     ----------------------------<  102    4.2     |  24     |  0.97     Ca    9.0        23 Mar 2025 07:10  Ca    9.3        22 Mar 2025 09:00  Ca    8.7        21 Mar 2025 05:37  Phos  3.0       23 Mar 2025 07:10  Phos  3.2       22 Mar 2025 09:00  Phos  3.3       21 Mar 2025 05:37  Mg     2.1       23 Mar 2025 07:10  Mg     2.1       22 Mar 2025 09:00  Mg     1.9       21 Mar 2025 05:37    TPro  6.7    /  Alb  2.3    /  TBili  0.3    /  DBili  x      /  AST  27     /  ALT  27     /  AlkPhos  84     23 Mar 2025 07:10  TPro  6.6    /  Alb  2.2    /  TBili  0.4    /  DBili  x      /  AST  31     /  ALT  25     /  AlkPhos  82     22 Mar 2025 09:00  TPro  6.3    /  Alb  2.1    /  TBili  0.3    /  DBili  x      /  AST  44     /  ALT  23     /  AlkPhos  78     21 Mar 2025 05:37   LIVER FUNCTIONS - ( 23 Mar 2025 07:10 )  Alb: 2.3 g/dL / Pro: 6.7 g/dL / ALK PHOS: 84 U/L / ALT: 27 U/L / AST: 27 U/L / GGT: x           Troponin I, High Sensitivity Result: 41062.5 ng/L (03-19-25 @ 13:52)  Troponin I, High Sensitivity Result: 24616.0 ng/L (03-19-25 @ 09:19)  Troponin I, High Sensitivity Result: 57741.9 ng/L (03-19-25 @ 06:05)    12 Lead ECG:   Ventricular Rate 77 BPM    Atrial Rate 77 BPM    P-R Interval 162 ms    QRS Duration 100 ms    Q-T Interval 372 ms    QTC Calculation(Bazett) 420 ms    P Axis 63 degrees    R Axis -14 degrees    T Axis -16 degrees    Diagnosis Line Normal sinus rhythm  Low voltage QRS  Inferior infarct (cited on or before 19-MAR-2025)  Abnormal ECG    Confirmed by Emily Littlejohn ([id number]) on 3/22/2025 7:11:56 AM (03-22-25 @ 03:33)      TRANSTHORACIC ECHOCARDIOGRAM REPORT  ________________________________________________________________________________                                      _______       Pt. Name:       JESSE MARQUEZNATHANIEL Study Date:    3/20/2025  MRN:     [id number]                   YOB: 1960  Accession #:    [id number]                  Age:           64 years  Account#:       [id number]                 Gender:        M  Heart Rate:                                Height:        66.93in (170.00 cm)  Rhythm:                                    Weight:        205.03 lb (93.00 kg)  Blood Pressure: 106/64 mmHg                BSA/BMI:       2.04 m² / 32.18 kg/m²  ________________________________________________________________________________________  Referring Physician:    [id number] Oswaldo Snowden  Interpreting Physician: Alberto Gilliam M.D.  Primary Sonographer:    Emperatriz AVILA    CPT:               ECHO TTE WO CON COMP W DOPP - 09431.m  Indication(s):     Chest pain, unspecified - R07.9  Procedure:         Transthoracic echocardiogram with 2-D, M-mode and complete                     spectral and color flow Doppler.  Ordering Location: TELE  Admission Status:  Inpatient    _______________________________________________________________________________________     CONCLUSIONS:      1. Left ventricular wall thickness is normal. Left ventricular systolic function is normal with an ejection fraction visually estimated at 60 to 65 %. There are no regional wall motion abnormalities seen.   2. Normal right ventricular cavity size, with normal wall thickness, and normal right ventricular systolic function.   3. Mild mitral regurgitation.   4. Fibrocalcific aortic valve sclerosis without stenosis.   5. The inferior vena cava is dilated measuring 2.44 cm in diameter, (dilated >2.1cm) with normal inspiratory collapse (normal >50%) consistent with mildly elevated right atrial pressure (~8, range 5-10mmHg).   6. Small pericardial effusion.   7. There is mild (grade 1) left ventricular diastolic dysfunction.   8. Compared to the transthoracic echocardiogram performed on 3/5/2025, there have been no significant interval changes.   9. Pulmonary artery systolic pressure could not be estimated.    ________________________________________________________________________________________  FINDINGS:     Left Ventricle:  Left ventricular wall thickness is normal. Left ventricular systolic function is normal with an ejection fraction visually estimated at 60 to 65%. There are no regional wall motion abnormalities seen. There is mild (grade 1) left ventricular diastolic dysfunction.     Right Ventricle:  The right ventricular cavity is normal in size, with normal wall thickness and right ventricular systolic function is normal.     Aortic Valve:  There is fibrocalcific aortic valve sclerosis without stenosis. There is no evidence of aortic regurgitation.     Mitral Valve:  Structurally normal mitral valve with normal leaflet excursion. There is mild mitral regurgitation.     Tricuspid Valve:  Structurally normal tricuspid valve with normal leaflet excursion. There is insufficient tricuspid regurgitation detected to calculate pulmonary artery systolic pressure.     Pulmonic Valve:  The pulmonic valve was not well visualized.     Pericardium:  There is a small pericardial effusion.     Systemic Veins:  The inferior vena cava is dilated measuring 2.44 cm in diameter, (dilated >2.1cm) with normal inspiratory collapse (normal >50%) consistent with mildly elevated right atrial pressure (~8, range 5-10mmHg).  ____________________________________________________________________  QUANTITATIVE DATA:  Left Ventricle Measurements: (Indexed to BSA)     Visualized LV EF%: 60 to 65%     MV E Vmax: 1.03 m/s  MV A Vmax: 1.33 m/s  MV E/A:    0.77  MV DT:     183 msec    Right Ventricle Measurements:     RV Base (RVID1): 2.8 cm    Mitral Valve Measurements:     MV E Vmax: 1.0 m/s  MV A Vmax: 1.3 m/s  MV E/A:    0.8       Tricuspid Valve Measurements:     RA Pressure: 8 mmHg    ________________________________________________________________________________________  Electronically signed on 3/21/2025 at 11:40:35 AM by Alberto Gilliam M.D.         *** Final ***

## 2025-03-23 NOTE — CASE MANAGEMENT PROGRESS NOTE - NSCMPROGRESSNOTE_GEN_ALL_CORE
Per MD, patient is medically cleared for discharge home today after his 2nd dose of Bumex.  CM met with patient and brother Damon at bedside to discussed discharge disposition is home. Patient declining any formal service. Brother to transport patient home. Patient verbalized understanding of the transition plan and is in agreement. CM answered all questions to the best of my abilities. CM remains available throughout hospital stay.

## 2025-03-25 LAB
CULTURE RESULTS: SIGNIFICANT CHANGE UP
SPECIMEN SOURCE: SIGNIFICANT CHANGE UP

## 2025-04-01 ENCOUNTER — INPATIENT (INPATIENT)
Facility: HOSPITAL | Age: 65
LOS: 4 days | Discharge: HOME | DRG: 182 | End: 2025-04-06
Attending: GENERAL PRACTICE | Admitting: INTERNAL MEDICINE
Payer: COMMERCIAL

## 2025-04-01 VITALS
RESPIRATION RATE: 18 BRPM | DIASTOLIC BLOOD PRESSURE: 72 MMHG | TEMPERATURE: 98 F | HEIGHT: 67 IN | HEART RATE: 81 BPM | SYSTOLIC BLOOD PRESSURE: 122 MMHG | OXYGEN SATURATION: 94 % | WEIGHT: 205.03 LBS

## 2025-04-01 DIAGNOSIS — E78.5 HYPERLIPIDEMIA, UNSPECIFIED: ICD-10-CM

## 2025-04-01 DIAGNOSIS — N17.9 ACUTE KIDNEY FAILURE, UNSPECIFIED: ICD-10-CM

## 2025-04-01 DIAGNOSIS — Z98.89 OTHER SPECIFIED POSTPROCEDURAL STATES: Chronic | ICD-10-CM

## 2025-04-01 DIAGNOSIS — R79.89 OTHER SPECIFIED ABNORMAL FINDINGS OF BLOOD CHEMISTRY: ICD-10-CM

## 2025-04-01 DIAGNOSIS — Z29.9 ENCOUNTER FOR PROPHYLACTIC MEASURES, UNSPECIFIED: ICD-10-CM

## 2025-04-01 DIAGNOSIS — J91.0 MALIGNANT PLEURAL EFFUSION: ICD-10-CM

## 2025-04-01 DIAGNOSIS — I10 ESSENTIAL (PRIMARY) HYPERTENSION: ICD-10-CM

## 2025-04-01 DIAGNOSIS — Z90.49 ACQUIRED ABSENCE OF OTHER SPECIFIED PARTS OF DIGESTIVE TRACT: Chronic | ICD-10-CM

## 2025-04-01 DIAGNOSIS — D63.8 ANEMIA IN OTHER CHRONIC DISEASES CLASSIFIED ELSEWHERE: ICD-10-CM

## 2025-04-01 DIAGNOSIS — J90 PLEURAL EFFUSION, NOT ELSEWHERE CLASSIFIED: ICD-10-CM

## 2025-04-01 DIAGNOSIS — C85.99 NON-HODGKIN LYMPHOMA, UNSPECIFIED, EXTRANODAL AND SOLID ORGAN SITES: ICD-10-CM

## 2025-04-01 LAB
ALBUMIN SERPL ELPH-MCNC: 2.5 G/DL — LOW (ref 3.3–5)
ALP SERPL-CCNC: 95 U/L — SIGNIFICANT CHANGE UP (ref 40–120)
ALT FLD-CCNC: 22 U/L — SIGNIFICANT CHANGE UP (ref 12–78)
ANION GAP SERPL CALC-SCNC: 6 MMOL/L — SIGNIFICANT CHANGE UP (ref 5–17)
ANISOCYTOSIS BLD QL: SLIGHT — SIGNIFICANT CHANGE UP
APTT BLD: 29.7 SEC — SIGNIFICANT CHANGE UP (ref 24.5–35.6)
APTT BLD: 48.6 SEC — HIGH (ref 24.5–35.6)
AST SERPL-CCNC: 26 U/L — SIGNIFICANT CHANGE UP (ref 15–37)
BASOPHILS # BLD AUTO: 0.07 K/UL — SIGNIFICANT CHANGE UP (ref 0–0.2)
BASOPHILS NFR BLD AUTO: 0.9 % — SIGNIFICANT CHANGE UP (ref 0–2)
BILIRUB SERPL-MCNC: 0.3 MG/DL — SIGNIFICANT CHANGE UP (ref 0.2–1.2)
BUN SERPL-MCNC: 27 MG/DL — HIGH (ref 7–23)
CALCIUM SERPL-MCNC: 9.3 MG/DL — SIGNIFICANT CHANGE UP (ref 8.5–10.1)
CHLORIDE SERPL-SCNC: 102 MMOL/L — SIGNIFICANT CHANGE UP (ref 96–108)
CO2 SERPL-SCNC: 27 MMOL/L — SIGNIFICANT CHANGE UP (ref 22–31)
CREAT SERPL-MCNC: 1.4 MG/DL — HIGH (ref 0.5–1.3)
D DIMER BLD IA.RAPID-MCNC: 2136 NG/ML DDU — HIGH
EGFR: 56 ML/MIN/1.73M2 — LOW
EGFR: 56 ML/MIN/1.73M2 — LOW
EOSINOPHIL # BLD AUTO: 0.04 K/UL — SIGNIFICANT CHANGE UP (ref 0–0.5)
EOSINOPHIL NFR BLD AUTO: 0.5 % — SIGNIFICANT CHANGE UP (ref 0–6)
GLUCOSE SERPL-MCNC: 132 MG/DL — HIGH (ref 70–99)
HCT VFR BLD CALC: 27.2 % — LOW (ref 39–50)
HCT VFR BLD CALC: 30.2 % — LOW (ref 39–50)
HGB BLD-MCNC: 8.6 G/DL — LOW (ref 13–17)
HGB BLD-MCNC: 9.5 G/DL — LOW (ref 13–17)
IMM GRANULOCYTES NFR BLD AUTO: 0.6 % — SIGNIFICANT CHANGE UP (ref 0–0.9)
INR BLD: 1.48 RATIO — HIGH (ref 0.85–1.16)
LYMPHOCYTES # BLD AUTO: 0.56 K/UL — LOW (ref 1–3.3)
LYMPHOCYTES # BLD AUTO: 7.2 % — LOW (ref 13–44)
MACROCYTES BLD QL: SLIGHT — SIGNIFICANT CHANGE UP
MANUAL SMEAR VERIFICATION: SIGNIFICANT CHANGE UP
MCHC RBC-ENTMCNC: 20.3 PG — LOW (ref 27–34)
MCHC RBC-ENTMCNC: 20.5 PG — LOW (ref 27–34)
MCHC RBC-ENTMCNC: 31.5 G/DL — LOW (ref 32–36)
MCHC RBC-ENTMCNC: 31.6 G/DL — LOW (ref 32–36)
MCV RBC AUTO: 64.3 FL — LOW (ref 80–100)
MCV RBC AUTO: 65.2 FL — LOW (ref 80–100)
MICROCYTES BLD QL: SLIGHT — SIGNIFICANT CHANGE UP
MONOCYTES # BLD AUTO: 1.27 K/UL — HIGH (ref 0–0.9)
MONOCYTES NFR BLD AUTO: 16.3 % — HIGH (ref 2–14)
NEUTROPHILS # BLD AUTO: 5.82 K/UL — SIGNIFICANT CHANGE UP (ref 1.8–7.4)
NEUTROPHILS NFR BLD AUTO: 74.5 % — SIGNIFICANT CHANGE UP (ref 43–77)
NRBC BLD AUTO-RTO: 0 /100 WBCS — SIGNIFICANT CHANGE UP (ref 0–0)
NRBC BLD AUTO-RTO: 0 /100 WBCS — SIGNIFICANT CHANGE UP (ref 0–0)
OVALOCYTES BLD QL SMEAR: SLIGHT — SIGNIFICANT CHANGE UP
PLAT MORPH BLD: NORMAL — SIGNIFICANT CHANGE UP
PLATELET # BLD AUTO: 556 K/UL — HIGH (ref 150–400)
PLATELET # BLD AUTO: 613 K/UL — HIGH (ref 150–400)
POIKILOCYTOSIS BLD QL AUTO: SLIGHT — SIGNIFICANT CHANGE UP
POTASSIUM SERPL-MCNC: 4 MMOL/L — SIGNIFICANT CHANGE UP (ref 3.5–5.3)
POTASSIUM SERPL-SCNC: 4 MMOL/L — SIGNIFICANT CHANGE UP (ref 3.5–5.3)
PROT SERPL-MCNC: 6.7 G/DL — SIGNIFICANT CHANGE UP (ref 6–8.3)
PROTHROM AB SERPL-ACNC: 17.4 SEC — HIGH (ref 9.9–13.4)
RBC # BLD: 4.23 M/UL — SIGNIFICANT CHANGE UP (ref 4.2–5.8)
RBC # BLD: 4.63 M/UL — SIGNIFICANT CHANGE UP (ref 4.2–5.8)
RBC # FLD: 22.2 % — HIGH (ref 10.3–14.5)
RBC # FLD: 22.3 % — HIGH (ref 10.3–14.5)
RBC BLD AUTO: ABNORMAL
SODIUM SERPL-SCNC: 135 MMOL/L — SIGNIFICANT CHANGE UP (ref 135–145)
TROPONIN I, HIGH SENSITIVITY RESULT: 260.7 NG/L — HIGH
TROPONIN I, HIGH SENSITIVITY RESULT: 275.8 NG/L — HIGH
WBC # BLD: 7.81 K/UL — SIGNIFICANT CHANGE UP (ref 3.8–10.5)
WBC # BLD: 8.11 K/UL — SIGNIFICANT CHANGE UP (ref 3.8–10.5)
WBC # FLD AUTO: 7.81 K/UL — SIGNIFICANT CHANGE UP (ref 3.8–10.5)
WBC # FLD AUTO: 8.11 K/UL — SIGNIFICANT CHANGE UP (ref 3.8–10.5)

## 2025-04-01 PROCEDURE — 99285 EMERGENCY DEPT VISIT HI MDM: CPT

## 2025-04-01 PROCEDURE — 71275 CT ANGIOGRAPHY CHEST: CPT | Mod: 26

## 2025-04-01 PROCEDURE — 99223 1ST HOSP IP/OBS HIGH 75: CPT | Mod: GC

## 2025-04-01 PROCEDURE — 93010 ELECTROCARDIOGRAM REPORT: CPT

## 2025-04-01 PROCEDURE — 71045 X-RAY EXAM CHEST 1 VIEW: CPT | Mod: 26

## 2025-04-01 PROCEDURE — 99223 1ST HOSP IP/OBS HIGH 75: CPT

## 2025-04-01 RX ORDER — ATORVASTATIN CALCIUM 80 MG/1
40 TABLET, FILM COATED ORAL AT BEDTIME
Refills: 0 | Status: DISCONTINUED | OUTPATIENT
Start: 2025-04-01 | End: 2025-04-04

## 2025-04-01 RX ORDER — LABETALOL HYDROCHLORIDE 200 MG/1
100 TABLET, FILM COATED ORAL
Refills: 0 | Status: DISCONTINUED | OUTPATIENT
Start: 2025-04-01 | End: 2025-04-04

## 2025-04-01 RX ORDER — HEPARIN SODIUM 1000 [USP'U]/ML
3500 INJECTION INTRAVENOUS; SUBCUTANEOUS EVERY 6 HOURS
Refills: 0 | Status: DISCONTINUED | OUTPATIENT
Start: 2025-04-01 | End: 2025-04-03

## 2025-04-01 RX ORDER — DEXTROMETHORPHAN HBR, GUAIFENESIN 200 MG/10ML
200 LIQUID ORAL EVERY 6 HOURS
Refills: 0 | Status: DISCONTINUED | OUTPATIENT
Start: 2025-04-01 | End: 2025-04-04

## 2025-04-01 RX ORDER — HEPARIN SODIUM 1000 [USP'U]/ML
7500 INJECTION INTRAVENOUS; SUBCUTANEOUS ONCE
Refills: 0 | Status: COMPLETED | OUTPATIENT
Start: 2025-04-01 | End: 2025-04-01

## 2025-04-01 RX ORDER — HEPARIN SODIUM 1000 [USP'U]/ML
7500 INJECTION INTRAVENOUS; SUBCUTANEOUS EVERY 6 HOURS
Refills: 0 | Status: DISCONTINUED | OUTPATIENT
Start: 2025-04-01 | End: 2025-04-03

## 2025-04-01 RX ORDER — ASPIRIN 325 MG
325 TABLET ORAL DAILY
Refills: 0 | Status: DISCONTINUED | OUTPATIENT
Start: 2025-04-01 | End: 2025-04-02

## 2025-04-01 RX ORDER — HEPARIN SODIUM 1000 [USP'U]/ML
INJECTION INTRAVENOUS; SUBCUTANEOUS
Qty: 25000 | Refills: 0 | Status: DISCONTINUED | OUTPATIENT
Start: 2025-04-01 | End: 2025-04-03

## 2025-04-01 RX ORDER — INFLUENZA A VIRUS A/IDAHO/07/2018 (H1N1) ANTIGEN (MDCK CELL DERIVED, PROPIOLACTONE INACTIVATED, INFLUENZA A VIRUS A/INDIANA/08/2018 (H3N2) ANTIGEN (MDCK CELL DERIVED, PROPIOLACTONE INACTIVATED), INFLUENZA B VIRUS B/SINGAPORE/INFTT-16-0610/2016 ANTIGEN (MDCK CELL DERIVED, PROPIOLACTONE INACTIVATED), INFLUENZA B VIRUS B/IOWA/06/2017 ANTIGEN (MDCK CELL DERIVED, PROPIOLACTONE INACTIVATED) 15; 15; 15; 15 UG/.5ML; UG/.5ML; UG/.5ML; UG/.5ML
0.5 INJECTION, SUSPENSION INTRAMUSCULAR ONCE
Refills: 0 | Status: DISCONTINUED | OUTPATIENT
Start: 2025-04-01 | End: 2025-04-06

## 2025-04-01 RX ORDER — ACETAMINOPHEN 500 MG/5ML
1000 LIQUID (ML) ORAL ONCE
Refills: 0 | Status: COMPLETED | OUTPATIENT
Start: 2025-04-01 | End: 2025-04-01

## 2025-04-01 RX ORDER — BUMETANIDE 1 MG/1
2 TABLET ORAL DAILY
Refills: 0 | Status: DISCONTINUED | OUTPATIENT
Start: 2025-04-01 | End: 2025-04-02

## 2025-04-01 RX ORDER — CLOPIDOGREL BISULFATE 75 MG/1
75 TABLET, FILM COATED ORAL DAILY
Refills: 0 | Status: DISCONTINUED | OUTPATIENT
Start: 2025-04-01 | End: 2025-04-02

## 2025-04-01 RX ADMIN — HEPARIN SODIUM 1900 UNIT(S)/HR: 1000 INJECTION INTRAVENOUS; SUBCUTANEOUS at 23:53

## 2025-04-01 RX ADMIN — HEPARIN SODIUM 7500 UNIT(S): 1000 INJECTION INTRAVENOUS; SUBCUTANEOUS at 15:41

## 2025-04-01 RX ADMIN — HEPARIN SODIUM 1700 UNIT(S)/HR: 1000 INJECTION INTRAVENOUS; SUBCUTANEOUS at 21:18

## 2025-04-01 RX ADMIN — ATORVASTATIN CALCIUM 40 MILLIGRAM(S): 80 TABLET, FILM COATED ORAL at 19:58

## 2025-04-01 RX ADMIN — Medication 60 MILLILITER(S): at 18:40

## 2025-04-01 RX ADMIN — HEPARIN SODIUM 1700 UNIT(S)/HR: 1000 INJECTION INTRAVENOUS; SUBCUTANEOUS at 15:44

## 2025-04-01 RX ADMIN — LABETALOL HYDROCHLORIDE 100 MILLIGRAM(S): 200 TABLET, FILM COATED ORAL at 17:05

## 2025-04-01 RX ADMIN — Medication 325 MILLIGRAM(S): at 06:55

## 2025-04-01 RX ADMIN — Medication 400 MILLIGRAM(S): at 19:57

## 2025-04-01 RX ADMIN — HEPARIN SODIUM 1700 UNIT(S)/HR: 1000 INJECTION INTRAVENOUS; SUBCUTANEOUS at 19:11

## 2025-04-01 RX ADMIN — HEPARIN SODIUM 3500 UNIT(S): 1000 INJECTION INTRAVENOUS; SUBCUTANEOUS at 23:57

## 2025-04-01 NOTE — PATIENT PROFILE ADULT - FUNCTIONAL ASSESSMENT - DAILY ACTIVITY SECTION LABEL
- LE swelling at baseline. Likely component for CKD fluid overloading and amlodipine use.  - Continue to monitor fluid status. C/w lasix 40mg daily recommended per nephrology.
.

## 2025-04-01 NOTE — CARE COORDINATION ASSESSMENT. - NS SW READMITTED REASON
Previously admitted with NSTEMI, resp failure and plueral effusion.  Patient returns with increased SOB and recurrent pl effusion.  Patient has declined [facility] services during last admission

## 2025-04-01 NOTE — CONSULT NOTE ADULT - ASSESSMENT
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 Now presents complaining of worsening exertional SOB for past week requiring home O2. CT scan with findings of No pulmonary embolism. Interval progression of right pleural effusion and right paratracheal lymphadenopathy.  Slightly improved multiloculated left pleural effusion along with pleural nodularity and septal lines concerning for malignant etiology.  Thoracic surgery consulted for pleurx placement.   WBC nl, VSSAF, on 3L NC. Elevated troponins noted.     Plan:  - Plan for pleurx placement, no time scheduled yet  - Continue supplemental O2  - Supportive care  - Admitted to medicine  - Rest of care per primary team  - Will need cardiology and medicine clearance, please document  To be discussed with Dr. Reich

## 2025-04-01 NOTE — CONSULT NOTE ADULT - ATTENDING COMMENTS
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  He appears to be volume overloaded on exam.  He has pleural effusions are likely malignant.  And the cause of his shortness of breath.  He needs a therapeutic thoracentesis along with a Pleurx catheter placement.  Thoracic surgery aware.  He is total body volume overloaded.  His creatinine has been rising since starting Bumex.  I think this is likely from his dhruv mitten hypoalbuminemia.  Would give Bumex 1 mg IV every 12 with albumin.  Strict I's and O's Daily weights and monitor creatinine/bicarb  Echocardiogram done on 3/20/2025 shows EF 60-65%, grade 1 diastolic dysfunction.     His troponins are mildly elevated.  His last admission a few weeks ago saw him have a type II MI that was medically managed.  Would trend his full cardiac enzymes.  He denies any anginal symptoms at this time.  He is EKG does not have any significant ischemic changes.  Monitor on telemetry.    Further cardiac workup will depend on clinical course.  - All other workup per primary team.  - Will continue to follow. 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  He appears to be volume overloaded on exam.  He has pleural effusions are likely malignant.  And the cause of his shortness of breath.  He needs a therapeutic thoracentesis along with a Pleurx catheter placement.  Thoracic surgery aware.  He is total body volume overloaded.  His creatinine has been rising since starting Bumex.  I think this is likely from his dhruv mitten hypoalbuminemia.  Would give Bumex 1 mg IV every 12 with albumin.  Strict I's and O's Daily weights and monitor creatinine/bicarb  Echocardiogram done on 3/20/2025 shows EF 60-65%, grade 1 diastolic dysfunction.     His troponins are mildly elevated.  His last admission a few weeks ago saw him have a type II MI that was medically managed.  Would trend his full cardiac enzymes.  He denies any anginal symptoms at this time.  He is EKG does not have any significant ischemic changes.  Monitor on telemetry.    cont ac for DVT   Further cardiac workup will depend on clinical course.  - All other workup per primary team.  - Will continue to follow.

## 2025-04-01 NOTE — H&P ADULT - PROBLEM SELECTOR PLAN 1
Pt has been admitted twice in the past month in which thoracentesis was performed which drained malignant pleural effusions on the left  -CT Chest: No pulmonary embolism. Interval progression of right pleural effusion and right paratracheal   lymphadenopathy. Slightly improved multiloculated left pleural effusion along with pleural nodularity and septal lines concerning for malignant etiology.  -c/w supplemental oxygen  -c/w home bumex  -Pulm consulted (Dr. Damico), recommends pleurix placement  -Thoracic Surgery consulted (Dr. Reich), f/u recs Pt has been admitted twice in the past month in which thoracentesis was performed which drained malignant pleural effusions on the left  -CT Chest: No pulmonary embolism. Interval progression of right pleural effusion and right paratracheal   lymphadenopathy. Slightly improved multiloculated left pleural effusion along with pleural nodularity and septal lines concerning for malignant etiology.  -c/w supplemental oxygen  -c/w home bumex  -c/w robitussin for cough PRN for cough  -Pulm consulted (Dr. Damico), recommends pleurix placement  -Thoracic Surgery consulted (Dr. Reich), f/u recs Pt has been admitted twice in the past month in which thoracentesis was performed which drained malignant pleural effusions on the left  -CT Chest: No pulmonary embolism. Interval progression of right pleural effusion and right paratracheal   lymphadenopathy. Slightly improved multiloculated left pleural effusion along with pleural nodularity and septal lines concerning for malignant etiology.  -c/w supplemental oxygen  -c/w home bumex  -c/w robitussin for cough PRN for cough  -Pulm consulted (Dr. Damico), recommends pleurex placement, planned for Thursday, NPO after MN on Wednesday  -Thoracic Surgery consulted (Dr. Reich), f/u recs

## 2025-04-01 NOTE — H&P ADULT - PROBLEM SELECTOR PLAN 5
BUN/Cr: 27/1.4. eGFR: 56  -Start on IV fluids BUN/Cr: 27/1.4. eGFR: 56. Baseline Cr ~0.97. Was on dialysis from Nov-Jan.   -Start on IV fluids

## 2025-04-01 NOTE — H&P ADULT - NSHPPHYSICALEXAM_GEN_ALL_CORE
CONSTITUTIONAL: Well groomed, no apparent distress  RESP: decreased breath sounds B/L  CV: RRR, +S1S2, trace peripheral edema  GI: Firm, NT, distended  PSYCH: Appropriate insight/judgment; A+O x 3, mood and affect appropriate

## 2025-04-01 NOTE — ED ADULT NURSE REASSESSMENT NOTE - NSFALLUNIVINTERV_ED_ALL_ED
Bed/Stretcher in lowest position, wheels locked, appropriate side rails in place/Call bell, personal items and telephone in reach/Instruct patient to call for assistance before getting out of bed/chair/stretcher/Non-slip footwear applied when patient is off stretcher/Waterproof to call system/Physically safe environment - no spills, clutter or unnecessary equipment/Purposeful proactive rounding/Room/bathroom lighting operational, light cord in reach

## 2025-04-01 NOTE — ED PROVIDER NOTE - NSICDXPASTSURGICALHX_GEN_ALL_CORE_FT
You can access the FollowMyHealth Patient Portal offered by [facility] by registering at the following website: [email or web address]. By joining [facility]’s FollowMyHealth portal, you will also be able to view your health information using other applications (apps) compatible with our system.
PAST SURGICAL HISTORY:  S/P appendectomy November 17th 2014    S/P cholecystectomy 15 years ago

## 2025-04-01 NOTE — ED PROVIDER NOTE - SIGNIFICANT NEGATIVE FINDINGS
no headache, no chest pain, no Syncope , no palpitations,  no abdominal pain,  no n/v/d, no urinary symptoms, no GI  bleeding. no neuro changes.

## 2025-04-01 NOTE — H&P ADULT - ATTENDING COMMENTS
63 y/o male w/ PMHx with poorly differentiated metastatic (known cervical and axillary lymphadenopathy) GI carcinoma on chemo, HTN, LUE and LLE DVT, and anemia presented to ED with SOB.     Recurrent pleural effusions, pulm consulted, will f/u recs, likely role of pleurex catheter placement.    Kentrell Cartagena, Attending Physician

## 2025-04-01 NOTE — H&P ADULT - PROBLEM SELECTOR PLAN 2
CT chest/abd/pelvis (3/3): Large left pleural effusion markedly increased since 11/30/2024 with total atelectasis left lower lobe and partial atelectasis left upper lobe. New mediastinal and left axillary lymphadenopathy. Inflammatory changes associated with left subclavian vein consistent with known DVT. Upper abdominal lymphadenopathy similar to 11/30/2024. Mildly increased pelvic lymphadenopathy. Mesenteric lymphadenopathy and mesenteric fat stranding similar to prior examination. No pulmonary embolus. Limited evaluation segmental and subsegmental branches.  -Pt recently taken off chemo, is planned to start a new medication based on recent PET scan results  -Heme/Onc consulted (Dr. Hu), f/u recs

## 2025-04-01 NOTE — H&P ADULT - PROBLEM SELECTOR PLAN 8
H/H: 9.5/30.2. Hemoglobin: 13.3 (9/2024). However, recently hemoglobin has been 7-8.   -Anemia of chronic disease etiology  -c/w home ferrous sulfate H/H: 9.5/30.2. Hemoglobin: 13.3 (9/2024). However, recently hemoglobin has been 7-8.   -Anemia of chronic disease etiology H/H: 9.5/30.2. Hemoglobin: 13.3 (9/2024). However, recently hemoglobin has been 7-8.   -Anemia of chronic disease etiology  -Monitor routine CBCs

## 2025-04-01 NOTE — ED PROVIDER NOTE - PROGRESS NOTE DETAILS
s/o pending CT. No PE. case discussed with Dr Margaux hill attg, recommending admission for Pleurex. attg hospitalist made aware.

## 2025-04-01 NOTE — CONSULT NOTE ADULT - ASSESSMENT
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  - Troponin elevated to 275, may be residual from recent type 2 NSTEMI, or new elevations from repeat demand ischemia. Recommend repeating full cardiac enzymes to trend to peak.   - Monitor closely for the development of anginal symptoms or clinical signs of ischemia.   - No acute changes on EKG compared to previous.    - Increasing effusions with lower extremity edema and pulmonary edema on imaging, likely multifactorial in setting of metastatic disease.   - Therapeutic thoracentesis with thoracic surgery and pleurex catheter placement, f/u per thoracic surgery.   - Recent TTE shows EF 60-65%, grade 1 diastolic dysfunction. No repeat warranted.   - Signs of fluid overload present, likely intravascularly depleted. Would recommend continuing bumex 2mg daily, and add albumin to increase oncotic pressure. Can back off on bumex if renal function worsens.   - Strict I/Os, daily weights.    - BP well controlled, monitor routine hemodynamics.  - Continue home labetalol. Hold losartan in setting of worsening renal function.     - Other cardiovascular workup will depend on clinical course.  - All other workup per primary team.  - Will continue to follow.             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  - Troponin elevated to 275, may be residual from recent type 2 NSTEMI, or new elevations from repeat demand ischemia. Recommend repeating full cardiac enzymes to trend to peak.   - Monitor closely for the development of anginal symptoms or clinical signs of ischemia.   - No acute changes on EKG compared to previous.    - Increasing effusions with lower extremity edema and pulmonary edema on imaging, likely multifactorial in setting of metastatic disease.   - Therapeutic thoracentesis with thoracic surgery and pleurex catheter placement, f/u per thoracic surgery.   - Recent TTE shows EF 60-65%, grade 1 diastolic dysfunction. No repeat warranted.   - Signs of fluid overload present, likely intravascularly depleted.  Would give Bumex 1 mg IV every 12 with albumin.  - Strict I/Os, daily weights.    - BP well controlled, monitor routine hemodynamics.  - Continue home labetalol. Hold losartan in setting of worsening renal function.     - Other cardiovascular workup will depend on clinical course.  - All other workup per primary team.  - Will continue to follow.

## 2025-04-01 NOTE — H&P ADULT - ASSESSMENT
Pt is a 63 y/o male w/ PMHx with poorly differentiated metastatic (known cervical and axillary lymphadenopathy) GI carcinoma on chemo, HTN, LUE and LLE DVT, and anemia presented to ED with SOB.

## 2025-04-01 NOTE — CONSULT NOTE ADULT - ASSESSMENT
INCOMPLETE NOTE.  Documentation in Progress  PT SEEN AND EVALUATED.   FULL/ADDITIONAL RECOMMENDATIONS TO FOLLOW   ***************************************************************      [ASSESSMENT and  PLAN]  C16.7     Poorly differentiated gastric cancer  C77.1     Lymph node metastasis  R18.0     Malignant ascites  J91.0     Malignant pleural effusion  D63.8    Anemia due to chronic disease  D50.0    Iron deficiency anemia  D75.83  Reactive thrombocytosis  I21.4      NSTEMI, recent    63 yo man w met poorly diff gastric ca (neck/axillary/abdomen LN mets, under care Dr [facility], on FOLFOX chemo [id number], Dx'd 11/2024, adm [facility] 12/2024 w [facility] req temporary HD, has planned f/u    04/01/2025 R effusion.   seen by CT surgery. Planned pleurex. No OR time scheduled yet.     03/19/2025 NSTEMI admission and R effusion. s/p 560cc thoracentesis 03/20/2025  Patient readmitted for dyspnea.  Found to have worse right effusion, NSTEMI  Repeat CT scan with moderate dependent layering right pleural effusion increased from previous.  Moderate multiloculated left pleural effusion with pleural pseudotumor, diminished from previous and produces sup total LLL passive atelectasis.  IAN interlobular septal thickening.  Diffuse mediastinal LN not changed from 3/3/25.  No pulmonary thromboembolism.  Abdominal ascites.    Early Mar 2025 Admission   LUE DVT LSCV, GARCIA, LAV, LBV, LSBV  Large L effusion s/p thora, with malig cells.   Pt had left chest/neck HD catheter removed Feb 2025, reports procedure had some difficulty, noted left neck sl swelling 2 days ago and then left arm swelling x past week  Early Mar 2025 Came to ER for the increased left arm swelling, also w increased SOB from baseline  Planned chemo 1wk PTA held for low Hgb 7, instead had IV iron (told needs Hgb >8 for chemo)  Reported cancer has been responding to chemo  Duplex sig for extensive LUE DVT subclavian, IM, axillary, brachial veins and basilic superficial v thrombosis. Ulnar vein not visualized  CTA c, a/p-compared w 12/1/24 larage left effusion, increased since last image, trace right effusion. No PE. Mediastinal LADs up to 2.8x1.2cm, inflamm changes surrounding left subclavian vein. Andreas renal cysts and subcentimeter hypodensities, stabble andreas mild-mod hydronephrosis. small ascites. Mesenteric/upper abdomen LADs and mild pelvic LADs to 1.3cm.  Right chest infusoport in place  Started on IV heparin for LUE DVT. ==>ELiquis  s/p eval Pulm, s/p L pigtail cath placement 03/04/25  CBC w microcytic anemia, on adm had 7.8 mcv 65.6. s/p IV iron outpt.   LUE extensive DVT and supericial thrombosis-suspect assoc w the previous HD catheter, w baseline hyper coagulable state due to met gastric ca.   Did have difficulty in removing the catheter 2/5wks ago and subsequently w progressive left neck adn arm symptoms        RECOMMENDATIONS    R effusion  CT surgery eval appreciated  s/p thoracentesis prior.   Planned pleurex  Follow CBC and transfuse as indicated    CAD/NSTEMI recently.  Cardiology Evaluation   On aspirin  On labetalol and losartan    met gastric ca w local and distal LN involvements  to continue Heme/Onc followup w own doctor Dr Mckeon upon discharge  will followup with outside oncologist on discharge     [ASSESSMENT and  PLAN]  C16.7     Poorly differentiated gastric cancer  C77.1     Lymph node metastasis  R18.0     Malignant ascites  J91.0     Malignant pleural effusion  D63.8    Anemia due to chronic disease  D50.0    Iron deficiency anemia  D75.83  Reactive thrombocytosis  I21.4      NSTEMI, recent    65 yo man w met poorly diff gastric ca (neck/axillary/abdomen LN mets, under care Dr [facility], on FOLFOX chemo [id number], Dx'd 11/2024, adm [facility] 12/2024 w [facility] req temporary HD.    04/01/2025 now admitted for R effusion.   seen by CT surgery. Planned pleurex. No OR time scheduled yet.       Several recent hospitalizations    03/19/2025 NSTEMI admission and R effusion. s/p 560cc thoracentesis 03/20/2025  Patient readmitted for dyspnea.  Found to have worse right effusion, NSTEMI  Repeat CT scan with moderate dependent layering right pleural effusion increased from previous.  Moderate multiloculated left pleural effusion with pleural pseudotumor, diminished from previous and produces sup total LLL passive atelectasis.  IAN interlobular septal thickening.  Diffuse mediastinal LN not changed from 3/3/25.  No pulmonary thromboembolism.  Abdominal ascites.    Early Mar 2025 Admission   LUE DVT LSCV, GARCIA, LAV, LBV, LSBV  Large L effusion s/p thora, with malig cells.   Pt had left chest/neck HD catheter removed Feb 2025, reports procedure had some difficulty, noted left neck sl swelling 2 days ago and then left arm swelling x past week  Early Mar 2025 Came to ER for the increased left arm swelling, also w increased SOB from baseline  Planned chemo 1wk PTA held for low Hgb 7, instead had IV iron (told needs Hgb >8 for chemo)  Reported cancer has been responding to chemo  Duplex sig for extensive LUE DVT subclavian, IM, axillary, brachial veins and basilic superficial v thrombosis. Ulnar vein not visualized  CTA c, a/p-compared w 12/1/24 larage left effusion, increased since last image, trace right effusion. No PE. Mediastinal LADs up to 2.8x1.2cm, inflamm changes surrounding left subclavian vein. Andreas renal cysts and subcentimeter hypodensities, stabble andreas mild-mod hydronephrosis. small ascites. Mesenteric/upper abdomen LADs and mild pelvic LADs to 1.3cm.  Right chest infusoport in place  Started on IV heparin for LUE DVT. ==>ELiquis  s/p eval Pulm, s/p L pigtail cath placement 03/04/25  CBC w microcytic anemia, on adm had 7.8 mcv 65.6. s/p IV iron outpt.   LUE extensive DVT and supericial thrombosis-suspect assoc w the previous HD catheter, w baseline hyper coagulable state due to met gastric ca.   Did have difficulty in removing the catheter 2/5wks ago and subsequently w progressive left neck adn arm symptoms      RECOMMENDATIONS    R effusion  CT surgery eval appreciated  s/p thoracentesis prior.   Planned pleurex  Follow CBC and transfuse as indicated    CAD/NSTEMI recently.  Cardiology Evaluation   On aspirin, plavix  On labetalol and losartan    met gastric ca w local and distal LN involvements  to continue Heme/Onc followup w own doctor Dr Mckeon upon discharge  will followup with outside oncologist on discharge    LUE extensive DVT and supericial thrombosis  on heparin ggt

## 2025-04-01 NOTE — ED PROVIDER NOTE - PRO INTERPRETER NEED 2
[place] ambulatory encounter  URGENT CARE OFFICE VISIT    SUBJECTIVE:  Jeff Powers is a pleasant 16 year old male who presented requesting evaluation for left arm abrasion which happened yesterday. The area has become progressively warm, red, tender. The patient denies other symptoms or modulating factors.     Review of systems:    A review of systems was performed and findings relevant to these symptoms are included in the HPI.     PAST HISTORIES:    ALLERGIES:   Allergen Reactions   • Dander Other (See Comments)     Runny nose, eyes itch   • Mold Other (See Comments)     Runny nose, eyes itch   • Pollen Other (See Comments)     Nasal congestion, runny eyes       MEDICATIONS:  Current Outpatient Prescriptions   Medication Sig   • fexofenadine (ALLEGRA) 60 MG tablet Take 60 mg by mouth daily.     • mupirocin (BACTROBAN) 2 % ointment Apply topically 2 times daily.   • cephALEXin (KEFLEX) 500 MG capsule Take 1 capsule by mouth 4 times daily for 7 days.   • Lactobacillus (FLORAJEN ACIDOPHILUS) capsule Take 1 capsule by mouth daily.   • amoxicillin (AMOXIL) 875 MG tablet Take 1 tablet by mouth 2 times daily.   • albuterol (VENTOLIN) 108 (90 BASE) MCG/ACT inhaler Inhale 2 puffs into the lungs every 4 hours as needed.       No current facility-administered medications for this visit.        I have reviewed the past medical history, family history, social history, medications and allergies listed in the medical record as obtained by my nursing staff and support staff and agree with their documentation    OBJECTIVE:  Physical Exam:    Visit Vitals  /64   Pulse 67   Temp 98.8 °F (37.1 °C) (Oral)   Resp 16   Wt 78 kg   SpO2 100%        CONSTITUTIONAL: Well-hydrated, well-nourished male who appears to be in no acute distress. Awake, alert and cooperative.  LUNGS: Unlabored  CARDIOVASCULAR: Distal perfusion symmetric.  MUSCULOSKELETAL: Full active and passive ROM of the left elbow. No laxity or crepitance. Muscle  compartment is soft.   SKIN: Warm, dry, shallow abrasion exposing endodermis about the lateral left forearm. There is some localized tissue edema and erythema with mild early erythema suggesting lymphangitic expansion.   NEUROLOGIC: Alert and oriented x3. Distal sensory intact.   PSYCHIATRIC:  Speech and behavior appropriate. Normal mood and affect.    URGENT CARE COURSE:  The wound was cleansed and dressed by the RN.    Last TD > 5 years, updated POC.     Assessment:  1. Abrasion of arm, left, initial encounter    2. Cellulitis of left upper extremity      The patient had a large shallow abrasion from sliding in baseball. The wound became infected and cellulitis developed, there is a hint of mild early lymphangitis which I outlined with a marker. The patient has no fluctuance or abscess. He will be closely monitored at home and I gave clear instructions for ED follow up including the development of marked lymphangitic changes, fever, chills, or changing and worsening condition beyond the expected normal initial 48-72 hour period where he may experience some slight worsening due to bacterial cell lysis and subsequent intracellular debris causing enhanced local inflammation.     PLAN:  Orders Placed This Encounter   • mupirocin (BACTROBAN) 2 % ointment   • cephALEXin (KEFLEX) 500 MG capsule   • Lactobacillus (FLORAJEN ACIDOPHILUS) capsule       FOLLOW-UP:  PCP VS ED    PATIENT INSTRUCTIONS:  ATTACHED    The patient was advised to follow up with primary physician or to recheck with the urgent care clinic sooner if symptoms get worse or if new symptoms appear.    The patient and mother indicated understanding of the diagnosis and agreed with the plan of care.       English

## 2025-04-01 NOTE — ED ADULT NURSE NOTE - NSFALLHARMRISKINTERV_ED_ALL_ED

## 2025-04-01 NOTE — ED ADULT TRIAGE NOTE - CHIEF COMPLAINT QUOTE
Pt ambulates to ED co SOB x1 week but worsened over the weekend. Pt seen here x1 week ago for similar symptoms. Pt on Bumetanide. PMHx stomach cancer, +allergies, AOx4. Pt sent for STAT EKG.

## 2025-04-01 NOTE — CARE COORDINATION ASSESSMENT. - NSCAREPROVIDERS_GEN_ALL_CORE_FT
CARE PROVIDERS:  Accepting Physician: Kentrell Cartagena  Access Services: Nati De La Torre  Administration: Jacqueline Hoyt  Admitting: Kentrell Cartagena  Attending: Kentrell Cartagena  Case Management: Elmer Knight  Consultant: Tammi Forbes  Consultant: Garfield Damico  Consultant: Alla Cottrell  Consultant: Laura Sanchez  ED Attending: Chirag Chamberlain  ED Attending2: Chirag Chamberlain  ED Nurse: Iliana Veliz  Emergency Medicine: Shital Pollack  Oncology: Scarlet Pickett  Outpatient Provider: Jhonny Hunter  PCA/Nursing Assistant: Loren Jacobs  Primary Team: Eddie Roach

## 2025-04-01 NOTE — ED PROVIDER NOTE - NSICDXPASTMEDICALHX_GEN_ALL_CORE_FT
PAST MEDICAL HISTORY:  Anemia of chronic disease     DVT (deep venous thrombosis)     Gastric lymphoma     Hyperlipidemia     Hypertension     Pleural effusion

## 2025-04-01 NOTE — ED ADULT NURSE REASSESSMENT NOTE - NS ED NURSE REASSESS COMMENT FT1
alan completed. pt sitting in chair with friend at bedside. no concerns at this time. aware of POC
Pt received in bed alert and oriented in bed. Pt denies any pain or discomfort as of this time. Pt admitted and awaiting bed placement. Nursing care ongoing and safety maintained

## 2025-04-01 NOTE — PATIENT PROFILE ADULT - DO YOU EVER NEED HELP READING HOSPITAL MATERIALS?
Phone Number Called: [phone]      Call outcome: Did not leave a detailed message. Requested patient to call back.         no

## 2025-04-01 NOTE — ED PROVIDER NOTE - CLINICAL SUMMARY MEDICAL DECISION MAKING FREE TEXT BOX
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 Using home oxygen and oxygen saturation is 97 percent,  Pt seen here x2 week ago for similar symptoms. Pt on Bumetanide for the effusion , Pleurx cathter was discussed on the last admission. VSS, saturation 97 percent, Exam stable, Labs elevated D-dimer, CT scan B/L effusions, no PE admit to medicine for  Pleurx cathter

## 2025-04-01 NOTE — CARE COORDINATION ASSESSMENT. - OTHER PERTINENT DISCHARGE PLANNING INFORMATION:
Met with patient at bedside to discuss the role of case management with verbalized understanding.  Patient seen in ED and anticipate inpatient admission for pleural effusion and possible pleurex cath insertion.  Patient resides home with brother, ambulates independently but recently obtained home o2.  Patient has an infusaport for chemo treatments.  Denies [facility] services and does states that his insurance is not good at approving home care services.  Will monitor for transition needs and remain available.  PCP Dr Mulugeta Delcid.

## 2025-04-01 NOTE — H&P ADULT - HISTORY OF PRESENT ILLNESS
Pt is a 65 y/o male w/ PMHx with poorly differentiated metastatic (known cervical and axillary lymphadenopathy) GI carcinoma, LUE and LLE DVT, HTN, and anemia presented to ED with SOB. Pt endorses this is similar to his prior episodes of SOB and it is predominately exertional in nature. Pt reports that  this is similar to prior episodes. Pt endorses visiting Dr. Fung last Wed. and placing him on 2L NC d/t the SOB, pt endorses using 3L at home. Pt states that also a deep productive cough w/ white phlegm of which has been chronic. Of note, pt was admitted from 3/3-3/7 with large left pleural effusion and LUE DVT. Pt had thoracentesis done of which 3.1 L of fluid from the left thorax. Of note, pt was admitted on 3/19-3/23 for pleural effusion and NSTEMI. Pt had a thoracentesis done of which drained 560 cc from the left thorax. Pt reports last receiving FolFox two weeks ago (received four cycles so far), but will not receive any more per Dr. Fung.     IN THE ED:  Temp 95.5F, HR 81, /72, RR 18, SpO2 94%  S/P robitussin 200 mg q6h prn for cough  Labs significant for H/H 9.5/30.2, D-Dimer: 2136, BUN/Cr: 27/1.4, Glucose: 132, eGFR: 56, Trops: 276  Imaging: CT angio chest: No pulmonary embolism. Interval progression of right pleural effusion and right paratracheal lymphadenopathy. Slightly improved multiloculated left pleural effusion along with pleural nodularity and septal lines concerning for malignant etiology.   Pt is a 65 y/o male w/ PMHx with poorly differentiated metastatic (known cervical and axillary lymphadenopathy) GI carcinoma, LUE and LLE DVT, HTN, and anemia presented to ED with SOB. Pt endorses this is similar to his prior episodes of SOB and it is predominately exertional in nature. Pt reports that  this is similar to prior episodes. Pt endorses visiting Dr. Fung last Wed. and placing him on 2L NC d/t the SOB, pt endorses using 3L at home. Pt states that also a deep productive cough w/ white phlegm of which has been chronic. Of note, pt was admitted from 3/3-3/7 with large left pleural effusion and LUE DVT. Pt had thoracentesis done of which 3.1 L of fluid from the left thorax. Of note, pt was admitted on 3/19-3/23 for pleural effusion and NSTEMI. Pt had a thoracentesis done of which drained 560 cc from the left thorax. Pt reports last receiving FolFox two weeks ago (received four cycles so far), but will not receive any more per Dr. Fung. On 3L NC in room.    IN THE ED:  Temp 95.5F, HR 81, /72, RR 18, SpO2 94%  S/P robitussin 200 mg q6h prn for cough  Labs significant for H/H 9.5/30.2, D-Dimer: 2136, BUN/Cr: 27/1.4, Glucose: 132, eGFR: 56, Trops: 276  Imaging: CT angio chest: No pulmonary embolism. Interval progression of right pleural effusion and right paratracheal lymphadenopathy. Slightly improved multiloculated left pleural effusion along with pleural nodularity and septal lines concerning for malignant etiology.

## 2025-04-01 NOTE — H&P ADULT - NSHPREVIEWOFSYSTEMS_GEN_ALL_CORE
REVIEW OF SYSTEMS:  CONSTITUTIONAL: No fever/chills  HENMT: No lightheadedness/dizziness  RESPIRATORY: (+) productive cough w/ white phlegm, (+) shortness of breath.  CARDIOVASCULAR: No chest pain, palpitations.  GASTROINTESTINAL: No abdominal or epigastric pain. No nausea or vomiting; No diarrhea or constipation.  GENITOURINARY: No dysuria, changes in frequency

## 2025-04-01 NOTE — H&P ADULT - PROBLEM SELECTOR PLAN 4
Trops: 275.8. Admitted recently for Type 2 MI  -Cardiology consulted (Dr. Forbes), recs appreciated  -Trops likely elevated 2/2 recent type 2 MI  -Trend trops to peak Trops: 275.8. Admitted recently for Type 2 MI  -Cardiology consulted (Dr. Forbes), recs appreciated  -Trops likely elevated 2/2 recent type 2 MI  -Trend trops to peak  -On hepartin gtt for DVT ppx Trops: 275.8. Admitted recently for Type 2 MI  -Cardiology consulted (Dr. Forbes), recs appreciated  -Trops likely elevated 2/2 recent type 2 MI  -Trend trops to peak  -On heparin gtt for DVT ppx Trops: 275.8. Admitted recently for Type 2 MI  -Cardiology consulted (Dr. Forbes), recs appreciated  -Trops likely elevated 2/2 recent type 2 MI  -Trend trops to peak  -On heparin gtt for DVT ppx  -c/w home plavix and started on daily aspirin for recent type II MI

## 2025-04-01 NOTE — H&P ADULT - PROBLEM SELECTOR PLAN 3
Elevated D-Dimer on admission (2136)  -CT Chest: No pulmonary embolism  -Likely 2/2 LUE DVT of which has been treated w/ eliquis o/p  -Start on heparin gtt for possible procedure.

## 2025-04-01 NOTE — CARE COORDINATION ASSESSMENT. - NS SW HOME EQUIPMENT
Patient states he recently acquired home o2 from his oncologist.  Has a home concentrator and portable tanks.  Typically uses between 2-3L as needed/oxygen

## 2025-04-01 NOTE — ED ADULT NURSE NOTE - OBJECTIVE STATEMENT
A&Ox4 from home to ED with complaints of SOB. hx stomach CA, last chemo tx 3 weeks ago. has been recently admitted for similar symptoms. wet cough noted. sometimes productive with white sputum. on Bumetanide. o2 sat WNL on RA but pt use of accessory muscles noted. placed on 4L NC for comfort as per request.

## 2025-04-01 NOTE — CONSULT NOTE ADULT - SUBJECTIVE AND OBJECTIVE BOX
INCOMPLETE NOTE.  Documentation in Progress  PT SEEN AND EVALUATED.   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 Pt endorses this is similar to his prior episodes of SOB and it is predominately exertional in nature. Pt reports that  this is similar to prior episodes. Pt endorses visiting Dr. Fung last Wed. and placing him on 2L NC d/t the SOB, pt endorses using 3L at home. Pt states that also a deep productive cough w/ white phlegm of which has been chronic. Of note, pt was admitted from 3/3-3/7 with large left pleural effusion and LUE DVT. Pt had thoracentesis done of which 3.1 L of fluid from the left thorax. Of note, pt was admitted on 3/19-3/23 for pleural effusion and NSTEMI. Pt had a thoracentesis done of which drained 560 cc from the left thorax. Pt reports last receiving FolFox two weeks ago (received four cycles so far), but will not receive any more per Dr. Fung. On 3L NC in room.    IN THE ED:  Temp 95.5F, HR 81, /72, RR 18, SpO2 94%  S/P robitussin 200 mg q6h prn for cough  Labs significant for H/H 9.5/30.2, D-Dimer: 2136, BUN/Cr: 27/1.4, Glucose: 132, eGFR: 56, Trops: 276  Imaging: CT angio chest: No pulmonary embolism. Interval progression of right pleural effusion and right paratracheal lymphadenopathy. Slightly improved multiloculated left pleural effusion along with pleural nodularity and septal lines concerning for malignant etiology.   (2025 12:46)    PAST MEDICAL & SURGICAL HISTORY:  Gastric lymphoma      Anemia of chronic disease      Pleural effusion      DVT (deep venous thrombosis)      Hypertension      Hyperlipidemia      S/P appendectomy  2014      S/P cholecystectomy  15 years ago         HEALTH ISSUES - PROBLEM Dx:  Gastric lymphoma    Pleural effusion    Hypertension    Hyperlipidemia    Anemia of chronic disease    Need for prophylactic measure    MARYSOL (acute kidney injury)    Elevated troponin    Elevated d-dimer          Malignant pleural effusion [J91.0]    No pertinent past medical history [502977524]    Incisional hernia [553.21]    Gastric lymphoma [C85.99]    Anemia of chronic disease [D63.8]    Pleural effusion [J90]    DVT (deep venous thrombosis) [I82.409]    Hypertension [I10]    Hyperlipidemia [E78.5]    No significant past surgical history [769328088]    S/P appendectomy [V45.89]    S/P cholecystectomy [V45.79]    Elevated troponin [R79.89]      FAMILY HISTORY:  Family history of coronary artery disease in father  Father -  age 60 following MI        [SOCIAL HISTORY: ]     smoking:  none currently     EtOH:  none currently     illicit drugs:  none currently     occupation:  Retired     marital status:       Other:       [ALLERGIES/INTOLERANCES:]  Allergies    Bactrim DS (Hives)    Intolerances        [MEDICATIONS]  MEDICATIONS  (STANDING):  aspirin 325 milliGRAM(s) Oral daily  atorvastatin 40 milliGRAM(s) Oral at bedtime  buMETAnide 2 milliGRAM(s) Oral daily  clopidogrel Tablet 75 milliGRAM(s) Oral daily  heparin  Infusion.  Unit(s)/Hr (17 mL/Hr) IV Continuous <Continuous>  influenza   Vaccine 0.5 milliLiter(s) IntraMuscular once  labetalol 100 milliGRAM(s) Oral two times a day  sodium chloride 0.9%. 1000 milliLiter(s) (60 mL/Hr) IV Continuous <Continuous>    MEDICATIONS  (PRN):  guaiFENesin Oral Liquid (Sugar-Free) 200 milliGRAM(s) Oral every 6 hours PRN Cough  heparin   Injectable 7500 Unit(s) IV Push every 6 hours PRN For aPTT less than 40  heparin   Injectable 3500 Unit(s) IV Push every 6 hours PRN For aPTT between 40 - 57      [REVIEW OF SYSTEMS: ]  CONSTITUTIONAL: normal, no fever, no shakes, no chills   EYES: No eye pain, no visual disturbances, no discharge  ENMT:  no discharge  NECK: No pain, no stiffness  BREASTS: No pain, no masses, no nipple discharge  RESPIRATORY: No cough, no wheezing, no chills, no hemoptysis; No shortness of breath  CARDIOVASCULAR: No chest pain, no palpitations, no dizziness, no leg swelling  GASTROINTESTINAL: No abdominal, no epigastric pain. No nausea, no vomiting, no hematemesis; No diarrhea , no constipation. No melena, no hematochezia.  GENITOURINARY: No dysuria, no frequency, no hematuria, no incontinence  NEUROLOGICAL: No headaches, no memory loss, no loss of strength, no numbness, no tremors  SKIN: No itching, no burning, no rashes, no lesions   LYMPH NODES: No enlarged glands  ENDOCRINE: No heat or cold intolerance; No hair loss  MUSCULOSKELETAL: No joint pain or swelling; No muscle, no back, no extremity pain  PSYCHIATRIC: No depression, no anxiety, no mood swings, no difficulty sleeping  HEME/LYMPH: No easy bruising, no bleeding gums    [VITALS SIGNS 24hrs]  Vital Signs Last 24 Hrs  T(C): 37 (2025 16:53), Max: 37 (2025 12:06)  T(F): 98.6 (2025 16:53), Max: 98.6 (2025 12:06)  HR: 80 (2025 16:53) (74 - 81)  BP: 111/63 (2025 16:53) (100/67 - 122/72)  BP(mean): --  RR: 18 (2025 16:53) (17 - 18)  SpO2: 95% (2025 16:53) (94% - 99%)    Parameters below as of 2025 16:53  Patient On (Oxygen Delivery Method): nasal cannula      Daily Height in cm: 170.18 (2025 05:21)    Daily     I&O's Summary      [PHYSICAL EXAM]  GEN:   HEENT: normocephalic and atraumatic. EOMI. PERRL.    NECK: Supple.  No lymphadenopathy   LUNGS: Clear to auscultation.  HEART: S1S2 Regular rate and rhythm, no MRG  ABDOMEN: Soft, nontender, and nondistended.  Positive bowel sounds.    : No CVA tenderness  EXTREMITIES: Without edema.  NEUROLOGIC: grossly intact.  PSYCHIATRIC: Appropriate affect .  SKIN: No rash     [LABS: ]                        9.5    7.81  )-----------( 613      ( 2025 06:15 )             30.2     CBC Full  -  ( 2025 06:15 )  WBC Count : 7.81 K/uL  RBC Count : 4.63 M/uL  Hemoglobin : 9.5 g/dL  Hematocrit : 30.2 %  Platelet Count - Automated : 613 K/uL  Mean Cell Volume : 65.2 fl  Mean Cell Hemoglobin : 20.5 pg  Mean Cell Hemoglobin Concentration : 31.5 g/dL  Auto Neutrophil # : x  Auto Lymphocyte # : x  Auto Monocyte # : x  Auto Eosinophil # : x  Auto Basophil # : x  Auto Neutrophil % : x  Auto Lymphocyte % : x  Auto Monocyte % : x  Auto Eosinophil % : x  Auto Basophil % : x        135  |  102  |  27[H]  ----------------------------<  132[H]  4.0   |  27  |  1.40[H]    Ca    9.3      2025 06:15    TPro  6.7  /  Alb  2.5[L]  /  TBili  0.3  /  DBili  x   /  AST  26  /  ALT  22  /  AlkPhos  95      PT/INR - ( 2025 14:10 )   PT: 17.4 sec;   INR: 1.48 ratio         PTT - ( 2025 14:10 )  PTT:29.7 sec  LIVER FUNCTIONS - ( 2025 06:15 )  Alb: 2.5 g/dL / Pro: 6.7 g/dL / ALK PHOS: 95 U/L / ALT: 22 U/L / AST: 26 U/L / GGT: x               Urinalysis Basic - ( 2025 06:15 )    Color: x / Appearance: x / SG: x / pH: x  Gluc: 132 mg/dL / Ketone: x  / Bili: x / Urobili: x   Blood: x / Protein: x / Nitrite: x   Leuk Esterase: x / RBC: x / WBC x   Sq Epi: x / Non Sq Epi: x / Bacteria: x          CBC TREND (5 Days)  WBC Count: 7.81 K/uL ( @ 06:15)    Hemoglobin: 9.5 g/dL ( @ 06:15)    Hematocrit: 30.2 % ( @ 06:15)    Platelet Count - Automated: 613 K/uL ( @ 06:15)        Ferritin: 989 ng/mL ( @ 05:40)  Ferritin: 943 ng/mL ( @ 06:24)  Ferritin: 257 ng/mL ( @ 07:05)  Ferritin: 140 ng/mL ( @ 19:53)    Iron Total: 19 ug/dL ( @ 05:40)  Iron Total: 20 ug/dL ( @ :24)  Iron Total: 17 ug/dL ( @ 07:05)     Vitamin B12, Serum: >2000 pg/mL ( @ 18:35)  Vitamin B12, Serum: >2000 pg/mL (:40)  Vitamin B12, Serum: >2000 pg/mL (:24)  Vitamin B12, Serum: 1506 pg/mL ( 19:53)     Folate, Serum: >20.0 ng/mL ( 18:35)  Folate, Serum: 18.1 ng/mL ( 05:40)  Folate, Serum: >20.0 ng/mL (:24)  Folate, Serum: 12.6 ng/mL ( @ 19:53)     Sedimentation Rate, Erythrocyte: 60 mm/hr ( @ 18:35)                           [MICROBIOLOGY /  VIROLOGY:]          [PATHOLOGY]       [RADIOLOGY & ADDITIONAL STUDIES:]     CT Angio Chest PE Protocol w/ IV Cont:   ACC: [id number] EXAM:  CT ANGIO CHEST PULM [facility]   ORDERED BY: KATHERINE JAY   PROCEDURE DATE:  2025    INTERPRETATION:  CLINICAL INFORMATION: Shortness of breath. Rule out pulmonary embolism.  COMPARISON: 2025.  FINDINGS:  LUNGS AND LARGE AIRWAYS: Trace secretions within the distal trachea. The   dependent lung atelectasis bilaterally, increased on the right. Evidence   of septal lines involving the left lung.  PLEURA: Moderate right pleural effusion, increased in comparison to the   prior study. Multiloculated mild left pleural effusion demonstrates   slight improvement. Pleural nodularity is identified in the left lung   base concerning for malignancy.  VESSELS: No pulmonary embolism. Atherosclerotic calcification including   the coronary arteries.  HEART: Heart size is normal. Small pericardial effusion.  MEDIASTINUM AND STELLA: No lymphadenopathy. Mediastinal and hilar   lymphadenopathy. Dominant right paratracheal luke mass measures 2.6 x   2.9 cm, previously 2.2 x 2.5 cm.  CHEST WALL AND LOWER NECK: Left axillary lymphadenopathy and subcutaneous   stranding, essentially unchanged.  VISUALIZED UPPER ABDOMEN: Small volume perihepatic ascites, peritoneal   stranding and mesenteric edema. Stable prominence of cisterna chyli. Mild   bilateral hydronephrosis. Bilateral renal cysts. Stable mildly enlarged   upper retroperitoneal nodes measuring up to 1.6 cm in short axis in the   periportal region.  BONES: Stable tiny sclerotic foci lower thoracic spine.    IMPRESSION:  No pulmonary embolism.  Interval progression of right pleural effusion and right paratracheal   lymphadenopathy.  Slightly improved multiloculated left pleural effusion along with pleural   nodularity and septal lines concerning for malignant etiology.        --- End of Report ---            DINO BARNES MD; Attending Radiologist  This document has been electronically signed. 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 Pt endorses this is similar to his prior episodes of SOB and it is predominately exertional in nature. Pt reports that  this is similar to prior episodes. Pt endorses visiting Dr. Fung last Wed. and placing him on 2L NC d/t the SOB, pt endorses using 3L at home. Pt states that also a deep productive cough w/ white phlegm of which has been chronic. Of note, pt was admitted from 3/3-3/7 with large left pleural effusion and LUE DVT. Pt had thoracentesis done of which 3.1 L of fluid from the left thorax. Of note, pt was admitted on 3/19-3/23 for pleural effusion and NSTEMI. Pt had a thoracentesis done of which drained 560 cc from the left thorax. Pt reports last receiving FolFox two weeks ago (received four cycles so far), but will not receive any more per Dr. Fung. On 3L NC in room.    IN THE ED:  Temp 95.5F, HR 81, /72, RR 18, SpO2 94%  S/P robitussin 200 mg q6h prn for cough  Labs significant for H/H 9.5/30.2, D-Dimer: 2136, BUN/Cr: 27/1.4, Glucose: 132, eGFR: 56, Trops: 276  Imaging: CT angio chest: No pulmonary embolism. Interval progression of right pleural effusion and right paratracheal lymphadenopathy. Slightly improved multiloculated left pleural effusion along with pleural nodularity and septal lines concerning for malignant etiology.   (2025 12:46)    PAST MEDICAL & SURGICAL HISTORY:  Gastric lymphoma  Anemia of chronic disease  Pleural effusion  DVT (deep venous thrombosis)  Hypertension  Hyperlipidemia  S/P appendectomy  2014    S/P cholecystectomy  15 years ago     HEALTH ISSUES - PROBLEM Dx:  Gastric lymphoma  Pleural effusion  Hypertension  Hyperlipidemia  Anemia of chronic disease  Need for prophylactic measure  MARYSOL (acute kidney injury)  Elevated troponin  Elevated d-dimer    Malignant pleural effusion [J91.0]  Incisional hernia [553.21]  Gastric lymphoma [C85.99]  Anemia of chronic disease [D63.8]  Pleural effusion [J90]  DVT (deep venous thrombosis) [I82.409]  Hypertension [I10]  Hyperlipidemia [E78.5]  S/P appendectomy [V45.89]  S/P cholecystectomy [V45.79]  Elevated troponin [R79.89]    FAMILY HISTORY:  Family history of coronary artery disease in father  Father -  age 60 following MI    [SOCIAL HISTORY: ]     smoking:  none currently     EtOH:  none currently     illicit drugs:  none currently     occupation:  Retired     marital status:       Other:       [ALLERGIES/INTOLERANCES:]  Allergies     Bactrim DS (Hives)  Intolerances        [MEDICATIONS]  MEDICATIONS  (STANDING):  aspirin 325 milliGRAM(s) Oral daily  atorvastatin 40 milliGRAM(s) Oral at bedtime  buMETAnide 2 milliGRAM(s) Oral daily  clopidogrel Tablet 75 milliGRAM(s) Oral daily  heparin  Infusion.  Unit(s)/Hr (17 mL/Hr) IV Continuous <Continuous>  influenza   Vaccine 0.5 milliLiter(s) IntraMuscular once  labetalol 100 milliGRAM(s) Oral two times a day  sodium chloride 0.9%. 1000 milliLiter(s) (60 mL/Hr) IV Continuous <Continuous>    MEDICATIONS  (PRN):  guaiFENesin Oral Liquid (Sugar-Free) 200 milliGRAM(s) Oral every 6 hours PRN Cough  heparin   Injectable 7500 Unit(s) IV Push every 6 hours PRN For aPTT less than 40  heparin   Injectable 3500 Unit(s) IV Push every 6 hours PRN For aPTT between 40 - 57      [REVIEW OF SYSTEMS: ]  CONSTITUTIONAL: normal, no fever, no shakes, no chills   EYES: No eye pain, no visual disturbances, no discharge  ENMT:  no discharge  NECK: No pain, no stiffness  BREASTS: No pain, no masses, no nipple discharge  RESPIRATORY: No cough, no wheezing, no chills, no hemoptysis; No shortness of breath  CARDIOVASCULAR: No chest pain, no palpitations, no dizziness, no leg swelling  GASTROINTESTINAL: No abdominal, no epigastric pain. No nausea, no vomiting, no hematemesis; No diarrhea , no constipation. No melena, no hematochezia.  GENITOURINARY: No dysuria, no frequency, no hematuria, no incontinence  NEUROLOGICAL: No headaches, no memory loss, no loss of strength, no numbness, no tremors  SKIN: No itching, no burning, no rashes, no lesions   LYMPH NODES: No enlarged glands  ENDOCRINE: No heat or cold intolerance; No hair loss  MUSCULOSKELETAL: No joint pain or swelling; No muscle, no back, no extremity pain  PSYCHIATRIC: No depression, no anxiety, no mood swings, no difficulty sleeping  HEME/LYMPH: No easy bruising, no bleeding gums    [VITALS SIGNS 24hrs]  Vital Signs Last 24 Hrs  T(C): 37 (2025 16:53), Max: 37 (2025 12:06)  T(F): 98.6 (2025 16:53), Max: 98.6 (2025 12:06)  HR: 80 (2025 16:53) (74 - 81)  BP: 111/63 (2025 16:53) (100/67 - 122/72)  BP(mean): --  RR: 18 (2025 16:53) (17 - 18)  SpO2: 95% (2025 16:53) (94% - 99%)    Parameters below as of 2025 16:53  Patient On (Oxygen Delivery Method): nasal cannula      Daily Height in cm: 170.18 (2025 05:21)    Daily     I&O's Summary      [PHYSICAL EXAM]  GEN:   HEENT: normocephalic and atraumatic. EOMI. PERRL.    NECK: Supple.  No lymphadenopathy   LUNGS: Clear to auscultation.  HEART: S1S2 Regular rate and rhythm, no MRG  ABDOMEN: Soft, nontender, and nondistended.  Positive bowel sounds.    : No CVA tenderness  EXTREMITIES: Without edema.  NEUROLOGIC: grossly intact.  PSYCHIATRIC: Appropriate affect .  SKIN: No rash     [LABS: ]                        9.5    7.81  )-----------( 613      ( 2025 06:15 )             30.2     CBC Full  -  ( 2025 06:15 )  WBC Count : 7.81 K/uL  RBC Count : 4.63 M/uL  Hemoglobin : 9.5 g/dL  Hematocrit : 30.2 %  Platelet Count - Automated : 613 K/uL  Mean Cell Volume : 65.2 fl  Mean Cell Hemoglobin : 20.5 pg  Mean Cell Hemoglobin Concentration : 31.5 g/dL  Auto Neutrophil # : x  Auto Lymphocyte # : x  Auto Monocyte # : x  Auto Eosinophil # : x  Auto Basophil # : x  Auto Neutrophil % : x  Auto Lymphocyte % : x  Auto Monocyte % : x  Auto Eosinophil % : x  Auto Basophil % : x        135  |  102  |  27[H]  ----------------------------<  132[H]  4.0   |  27  |  1.40[H]    Ca    9.3      2025 06:15    TPro  6.7  /  Alb  2.5[L]  /  TBili  0.3  /  DBili  x   /  AST  26  /  ALT  22  /  AlkPhos  95      PT/INR - ( 2025 14:10 )   PT: 17.4 sec;   INR: 1.48 ratio         PTT - ( 2025 14:10 )  PTT:29.7 sec  LIVER FUNCTIONS - ( 2025 06:15 )  Alb: 2.5 g/dL / Pro: 6.7 g/dL / ALK PHOS: 95 U/L / ALT: 22 U/L / AST: 26 U/L / GGT: x               Urinalysis Basic - ( 2025 06:15 )    Color: x / Appearance: x / SG: x / pH: x  Gluc: 132 mg/dL / Ketone: x  / Bili: x / Urobili: x   Blood: x / Protein: x / Nitrite: x   Leuk Esterase: x / RBC: x / WBC x   Sq Epi: x / Non Sq Epi: x / Bacteria: x          CBC TREND (5 Days)  WBC Count: 7.81 K/uL ( @ 06:15)    Hemoglobin: 9.5 g/dL ( @ 06:15)    Hematocrit: 30.2 % ( @ 06:15)    Platelet Count - Automated: 613 K/uL ( @ 06:15)        Ferritin: 989 ng/mL ( @ 05:40)  Ferritin: 943 ng/mL ( @ 06:24)  Ferritin: 257 ng/mL ( @ 07:05)  Ferritin: 140 ng/mL ( @ 19:53)    Iron Total: 19 ug/dL ( @ 05:40)  Iron Total: 20 ug/dL ( @ 06:24)  Iron Total: 17 ug/dL (-22 @ 07:05)     Vitamin B12, Serum: >2000 pg/mL ( @ 18:35)  Vitamin B12, Serum: >2000 pg/mL ( @ 05:40)  Vitamin B12, Serum: >2000 pg/mL ( @ 06:24)  Vitamin B12, Serum: 1506 pg/mL ( @ 19:53)     Folate, Serum: >20.0 ng/mL ( @ 18:35)  Folate, Serum: 18.1 ng/mL ( @ 05:40)  Folate, Serum: >20.0 ng/mL ( @ 06:24)  Folate, Serum: 12.6 ng/mL ( @ 19:53)     Sedimentation Rate, Erythrocyte: 60 mm/hr ( @ 18:35)      [MICROBIOLOGY /  VIROLOGY:]    [PATHOLOGY]     ACCESSION No:  [id number]  Patient:     JESSE EATON  Accession:                             [id number]  Collected Date/Time:                   3/4/2025 12:47 EST  Received Date/Time:                    3/4/2025 18:49 EST  Non-Gynecologic Report - Auth (Verified)  Specimen(s) Submitted: PLEURAL FLUID  Final Diagnosis: PLEURAL FLUID  POSITIVE FOR MALIGNANT CELLS. Metastatic poorly  differentiated  carcinoma.  Cytology slides and cell block show a hypercellular specimen composed of single lying and rare groups of malignant cells with enlarged, eccentric placed  nuclei containing prominent nucleoli,  irregular nuclear contours and moderate cytoplasm, in a background of reactive mesothelial cells.   In summary: Compatible with metastatic carcinoma of upper GI, as was diagnosed in specimen: [id number]   Screened by: Fidelia PIERRE(ASCP)   Verified by: Maxwell Abdalla M.D. (Electronic Signature)  Reported on: 25 15:39 EST, [facility] [address]  Phone: [phone]   Fax: [phone]  Cytology processed at [facility], [address] [place] [id number]  _________________________________________________________________  Clinical Information  LUE DVT, pleural effusion.  Gross Description  Received: 60  ml of cloudy fluid in CytoLyt  Prepared: 1 ThinPrep slide, 1 smear, 1 cell block        ACCESSION No:  [id number]  Patient:     JESSE EATON  Accession:                             [id number]  Collected Date/Time:                   10/1/2024 13:07 EDT  Received Date/Time:                    10/2/2024 13:07 EDT  Addendum Report - Auth (Verified)  Addendum  The full reference lab report from [facility] is on the next page of this Addendum.  If you are unable to view the report:   1. View from Reference Lab Portal  2. View in [facility] Result Viewer  3. In SCM for Hospital Patients Only:  Click on the "Patient Window" tab after selecting the appropriate patient visit  4. Call Pathology Dept. at [phone] for a faxed report  Verified by: Javier Mcbride MD  (Electronic Signature)  Reported on: 11/15/24 16:56 EST, [facility] [facility] [place] [address]  Phone: [phone]   Fax: [phone]  _________________________________________________________________  *** Image not supported for this output type ***  Addendum Report - Auth (Verified)  Addendum  Additional immunostains results:  CK7 ( repeat ): Positive  CK19: Positive  CA 19.9: Focally positive  INI-1: Positive  Negative reactivity is seen with Calretinin, WT1, CD30, OCT 3/4, PLAP, , PSA, and PSAP.  In summary the tumor cells are positive for CK7, AE1/AE3, CDX2, CK19 and CA 19.9.  Findings are not diagnostic. Upper GI including  (but not limited to) pancreato-biliary should be considered among the possible primary sites of origin.  Foundation results submitted separately.  PD-L1 Immunohistochemistry  Antibody: Cunard PDL1 ()  Tissue type: Â Lymph node  Block: 1B  Result: Combined Positive Score (CPS): 3  PD-L1 Immunohistochemistry  Antibody: Cunard PDL1 ()  Result: Tumor Proportion Score (TPS*)  Â  8%  *TPS: The percentage of viable tumor cells showing partial or complete membrane staining at any intensity  Verified by: Javier Mcbride MD  (Electronic Signature)  Reported on: 10/30/24 13:02 EDT, [facility], [address] [address]  Phone: [phone]   Fax: [phone]  _________________________________________________________________  Surgical Pathology Report - Auth (Verified)  Specimen(s) Submitted  1-Perihepatic lymph node FNB  Final Diagnosis  1.  Perihepatic lymph node, FNB:  -   Metastatic poorly-differentiated carcinoma.  See note.  Note: Immunostain results.  AE1/AE3 and CDX2: Positive tumor cells.   CK7 ( minimal focally positive) CK20, TTF-1, Hepar, DOG1, NKX3.1, arginase, synaptophysin, chromogranin, GATA3, SOX10, PAX8, p40 : Negative tumor cells.   Comment: Diagnosis confirmed upon review at the daily QA/GI intradepartmental    conference.  Dr. Soni notified via secure email.  Additional immunostains and Foundation pending.  Verified by: Javier Mcbride MD (Electronic Signature)  Reported on: 10/09/24 09:43 EDT, [facility], [address] [address]  Phone: [phone]   Fax: [phone]  _________________________________________________________________  Gross Description  Received:  In formalin labeled  "perihepatic lymph node FNB"  Size:  Multiple cores each measuring 0.1 to 0.5 cm  Description:  Tan soft tissue cores admixed with blood clot  Submitted:  Entirely in two cassettes: 1A-1B  SHUBHAM Greenwood ([facility]) 10/02/2024 06:53 PM      [RADIOLOGY & ADDITIONAL STUDIES:]     CT Angio Chest PE Protocol w/ IV Cont:   ACC: [id number] EXAM:  CT ANGIO CHEST PULM [facility]   ORDERED BY: KATHERINE JAY   PROCEDURE DATE:  2025    INTERPRETATION:  CLINICAL INFORMATION: Shortness of breath. Rule out pulmonary embolism.  COMPARISON: 2025.  FINDINGS:  LUNGS AND LARGE AIRWAYS: Trace secretions within the distal trachea. The dependent lung atelectasis bilaterally, increased on the right. Evidence of septal lines involving the left lung.  PLEURA: Moderate right pleural effusion, increased in comparison to the prior study. Multiloculated mild left pleural effusion demonstrates slight improvement. Pleural nodularity is identified in the left lung base concerning for malignancy.  VESSELS: No pulmonary embolism. Atherosclerotic calcification including the coronary arteries.  HEART: Heart size is normal. Small pericardial effusion.  MEDIASTINUM AND STELLA: No lymphadenopathy. Mediastinal and hilar lymphadenopathy. Dominant right paratracheal luke mass measures 2.6 x 2.9 cm, previously 2.2 x 2.5 cm.  CHEST WALL AND LOWER NECK: Left axillary lymphadenopathy and subcutaneous stranding, essentially unchanged.  VISUALIZED UPPER ABDOMEN: Small volume perihepatic ascites, peritoneal stranding and mesenteric edema. Stable prominence of cisterna chyli. Mild bilateral hydronephrosis. Bilateral renal cysts. Stable mildly enlarged   upper retroperitoneal nodes measuring up to 1.6 cm in short axis in the periportal region.  BONES: Stable tiny sclerotic foci lower thoracic spine.    IMPRESSION:  No pulmonary embolism.  Interval progression of right pleural effusion and right paratracheal lymphadenopathy.  Slightly improved multiloculated left pleural effusion along with pleural nodularity and septal lines concerning for malignant etiology.  --- End of Report ---  DNIO BARNES MD; Attending Radiologist  This document has been electronically signed. 2025 10:44AM (04-01-25 @ 08:12)      CT Angio Chest PE Protocol w/ IV Cont:   ACC: [id number] EXAM:  CT ANGIO CHEST PULM ART [facility]   ORDERED BY: FANTA OLIVEIRA   PROCEDURE DATE:  2025    INTERPRETATION:  CLINICAL INFORMATION: Metastatic GI cancer. Shortness of breath  COMPARISON: 3/3/2025  FINDINGS:  LUNGS AND LARGE AIRWAYS: Minimal respiratory motion unsharpness .Patent central airways. Moderate dependent/layering RIGHT pleural effusion, increased from 3/3/2025. Moderate multiloculated LEFT pleural effusion (w/ pleural pseudotumor), diminished from 3/3/2025 and produces subtotal LLL passive atelectasis. IAN interlobular septal thickening. No suspicious pulmonary lesions  PLEURA: See above.  VESSELS: No aortic dilatation/dissection or pulmonary thromboembolism.   Coronary artery calcification. Minimal inflammatory changes around left subclavian vein, c/w  previously DVT  HEART: Normal sized heart. Small pericardial effusion.  MEDIASTINUM AND STELLA: Diffuse mediastinal lymphadenopathy is not significantly changed from 3/3/2025. (RIGHT paratracheal LN 2.5 x 2.2 cm, 301/36). While differences in arm position limits interval axillary assessment, LEFT axillary lymphadenopathy (now w/ surrounding fat stranding) appears progressed from 3/3/2025 (largest axillary LN 2.3 x 1.7 cm, 301/27.  CHEST WALL AND LOWER NECK: Trace gynecomastia  VISUALIZED UPPER ABDOMEN: Bilateral renal cysts and Gerota's fascia edema/thickening (L>R), unchanged. Gastrohepatic/periportal lymphadenopathy and adrenal nodularity, not significantly changed. RIGHT   perihepatic ascites, developed. Fluid dilated cisterna chyli, unchanged (301/120).  BONES: Thoracic spondylosis. Scattered sclerotic osseous foci. Punctate T10 and T11 (sagittal 602/68, 67) sclerotic foci, developed from 2024.  IMPRESSION:  1.  No pulmonary thromboembolism.  2.  Moderate multiloculated LEFT pleural effusion diminished from 3/3/2025 and produces subtotal LLL passive atelectasis. Moderate dependent RIGHT pleural effusion, increased.  3.  Mediastinal lymphadenopathy is not significantly changed from 3/3/2025.  While differences in arm position limits interval axillary assessment, LEFT axillary lymphadenopathy (now w/ surrounding fat stranding) appears progressed from 3/3/2025  4.  Punctate T8, T10 and T11 (sagittal 602/68, 67, 75) sclerotic foci, developed from 2024, must be viewed with suspicion.  5.  Abdominal ascites, developed  --- End of Report ---  GUICHO CORCORAN MD; Attending Radiologist  This document has been electronically signed. Mar 19 2025  6:59AM (25 @ 06:26)

## 2025-04-01 NOTE — ED PROVIDER NOTE - CARE PLAN
Principal Discharge DX:	Malignant pleural effusion   1 Principal Discharge DX:	Malignant pleural effusion  Secondary Diagnosis:	Elevated troponin

## 2025-04-01 NOTE — CONSULT NOTE ADULT - SUBJECTIVE AND OBJECTIVE BOX
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, H/O HTN,  Anemia, recent admission for Pleural effusion,  DVT of LUE currently on Eliquis p/w  SOB since recent discharged but increased   x1 week    PAST MEDICAL & SURGICAL HISTORY:  Incisional hernia        Gastric lymphoma      Anemia of chronic disease      S/P appendectomy  2014      S/P cholecystectomy  15 years ago                ECHO  FINDINGS:      MEDICATIONS  (STANDING):  aspirin 325 milliGRAM(s) Oral daily    MEDICATIONS  (PRN):  guaiFENesin Oral Liquid (Sugar-Free) 200 milliGRAM(s) Oral every 6 hours PRN Cough      FAMILY HISTORY:  Family history of coronary artery disease in father  Father -  age 60 following MI      Denies Family history of CAD or early MI    ROS:  Constitutional: denies fever, chills  HEENT: denies blurry vision, difficulty hearing  Respiratory: denies SOB, ZHOU, cough  Cardiovascular: denies CP, palpitations, orthopnea, PND, LE edema  Gastrointestinal: denies nausea, vomiting, abdominal pain  Genitourinary: denies urinary changes  Skin: Denies rashes, itching  Neurologic: denies headache, weakness, dizziness  Hematology/Oncology: denies bleeding, easy bruising  ROS negative except as noted above      SOCIAL HISTORY:    No tobacco, Alcohol or Ddrug use    Vital Signs Last 24 Hrs  T(C): 36.9 (2025 05:21), Max: 36.9 (2025 05:21)  T(F): 98.5 (2025 05:21), Max: 98.5 (2025 05:21)  HR: 81 (2025 05:21) (81 - 81)  BP: 122/72 (2025 05:21) (122/72 - 122/72)  BP(mean): --  RR: 18 (2025 05:21) (18 - 18)  SpO2: 94% (2025 05:21) (94% - 94%)    Parameters below as of 2025 05:21  Patient On (Oxygen Delivery Method): room air        Physical Exam:  General: Well developed, well nourished, NAD  HEENT: NCAT, PERRLA, EOMI bl, moist mucous membranes   Neck: Supple, nontender, no mass  Neurology: A&Ox3, nonfocal, sensation intact   Respiratory: CTA B/L, No W/R/R  CV: RRR, +S1/S2, no murmurs, rubs or gallops  Abdominal: Soft, NT, ND +BSx4, no palpable masses  Extremities: No C/C/E, + peripheral pulses  MSK: Normal ROM, no joint erythema or warmth, no joint swelling   Heme: No obvious ecchymosis or petechiae   Skin: warm, dry, normal color      ECG:    I&O's Detail      LABS:                        9.5    7.81  )-----------( 613      ( 2025 06:15 )             30.2     04-    135  |  102  |  27[H]  ----------------------------<  132[H]  4.0   |  27  |  1.40[H]    Ca    9.3      2025 06:15    TPro  6.7  /  Alb  2.5[L]  /  TBili  0.3  /  DBili  x   /  AST  26  /  ALT  22  /  AlkPhos  95  04-01          Urinalysis Basic - ( 2025 06:15 )    Color: x / Appearance: x / SG: x / pH: x  Gluc: 132 mg/dL / Ketone: x  / Bili: x / Urobili: x   Blood: x / Protein: x / Nitrite: x   Leuk Esterase: x / RBC: x / WBC x   Sq Epi: x / Non Sq Epi: x / Bacteria: x      I&O's Summary    BNP  RADIOLOGY & ADDITIONAL STUDIES: 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 Denies new chest pain, palpitations, but endorses persistent cough with white sputum production. No recent changes in chemotherapy regimen.     PAST MEDICAL & SURGICAL HISTORY:  Incisional hernia        Gastric lymphoma      Anemia of chronic disease      S/P appendectomy  2014      S/P cholecystectomy  15 years ago    ECHO  FINDINGS:    < from: TTE W or WO Ultrasound Enhancing Agent (25 @ 18:05) >  TRANSTHORACIC ECHOCARDIOGRAM REPORT  ________________________________________________________________________________                                      _______       Pt. Name:       JESSE EATON Study Date:    3/20/2025  MRN:     [id number]                   YOB: 1960  Accession #:    [id number]                  Age:           64 years  Account#:       [id number]                 Gender:        M  Heart Rate:                                Height:        66.93in (170.00 cm)  Rhythm:                                    Weight:        205.03 lb (93.00 kg)  Blood Pressure: 106/64 mmHg                BSA/BMI:       2.04 m² / 32.18 kg/m²  ________________________________________________________________________________________  Referring Physician:    [id number] Oswaldo Snowden  Interpreting Physician: Alberto Gilliam M.D.  Primary Sonographer:    Emperatriz AVILA    CPT:               ECHO TTE WO CON COMP W DOPP - 97536.m  Indication(s):     Chest pain, unspecified - R07.9  Procedure:         Transthoracic echocardiogram with 2-D, M-mode and complete                     spectral and color flow Doppler.  Ordering Location: [facility]  Admission Status:  Inpatient    _______________________________________________________________________________________     CONCLUSIONS:      1. Left ventricular wall thickness is normal. Left ventricular systolic function is normal with an ejection fraction visually estimated at 60 to 65 %. There are no regional wall motion abnormalities seen.   2. Normal right ventricular cavity size, with normal wall thickness, and normal right ventricular systolic function.   3. Mild mitral regurgitation.   4. Fibrocalcific aortic valve sclerosis without stenosis.   5. The inferior vena cava is dilated measuring 2.44 cm in diameter, (dilated >2.1cm) with normal inspiratory collapse (normal >50%) consistent with mildly elevated right atrial pressure (~8, range 5-10mmHg).   6. Small pericardial effusion.   7. There is mild (grade 1) left ventricular diastolic dysfunction.   8. Compared to the transthoracic echocardiogram performed on 3/5/2025, there have been no significant interval changes.   9. Pulmonary artery systolic pressure could not be estimated.    ________________________________________________________________________________________  FINDINGS:     Left Ventricle:  Left ventricular wall thickness is normal. Left ventricular systolic function is normal with an ejection fraction visually estimated at 60 to 65%. There are no regional wall motion abnormalities seen. There is mild (grade 1) left ventricular diastolic dysfunction.     Right Ventricle:  The right ventricular cavity is normal in size, with normal wall thickness and right ventricular systolic function is normal.     Aortic Valve:  There is fibrocalcific aortic valve sclerosis without stenosis. There is no evidence of aortic regurgitation.     Mitral Valve:  Structurally normal mitral valve with normal leaflet excursion. There is mild mitral regurgitation.     Tricuspid Valve:  Structurally normal tricuspid valve with normal leaflet excursion. There is insufficient tricuspid regurgitation detected to calculate pulmonary artery systolic pressure.     Pulmonic Valve:  The pulmonic valve was not well visualized.     Pericardium:  There is a small pericardial effusion.     Systemic Veins:  The inferior vena cava is dilated measuring 2.44 cm in diameter, (dilated >2.1cm) with normal inspiratory collapse (normal >50%) consistent with mildly elevated right atrial pressure (~8, range 5-10mmHg).  ____________________________________________________________________  QUANTITATIVE DATA:  Left Ventricle Measurements: (Indexed to BSA)     Visualized LV EF%: 60 to 65%     MV E Vmax: 1.03 m/s  MV A Vmax: 1.33 m/s  MV E/A:    0.77  MV DT:     183 msec             Right Ventricle Measurements:     RV Base (RVID1): 2.8 cm    Mitral Valve Measurements:     MV E Vmax: 1.0 m/s  MV A Vmax: 1.3 m/s  MV E/A:    0.8       Tricuspid Valve Measurements:     RA Pressure: 8 mmHg    ________________________________________________________________________________________  Electronically signed on 3/21/2025 at 11:40:35 AM by Alberto Gilliam M.D.         *** Final ***    < end of copied text >        MEDICATIONS  (STANDING):  aspirin 325 milliGRAM(s) Oral daily    MEDICATIONS  (PRN):  guaiFENesin Oral Liquid (Sugar-Free) 200 milliGRAM(s) Oral every 6 hours PRN Cough      FAMILY HISTORY:  Family history of coronary artery disease in father  Father -  age 60 following MI      Denies Family history of CAD or early MI    ROS:  Constitutional: denies fever, chills  Respiratory: + SOB, ZHOU, cough  Cardiovascular: denies CP, palpitations, orthopnea, PND  Gastrointestinal: denies nausea, vomiting, abdominal pain      SOCIAL HISTORY:    No tobacco, Alcohol or Ddrug use    Vital Signs Last 24 Hrs  T(C): 36.9 (2025 05:21), Max: 36.9 (2025 05:21)  T(F): 98.5 (2025 05:21), Max: 98.5 (2025 05:21)  HR: 81 (2025 05:21) (81 - 81)  BP: 122/72 (2025 05:21) (122/72 - 122/72)  BP(mean): --  RR: 18 (2025 05:21) (18 - 18)  SpO2: 94% (2025 05:21) (94% - 94%)    Parameters below as of 2025 05:21  Patient On (Oxygen Delivery Method): room air        Physical Exam:  General: Well developed, well nourished, NAD  HEENT: NCAT, PERRLA, EOMI bl, moist mucous membranes   Neurology: A&Ox3, nonfocal, sensation intact   Respiratory: decreased breath sounds lower lung fields R>L, no respiratory distress, coughing  CV: RRR, +S1/S2, no murmurs, rubs or gallops  Abdominal: Soft, NT, ND  Extremities: bilateral LE pitting edema,   Heme: No obvious ecchymosis or petechiae   Skin: warm, dry, normal color      ECG:  NSR, low voltage      LABS:                        9.5    7.81  )-----------( 613      ( 2025 06:15 )             30.2     04-    135  |  102  |  27[H]  ----------------------------<  132[H]  4.0   |  27  |  1.40[H]    Ca    9.3      2025 06:15    TPro  6.7  /  Alb  2.5[L]  /  TBili  0.3  /  DBili  x   /  AST  26  /  ALT  22  /  AlkPhos  95  04-          Urinalysis Basic - ( 2025 06:15 )    Color: x / Appearance: x / SG: x / pH: x  Gluc: 132 mg/dL / Ketone: x  / Bili: x / Urobili: x   Blood: x / Protein: x / Nitrite: x   Leuk Esterase: x / RBC: x / WBC x   Sq Epi: x / Non Sq Epi: x / Bacteria: x      I&O's Summary    BNP  RADIOLOGY & ADDITIONAL STUDIES: 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 Denies new chest pain, palpitations, but endorses persistent cough with white sputum production and leg swelling. No recent changes in chemotherapy regimen.     PAST MEDICAL & SURGICAL HISTORY:  Incisional hernia        Gastric lymphoma      Anemia of chronic disease      S/P appendectomy  2014      S/P cholecystectomy  15 years ago    ECHO  FINDINGS:    < from: TTE W or WO Ultrasound Enhancing Agent (25 @ 18:05) >  TRANSTHORACIC ECHOCARDIOGRAM REPORT  ________________________________________________________________________________                                      _______       Pt. Name:       JESSE EATON Study Date:    3/20/2025  MRN:     [id number]                   YOB: 1960  Accession #:    [id number]                  Age:           64 years  Account#:       [id number]                 Gender:        M  Heart Rate:                                Height:        66.93in (170.00 cm)  Rhythm:                                    Weight:        205.03 lb (93.00 kg)  Blood Pressure: 106/64 mmHg                BSA/BMI:       2.04 m² / 32.18 kg/m²  ________________________________________________________________________________________  Referring Physician:    [id number] Oswaldo Snowden  Interpreting Physician: Alberto Gilliam M.D.  Primary Sonographer:    Emperatriz AVILA    CPT:               ECHO TTE WO CON COMP W DOPP - 26510.m  Indication(s):     Chest pain, unspecified - R07.9  Procedure:         Transthoracic echocardiogram with 2-D, M-mode and complete                     spectral and color flow Doppler.  Ordering Location: [facility]  Admission Status:  Inpatient    _______________________________________________________________________________________     CONCLUSIONS:      1. Left ventricular wall thickness is normal. Left ventricular systolic function is normal with an ejection fraction visually estimated at 60 to 65 %. There are no regional wall motion abnormalities seen.   2. Normal right ventricular cavity size, with normal wall thickness, and normal right ventricular systolic function.   3. Mild mitral regurgitation.   4. Fibrocalcific aortic valve sclerosis without stenosis.   5. The inferior vena cava is dilated measuring 2.44 cm in diameter, (dilated >2.1cm) with normal inspiratory collapse (normal >50%) consistent with mildly elevated right atrial pressure (~8, range 5-10mmHg).   6. Small pericardial effusion.   7. There is mild (grade 1) left ventricular diastolic dysfunction.   8. Compared to the transthoracic echocardiogram performed on 3/5/2025, there have been no significant interval changes.   9. Pulmonary artery systolic pressure could not be estimated.    ________________________________________________________________________________________  FINDINGS:     Left Ventricle:  Left ventricular wall thickness is normal. Left ventricular systolic function is normal with an ejection fraction visually estimated at 60 to 65%. There are no regional wall motion abnormalities seen. There is mild (grade 1) left ventricular diastolic dysfunction.     Right Ventricle:  The right ventricular cavity is normal in size, with normal wall thickness and right ventricular systolic function is normal.     Aortic Valve:  There is fibrocalcific aortic valve sclerosis without stenosis. There is no evidence of aortic regurgitation.     Mitral Valve:  Structurally normal mitral valve with normal leaflet excursion. There is mild mitral regurgitation.     Tricuspid Valve:  Structurally normal tricuspid valve with normal leaflet excursion. There is insufficient tricuspid regurgitation detected to calculate pulmonary artery systolic pressure.     Pulmonic Valve:  The pulmonic valve was not well visualized.     Pericardium:  There is a small pericardial effusion.     Systemic Veins:  The inferior vena cava is dilated measuring 2.44 cm in diameter, (dilated >2.1cm) with normal inspiratory collapse (normal >50%) consistent with mildly elevated right atrial pressure (~8, range 5-10mmHg).  ____________________________________________________________________  QUANTITATIVE DATA:  Left Ventricle Measurements: (Indexed to BSA)     Visualized LV EF%: 60 to 65%     MV E Vmax: 1.03 m/s  MV A Vmax: 1.33 m/s  MV E/A:    0.77  MV DT:     183 msec             Right Ventricle Measurements:     RV Base (RVID1): 2.8 cm    Mitral Valve Measurements:     MV E Vmax: 1.0 m/s  MV A Vmax: 1.3 m/s  MV E/A:    0.8       Tricuspid Valve Measurements:     RA Pressure: 8 mmHg    ________________________________________________________________________________________  Electronically signed on 3/21/2025 at 11:40:35 AM by Alberto Gilliam M.D.         *** Final ***    < end of copied text >        MEDICATIONS  (STANDING):  aspirin 325 milliGRAM(s) Oral daily    MEDICATIONS  (PRN):  guaiFENesin Oral Liquid (Sugar-Free) 200 milliGRAM(s) Oral every 6 hours PRN Cough      FAMILY HISTORY:  Family history of coronary artery disease in father  Father -  age 60 following MI      Denies Family history of CAD or early MI    ROS:  Constitutional: denies fever, chills  Respiratory: + SOB, ZHOU, cough  Cardiovascular: denies CP, palpitations, orthopnea, PND  Gastrointestinal: denies nausea, vomiting, abdominal pain      SOCIAL HISTORY:    No tobacco, Alcohol or Ddrug use    Vital Signs Last 24 Hrs  T(C): 36.9 (2025 05:21), Max: 36.9 (2025 05:21)  T(F): 98.5 (2025 05:21), Max: 98.5 (2025 05:21)  HR: 81 (2025 05:21) (81 - 81)  BP: 122/72 (2025 05:21) (122/72 - 122/72)  BP(mean): --  RR: 18 (2025 05:21) (18 - 18)  SpO2: 94% (2025 05:21) (94% - 94%)    Parameters below as of 2025 05:21  Patient On (Oxygen Delivery Method): room air        Physical Exam:  General: Well developed, well nourished, NAD  HEENT: NCAT, PERRLA, EOMI bl, moist mucous membranes   Neurology: A&Ox3, nonfocal, sensation intact   Respiratory: decreased breath sounds lower lung fields R>L, no respiratory distress, coughing, on 4L NC  CV: RRR, +S1/S2, no murmurs, rubs or gallops  Abdominal: Soft, NT, ND  Extremities: bilateral LE pitting edema,   Heme: No obvious ecchymosis or petechiae   Skin: warm, dry, normal color      ECG:  NSR, low voltage      LABS:                        9.5    7.81  )-----------( 613      ( 2025 06:15 )             30.2     04-    135  |  102  |  27[H]  ----------------------------<  132[H]  4.0   |  27  |  1.40[H]    Ca    9.3      2025 06:15    TPro  6.7  /  Alb  2.5[L]  /  TBili  0.3  /  DBili  x   /  AST  26  /  ALT  22  /  AlkPhos  95  04-01          Urinalysis Basic - ( 2025 06:15 )    Color: x / Appearance: x / SG: x / pH: x  Gluc: 132 mg/dL / Ketone: x  / Bili: x / Urobili: x   Blood: x / Protein: x / Nitrite: x   Leuk Esterase: x / RBC: x / WBC x   Sq Epi: x / Non Sq Epi: x / Bacteria: x      RADIOLOGY & ADDITIONAL STUDIES:    < from: Xray Chest 1 View- PORTABLE-Routine (Xray Chest 1 View- PORTABLE-Routine .) (25 @ 12:19) >    ACC: [id number] EXAM:  XR CHEST PORTABLE ROUTINE 1V   ORDERED BY: GRACE MCCLOUD     PROCEDURE DATE:  2025          INTERPRETATION:  Portable AP chest radiograph    COMPARISON: 3/20/2025 chest x-ray.    CLINICAL INFORMATION: Dyspnea.    FINDINGS:  CATHETERS AND TUBES: Mediport catheter tip in SVC.    PULMONARY:  Increasing bilateral pleural effusions and/or lower zone airspace   consolidations obscuring the LEFT diaphragm contour and LEFT lateral,   inferior cardiac border.  Upper zones clear..    HEART/VASCULAR: The heart size cannot be assessed due to opacified LEFT   lower hemithorax..    BONES: The visualized osseous thorax is intact.    IMPRESSION:  Increasing bilateral pleural effusions and/or lower zone airspace   consolidations    --- End of Report ---            VIKA LEE MD; Attending Radiologist  This document has been electronically signed. 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 Denies new chest pain, palpitations, but endorses persistent cough with white sputum production and leg swelling. No recent changes in chemotherapy regimen.     PAST MEDICAL & SURGICAL HISTORY:  Incisional hernia        Gastric lymphoma      Anemia of chronic disease      S/P appendectomy  2014      S/P cholecystectomy  15 years ago    ECHO  FINDINGS:    < from: TTE W or WO Ultrasound Enhancing Agent (25 @ 18:05) >  TRANSTHORACIC ECHOCARDIOGRAM REPORT  ________________________________________________________________________________                                      _______       Pt. Name:       JESSE EATON Study Date:    3/20/2025  MRN:     [id number]                   YOB: 1960  Accession #:    [id number]                  Age:           64 years  Account#:       [id number]                 Gender:        M  Heart Rate:                                Height:        66.93in (170.00 cm)  Rhythm:                                    Weight:        205.03 lb (93.00 kg)  Blood Pressure: 106/64 mmHg                BSA/BMI:       2.04 m² / 32.18 kg/m²  ________________________________________________________________________________________  Referring Physician:    [id number] Oswaldo Snowden  Interpreting Physician: Alberto Gilliam M.D.  Primary Sonographer:    Emperatriz AVILA    CPT:               ECHO TTE WO CON COMP W DOPP - 48428.m  Indication(s):     Chest pain, unspecified - R07.9  Procedure:         Transthoracic echocardiogram with 2-D, M-mode and complete                     spectral and color flow Doppler.  Ordering Location: [facility]  Admission Status:  Inpatient    _______________________________________________________________________________________     CONCLUSIONS:      1. Left ventricular wall thickness is normal. Left ventricular systolic function is normal with an ejection fraction visually estimated at 60 to 65 %. There are no regional wall motion abnormalities seen.   2. Normal right ventricular cavity size, with normal wall thickness, and normal right ventricular systolic function.   3. Mild mitral regurgitation.   4. Fibrocalcific aortic valve sclerosis without stenosis.   5. The inferior vena cava is dilated measuring 2.44 cm in diameter, (dilated >2.1cm) with normal inspiratory collapse (normal >50%) consistent with mildly elevated right atrial pressure (~8, range 5-10mmHg).   6. Small pericardial effusion.   7. There is mild (grade 1) left ventricular diastolic dysfunction.   8. Compared to the transthoracic echocardiogram performed on 3/5/2025, there have been no significant interval changes.   9. Pulmonary artery systolic pressure could not be estimated.    ________________________________________________________________________________________  FINDINGS:     Left Ventricle:  Left ventricular wall thickness is normal. Left ventricular systolic function is normal with an ejection fraction visually estimated at 60 to 65%. There are no regional wall motion abnormalities seen. There is mild (grade 1) left ventricular diastolic dysfunction.     Right Ventricle:  The right ventricular cavity is normal in size, with normal wall thickness and right ventricular systolic function is normal.     Aortic Valve:  There is fibrocalcific aortic valve sclerosis without stenosis. There is no evidence of aortic regurgitation.     Mitral Valve:  Structurally normal mitral valve with normal leaflet excursion. There is mild mitral regurgitation.     Tricuspid Valve:  Structurally normal tricuspid valve with normal leaflet excursion. There is insufficient tricuspid regurgitation detected to calculate pulmonary artery systolic pressure.     Pulmonic Valve:  The pulmonic valve was not well visualized.     Pericardium:  There is a small pericardial effusion.     Systemic Veins:  The inferior vena cava is dilated measuring 2.44 cm in diameter, (dilated >2.1cm) with normal inspiratory collapse (normal >50%) consistent with mildly elevated right atrial pressure (~8, range 5-10mmHg).  ____________________________________________________________________  QUANTITATIVE DATA:  Left Ventricle Measurements: (Indexed to BSA)     Visualized LV EF%: 60 to 65%     MV E Vmax: 1.03 m/s  MV A Vmax: 1.33 m/s  MV E/A:    0.77  MV DT:     183 msec             Right Ventricle Measurements:     RV Base (RVID1): 2.8 cm    Mitral Valve Measurements:     MV E Vmax: 1.0 m/s  MV A Vmax: 1.3 m/s  MV E/A:    0.8       Tricuspid Valve Measurements:     RA Pressure: 8 mmHg    ________________________________________________________________________________________  Electronically signed on 3/21/2025 at 11:40:35 AM by Alberto Gilliam M.D.         *** Final ***    < end of copied text >        MEDICATIONS  (STANDING):  aspirin 325 milliGRAM(s) Oral daily    MEDICATIONS  (PRN):  guaiFENesin Oral Liquid (Sugar-Free) 200 milliGRAM(s) Oral every 6 hours PRN Cough      FAMILY HISTORY:  Family history of coronary artery disease in father  Father -  age 60 following MI      Denies Family history of CAD or early MI    ROS:  as listed otherwise negative       SOCIAL HISTORY:    No tobacco, Alcohol or Ddrug use    Vital Signs Last 24 Hrs  T(C): 36.9 (2025 05:21), Max: 36.9 (2025 05:21)  T(F): 98.5 (2025 05:21), Max: 98.5 (2025 05:21)  HR: 81 (2025 05:21) (81 - 81)  BP: 122/72 (2025 05:21) (122/72 - 122/72)  BP(mean): --  RR: 18 (2025 05:21) (18 - 18)  SpO2: 94% (:) (94% - 94%)    Parameters below as of 2025 05:21  Patient On (Oxygen Delivery Method): room air        Physical Exam:  General: Well developed, well nourished, NAD  HEENT: NCAT, PERRLA, EOMI bl, moist mucous membranes   Neurology: A&Ox3, nonfocal, sensation intact   Respiratory: decreased breath sounds lower lung fields R>L, no respiratory distress, coughing, on 4L NC  CV: RRR, +S1/S2, no murmurs, rubs or gallops  Abdominal: Soft, NT, ND  Extremities: bilateral LE pitting edema,   Heme: No obvious ecchymosis or petechiae   Skin: warm, dry, normal color      ECG:  NSR, low voltage      LABS:                        9.5    7.81  )-----------( 613      ( 2025 06:15 )             30.2     04-    135  |  102  |  27[H]  ----------------------------<  132[H]  4.0   |  27  |  1.40[H]    Ca    9.3      2025 06:15    TPro  6.7  /  Alb  2.5[L]  /  TBili  0.3  /  DBili  x   /  AST  26  /  ALT  22  /  AlkPhos  95  04-01          Urinalysis Basic - ( 2025 06:15 )    Color: x / Appearance: x / SG: x / pH: x  Gluc: 132 mg/dL / Ketone: x  / Bili: x / Urobili: x   Blood: x / Protein: x / Nitrite: x   Leuk Esterase: x / RBC: x / WBC x   Sq Epi: x / Non Sq Epi: x / Bacteria: x      RADIOLOGY & ADDITIONAL STUDIES:    < from: Xray Chest 1 View- PORTABLE-Routine (Xray Chest 1 View- PORTABLE-Routine .) (25 @ 12:19) >    ACC: [id number] EXAM:  XR CHEST PORTABLE ROUTINE 1V   ORDERED BY: GRACE MCCLOUD     PROCEDURE DATE:  2025          INTERPRETATION:  Portable AP chest radiograph    COMPARISON: 3/20/2025 chest x-ray.    CLINICAL INFORMATION: Dyspnea.    FINDINGS:  CATHETERS AND TUBES: Mediport catheter tip in SVC.    PULMONARY:  Increasing bilateral pleural effusions and/or lower zone airspace   consolidations obscuring the LEFT diaphragm contour and LEFT lateral,   inferior cardiac border.  Upper zones clear..    HEART/VASCULAR: The heart size cannot be assessed due to opacified LEFT   lower hemithorax..    BONES: The visualized osseous thorax is intact.    IMPRESSION:  Increasing bilateral pleural effusions and/or lower zone airspace   consolidations    --- End of Report ---            VIKA LEE MD; Attending Radiologist  This document has been electronically signed. Mar 23 2025  1:40PM    < end of copied text >

## 2025-04-01 NOTE — CONSULT NOTE ADULT - SUBJECTIVE AND OBJECTIVE BOX
Date/Time Patient Seen:  		  Referring MD:   Data Reviewed	       Patient is a 64y old  Male who presents with a chief complaint of     Subjective/HPI   Do you have red irritated eyes or flu-like symptoms (fever, sore throat, cough, headaches, body-aches)? No.     International Travel:  International Travel within 21 days? No.(1)     Preferred Language to Address Healthcare:  · Preferred Language to Address Healthcare	English     How to be Addressed:  · How to be Addressed	Tom Jose     Patient Identity:  · Birth Sex	Male  · Patient's Preferred Pronoun	Him/He     Child Abuse Assessment (patients less than 13 yrs):  Chief Complaint: shortness of breath.    · Chief Complaint: The patient is a 64y Male complaining of shortness of breath.  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 Using home oxygen and oxygen saturation is 97 percent,  Pt seen here x2 week ago for similar symptoms. Pt on Bumetanide for the effusion , Pleurx cathter was discussed on the last admission.    HIV:    HIV Test Questions:  · In accordance with [place] State law, we offer every patient who comes to our ED an HIV test. Would you like to be tested today?	Previously Declined (within the last year)    HEPATITIS C TEST QUESTIONS:    Hepatitis C Test Questions:  · In accordance with [place] State Law, we offer every patient a Hepatitis C test. Would you like to be tested today?	Previously negative    ALLERGIES AND HOME MEDICATIONS:   Allergies:        Allergies:  	Bactrim DS: Drug, Hives    Home Medications:   * Patient Currently Takes Medications as of 23-Mar-2025 12:13 documented in Structured Notes  · 	bumetanide 2 mg oral tablet: 1 tab(s) orally once a day  · 	Lipitor 40 mg oral tablet: 1 tab(s) orally once a day  · 	clopidogrel 75 mg oral tablet: 1 tab(s) orally once a day  · 	apixaban 5 mg oral tablet: 1 tab(s) orally 2 times a day  · 	labetalol 100 mg oral tablet: 1 tab(s) orally 2 times a day  · 	losartan 50 mg oral tablet: 1 tab(s) orally once a day    RESULTS:    Wet Read:  There are no Wet Read(s) to document.    · EKG Date/Time: 01-Apr-2025 06:23  · Rate: 80  · Interpretation: normal sinus rhythm  · Other Findings: inferior infarct  age indeterminate,   anterior infarct age indeterminate    PAST MEDICAL & SURGICAL HISTORY:  No pertinent past medical history    Incisional hernia  2015    Gastric lymphoma    Anemia of chronic disease    No significant past surgical history    S/P appendectomy  November 17th 2014    S/P cholecystectomy  15 years ago          Medication list         MEDICATIONS  (STANDING):  aspirin 325 milliGRAM(s) Oral daily    MEDICATIONS  (PRN):         Vitals log        ICU Vital Signs Last 24 Hrs  T(C): 36.9 (01 Apr 2025 05:21), Max: 36.9 (01 Apr 2025 05:21)  T(F): 98.5 (01 Apr 2025 05:21), Max: 98.5 (01 Apr 2025 05:21)  HR: 81 (01 Apr 2025 05:21) (81 - 81)  BP: 122/72 (01 Apr 2025 05:21) (122/72 - 122/72)  BP(mean): --  ABP: --  ABP(mean): --  RR: 18 (01 Apr 2025 05:21) (18 - 18)  SpO2: 94% (01 Apr 2025 05:21) (94% - 94%)    O2 Parameters below as of 01 Apr 2025 05:21  Patient On (Oxygen Delivery Method): room air                 Input and Output:  I&O's Detail      Lab Data                        9.5    7.81  )-----------( 613      ( 01 Apr 2025 06:15 )             30.2     04-01    135  |  102  |  27[H]  ----------------------------<  132[H]  4.0   |  27  |  1.40[H]    Ca    9.3      01 Apr 2025 06:15    TPro  6.7  /  Alb  2.5[L]  /  TBili  0.3  /  DBili  x   /  AST  26  /  ALT  22  /  AlkPhos  95  04-01            Review of Systems	    sob  aranda  weakness  o2 support  alert  verbal  oriented    Objective     Physical Examination    heart s1s2  lung dc bs  head nc  head at  abd soft  o2 support      Pertinent Lab findings & Imaging      Barba:  NO   Adequate UO     I&O's Detail           Discussed with:     Cultures:	        Radiology    cxr with eff - atelectasis -

## 2025-04-01 NOTE — ED ADULT TRIAGE NOTE - AS TEMP SITE
oral
clitoris and vaginal anatomy normal, absent significant discharge or tags; no masses; no hernias.

## 2025-04-01 NOTE — ED PROVIDER NOTE - OBJECTIVE STATEMENT
Pt ambulates to ED co SOB x1 week but worsened over the weekend. Pt seen here x1 week ago for similar symptoms. Pt on Bumetanide. PMHx stomach cancer, +allergies, AOx4. Pt sent for STAT EKG.  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 Pt seen here x1 week ago for similar symptoms. Pt on Bumetanide. PMHx stomach cancer, +allergies, AOx4. Pt sent for STAT EKG.  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 Pt seen here x1 week ago for similar symptoms. Pt on Bumetanide. 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 Using home oxygen and oxygen saturation is 97 percent,  Pt seen here x2 week ago for similar symptoms. Pt on Bumetanide for the effusion , Pleurx cathter was discussed on the last admission.

## 2025-04-01 NOTE — CONSULT NOTE ADULT - ASSESSMENT
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    mets ca  malignant pleural eff  HTN  anemia  dyspnea  Atelectasis  DVT hx  gastric ca    plan for pleurx  I moustapha  fio2 support  cardio eval and optimization of cvs rx regimen  tele monitoring  I and O  replete lytes  thoracic sx consultation for pleurx placement  TTE w/ normal LV & RV size and function EF 60-65%, Mild MR, small pericardial effusion, Grade 1 DD  follows with Dr Mike Mcdaniels Onc MD

## 2025-04-01 NOTE — CONSULT NOTE ADULT - SUBJECTIVE AND OBJECTIVE BOX
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 Previous admission notable for NSTEMI. Of note patient is s/p 4 chemotherapy treatments, states he missed 2 due to health complications. Last treatment was 2 weeks ago. Patient was supposed to see his oncologist today to review PET scan results but states he was too SOB to make it and came to the ED instead.     Interval HPI:  Patient presents today complaining of worsening SOB since last admission. States this week he has been unable to do his normal activities or walk short distances without needing to take multiple breaks to catch his breath. States he does not feel SOB with rest. He has been on portable home O2 since last Wednesday that was prescribed to him by his PCP, which he states has provided minimal improvement. He also admits to a deep productive cough with white foamy sputum which he states is normal for him. States he has been waking up in the middle of the night gasping for air. He denies history of MALGORZATA, COPD, or asthma. He has not yet followed up with a cardiologist since his last admission. Admits to generalized fatigue and chills at night which he attributes to cancer. He denies chest pain, tightness, or palpitations. Denies fevers or recent sickness. Thoracic Surgery consulted for pleurx placement for malignant pleural effusion. Patient is interested and amenable to procedure.       PAST MEDICAL & SURGICAL HISTORY:  Incisional hernia  2015      Gastric lymphoma      Anemia of chronic disease      S/P appendectomy  November 17th 2014      S/P cholecystectomy  15 years ago        Social History: Admits to tobacco smoking history, states he quit 18 years ago    ALLERGIES: Bactrim DS (Hives)        REVIEW OF SYSTEMS:   Head: denies headaches, dizziness & lightheadedness  Eyes: denies changes in vision, eye pain, double vision & eye discharge  Ears: denies changes in hearing & ear discharge  Nose: denies rhinorrhea  Mouth: denies bleeding gums & sore tongue & sore throat  Neck: denies swollen lymph nodes   Respiratory: +SOB, sputum production, cough. Denies wheezing.  Cardiac: denies CP & irregular heart beat  Abdominal: denies abdominal pain, change in bowel movements  : denies dysuria, frequent urination, hematuria  Musculoskeletal: denies joint pain & muscle pain  Neuro: denies involuntary muscle movements  Psych: AAO x 3, no depression, no anxiety, upset      MEDICATIONS:  aspirin 325 milliGRAM(s) Oral daily  guaiFENesin Oral Liquid (Sugar-Free) 200 milliGRAM(s) Oral every 6 hours PRN        VITAL SIGNS:  T(C): 37 (04-01-25 @ 12:06), Max: 37 (04-01-25 @ 12:06)  HR: 74 (04-01-25 @ 12:06) (74 - 81)  BP: 100/67 (04-01-25 @ 12:06) (100/67 - 122/72)  RR: 18 (04-01-25 @ 12:06) (17 - 18)  SpO2: 98% (04-01-25 @ 12:06) (94% - 99%)      LABS:           Complete Blood Count + Automated Diff (04.01.25 @ 06:15)    Auto NRBC: 0 /100 WBCs   WBC Count: 7.81 K/uL   RBC Count: 4.63 M/uL   Hemoglobin: 9.5 g/dL   Hematocrit: 30.2 %   Mean Cell Volume: 65.2 fl   Mean Cell Hemoglobin: 20.5 pg   Mean Cell Hemoglobin Conc: 31.5 g/dL   Red Cell Distrib Width: 22.3 %   Platelet Count - Automated: 613 K/uL   Neutrophil #: 5.82 K/uL   Lymphocyte #: 0.56 K/uL   Monocyte #: 1.27 K/uL   Eosinophil #: 0.04 K/uL   Basophil #: 0.07 K/uL   Neutrophil %: 74.5: Differential percentages must be correlated with absolute numbers for  clinical significance. %   Lymphocyte %: 7.2 %   Monocyte %: 16.3 %   Eosinophil %: 0.5 %   Basophil %: 0.9 %   Auto Immature Granulocyte %: 0.6: (Includes meta, myelo and promyelocytes). Mild elevations in immature  granulocytes may be seen with many inflammatory processes and pregnancy;  clinical correlation suggested. %    Basic Metabolic Panel in AM (03.04.25 @ 05:57)    Sodium: 137 mmol/L   Potassium: 4.4 mmol/L   Chloride: 103 mmol/L   Carbon Dioxide: 23 mmol/L   Anion Gap: 11 mmol/L   Blood Urea Nitrogen: 16 mg/dL   Creatinine: 1.30 mg/dL   Glucose: 105 mg/dL   Calcium: 8.9 mg/dL   eGFR: 61: The estimated glomerular filtration rate (eGFR) calculation is based on  the 2021 CKD-EPI creatinine equation, which is validated in male and  female population 18 years of age and older (N Engl J Med 2021;  385:9597-1498). mL/min/1.73m2    D-Dimer Assay, Quantitative (04.01.25 @ 06:15)    D-Dimer Assay, Quantitative: 2136: D-Dimer result less than 230 ng/mL DDU correlates with the absence of  thrombosis in a patient with a low and moderate       pre-test probability of thrombosis.  1 DDU is approximately equal to  2 ng/mL FEU (previous units). ng/mL DDU    Troponin I, High Sensitivity (04.01.25 @ 06:15)    Troponin I, High Sensitivity Result: 275.8: Serial measurements of high sensitivity troponin I (hs TnI) showing rapid  upward or downward changes suggest acute myocardial injury.  Please note  that a sustained elevation of hsTnI can be caused by renal disease,  chronic heart failure, sepsis, pulmonary embolism and other clinical  conditions. ng/L        IMAGING:     < from: CT Angio Chest PE Protocol w/ IV Cont (04.01.25 @ 08:12) >  ACC: [id number] EXAM:  CT ANGIO CHEST PULM ART WAWIC   ORDERED BY:   KATHERINE JAY     PROCEDURE DATE:  04/01/2025          INTERPRETATION:  CLINICAL INFORMATION: Shortness of breath. Rule out   pulmonary embolism.    COMPARISON: 03/19/2025.    CONTRAST/COMPLICATIONS:  IV Contrast: Omnipaque 350  70 cc administered   30 cc discarded  Oral Contrast: NONE  .    PROCEDURE:  CT Angiography of the Chest.  Sagittal and coronal reformats were performed as well as 3D (MIP)   reconstructions.    FINDINGS:    LUNGS AND LARGE AIRWAYS: Trace secretions within the distal trachea. The   dependent lung atelectasis bilaterally, increased on the right. Evidence   of septal lines involving the left lung.  PLEURA: Moderate right pleural effusion, increased in comparison to the   prior study. Multiloculated mild left pleural effusion demonstrates   slight improvement. Pleural nodularity is identified in the left lung   base concerning for malignancy.  VESSELS: No pulmonary embolism. Atherosclerotic calcification including   the coronary arteries.  HEART: Heart size is normal. Small pericardial effusion.  MEDIASTINUM AND STELLA: No lymphadenopathy. Mediastinal and hilar   lymphadenopathy. Dominant right paratracheal luke mass measures 2.6 x   2.9 cm, previously 2.2 x 2.5 cm.  CHEST WALL AND LOWER NECK: Left axillary lymphadenopathy and subcutaneous   stranding, essentially unchanged.  VISUALIZED UPPER ABDOMEN: Small volume perihepatic ascites, peritoneal   stranding and mesenteric edema. Stable prominence of cisterna chyli. Mild   bilateral hydronephrosis. Bilateral renal cysts. Stable mildly enlarged   upper retroperitoneal nodes measuring up to 1.6 cm in short axis in the   periportal region.  BONES: Stable tiny sclerotic foci lower thoracic spine.    IMPRESSION:  No pulmonary embolism.  Interval progression of right pleural effusion and right paratracheal   lymphadenopathy.  Slightly improved multiloculated left pleural effusion along with pleural   nodularity and septal lines concerning for malignant etiology.        --- End of Report ---            DINO BARNES MD; Attending Radiologist  This document has been electronically signed. Apr 1 2025 10:44AM    < end of copied text >        PHYSICAL EXAM:  General: Seated in chair hunched over, On supplemental O2 3L NC, No acute distress, alert, cooperative, well developed, well groomed, well nourished, not cachetic   Skin: no jaundice, moist, normal texture, good skin turgor  Head: Normocephalic  Eyes: PERRL, EOMI, vision grossly intact  Neck: supple, No JVD, No lymphadenopathy  Pulmonary: On 3L NC. Good respiratory effort, no accessory muscle use, no intercostal retractions. Diminished lung sounds at bases. No wheezes, rales, or ronchi.   Cardiac: normal S1 & S2, normal rate & rhythm, no murmurs appreciated. No cyanosis or pallor. Extremities are warm & well perfused, 1+ pitting edema in b/l LE  Abdomen: soft, non tender, non-distended, no rebound, no guarding  Extremities: no gross joint deformities or abnormalities, 1+ pitting edema in b/l LE  Psych: AAO x 3, normal affect, not anxious

## 2025-04-01 NOTE — CONSULT NOTE ADULT - CONSULT REASON
C16.7     Poorly differentiated gastric cancer  C77.1     Lymph node metastasis  R18.0     Malignant ascites  J91.0     Malignant pleural effusion  D63.8    Anemia due to chronic disease  D50.0    Iron deficiency anemia  D75.83  Reactive thrombocytosis  I21.4      NSTEMI, recent

## 2025-04-02 ENCOUNTER — TRANSCRIPTION ENCOUNTER (OUTPATIENT)
Age: 65
End: 2025-04-02

## 2025-04-02 LAB
ALBUMIN SERPL ELPH-MCNC: 2.3 G/DL — LOW (ref 3.3–5)
ALP SERPL-CCNC: 92 U/L — SIGNIFICANT CHANGE UP (ref 40–120)
ALT FLD-CCNC: 21 U/L — SIGNIFICANT CHANGE UP (ref 12–78)
ANION GAP SERPL CALC-SCNC: 6 MMOL/L — SIGNIFICANT CHANGE UP (ref 5–17)
APTT BLD: 52.8 SEC — HIGH (ref 24.5–35.6)
APTT BLD: 68.1 SEC — HIGH (ref 24.5–35.6)
APTT BLD: 79.6 SEC — HIGH (ref 24.5–35.6)
AST SERPL-CCNC: 25 U/L — SIGNIFICANT CHANGE UP (ref 15–37)
BILIRUB SERPL-MCNC: 0.4 MG/DL — SIGNIFICANT CHANGE UP (ref 0.2–1.2)
BUN SERPL-MCNC: 29 MG/DL — HIGH (ref 7–23)
CALCIUM SERPL-MCNC: 9 MG/DL — SIGNIFICANT CHANGE UP (ref 8.5–10.1)
CHLORIDE SERPL-SCNC: 102 MMOL/L — SIGNIFICANT CHANGE UP (ref 96–108)
CO2 SERPL-SCNC: 26 MMOL/L — SIGNIFICANT CHANGE UP (ref 22–31)
CREAT SERPL-MCNC: 1.4 MG/DL — HIGH (ref 0.5–1.3)
EGFR: 56 ML/MIN/1.73M2 — LOW
EGFR: 56 ML/MIN/1.73M2 — LOW
GLUCOSE SERPL-MCNC: 111 MG/DL — HIGH (ref 70–99)
HCT VFR BLD CALC: 28.9 % — LOW (ref 39–50)
HGB BLD-MCNC: 9.1 G/DL — LOW (ref 13–17)
LDH SERPL L TO P-CCNC: 174 U/L — SIGNIFICANT CHANGE UP (ref 50–242)
MCHC RBC-ENTMCNC: 20.5 PG — LOW (ref 27–34)
MCHC RBC-ENTMCNC: 31.5 G/DL — LOW (ref 32–36)
MCV RBC AUTO: 65.1 FL — LOW (ref 80–100)
NRBC BLD AUTO-RTO: 0 /100 WBCS — SIGNIFICANT CHANGE UP (ref 0–0)
PLATELET # BLD AUTO: 585 K/UL — HIGH (ref 150–400)
POTASSIUM SERPL-MCNC: 4.2 MMOL/L — SIGNIFICANT CHANGE UP (ref 3.5–5.3)
POTASSIUM SERPL-SCNC: 4.2 MMOL/L — SIGNIFICANT CHANGE UP (ref 3.5–5.3)
PROT SERPL-MCNC: 6.4 G/DL — SIGNIFICANT CHANGE UP (ref 6–8.3)
RBC # BLD: 4.44 M/UL — SIGNIFICANT CHANGE UP (ref 4.2–5.8)
RBC # FLD: 22.1 % — HIGH (ref 10.3–14.5)
SODIUM SERPL-SCNC: 134 MMOL/L — LOW (ref 135–145)
WBC # BLD: 8.88 K/UL — SIGNIFICANT CHANGE UP (ref 3.8–10.5)
WBC # FLD AUTO: 8.88 K/UL — SIGNIFICANT CHANGE UP (ref 3.8–10.5)

## 2025-04-02 PROCEDURE — 99233 SBSQ HOSP IP/OBS HIGH 50: CPT

## 2025-04-02 RX ORDER — ALBUMIN (HUMAN) 12.5 G/50ML
50 INJECTION, SOLUTION INTRAVENOUS ONCE
Refills: 0 | Status: COMPLETED | OUTPATIENT
Start: 2025-04-02 | End: 2025-04-02

## 2025-04-02 RX ORDER — ALPRAZOLAM 0.5 MG
0.25 TABLET, EXTENDED RELEASE 24 HR ORAL ONCE
Refills: 0 | Status: DISCONTINUED | OUTPATIENT
Start: 2025-04-02 | End: 2025-04-02

## 2025-04-02 RX ORDER — ACETAMINOPHEN 500 MG/5ML
1000 LIQUID (ML) ORAL ONCE
Refills: 0 | Status: COMPLETED | OUTPATIENT
Start: 2025-04-02 | End: 2025-04-02

## 2025-04-02 RX ORDER — BUMETANIDE 1 MG/1
1 TABLET ORAL
Refills: 0 | Status: DISCONTINUED | OUTPATIENT
Start: 2025-04-02 | End: 2025-04-04

## 2025-04-02 RX ADMIN — BUMETANIDE 2 MILLIGRAM(S): 1 TABLET ORAL at 06:56

## 2025-04-02 RX ADMIN — HEPARIN SODIUM 2100 UNIT(S)/HR: 1000 INJECTION INTRAVENOUS; SUBCUTANEOUS at 07:17

## 2025-04-02 RX ADMIN — Medication 400 MILLIGRAM(S): at 11:00

## 2025-04-02 RX ADMIN — LABETALOL HYDROCHLORIDE 100 MILLIGRAM(S): 200 TABLET, FILM COATED ORAL at 06:56

## 2025-04-02 RX ADMIN — HEPARIN SODIUM 2100 UNIT(S)/HR: 1000 INJECTION INTRAVENOUS; SUBCUTANEOUS at 07:09

## 2025-04-02 RX ADMIN — Medication 0.25 MILLIGRAM(S): at 22:35

## 2025-04-02 RX ADMIN — HEPARIN SODIUM 2100 UNIT(S)/HR: 1000 INJECTION INTRAVENOUS; SUBCUTANEOUS at 06:47

## 2025-04-02 RX ADMIN — HEPARIN SODIUM 2100 UNIT(S)/HR: 1000 INJECTION INTRAVENOUS; SUBCUTANEOUS at 19:58

## 2025-04-02 RX ADMIN — HEPARIN SODIUM 2100 UNIT(S)/HR: 1000 INJECTION INTRAVENOUS; SUBCUTANEOUS at 19:47

## 2025-04-02 RX ADMIN — HEPARIN SODIUM 2100 UNIT(S)/HR: 1000 INJECTION INTRAVENOUS; SUBCUTANEOUS at 22:30

## 2025-04-02 RX ADMIN — HEPARIN SODIUM 2100 UNIT(S)/HR: 1000 INJECTION INTRAVENOUS; SUBCUTANEOUS at 13:51

## 2025-04-02 RX ADMIN — LABETALOL HYDROCHLORIDE 100 MILLIGRAM(S): 200 TABLET, FILM COATED ORAL at 17:21

## 2025-04-02 RX ADMIN — Medication 325 MILLIGRAM(S): at 11:01

## 2025-04-02 RX ADMIN — CLOPIDOGREL BISULFATE 75 MILLIGRAM(S): 75 TABLET, FILM COATED ORAL at 11:01

## 2025-04-02 RX ADMIN — ATORVASTATIN CALCIUM 40 MILLIGRAM(S): 80 TABLET, FILM COATED ORAL at 21:55

## 2025-04-02 RX ADMIN — HEPARIN SODIUM 2100 UNIT(S)/HR: 1000 INJECTION INTRAVENOUS; SUBCUTANEOUS at 19:42

## 2025-04-02 RX ADMIN — HEPARIN SODIUM 3500 UNIT(S): 1000 INJECTION INTRAVENOUS; SUBCUTANEOUS at 06:56

## 2025-04-02 RX ADMIN — ALBUMIN (HUMAN) 50 MILLILITER(S): 12.5 INJECTION, SOLUTION INTRAVENOUS at 13:10

## 2025-04-02 NOTE — DISCHARGE NOTE PROVIDER - PROVIDER TOKENS
PROVIDER:[TOKEN:[[id number]],FOLLOWUP:[1-3 days],ESTABLISHEDPATIENT:[T]],PROVIDER:[TOKEN:[[id number]],FOLLOWUP:[1 week],ESTABLISHEDPATIENT:[T]] PROVIDER:[TOKEN:[[id number]],FOLLOWUP:[1-3 days],ESTABLISHEDPATIENT:[T]],PROVIDER:[TOKEN:[[id number]],FOLLOWUP:[1 week],ESTABLISHEDPATIENT:[T]],PROVIDER:[TOKEN:[[id number]],FOLLOWUP:[1 week]]

## 2025-04-02 NOTE — PROGRESS NOTE ADULT - ASSESSMENT
[ASSESSMENT and  PLAN]  C16.7     Poorly differentiated gastric cancer  C77.1     Lymph node metastasis  R18.0     Malignant ascites  J91.0     Malignant pleural effusion  D63.8    Anemia due to chronic disease  D50.0    Iron deficiency anemia  D75.83  Reactive thrombocytosis  I21.4      NSTEMI, recent    63 yo man w met poorly diff gastric ca (neck/axillary/abdomen LN mets, under care Dr [facility], on FOLFOX chemo [id number], Dx'd 11/2024, adm [facility] 12/2024 w MARYSOL req temporary HD.    04/01/2025 now admitted for R effusion.   seen by CT surgery. Planned pleurex. No OR time scheduled yet.       Several recent hospitalizations    03/19/2025 NSTEMI admission and R effusion. s/p 560cc thoracentesis 03/20/2025  Patient readmitted for dyspnea.  Found to have worse right effusion, NSTEMI  Repeat CT scan with moderate dependent layering right pleural effusion increased from previous.  Moderate multiloculated left pleural effusion with pleural pseudotumor, diminished from previous and produces sup total LLL passive atelectasis.  IAN interlobular septal thickening.  Diffuse mediastinal LN not changed from 3/3/25.  No pulmonary thromboembolism.  Abdominal ascites.    Early Mar 2025 Admission   LUE DVT LSCV, GARCIA, LAV, LBV, LSBV  Large L effusion s/p thora, with malig cells.   Pt had left chest/neck HD catheter removed Feb 2025, reports procedure had some difficulty, noted left neck sl swelling 2 days ago and then left arm swelling x past week  Early Mar 2025 Came to ER for the increased left arm swelling, also w increased SOB from baseline  Planned chemo 1wk PTA held for low Hgb 7, instead had IV iron (told needs Hgb >8 for chemo)  Reported cancer has been responding to chemo  Duplex sig for extensive LUE DVT subclavian, IM, axillary, brachial veins and basilic superficial v thrombosis. Ulnar vein not visualized  CTA c, a/p-compared w 12/1/24 larage left effusion, increased since last image, trace right effusion. No PE. Mediastinal LADs up to 2.8x1.2cm, inflamm changes surrounding left subclavian vein. Andreas renal cysts and subcentimeter hypodensities, stabble andreas mild-mod hydronephrosis. small ascites. Mesenteric/upper abdomen LADs and mild pelvic LADs to 1.3cm.  Right chest infusoport in place  Started on IV heparin for LUE DVT. ==>ELiquis  s/p eval Pulm, s/p L pigtail cath placement 03/04/25  CBC w microcytic anemia, on adm had 7.8 mcv 65.6. s/p IV iron outpt.   LUE extensive DVT and supericial thrombosis-suspect assoc w the previous HD catheter, w baseline hyper coagulable state due to met gastric ca.   Did have difficulty in removing the catheter 2/5wks ago and subsequently w progressive left neck adn arm symptoms      RECOMMENDATIONS    R effusion  CT surgery eval appreciated  s/p thoracentesis prior.   Planned pleurex Thurs AM  last dose Eliquis 5AM on Tue AM; onheparinggt  Clear fromhematology for planned pleurex.     Follow CBC and transfuse as indicated    CAD/NSTEMI recently.  Cardiology Evaluation   On aspirin, plavix for recent NSTEMi  On labetalol and losartan    met gastric ca w local and distal LN involvements  to continue Heme/Onc followup w own doctor Dr Mckeon upon discharge  will followup with outside oncologist on discharge    LUE extensive DVT and superficial thrombosis  on heparin ggt, hold before procedure per protocol.   Post procedure, resume when OK with CT surgery.   Pt concurrent APT and AC needs to be taken into consideration

## 2025-04-02 NOTE — PROGRESS NOTE ADULT - ASSESSMENT
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    mets ca  malignant pleural eff  HTN  anemia  dyspnea  Atelectasis  DVT hx  gastric ca    on diuretic  on antiplatelet rx regimen and Hep gtt  plan for thoracentesis and pleurx on this admission  I moustapha  fio2 support  cardio f/u    plan for pleurx  I moustapha  fio2 support  cardio eval and optimization of cvs rx regimen  tele monitoring  I and O  replete lytes  thoracic sx consultation for pleurx placement  TTE w/ normal LV & RV size and function EF 60-65%, Mild MR, small pericardial effusion, Grade 1 DD  follows with Dr Mckeon - Onc MD

## 2025-04-02 NOTE — DISCHARGE NOTE PROVIDER - HOSPITAL COURSE
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 Pt endorses this is similar to his prior episodes of SOB and it is predominately exertional in nature. Pt reports that  this is similar to prior episodes. Pt endorses visiting Dr. Fung last Wed. and placing him on 2L NC d/t the SOB, pt endorses using 3L at home. Pt states that also a deep productive cough w/ white phlegm of which has been chronic. Of note, pt was admitted from 3/3-3/7 with large left pleural effusion and LUE DVT. Pt had thoracentesis done of which 3.1 L of fluid from the left thorax. Of note, pt was admitted on 3/19-3/23 for pleural effusion and NSTEMI. Pt had a thoracentesis done of which drained 560 cc from the left thorax. Pt reports last receiving FolFox two weeks ago (received four cycles so far), but will not receive any more per Dr. Fung. On 3L NC in room.    IN THE ED:  Temp 95.5F, HR 81, /72, RR 18, SpO2 94%  S/P robitussin 200 mg q6h prn for cough  Labs significant for H/H 9.5/30.2, D-Dimer: 2136, BUN/Cr: 27/1.4, Glucose: 132, eGFR: 56, Trops: 276  Imaging: CT angio chest: No pulmonary embolism. Interval progression of right pleural effusion and right paratracheal lymphadenopathy. Slightly improved multiloculated left pleural effusion along with pleural nodularity and septal lines concerning for malignant etiology.   (01 Apr 2025 12:46)      ---  HOSPITAL COURSE/PERTINENT LABS/PROCEDURES PERFORMED/PENDING TESTS:    ---  PATIENT CONDITION:  - stable    ---  PHYSICAL EXAM ON DAY OF DISCHARGE:    ---  CONSULTANTS:     ---  ADVANCED CARE PLANNING:  - Code status:      - MOLST completed:      [  ] NO     [  ] YES    ---  TIME SPENT:  I, the attending physician, was physically present for the key portions of the evaluation and management (E/M) service provided. The total amount of time spent reviewing the hospital notes, laboratory values, imaging findings, assessing/counseling the patient, discussing with consultant physicians, social work, nursing staff was -- minutes FROM ADMISSION H+P:   HPI:  Pt is a 63 y/o male w/ PMHx with poorly differentiated metastatic (known cervical and axillary lymphadenopathy) GI carcinoma, LUE and LLE DVT, HTN, and anemia presented to ED with SOB. Pt endorses this is similar to his prior episodes of SOB and it is predominately exertional in nature. Pt reports that  this is similar to prior episodes. Pt endorses visiting Dr. Fung last Wed. and placing him on 2L NC d/t the SOB, pt endorses using 3L at home. Pt states that also a deep productive cough w/ white phlegm of which has been chronic. Of note, pt was admitted from 3/3-3/7 with large left pleural effusion and LUE DVT. Pt had thoracentesis done of which 3.1 L of fluid from the left thorax. Of note, pt was admitted on 3/19-3/23 for pleural effusion and NSTEMI. Pt had a thoracentesis done of which drained 560 cc from the left thorax. Pt reports last receiving FolFox two weeks ago (received four cycles so far), but will not receive any more per Dr. Fung. On 3L NC in room.    IN THE ED:  Temp 95.5F, HR 81, /72, RR 18, SpO2 94%  S/P robitussin 200 mg q6h prn for cough  Labs significant for H/H 9.5/30.2, D-Dimer: 2136, BUN/Cr: 27/1.4, Glucose: 132, eGFR: 56, Trops: 276  Imaging: CT angio chest: No pulmonary embolism. Interval progression of right pleural effusion and right paratracheal lymphadenopathy. Slightly improved multiloculated left pleural effusion along with pleural nodularity and septal lines concerning for malignant etiology.   (01 Apr 2025 12:46)      ---  HOSPITAL COURSE/PERTINENT LABS/PROCEDURES PERFORMED/PENDING TESTS:  Patient admitted to the hospital with malignant pleural effusion in the right lung. Patient was placed on increased supplemental oxygen requirements for shortness of breath in the setting of pleural effusion to 3L NC. Cardiology also recommended transitioning his outpatient PO Bumex to IV Bumex 1G twice daily to treat his fluid overload and shortness of breath while he was admitted. Patient's blood thinner's and anti-platelet agents were held for planned pleurex catheter placement on 4/4. Pleurex catheter with water seal was placed with drainage of pleural effusion and improvement of symptoms. Patient was transitioned back to his home PO Bumex as well as his home Eliquis and Plavix.    Patient was found to have a slight MARYSOL on 4/5 with a Creatinine of 1.6 (baseline 1.3) and hyponatremia to 131 after pleurex placement. Creatinine improved closer to baseline to 1.5 on day of discharge. Hyponatremia stable at 131. Nephrology recommended outpatient follow up for repeat labs. ***    Patient improved clinically throughout hospital course. Patient hemodynamically stable for Discharge.  ---  PATIENT CONDITION:  - stable    ---  PHYSICAL EXAM ON DAY OF DISCHARGE:  T(C): 36.3 (04-06-25 @ 05:05), Max: 37.1 (04-05-25 @ 21:14)  HR: 80 (04-06-25 @ 05:05) (80 - 88)  BP: 110/69 (04-06-25 @ 05:05) (110/69 - 115/71)  RR: 19 (04-06-25 @ 05:05) (19 - 19)  SpO2: 98% (04-06-25 @ 05:05) (96% - 98%)  Wt(kg): --    Physical Exam:   GENERAL: well-groomed, well-developed, NAD  HEENT: head NC/AT; EOM intact, PERRLA, conjunctiva & sclera clear; hearing grossly intact, moist mucous membranes  NECK: supple, no JVD  RESPIRATORY: CTA B/L, no wheezing, rales, rhonchi or rubs. Pleurex Catheter in place in right lower thorax. Dressing C/D/I.  CARDIOVASCULAR: S1&S2, RRR, no murmurs or gallops  ABDOMEN: soft, non-tender, non-distended, + Bowel sounds x4 quadrants, no guarding, rebound or rigidity  MUSCULOSKELETAL:  no clubbing, cyanosis or edema of all 4 extremities  LYMPH: no cervical lymphadenopathy  VASCULAR: Radial pulses 2+ bilaterally, no varicose veins   SKIN: warm and dry, color normal  NEUROLOGIC: AA&O X3, CN2-12 intact w/ no focal deficits, no sensory loss, motor Strength 5/5 in UE & LE B/L  Psych: Normal mood and affect, normal behavior    ---  CONSULTANTS:   - Cardiology: Dr. Santana  - Pulmonology: Dr. Damico  - Thoracic Surgery: Dr. Palomo  - Heme/Onc: Dr. Quiñones  - nephrology: Dr. Cantu  ---  ADVANCED CARE PLANNING:  - Code status:      - MOLST completed:      [  ] NO     [  ] YES    ---  TIME SPENT:  I, the attending physician, was physically present for the key portions of the evaluation and management (E/M) service provided. 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 Pt endorses this is similar to his prior episodes of SOB and it is predominately exertional in nature. Pt reports that  this is similar to prior episodes. Pt endorses visiting Dr. Fung last Wed. and placing him on 2L NC d/t the SOB, pt endorses using 3L at home. Pt states that also a deep productive cough w/ white phlegm of which has been chronic. Of note, pt was admitted from 3/3-3/7 with large left pleural effusion and LUE DVT. Pt had thoracentesis done of which 3.1 L of fluid from the left thorax. Of note, pt was admitted on 3/19-3/23 for pleural effusion and NSTEMI. Pt had a thoracentesis done of which drained 560 cc from the left thorax. Pt reports last receiving FolFox two weeks ago (received four cycles so far), but will not receive any more per Dr. Fung. On 3L NC in room.    IN THE ED:  Temp 95.5F, HR 81, /72, RR 18, SpO2 94%  S/P robitussin 200 mg q6h prn for cough  Labs significant for H/H 9.5/30.2, D-Dimer: 2136, BUN/Cr: 27/1.4, Glucose: 132, eGFR: 56, Trops: 276  Imaging: CT angio chest: No pulmonary embolism. Interval progression of right pleural effusion and right paratracheal lymphadenopathy. Slightly improved multiloculated left pleural effusion along with pleural nodularity and septal lines concerning for malignant etiology.   (01 Apr 2025 12:46)      ---  HOSPITAL COURSE/PERTINENT LABS/PROCEDURES PERFORMED/PENDING TESTS:  Patient admitted to the hospital with malignant pleural effusion in the right lung. Patient was placed on increased supplemental oxygen requirements for shortness of breath in the setting of pleural effusion to 3L NC. Cardiology also recommended transitioning his outpatient PO Bumex to IV Bumex 1G twice daily to treat his fluid overload and shortness of breath while he was admitted. Patient's blood thinner's and anti-platelet agents were held for planned pleurex catheter placement on 4/4. Pleurex catheter with water seal was placed with drainage of pleural effusion and improvement of symptoms. Patient was transitioned back to his home PO Bumex as well as his home Eliquis and Plavix.    Patient was found to have a slight MARYSOL on 4/5 with a Creatinine of 1.6 (baseline 1.3) and hyponatremia to 131 after pleurex placement. Creatinine improved closer to baseline to 1.5 on day of discharge. Hyponatremia stable at 131. Nephrology recommended holding his home Bumex for 2-3 days upon discharge and outpatient follow up for repeat labs in one week.    Patient improved clinically throughout hospital course. Patient hemodynamically stable for Discharge.  ---  PATIENT CONDITION:  - stable    ---  PHYSICAL EXAM ON DAY OF DISCHARGE:  T(C): 36.3 (04-06-25 @ 05:05), Max: 37.1 (04-05-25 @ 21:14)  HR: 80 (04-06-25 @ 05:05) (80 - 88)  BP: 110/69 (04-06-25 @ 05:05) (110/69 - 115/71)  RR: 19 (04-06-25 @ 05:05) (19 - 19)  SpO2: 98% (04-06-25 @ 05:05) (96% - 98%)  Wt(kg): --    Physical Exam:   GENERAL: well-groomed, well-developed, NAD  HEENT: head NC/AT; EOM intact, PERRLA, conjunctiva & sclera clear; hearing grossly intact, moist mucous membranes  NECK: supple, no JVD  RESPIRATORY: CTA B/L, no wheezing, rales, rhonchi or rubs. Pleurex Catheter in place in right lower thorax. Dressing C/D/I.  CARDIOVASCULAR: S1&S2, RRR, no murmurs or gallops  ABDOMEN: soft, non-tender, non-distended, + Bowel sounds x4 quadrants, no guarding, rebound or rigidity  MUSCULOSKELETAL:  no clubbing, cyanosis or edema of all 4 extremities  LYMPH: no cervical lymphadenopathy  VASCULAR: Radial pulses 2+ bilaterally, no varicose veins   SKIN: warm and dry, color normal  NEUROLOGIC: AA&O X3, CN2-12 intact w/ no focal deficits, no sensory loss, motor Strength 5/5 in UE & LE B/L  Psych: Normal mood and affect, normal behavior    ---  CONSULTANTS:   - Cardiology: Dr. Santana  - Pulmonology: Dr. Damico  - Thoracic Surgery: Dr. Palomo  - Heme/Onc: Dr. Quiñones  - nephrology: Dr. Cantu  ---  ADVANCED CARE PLANNING:  - Code status:      - MOLST completed:      [  ] NO     [  ] YES    ---  TIME SPENT:  I, the attending physician, was physically present for the key portions of the evaluation and management (E/M) service provided. The total amount of time spent reviewing the hospital notes, laboratory values, imaging findings, assessing/counseling the patient, discussing with consultant physicians, social work, nursing staff was -- minutes

## 2025-04-02 NOTE — PROGRESS NOTE ADULT - ASSESSMENT
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 Now presents to [facility] on 4/1/25 complaining of worsening exertional SOB for past week requiring home O2. CT scan with findings of no pulmonary embolism. Interval progression of right pleural effusion and right paratracheal lymphadenopathy.  Slightly improved multiloculated left pleural effusion along with pleural nodularity and septal lines concerning for malignant etiology. Thoracic surgery consulted for pleurx placement. WBC WNL, AFVSS on O2 NC. Elevated troponins noted 26.7 with cardiology following. CR 1.4 (1.4) Patient planned for pleurx placement tomorrow, will need cardiology and medicine optimization.    Plan:  - Plan for pleurx placement, tomorrow, will update on time  - Continue supplemental O2 as needed  - Hold Plavix  - Supportive care  - OOB as tolerated  - Cardiology/hematology/onc/pulmonology following  - Cardiology and medical optimization; called and spoke to the two teams  - Rest of care per primary team  To be discussed with Dr. Palomo

## 2025-04-02 NOTE — PROGRESS NOTE ADULT - SUBJECTIVE AND OBJECTIVE BOX
Examined patient at bedside this morning sitting in his chair on nasal cannula in NAD. Patient report pain controlled. No acute overnight events. AFVSS. WBC WNL, H/H improved 9.1/28.9 (8.6/27.2). Cr 1.4 (1.4) Albumin 2.3 (2.5) Trop 26.7 (275.8) currently followed by cardiology, hem/onc and pulmonology. Denies chest pain or shortness of breath at rest and with nasal cannula. Denies nausea or vomiting.       Vital Signs Last 24 Hrs  T(C): 36.9 (02 Apr 2025 04:44), Max: 37 (01 Apr 2025 12:06)  T(F): 98.5 (02 Apr 2025 04:44), Max: 98.6 (01 Apr 2025 12:06)  HR: 83 (02 Apr 2025 10:31) (71 - 83)  BP: 100/70 (02 Apr 2025 10:31) (92/55 - 124/70)  BP(mean): --  RR: 19 (02 Apr 2025 10:31) (18 - 25)  SpO2: 95% (02 Apr 2025 10:31) (92% - 98%)    Parameters below as of 02 Apr 2025 10:31  Patient On (Oxygen Delivery Method): nasal cannula, 3L  O2 Flow (L/min): 3      Allergies    Bactrim DS (Hives)    Intolerances        MEDICATIONS  (STANDING):  acetaminophen   IVPB .. 1000 milliGRAM(s) IV Intermittent once  aspirin 325 milliGRAM(s) Oral daily  atorvastatin 40 milliGRAM(s) Oral at bedtime  buMETAnide 2 milliGRAM(s) Oral daily  clopidogrel Tablet 75 milliGRAM(s) Oral daily  heparin  Infusion.  Unit(s)/Hr (17 mL/Hr) IV Continuous <Continuous>  influenza   Vaccine 0.5 milliLiter(s) IntraMuscular once  labetalol 100 milliGRAM(s) Oral two times a day  sodium chloride 0.9%. 1000 milliLiter(s) (60 mL/Hr) IV Continuous <Continuous>    MEDICATIONS  (PRN):  guaiFENesin Oral Liquid (Sugar-Free) 200 milliGRAM(s) Oral every 6 hours PRN Cough  heparin   Injectable 7500 Unit(s) IV Push every 6 hours PRN For aPTT less than 40  heparin   Injectable 3500 Unit(s) IV Push every 6 hours PRN For aPTT between 40 - 57        01 Apr 2025 07:01  -  02 Apr 2025 07:00  --------------------------------------------------------  IN:    Heparin Infusion: 176 mL    sodium chloride 0.9%: 600 mL  Total IN: 776 mL    OUT:    Voided (mL): 650 mL  Total OUT: 650 mL    Total NET: 126 mL        Urinalysis Basic - ( 02 Apr 2025 05:56 )    Color: x / Appearance: x / SG: x / pH: x  Gluc: 111 mg/dL / Ketone: x  / Bili: x / Urobili: x   Blood: x / Protein: x / Nitrite: x   Leuk Esterase: x / RBC: x / WBC x   Sq Epi: x / Non Sq Epi: x / Bacteria: x      LABS:                          9.1    8.88  )-----------( 585      ( 02 Apr 2025 05:56 )             28.9     04-02    134[L]  |  102  |  29[H]  ----------------------------<  111[H]  4.2   |  26  |  1.40[H]    Ca    9.0      02 Apr 2025 05:56    TPro  6.4  /  Alb  2.3[L]  /  TBili  0.4  /  DBili  x   /  AST  25  /  ALT  21  /  AlkPhos  92  04-02    LIVER FUNCTIONS - ( 02 Apr 2025 05:56 )  Alb: 2.3 g/dL / Pro: 6.4 g/dL / ALK PHOS: 92 U/L / ALT: 21 U/L / AST: 25 U/L / GGT: x           PT/INR - ( 01 Apr 2025 14:10 )   PT: 17.4 sec;   INR: 1.48 ratio         PTT - ( 02 Apr 2025 05:56 )  PTT:52.8 sec  Urinalysis Basic - ( 02 Apr 2025 05:56 )    Color: x / Appearance: x / SG: x / pH: x  Gluc: 111 mg/dL / Ketone: x  / Bili: x / Urobili: x   Blood: x / Protein: x / Nitrite: x   Leuk Esterase: x / RBC: x / WBC x   Sq Epi: x / Non Sq Epi: x / Bacteria: x    IMAGING:     < from: CT Angio Chest PE Protocol w/ IV Cont (04.01.25 @ 08:12) >  ACC: [id number] EXAM:  CT ANGIO CHEST PULM ART [facility]   ORDERED BY:   KATHERINE JAY     PROCEDURE DATE:  04/01/2025          INTERPRETATION:  CLINICAL INFORMATION: Shortness of breath. Rule out   pulmonary embolism.    COMPARISON: 03/19/2025.    CONTRAST/COMPLICATIONS:  IV Contrast: Omnipaque 350  70 cc administered   30 cc discarded  Oral Contrast: NONE  .    PROCEDURE:  CT Angiography of the Chest.  Sagittal and coronal reformats were performed as well as 3D (MIP)   reconstructions.    FINDINGS:    LUNGS AND LARGE AIRWAYS: Trace secretions within the distal trachea. The   dependent lung atelectasis bilaterally, increased on the right. Evidence   of septal lines involving the left lung.  PLEURA: Moderate right pleural effusion, increased in comparison to the   prior study. Multiloculated mild left pleural effusion demonstrates   slight improvement. Pleural nodularity is identified in the left lung   base concerning for malignancy.  VESSELS: No pulmonary embolism. Atherosclerotic calcification including   the coronary arteries.  HEART: Heart size is normal. Small pericardial effusion.  MEDIASTINUM AND STELLA: No lymphadenopathy. Mediastinal and hilar   lymphadenopathy. Dominant right paratracheal luke mass measures 2.6 x   2.9 cm, previously 2.2 x 2.5 cm.  CHEST WALL AND LOWER NECK: Left axillary lymphadenopathy and subcutaneous   stranding, essentially unchanged.  VISUALIZED UPPER ABDOMEN: Small volume perihepatic ascites, peritoneal   stranding and mesenteric edema. Stable prominence of cisterna chyli. Mild   bilateral hydronephrosis. Bilateral renal cysts. Stable mildly enlarged   upper retroperitoneal nodes measuring up to 1.6 cm in short axis in the   periportal region.  BONES: Stable tiny sclerotic foci lower thoracic spine.    IMPRESSION:  No pulmonary embolism.  Interval progression of right pleural effusion and right paratracheal   lymphadenopathy.  Slightly improved multiloculated left pleural effusion along with pleural   nodularity and septal lines concerning for malignant etiology.    --- End of Report ---    DINO BARNES MD; Attending Radiologist  This document has been electronically signed. Apr 1 2025 10:44AM    < end of copied text >      PHYSICAL EXAM:  GENERAL: NAD, lying in bed comfortably  HEAD:  Atraumatic, Normocephalic  CV: RRR, +S1/S2, no murmurs, rubs or gallops  CHEST/LUNG: Decreased breath sounds bilaterally, no labored breathing however on O2 Nasal cannula   NERVOUS SYSTEM:  Alert & Oriented X3, speech clear. No deficits   MSK: FROM all 4 extremities, full and equal strength

## 2025-04-02 NOTE — PROGRESS NOTE ADULT - SUBJECTIVE AND OBJECTIVE BOX
Patient is a 64y old  Male who presents with a chief complaint of Pleural effusion (02 Apr 2025 12:12)      INTERVAL HPI/OVERNIGHT EVENTS: No acute overnight events. Pt was seen and examined at bedside. Pt states that he feels slightly short of breath and overall unwell due to the fluid build up in his chest. Saturating well on 2L NC.  Pt denies headache, dizziness, lightheadedness, fever, chills, body aches, CP, palpitations, abdominal pain, n/v, numbness/tingling.  No other complaints at this time.     MEDICATIONS  (STANDING):  albumin human 25% IVPB 50 milliLiter(s) IV Intermittent once  atorvastatin 40 milliGRAM(s) Oral at bedtime  buMETAnide Injectable 1 milliGRAM(s) IV Push two times a day  chlorhexidine 2% Cloths 1 Application(s) Topical daily  heparin  Infusion.  Unit(s)/Hr (17 mL/Hr) IV Continuous <Continuous>  influenza   Vaccine 0.5 milliLiter(s) IntraMuscular once  labetalol 100 milliGRAM(s) Oral two times a day  sodium chloride 0.9%. 1000 milliLiter(s) (60 mL/Hr) IV Continuous <Continuous>    MEDICATIONS  (PRN):  guaiFENesin Oral Liquid (Sugar-Free) 200 milliGRAM(s) Oral every 6 hours PRN Cough  heparin   Injectable 7500 Unit(s) IV Push every 6 hours PRN For aPTT less than 40  heparin   Injectable 3500 Unit(s) IV Push every 6 hours PRN For aPTT between 40 - 57      Allergies    Bactrim DS (Hives)    Intolerances        REVIEW OF SYSTEMS:  CONSTITUTIONAL: No fever or chills  HEENT:  No headache, no sore throat  RESPIRATORY: Admits dyspnea. No cough, wheezing,   CARDIOVASCULAR: No chest pain, palpitations  GASTROINTESTINAL: No abd pain, nausea, vomiting, or diarrhea  GENITOURINARY: No dysuria, frequency, or hematuria  NEUROLOGICAL: no focal weakness or dizziness  MUSCULOSKELETAL: no myalgias     Vital Signs Last 24 Hrs  T(C): 36.9 (02 Apr 2025 04:44), Max: 37 (01 Apr 2025 16:53)  T(F): 98.5 (02 Apr 2025 04:44), Max: 98.6 (01 Apr 2025 16:53)  HR: 83 (02 Apr 2025 10:31) (71 - 83)  BP: 100/70 (02 Apr 2025 10:31) (92/55 - 124/70)  BP(mean): --  RR: 19 (02 Apr 2025 10:31) (18 - 25)  SpO2: 95% (02 Apr 2025 10:31) (92% - 95%)    Parameters below as of 02 Apr 2025 10:31  Patient On (Oxygen Delivery Method): nasal cannula, 3L  O2 Flow (L/min): 3      PHYSICAL EXAM:  GENERAL: NAD  HEENT:  anicteric, moist mucous membranes  CHEST/LUNG:  Decreased lung sounds b/l LLs. Otherwise CTA b/l, no rales, wheezes, or rhonchi  HEART:  RRR, S1, S2  ABDOMEN:  BS+, soft, nontender, nondistended  EXTREMITIES: 1+ pitting edema in b/l LEs.  cyanosis, or calf tenderness  NERVOUS SYSTEM: answers questions and follows commands appropriately    LABS:                        9.1    8.88  )-----------( 585      ( 02 Apr 2025 05:56 )             28.9     CBC Full  -  ( 02 Apr 2025 05:56 )  WBC Count : 8.88 K/uL  Hemoglobin : 9.1 g/dL  Hematocrit : 28.9 %  Platelet Count - Automated : 585 K/uL  Mean Cell Volume : 65.1 fl  Mean Cell Hemoglobin : 20.5 pg  Mean Cell Hemoglobin Concentration : 31.5 g/dL  Auto Neutrophil # : x  Auto Lymphocyte # : x  Auto Monocyte # : x  Auto Eosinophil # : x  Auto Basophil # : x  Auto Neutrophil % : x  Auto Lymphocyte % : x  Auto Monocyte % : x  Auto Eosinophil % : x  Auto Basophil % : x    02 Apr 2025 05:56    134    |  102    |  29     ----------------------------<  111    4.2     |  26     |  1.40     Ca    9.0        02 Apr 2025 05:56    TPro  6.4    /  Alb  2.3    /  TBili  0.4    /  DBili  x      /  AST  25     /  ALT  21     /  AlkPhos  92     02 Apr 2025 05:56    PT/INR - ( 01 Apr 2025 14:10 )   PT: 17.4 sec;   INR: 1.48 ratio         PTT - ( 02 Apr 2025 05:56 )  PTT:52.8 sec  Urinalysis Basic - ( 02 Apr 2025 05:56 )    Color: x / Appearance: x / SG: x / pH: x  Gluc: 111 mg/dL / Ketone: x  / Bili: x / Urobili: x   Blood: x / Protein: x / Nitrite: x   Leuk Esterase: x / RBC: x / WBC x   Sq Epi: x / Non Sq Epi: x / Bacteria: x      CAPILLARY BLOOD GLUCOSE              RADIOLOGY & ADDITIONAL TESTS: ____    Personally reviewed.     Consultant(s) Notes Reviewed:  [x] YES  [ ] NO

## 2025-04-02 NOTE — DISCHARGE NOTE PROVIDER - ATTENDING DISCHARGE PHYSICAL EXAMINATION:
PHYSICAL EXAM:    Vital Signs Last 24 Hrs  T(C): 36.4 (06 Apr 2025 11:37), Max: 37.1 (05 Apr 2025 21:14)  T(F): 97.5 (06 Apr 2025 11:37), Max: 98.7 (05 Apr 2025 21:14)  HR: 79 (06 Apr 2025 11:37) (79 - 88)  BP: 107/71 (06 Apr 2025 11:37) (107/71 - 115/71)  BP(mean): --  RR: 19 (06 Apr 2025 05:05) (19 - 19)  SpO2: 97% (06 Apr 2025 11:37) (96% - 98%)    General: No acute distress.  HEENT: Moist MM  Neck: Supple.  Full ROM.  No JVD  Heart: RRR.  Normal S1 and S2.  No murmurs, rubs, or gallops.   Lungs: Decreased R lung sounds, no wheezing. R pleurx catheter   Abdomen: BS+, soft, NT/ND  Skin: Warm and dry.  No rashes.  Extremities: No edema, clubbing, or cyanosis  Musculoskeletal: No deformities  Neuro: A&Ox3

## 2025-04-02 NOTE — PROGRESS NOTE ADULT - ASSESSMENT
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     - Increasing effusions with lower extremity edema and pulmonary edema on imaging, likely multifactorial in setting of metastatic disease.   - planned for therapeutic thoracentesis (4/2)  and Pleurx catheter placement (4/3)  thoracic following   - TTE (3/20/2025) shows EF 60-65%, grade 1 diastolic dysfunction.   - continue Bumex 1 mg IV every 12 with albumin.  - Strict I/Os, daily weights.    - EKG: SR   - No acute changes on EKG compared to previous.  - Troponin elevated to 275-> 260.7,  may be residual from recent type 2 NSTEMI, that was medically managed   - no anginal complaints at this time   - Monitor closely for the development of anginal symptoms or clinical signs of ischemia.     - BP, HR stable and well controlled  - continue to monitor routine hemodynamics.  - Continue home labetalol  - Hold losartan in setting of worsening renal function.   - Monitor and replete Lytes. Keep K > 4 and Mg > 2    - on hep gtt  (hx DVT)    - Patient at high risk for procedure given volume overload and renal dysfunction, but benefit of procedure outweighs the risk. OK to proceed with the planned procedure.    - Will continue to follow.    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     - Increasing effusions with lower extremity edema and pulmonary edema on imaging, likely multifactorial in setting of metastatic disease.   - planned for therapeutic thoracentesis (4/2)  and Pleurx catheter placement (4/3)  thoracic following   - TTE (3/20/2025) shows EF 60-65%, grade 1 diastolic dysfunction.   - continue Bumex 1 mg IV every 12 with albumin.  - Strict I/Os, daily weights.    - EKG: SR   - No acute changes on EKG compared to previous.  - Troponin elevated to 275-> 260.7,  may be residual from recent type 2 NSTEMI, that was medically managed   - no anginal complaints at this time   - would continue ASA 81 mg PO daily  - Plavix on hold resume post procedure   - Monitor closely for the development of anginal symptoms or clinical signs of ischemia.     - BP, HR stable and well controlled  - continue to monitor routine hemodynamics.  - Continue home labetalol  - Hold losartan in setting of worsening renal function.   - Monitor and replete Lytes. Keep K > 4 and Mg > 2    - on hep gtt  (hx DVT)    - Patient at high risk for procedure given volume overload and renal dysfunction, but benefit of procedure outweighs the risk. OK to proceed with the planned procedure.  - Will continue to follow.    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     - Increasing effusions with lower extremity edema and pulmonary edema on imaging, likely multifactorial in setting of metastatic disease.   - planned for therapeutic thoracentesis (4/2)  and Pleurx catheter placement (4/3)  thoracic following   - TTE (3/20/2025) shows EF 60-65%, grade 1 diastolic dysfunction.   - continue Bumex 1 mg IV every 12 with albumin.  - Strict I/Os, daily weights.    - EKG: SR   - No acute changes on EKG compared to previous.  - Troponin elevated to 275-> 260.7,  may be residual from recent type 2 NSTEMI, that was medically managed   - no anginal complaints at this time   - Plavix on hold resume post procedure. No need for ASA.   - Monitor closely for the development of anginal symptoms or clinical signs of ischemia.     - BP, HR stable and well controlled  - continue to monitor routine hemodynamics.  - Continue home labetalol  - Hold losartan in setting of worsening renal function.   - Monitor and replete Lytes. Keep K > 4 and Mg > 2    - on hep gtt  (hx DVT)    - Patient at high risk for procedure given volume overload and renal dysfunction, but benefit of procedure outweighs the risk. OK to proceed with the planned procedure.  - Will continue to follow.    Karime Hartley [facility]  Nurse Practitioner - Cardiology   call TEAMS

## 2025-04-02 NOTE — DISCHARGE NOTE PROVIDER - NSDCMRMEDTOKEN_GEN_ALL_CORE_FT
apixaban 5 mg oral tablet: 1 tab(s) orally 2 times a day  bumetanide 2 mg oral tablet: 1 tab(s) orally once a day  clopidogrel 75 mg oral tablet: 1 tab(s) orally once a day  labetalol 100 mg oral tablet: 1 tab(s) orally 2 times a day  Lipitor 40 mg oral tablet: 1 tab(s) orally once a day  losartan 50 mg oral tablet: 1 tab(s) orally once a day   apixaban 5 mg oral tablet: 1 tab(s) orally 2 times a day  bumetanide 2 mg oral tablet: 1 tab(s) orally once a day  clopidogrel 75 mg oral tablet: 1 tab(s) orally once a day  labetalol 100 mg oral tablet: 1 tab(s) orally 2 times a day  Lipitor 40 mg oral tablet: 1 tab(s) orally once a day  losartan 50 mg oral tablet: 1 tab(s) orally once a day  oxyCODONE 5 mg oral tablet: 1 tab(s) orally every 4 hours As needed Severe Pain (7 - 10)   apixaban 5 mg oral tablet: 1 tab(s) orally 2 times a day  bumetanide 2 mg oral tablet: 1 tab(s) orally once a day restart on Wed 4/9  clopidogrel 75 mg oral tablet: 1 tab(s) orally once a day  labetalol 100 mg oral tablet: 1 tab(s) orally 2 times a day  Lipitor 40 mg oral tablet: 1 tab(s) orally once a day  losartan 50 mg oral tablet: 1 tab(s) orally once a day  oxyCODONE 5 mg oral tablet: 1 tab(s) orally every 4 hours As needed Severe Pain (7 - 10)

## 2025-04-02 NOTE — DISCHARGE NOTE PROVIDER - CARE PROVIDERS DIRECT ADDRESSES
,[email or web address]_Unknown [email or web address],DirectAddress_Unknown,DirectAddress_Unknown

## 2025-04-02 NOTE — PROGRESS NOTE ADULT - SUBJECTIVE AND OBJECTIVE BOX
[facility] -- Charles Martinez Pannella, Patel, Savella, Goodger, Cohen  Office # [phone]    Follow Up:  SOB, elevated trops    Subjective/Observations: seen and examined, awake, alert, OOB to chair, denies chest pain, c/o dyspnea, on O2 via NC   REVIEW OF SYSTEMS: All other review of systems is negative unless indicated above  PAST MEDICAL & SURGICAL HISTORY:  Gastric lymphoma      Anemia of chronic disease      Pleural effusion      DVT (deep venous thrombosis)      Hypertension      Hyperlipidemia      S/P appendectomy  November 17th 2014      S/P cholecystectomy  15 years ago        MEDICATIONS  (STANDING):  aspirin 325 milliGRAM(s) Oral daily  atorvastatin 40 milliGRAM(s) Oral at bedtime  buMETAnide 2 milliGRAM(s) Oral daily  heparin  Infusion.  Unit(s)/Hr (17 mL/Hr) IV Continuous <Continuous>  influenza   Vaccine 0.5 milliLiter(s) IntraMuscular once  labetalol 100 milliGRAM(s) Oral two times a day  sodium chloride 0.9%. 1000 milliLiter(s) (60 mL/Hr) IV Continuous <Continuous>    MEDICATIONS  (PRN):  guaiFENesin Oral Liquid (Sugar-Free) 200 milliGRAM(s) Oral every 6 hours PRN Cough  heparin   Injectable 7500 Unit(s) IV Push every 6 hours PRN For aPTT less than 40  heparin   Injectable 3500 Unit(s) IV Push every 6 hours PRN For aPTT between 40 - 57    Allergies    Bactrim DS (Hives)    Intolerances      Vital Signs Last 24 Hrs  T(C): 36.9 (02 Apr 2025 04:44), Max: 37 (01 Apr 2025 12:06)  T(F): 98.5 (02 Apr 2025 04:44), Max: 98.6 (01 Apr 2025 12:06)  HR: 83 (02 Apr 2025 10:31) (71 - 83)  BP: 100/70 (02 Apr 2025 10:31) (92/55 - 124/70)  BP(mean): --  RR: 19 (02 Apr 2025 10:31) (18 - 25)  SpO2: 95% (02 Apr 2025 10:31) (92% - 98%)    Parameters below as of 02 Apr 2025 10:31  Patient On (Oxygen Delivery Method): nasal cannula, 3L  O2 Flow (L/min): 3    I&O's Summary    01 Apr 2025 07:01  -  02 Apr 2025 07:00  --------------------------------------------------------  IN: 776 mL / OUT: 650 mL / NET: 126 mL          TELE:  70-90's   PHYSICAL EXAM:  Constitutional: NAD, awake and alert  HEENT: Moist Mucous Membranes, Anicteric  Pulmonary: Non-labored, breath sounds diminished R> L, No wheezing, rales or rhonchi  Cardiovascular: Regular, S1 and S2, No murmurs, rubs, gallops or clicks  Gastrointestinal: Bowel Sounds present, soft, nontender.   Lymph: No peripheral edema. No lymphadenopathy.  Skin: No visible rashes or ulcers.  Psych:  Mood & affect appropriate  LABS: All Labs Reviewed:                        9.1    8.88  )-----------( 585      ( 02 Apr 2025 05:56 )             28.9                         8.6    8.11  )-----------( 556      ( 01 Apr 2025 21:37 )             27.2                         9.5    7.81  )-----------( 613      ( 01 Apr 2025 06:15 )             30.2     02 Apr 2025 05:56    134    |  102    |  29     ----------------------------<  111    4.2     |  26     |  1.40   01 Apr 2025 06:15    135    |  102    |  27     ----------------------------<  132    4.0     |  27     |  1.40     Ca    9.0        02 Apr 2025 05:56  Ca    9.3        01 Apr 2025 06:15    TPro  6.4    /  Alb  2.3    /  TBili  0.4    /  DBili  x      /  AST  25     /  ALT  21     /  AlkPhos  92     02 Apr 2025 05:56  TPro  6.7    /  Alb  2.5    /  TBili  0.3    /  DBili  x      /  AST  26     /  ALT  22     /  AlkPhos  95     01 Apr 2025 06:15    PT/INR - ( 01 Apr 2025 14:10 )   PT: 17.4 sec;   INR: 1.48 ratio         PTT - ( 02 Apr 2025 05:56 )  PTT:52.8 sec      12 Lead ECG:   Ventricular Rate 80 BPM    Atrial Rate 80 BPM    P-R Interval 150 ms    QRS Duration 94 ms    Q-T Interval 364 ms    QTC Calculation(Bazett) 419 ms    P Axis 64 degrees    R Axis -15 degrees    T Axis 3 degrees    Diagnosis Line Normal sinus rhythm  Low voltage QRS  Inferior infarct (cited on or before 19-MAR-2025)  Cannot rule out Anterior infarct , age undetermined  Abnormal ECG  When compared with ECG of 22-MAR-2025 03:33,  No significant change was found  Confirmed by CHASE CELIS ([id number]) on 4/1/2025 10:36:04 PM (04-01-25 @ 05:38)      TRANSTHORACIC ECHOCARDIOGRAM REPORT  ________________________________________________________________________________                                      _______       Pt. Name:       JESSE EATON Study Date:    3/20/2025  MRN:     [id number]                   YOB: 1960  Accession #:    [id number]                  Age:           64 years  Account#:       [id number]                 Gender:        M  Heart Rate:                                Height:        66.93in (170.00 cm)  Rhythm:                                    Weight:        205.03 lb (93.00 kg)  Blood Pressure: 106/64 mmHg                BSA/BMI:       2.04 m² / 32.18 kg/m²  ________________________________________________________________________________________  Referring Physician:    [id number] Oswadlo Snowden  Interpreting Physician: Alberto Gilliam M.D.  Primary Sonographer:    Emperatriz AVILA    CPT:               ECHO TTE WO CON COMP W DOPP - 47214.m  Indication(s):     Chest pain, unspecified - R07.9  Procedure:         Transthoracic echocardiogram with 2-D, M-mode and complete                     spectral and color flow Doppler.  Ordering Location: TELE  Admission Status:  Inpatient    _______________________________________________________________________________________     CONCLUSIONS:      1. Left ventricular wall thickness is normal. Left ventricular systolic function is normal with an ejection fraction visually estimated at 60 to 65 %. There are no regional wall motion abnormalities seen.   2. Normal right ventricular cavity size, with normal wall thickness, and normal right ventricular systolic function.   3. Mild mitral regurgitation.   4. Fibrocalcific aortic valve sclerosis without stenosis.   5. The inferior vena cava is dilated measuring 2.44 cm in diameter, (dilated >2.1cm) with normal inspiratory collapse (normal >50%) consistent with mildly elevated right atrial pressure (~8, range 5-10mmHg).   6. Small pericardial effusion.   7. There is mild (grade 1) left ventricular diastolic dysfunction.   8. Compared to the transthoracic echocardiogram performed on 3/5/2025, there have been no significant interval changes.   9. Pulmonary artery systolic pressure could not be estimated.    ________________________________________________________________________________________  FINDINGS:     Left Ventricle:  Left ventricular wall thickness is normal. Left ventricular systolic function is normal with an ejection fraction visually estimated at 60 to 65%. There are no regional wall motion abnormalities seen. There is mild (grade 1) left ventricular diastolic dysfunction.     Right Ventricle:  The right ventricular cavity is normal in size, with normal wall thickness and right ventricular systolic function is normal.     Aortic Valve:  There is fibrocalcific aortic valve sclerosis without stenosis. There is no evidence of aortic regurgitation.     Mitral Valve:  Structurally normal mitral valve with normal leaflet excursion. There is mild mitral regurgitation.     Tricuspid Valve:  Structurally normal tricuspid valve with normal leaflet excursion. There is insufficient tricuspid regurgitation detected to calculate pulmonary artery systolic pressure.     Pulmonic Valve:  The pulmonic valve was not well visualized.     Pericardium:  There is a small pericardial effusion.     Systemic Veins:  The inferior vena cava is dilated measuring 2.44 cm in diameter, (dilated >2.1cm) with normal inspiratory collapse (normal >50%) consistent with mildly elevated right atrial pressure (~8, range 5-10mmHg).  ____________________________________________________________________  QUANTITATIVE DATA:  Left Ventricle Measurements: (Indexed to BSA)     Visualized LV EF%: 60 to 65%     MV E Vmax: 1.03 m/s  MV A Vmax: 1.33 m/s  MV E/A:    0.77  MV DT:     183 msec             Right Ventricle Measurements:     RV Base (RVID1): 2.8 cm    Mitral Valve Measurements:     MV E Vmax: 1.0 m/s  MV A Vmax: 1.3 m/s  MV E/A:    0.8       Tricuspid Valve Measurements:     RA Pressure: 8 mmHg    ________________________________________________________________________________________  Electronically signed on 3/21/2025 at 11:40:35 AM by Alberto Gilliam M.D.         *** Final ***

## 2025-04-02 NOTE — CHART NOTE - NSCHARTNOTEFT_GEN_A_CORE
Called by RN for Pt c/o pt nausea, vomiting, and anxiety. Pt seen and examined at bedside. States he is severely upset as his procedure has been delayed and attributes his nausea and vomiting to getting worked up regarding his procedure being delayed. Pt endorsing he "does not care" what happens at this point but needs anxiolytic and something to help him sleep. Melatonin offered and declined         T(C): 36.8 (04-02-25 @ 20:51), Max: 36.9 (04-02-25 @ 04:44)  HR: 76 (04-02-25 @ 20:51) (76 - 83)  BP: 115/72 (04-02-25 @ 20:51) (100/70 - 124/70)  RR: 19 (04-02-25 @ 20:51) (19 - 19)  SpO2: 95% (04-02-25 @ 20:51) (92% - 95%)  Wt(kg): --    Physical :  Gen- anxious, sitting at edge of bed  Neuro- CN grossly intact, grossly moving all 4 extremities; AxO3    LABS:                        9.1    8.88  )-----------( 585      ( 02 Apr 2025 05:56 )             28.9     04-02    134[L]  |  102  |  29[H]  ----------------------------<  111[H]  4.2   |  26  |  1.40[H]    Ca    9.0      02 Apr 2025 05:56    TPro  6.4  /  Alb  2.3[L]  /  TBili  0.4  /  DBili  x   /  AST  25  /  ALT  21  /  AlkPhos  92  04-02    PT/INR - ( 01 Apr 2025 14:10 )   PT: 17.4 sec;   INR: 1.48 ratio         PTT - ( 02 Apr 2025 21:09 )  PTT:68.1 sec                Assessment/Plan  64yMale admitted for malignant pleural effusion  1. Xanax 0.25mg PO for anxiety    RN to call with any changes in pt status

## 2025-04-02 NOTE — DISCHARGE NOTE PROVIDER - CARE PROVIDER_API CALL
Doris Fung  Medical Oncology  [address] [place] [address]  Phone: [phone]  Fax: [phone]  Established Patient  Follow Up Time: 1-3 days    Mulugeta Delcid  Physician Assistant Services  [address] [place] [address]  Phone: [phone]  Fax: [phone]  Established Patient  Follow Up Time: 1 week   Doris Fung  Medical Oncology  [address] [place] [address]  Phone: [phone]  Fax: [phone]  Established Patient  Follow Up Time: 1-3 days    Mulugeta Delcid  Physician Assistant Services  [address] [place] [address]  Phone: [phone]  Fax: [phone]  Established Patient  Follow Up Time: 1 week    Boaz Eugene  Nephrology  [address] [place] [address] [address]  [place] [address]  Phone: [phone]  Fax: [phone]  Follow Up Time: 1 week

## 2025-04-02 NOTE — PROGRESS NOTE ADULT - SUBJECTIVE AND OBJECTIVE BOX
[INTERVAL HX: ]  Patient seen and examined;  Chart reviewed and events noted;     similair dyspnea    took Eliquis yest 5AM  none since    no CP, no diarrhea    [MEDICATIONS]  MEDICATIONS  (STANDING):  albumin human 25% IVPB 50 milliLiter(s) IV Intermittent once  atorvastatin 40 milliGRAM(s) Oral at bedtime  buMETAnide Injectable 1 milliGRAM(s) IV Push two times a day  heparin  Infusion.  Unit(s)/Hr (17 mL/Hr) IV Continuous <Continuous>  influenza   Vaccine 0.5 milliLiter(s) IntraMuscular once  labetalol 100 milliGRAM(s) Oral two times a day  sodium chloride 0.9%. 1000 milliLiter(s) (60 mL/Hr) IV Continuous <Continuous>    MEDICATIONS  (PRN):  guaiFENesin Oral Liquid (Sugar-Free) 200 milliGRAM(s) Oral every 6 hours PRN Cough  heparin   Injectable 7500 Unit(s) IV Push every 6 hours PRN For aPTT less than 40  heparin   Injectable 3500 Unit(s) IV Push every 6 hours PRN For aPTT between 40 - 57      [VITALS]  Vital Signs Last 24 Hrs  T(C): 36.9 (2025 04:44), Max: 37 (2025 16:53)  T(F): 98.5 (2025 04:44), Max: 98.6 (2025 16:53)  HR: 83 (2025 10:31) (71 - 83)  BP: 100/70 (2025 10:31) (92/55 - 124/70)  BP(mean): --  RR: 19 (2025 10:31) (18 - 25)  SpO2: 95% (2025 10:31) (92% - 95%)    Parameters below as of 2025 10:31  Patient On (Oxygen Delivery Method): nasal cannula, 3L  O2 Flow (L/min): 3    [WT/HT]  Daily     Daily Weight in k.1 (2025 04:44)  [VENT]      [PHYSICAL EXAM]  GEN: NAD  HEENT: normocephalic and atraumatic. EOMI. PERRL.    NECK: Supple.  No lymphadenopathy   LUNGS: dec BS L base  Dec BS R lung 3/4 up  HEART: Regular rate and rhythm,  no MRG  ABDOMEN: Soft, nontender, and mild distended.  Positive bowel sounds.    : No CVA tenderness  EXTREMITIES: Without edema.  NEUROLOGIC: grossly intact.  PSYCHIATRIC: Appropriate affect .  SKIN: No rash     [LABS:]                        9.1    8.88  )-----------( 585      ( 2025 05:56 )             28.9     04-    134[L]  |  102  |  29[H]  ----------------------------<  111[H]  4.2   |  26  |  1.40[H]    Ca    9.0      2025 05:56    TPro  6.4  /  Alb  2.3[L]  /  TBili  0.4  /  DBili  x   /  AST  25  /  ALT  21  /  AlkPhos  92  04-02    PT/INR - ( 2025 14:10 )   PT: 17.4 sec;   INR: 1.48 ratio         PTT - ( 2025 05:56 )  PTT:52.8 sec      Vitamin B12, Serum: >2000 pg/mL *H* [232 - 1245] (25 @ 18:35)    Folate, Serum: >20.0 ng/mL (25 @ 18:35)    Sedimentation Rate, Erythrocyte: 60 mm/hr *H* [0 - 20] (25 @ 18:35)    Iron - Total Binding Capacity.: 212 ug/dL *L* [220 - 430] (25 @ 05:40)    Vitamin B12, Serum: >2000 pg/mL *H* [232 - 1245] (25 @ 05:40)    Folate, Serum: 18.1 ng/mL (25 @ 05:40)    Ferritin: 989 ng/mL *H* [30 - 400] (25 @ 05:40)    Iron - Total Binding Capacity.: 223 ug/dL [220 - 430] (25 @ 06:24)    Ferritin: 943 ng/mL *H* [30 - 400] (25 @ 06:24)    Vitamin B12, Serum: >2000 pg/mL *H* [232 - 1245] (25 @ 06:24)    Folate, Serum: >20.0 ng/mL (25 @ 06:24)    Iron - Total Binding Capacity.: 241 ug/dL [220 - 430] (24 @ 07:05)    Ferritin: 257 ng/mL [30 - 400] (24 @ 07:05)    Vitamin B12, Serum: 1506 pg/mL *H* [200 - 900] (24 @ 19:53)    Ferritin: 140 ng/mL [30 - 400] (24 @ 19:53)    Folate, Serum: 12.6 ng/mL [3.1 - 17.5] (24 @ 19:53)      Urinalysis Basic - ( 2025 05:56 )    Color: x / Appearance: x / SG: x / pH: x  Gluc: 111 mg/dL / Ketone: x  / Bili: x / Urobili: x   Blood: x / Protein: x / Nitrite: x   Leuk Esterase: x / RBC: x / WBC x   Sq Epi: x / Non Sq Epi: x / Bacteria: x                [RADIOLOGY STUDIES:]

## 2025-04-02 NOTE — CASE MANAGEMENT PROGRESS NOTE - NSCMPROGRESSNOTE_GEN_ALL_CORE
Discussed pt in rounds, pt admitted from home with Pleural effusion, receiving heparin drip and oxygen 3L. Pt has home oxygen. Plan for thoracentesis and pleurx placement tomorrow. CM discussed home care visiting nurse services, pt declining at this time. CM discussed that he will need a visiting nurse to reinforce, educate on pleurx drainage and management. CM will continue to follow.

## 2025-04-02 NOTE — DISCHARGE NOTE PROVIDER - NSDCCPCAREPLAN_GEN_ALL_CORE_FT
PRINCIPAL DISCHARGE DIAGNOSIS  Diagnosis: Malignant pleural effusion  Assessment and Plan of Treatment: You were admitted to the hospital with pleural effusion of your right lung which is a fluid build up that compresses you lung causing shortness of breath. This is likely due to your previous cancer diagnosis. You were treated with IV bumex as well as placement of a Pleurex catheter to drain the fluid.   Pleurex Catheter Directions:  Please follow up with your Primary care provider within 1 week of discharge. please follow up with your Heme/Onc Dr. for your scheduled appointment tomorrow.     PRINCIPAL DISCHARGE DIAGNOSIS  Diagnosis: Malignant pleural effusion  Assessment and Plan of Treatment: You were admitted to the hospital with pleural effusion of your right lung which is a fluid build up that compresses you lung causing shortness of breath. This is likely due to your previous cancer diagnosis. You were treated with IV bumex as well as placement of a Pleurex catheter to drain the fluid.   Pleurx Catheter Directions: Nursing Services at home will help drain Pleurx 3x per week, no more than 1.5L drained at a time.   Please follow up with your Primary care provider Dr. Delcid within 1 week of discharge. please follow up with your Heme/Onc Dr. Fung for your scheduled appointment tomorrow.     PRINCIPAL DISCHARGE DIAGNOSIS  Diagnosis: Malignant pleural effusion  Assessment and Plan of Treatment: You were admitted to the hospital with pleural effusion of your right lung which is a fluid build up that compresses you lung causing shortness of breath. This is likely due to your previous cancer diagnosis. You were treated with IV bumex as well as placement of a Pleurex catheter to drain the fluid.   Pleurx Catheter Directions: Nursing Services at home will help drain Pleurx 3x per week, no more than 1.5L drained at a time.   Please follow up with your Primary care provider Dr. Delcid within 1 week of discharge. please follow up with your Heme/Onc Dr. Fung for your scheduled appointment tomorrow.      SECONDARY DISCHARGE DIAGNOSES  Diagnosis: MARYSOL (acute kidney injury)  Assessment and Plan of Treatment: You were found to have acute kidney injury with anelevated creatinine to 1.6 from your baseline creatinie while in the hospital. You were seen by nepohrology who recommended to hold you Bumex for 2-3 days and follow up outpatient in their office within the week. Please do not take your Bumex at home for 3 days aftr discharge. Follow up with Nephrology Dr. Eugene within 1 week of discharge.     PRINCIPAL DISCHARGE DIAGNOSIS  Diagnosis: Malignant pleural effusion  Assessment and Plan of Treatment: You were admitted to the hospital with pleural effusion of your right lung which is a fluid build up that compresses you lung causing shortness of breath. This is likely due to your previous cancer diagnosis. You were treated with IV bumex as well as placement of a Pleurex catheter to drain the fluid.   Pleurx Catheter Directions: Nursing Services at home will help drain Pleurx 3x per week, no more than 1.5L drained at a time.   Please follow up with your Primary care provider Dr. Delcid within 1 week of discharge. please follow up with your Heme/Onc Dr. Fung for your scheduled appointment tomorrow.      SECONDARY DISCHARGE DIAGNOSES  Diagnosis: MARYSOL (acute kidney injury)  Assessment and Plan of Treatment: You were found to have acute kidney injury with anelevated creatinine to 1.6 from your baseline creatinie while in the hospital. You were seen by nepohrology who recommended to hold you Bumex for 2 days and follow up outpatient in their office within the week. Please do not take your Bumex at home for 2 days after discharge. You can restart on Wednesday. Follow up with Nephrology Dr. Eugene within 1 week of discharge.    Diagnosis: Hyponatremia  Assessment and Plan of Treatment: You were found to have low sodium levels. Please see your nephrologist in 1 week to monitor your sodium levels

## 2025-04-02 NOTE — PROGRESS NOTE ADULT - SUBJECTIVE AND OBJECTIVE BOX
Date/Time Patient Seen:  		  Referring MD:   Data Reviewed	       Patient is a 64y old  Male who presents with a chief complaint of Pleural effusion (01 Apr 2025 18:38)      Subjective/HPI     PAST MEDICAL & SURGICAL HISTORY:  No pertinent past medical history    Incisional hernia  2015    Gastric lymphoma    Anemia of chronic disease    Pleural effusion    DVT (deep venous thrombosis)    Hypertension    Hyperlipidemia    No significant past surgical history    S/P appendectomy  November 17th 2014    S/P cholecystectomy  15 years ago          Medication list         MEDICATIONS  (STANDING):  aspirin 325 milliGRAM(s) Oral daily  atorvastatin 40 milliGRAM(s) Oral at bedtime  buMETAnide 2 milliGRAM(s) Oral daily  clopidogrel Tablet 75 milliGRAM(s) Oral daily  heparin  Infusion.  Unit(s)/Hr (17 mL/Hr) IV Continuous <Continuous>  influenza   Vaccine 0.5 milliLiter(s) IntraMuscular once  labetalol 100 milliGRAM(s) Oral two times a day  sodium chloride 0.9%. 1000 milliLiter(s) (60 mL/Hr) IV Continuous <Continuous>    MEDICATIONS  (PRN):  guaiFENesin Oral Liquid (Sugar-Free) 200 milliGRAM(s) Oral every 6 hours PRN Cough  heparin   Injectable 7500 Unit(s) IV Push every 6 hours PRN For aPTT less than 40  heparin   Injectable 3500 Unit(s) IV Push every 6 hours PRN For aPTT between 40 - 57         Vitals log        ICU Vital Signs Last 24 Hrs  T(C): 36.9 (02 Apr 2025 04:44), Max: 37 (01 Apr 2025 12:06)  T(F): 98.5 (02 Apr 2025 04:44), Max: 98.6 (01 Apr 2025 12:06)  HR: 81 (02 Apr 2025 04:44) (71 - 81)  BP: 124/70 (02 Apr 2025 04:44) (92/55 - 124/70)  BP(mean): --  ABP: --  ABP(mean): --  RR: 19 (02 Apr 2025 04:44) (17 - 25)  SpO2: 92% (02 Apr 2025 04:44) (92% - 99%)    O2 Parameters below as of 02 Apr 2025 04:44  Patient On (Oxygen Delivery Method): nasal cannula  O2 Flow (L/min): 3               Input and Output:  I&O's Detail      Lab Data                        8.6    8.11  )-----------( 556      ( 01 Apr 2025 21:37 )             27.2     04-01    135  |  102  |  27[H]  ----------------------------<  132[H]  4.0   |  27  |  1.40[H]    Ca    9.3      01 Apr 2025 06:15    TPro  6.7  /  Alb  2.5[L]  /  TBili  0.3  /  DBili  x   /  AST  26  /  ALT  22  /  AlkPhos  95  04-01            Review of Systems	      Objective     Physical Examination    heart s1s2  lung dec bS  head nc  head at  abd soft      Pertinent Lab findings & Imaging      Jameson:  NO   Adequate UO     I&O's Detail           Discussed with:     Cultures:	        Radiology

## 2025-04-02 NOTE — DISCHARGE NOTE PROVIDER - NSDCFUADDINST_GEN_ALL_CORE_FT
Empty Pleurex 3'xs a week M/W/F and record outputs or as needed with shortness of breath for up to 1000L.  Instructional video on Pleur-X drainage/dressing changes: [email or web address]

## 2025-04-03 PROBLEM — I10 ESSENTIAL (PRIMARY) HYPERTENSION: Chronic | Status: ACTIVE | Noted: 2025-04-01

## 2025-04-03 PROBLEM — E78.5 HYPERLIPIDEMIA, UNSPECIFIED: Chronic | Status: ACTIVE | Noted: 2025-04-01

## 2025-04-03 PROBLEM — I82.409 ACUTE EMBOLISM AND THROMBOSIS OF UNSPECIFIED DEEP VEINS OF UNSPECIFIED LOWER EXTREMITY: Chronic | Status: ACTIVE | Noted: 2025-04-01

## 2025-04-03 PROBLEM — J90 PLEURAL EFFUSION, NOT ELSEWHERE CLASSIFIED: Chronic | Status: ACTIVE | Noted: 2025-04-01

## 2025-04-03 LAB
ALBUMIN SERPL ELPH-MCNC: 2 G/DL — LOW (ref 3.3–5)
ALP SERPL-CCNC: 81 U/L — SIGNIFICANT CHANGE UP (ref 40–120)
ALT FLD-CCNC: 15 U/L — SIGNIFICANT CHANGE UP (ref 12–78)
ANION GAP SERPL CALC-SCNC: 9 MMOL/L — SIGNIFICANT CHANGE UP (ref 5–17)
APTT BLD: 27.5 SEC — SIGNIFICANT CHANGE UP (ref 24.5–35.6)
AST SERPL-CCNC: 20 U/L — SIGNIFICANT CHANGE UP (ref 15–37)
BASOPHILS # BLD AUTO: 0.05 K/UL — SIGNIFICANT CHANGE UP (ref 0–0.2)
BASOPHILS NFR BLD AUTO: 0.6 % — SIGNIFICANT CHANGE UP (ref 0–2)
BILIRUB SERPL-MCNC: 0.3 MG/DL — SIGNIFICANT CHANGE UP (ref 0.2–1.2)
BUN SERPL-MCNC: 30 MG/DL — HIGH (ref 7–23)
CALCIUM SERPL-MCNC: 9 MG/DL — SIGNIFICANT CHANGE UP (ref 8.5–10.1)
CHLORIDE SERPL-SCNC: 100 MMOL/L — SIGNIFICANT CHANGE UP (ref 96–108)
CO2 SERPL-SCNC: 26 MMOL/L — SIGNIFICANT CHANGE UP (ref 22–31)
CREAT SERPL-MCNC: 1.3 MG/DL — SIGNIFICANT CHANGE UP (ref 0.5–1.3)
EGFR: 61 ML/MIN/1.73M2 — SIGNIFICANT CHANGE UP
EGFR: 61 ML/MIN/1.73M2 — SIGNIFICANT CHANGE UP
EOSINOPHIL # BLD AUTO: 0.03 K/UL — SIGNIFICANT CHANGE UP (ref 0–0.5)
EOSINOPHIL NFR BLD AUTO: 0.3 % — SIGNIFICANT CHANGE UP (ref 0–6)
GLUCOSE SERPL-MCNC: 103 MG/DL — HIGH (ref 70–99)
HCT VFR BLD CALC: 26.5 % — LOW (ref 39–50)
HGB BLD-MCNC: 8.3 G/DL — LOW (ref 13–17)
IMM GRANULOCYTES NFR BLD AUTO: 1.1 % — HIGH (ref 0–0.9)
INR BLD: 1.41 RATIO — HIGH (ref 0.85–1.16)
LYMPHOCYTES # BLD AUTO: 0.55 K/UL — LOW (ref 1–3.3)
LYMPHOCYTES # BLD AUTO: 6.3 % — LOW (ref 13–44)
MAGNESIUM SERPL-MCNC: 2 MG/DL — SIGNIFICANT CHANGE UP (ref 1.6–2.6)
MCHC RBC-ENTMCNC: 20.2 PG — LOW (ref 27–34)
MCHC RBC-ENTMCNC: 31.3 G/DL — LOW (ref 32–36)
MCV RBC AUTO: 64.6 FL — LOW (ref 80–100)
MONOCYTES # BLD AUTO: 1.27 K/UL — HIGH (ref 0–0.9)
MONOCYTES NFR BLD AUTO: 14.4 % — HIGH (ref 2–14)
NEUTROPHILS # BLD AUTO: 6.8 K/UL — SIGNIFICANT CHANGE UP (ref 1.8–7.4)
NEUTROPHILS NFR BLD AUTO: 77.3 % — HIGH (ref 43–77)
NRBC BLD AUTO-RTO: 0 /100 WBCS — SIGNIFICANT CHANGE UP (ref 0–0)
PHOSPHATE SERPL-MCNC: 3.4 MG/DL — SIGNIFICANT CHANGE UP (ref 2.5–4.5)
PLATELET # BLD AUTO: 493 K/UL — HIGH (ref 150–400)
POTASSIUM SERPL-MCNC: 4.1 MMOL/L — SIGNIFICANT CHANGE UP (ref 3.5–5.3)
POTASSIUM SERPL-SCNC: 4.1 MMOL/L — SIGNIFICANT CHANGE UP (ref 3.5–5.3)
PROT SERPL-MCNC: 5.8 G/DL — LOW (ref 6–8.3)
PROTHROM AB SERPL-ACNC: 16.6 SEC — HIGH (ref 9.9–13.4)
RBC # BLD: 4.1 M/UL — LOW (ref 4.2–5.8)
RBC # FLD: 21.5 % — HIGH (ref 10.3–14.5)
SODIUM SERPL-SCNC: 135 MMOL/L — SIGNIFICANT CHANGE UP (ref 135–145)
WBC # BLD: 8.8 K/UL — SIGNIFICANT CHANGE UP (ref 3.8–10.5)
WBC # FLD AUTO: 8.8 K/UL — SIGNIFICANT CHANGE UP (ref 3.8–10.5)

## 2025-04-03 PROCEDURE — 99233 SBSQ HOSP IP/OBS HIGH 50: CPT

## 2025-04-03 RX ORDER — HEPARIN SODIUM 1000 [USP'U]/ML
2100 INJECTION INTRAVENOUS; SUBCUTANEOUS
Qty: 25000 | Refills: 0 | Status: DISCONTINUED | OUTPATIENT
Start: 2025-04-03 | End: 2025-04-04

## 2025-04-03 RX ORDER — CEFAZOLIN SODIUM IN 0.9 % NACL 3 G/100 ML
2000 INTRAVENOUS SOLUTION, PIGGYBACK (ML) INTRAVENOUS ONCE
Refills: 0 | Status: COMPLETED | OUTPATIENT
Start: 2025-04-03 | End: 2025-04-03

## 2025-04-03 RX ORDER — LORAZEPAM 4 MG/ML
0.5 VIAL (ML) INJECTION ONCE
Refills: 0 | Status: DISCONTINUED | OUTPATIENT
Start: 2025-04-03 | End: 2025-04-03

## 2025-04-03 RX ORDER — HEPARIN SODIUM 1000 [USP'U]/ML
INJECTION INTRAVENOUS; SUBCUTANEOUS
Qty: 25000 | Refills: 0 | Status: DISCONTINUED | OUTPATIENT
Start: 2025-04-03 | End: 2025-04-03

## 2025-04-03 RX ORDER — SODIUM CHLORIDE 9 G/1000ML
1000 INJECTION, SOLUTION INTRAVENOUS
Refills: 0 | Status: DISCONTINUED | OUTPATIENT
Start: 2025-04-03 | End: 2025-04-04

## 2025-04-03 RX ORDER — HEPARIN SODIUM 1000 [USP'U]/ML
7500 INJECTION INTRAVENOUS; SUBCUTANEOUS EVERY 6 HOURS
Refills: 0 | Status: DISCONTINUED | OUTPATIENT
Start: 2025-04-03 | End: 2025-04-04

## 2025-04-03 RX ORDER — HEPARIN SODIUM 1000 [USP'U]/ML
7500 INJECTION INTRAVENOUS; SUBCUTANEOUS ONCE
Refills: 0 | Status: COMPLETED | OUTPATIENT
Start: 2025-04-03 | End: 2025-04-03

## 2025-04-03 RX ORDER — HEPARIN SODIUM 1000 [USP'U]/ML
3500 INJECTION INTRAVENOUS; SUBCUTANEOUS EVERY 6 HOURS
Refills: 0 | Status: DISCONTINUED | OUTPATIENT
Start: 2025-04-03 | End: 2025-04-04

## 2025-04-03 RX ADMIN — HEPARIN SODIUM 7500 UNIT(S): 1000 INJECTION INTRAVENOUS; SUBCUTANEOUS at 18:08

## 2025-04-03 RX ADMIN — LABETALOL HYDROCHLORIDE 100 MILLIGRAM(S): 200 TABLET, FILM COATED ORAL at 18:05

## 2025-04-03 RX ADMIN — Medication 0.5 MILLIGRAM(S): at 20:24

## 2025-04-03 RX ADMIN — HEPARIN SODIUM 2100 UNIT(S)/HR: 1000 INJECTION INTRAVENOUS; SUBCUTANEOUS at 19:27

## 2025-04-03 RX ADMIN — SODIUM CHLORIDE 90 MILLILITER(S): 9 INJECTION, SOLUTION INTRAVENOUS at 05:24

## 2025-04-03 RX ADMIN — ATORVASTATIN CALCIUM 40 MILLIGRAM(S): 80 TABLET, FILM COATED ORAL at 20:24

## 2025-04-03 RX ADMIN — Medication 1 APPLICATION(S): at 11:38

## 2025-04-03 RX ADMIN — LABETALOL HYDROCHLORIDE 100 MILLIGRAM(S): 200 TABLET, FILM COATED ORAL at 05:42

## 2025-04-03 RX ADMIN — HEPARIN SODIUM 2100 UNIT(S)/HR: 1000 INJECTION INTRAVENOUS; SUBCUTANEOUS at 18:03

## 2025-04-03 RX ADMIN — BUMETANIDE 1 MILLIGRAM(S): 1 TABLET ORAL at 18:06

## 2025-04-03 NOTE — CASE MANAGEMENT PROGRESS NOTE - NSCMPROGRESSNOTE_GEN_ALL_CORE
Discussed pt in rounds, plan was for Pleurx to be placed today, procedure cancelled to be done tomorrow.

## 2025-04-03 NOTE — PROGRESS NOTE ADULT - SUBJECTIVE AND OBJECTIVE BOX
[facility] -- Charles Martinez Pannella, Patel, Savella Goodger, Cohen  Office # [phone]      Follow Up:    Shortness of breath , elevated troponin     Subjective/Observations:     No events overnight resting comfortably in bed.  No complaints of chest pain, dyspnea, or palpitations reported. No signs of orthopnea or PND.  remains on nc     REVIEW OF SYSTEMS: All other review of systems is negative unless indicated above    PAST MEDICAL & SURGICAL HISTORY:  Gastric lymphoma      Anemia of chronic disease      Pleural effusion      DVT (deep venous thrombosis)      Hypertension      Hyperlipidemia      S/P appendectomy  2014      S/P cholecystectomy  15 years ago          MEDICATIONS  (STANDING):  atorvastatin 40 milliGRAM(s) Oral at bedtime  buMETAnide Injectable 1 milliGRAM(s) IV Push two times a day  ceFAZolin   IVPB 2000 milliGRAM(s) IV Intermittent once  chlorhexidine 2% Cloths 1 Application(s) Topical daily  heparin  Infusion.  Unit(s)/Hr (17 mL/Hr) IV Continuous <Continuous>  influenza   Vaccine 0.5 milliLiter(s) IntraMuscular once  labetalol 100 milliGRAM(s) Oral two times a day  lactated ringers. 1000 milliLiter(s) (90 mL/Hr) IV Continuous <Continuous>  sodium chloride 0.9%. 1000 milliLiter(s) (60 mL/Hr) IV Continuous <Continuous>    MEDICATIONS  (PRN):  guaiFENesin Oral Liquid (Sugar-Free) 200 milliGRAM(s) Oral every 6 hours PRN Cough  heparin   Injectable 7500 Unit(s) IV Push every 6 hours PRN For aPTT less than 40  heparin   Injectable 3500 Unit(s) IV Push every 6 hours PRN For aPTT between 40 - 57      Allergies    Bactrim DS (Hives)    Intolerances        Vital Signs Last 24 Hrs  T(C): 36.6 (2025 08:20), Max: 36.8 (2025 20:51)  T(F): 97.9 (2025 08:20), Max: 98.2 (2025 20:51)  HR: 85 (2025 08:20) (76 - 94)  BP: 106/68 (2025 08:20) (100/70 - 115/72)  BP(mean): --  RR: 19 (2025 08:20) (19 - 19)  SpO2: 93% (2025 08:20) (92% - 95%)    Parameters below as of 2025 08:11  Patient On (Oxygen Delivery Method): nasal cannula  O2 Flow (L/min): 3      I&O's Summary    2025 07:01  -  2025 07:00  --------------------------------------------------------  IN: 0 mL / OUT: 400 mL / NET: -400 mL          PHYSICAL EXAM:  Constitutional: NAD, awake and alert, well-developed  HEENT: Moist Mucous Membranes, Anicteric  Pulmonary: Non-labored, breath sounds are Dim   Cardiovascular: Regular, S1 and S2 nl, No murmurs, rubs, gallops or clicks  Gastrointestinal: Bowel Sounds present, soft, nontender.   Lymph: No lymphadenopathy. No peripheral edema.  Skin: No visible rashes or ulcers.  Psych:  Mood & affect appropriate    LABS: All Labs Reviewed:                        8.3    8.80  )-----------( 493      ( 2025 07:50 )             26.5                         9.1    8.88  )-----------( 585      ( 2025 05:56 )             28.9                         8.6    8.11  )-----------( 556      ( 2025 21:37 )             27.2     2025 07:50    135    |  100    |  30     ----------------------------<  103    4.1     |  26     |  1.30   2025 05:56    134    |  102    |  29     ----------------------------<  111    4.2     |  26     |  1.40   2025 06:15    135    |  102    |  27     ----------------------------<  132    4.0     |  27     |  1.40     Ca    9.0        2025 07:50  Ca    9.0        2025 05:56  Ca    9.3        2025 06:15  Phos  3.4       2025 07:50  Mg     2.0       2025 07:50    TPro  5.8    /  Alb  2.0    /  TBili  0.3    /  DBili  x      /  AST  20     /  ALT  15     /  AlkPhos  81     2025 07:50  TPro  6.4    /  Alb  2.3    /  TBili  0.4    /  DBili  x      /  AST  25     /  ALT  21     /  AlkPhos  92     2025 05:56  TPro  6.7    /  Alb  2.5    /  TBili  0.3    /  DBili  x      /  AST  26     /  ALT  22     /  AlkPhos  95     2025 06:15    PT/INR - ( 2025 07:50 )   PT: 16.6 sec;   INR: 1.41 ratio         PTT - ( 2025 07:50 )  PTT:27.5 sec         EC Lead ECG:   Ventricular Rate 80 BPM    Atrial Rate 80 BPM    P-R Interval 150 ms    QRS Duration 94 ms    Q-T Interval 364 ms    QTC Calculation(Bazett) 419 ms    P Axis 64 degrees    R Axis -15 degrees    T Axis 3 degrees    Diagnosis Line Normal sinus rhythm  Low voltage QRS  Inferior infarct (cited on or before 19-MAR-2025)  Cannot rule out Anterior infarct , age undetermined  Abnormal ECG  When compared with ECG of 22-MAR-2025 03:33,  No significant change was found  Confirmed by CHASE CELIS ([id number]) on 2025 10:36:04 PM (25 @ 05:38)      TRANSTHORACIC ECHOCARDIOGRAM REPORT  ________________________________________________________________________________                                      _______       Pt. Name:       JESSE MARQUEZCELIAGEORGE Study Date:    3/20/2025  MRN:     [id number]                   YOB: 1960  Accession #:    [id number]                  Age:           64 years  Account#:       [id number]                 Gender:        M  Heart Rate:                                Height:        66.93in (170.00 cm)  Rhythm:                                    Weight:        205.03 lb (93.00 kg)  Blood Pressure: 106/64 mmHg                BSA/BMI:       2.04 m² / 32.18 kg/m²  ________________________________________________________________________________________  Referring Physician:    [id number] Oswaldo Snowden  Interpreting Physician: Alberto Gilliam M.D.  Primary Sonographer:    Emperatriz AVILA    CPT:               ECHO TTE WO CON COMP W DOPP - 00969.m  Indication(s):     Chest pain, unspecified - R07.9  Procedure:         Transthoracic echocardiogram with 2-D, M-mode and complete                     spectral and color flow Doppler.  Ordering Location: TELE  Admission Status:  Inpatient    _______________________________________________________________________________________     CONCLUSIONS:      1. Left ventricular wall thickness is normal. Left ventricular systolic function is normal with an ejection fraction visually estimated at 60 to 65 %. There are no regional wall motion abnormalities seen.   2. Normal right ventricular cavity size, with normal wall thickness, and normal right ventricular systolic function.   3. Mild mitral regurgitation.   4. Fibrocalcific aortic valve sclerosis without stenosis.   5. The inferior vena cava is dilated measuring 2.44 cm in diameter, (dilated >2.1cm) with normal inspiratory collapse (normal >50%) consistent with mildly elevated right atrial pressure (~8, range 5-10mmHg).   6. Small pericardial effusion.   7. There is mild (grade 1) left ventricular diastolic dysfunction.   8. Compared to the transthoracic echocardiogram performed on 3/5/2025, there have been no significant interval changes.   9. Pulmonary artery systolic pressure could not be estimated.    ________________________________________________________________________________________  FINDINGS:     Left Ventricle:  Left ventricular wall thickness is normal. Left ventricular systolic function is normal with an ejection fraction visually estimated at 60 to 65%. There are no regional wall motion abnormalities seen. There is mild (grade 1) left ventricular diastolic dysfunction.     Right Ventricle:  The right ventricular cavity is normal in size, with normal wall thickness and right ventricular systolic function is normal.     Aortic Valve:  There is fibrocalcific aortic valve sclerosis without stenosis. There is no evidence of aortic regurgitation.     Mitral Valve:  Structurally normal mitral valve with normal leaflet excursion. There is mild mitral regurgitation.     Tricuspid Valve:  Structurally normal tricuspid valve with normal leaflet excursion. There is insufficient tricuspid regurgitation detected to calculate pulmonary artery systolic pressure.     Pulmonic Valve:  The pulmonic valve was not well visualized.     Pericardium:  There is a small pericardial effusion.     Systemic Veins:  The inferior vena cava is dilated measuring 2.44 cm in diameter, (dilated >2.1cm) with normal inspiratory collapse (normal >50%) consistent with mildly elevated right atrial pressure (~8, range 5-10mmHg).  ____________________________________________________________________  QUANTITATIVE DATA:  Left Ventricle Measurements: (Indexed to BSA)     Visualized LV EF%: 60 to 65%     MV E Vmax: 1.03 m/s  MV A Vmax: 1.33 m/s  MV E/A:    0.77  MV DT:     183 msec             Right Ventricle Measurements:     RV Base (RVID1): 2.8 cm    Mitral Valve Measurements:     MV E Vmax: 1.0 m/s  MV A Vmax: 1.3 m/s  MV E/A:    0.8       Tricuspid Valve Measurements:     RA Pressure: 8 mmHg    ________________________________________________________________________________________  Electronically signed on 3/21/2025 at 11:40:35 AM by Alberto Gilliam M.D.         *** Final ***      Radiology:

## 2025-04-03 NOTE — PROGRESS NOTE ADULT - SUBJECTIVE AND OBJECTIVE BOX
Patient is a 64y old  Male who presents with a chief complaint of Pleural effusion (03 Apr 2025 05:10)      INTERVAL HPI/OVERNIGHT EVENTS: Pt state she was feeling anxious and frustrated overnight requiring one dose of 0.25 mg xanax. States it did not help him sleep and now he feels exhausted. Pt was seen and examined at bedside. Pt states that he still feels anxious and just wants to feel better. States he wants his procedure to be done today so he can start feeling better.  Pt denies headache, dizziness, lightheadedness, fever, chills, body aches, CP, SOB, palpitations, abdominal pain, n/v, numbness/tingling.  No other complaints at this time.     MEDICATIONS  (STANDING):  atorvastatin 40 milliGRAM(s) Oral at bedtime  buMETAnide Injectable 1 milliGRAM(s) IV Push two times a day  ceFAZolin   IVPB 2000 milliGRAM(s) IV Intermittent once  chlorhexidine 2% Cloths 1 Application(s) Topical daily  heparin  Infusion.  Unit(s)/Hr (17 mL/Hr) IV Continuous <Continuous>  influenza   Vaccine 0.5 milliLiter(s) IntraMuscular once  labetalol 100 milliGRAM(s) Oral two times a day  lactated ringers. 1000 milliLiter(s) (90 mL/Hr) IV Continuous <Continuous>  sodium chloride 0.9%. 1000 milliLiter(s) (60 mL/Hr) IV Continuous <Continuous>    MEDICATIONS  (PRN):  guaiFENesin Oral Liquid (Sugar-Free) 200 milliGRAM(s) Oral every 6 hours PRN Cough  heparin   Injectable 7500 Unit(s) IV Push every 6 hours PRN For aPTT less than 40  heparin   Injectable 3500 Unit(s) IV Push every 6 hours PRN For aPTT between 40 - 57      Allergies    Bactrim DS (Hives)    Intolerances        REVIEW OF SYSTEMS:  CONSTITUTIONAL: Generalized fatigue. No fever or chills  HEENT:  No headache, no sore throat  RESPIRATORY: Admits to slight SOB. No cough, wheezing.  CARDIOVASCULAR: No chest pain, palpitations  GASTROINTESTINAL:  some nausea overnight. No abd painvomiting, or diarrhea  GENITOURINARY: No dysuria, frequency, or hematuria  NEUROLOGICAL: no focal weakness or dizziness  MUSCULOSKELETAL: no myalgias     Vital Signs Last 24 Hrs  T(C): 36.6 (03 Apr 2025 08:20), Max: 36.8 (02 Apr 2025 20:51)  T(F): 97.9 (03 Apr 2025 08:20), Max: 98.2 (02 Apr 2025 20:51)  HR: 85 (03 Apr 2025 08:20) (76 - 94)  BP: 106/68 (03 Apr 2025 08:20) (100/70 - 115/72)  BP(mean): --  RR: 19 (03 Apr 2025 08:20) (19 - 19)  SpO2: 93% (03 Apr 2025 08:20) (92% - 95%)    Parameters below as of 03 Apr 2025 08:11  Patient On (Oxygen Delivery Method): nasal cannula  O2 Flow (L/min): 3      PHYSICAL EXAM:  GENERAL: NAD  HEENT:  anicteric, moist mucous membranes  CHEST/LUNG:  Decreased lung sounds b/l LLs. Otherwise CTA b/l, no rales, wheezes, or rhonchi  HEART:  RRR, S1, S2  ABDOMEN:  BS+, soft, nontender, nondistended  EXTREMITIES: 1+ pitting edema in b/l LEs.  cyanosis, or calf tenderness  NERVOUS SYSTEM: answers questions and follows commands appropriately    LABS:                        8.3    8.80  )-----------( 493      ( 03 Apr 2025 07:50 )             26.5     CBC Full  -  ( 03 Apr 2025 07:50 )  WBC Count : 8.80 K/uL  Hemoglobin : 8.3 g/dL  Hematocrit : 26.5 %  Platelet Count - Automated : 493 K/uL  Mean Cell Volume : 64.6 fl  Mean Cell Hemoglobin : 20.2 pg  Mean Cell Hemoglobin Concentration : 31.3 g/dL  Auto Neutrophil # : x  Auto Lymphocyte # : x  Auto Monocyte # : x  Auto Eosinophil # : x  Auto Basophil # : x  Auto Neutrophil % : x  Auto Lymphocyte % : x  Auto Monocyte % : x  Auto Eosinophil % : x  Auto Basophil % : x    03 Apr 2025 07:50    135    |  100    |  30     ----------------------------<  103    4.1     |  26     |  1.30     Ca    9.0        03 Apr 2025 07:50  Phos  3.4       03 Apr 2025 07:50  Mg     2.0       03 Apr 2025 07:50    TPro  5.8    /  Alb  2.0    /  TBili  0.3    /  DBili  x      /  AST  20     /  ALT  15     /  AlkPhos  81     03 Apr 2025 07:50    PT/INR - ( 03 Apr 2025 07:50 )   PT: 16.6 sec;   INR: 1.41 ratio         PTT - ( 03 Apr 2025 07:50 )  PTT:27.5 sec  Urinalysis Basic - ( 03 Apr 2025 07:50 )    Color: x / Appearance: x / SG: x / pH: x  Gluc: 103 mg/dL / Ketone: x  / Bili: x / Urobili: x   Blood: x / Protein: x / Nitrite: x   Leuk Esterase: x / RBC: x / WBC x   Sq Epi: x / Non Sq Epi: x / Bacteria: x      CAPILLARY BLOOD GLUCOSE              RADIOLOGY & ADDITIONAL TESTS: ____    Personally reviewed.     Consultant(s) Notes Reviewed:  [x] YES  [ ] NO

## 2025-04-03 NOTE — PROGRESS NOTE ADULT - ASSESSMENT
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    mets ca  malignant pleural eff  HTN  anemia  dyspnea  Atelectasis  DVT hx  gastric ca    on diuretic  plan for Pleurx - thoracic f/u noted -   I moustapha  fio2 support  cardio f/u    plan for pleurx  I moustapha  fio2 support  cardio eval and optimization of [facility] rx regimen  tele monitoring  I and O  replete lytes  thoracic sx consultation for pleurx placement  TTE w/ normal LV & RV size and function EF 60-65%, Mild MR, small pericardial effusion, Grade 1 DD  follows with Dr Mckeon - Onc MD

## 2025-04-03 NOTE — PROGRESS NOTE ADULT - SUBJECTIVE AND OBJECTIVE BOX
SUBJECTIVE:  Patient seen and examined at bedside. Patient endorses frustration with his pleurx procedure being cancelled again today as he has only been off plavix x 24 hours. Patient also reports poor sleep due to chest discomfort and anxiety, and endorses feelings of "giving up."    VITALS  Vital Signs Last 24 Hrs  T(C): 36.7 (03 Apr 2025 11:36), Max: 36.8 (02 Apr 2025 20:51)  T(F): 98 (03 Apr 2025 11:36), Max: 98.2 (02 Apr 2025 20:51)  HR: 83 (03 Apr 2025 11:36) (76 - 94)  BP: 101/66 (03 Apr 2025 11:36) (101/66 - 115/72)  RR: 19 (03 Apr 2025 11:36) (19 - 19)  SpO2: 93% (03 Apr 2025 11:36) (92% - 95%)    Parameters below as of 03 Apr 2025 11:36  Patient On (Oxygen Delivery Method): nasal cannula    PHYSICAL EXAM  GENERAL:  Agitated male in NAD  HEENT:  NC/AT. Sclera white. Mucous membranes moist.  RESPIRATORY:  Nonlabored breathing, no accessory muscle use.  SKIN:  No jaundice, pallor, or cyanosis  NEURO:  A&O x 3    INTAKE & OUTPUT  I&O's Summary    02 Apr 2025 07:01  -  03 Apr 2025 07:00  --------------------------------------------------------  IN: 0 mL / OUT: 400 mL / NET: -400 mL    03 Apr 2025 07:01  -  03 Apr 2025 16:15  --------------------------------------------------------  IN: 450 mL / OUT: 0 mL / NET: 450 mL    I&O's Detail    02 Apr 2025 07:01  -  03 Apr 2025 07:00  --------------------------------------------------------  IN:  Total IN: 0 mL    OUT:    Voided (mL): 400 mL  Total OUT: 400 mL    Total NET: -400 mL    03 Apr 2025 07:01  -  03 Apr 2025 16:15  --------------------------------------------------------  IN:    Lactated Ringers: 450 mL  Total IN: 450 mL    OUT:  Total OUT: 0 mL    Total NET: 450 mL    MEDICATIONS  MEDICATIONS  (STANDING):  atorvastatin 40 milliGRAM(s) Oral at bedtime  buMETAnide Injectable 1 milliGRAM(s) IV Push two times a day  ceFAZolin   IVPB 2000 milliGRAM(s) IV Intermittent once  chlorhexidine 2% Cloths 1 Application(s) Topical daily  heparin  Infusion.  Unit(s)/Hr (17 mL/Hr) IV Continuous <Continuous>  influenza   Vaccine 0.5 milliLiter(s) IntraMuscular once  labetalol 100 milliGRAM(s) Oral two times a day  lactated ringers. 1000 milliLiter(s) (90 mL/Hr) IV Continuous <Continuous>  sodium chloride 0.9%. 1000 milliLiter(s) (60 mL/Hr) IV Continuous <Continuous>    MEDICATIONS  (PRN):  guaiFENesin Oral Liquid (Sugar-Free) 200 milliGRAM(s) Oral every 6 hours PRN Cough  heparin   Injectable 7500 Unit(s) IV Push every 6 hours PRN For aPTT less than 40  heparin   Injectable 3500 Unit(s) IV Push every 6 hours PRN For aPTT between 40 - 57    LABS:                        8.3    8.80  )-----------( 493      ( 03 Apr 2025 07:50 )             26.5     04-03    135  |  100  |  30[H]  ----------------------------<  103[H]  4.1   |  26  |  1.30    Ca    9.0      03 Apr 2025 07:50  Phos  3.4     04-03  Mg     2.0     04-03    TPro  5.8[L]  /  Alb  2.0[L]  /  TBili  0.3  /  DBili  x   /  AST  20  /  ALT  15  /  AlkPhos  81  04-03    PT/INR - ( 03 Apr 2025 07:50 )   PT: 16.6 sec;   INR: 1.41 ratio  PTT - ( 03 Apr 2025 07:50 )  PTT:27.5 sec    ASSESSMENT & PLAN  64 year old male with with a PMHx of 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 Patient was scheduled for pleurx placement today, however he has only been off plavix x 24 hours. Last dose AM of 4/2/25.    - Pleurx rescheduled for 4/4/25 at approximately 1400; heparin to be held at 0600  - Explained the need to postpone surgery to minimize bleeding risk. Emotional support offered with Dr. Madrigal present bedside. Psych evaluation offered, but patient declined. Recommend offering anxiolytic PRN. By the end of encounter, patient calmed and denied thoughts of self-harm.  - Will endorse to night team to monitor  - Case discussed with Dr. Palomo

## 2025-04-03 NOTE — PROGRESS NOTE ADULT - ASSESSMENT
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   - planned for pleurx   - TTE (3/20/2025) shows EF 60-65%, grade 1 diastolic dysfunction.   - continue Bumex 1 mg IV every 12 with albumin.  - Strict I/Os, daily weights.    - EKG: SR   - No acute changes on EKG compared to previous.  - Troponin elevated to 275-> 260.7,  may be residual from recent type 2 NSTEMI, that was medically managed   - no anginal complaints at this time   - Plavix on hold resume post procedure. No need for ASA.   - Monitor closely for the development of anginal symptoms or clinical signs of ischemia.     - BP, HR stable and well controlled  - continue to monitor routine hemodynamics.  - Continue home labetalol  - Hold losartan in setting of worsening renal function.   - Monitor and replete Lytes. Keep K > 4 and Mg > 2    - DVT on home eliquis changed to heparin for OR     He has pleural effusions are likely malignant.  And the cause of his shortness of breath.  He needs a therapeutic thoracentesis along with a Pleurx catheter placement.  Thoracic surgery aware. Planned for PleurX on 4/3.   He is optimized from a cardiac standpoint for his PleurX.

## 2025-04-03 NOTE — PROGRESS NOTE ADULT - ASSESSMENT
Pt is a 65 y/o male w/ PMHx with poorly differentiated metastatic (known cervical and axillary lymphadenopathy) GI carcinoma on chemo, HTN, LUE and LLE DVT, and anemia presented to ED with SOB.

## 2025-04-03 NOTE — PROGRESS NOTE ADULT - SUBJECTIVE AND OBJECTIVE BOX
Interval History:  pleurex held today  patient still with some SOB and anxiety    Chart reviewed and events noted;   Overnight events:    MEDICATIONS  (STANDING):  atorvastatin 40 milliGRAM(s) Oral at bedtime  buMETAnide Injectable 1 milliGRAM(s) IV Push two times a day  ceFAZolin   IVPB 2000 milliGRAM(s) IV Intermittent once  chlorhexidine 2% Cloths 1 Application(s) Topical daily  heparin  Infusion. 2100 Unit(s)/Hr (21 mL/Hr) IV Continuous <Continuous>  influenza   Vaccine 0.5 milliLiter(s) IntraMuscular once  labetalol 100 milliGRAM(s) Oral two times a day  lactated ringers. 1000 milliLiter(s) (90 mL/Hr) IV Continuous <Continuous>  sodium chloride 0.9%. 1000 milliLiter(s) (60 mL/Hr) IV Continuous <Continuous>    MEDICATIONS  (PRN):  guaiFENesin Oral Liquid (Sugar-Free) 200 milliGRAM(s) Oral every 6 hours PRN Cough  heparin   Injectable 7500 Unit(s) IV Push every 6 hours PRN For aPTT less than 40  heparin   Injectable 3500 Unit(s) IV Push every 6 hours PRN For aPTT between 40 - 57      Vital Signs Last 24 Hrs  T(C): 36.9 (03 Apr 2025 20:13), Max: 36.9 (03 Apr 2025 20:13)  T(F): 98.4 (03 Apr 2025 20:13), Max: 98.4 (03 Apr 2025 20:13)  HR: 81 (03 Apr 2025 20:13) (81 - 94)  BP: 101/62 (03 Apr 2025 20:13) (101/62 - 121/70)  BP(mean): --  RR: 19 (03 Apr 2025 20:13) (19 - 19)  SpO2: 92% (03 Apr 2025 20:13) (92% - 94%)    Parameters below as of 03 Apr 2025 20:13  Patient On (Oxygen Delivery Method): nasal cannula  O2 Flow (L/min): 3      PHYSICAL EXAM  General: adult in NAD  HEENT: clear oropharynx, anicteric sclera, pink conjunctivae  Neck: supple  CV: normal S1S2 with no murmur rubs or gallops  Lungs: clear to auscultation, no wheezes, no rhales  Abdomen: soft non-tender non-distended, no hepato/splenomegaly  Ext: no clubbing cyanosis or edema  Skin: no rashes and no petichiae  Neuro: alert and oriented X3 no focal deficits      LABS:  CBC Full  -  ( 03 Apr 2025 07:50 )  WBC Count : 8.80 K/uL  RBC Count : 4.10 M/uL  Hemoglobin : 8.3 g/dL  Hematocrit : 26.5 %  Platelet Count - Automated : 493 K/uL  Mean Cell Volume : 64.6 fl  Mean Cell Hemoglobin : 20.2 pg  Mean Cell Hemoglobin Concentration : 31.3 g/dL  Auto Neutrophil # : x  Auto Lymphocyte # : x  Auto Monocyte # : x  Auto Eosinophil # : x  Auto Basophil # : x  Auto Neutrophil % : x  Auto Lymphocyte % : x  Auto Monocyte % : x  Auto Eosinophil % : x  Auto Basophil % : x    04-03    135  |  100  |  30[H]  ----------------------------<  103[H]  4.1   |  26  |  1.30    Ca    9.0      03 Apr 2025 07:50  Phos  3.4     04-03  Mg     2.0     04-03    TPro  5.8[L]  /  Alb  2.0[L]  /  TBili  0.3  /  DBili  x   /  AST  20  /  ALT  15  /  AlkPhos  81  04-03    PT/INR - ( 03 Apr 2025 07:50 )   PT: 16.6 sec;   INR: 1.41 ratio         PTT - ( 03 Apr 2025 07:50 )  PTT:27.5 sec    fe studies      WBC trend  8.80 K/uL (04-03-25 @ 07:50)  8.88 K/uL (04-02-25 @ 05:56)  8.11 K/uL (04-01-25 @ 21:37)  7.81 K/uL (04-01-25 @ 06:15)      Hgb trend  8.3 g/dL (04-03-25 @ 07:50)  9.1 g/dL (04-02-25 @ 05:56)  8.6 g/dL (04-01-25 @ 21:37)  9.5 g/dL (04-01-25 @ 06:15)      plt trend  493 K/uL (04-03-25 @ 07:50)  585 K/uL (04-02-25 @ 05:56)  556 K/uL (04-01-25 @ 21:37)  613 K/uL (04-01-25 @ 06:15)        RADIOLOGY & ADDITIONAL STUDIES:

## 2025-04-03 NOTE — CHART NOTE - NSCHARTNOTEFT_GEN_A_CORE
Patient seen earlier during night to discuss surgical plan for pleurex. Initially OR was to be delayed to another day 2/2 plavix being continued until 4/2. Patient upset & anxious, reporting worsening of symptoms, SOB. On 3L supplemental O2 via NC, increased work of breathing with accessory muscle use, tachypneic.   Upon further discussion with attending, will continue with plan for Pleurex 4/3. Timing to be determined in AM after discussion with OR. Discussed with patient who expressed understanding.       PRE-OP NOTE:    Pre-op diagnosis: malignant pleural effusion  Planned procedure:  R pleurex placement  Surgeon: Dr. Palomo/Damir    HPI:  Pt is a 65 y/o male w/ PMHx with poorly differentiated metastatic (known cervical and axillary lymphadenopathy) GI carcinoma, LUE and LLE DVT, HTN, and anemia presented to ED with SOB. Pt endorses this is similar to his prior episodes of SOB and it is predominately exertional in nature. Pt reports that  this is similar to prior episodes. Pt endorses visiting Dr. Fung last Wed. and placing him on 2L NC d/t the SOB, pt endorses using 3L at home. Pt states that also a deep productive cough w/ white phlegm of which has been chronic. Of note, pt was admitted from 3/3-3/7 with large left pleural effusion and LUE DVT. Pt had thoracentesis done of which 3.1 L of fluid from the left thorax. Of note, pt was admitted on 3/19-3/23 for pleural effusion and NSTEMI. Pt had a thoracentesis done of which drained 560 cc from the left thorax. Pt reports last receiving FolFox two weeks ago (received four cycles so far), but will not receive any more per Dr. Fung. On 3L NC in room.    IN THE ED:  Temp 95.5F, HR 81, /72, RR 18, SpO2 94%  S/P robitussin 200 mg q6h prn for cough  Labs significant for H/H 9.5/30.2, D-Dimer: 2136, BUN/Cr: 27/1.4, Glucose: 132, eGFR: 56, Trops: 276  Imaging: CT angio chest: No pulmonary embolism. Interval progression of right pleural effusion and right paratracheal lymphadenopathy. Slightly improved multiloculated left pleural effusion along with pleural nodularity and septal lines concerning for malignant etiology.   (01 Apr 2025 12:46)      Labs:                         9.1    8.88  )-----------( 585      ( 02 Apr 2025 05:56 )             28.9     04-02    134[L]  |  102  |  29[H]  ----------------------------<  111[H]  4.2   |  26  |  1.40[H]    Ca    9.0      02 Apr 2025 05:56    TPro  6.4  /  Alb  2.3[L]  /  TBili  0.4  /  DBili  x   /  AST  25  /  ALT  21  /  AlkPhos  92  04-02    LIVER FUNCTIONS - ( 02 Apr 2025 05:56 )  Alb: 2.3 g/dL / Pro: 6.4 g/dL / ALK PHOS: 92 U/L / ALT: 21 U/L / AST: 25 U/L / GGT: x           PT/INR - ( 01 Apr 2025 14:10 )   PT: 17.4 sec;   INR: 1.48 ratio         PTT - ( 02 Apr 2025 21:09 )  PTT:68.1 sec    Urinalysis:   Urinalysis Basic - ( 02 Apr 2025 05:56 )    Color: x / Appearance: x / SG: x / pH: x  Gluc: 111 mg/dL / Ketone: x  / Bili: x / Urobili: x   Blood: x / Protein: x / Nitrite: x   Leuk Esterase: x / RBC: x / WBC x   Sq Epi: x / Non Sq Epi: x / Bacteria: x        T&S x 2:   Type + Screen (03.19.25 @ 06:05)    ABO RH Interpretation: O POS  Repeat T&S ordered with AM labs    EKG:   < from: 12 Lead ECG (04.01.25 @ 05:38) >    Ventricular Rate 80 BPM    Atrial Rate 80 BPM    P-R Interval 150 ms    QRS Duration 94 ms    Q-T Interval 364 ms    QTC Calculation(Bazett) 419 ms    P Axis 64 degrees    R Axis -15 degrees    T Axis 3 degrees    Diagnosis Line Normal sinus rhythm  Low voltage QRS  Inferior infarct (cited on or before 19-MAR-2025)  Cannot rule out Anterior infarct , age undetermined  Abnormal ECG  When compared with ECG of 22-MAR-2025 03:33,  No significant change was found  Confirmed by CHASE CELIS ([id number]) on 4/1/2025 10:36:04 PM    < end of copied text >      CXR:   < from: Xray Chest 1 View- PORTABLE-Urgent (04.01.25 @ 06:52) >    IMPRESSION: Increased density at the left base with increased   interstitial markings on the left chest compatible with effusion and some   atelectatic change. Increased interstitial markings in the right chest to   a lesser extent with also increased density at the base compatible with   effusion and atelectatic change. Port-A-Cath as before. Cardiomegaly some   of which may be related to pericardial effusion. Adenopathy better   appreciated on CT today the results of which are available under separate   cover    --- End of Report ---    < end of copied text >      Urine BhCG: N/A    A/P: 64yMale planned for OR 4/3 for R pleurex placement.   - Operative consent to be attained by surgeon  - Pain/nausea control  - NPO except for meds for OR   - Will stop hep gtt at 0400 for procedure, if OR planned for afternoon/evening will restart and stop 6 hours prior to OR.  - Medical and cardiac optimization noted in chart (both documented in 4/2/25 progress notes)  - T&S ordered with AM labs  - Discussed above with medicine resident, RN, patient.     Discussed with Dr. Reich.

## 2025-04-03 NOTE — PROGRESS NOTE ADULT - ASSESSMENT
[ASSESSMENT and  PLAN]  C16.7     Poorly differentiated gastric cancer  C77.1     Lymph node metastasis  R18.0     Malignant ascites  J91.0     Malignant pleural effusion  D63.8    Anemia due to chronic disease  D50.0    Iron deficiency anemia  D75.83  Reactive thrombocytosis  I21.4      NSTEMI, recent    63 yo man w met poorly diff gastric ca (neck/axillary/abdomen LN mets, under care Dr Mckeon [facility], on FOLFOX chemo [id number], Dx'd 11/2024, adm [facility] 12/2024 w [facility] req temporary HD.    04/01/2025 now admitted for R effusion.   seen by CT surgery. Planned pleurex for tomorrow        RECOMMENDATIONS    R effusion  CT surgery eval appreciated  s/p thoracentesis prior.   Planned pleurex tomorrow  last dose Eliquis 5AM on Tue AM; onheparinggt  Clear fromhematology for planned pleurex.     Follow CBC and transfuse as indicated    to continue Heme/Onc followup w own doctor Dr Mckeon upon discharge  will followup with outside oncologist on discharge    LUE extensive DVT and superficial thrombosis  on heparin ggt, hold before procedure per protocol.   Post procedure, resume when OK with CT surgery.

## 2025-04-03 NOTE — PROGRESS NOTE ADULT - SUBJECTIVE AND OBJECTIVE BOX
Date/Time Patient Seen:  		  Referring MD:   Data Reviewed	       Patient is a 64y old  Male who presents with a chief complaint of Pleural effusion (02 Apr 2025 15:36)      Subjective/HPI     PAST MEDICAL & SURGICAL HISTORY:  No pertinent past medical history    Incisional hernia  2015    Gastric lymphoma    Anemia of chronic disease    Pleural effusion    DVT (deep venous thrombosis)    Hypertension    Hyperlipidemia    No significant past surgical history    S/P appendectomy  November 17th 2014    S/P cholecystectomy  15 years ago          Medication list         MEDICATIONS  (STANDING):  atorvastatin 40 milliGRAM(s) Oral at bedtime  buMETAnide Injectable 1 milliGRAM(s) IV Push two times a day  ceFAZolin   IVPB 2000 milliGRAM(s) IV Intermittent once  chlorhexidine 2% Cloths 1 Application(s) Topical daily  heparin  Infusion.  Unit(s)/Hr (17 mL/Hr) IV Continuous <Continuous>  influenza   Vaccine 0.5 milliLiter(s) IntraMuscular once  labetalol 100 milliGRAM(s) Oral two times a day  lactated ringers. 1000 milliLiter(s) (90 mL/Hr) IV Continuous <Continuous>  sodium chloride 0.9%. 1000 milliLiter(s) (60 mL/Hr) IV Continuous <Continuous>    MEDICATIONS  (PRN):  guaiFENesin Oral Liquid (Sugar-Free) 200 milliGRAM(s) Oral every 6 hours PRN Cough  heparin   Injectable 7500 Unit(s) IV Push every 6 hours PRN For aPTT less than 40  heparin   Injectable 3500 Unit(s) IV Push every 6 hours PRN For aPTT between 40 - 57         Vitals log        ICU Vital Signs Last 24 Hrs  T(C): 36.8 (02 Apr 2025 20:51), Max: 36.8 (02 Apr 2025 20:51)  T(F): 98.2 (02 Apr 2025 20:51), Max: 98.2 (02 Apr 2025 20:51)  HR: 76 (02 Apr 2025 20:51) (76 - 83)  BP: 115/72 (02 Apr 2025 20:51) (100/70 - 115/72)  BP(mean): --  ABP: --  ABP(mean): --  RR: 19 (02 Apr 2025 20:51) (19 - 19)  SpO2: 95% (02 Apr 2025 20:51) (94% - 95%)    O2 Parameters below as of 02 Apr 2025 20:51  Patient On (Oxygen Delivery Method): nasal cannula  O2 Flow (L/min): 3               Input and Output:  I&O's Detail    01 Apr 2025 07:01  -  02 Apr 2025 07:00  --------------------------------------------------------  IN:    Heparin Infusion: 176 mL    sodium chloride 0.9%: 600 mL  Total IN: 776 mL    OUT:    Voided (mL): 650 mL  Total OUT: 650 mL    Total NET: 126 mL      02 Apr 2025 07:01  -  03 Apr 2025 05:10  --------------------------------------------------------  IN:  Total IN: 0 mL    OUT:    Voided (mL): 400 mL  Total OUT: 400 mL    Total NET: -400 mL          Lab Data                        9.1    8.88  )-----------( 585      ( 02 Apr 2025 05:56 )             28.9     04-02    134[L]  |  102  |  29[H]  ----------------------------<  111[H]  4.2   |  26  |  1.40[H]    Ca    9.0      02 Apr 2025 05:56    TPro  6.4  /  Alb  2.3[L]  /  TBili  0.4  /  DBili  x   /  AST  25  /  ALT  21  /  AlkPhos  92  04-02            Review of Systems	      Objective     Physical Examination    heart s1s2  lung dc bs  head nc  head at  abd soft      Pertinent Lab findings & Imaging      Jameson:  NO   Adequate UO     I&O's Detail    01 Apr 2025 07:01  -  02 Apr 2025 07:00  --------------------------------------------------------  IN:    Heparin Infusion: 176 mL    sodium chloride 0.9%: 600 mL  Total IN: 776 mL    OUT:    Voided (mL): 650 mL  Total OUT: 650 mL    Total NET: 126 mL      02 Apr 2025 07:01  -  03 Apr 2025 05:10  --------------------------------------------------------  IN:  Total IN: 0 mL    OUT:    Voided (mL): 400 mL  Total OUT: 400 mL    Total NET: -400 mL               Discussed with:     Cultures:	        Radiology

## 2025-04-04 ENCOUNTER — TRANSCRIPTION ENCOUNTER (OUTPATIENT)
Age: 65
End: 2025-04-04

## 2025-04-04 ENCOUNTER — APPOINTMENT (OUTPATIENT)
Dept: THORACIC SURGERY | Facility: HOSPITAL | Age: 65
End: 2025-04-04

## 2025-04-04 LAB
ALBUMIN SERPL ELPH-MCNC: 2.1 G/DL — LOW (ref 3.3–5)
ALP SERPL-CCNC: 93 U/L — SIGNIFICANT CHANGE UP (ref 40–120)
ALT FLD-CCNC: 18 U/L — SIGNIFICANT CHANGE UP (ref 12–78)
ANION GAP SERPL CALC-SCNC: 10 MMOL/L — SIGNIFICANT CHANGE UP (ref 5–17)
APTT BLD: 57.8 SEC — HIGH (ref 24.5–35.6)
APTT BLD: 63.6 SEC — HIGH (ref 24.5–35.6)
AST SERPL-CCNC: 24 U/L — SIGNIFICANT CHANGE UP (ref 15–37)
BASOPHILS # BLD AUTO: 0.06 K/UL — SIGNIFICANT CHANGE UP (ref 0–0.2)
BASOPHILS NFR BLD AUTO: 0.7 % — SIGNIFICANT CHANGE UP (ref 0–2)
BILIRUB SERPL-MCNC: 0.5 MG/DL — SIGNIFICANT CHANGE UP (ref 0.2–1.2)
BUN SERPL-MCNC: 32 MG/DL — HIGH (ref 7–23)
CALCIUM SERPL-MCNC: 9.2 MG/DL — SIGNIFICANT CHANGE UP (ref 8.5–10.1)
CHLORIDE SERPL-SCNC: 99 MMOL/L — SIGNIFICANT CHANGE UP (ref 96–108)
CO2 SERPL-SCNC: 25 MMOL/L — SIGNIFICANT CHANGE UP (ref 22–31)
CREAT SERPL-MCNC: 1.3 MG/DL — SIGNIFICANT CHANGE UP (ref 0.5–1.3)
EGFR: 61 ML/MIN/1.73M2 — SIGNIFICANT CHANGE UP
EGFR: 61 ML/MIN/1.73M2 — SIGNIFICANT CHANGE UP
EOSINOPHIL # BLD AUTO: 0.03 K/UL — SIGNIFICANT CHANGE UP (ref 0–0.5)
EOSINOPHIL NFR BLD AUTO: 0.4 % — SIGNIFICANT CHANGE UP (ref 0–6)
GLUCOSE SERPL-MCNC: 104 MG/DL — HIGH (ref 70–99)
HCT VFR BLD CALC: 27.8 % — LOW (ref 39–50)
HCT VFR BLD CALC: 28 % — LOW (ref 39–50)
HGB BLD-MCNC: 8.6 G/DL — LOW (ref 13–17)
HGB BLD-MCNC: 8.8 G/DL — LOW (ref 13–17)
IMM GRANULOCYTES NFR BLD AUTO: 1.1 % — HIGH (ref 0–0.9)
LYMPHOCYTES # BLD AUTO: 0.5 K/UL — LOW (ref 1–3.3)
LYMPHOCYTES # BLD AUTO: 5.9 % — LOW (ref 13–44)
MAGNESIUM SERPL-MCNC: 2.1 MG/DL — SIGNIFICANT CHANGE UP (ref 1.6–2.6)
MCHC RBC-ENTMCNC: 20 PG — LOW (ref 27–34)
MCHC RBC-ENTMCNC: 20.3 PG — LOW (ref 27–34)
MCHC RBC-ENTMCNC: 30.9 G/DL — LOW (ref 32–36)
MCHC RBC-ENTMCNC: 31.4 G/DL — LOW (ref 32–36)
MCV RBC AUTO: 64.5 FL — LOW (ref 80–100)
MCV RBC AUTO: 64.7 FL — LOW (ref 80–100)
MONOCYTES # BLD AUTO: 1.37 K/UL — HIGH (ref 0–0.9)
MONOCYTES NFR BLD AUTO: 16.1 % — HIGH (ref 2–14)
NEUTROPHILS # BLD AUTO: 6.47 K/UL — SIGNIFICANT CHANGE UP (ref 1.8–7.4)
NEUTROPHILS NFR BLD AUTO: 75.8 % — SIGNIFICANT CHANGE UP (ref 43–77)
NRBC BLD AUTO-RTO: 0 /100 WBCS — SIGNIFICANT CHANGE UP (ref 0–0)
NRBC BLD AUTO-RTO: 0 /100 WBCS — SIGNIFICANT CHANGE UP (ref 0–0)
PHOSPHATE SERPL-MCNC: 4 MG/DL — SIGNIFICANT CHANGE UP (ref 2.5–4.5)
PLATELET # BLD AUTO: 537 K/UL — HIGH (ref 150–400)
PLATELET # BLD AUTO: 567 K/UL — HIGH (ref 150–400)
POTASSIUM SERPL-MCNC: 4.2 MMOL/L — SIGNIFICANT CHANGE UP (ref 3.5–5.3)
POTASSIUM SERPL-SCNC: 4.2 MMOL/L — SIGNIFICANT CHANGE UP (ref 3.5–5.3)
PROT SERPL-MCNC: 6.6 G/DL — SIGNIFICANT CHANGE UP (ref 6–8.3)
RBC # BLD: 4.3 M/UL — SIGNIFICANT CHANGE UP (ref 4.2–5.8)
RBC # BLD: 4.34 M/UL — SIGNIFICANT CHANGE UP (ref 4.2–5.8)
RBC # FLD: 21.7 % — HIGH (ref 10.3–14.5)
RBC # FLD: 22 % — HIGH (ref 10.3–14.5)
SODIUM SERPL-SCNC: 134 MMOL/L — LOW (ref 135–145)
WBC # BLD: 8.52 K/UL — SIGNIFICANT CHANGE UP (ref 3.8–10.5)
WBC # BLD: 8.64 K/UL — SIGNIFICANT CHANGE UP (ref 3.8–10.5)
WBC # FLD AUTO: 8.52 K/UL — SIGNIFICANT CHANGE UP (ref 3.8–10.5)
WBC # FLD AUTO: 8.64 K/UL — SIGNIFICANT CHANGE UP (ref 3.8–10.5)

## 2025-04-04 PROCEDURE — 99233 SBSQ HOSP IP/OBS HIGH 50: CPT

## 2025-04-04 PROCEDURE — 32601 THORACOSCOPY DIAGNOSTIC: CPT

## 2025-04-04 PROCEDURE — 32550 INSERT PLEURAL CATH: CPT | Mod: RT

## 2025-04-04 PROCEDURE — 71045 X-RAY EXAM CHEST 1 VIEW: CPT | Mod: 26,76

## 2025-04-04 PROCEDURE — 32550 INSERT PLEURAL CATH: CPT

## 2025-04-04 DEVICE — PLEURX CATHETER KIT: Type: IMPLANTABLE DEVICE | Site: RIGHT | Status: FUNCTIONAL

## 2025-04-04 RX ORDER — BUMETANIDE 1 MG/1
1 TABLET ORAL
Refills: 0 | Status: DISCONTINUED | OUTPATIENT
Start: 2025-04-04 | End: 2025-04-05

## 2025-04-04 RX ORDER — HYDROMORPHONE/SOD CHLOR,ISO/PF 2 MG/10 ML
0.5 SYRINGE (ML) INJECTION
Refills: 0 | Status: DISCONTINUED | OUTPATIENT
Start: 2025-04-04 | End: 2025-04-04

## 2025-04-04 RX ORDER — SODIUM CHLORIDE 9 G/1000ML
1000 INJECTION, SOLUTION INTRAVENOUS
Refills: 0 | Status: DISCONTINUED | OUTPATIENT
Start: 2025-04-04 | End: 2025-04-06

## 2025-04-04 RX ORDER — ONDANSETRON HCL/PF 4 MG/2 ML
4 VIAL (ML) INJECTION ONCE
Refills: 0 | Status: DISCONTINUED | OUTPATIENT
Start: 2025-04-04 | End: 2025-04-04

## 2025-04-04 RX ORDER — OXYCODONE HYDROCHLORIDE 30 MG/1
5 TABLET ORAL EVERY 4 HOURS
Refills: 0 | Status: DISCONTINUED | OUTPATIENT
Start: 2025-04-04 | End: 2025-04-06

## 2025-04-04 RX ORDER — SODIUM CHLORIDE 9 G/1000ML
1000 INJECTION, SOLUTION INTRAVENOUS
Refills: 0 | Status: DISCONTINUED | OUTPATIENT
Start: 2025-04-04 | End: 2025-04-04

## 2025-04-04 RX ORDER — LABETALOL HYDROCHLORIDE 200 MG/1
100 TABLET, FILM COATED ORAL
Refills: 0 | Status: DISCONTINUED | OUTPATIENT
Start: 2025-04-04 | End: 2025-04-06

## 2025-04-04 RX ORDER — ATORVASTATIN CALCIUM 80 MG/1
40 TABLET, FILM COATED ORAL AT BEDTIME
Refills: 0 | Status: DISCONTINUED | OUTPATIENT
Start: 2025-04-04 | End: 2025-04-06

## 2025-04-04 RX ORDER — ACETAMINOPHEN 500 MG/5ML
1000 LIQUID (ML) ORAL EVERY 6 HOURS
Refills: 0 | Status: DISCONTINUED | OUTPATIENT
Start: 2025-04-04 | End: 2025-04-06

## 2025-04-04 RX ORDER — ACETAMINOPHEN 500 MG/5ML
1000 LIQUID (ML) ORAL ONCE
Refills: 0 | Status: DISCONTINUED | OUTPATIENT
Start: 2025-04-04 | End: 2025-04-04

## 2025-04-04 RX ORDER — DEXTROMETHORPHAN HBR, GUAIFENESIN 200 MG/10ML
200 LIQUID ORAL EVERY 6 HOURS
Refills: 0 | Status: DISCONTINUED | OUTPATIENT
Start: 2025-04-04 | End: 2025-04-06

## 2025-04-04 RX ORDER — OXYCODONE HYDROCHLORIDE 30 MG/1
5 TABLET ORAL ONCE
Refills: 0 | Status: DISCONTINUED | OUTPATIENT
Start: 2025-04-04 | End: 2025-04-04

## 2025-04-04 RX ADMIN — Medication 1 APPLICATION(S): at 12:38

## 2025-04-04 RX ADMIN — SODIUM CHLORIDE 75 MILLILITER(S): 9 INJECTION, SOLUTION INTRAVENOUS at 16:31

## 2025-04-04 RX ADMIN — HEPARIN SODIUM 2100 UNIT(S)/HR: 1000 INJECTION INTRAVENOUS; SUBCUTANEOUS at 01:27

## 2025-04-04 RX ADMIN — Medication 0.5 MILLIGRAM(S): at 16:34

## 2025-04-04 RX ADMIN — LABETALOL HYDROCHLORIDE 100 MILLIGRAM(S): 200 TABLET, FILM COATED ORAL at 05:21

## 2025-04-04 RX ADMIN — Medication 0.5 MILLIGRAM(S): at 16:24

## 2025-04-04 RX ADMIN — HEPARIN SODIUM 2100 UNIT(S)/HR: 1000 INJECTION INTRAVENOUS; SUBCUTANEOUS at 06:37

## 2025-04-04 RX ADMIN — ATORVASTATIN CALCIUM 40 MILLIGRAM(S): 80 TABLET, FILM COATED ORAL at 21:11

## 2025-04-04 RX ADMIN — OXYCODONE HYDROCHLORIDE 5 MILLIGRAM(S): 30 TABLET ORAL at 19:05

## 2025-04-04 RX ADMIN — BUMETANIDE 1 MILLIGRAM(S): 1 TABLET ORAL at 05:22

## 2025-04-04 RX ADMIN — LABETALOL HYDROCHLORIDE 100 MILLIGRAM(S): 200 TABLET, FILM COATED ORAL at 18:24

## 2025-04-04 NOTE — CASE MANAGEMENT PROGRESS NOTE - NSCMPROGRESSNOTE_GEN_ALL_CORE
Discussed pt in rounds, plan for Pleurx catheter placement today. Pt has home oxygen. Home care referral initiated. unsure of D/C date

## 2025-04-04 NOTE — PROGRESS NOTE ADULT - SUBJECTIVE AND OBJECTIVE BOX
[facility] -- Charles Martinez, Andrei Mccall Savella, Goodger, Cohen: Office # [phone]    Follow Up:  Shortness of breath , elevated troponin     Subjective/Observations: No events overnight resting comfortably in bed.  No complaints of chest pain, dyspnea, or palpitations reported. No signs of orthopnea or PND. Wearing nasal cannula 3 L    REVIEW OF SYSTEMS: All other review of systems are negative unless indicated above    PAST MEDICAL & SURGICAL HISTORY:  Gastric lymphoma    Anemia of chronic disease    Pleural effusion    DVT (deep venous thrombosis)    Hypertension    Hyperlipidemia    S/P appendectomy  November 17th 2014    S/P cholecystectomy  15 years ago          MEDICATIONS  (STANDING):  atorvastatin 40 milliGRAM(s) Oral at bedtime  buMETAnide Injectable 1 milliGRAM(s) IV Push two times a day  ceFAZolin   IVPB 2000 milliGRAM(s) IV Intermittent once  chlorhexidine 2% Cloths 1 Application(s) Topical daily  heparin  Infusion. 2100 Unit(s)/Hr (21 mL/Hr) IV Continuous <Continuous>  influenza   Vaccine 0.5 milliLiter(s) IntraMuscular once  labetalol 100 milliGRAM(s) Oral two times a day  lactated ringers. 1000 milliLiter(s) (90 mL/Hr) IV Continuous <Continuous>  sodium chloride 0.9%. 1000 milliLiter(s) (60 mL/Hr) IV Continuous <Continuous>    MEDICATIONS  (PRN):  guaiFENesin Oral Liquid (Sugar-Free) 200 milliGRAM(s) Oral every 6 hours PRN Cough  heparin   Injectable 7500 Unit(s) IV Push every 6 hours PRN For aPTT less than 40  heparin   Injectable 3500 Unit(s) IV Push every 6 hours PRN For aPTT between 40 - 57    Allergies    Bactrim DS (Hives)    Intolerances      Vital Signs Last 24 Hrs  T(C): 36.7 (04 Apr 2025 04:28), Max: 36.9 (03 Apr 2025 20:13)  T(F): 98 (04 Apr 2025 04:28), Max: 98.4 (03 Apr 2025 20:13)  HR: 89 (04 Apr 2025 04:28) (81 - 90)  BP: 114/73 (04 Apr 2025 04:28) (101/62 - 121/70)  BP(mean): --  RR: 19 (03 Apr 2025 20:13) (19 - 19)  SpO2: 92% (03 Apr 2025 20:13) (92% - 94%)    Parameters below as of 03 Apr 2025 20:13  Patient On (Oxygen Delivery Method): nasal cannula  O2 Flow (L/min): 3    I&O's Summary    03 Apr 2025 07:01  -  04 Apr 2025 07:00  --------------------------------------------------------  IN: 450 mL / OUT: 350 mL / NET: 100 mL          TELE: SR w/ PAC  PHYSICAL EXAM:  Constitutional: NAD, awake and alert  HEENT: Moist Mucous Membranes, Anicteric  Pulmonary: Non-labored, breath sounds are clear bilaterally, No wheezing, rales or rhonchi  Cardiovascular: Regular, S1 and S2, No murmurs, No rubs, gallops or clicks  Gastrointestinal:  soft, nontender, nondistended   Lymph: No peripheral edema. No lymphadenopathy.   Skin: No visible rashes or ulcers.  Psych:  Mood & affect appropriate      LABS: All Labs Reviewed:                        8.6    8.52  )-----------( 567      ( 04 Apr 2025 07:32 )             27.8                         8.8    8.64  )-----------( 537      ( 04 Apr 2025 00:13 )             28.0                         8.3    8.80  )-----------( 493      ( 03 Apr 2025 07:50 )             26.5     04 Apr 2025 07:32    134    |  99     |  32     ----------------------------<  104    4.2     |  25     |  1.30   03 Apr 2025 07:50    135    |  100    |  30     ----------------------------<  103    4.1     |  26     |  1.30   02 Apr 2025 05:56    134    |  102    |  29     ----------------------------<  111    4.2     |  26     |  1.40     Ca    9.2        04 Apr 2025 07:32  Ca    9.0        03 Apr 2025 07:50  Ca    9.0        02 Apr 2025 05:56  Phos  4.0       04 Apr 2025 07:32  Phos  3.4       03 Apr 2025 07:50  Mg     2.1       04 Apr 2025 07:32  Mg     2.0       03 Apr 2025 07:50    TPro  6.6    /  Alb  2.1    /  TBili  0.5    /  DBili  x      /  AST  24     /  ALT  18     /  AlkPhos  93     04 Apr 2025 07:32  TPro  5.8    /  Alb  2.0    /  TBili  0.3    /  DBili  x      /  AST  20     /  ALT  15     /  AlkPhos  81     03 Apr 2025 07:50  TPro  6.4    /  Alb  2.3    /  TBili  0.4    /  DBili  x      /  AST  25     /  ALT  21     /  AlkPhos  92     02 Apr 2025 05:56   LIVER FUNCTIONS - ( 04 Apr 2025 07:32 )  Alb: 2.1 g/dL / Pro: 6.6 g/dL / ALK PHOS: 93 U/L / ALT: 18 U/L / AST: 24 U/L / GGT: x           PT/INR - ( 03 Apr 2025 07:50 )   PT: 16.6 sec;   INR: 1.41 ratio         PTT - ( 04 Apr 2025 07:32 )  PTT:57.8 secTroponin I, High Sensitivity Result: 260.7 ng/L (04-01-25 @ 17:29)  Troponin I, High Sensitivity Result: 275.8 ng/L (04-01-25 @ 06:15)  D-Dimer Assay, Quantitative: 2136 ng/mL DDU (04-01-25 @ 06:15)  Cholesterol: 186 mg/dL (03-23-25 @ 07:10)  HDL Cholesterol: 32 mg/dL (03-23-25 @ 07:10)  Triglycerides, Serum: 133 mg/dL (03-23-25 @ 07:10)    12 Lead ECG:   Ventricular Rate 80 BPM    Atrial Rate 80 BPM    P-R Interval 150 ms    QRS Duration 94 ms    Q-T Interval 364 ms    QTC Calculation(Bazett) 419 ms    P Axis 64 degrees    R Axis -15 degrees    T Axis 3 degrees    Diagnosis Line Normal sinus rhythm  Low voltage QRS  Inferior infarct (cited on or before 19-MAR-2025)  Cannot rule out Anterior infarct , age undetermined  Abnormal ECG  When compared with ECG of 22-MAR-2025 03:33,  No significant change was found  Confirmed by CHASE CELIS ([id number]) on 4/1/2025 10:36:04 PM (04-01-25 @ 05:38)      TRANSTHORACIC ECHOCARDIOGRAM REPORT  ________________________________________________________________________________                                      _______       Pt. Name:       JESSE EATON Study Date:    3/20/2025  MRN:     [id number]                   YOB: 1960  Accession #:    [id number]                  Age:           64 years  Account#:       [id number]                 Gender:        M  Heart Rate:                                Height:        66.93in (170.00 cm)  Rhythm:                                    Weight:        205.03 lb (93.00 kg)  Blood Pressure: 106/64 mmHg                BSA/BMI:       2.04 m² / 32.18 kg/m²  ________________________________________________________________________________________  Referring Physician:    [id number] Oswaldo Snowden  Interpreting Physician: Alberto Gilliam M.D.  Primary Sonographer:    Emperatriz AVILA    CPT:               ECHO TTE WO CON COMP W DOPP - 27337.m  Indication(s):     Chest pain, unspecified - R07.9  Procedure:         Transthoracic echocardiogram with 2-D, M-mode and complete                     spectral and color flow Doppler.  Ordering Location: TELE  Admission Status:  Inpatient    _______________________________________________________________________________________     CONCLUSIONS:      1. Left ventricular wall thickness is normal. Left ventricular systolic function is normal with an ejection fraction visually estimated at 60 to 65 %. There are no regional wall motion abnormalities seen.   2. Normal right ventricular cavity size, with normal wall thickness, and normal right ventricular systolic function.   3. Mild mitral regurgitation.   4. Fibrocalcific aortic valve sclerosis without stenosis.   5. The inferior vena cava is dilated measuring 2.44 cm in diameter, (dilated >2.1cm) with normal inspiratory collapse (normal >50%) consistent with mildly elevated right atrial pressure (~8, range 5-10mmHg).   6. Small pericardial effusion.   7. There is mild (grade 1) left ventricular diastolic dysfunction.   8. Compared to the transthoracic echocardiogram performed on 3/5/2025, there have been no significant interval changes.   9. Pulmonary artery systolic pressure could not be estimated.    ________________________________________________________________________________________  FINDINGS:     Left Ventricle:  Left ventricular wall thickness is normal. Left ventricular systolic function is normal with an ejection fraction visually estimated at 60 to 65%. There are no regional wall motion abnormalities seen. There is mild (grade 1) left ventricular diastolic dysfunction.     Right Ventricle:  The right ventricular cavity is normal in size, with normal wall thickness and right ventricular systolic function is normal.     Aortic Valve:  There is fibrocalcific aortic valve sclerosis without stenosis. There is no evidence of aortic regurgitation.     Mitral Valve:  Structurally normal mitral valve with normal leaflet excursion. There is mild mitral regurgitation.     Tricuspid Valve:  Structurally normal tricuspid valve with normal leaflet excursion. There is insufficient tricuspid regurgitation detected to calculate pulmonary artery systolic pressure.     Pulmonic Valve:  The pulmonic valve was not well visualized.     Pericardium:  There is a small pericardial effusion.     Systemic Veins:  The inferior vena cava is dilated measuring 2.44 cm in diameter, (dilated >2.1cm) with normal inspiratory collapse (normal >50%) consistent with mildly elevated right atrial pressure (~8, range 5-10mmHg).  ____________________________________________________________________  QUANTITATIVE DATA:  Left Ventricle Measurements: (Indexed to BSA)     Visualized LV EF%: 60 to 65%     MV E Vmax: 1.03 m/s  MV A Vmax: 1.33 m/s  MV E/A:    0.77  MV DT:     183 msec             Right Ventricle Measurements:     RV Base (RVID1): 2.8 cm    Mitral Valve Measurements:     MV E Vmax: 1.0 m/s  MV A Vmax: 1.3 m/s  MV E/A:    0.8       Tricuspid Valve Measurements:     RA Pressure: 8 mmHg    ________________________________________________________________________________________  Electronically signed on 3/21/2025 at 11:40:35 AM by Alberto Gilliam M.D.         *** Final ***

## 2025-04-04 NOTE — PROGRESS NOTE ADULT - SUBJECTIVE AND OBJECTIVE BOX
Post Operative Note  Patient: JESSE EATON 64y (1960) Male   MRN: [id number]  Location: [facility] TELN 889 C5  Visit: 04-01-25 Inpatient  Date: 04-04-25 @ 23:46    Procedure: Malignant pleural effusion, s/p R Pleurx 4/4    Subjective:   Patient seen and examined at bedside.  No events post-operatively.  Patient with no new complaints at this time besides mild post operative pain at insertion site, tolerating diet, denies flatus and bowel movement. Admits to voiding post-operatively.  Patient denies any fevers, chills, chest pain, shortness of breath,  nausea, vomiting or diarrhea.    Objective:  Vitals: T(F): 98.2 (04-04-25 @ 20:28), Max: 99 (04-04-25 @ 17:20)  HR: 80 (04-04-25 @ 20:28)  BP: 105/70 (04-04-25 @ 20:28) (99/76 - 132/80)  RR: 19 (04-04-25 @ 20:28)  SpO2: 94% (04-04-25 @ 20:28)  Vent Settings:     In:   04-03-25 @ 07:01  -  04-04-25 @ 07:00  --------------------------------------------------------  IN: 450 mL    04-04-25 @ 07:01  -  04-04-25 @ 23:46  --------------------------------------------------------  IN: 75 mL      IV Fluids: lactated ringers. 1000 milliLiter(s) (75 mL/Hr) IV Continuous <Continuous>  sodium chloride 0.9%. 1000 milliLiter(s) (60 mL/Hr) IV Continuous <Continuous>      Out:   04-03-25 @ 07:01  -  04-04-25 @ 07:00  --------------------------------------------------------  OUT: 350 mL    04-04-25 @ 07:01  -  04-04-25 @ 23:46  --------------------------------------------------------  OUT: 3150 mL      EBL:     Voided Urine:   04-03-25 @ 07:01  -  04-04-25 @ 07:00  --------------------------------------------------------  OUT: 350 mL    04-04-25 @ 07:01  -  04-04-25 @ 23:46  --------------------------------------------------------  OUT: 3150 mL      Barba Catheter: yes no   Drains:   HITESH:    ,   Chest Tube:   04-04-25 @ 07:01  -  04-04-25 @ 23:46  --------------------------------------------------------  OUT: 3150 mL       NG Tube:       PHYSICAL EXAM:  GENERAL: No acute distress, well-developed  HEAD:  Atraumatic, Normocephalic  CHEST/LUNG: decreased at the bases b/l, chest tube cannister with serosanguinous drainage  NEUROLOGY: A&O x 3, no focal deficits    Medications: [Standing]  acetaminophen     Tablet .. 1000 milliGRAM(s) Oral every 6 hours PRN  atorvastatin 40 milliGRAM(s) Oral at bedtime  buMETAnide Injectable 1 milliGRAM(s) IV Push two times a day  guaiFENesin Oral Liquid (Sugar-Free) 200 milliGRAM(s) Oral every 6 hours PRN  influenza   Vaccine 0.5 milliLiter(s) IntraMuscular once  labetalol 100 milliGRAM(s) Oral two times a day  lactated ringers. 1000 milliLiter(s) IV Continuous <Continuous>  oxyCODONE    IR 5 milliGRAM(s) Oral every 4 hours PRN  sodium chloride 0.9%. 1000 milliLiter(s) IV Continuous <Continuous>    Medications: [PRN]  acetaminophen     Tablet .. 1000 milliGRAM(s) Oral every 6 hours PRN  atorvastatin 40 milliGRAM(s) Oral at bedtime  buMETAnide Injectable 1 milliGRAM(s) IV Push two times a day  guaiFENesin Oral Liquid (Sugar-Free) 200 milliGRAM(s) Oral every 6 hours PRN  influenza   Vaccine 0.5 milliLiter(s) IntraMuscular once  labetalol 100 milliGRAM(s) Oral two times a day  lactated ringers. 1000 milliLiter(s) IV Continuous <Continuous>  oxyCODONE    IR 5 milliGRAM(s) Oral every 4 hours PRN  sodium chloride 0.9%. 1000 milliLiter(s) IV Continuous <Continuous>    Labs:                        8.6    8.52  )-----------( 567      ( 04 Apr 2025 07:32 )             27.8     04-04    134[L]  |  99  |  32[H]  ----------------------------<  104[H]  4.2   |  25  |  1.30    Ca    9.2      04 Apr 2025 07:32  Phos  4.0     04-04  Mg     2.1     04-04    TPro  6.6  /  Alb  2.1[L]  /  TBili  0.5  /  DBili  x   /  AST  24  /  ALT  18  /  AlkPhos  93  04-04    PT/INR - ( 03 Apr 2025 07:50 )   PT: 16.6 sec;   INR: 1.41 ratio         PTT - ( 04 Apr 2025 07:32 )  PTT:57.8 sec      Imaging:  No post-op imaging studies    Assessment:  64yMale patient w/Malignant pleural effusion, s/p R Pleurx 4/4 doinig well with drain currently clamped.    Plan:  - CT clamped for high drainage post-op, water seal in AM  - pain control  - OOB  - REG diet  - labs in am    Surgical Team Contact Information  Spectralink: Ext: [phone] or [phone]    Date/Time: 04-04-25 @ 23:46

## 2025-04-04 NOTE — BRIEF OPERATIVE NOTE - NSICDXBRIEFOPLAUNCH_GEN_ALL_CORE
- Patient of Dr. 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 Received 3 bags with CD34 dose of 6.04x10^6/kg.   - Today is Day +3  - Cannot have Tylenol 72 hours prior to until 72 hours following Busulfan administration. (Listed as allergy in Epic. Can be removed as allergy 72 hours following last dose of Busulfan).  - Patient is O+. Donor is O+.  - Patient is CMV non-reactive. Donor is CMV reactive. Given donor CMV status, patient will start (patient-supplied) letermovir on Day +13.  - Day -4 and Day -3 Busulfan dose-adjusted to 282 mg  - See treatment plan below:    Planned conditioning regimen:  Thiotepa on Day -7  Busulfan on Day -6, -5, -4, and -3  Fludarabine on Day -6, -5, -4, and -3  REST DAYS on Day -2 and -1     Planned  GVHD Prophylaxis:  Cyclophosphamide on Day +3 and +4  Tacrolimus starting on Day +5  Mycophenolate starting on Day +5     Antimicrobial Prophylaxis:  Acyclovir starting on Day -7  Levofloxacin starting on Day -1  Micafungin on Day -1 through Day +4  Letermovir starting on Day +5 (if CMV PCR drawn on day 0 results negative)  Posaconazole starting on Day +5  Atovaquone starting on Day +30     Skin Care with Thiotepa: Day -7 through D-5     Seizure Prophylaxis:  Levetiracetam on Day -7 through Day -2     SOS/VOD Prophylaxis:  Ursodiol on Day -7 through Day +30     Growth Factor Support:  Neupogen starting on Day +5        Caregiver: Carmelita (Spouse)  Post-transplant discharge plans: Home     <--- Click to Launch ICDx for PreOp, PostOp and Procedure

## 2025-04-04 NOTE — PROGRESS NOTE ADULT - ASSESSMENT
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   - planned pleurx delayed d/t scheduling now planned for 4/4 in afternoon  - TTE (3/20/2025) shows EF 60-65%, grade 1 diastolic dysfunction.   - continue Bumex 1 mg IV every 12 with albumin.  - Strict I/Os, daily weights.    - EKG: SR   - No acute changes on EKG compared to previous.  - Troponin elevated to 275-> 260.7,  may be residual from recent type 2 NSTEMI, that was medically managed   - no anginal complaints at this time   - Plavix on hold resume post procedure. No need for ASA.   - Monitor closely for the development of anginal symptoms or clinical signs of ischemia.     - BP, HR stable and well controlled  - continue to monitor routine hemodynamics.  - Continue home labetalol  - Hold losartan in setting of worsening renal function.   - Monitor and replete Lytes. Keep K > 4 and Mg > 2    - DVT on home eliquis changed to heparin for OR     He has pleural effusions are likely malignant.  And the cause of his shortness of breath.  He needs a therapeutic thoracentesis along with a Pleurx catheter placement.  Thoracic surgery aware. Planned for PleurX on 4/4.   He is optimized from a cardiac standpoint for his PleurX.    Melvin Sofia [facility], [facility]  Cardiology   Call Teams

## 2025-04-04 NOTE — PROGRESS NOTE ADULT - ASSESSMENT
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    mets ca  malignant pleural eff  HTN  anemia  dyspnea  Atelectasis  DVT hx  gastric ca    plan for Pleurx - thoracic f/u noted -   I moustapha  fio2 support  cardio f/u    plan for pleurx  I moustapha  fio2 support  cardio eval and optimization of cvs rx regimen  tele monitoring  I and O  replete lytes  thoracic sx consultation for pleurx placement  TTE w/ normal LV & RV size and function EF 60-65%, Mild MR, small pericardial effusion, Grade 1 DD  follows with Dr Mckeon - Onc MD

## 2025-04-04 NOTE — PRE-OP CHECKLIST - ALLERGIES REVIEWED
Subjective     Blayne Horne is a 5 days male here with parents. Patient brought in for Well Child      History of Present Illness:  T bili in hospital low and going down the last day. C/S with TTN.  MBT O+, BBT O+.  Neg mat labs. Had hep B, passed hearing screen. Weight stable from D/C. Up slightly    Well Child Exam  Diet - WNL - Diet includes breast milk (expressed bm, 2 oz every 3hrs. stooling well)   Growth, Elimination, Sleep - WNL -  Growth chart normal, voiding normal, stooling normal and sleeping normal  Household/Safety - WNL - safe environment, support present for parents, appropriate carseat/belt use and adult support for patient      Review of Systems   Constitutional:  Negative for activity change, appetite change, crying, fever and irritability.   HENT:  Negative for congestion and sneezing.    Eyes:  Negative for discharge and redness.   Respiratory:  Negative for apnea, cough, choking, wheezing and stridor.    Cardiovascular:  Negative for fatigue with feeds, sweating with feeds and cyanosis.   Gastrointestinal:  Negative for abdominal distention, constipation, diarrhea and vomiting.   Genitourinary:  Negative for hematuria.   Skin:  Negative for color change and rash.          Objective     Physical Exam  Vitals and nursing note reviewed.   Constitutional:       General: He is active. He is not in acute distress.     Appearance: Normal appearance. He is well-developed.   HENT:      Head: Anterior fontanelle is flat.      Right Ear: Tympanic membrane normal.      Left Ear: Tympanic membrane normal.      Nose: Nose normal.      Mouth/Throat:      Mouth: Mucous membranes are moist.      Pharynx: Oropharynx is clear.   Eyes:      General: Red reflex is present bilaterally.      Conjunctiva/sclera: Conjunctivae normal.      Pupils: Pupils are equal, round, and reactive to light.   Cardiovascular:      Rate and Rhythm: Normal rate and regular rhythm.      Pulses: Pulses are strong.      Heart sounds: 
S1 normal and S2 normal.   Pulmonary:      Effort: Pulmonary effort is normal. No respiratory distress, nasal flaring or retractions.   Abdominal:      General: Bowel sounds are normal. There is no distension.      Palpations: Abdomen is soft.      Tenderness: There is no abdominal tenderness. There is no guarding or rebound.   Genitourinary:     Penis: Normal and circumcised.       Comments: Healing circumcision with some abrasions  Musculoskeletal:         General: Normal range of motion.      Cervical back: Normal range of motion.   Skin:     General: Skin is warm.      Coloration: Skin is not jaundiced.      Findings: No rash.   Neurological:      Mental Status: He is alert.            Assessment and Plan     1. Weight check in breast-fed  under 8 days old        Plan:    Blayne was seen today for well child.    Diagnoses and all orders for this visit:    Weight check in breast-fed  under 8 days old      Keep well check with Dr King Boland.  Continue frequent feedings.        
done
Unit RN to OR RN
done

## 2025-04-04 NOTE — PROGRESS NOTE ADULT - ASSESSMENT
[ASSESSMENT and  PLAN]  C16.7     Poorly differentiated gastric cancer  C77.1     Lymph node metastasis  R18.0     Malignant ascites  J91.0     Malignant pleural effusion  D63.8    Anemia due to chronic disease  D50.0    Iron deficiency anemia  D75.83  Reactive thrombocytosis  I21.4      NSTEMI, recent    65 yo man w met poorly diff gastric ca (neck/axillary/abdomen LN mets, under care Dr Mckeon [facility], on FOLFOX chemo [id number], Dx'd 11/2024, adm [facility] 12/2024 w MARYSOL req temporary HD.    04/01/2025 now admitted for R effusion.   seen by CT surgery. Planned pleurex for today        RECOMMENDATIONS    R effusion  CT surgery eval appreciated  s/p thoracentesis prior.   Planned pleurex today  last dose Eliquis 5AM on Tue AM; onheparinggt now stopped  Clear from hematology for planned pleurex.     Follow CBC and transfuse as indicated    to continue Heme/Onc followup w own doctor Dr Mckeon upon discharge  will followup with outside oncologist on discharge    LUE extensive DVT and superficial thrombosis  on heparin ggt, hold before procedure per protocol.   Post procedure, resume when OK with CT surgery.

## 2025-04-04 NOTE — PROGRESS NOTE ADULT - SUBJECTIVE AND OBJECTIVE BOX
Interval History:  going for pleurex today  Chart reviewed and events noted;   Overnight events:    MEDICATIONS  (STANDING):  atorvastatin 40 milliGRAM(s) Oral at bedtime  buMETAnide Injectable 1 milliGRAM(s) IV Push two times a day  ceFAZolin   IVPB 2000 milliGRAM(s) IV Intermittent once  chlorhexidine 2% Cloths 1 Application(s) Topical daily  heparin  Infusion. 2100 Unit(s)/Hr (21 mL/Hr) IV Continuous <Continuous>  influenza   Vaccine 0.5 milliLiter(s) IntraMuscular once  labetalol 100 milliGRAM(s) Oral two times a day  lactated ringers. 1000 milliLiter(s) (90 mL/Hr) IV Continuous <Continuous>  sodium chloride 0.9%. 1000 milliLiter(s) (60 mL/Hr) IV Continuous <Continuous>    MEDICATIONS  (PRN):  guaiFENesin Oral Liquid (Sugar-Free) 200 milliGRAM(s) Oral every 6 hours PRN Cough  heparin   Injectable 7500 Unit(s) IV Push every 6 hours PRN For aPTT less than 40  heparin   Injectable 3500 Unit(s) IV Push every 6 hours PRN For aPTT between 40 - 57      Vital Signs Last 24 Hrs  T(C): 36.7 (04 Apr 2025 11:43), Max: 36.9 (03 Apr 2025 20:13)  T(F): 98 (04 Apr 2025 11:43), Max: 98.4 (03 Apr 2025 20:13)  HR: 82 (04 Apr 2025 11:43) (81 - 90)  BP: 101/96 (04 Apr 2025 11:43) (101/62 - 121/70)  BP(mean): --  RR: 19 (04 Apr 2025 11:43) (19 - 19)  SpO2: 96% (04 Apr 2025 11:43) (92% - 96%)    Parameters below as of 04 Apr 2025 11:43  Patient On (Oxygen Delivery Method): nasal cannula        PHYSICAL EXAM  General: adult in NAD  HEENT: clear oropharynx, anicteric sclera, pink conjunctivae  Neck: supple  CV: normal S1S2 with no murmur rubs or gallops  Lungs: clear to auscultation, no wheezes, no rhales  Abdomen: soft non-tender non-distended, no hepato/splenomegaly  Ext: no clubbing cyanosis or edema  Skin: no rashes and no petichiae  Neuro: alert and oriented X3 no focal deficits      LABS:  CBC Full  -  ( 04 Apr 2025 07:32 )  WBC Count : 8.52 K/uL  RBC Count : 4.30 M/uL  Hemoglobin : 8.6 g/dL  Hematocrit : 27.8 %  Platelet Count - Automated : 567 K/uL  Mean Cell Volume : 64.7 fl  Mean Cell Hemoglobin : 20.0 pg  Mean Cell Hemoglobin Concentration : 30.9 g/dL  Auto Neutrophil # : x  Auto Lymphocyte # : x  Auto Monocyte # : x  Auto Eosinophil # : x  Auto Basophil # : x  Auto Neutrophil % : x  Auto Lymphocyte % : x  Auto Monocyte % : x  Auto Eosinophil % : x  Auto Basophil % : x    04-04    134[L]  |  99  |  32[H]  ----------------------------<  104[H]  4.2   |  25  |  1.30    Ca    9.2      04 Apr 2025 07:32  Phos  4.0     04-04  Mg     2.1     04-04    TPro  6.6  /  Alb  2.1[L]  /  TBili  0.5  /  DBili  x   /  AST  24  /  ALT  18  /  AlkPhos  93  04-04    PT/INR - ( 03 Apr 2025 07:50 )   PT: 16.6 sec;   INR: 1.41 ratio         PTT - ( 04 Apr 2025 07:32 )  PTT:57.8 sec    fe studies      WBC trend  8.52 K/uL (04-04-25 @ 07:32)  8.64 K/uL (04-04-25 @ 00:13)  8.80 K/uL (04-03-25 @ 07:50)  8.88 K/uL (04-02-25 @ 05:56)  8.11 K/uL (04-01-25 @ 21:37)      Hgb trend  8.6 g/dL (04-04-25 @ 07:32)  8.8 g/dL (04-04-25 @ 00:13)  8.3 g/dL (04-03-25 @ 07:50)  9.1 g/dL (04-02-25 @ 05:56)  8.6 g/dL (04-01-25 @ 21:37)      plt trend  567 K/uL (04-04-25 @ 07:32)  537 K/uL (04-04-25 @ 00:13)  493 K/uL (04-03-25 @ 07:50)  585 K/uL (04-02-25 @ 05:56)  556 K/uL (04-01-25 @ 21:37)        RADIOLOGY & ADDITIONAL STUDIES:

## 2025-04-04 NOTE — PROGRESS NOTE ADULT - SUBJECTIVE AND OBJECTIVE BOX
Date/Time Patient Seen:  		  Referring MD:   Data Reviewed	       Patient is a 64y old  Male who presents with a chief complaint of Pleural effusion (03 Apr 2025 22:45)      Subjective/HPI     PAST MEDICAL & SURGICAL HISTORY:  No pertinent past medical history    Incisional hernia  2015    Gastric lymphoma    Anemia of chronic disease    Pleural effusion    DVT (deep venous thrombosis)    Hypertension    Hyperlipidemia    No significant past surgical history    S/P appendectomy  November 17th 2014    S/P cholecystectomy  15 years ago          Medication list         MEDICATIONS  (STANDING):  atorvastatin 40 milliGRAM(s) Oral at bedtime  buMETAnide Injectable 1 milliGRAM(s) IV Push two times a day  ceFAZolin   IVPB 2000 milliGRAM(s) IV Intermittent once  chlorhexidine 2% Cloths 1 Application(s) Topical daily  heparin  Infusion. 2100 Unit(s)/Hr (21 mL/Hr) IV Continuous <Continuous>  influenza   Vaccine 0.5 milliLiter(s) IntraMuscular once  labetalol 100 milliGRAM(s) Oral two times a day  lactated ringers. 1000 milliLiter(s) (90 mL/Hr) IV Continuous <Continuous>  sodium chloride 0.9%. 1000 milliLiter(s) (60 mL/Hr) IV Continuous <Continuous>    MEDICATIONS  (PRN):  guaiFENesin Oral Liquid (Sugar-Free) 200 milliGRAM(s) Oral every 6 hours PRN Cough  heparin   Injectable 7500 Unit(s) IV Push every 6 hours PRN For aPTT less than 40  heparin   Injectable 3500 Unit(s) IV Push every 6 hours PRN For aPTT between 40 - 57         Vitals log        ICU Vital Signs Last 24 Hrs  T(C): 36.7 (04 Apr 2025 04:28), Max: 36.9 (03 Apr 2025 20:13)  T(F): 98 (04 Apr 2025 04:28), Max: 98.4 (03 Apr 2025 20:13)  HR: 89 (04 Apr 2025 04:28) (81 - 90)  BP: 114/73 (04 Apr 2025 04:28) (101/62 - 121/70)  BP(mean): --  ABP: --  ABP(mean): --  RR: 19 (03 Apr 2025 20:13) (19 - 19)  SpO2: 92% (03 Apr 2025 20:13) (92% - 94%)    O2 Parameters below as of 03 Apr 2025 20:13  Patient On (Oxygen Delivery Method): nasal cannula  O2 Flow (L/min): 3               Input and Output:  I&O's Detail    02 Apr 2025 07:01  -  03 Apr 2025 07:00  --------------------------------------------------------  IN:  Total IN: 0 mL    OUT:    Voided (mL): 400 mL  Total OUT: 400 mL    Total NET: -400 mL      03 Apr 2025 07:01  -  04 Apr 2025 05:48  --------------------------------------------------------  IN:    Lactated Ringers: 450 mL  Total IN: 450 mL    OUT:    Voided (mL): 350 mL  Total OUT: 350 mL    Total NET: 100 mL          Lab Data                        8.8    8.64  )-----------( 537      ( 04 Apr 2025 00:13 )             28.0     04-03    135  |  100  |  30[H]  ----------------------------<  103[H]  4.1   |  26  |  1.30    Ca    9.0      03 Apr 2025 07:50  Phos  3.4     04-03  Mg     2.0     04-03    TPro  5.8[L]  /  Alb  2.0[L]  /  TBili  0.3  /  DBili  x   /  AST  20  /  ALT  15  /  AlkPhos  81  04-03            Review of Systems	      Objective     Physical Examination    heart s1s2  lung dc bs  head nc  head at      Pertinent Lab findings & Imaging      Jameson:  NO   Adequate UO     I&O's Detail    02 Apr 2025 07:01  -  03 Apr 2025 07:00  --------------------------------------------------------  IN:  Total IN: 0 mL    OUT:    Voided (mL): 400 mL  Total OUT: 400 mL    Total NET: -400 mL      03 Apr 2025 07:01  -  04 Apr 2025 05:48  --------------------------------------------------------  IN:    Lactated Ringers: 450 mL  Total IN: 450 mL    OUT:    Voided (mL): 350 mL  Total OUT: 350 mL    Total NET: 100 mL               Discussed with:     Cultures:	        Radiology

## 2025-04-04 NOTE — PROGRESS NOTE ADULT - SUBJECTIVE AND OBJECTIVE BOX
Patient is a 64y old  Male who presents with a chief complaint of Pleural effusion (04 Apr 2025 09:00)      INTERVAL HPI/OVERNIGHT EVENTS: No acute overnight events. Pt was seen and examined at bedside. Pt states that he still feels anxious and slightly short of breath. States that he is eager to get his Pleurx procedure today.  Pt denies headache, dizziness, lightheadedness, fever, chills, body aches, CP , palpitations, abdominal pain, n/v, numbness/tingling.  No other complaints at this time.     MEDICATIONS  (STANDING):  atorvastatin 40 milliGRAM(s) Oral at bedtime  buMETAnide Injectable 1 milliGRAM(s) IV Push two times a day  ceFAZolin   IVPB 2000 milliGRAM(s) IV Intermittent once  chlorhexidine 2% Cloths 1 Application(s) Topical daily  heparin  Infusion. 2100 Unit(s)/Hr (21 mL/Hr) IV Continuous <Continuous>  influenza   Vaccine 0.5 milliLiter(s) IntraMuscular once  labetalol 100 milliGRAM(s) Oral two times a day  lactated ringers. 1000 milliLiter(s) (90 mL/Hr) IV Continuous <Continuous>  sodium chloride 0.9%. 1000 milliLiter(s) (60 mL/Hr) IV Continuous <Continuous>    MEDICATIONS  (PRN):  guaiFENesin Oral Liquid (Sugar-Free) 200 milliGRAM(s) Oral every 6 hours PRN Cough  heparin   Injectable 7500 Unit(s) IV Push every 6 hours PRN For aPTT less than 40  heparin   Injectable 3500 Unit(s) IV Push every 6 hours PRN For aPTT between 40 - 57      Allergies    Bactrim DS (Hives)    Intolerances        REVIEW OF SYSTEMS:  CONSTITUTIONAL: Generalized fatigue. No fever or chills  HEENT:  No headache, no sore throat  RESPIRATORY: Admits to slight SOB. No cough, wheezing.  CARDIOVASCULAR: No chest pain, palpitations  GASTROINTESTINAL:  some nausea overnight. No abd painvomiting, or diarrhea  GENITOURINARY: No dysuria, frequency, or hematuria  NEUROLOGICAL: no focal weakness or dizziness  MUSCULOSKELETAL: no myalgias      Vital Signs Last 24 Hrs  T(C): 36.7 (04 Apr 2025 11:43), Max: 36.9 (03 Apr 2025 20:13)  T(F): 98 (04 Apr 2025 11:43), Max: 98.4 (03 Apr 2025 20:13)  HR: 82 (04 Apr 2025 11:43) (81 - 90)  BP: 101/96 (04 Apr 2025 11:43) (101/62 - 121/70)  BP(mean): --  RR: 19 (04 Apr 2025 11:43) (19 - 19)  SpO2: 96% (04 Apr 2025 11:43) (92% - 96%)    Parameters below as of 04 Apr 2025 11:43  Patient On (Oxygen Delivery Method): nasal cannula        PHYSICAL EXAM:  GENERAL: NAD  HEENT:  anicteric, moist mucous membranes  CHEST/LUNG:  Decreased lung sounds b/l LLs. Otherwise CTA b/l, no rales, wheezes, or rhonchi  HEART:  RRR, S1, S2  ABDOMEN:  BS+, soft, nontender, nondistended  EXTREMITIES: 1+ pitting edema in b/l LEs.  cyanosis, or calf tenderness  NERVOUS SYSTEM: answers questions and follows commands appropriately  LABS:                        8.6    8.52  )-----------( 567      ( 04 Apr 2025 07:32 )             27.8     CBC Full  -  ( 04 Apr 2025 07:32 )  WBC Count : 8.52 K/uL  Hemoglobin : 8.6 g/dL  Hematocrit : 27.8 %  Platelet Count - Automated : 567 K/uL  Mean Cell Volume : 64.7 fl  Mean Cell Hemoglobin : 20.0 pg  Mean Cell Hemoglobin Concentration : 30.9 g/dL  Auto Neutrophil # : x  Auto Lymphocyte # : x  Auto Monocyte # : x  Auto Eosinophil # : x  Auto Basophil # : x  Auto Neutrophil % : x  Auto Lymphocyte % : x  Auto Monocyte % : x  Auto Eosinophil % : x  Auto Basophil % : x    04 Apr 2025 07:32    134    |  99     |  32     ----------------------------<  104    4.2     |  25     |  1.30     Ca    9.2        04 Apr 2025 07:32  Phos  4.0       04 Apr 2025 07:32  Mg     2.1       04 Apr 2025 07:32    TPro  6.6    /  Alb  2.1    /  TBili  0.5    /  DBili  x      /  AST  24     /  ALT  18     /  AlkPhos  93     04 Apr 2025 07:32    PT/INR - ( 03 Apr 2025 07:50 )   PT: 16.6 sec;   INR: 1.41 ratio         PTT - ( 04 Apr 2025 07:32 )  PTT:57.8 sec  Urinalysis Basic - ( 04 Apr 2025 07:32 )    Color: x / Appearance: x / SG: x / pH: x  Gluc: 104 mg/dL / Ketone: x  / Bili: x / Urobili: x   Blood: x / Protein: x / Nitrite: x   Leuk Esterase: x / RBC: x / WBC x   Sq Epi: x / Non Sq Epi: x / Bacteria: x      CAPILLARY BLOOD GLUCOSE              RADIOLOGY & ADDITIONAL TESTS: ____    Personally reviewed.     Consultant(s) Notes Reviewed:  [x] YES  [ ] NO

## 2025-04-05 LAB
ALBUMIN SERPL ELPH-MCNC: 2.1 G/DL — LOW (ref 3.3–5)
ALP SERPL-CCNC: 93 U/L — SIGNIFICANT CHANGE UP (ref 40–120)
ALT FLD-CCNC: 16 U/L — SIGNIFICANT CHANGE UP (ref 12–78)
ANION GAP SERPL CALC-SCNC: 6 MMOL/L — SIGNIFICANT CHANGE UP (ref 5–17)
AST SERPL-CCNC: 24 U/L — SIGNIFICANT CHANGE UP (ref 15–37)
BASOPHILS # BLD AUTO: 0.04 K/UL — SIGNIFICANT CHANGE UP (ref 0–0.2)
BASOPHILS NFR BLD AUTO: 0.4 % — SIGNIFICANT CHANGE UP (ref 0–2)
BILIRUB SERPL-MCNC: 0.4 MG/DL — SIGNIFICANT CHANGE UP (ref 0.2–1.2)
BUN SERPL-MCNC: 37 MG/DL — HIGH (ref 7–23)
CALCIUM SERPL-MCNC: 9.5 MG/DL — SIGNIFICANT CHANGE UP (ref 8.5–10.1)
CHLORIDE SERPL-SCNC: 97 MMOL/L — SIGNIFICANT CHANGE UP (ref 96–108)
CO2 SERPL-SCNC: 28 MMOL/L — SIGNIFICANT CHANGE UP (ref 22–31)
CREAT SERPL-MCNC: 1.6 MG/DL — HIGH (ref 0.5–1.3)
EGFR: 48 ML/MIN/1.73M2 — LOW
EGFR: 48 ML/MIN/1.73M2 — LOW
EOSINOPHIL # BLD AUTO: 0.01 K/UL — SIGNIFICANT CHANGE UP (ref 0–0.5)
EOSINOPHIL NFR BLD AUTO: 0.1 % — SIGNIFICANT CHANGE UP (ref 0–6)
GLUCOSE SERPL-MCNC: 118 MG/DL — HIGH (ref 70–99)
HCT VFR BLD CALC: 30.1 % — LOW (ref 39–50)
HGB BLD-MCNC: 9.3 G/DL — LOW (ref 13–17)
IMM GRANULOCYTES NFR BLD AUTO: 1 % — HIGH (ref 0–0.9)
LYMPHOCYTES # BLD AUTO: 0.53 K/UL — LOW (ref 1–3.3)
LYMPHOCYTES # BLD AUTO: 5.6 % — LOW (ref 13–44)
MAGNESIUM SERPL-MCNC: 2.4 MG/DL — SIGNIFICANT CHANGE UP (ref 1.6–2.6)
MCHC RBC-ENTMCNC: 20 PG — LOW (ref 27–34)
MCHC RBC-ENTMCNC: 30.9 G/DL — LOW (ref 32–36)
MCV RBC AUTO: 64.9 FL — LOW (ref 80–100)
MONOCYTES # BLD AUTO: 1.29 K/UL — HIGH (ref 0–0.9)
MONOCYTES NFR BLD AUTO: 13.7 % — SIGNIFICANT CHANGE UP (ref 2–14)
NEUTROPHILS # BLD AUTO: 7.45 K/UL — HIGH (ref 1.8–7.4)
NEUTROPHILS NFR BLD AUTO: 79.2 % — HIGH (ref 43–77)
NRBC BLD AUTO-RTO: 0 /100 WBCS — SIGNIFICANT CHANGE UP (ref 0–0)
PHOSPHATE SERPL-MCNC: 5 MG/DL — HIGH (ref 2.5–4.5)
PLATELET # BLD AUTO: 545 K/UL — HIGH (ref 150–400)
POTASSIUM SERPL-MCNC: 4.3 MMOL/L — SIGNIFICANT CHANGE UP (ref 3.5–5.3)
POTASSIUM SERPL-SCNC: 4.3 MMOL/L — SIGNIFICANT CHANGE UP (ref 3.5–5.3)
PROT SERPL-MCNC: 6.6 G/DL — SIGNIFICANT CHANGE UP (ref 6–8.3)
RBC # BLD: 4.64 M/UL — SIGNIFICANT CHANGE UP (ref 4.2–5.8)
RBC # FLD: 21.8 % — HIGH (ref 10.3–14.5)
SODIUM SERPL-SCNC: 131 MMOL/L — LOW (ref 135–145)
WBC # BLD: 9.41 K/UL — SIGNIFICANT CHANGE UP (ref 3.8–10.5)
WBC # FLD AUTO: 9.41 K/UL — SIGNIFICANT CHANGE UP (ref 3.8–10.5)

## 2025-04-05 PROCEDURE — 99233 SBSQ HOSP IP/OBS HIGH 50: CPT

## 2025-04-05 PROCEDURE — 99231 SBSQ HOSP IP/OBS SF/LOW 25: CPT

## 2025-04-05 PROCEDURE — 71045 X-RAY EXAM CHEST 1 VIEW: CPT | Mod: 26

## 2025-04-05 RX ORDER — CLOPIDOGREL BISULFATE 75 MG/1
75 TABLET, FILM COATED ORAL DAILY
Refills: 0 | Status: DISCONTINUED | OUTPATIENT
Start: 2025-04-05 | End: 2025-04-06

## 2025-04-05 RX ORDER — ALBUMIN (HUMAN) 12.5 G/50ML
100 INJECTION, SOLUTION INTRAVENOUS ONCE
Refills: 0 | Status: COMPLETED | OUTPATIENT
Start: 2025-04-05 | End: 2025-04-05

## 2025-04-05 RX ORDER — APIXABAN 2.5 MG/1
5 TABLET, FILM COATED ORAL EVERY 12 HOURS
Refills: 0 | Status: DISCONTINUED | OUTPATIENT
Start: 2025-04-05 | End: 2025-04-06

## 2025-04-05 RX ORDER — BUMETANIDE 1 MG/1
1 TABLET ORAL EVERY 12 HOURS
Refills: 0 | Status: DISCONTINUED | OUTPATIENT
Start: 2025-04-05 | End: 2025-04-05

## 2025-04-05 RX ADMIN — LABETALOL HYDROCHLORIDE 100 MILLIGRAM(S): 200 TABLET, FILM COATED ORAL at 05:27

## 2025-04-05 RX ADMIN — OXYCODONE HYDROCHLORIDE 5 MILLIGRAM(S): 30 TABLET ORAL at 05:27

## 2025-04-05 RX ADMIN — LABETALOL HYDROCHLORIDE 100 MILLIGRAM(S): 200 TABLET, FILM COATED ORAL at 17:09

## 2025-04-05 RX ADMIN — OXYCODONE HYDROCHLORIDE 5 MILLIGRAM(S): 30 TABLET ORAL at 10:49

## 2025-04-05 RX ADMIN — OXYCODONE HYDROCHLORIDE 5 MILLIGRAM(S): 30 TABLET ORAL at 22:30

## 2025-04-05 RX ADMIN — ALBUMIN (HUMAN) 50 MILLILITER(S): 12.5 INJECTION, SOLUTION INTRAVENOUS at 22:48

## 2025-04-05 RX ADMIN — OXYCODONE HYDROCHLORIDE 5 MILLIGRAM(S): 30 TABLET ORAL at 21:32

## 2025-04-05 RX ADMIN — BUMETANIDE 1 MILLIGRAM(S): 1 TABLET ORAL at 05:27

## 2025-04-05 RX ADMIN — OXYCODONE HYDROCHLORIDE 5 MILLIGRAM(S): 30 TABLET ORAL at 06:21

## 2025-04-05 RX ADMIN — OXYCODONE HYDROCHLORIDE 5 MILLIGRAM(S): 30 TABLET ORAL at 11:24

## 2025-04-05 RX ADMIN — ATORVASTATIN CALCIUM 40 MILLIGRAM(S): 80 TABLET, FILM COATED ORAL at 21:32

## 2025-04-05 NOTE — PROGRESS NOTE ADULT - PROBLEM SELECTOR PLAN 5
BUN/Cr: 27/1.4. eGFR: 56.  Was on dialysis from Nov-Jan.   - now 1.6 this AM, deescalate IV bumex to PO at this time.  - hold IVF, pt remains mildly hypervolemic, notably in extremities.  - Base seems to be around 1.4 based on previous admissions
BUN/Cr: 27/1.4. eGFR: 56.  Was on dialysis from Nov-Jan.   - Hold IV fluids in setting of fluid overload  - Base seems to be around 1.4 based on previous admissions

## 2025-04-05 NOTE — PROGRESS NOTE ADULT - PROBLEM SELECTOR PLAN 7
Chronic  -c/w home lipitor

## 2025-04-05 NOTE — PROGRESS NOTE ADULT - SUBJECTIVE AND OBJECTIVE BOX
Date/Time Patient Seen:  		  Referring MD:   Data Reviewed	       Patient is a 64y old  Male who presents with a chief complaint of Pleural effusion (04 Apr 2025 23:46)      Subjective/HPI     PAST MEDICAL & SURGICAL HISTORY:  No pertinent past medical history    Incisional hernia  2015    Gastric lymphoma    Anemia of chronic disease    Pleural effusion    DVT (deep venous thrombosis)    Hypertension    Hyperlipidemia    No significant past surgical history    S/P appendectomy  November 17th 2014    S/P cholecystectomy  15 years ago          Medication list         MEDICATIONS  (STANDING):  atorvastatin 40 milliGRAM(s) Oral at bedtime  buMETAnide Injectable 1 milliGRAM(s) IV Push two times a day  influenza   Vaccine 0.5 milliLiter(s) IntraMuscular once  labetalol 100 milliGRAM(s) Oral two times a day  lactated ringers. 1000 milliLiter(s) (75 mL/Hr) IV Continuous <Continuous>  sodium chloride 0.9%. 1000 milliLiter(s) (60 mL/Hr) IV Continuous <Continuous>    MEDICATIONS  (PRN):  acetaminophen     Tablet .. 1000 milliGRAM(s) Oral every 6 hours PRN Temp greater or equal to 38C (100.4F), Mild Pain (1 - 3), Moderate Pain (4 - 6)  guaiFENesin Oral Liquid (Sugar-Free) 200 milliGRAM(s) Oral every 6 hours PRN Cough  oxyCODONE    IR 5 milliGRAM(s) Oral every 4 hours PRN Severe Pain (7 - 10)         Vitals log        ICU Vital Signs Last 24 Hrs  T(C): 36.4 (05 Apr 2025 04:41), Max: 37.2 (04 Apr 2025 17:20)  T(F): 97.6 (05 Apr 2025 04:41), Max: 99 (04 Apr 2025 17:20)  HR: 85 (05 Apr 2025 04:41) (80 - 89)  BP: 119/80 (05 Apr 2025 04:41) (99/76 - 132/80)  BP(mean): --  ABP: --  ABP(mean): --  RR: 19 (05 Apr 2025 04:41) (15 - 36)  SpO2: 93% (05 Apr 2025 04:41) (92% - 100%)    O2 Parameters below as of 05 Apr 2025 04:41  Patient On (Oxygen Delivery Method): nasal cannula  O2 Flow (L/min): 3               Input and Output:  I&O's Detail    04 Apr 2025 07:01  -  05 Apr 2025 07:00  --------------------------------------------------------  IN:    Lactated Ringers: 75 mL  Total IN: 75 mL    OUT:    Chest Tube (mL): 3150 mL    Voided (mL): 200 mL  Total OUT: 3350 mL    Total NET: -3275 mL          Lab Data                        8.6    8.52  )-----------( 567      ( 04 Apr 2025 07:32 )             27.8     04-04    134[L]  |  99  |  32[H]  ----------------------------<  104[H]  4.2   |  25  |  1.30    Ca    9.2      04 Apr 2025 07:32  Phos  4.0     04-04  Mg     2.1     04-04    TPro  6.6  /  Alb  2.1[L]  /  TBili  0.5  /  DBili  x   /  AST  24  /  ALT  18  /  AlkPhos  93  04-04            Review of Systems	      Objective     Physical Examination    heart s1s2  lung dc bs  head nc  head at  right pleurx      Pertinent Lab findings & Imaging      Jameson:  NO   Adequate UO     I&O's Detail    04 Apr 2025 07:01  -  05 Apr 2025 07:00  --------------------------------------------------------  IN:    Lactated Ringers: 75 mL  Total IN: 75 mL    OUT:    Chest Tube (mL): 3150 mL    Voided (mL): 200 mL  Total OUT: 3350 mL    Total NET: -3275 mL               Discussed with:     Cultures:	        Radiology

## 2025-04-05 NOTE — PROGRESS NOTE ADULT - ATTENDING COMMENTS
Patient seen at bedside   post pleurx placement.   reports feeling better.   on RA.   reports tenderness where the pleurx was placed.   afebrile.     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 no need to restart asa     HTN   - cont labetalol     hold heparin gtt until cleared by thoracics to resume. recent LUE DVT .  spoke with patient and brother at bedside.
Patient seen at bedside   upset over change in OR planning.   Spoke with patient at length with surgery PA at bedside.   Patient aware and understand that pleurx was held secondary to plavix given yesterday. Will need to be held for 48h at least.   Patient voiced frustration about cancer status and that there is no quality of life at this time.   denies any suicidal ideations.   psychiatry was offered which patient is currently declining.     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 no need to restart asa     HTN   - cont labetalol     on heparin gtt. recent LUE DVT 3/5/25  NPO for pleurx tomorrow.   medically optimized for planned pleurx procedure .  emotional support provided to patient. He is willing to stay for pleurx procedure tomorrow.
Patient seen at bedside   post pleurx placement.   reports feeling better.   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 no need to restart asa     HTN   - cont labetalol     hold heparin gtt until cleared by thoracics to resume. recent LUE DVT
Patient seen at bedside   sitting in chair on NC  reports feeling short of breath with ambulation.   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 no need to restart asa.     HTN   - cont labetalol     on heparin gtt. recent LUE DVT 3/5/25  NPO for pleurx tomorrow.   medically optimized for planned pleurx procedure

## 2025-04-05 NOTE — PROGRESS NOTE ADULT - SUBJECTIVE AND OBJECTIVE BOX
Interval History:  s/p pleurex catheter without complication   some discomfort at site  draining well  breathing better    Chart reviewed and events noted;   Overnight events:    MEDICATIONS  (STANDING):  atorvastatin 40 milliGRAM(s) Oral at bedtime  buMETAnide 1 milliGRAM(s) Oral every 12 hours  influenza   Vaccine 0.5 milliLiter(s) IntraMuscular once  labetalol 100 milliGRAM(s) Oral two times a day  lactated ringers. 1000 milliLiter(s) (75 mL/Hr) IV Continuous <Continuous>  sodium chloride 0.9%. 1000 milliLiter(s) (60 mL/Hr) IV Continuous <Continuous>    MEDICATIONS  (PRN):  acetaminophen     Tablet .. 1000 milliGRAM(s) Oral every 6 hours PRN Temp greater or equal to 38C (100.4F), Mild Pain (1 - 3), Moderate Pain (4 - 6)  guaiFENesin Oral Liquid (Sugar-Free) 200 milliGRAM(s) Oral every 6 hours PRN Cough  oxyCODONE    IR 5 milliGRAM(s) Oral every 4 hours PRN Severe Pain (7 - 10)      Vital Signs Last 24 Hrs  T(C): 36.4 (05 Apr 2025 04:41), Max: 37.2 (04 Apr 2025 17:20)  T(F): 97.6 (05 Apr 2025 04:41), Max: 99 (04 Apr 2025 17:20)  HR: 85 (05 Apr 2025 04:41) (80 - 89)  BP: 119/80 (05 Apr 2025 04:41) (99/76 - 132/80)  BP(mean): --  RR: 19 (05 Apr 2025 04:41) (15 - 36)  SpO2: 93% (05 Apr 2025 04:41) (92% - 100%)    Parameters below as of 05 Apr 2025 04:41  Patient On (Oxygen Delivery Method): nasal cannula  O2 Flow (L/min): 3      PHYSICAL EXAM  General: adult in NAD  HEENT: clear oropharynx, anicteric sclera, pink conjunctivae  Neck: supple  CV: normal S1S2 with no murmur rubs or gallops  Lungs: clear to auscultation, no wheezes, no rhales  Abdomen: soft non-tender non-distended, no hepato/splenomegaly  Ext: no clubbing cyanosis or edema  Skin: no rashes and no petichiae  Neuro: alert and oriented X3 no focal deficits      LABS:  CBC Full  -  ( 05 Apr 2025 08:15 )  WBC Count : 9.41 K/uL  RBC Count : 4.64 M/uL  Hemoglobin : 9.3 g/dL  Hematocrit : 30.1 %  Platelet Count - Automated : 545 K/uL  Mean Cell Volume : 64.9 fl  Mean Cell Hemoglobin : 20.0 pg  Mean Cell Hemoglobin Concentration : 30.9 g/dL  Auto Neutrophil # : x  Auto Lymphocyte # : x  Auto Monocyte # : x  Auto Eosinophil # : x  Auto Basophil # : x  Auto Neutrophil % : x  Auto Lymphocyte % : x  Auto Monocyte % : x  Auto Eosinophil % : x  Auto Basophil % : x    04-05    131[L]  |  97  |  37[H]  ----------------------------<  118[H]  4.3   |  28  |  1.60[H]    Ca    9.5      05 Apr 2025 08:15  Phos  5.0     04-05  Mg     2.4     04-05    TPro  6.6  /  Alb  2.1[L]  /  TBili  0.4  /  DBili  x   /  AST  24  /  ALT  16  /  AlkPhos  93  04-05    PTT - ( 04 Apr 2025 07:32 )  PTT:57.8 sec    fe studies      WBC trend  9.41 K/uL (04-05-25 @ 08:15)  8.52 K/uL (04-04-25 @ 07:32)  8.64 K/uL (04-04-25 @ 00:13)  8.80 K/uL (04-03-25 @ 07:50)      Hgb trend  9.3 g/dL (04-05-25 @ 08:15)  8.6 g/dL (04-04-25 @ 07:32)  8.8 g/dL (04-04-25 @ 00:13)  8.3 g/dL (04-03-25 @ 07:50)      plt trend  545 K/uL (04-05-25 @ 08:15)  567 K/uL (04-04-25 @ 07:32)  537 K/uL (04-04-25 @ 00:13)  493 K/uL (04-03-25 @ 07:50)        RADIOLOGY & ADDITIONAL STUDIES:

## 2025-04-05 NOTE — PROGRESS NOTE ADULT - PROBLEM SELECTOR PLAN 9
Holding heparing gtt for procedure
On heparin gtt
Holding heparing gtt for procedure
Off heparin gtt for procedure- to now f/u thoracic surgery recs.

## 2025-04-05 NOTE — PROGRESS NOTE ADULT - PROBLEM SELECTOR PLAN 1
Pt has been admitted twice in the past month in which thoracentesis was performed which drained malignant pleural effusions on the left  -CT Chest: No pulmonary embolism. Interval progression of right pleural effusion and right paratracheal   lymphadenopathy. Slightly improved multiloculated left pleural effusion along with pleural nodularity and septal lines concerning for malignant etiology.  - c/w supplemental oxygen  - IV bumex 1mg with albumin for fluid overload per cardiology --- will deescalate to PO BID dosing with Cr 1.6   -c/w robitussin for cough PRN for cough  - s/p pleurex placement, dressing C/D/I - clamped water seal.  -Pulm consulted (Dr. Damico)  -Thoracic Surgery consulted (Dr. Reich), f/u recs
Pt has been admitted twice in the past month in which thoracentesis was performed which drained malignant pleural effusions on the left  -CT Chest: No pulmonary embolism. Interval progression of right pleural effusion and right paratracheal   lymphadenopathy. Slightly improved multiloculated left pleural effusion along with pleural nodularity and septal lines concerning for malignant etiology.  - c/w supplemental oxygen  - IV bumex 1mg with albumin for fluid overload per cardiology  -c/w robitussin for cough PRN for cough  - For Pleurex today  -Pulm consulted (Dr. Damico)  -Thoracic Surgery consulted (Dr. Reich), f/u recs
Pt has been admitted twice in the past month in which thoracentesis was performed which drained malignant pleural effusions on the left  -CT Chest: No pulmonary embolism. Interval progression of right pleural effusion and right paratracheal   lymphadenopathy. Slightly improved multiloculated left pleural effusion along with pleural nodularity and septal lines concerning for malignant etiology.  -c/w supplemental oxygen  - IV bumex 1mg with albumin for fluid overload per cardiology  -c/w robitussin for cough PRN for cough  -Pulm consulted (Dr. Damico), recommends pleurex placement, planned for Thursday, NPO after MN on Wednesday  -Thoracic Surgery consulted (Dr. Reich), f/u recs
Pt has been admitted twice in the past month in which thoracentesis was performed which drained malignant pleural effusions on the left  -CT Chest: No pulmonary embolism. Interval progression of right pleural effusion and right paratracheal   lymphadenopathy. Slightly improved multiloculated left pleural effusion along with pleural nodularity and septal lines concerning for malignant etiology.  - c/w supplemental oxygen  - IV bumex 1mg with albumin for fluid overload per cardiology  -c/w robitussin for cough PRN for cough  - For Pleurex today  -Pulm consulted (Dr. Damico)  -Thoracic Surgery consulted (Dr. Reich), f/u recs

## 2025-04-05 NOTE — PROGRESS NOTE ADULT - PROBLEM SELECTOR PLAN 6
Chronic  -c/w home labetalol  -holding losartan in setting of MARYSOL.
Chronic  -c/w home labetalol  -holding losartan in setting of MARYSOL
Chronic  -c/w home labetalol  -holding losartan in setting of MARYSOL. Will re-eval s/p pleurex procedure
Chronic  -c/w home labetalol  -holding losartan in setting of MARYSOL

## 2025-04-05 NOTE — PROGRESS NOTE ADULT - ASSESSMENT
64yMale patient w/Malignant pleural effusion, s/p R Pleurx 4/4 doinig well with drain currently clamped.    Pleurx on waterseal with 250cc output, total of 1.9L output since insertion.  H/H stable.  Remains on supplemental O2 on 3L NC.        Plan: 64yMale patient w/Malignant pleural effusion, s/p R Pleurx 4/4 doinig well with drain currently clamped.    Pleurx on waterseal with 250cc output, total of 1.9L output since insertion.  H/H stable.  Remains on supplemental O2 on 3L NC.        Plan:  - Stable for discharge from thoracic surgery perspective  - continue Pleurx catheter to waterseal until discharge, thoracic surgery team to cap tube upon discharge  - patient to have VNS 3x per week to drain Pleurx, no more than 1.5L drained at a time  - Patient may resume home eliquis, ASA, and Plavix  - pain control as needed  - May follow up with Dr. Palomo in office as needed  - above discussed with Thoracic surgery attending

## 2025-04-05 NOTE — PROGRESS NOTE ADULT - SUBJECTIVE AND OBJECTIVE BOX
THORACIC SURGERY PA NOTE ON BEHALF OF  ___ / ___ SURGERY:    S: Patient seen and examined at bedside.       MEDICATIONS:  acetaminophen     Tablet .. 1000 milliGRAM(s) Oral every 6 hours PRN  atorvastatin 40 milliGRAM(s) Oral at bedtime  buMETAnide Injectable 1 milliGRAM(s) IV Push two times a day  guaiFENesin Oral Liquid (Sugar-Free) 200 milliGRAM(s) Oral every 6 hours PRN  influenza   Vaccine 0.5 milliLiter(s) IntraMuscular once  labetalol 100 milliGRAM(s) Oral two times a day  lactated ringers. 1000 milliLiter(s) IV Continuous <Continuous>  oxyCODONE    IR 5 milliGRAM(s) Oral every 4 hours PRN  sodium chloride 0.9%. 1000 milliLiter(s) IV Continuous <Continuous>      O:  Vital Signs Last 24 Hrs  T(C): 36.4 (05 Apr 2025 04:41), Max: 37.2 (04 Apr 2025 17:20)  T(F): 97.6 (05 Apr 2025 04:41), Max: 99 (04 Apr 2025 17:20)  HR: 85 (05 Apr 2025 04:41) (80 - 89)  BP: 119/80 (05 Apr 2025 04:41) (99/76 - 132/80)  BP(mean): --  RR: 19 (05 Apr 2025 04:41) (15 - 36)  SpO2: 93% (05 Apr 2025 04:41) (92% - 100%)    Parameters below as of 05 Apr 2025 04:41  Patient On (Oxygen Delivery Method): nasal cannula  O2 Flow (L/min): 3      I&O SUMMARY:    04-04-25 @ 07:01  -  04-05-25 @ 07:00  --------------------------------------------------------  IN: 75 mL / OUT: 3350 mL / NET: -3275 mL        PHYSICAL EXAM:  Lungs: CTA bilat without W/R/R  Card: S1S2  Abd: Soft, NT, ND.  +BS x 4.  No rebound/guarding.    Ext: Calves soft, NT, without edema bilat    LABS:                        9.3    9.41  )-----------( 545      ( 05 Apr 2025 08:15 )             30.1     04-05    131[L]  |  97  |  37[H]  ----------------------------<  118[H]  4.3   |  28  |  1.60[H]    Ca    9.5      05 Apr 2025 08:15  Phos  5.0     04-05  Mg     2.4     04-05    TPro  6.6  /  Alb  2.1[L]  /  TBili  0.4  /  DBili  x   /  AST  24  /  ALT  16  /  AlkPhos  93  04-05    PTT - ( 04 Apr 2025 07:32 )  PTT:57.8 sec          RADIOLOGY:    A:    P:       THORACIC SURGERY PA NOTE:    S: Patient seen and examined at bedside.   No acute events overnight.  Patient has no new complaints this AM.  States breathing difficulty is around baseline, no worsening SOB or dyspnea.  Endorses some soreness at Pleurx insertion.  Denies fevers, chills, chest pain, SOB, palpitations, calf pain.      MEDICATIONS:  acetaminophen     Tablet .. 1000 milliGRAM(s) Oral every 6 hours PRN  atorvastatin 40 milliGRAM(s) Oral at bedtime  buMETAnide Injectable 1 milliGRAM(s) IV Push two times a day  guaiFENesin Oral Liquid (Sugar-Free) 200 milliGRAM(s) Oral every 6 hours PRN  influenza   Vaccine 0.5 milliLiter(s) IntraMuscular once  labetalol 100 milliGRAM(s) Oral two times a day  lactated ringers. 1000 milliLiter(s) IV Continuous <Continuous>  oxyCODONE    IR 5 milliGRAM(s) Oral every 4 hours PRN  sodium chloride 0.9%. 1000 milliLiter(s) IV Continuous <Continuous>      O:  Vital Signs Last 24 Hrs  T(C): 36.4 (05 Apr 2025 04:41), Max: 37.2 (04 Apr 2025 17:20)  T(F): 97.6 (05 Apr 2025 04:41), Max: 99 (04 Apr 2025 17:20)  HR: 85 (05 Apr 2025 04:41) (80 - 89)  BP: 119/80 (05 Apr 2025 04:41) (99/76 - 132/80)  BP(mean): --  RR: 19 (05 Apr 2025 04:41) (15 - 36)  SpO2: 93% (05 Apr 2025 04:41) (92% - 100%)    Parameters below as of 05 Apr 2025 04:41  Patient On (Oxygen Delivery Method): nasal cannula  O2 Flow (L/min): 3      I&O SUMMARY:    04-04-25 @ 07:01  -  04-05-25 @ 07:00  --------------------------------------------------------  IN: 75 mL / OUT: 3350 mL / NET: -3275 mL        PHYSICAL EXAM:  Lungs: CTA bilat.  Pleurx catheter in place.    Card: S1S2  Abd: Soft, NT, ND.  +BS x 4.  No rebound/guarding.    Ext: Calves soft, NT, without edema bilat    LABS:                        9.3    9.41  )-----------( 545      ( 05 Apr 2025 08:15 )             30.1     04-05    131[L]  |  97  |  37[H]  ----------------------------<  118[H]  4.3   |  28  |  1.60[H]    Ca    9.5      05 Apr 2025 08:15  Phos  5.0     04-05  Mg     2.4     04-05    TPro  6.6  /  Alb  2.1[L]  /  TBili  0.4  /  DBili  x   /  AST  24  /  ALT  16  /  AlkPhos  93  04-05    PTT - ( 04 Apr 2025 07:32 )  PTT:57.8 sec

## 2025-04-05 NOTE — PROGRESS NOTE ADULT - ASSESSMENT
[ASSESSMENT and  PLAN]  C16.7     Poorly differentiated gastric cancer  C77.1     Lymph node metastasis  R18.0     Malignant ascites  J91.0     Malignant pleural effusion  D63.8    Anemia due to chronic disease  D50.0    Iron deficiency anemia  D75.83  Reactive thrombocytosis  I21.4      NSTEMI, recent    63 yo man w met poorly diff gastric ca (neck/axillary/abdomen LN mets, under care Dr Mckeon [facility], on FOLFOX chemo [id number], Dx'd 11/2024, adm [facility] 12/2024 w MARYSOL req temporary HD.    04/01/2025 now admitted for R effusion.   seen by CT surgery. Planned pleurex for today        RECOMMENDATIONS    R effusion  CT surgery eval appreciated  s/p thoracentesis prior.   s/p pleurex 4/4/2025  to resume anticoagulation for LUE DVT when cleared by thoracic surgery     Follow CBC and transfuse as indicated    to continue Heme/Onc followup w own doctor Dr Mckeon upon discharge  will followup with outside oncologist on discharge

## 2025-04-05 NOTE — CONSULT NOTE ADULT - SUBJECTIVE AND OBJECTIVE BOX
[facility]                             Nephrology and Hypertension                             Gustavo Eugene  Franck Oscar                                          [phone]     Patient is a 64y old  Male who presents with a chief complaint of Pleural effusion (2025 11:43)       HPI:  Pt is a 63 y/o male w/ PMHx with poorly differentiated metastatic (known cervical and axillary lymphadenopathy) GI carcinoma, LUE and LLE DVT, HTN, and anemia presented to ED with SOB. Pt endorses this is similar to his prior episodes of SOB and it is predominately exertional in nature. Pt reports that  this is similar to prior episodes. Pt endorses visiting Dr. Fung last Wed. and placing him on 2L NC d/t the SOB, pt endorses using 3L at home. Pt states that also a deep productive cough w/ white phlegm of which has been chronic. Of note, pt was admitted from 3/3-3/7 with large left pleural effusion and LUE DVT. Pt had thoracentesis done of which 3.1 L of fluid from the left thorax. Of note, pt was admitted on 3/19-3/23 for pleural effusion and NSTEMI. Pt had a thoracentesis done of which drained 560 cc from the left thorax. Pt reports last receiving FolFox two weeks ago (received four cycles so far), but will not receive any more per Dr. Fung. On 3L NC in room.  Known to us from outpatient office.  Sees Dr. Eugene.  States he is urinating less than usual.  NO N/V.  SOB improved.        PAST MEDICAL & SURGICAL HISTORY:  Gastric lymphoma      Anemia of chronic disease      Pleural effusion      DVT (deep venous thrombosis)      Hypertension      Hyperlipidemia      S/P appendectomy  2014      S/P cholecystectomy  15 years ago           FAMILY HISTORY:  Family history of coronary artery disease in father  Father -  age 60 following [facility]    Social History:Non smoker    MEDICATIONS  (STANDING):  albumin human 25% IVPB 100 milliLiter(s) IV Intermittent once  apixaban 5 milliGRAM(s) Oral every 12 hours  atorvastatin 40 milliGRAM(s) Oral at bedtime  clopidogrel Tablet 75 milliGRAM(s) Oral daily  influenza   Vaccine 0.5 milliLiter(s) IntraMuscular once  labetalol 100 milliGRAM(s) Oral two times a day  lactated ringers. 1000 milliLiter(s) (75 mL/Hr) IV Continuous <Continuous>  sodium chloride 0.9%. 1000 milliLiter(s) (60 mL/Hr) IV Continuous <Continuous>    MEDICATIONS  (PRN):  acetaminophen     Tablet .. 1000 milliGRAM(s) Oral every 6 hours PRN Temp greater or equal to 38C (100.4F), Mild Pain (1 - 3), Moderate Pain (4 - 6)  guaiFENesin Oral Liquid (Sugar-Free) 200 milliGRAM(s) Oral every 6 hours PRN Cough  oxyCODONE    IR 5 milliGRAM(s) Oral every 4 hours PRN Severe Pain (7 - 10)   Meds reviewed    Allergies    Bactrim DS (Hives)    Intolerances         REVIEW OF SYSTEMS:    Review of Systems:   Constitutional: Denies fatigue  HEENT: Denies headaches and dizziness  Respiratory: denies SOB, cough, or wheezing  Cardiovascular: denies CP, palpitations  Gastrointestinal: Denies nausea, denies vomiting, diarrhea, constipation, abdominal pain, or bloody stools  Genitourinary: denies painful urination, increased frequency, urgency, or bloody urine  Skin: denies rashes or itching  Musculoskeletal: denies muscle aches, joint swelling  Neurologic: Denies generalized weakness, denies loss of sensation, numbness, or tingling      Vital Signs Last 24 Hrs  T(C): 36.3 (2025 11:33), Max: 36.8 (2025 20:28)  T(F): 97.4 (2025 11:33), Max: 98.2 (2025 20:28)  HR: 80 (2025 11:33) (80 - 85)  BP: 113/71 (2025 11:33) (105/70 - 119/80)  BP(mean): --  RR: 19 (2025 04:41) (19 - 19)  SpO2: 96% (2025 11:33) (93% - 96%)    Parameters below as of 2025 11:33  Patient On (Oxygen Delivery Method): nasal cannula  O2 Flow (L/min): 3    Daily     Daily Weight in k.5 (2025 04:41)    PHYSICAL EXAM:    GENERAL: NAD  HEAD:  Atraumatic, Normocephalic  EYES: EOMI, conjunctiva and sclera clear  ENMT: No Drainage from nares, No drainage from ears  NERVOUS SYSTEM:  Awake and Alert  CHEST/LUNG: Clear to percussion bilaterally  EXTREMITIES:  No Edema  SKIN: No rashes No obvious ecchymosis      LABS:                        9.3    9.41  )-----------( 545      ( 2025 08:15 )             30.1     -    131[L]  |  97  |  37[H]  ----------------------------<  118[H]  4.3   |  28  |  1.60[H]    Ca    9.5      2025 08:15  Phos  5.0     -  Mg     2.4     -    TPro  6.6  /  Alb  2.1[L]  /  TBili  0.4  /  DBili  x   /  AST  24  /  ALT  16  /  AlkPhos  93  -05    PTT - ( 2025 07:32 )  PTT:57.8 sec  Urinalysis Basic - ( 2025 08:15 )    Color: x / Appearance: x / SG: x / pH: x  Gluc: 118 mg/dL / Ketone: x  / Bili: x / Urobili: x   Blood: x / Protein: x / Nitrite: x   Leuk Esterase: x / RBC: x / WBC x   Sq Epi: x / Non Sq Epi: x / Bacteria: x      Magnesium: 2.4 mg/dL ( @ 08:15)  Phosphorus: 5.0 mg/dL ( @ 08:15)          RADIOLOGY & ADDITIONAL TESTS:

## 2025-04-05 NOTE — PROGRESS NOTE ADULT - PROBLEM SELECTOR PLAN 4
Trops: 275.8. Admitted recently for Type 2 MI  -Cardiology consulted (Dr. Forbes), recs appreciated  -Trops likely elevated 2/2 recent type 2 MI  - Trops trended to peak  - Holding Heparin gtt for procedure  -Hold Plavix and ASA pending Pleurex tomorrow AM.
Trops: 275.8. Admitted recently for Type 2 MI  -Cardiology consulted (Dr. Forbes), recs appreciated  -Trops likely elevated 2/2 recent type 2 MI  - Trops trended to peak  - Holding Heparin gtt for procedure  -Hold Plavix and ASA pending Pleurex tomorrow AM.
Trops: 275.8. Admitted recently for Type 2 MI  -Cardiology consulted (Dr. Forbes), recs appreciated  -Trops likely elevated 2/2 recent type 2 MI  -Trend trops to peak  -On heparin gtt for DVT ppx  -Hold Plavix and ASA pending Pleurex tomorrow AM.
Trops: 275.8. Admitted recently for Type 2 MI  -Cardiology consulted (Dr. Forbes), recs appreciated  -Trops likely elevated 2/2 recent type 2 MI  - Trops trended to peak  - Holding Heparin gtt for procedure  -Hold Plavix and ASA pending Pleurex tomorrow AM.

## 2025-04-05 NOTE — PROGRESS NOTE ADULT - PROBLEM SELECTOR PROBLEM 5
MARYSOL (acute kidney injury)

## 2025-04-05 NOTE — CONSULT NOTE ADULT - ASSESSMENT
MARYSOL on CKD 3  Hyponatremia  HTN    -Baseline Creatinine 1.3  -MARYSOL Likely 2/2 decreased EABV  -Check Urine lytes  -Check UA  -Check Ur Osm  -Give albumin  -Goal sodium correction 6-8 meq in 24 hour period

## 2025-04-05 NOTE — PROGRESS NOTE ADULT - ASSESSMENT
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 Slightly improved multiloculated left pleural effusion along with pleural nodularity and septal lines concerning for malignant etiology.   - Pt has been admitted twice in the past month in which thoracentesis was performed which drained malignant pleural effusions on the left  - Now s/p Pleurx 4/4  - Tolerated procedure well, cardiac status optimal post operatively     - TTE (3/20/2025) shows EF 60-65%, grade 1 diastolic dysfunction.   - On Bumex 1 mg IV every 12 with albumin.  - Creatinine:  <--1.60,  <--1.30,  <--1.30,  <--1.40,  <--1.40,  <--1.10  - Can change to home dose PO Bumex    - Strict I/Os, daily weights.    - EKG: SR   - Troponin elevated to 275-> 260.7,  may be residual from recent type 2 NSTEMI, that was medically managed   - no anginal complaints at this time   - Plavix on hold resume post procedure. No need for ASA.   - Continue statin     - BP stable and controlled   - Continue home labetalol  - Hold losartan in setting of worsening renal function.     - DVT on home Eliquis changed to heparin for OR. Can resume     - Monitor and replete lytes, keep K>4, Mg>2.  - Will continue to follow.    Jolene Dalal, MS FNP, AGACNP  Nurse Practitioner- Cardiology   Please call on TEAMS

## 2025-04-05 NOTE — PROGRESS NOTE ADULT - PROBLEM SELECTOR PLAN 3
Elevated D-Dimer on admission (2136)  -CT Chest: No pulmonary embolism  -Likely 2/2 LUE DVT of which has been treated w/ eliquis o/p  - Holding heparin gtt for procedure
Elevated D-Dimer on admission (2136)  -CT Chest: No pulmonary embolism  -Likely 2/2 LUE DVT of which has been treated w/ eliquis o/p  - Holding heparin gtt for procedure - to follow with thoracic regarding heparin gtt now that patient is s/p pleurex placement.
Elevated D-Dimer on admission (2136)  -CT Chest: No pulmonary embolism  -Likely 2/2 LUE DVT of which has been treated w/ eliquis o/p  -Start on heparin gtt for possible procedure.
Elevated D-Dimer on admission (2136)  -CT Chest: No pulmonary embolism  -Likely 2/2 LUE DVT of which has been treated w/ eliquis o/p  - Holding heparin gtt for procedure

## 2025-04-05 NOTE — PROGRESS NOTE ADULT - SUBJECTIVE AND OBJECTIVE BOX
[facility] -- Charles Martienz, Andrei Mccall, Olga, Eron Pablo: Office # [phone]    Follow Up:  Shortness of breath , elevated troponin     Subjective/Observations: Patient seen and examined. Patient awake, alert, OOB to chair. No complaints of chest pain, dyspnea, palpitations or dizziness. No signs of orthopnea or PND. Tolerating O2 via nasal cannula. Reports pain at chest tube site.     REVIEW OF SYSTEMS: All other review of systems are negative unless indicated above    PAST MEDICAL & SURGICAL HISTORY:  Gastric lymphoma      Anemia of chronic disease      Pleural effusion      DVT (deep venous thrombosis)      Hypertension      Hypertension      Hyperlipidemia      S/P appendectomy  November 17th 2014      S/P cholecystectomy  15 years ago          MEDICATIONS  (STANDING):  atorvastatin 40 milliGRAM(s) Oral at bedtime  buMETAnide Injectable 1 milliGRAM(s) IV Push two times a day  influenza   Vaccine 0.5 milliLiter(s) IntraMuscular once  labetalol 100 milliGRAM(s) Oral two times a day  lactated ringers. 1000 milliLiter(s) (75 mL/Hr) IV Continuous <Continuous>  sodium chloride 0.9%. 1000 milliLiter(s) (60 mL/Hr) IV Continuous <Continuous>    MEDICATIONS  (PRN):  acetaminophen     Tablet .. 1000 milliGRAM(s) Oral every 6 hours PRN Temp greater or equal to 38C (100.4F), Mild Pain (1 - 3), Moderate Pain (4 - 6)  guaiFENesin Oral Liquid (Sugar-Free) 200 milliGRAM(s) Oral every 6 hours PRN Cough  oxyCODONE    IR 5 milliGRAM(s) Oral every 4 hours PRN Severe Pain (7 - 10)    Allergies    Bactrim DS (Hives)    Intolerances      Vital Signs Last 24 Hrs  T(C): 36.4 (05 Apr 2025 04:41), Max: 37.2 (04 Apr 2025 17:20)  T(F): 97.6 (05 Apr 2025 04:41), Max: 99 (04 Apr 2025 17:20)  HR: 85 (05 Apr 2025 04:41) (80 - 89)  BP: 119/80 (05 Apr 2025 04:41) (99/76 - 132/80)  BP(mean): --  RR: 19 (05 Apr 2025 04:41) (15 - 36)  SpO2: 93% (05 Apr 2025 04:41) (92% - 100%)    Parameters below as of 05 Apr 2025 04:41  Patient On (Oxygen Delivery Method): nasal cannula  O2 Flow (L/min): 3    I&O's Summary    04 Apr 2025 07:01  -  05 Apr 2025 07:00  --------------------------------------------------------  IN: 75 mL / OUT: 3350 mL / NET: -3275 mL      Weight (kg): 92.487 (04-04 @ 14:04)    TELE: Not on telemetry   PHYSICAL EXAM:  Constitutional: NAD, awake and alert  HEENT: Moist Mucous Membranes, Anicteric  Pulmonary: Non-labored, breath sounds are clear bilaterally, No wheezing, rales or rhonchi, + chest tube   Cardiovascular: Regular, S1 and S2, No murmurs, No rubs, gallops or clicks  Gastrointestinal:  soft, nontender, nondistended   Lymph: No peripheral edema. No lymphadenopathy.   Skin: No visible rashes or ulcers.  Psych:  Mood & affect appropriate      LABS: All Labs Reviewed:                        9.3    9.41  )-----------( 545      ( 05 Apr 2025 08:15 )             30.1                         8.6    8.52  )-----------( 567      ( 04 Apr 2025 07:32 )             27.8                         8.8    8.64  )-----------( 537      ( 04 Apr 2025 00:13 )             28.0     05 Apr 2025 08:15    131    |  97     |  37     ----------------------------<  118    4.3     |  28     |  1.60   04 Apr 2025 07:32    134    |  99     |  32     ----------------------------<  104    4.2     |  25     |  1.30   03 Apr 2025 07:50    135    |  100    |  30     ----------------------------<  103    4.1     |  26     |  1.30     Ca    9.5        05 Apr 2025 08:15  Ca    9.2        04 Apr 2025 07:32  Ca    9.0        03 Apr 2025 07:50  Phos  5.0       05 Apr 2025 08:15  Phos  4.0       04 Apr 2025 07:32  Phos  3.4       03 Apr 2025 07:50  Mg     2.4       05 Apr 2025 08:15  Mg     2.1       04 Apr 2025 07:32  Mg     2.0       03 Apr 2025 07:50    TPro  6.6    /  Alb  2.1    /  TBili  0.4    /  DBili  x      /  AST  24     /  ALT  16     /  AlkPhos  93     05 Apr 2025 08:15  TPro  6.6    /  Alb  2.1    /  TBili  0.5    /  DBili  x      /  AST  24     /  ALT  18     /  AlkPhos  93     04 Apr 2025 07:32  TPro  5.8    /  Alb  2.0    /  TBili  0.3    /  DBili  x      /  AST  20     /  ALT  15     /  AlkPhos  81     03 Apr 2025 07:50   LIVER FUNCTIONS - ( 05 Apr 2025 08:15 )  Alb: 2.1 g/dL / Pro: 6.6 g/dL / ALK PHOS: 93 U/L / ALT: 16 U/L / AST: 24 U/L / GGT: x           PTT - ( 04 Apr 2025 07:32 )  PTT:57.8 secTroponin I, High Sensitivity Result: 260.7 ng/L (04-01-25 @ 17:29)  Troponin I, High Sensitivity Result: 275.8 ng/L (04-01-25 @ 06:15)  D-Dimer Assay, Quantitative: 2136 ng/mL DDU (04-01-25 @ 06:15)  Cholesterol: 186 mg/dL (03-23-25 @ 07:10)  HDL Cholesterol: 32 mg/dL (03-23-25 @ 07:10)  Triglycerides, Serum: 133 mg/dL (03-23-25 @ 07:10)    12 Lead ECG:   Ventricular Rate 80 BPM    Atrial Rate 80 BPM    P-R Interval 150 ms    QRS Duration 94 ms    Q-T Interval 364 ms    QTC Calculation(Bazett) 419 ms    P Axis 64 degrees    R Axis -15 degrees    T Axis 3 degrees    Diagnosis Line Normal sinus rhythm  Low voltage QRS  Inferior infarct (cited on or before 19-MAR-2025)  Cannot rule out Anterior infarct , age undetermined  Abnormal ECG  When compared with ECG of 22-MAR-2025 03:33,  No significant change was found  Confirmed by CHASE CELIS ([id number]) on 4/1/2025 10:36:04 PM (04-01-25 @ 05:38)      TRANSTHORACIC ECHOCARDIOGRAM REPORT  ________________________________________________________________________________                                      _______       Pt. Name:       JESSE EATON Study Date:    3/20/2025  MRN:     [id number]                   YOB: 1960  Accession #:    [id number]                  Age:           64 years  Account#:       [id number]                 Gender:        M  Heart Rate:                                Height:        66.93in (170.00 cm)  Rhythm:                                    Weight:        205.03 lb (93.00 kg)  Blood Pressure: 106/64 mmHg                BSA/BMI:       2.04 m² / 32.18 kg/m²  ________________________________________________________________________________________  Referring Physician:    [id number] Oswaldo Snowden  Interpreting Physician: Alberto Gilliam M.D.  Primary Sonographer:    Emperatriz AVILA    CPT:               ECHO TTE WO CON COMP W DOPP - 68597.m  Indication(s):     Chest pain, unspecified - R07.9  Procedure:         Transthoracic echocardiogram with 2-D, M-mode and complete                     spectral and color flow Doppler.  Ordering Location: TELE  Admission Status:  Inpatient    _______________________________________________________________________________________     CONCLUSIONS:      1. Left ventricular wall thickness is normal. Left ventricular systolic function is normal with an ejection fraction visually estimated at 60 to 65 %. There are no regional wall motion abnormalities seen.   2. Normal right ventricular cavity size, with normal wall thickness, and normal right ventricular systolic function.   3. Mild mitral regurgitation.   4. Fibrocalcific aortic valve sclerosis without stenosis.   5. The inferior vena cava is dilated measuring 2.44 cm in diameter, (dilated >2.1cm) with normal inspiratory collapse (normal >50%) consistent with mildly elevated right atrial pressure (~8, range 5-10mmHg).   6. Small pericardial effusion.   7. There is mild (grade 1) left ventricular diastolic dysfunction.   8. Compared to the transthoracic echocardiogram performed on 3/5/2025, there have been no significant interval changes.   9. Pulmonary artery systolic pressure could not be estimated.    ________________________________________________________________________________________  FINDINGS:     Left Ventricle:  Left ventricular wall thickness is normal. Left ventricular systolic function is normal with an ejection fraction visually estimated at 60 to 65%. There are no regional wall motion abnormalities seen. There is mild (grade 1) left ventricular diastolic dysfunction.     Right Ventricle:  The right ventricular cavity is normal in size, with normal wall thickness and right ventricular systolic function is normal.     Aortic Valve:  There is fibrocalcific aortic valve sclerosis without stenosis. There is no evidence of aortic regurgitation.     Mitral Valve:  Structurally normal mitral valve with normal leaflet excursion. There is mild mitral regurgitation.     Tricuspid Valve:  Structurally normal tricuspid valve with normal leaflet excursion. There is insufficient tricuspid regurgitation detected to calculate pulmonary artery systolic pressure.     Pulmonic Valve:  The pulmonic valve was not well visualized.     Pericardium:  There is a small pericardial effusion.     Systemic Veins:  The inferior vena cava is dilated measuring 2.44 cm in diameter, (dilated >2.1cm) with normal inspiratory collapse (normal >50%) consistent with mildly elevated right atrial pressure (~8, range 5-10mmHg).  ____________________________________________________________________  QUANTITATIVE DATA:  Left Ventricle Measurements: (Indexed to BSA)     Visualized LV EF%: 60 to 65%     MV E Vmax: 1.03 m/s  MV A Vmax: 1.33 m/s  MV E/A:    0.77  MV DT:     183 msec             Right Ventricle Measurements:     RV Base (RVID1): 2.8 cm    Mitral Valve Measurements:     MV E Vmax: 1.0 m/s  MV A Vmax: 1.3 m/s  MV E/A:    0.8       Tricuspid Valve Measurements:     RA Pressure: 8 mmHg    ________________________________________________________________________________________  Electronically signed on 3/21/2025 at 11:40:35 AM by Alberto Gilliam M.D.         *** Final ***   [facility] -- Charles Martinez, Andrei Mccall, Olga, Eron Pablo: Office # [phone]    Follow Up:  Shortness of breath , elevated troponin     Subjective/Observations: Patient seen and examined. Patient awake, alert, OOB to chair. No complaints of chest pain, dyspnea, palpitations or dizziness. No signs of orthopnea or PND. Tolerating O2 via nasal cannula. Reports pain at chest tube site.     REVIEW OF SYSTEMS: All other review of systems are negative unless indicated above    PAST MEDICAL & SURGICAL HISTORY:  Gastric lymphoma      Anemia of chronic disease      Pleural effusion      DVT (deep venous thrombosis)      Hypertension      Hypertension      Hyperlipidemia      S/P appendectomy  November 17th 2014      S/P cholecystectomy  15 years ago          MEDICATIONS  (STANDING):  atorvastatin 40 milliGRAM(s) Oral at bedtime  buMETAnide Injectable 1 milliGRAM(s) IV Push two times a day  influenza   Vaccine 0.5 milliLiter(s) IntraMuscular once  labetalol 100 milliGRAM(s) Oral two times a day  lactated ringers. 1000 milliLiter(s) (75 mL/Hr) IV Continuous <Continuous>  sodium chloride 0.9%. 1000 milliLiter(s) (60 mL/Hr) IV Continuous <Continuous>    MEDICATIONS  (PRN):  acetaminophen     Tablet .. 1000 milliGRAM(s) Oral every 6 hours PRN Temp greater or equal to 38C (100.4F), Mild Pain (1 - 3), Moderate Pain (4 - 6)  guaiFENesin Oral Liquid (Sugar-Free) 200 milliGRAM(s) Oral every 6 hours PRN Cough  oxyCODONE    IR 5 milliGRAM(s) Oral every 4 hours PRN Severe Pain (7 - 10)    Allergies    Bactrim DS (Hives)    Intolerances      Vital Signs Last 24 Hrs  T(C): 36.4 (05 Apr 2025 04:41), Max: 37.2 (04 Apr 2025 17:20)  T(F): 97.6 (05 Apr 2025 04:41), Max: 99 (04 Apr 2025 17:20)  HR: 85 (05 Apr 2025 04:41) (80 - 89)  BP: 119/80 (05 Apr 2025 04:41) (99/76 - 132/80)  BP(mean): --  RR: 19 (05 Apr 2025 04:41) (15 - 36)  SpO2: 93% (05 Apr 2025 04:41) (92% - 100%)    Parameters below as of 05 Apr 2025 04:41  Patient On (Oxygen Delivery Method): nasal cannula  O2 Flow (L/min): 3    I&O's Summary    04 Apr 2025 07:01  -  05 Apr 2025 07:00  --------------------------------------------------------  IN: 75 mL / OUT: 3350 mL / NET: -3275 mL      Weight (kg): 92.487 (04-04 @ 14:04)    TELE: Not on telemetry   PHYSICAL EXAM:  Constitutional: NAD, awake and alert  HEENT: Moist Mucous Membranes, Anicteric  Pulmonary: Non-labored, breath sounds are clear bilaterally, Diminished at bases, No wheezing, rales or rhonchi, + chest tube   Cardiovascular: Regular, S1 and S2, No murmurs, No rubs, gallops or clicks  Gastrointestinal:  soft, nontender, nondistended   Lymph: +1-2 peripheral edema. No lymphadenopathy.   Skin: No visible rashes or ulcers.  Psych:  Mood & affect appropriate      LABS: All Labs Reviewed:                        9.3    9.41  )-----------( 545      ( 05 Apr 2025 08:15 )             30.1                         8.6    8.52  )-----------( 567      ( 04 Apr 2025 07:32 )             27.8                         8.8    8.64  )-----------( 537      ( 04 Apr 2025 00:13 )             28.0     05 Apr 2025 08:15    131    |  97     |  37     ----------------------------<  118    4.3     |  28     |  1.60   04 Apr 2025 07:32    134    |  99     |  32     ----------------------------<  104    4.2     |  25     |  1.30   03 Apr 2025 07:50    135    |  100    |  30     ----------------------------<  103    4.1     |  26     |  1.30     Ca    9.5        05 Apr 2025 08:15  Ca    9.2        04 Apr 2025 07:32  Ca    9.0        03 Apr 2025 07:50  Phos  5.0       05 Apr 2025 08:15  Phos  4.0       04 Apr 2025 07:32  Phos  3.4       03 Apr 2025 07:50  Mg     2.4       05 Apr 2025 08:15  Mg     2.1       04 Apr 2025 07:32  Mg     2.0       03 Apr 2025 07:50    TPro  6.6    /  Alb  2.1    /  TBili  0.4    /  DBili  x      /  AST  24     /  ALT  16     /  AlkPhos  93     05 Apr 2025 08:15  TPro  6.6    /  Alb  2.1    /  TBili  0.5    /  DBili  x      /  AST  24     /  ALT  18     /  AlkPhos  93     04 Apr 2025 07:32  TPro  5.8    /  Alb  2.0    /  TBili  0.3    /  DBili  x      /  AST  20     /  ALT  15     /  AlkPhos  81     03 Apr 2025 07:50   LIVER FUNCTIONS - ( 05 Apr 2025 08:15 )  Alb: 2.1 g/dL / Pro: 6.6 g/dL / ALK PHOS: 93 U/L / ALT: 16 U/L / AST: 24 U/L / GGT: x           PTT - ( 04 Apr 2025 07:32 )  PTT:57.8 secTroponin I, High Sensitivity Result: 260.7 ng/L (04-01-25 @ 17:29)  Troponin I, High Sensitivity Result: 275.8 ng/L (04-01-25 @ 06:15)  D-Dimer Assay, Quantitative: 2136 ng/mL DDU (04-01-25 @ 06:15)  Cholesterol: 186 mg/dL (03-23-25 @ 07:10)  HDL Cholesterol: 32 mg/dL (03-23-25 @ 07:10)  Triglycerides, Serum: 133 mg/dL (03-23-25 @ 07:10)    12 Lead ECG:   Ventricular Rate 80 BPM    Atrial Rate 80 BPM    P-R Interval 150 ms    QRS Duration 94 ms    Q-T Interval 364 ms    QTC Calculation(Bazett) 419 ms    P Axis 64 degrees    R Axis -15 degrees    T Axis 3 degrees    Diagnosis Line Normal sinus rhythm  Low voltage QRS  Inferior infarct (cited on or before 19-MAR-2025)  Cannot rule out Anterior infarct , age undetermined  Abnormal ECG  When compared with ECG of 22-MAR-2025 03:33,  No significant change was found  Confirmed by CHASE CELIS ([id number]) on 4/1/2025 10:36:04 PM (04-01-25 @ 05:38)      TRANSTHORACIC ECHOCARDIOGRAM REPORT  ________________________________________________________________________________                                      _______       Pt. Name:       JESSE EATON Study Date:    3/20/2025  MRN:     [id number]                   YOB: 1960  Accession #:    [id number]                  Age:           64 years  Account#:       [id number]                 Gender:        M  Heart Rate:                                Height:        66.93in (170.00 cm)  Rhythm:                                    Weight:        205.03 lb (93.00 kg)  Blood Pressure: 106/64 mmHg                BSA/BMI:       2.04 m² / 32.18 kg/m²  ________________________________________________________________________________________  Referring Physician:    [id number] Oswaldo Snowden  Interpreting Physician: Alberto Gilliam M.D.  Primary Sonographer:    Emperatriz AVILA    CPT:               ECHO TTE WO CON COMP W DOPP - 46800.m  Indication(s):     Chest pain, unspecified - R07.9  Procedure:         Transthoracic echocardiogram with 2-D, M-mode and complete                     spectral and color flow Doppler.  Ordering Location: TELE  Admission Status:  Inpatient    _______________________________________________________________________________________     CONCLUSIONS:      1. Left ventricular wall thickness is normal. Left ventricular systolic function is normal with an ejection fraction visually estimated at 60 to 65 %. There are no regional wall motion abnormalities seen.   2. Normal right ventricular cavity size, with normal wall thickness, and normal right ventricular systolic function.   3. Mild mitral regurgitation.   4. Fibrocalcific aortic valve sclerosis without stenosis.   5. The inferior vena cava is dilated measuring 2.44 cm in diameter, (dilated >2.1cm) with normal inspiratory collapse (normal >50%) consistent with mildly elevated right atrial pressure (~8, range 5-10mmHg).   6. Small pericardial effusion.   7. There is mild (grade 1) left ventricular diastolic dysfunction.   8. Compared to the transthoracic echocardiogram performed on 3/5/2025, there have been no significant interval changes.   9. Pulmonary artery systolic pressure could not be estimated.    ________________________________________________________________________________________  FINDINGS:     Left Ventricle:  Left ventricular wall thickness is normal. Left ventricular systolic function is normal with an ejection fraction visually estimated at 60 to 65%. There are no regional wall motion abnormalities seen. There is mild (grade 1) left ventricular diastolic dysfunction.     Right Ventricle:  The right ventricular cavity is normal in size, with normal wall thickness and right ventricular systolic function is normal.     Aortic Valve:  There is fibrocalcific aortic valve sclerosis without stenosis. There is no evidence of aortic regurgitation.     Mitral Valve:  Structurally normal mitral valve with normal leaflet excursion. There is mild mitral regurgitation.     Tricuspid Valve:  Structurally normal tricuspid valve with normal leaflet excursion. There is insufficient tricuspid regurgitation detected to calculate pulmonary artery systolic pressure.     Pulmonic Valve:  The pulmonic valve was not well visualized.     Pericardium:  There is a small pericardial effusion.     Systemic Veins:  The inferior vena cava is dilated measuring 2.44 cm in diameter, (dilated >2.1cm) with normal inspiratory collapse (normal >50%) consistent with mildly elevated right atrial pressure (~8, range 5-10mmHg).  ____________________________________________________________________  QUANTITATIVE DATA:  Left Ventricle Measurements: (Indexed to BSA)     Visualized LV EF%: 60 to 65%     MV E Vmax: 1.03 m/s  MV A Vmax: 1.33 m/s  MV E/A:    0.77  MV DT:     183 msec             Right Ventricle Measurements:     RV Base (RVID1): 2.8 cm    Mitral Valve Measurements:     MV E Vmax: 1.0 m/s  MV A Vmax: 1.3 m/s  MV E/A:    0.8       Tricuspid Valve Measurements:     RA Pressure: 8 mmHg    ________________________________________________________________________________________  Electronically signed on 3/21/2025 at 11:40:35 AM by Alberto Gilliam M.D.         *** Final ***

## 2025-04-05 NOTE — PROGRESS NOTE ADULT - ASSESSMENT
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    mets ca  malignant pleural eff  HTN  anemia  dyspnea  Atelectasis  DVT hx  gastric ca    right pleurx in  thoracic follow up  on BUMEX  I moustapha  pain relief    I moustapha  fio2 support  cardio eval and optimization of cvs rx regimen  tele monitoring  I and O  replete lytes  thoracic sx consultation for pleurx placement  TTE w/ normal LV & RV size and function EF 60-65%, Mild MR, small pericardial effusion, Grade 1 DD  follows with Dr Mckeon - Onc MD

## 2025-04-05 NOTE — PROGRESS NOTE ADULT - SUBJECTIVE AND OBJECTIVE BOX
Patient is a 64y old  Male who presents with a chief complaint of Pleural effusion (05 Apr 2025 11:07)    INTERVAL HPI/OVERNIGHT EVENTS: No acute overnight events. Pt seen and examined at the bedside this AM. s/p R pleurex with 3ml EBL yesterday afternoon. Patient seen in chair this AM. States some chest discomfort with placement, currently clamped. Pleurex site covered in dressing, C/D/I. Denies any shortness of breath, CP, abd pain at this time. Endorses some leg swelling, but is on IV bumex.    MEDICATIONS  (STANDING):  atorvastatin 40 milliGRAM(s) Oral at bedtime  buMETAnide 1 milliGRAM(s) Oral every 12 hours  influenza   Vaccine 0.5 milliLiter(s) IntraMuscular once  labetalol 100 milliGRAM(s) Oral two times a day  lactated ringers. 1000 milliLiter(s) (75 mL/Hr) IV Continuous <Continuous>  sodium chloride 0.9%. 1000 milliLiter(s) (60 mL/Hr) IV Continuous <Continuous>    MEDICATIONS  (PRN):  acetaminophen     Tablet .. 1000 milliGRAM(s) Oral every 6 hours PRN Temp greater or equal to 38C (100.4F), Mild Pain (1 - 3), Moderate Pain (4 - 6)  guaiFENesin Oral Liquid (Sugar-Free) 200 milliGRAM(s) Oral every 6 hours PRN Cough  oxyCODONE    IR 5 milliGRAM(s) Oral every 4 hours PRN Severe Pain (7 - 10)      Allergies    Bactrim DS (Hives)    Intolerances        REVIEW OF SYSTEMS:  per above HPI     Vital Signs Last 24 Hrs  T(C): 36.4 (05 Apr 2025 04:41), Max: 37.2 (04 Apr 2025 17:20)  T(F): 97.6 (05 Apr 2025 04:41), Max: 99 (04 Apr 2025 17:20)  HR: 85 (05 Apr 2025 04:41) (80 - 89)  BP: 119/80 (05 Apr 2025 04:41) (99/76 - 132/80)  BP(mean): --  RR: 19 (05 Apr 2025 04:41) (15 - 36)  SpO2: 93% (05 Apr 2025 04:41) (92% - 100%)    Parameters below as of 05 Apr 2025 04:41  Patient On (Oxygen Delivery Method): nasal cannula  O2 Flow (L/min): 3      PHYSICAL EXAM:  GENERAL: NAD  HEENT:  anicteric, moist mucous membranes  CHEST/LUNG:  +mildly decreased b/l lower lung sounds. Otherwise CTA b/l, no rales, wheezes, or rhonchi. R sided pleurex noted with water seal, dressing C/D/I  HEART:  RRR, S1, S2  ABDOMEN:  BS+, soft, nontender, nondistended  EXTREMITIES: 1+ pitting edema in b/l LEs.  cyanosis, or calf tenderness  NERVOUS SYSTEM: answers questions and follows commands appropriately    LABS:                        9.3    9.41  )-----------( 545      ( 05 Apr 2025 08:15 )             30.1     CBC Full  -  ( 05 Apr 2025 08:15 )  WBC Count : 9.41 K/uL  Hemoglobin : 9.3 g/dL  Hematocrit : 30.1 %  Platelet Count - Automated : 545 K/uL  Mean Cell Volume : 64.9 fl  Mean Cell Hemoglobin : 20.0 pg  Mean Cell Hemoglobin Concentration : 30.9 g/dL  Auto Neutrophil # : x  Auto Lymphocyte # : x  Auto Monocyte # : x  Auto Eosinophil # : x  Auto Basophil # : x  Auto Neutrophil % : x  Auto Lymphocyte % : x  Auto Monocyte % : x  Auto Eosinophil % : x  Auto Basophil % : x    05 Apr 2025 08:15    131    |  97     |  37     ----------------------------<  118    4.3     |  28     |  1.60     Ca    9.5        05 Apr 2025 08:15  Phos  5.0       05 Apr 2025 08:15  Mg     2.4       05 Apr 2025 08:15    TPro  6.6    /  Alb  2.1    /  TBili  0.4    /  DBili  x      /  AST  24     /  ALT  16     /  AlkPhos  93     05 Apr 2025 08:15    PTT - ( 04 Apr 2025 07:32 )  PTT:57.8 sec  Urinalysis Basic - ( 05 Apr 2025 08:15 )    Color: x / Appearance: x / SG: x / pH: x  Gluc: 118 mg/dL / Ketone: x  / Bili: x / Urobili: x   Blood: x / Protein: x / Nitrite: x   Leuk Esterase: x / RBC: x / WBC x   Sq Epi: x / Non Sq Epi: x / Bacteria: x      CAPILLARY BLOOD GLUCOSE              RADIOLOGY & ADDITIONAL TESTS:  Personally reviewed.     Consultant(s) Notes Reviewed:  [x] YES  [ ] NO

## 2025-04-05 NOTE — PROGRESS NOTE ADULT - PROBLEM SELECTOR PLAN 8
H/H: 9.5/30.2. Hemoglobin: 13.3 (9/2024). However, recently hemoglobin has been 7-8.   -Anemia of chronic disease etiology  -Monitor routine CBCs

## 2025-04-06 VITALS
RESPIRATION RATE: 19 BRPM | DIASTOLIC BLOOD PRESSURE: 64 MMHG | SYSTOLIC BLOOD PRESSURE: 103 MMHG | OXYGEN SATURATION: 98 % | HEART RATE: 81 BPM | TEMPERATURE: 98 F

## 2025-04-06 LAB
ALBUMIN SERPL ELPH-MCNC: 2.3 G/DL — LOW (ref 3.3–5)
ALP SERPL-CCNC: 84 U/L — SIGNIFICANT CHANGE UP (ref 40–120)
ALT FLD-CCNC: 14 U/L — SIGNIFICANT CHANGE UP (ref 12–78)
ANION GAP SERPL CALC-SCNC: 5 MMOL/L — SIGNIFICANT CHANGE UP (ref 5–17)
APPEARANCE UR: ABNORMAL
AST SERPL-CCNC: 20 U/L — SIGNIFICANT CHANGE UP (ref 15–37)
BASOPHILS # BLD AUTO: 0.05 K/UL — SIGNIFICANT CHANGE UP (ref 0–0.2)
BASOPHILS NFR BLD AUTO: 0.6 % — SIGNIFICANT CHANGE UP (ref 0–2)
BILIRUB SERPL-MCNC: 0.3 MG/DL — SIGNIFICANT CHANGE UP (ref 0.2–1.2)
BILIRUB UR-MCNC: NEGATIVE — SIGNIFICANT CHANGE UP
BUN SERPL-MCNC: 40 MG/DL — HIGH (ref 7–23)
CALCIUM SERPL-MCNC: 9.6 MG/DL — SIGNIFICANT CHANGE UP (ref 8.5–10.1)
CHLORIDE SERPL-SCNC: 97 MMOL/L — SIGNIFICANT CHANGE UP (ref 96–108)
CO2 SERPL-SCNC: 29 MMOL/L — SIGNIFICANT CHANGE UP (ref 22–31)
COLOR SPEC: YELLOW — SIGNIFICANT CHANGE UP
CREAT ?TM UR-MCNC: 139 MG/DL — SIGNIFICANT CHANGE UP
CREAT SERPL-MCNC: 1.5 MG/DL — HIGH (ref 0.5–1.3)
DIFF PNL FLD: NEGATIVE — SIGNIFICANT CHANGE UP
EGFR: 52 ML/MIN/1.73M2 — LOW
EGFR: 52 ML/MIN/1.73M2 — LOW
EOSINOPHIL # BLD AUTO: 0.05 K/UL — SIGNIFICANT CHANGE UP (ref 0–0.5)
EOSINOPHIL NFR BLD AUTO: 0.6 % — SIGNIFICANT CHANGE UP (ref 0–6)
GLUCOSE SERPL-MCNC: 102 MG/DL — HIGH (ref 70–99)
GLUCOSE UR QL: NEGATIVE MG/DL — SIGNIFICANT CHANGE UP
HCT VFR BLD CALC: 28.8 % — LOW (ref 39–50)
HGB BLD-MCNC: 8.8 G/DL — LOW (ref 13–17)
IMM GRANULOCYTES NFR BLD AUTO: 0.9 % — SIGNIFICANT CHANGE UP (ref 0–0.9)
KETONES UR-MCNC: NEGATIVE MG/DL — SIGNIFICANT CHANGE UP
LEUKOCYTE ESTERASE UR-ACNC: ABNORMAL
LYMPHOCYTES # BLD AUTO: 0.4 K/UL — LOW (ref 1–3.3)
LYMPHOCYTES # BLD AUTO: 4.6 % — LOW (ref 13–44)
MAGNESIUM SERPL-MCNC: 2.5 MG/DL — SIGNIFICANT CHANGE UP (ref 1.6–2.6)
MCHC RBC-ENTMCNC: 19.9 PG — LOW (ref 27–34)
MCHC RBC-ENTMCNC: 30.6 G/DL — LOW (ref 32–36)
MCV RBC AUTO: 65 FL — LOW (ref 80–100)
MONOCYTES # BLD AUTO: 1.21 K/UL — HIGH (ref 0–0.9)
MONOCYTES NFR BLD AUTO: 14 % — SIGNIFICANT CHANGE UP (ref 2–14)
NEUTROPHILS # BLD AUTO: 6.84 K/UL — SIGNIFICANT CHANGE UP (ref 1.8–7.4)
NEUTROPHILS NFR BLD AUTO: 79.3 % — HIGH (ref 43–77)
NITRITE UR-MCNC: NEGATIVE — SIGNIFICANT CHANGE UP
NRBC BLD AUTO-RTO: 0 /100 WBCS — SIGNIFICANT CHANGE UP (ref 0–0)
OSMOLALITY UR: 476 MOSM/KG — SIGNIFICANT CHANGE UP (ref 50–1200)
PH UR: 5 — SIGNIFICANT CHANGE UP (ref 5–8)
PHOSPHATE SERPL-MCNC: 4.1 MG/DL — SIGNIFICANT CHANGE UP (ref 2.5–4.5)
PLATELET # BLD AUTO: 542 K/UL — HIGH (ref 150–400)
POTASSIUM SERPL-MCNC: 4.6 MMOL/L — SIGNIFICANT CHANGE UP (ref 3.5–5.3)
POTASSIUM SERPL-SCNC: 4.6 MMOL/L — SIGNIFICANT CHANGE UP (ref 3.5–5.3)
POTASSIUM UR-SCNC: 50.1 MMOL/L — SIGNIFICANT CHANGE UP
PROT ?TM UR-MCNC: 10 MG/DL — SIGNIFICANT CHANGE UP (ref 0–12)
PROT SERPL-MCNC: 6.4 G/DL — SIGNIFICANT CHANGE UP (ref 6–8.3)
PROT UR-MCNC: NEGATIVE MG/DL — SIGNIFICANT CHANGE UP
PROT/CREAT UR-RTO: 0.1 RATIO — SIGNIFICANT CHANGE UP (ref 0–0.2)
RBC # BLD: 4.43 M/UL — SIGNIFICANT CHANGE UP (ref 4.2–5.8)
RBC # FLD: 21.2 % — HIGH (ref 10.3–14.5)
SODIUM SERPL-SCNC: 131 MMOL/L — LOW (ref 135–145)
SODIUM UR-SCNC: <20 MMOL/L — SIGNIFICANT CHANGE UP
SP GR SPEC: 1.02 — SIGNIFICANT CHANGE UP (ref 1–1.03)
UROBILINOGEN FLD QL: 0.2 MG/DL — SIGNIFICANT CHANGE UP (ref 0.2–1)
UUN UR-MCNC: 860 MG/DL — SIGNIFICANT CHANGE UP
WBC # BLD: 8.63 K/UL — SIGNIFICANT CHANGE UP (ref 3.8–10.5)
WBC # FLD AUTO: 8.63 K/UL — SIGNIFICANT CHANGE UP (ref 3.8–10.5)

## 2025-04-06 PROCEDURE — 86900 BLOOD TYPING SEROLOGIC ABO: CPT

## 2025-04-06 PROCEDURE — 84133 ASSAY OF URINE POTASSIUM: CPT

## 2025-04-06 PROCEDURE — C1729: CPT

## 2025-04-06 PROCEDURE — 84484 ASSAY OF TROPONIN QUANT: CPT

## 2025-04-06 PROCEDURE — 84300 ASSAY OF URINE SODIUM: CPT

## 2025-04-06 PROCEDURE — 85027 COMPLETE CBC AUTOMATED: CPT

## 2025-04-06 PROCEDURE — 71275 CT ANGIOGRAPHY CHEST: CPT | Mod: MC

## 2025-04-06 PROCEDURE — 85379 FIBRIN DEGRADATION QUANT: CPT

## 2025-04-06 PROCEDURE — 99285 EMERGENCY DEPT VISIT HI MDM: CPT

## 2025-04-06 PROCEDURE — 71045 X-RAY EXAM CHEST 1 VIEW: CPT

## 2025-04-06 PROCEDURE — 85610 PROTHROMBIN TIME: CPT

## 2025-04-06 PROCEDURE — 83935 ASSAY OF URINE OSMOLALITY: CPT

## 2025-04-06 PROCEDURE — 84540 ASSAY OF URINE/UREA-N: CPT

## 2025-04-06 PROCEDURE — 36415 COLL VENOUS BLD VENIPUNCTURE: CPT

## 2025-04-06 PROCEDURE — 99232 SBSQ HOSP IP/OBS MODERATE 35: CPT

## 2025-04-06 PROCEDURE — P9047: CPT

## 2025-04-06 PROCEDURE — 83735 ASSAY OF MAGNESIUM: CPT

## 2025-04-06 PROCEDURE — 85025 COMPLETE CBC W/AUTO DIFF WBC: CPT

## 2025-04-06 PROCEDURE — 81001 URINALYSIS AUTO W/SCOPE: CPT

## 2025-04-06 PROCEDURE — 83615 LACTATE (LD) (LDH) ENZYME: CPT

## 2025-04-06 PROCEDURE — 86901 BLOOD TYPING SEROLOGIC RH(D): CPT

## 2025-04-06 PROCEDURE — 86850 RBC ANTIBODY SCREEN: CPT

## 2025-04-06 PROCEDURE — 84156 ASSAY OF PROTEIN URINE: CPT

## 2025-04-06 PROCEDURE — 99239 HOSP IP/OBS DSCHRG MGMT >30: CPT

## 2025-04-06 PROCEDURE — 93005 ELECTROCARDIOGRAM TRACING: CPT

## 2025-04-06 PROCEDURE — 85730 THROMBOPLASTIN TIME PARTIAL: CPT

## 2025-04-06 PROCEDURE — 80053 COMPREHEN METABOLIC PANEL: CPT

## 2025-04-06 PROCEDURE — 82570 ASSAY OF URINE CREATININE: CPT

## 2025-04-06 PROCEDURE — 84100 ASSAY OF PHOSPHORUS: CPT

## 2025-04-06 RX ORDER — ONDANSETRON HCL/PF 4 MG/2 ML
4 VIAL (ML) INJECTION ONCE
Refills: 0 | Status: COMPLETED | OUTPATIENT
Start: 2025-04-06 | End: 2025-04-06

## 2025-04-06 RX ORDER — BUMETANIDE 1 MG/1
1 TABLET ORAL
Qty: 30 | Refills: 0
Start: 2025-04-06 | End: 2025-05-05

## 2025-04-06 RX ORDER — OXYCODONE HYDROCHLORIDE 30 MG/1
1 TABLET ORAL
Qty: 0 | Refills: 0 | DISCHARGE
Start: 2025-04-06

## 2025-04-06 RX ADMIN — Medication 4 MILLIGRAM(S): at 12:24

## 2025-04-06 RX ADMIN — OXYCODONE HYDROCHLORIDE 5 MILLIGRAM(S): 30 TABLET ORAL at 08:59

## 2025-04-06 RX ADMIN — LABETALOL HYDROCHLORIDE 100 MILLIGRAM(S): 200 TABLET, FILM COATED ORAL at 05:14

## 2025-04-06 RX ADMIN — LABETALOL HYDROCHLORIDE 100 MILLIGRAM(S): 200 TABLET, FILM COATED ORAL at 17:20

## 2025-04-06 RX ADMIN — APIXABAN 5 MILLIGRAM(S): 2.5 TABLET, FILM COATED ORAL at 17:20

## 2025-04-06 RX ADMIN — CLOPIDOGREL BISULFATE 75 MILLIGRAM(S): 75 TABLET, FILM COATED ORAL at 11:21

## 2025-04-06 RX ADMIN — APIXABAN 5 MILLIGRAM(S): 2.5 TABLET, FILM COATED ORAL at 05:14

## 2025-04-06 RX ADMIN — OXYCODONE HYDROCHLORIDE 5 MILLIGRAM(S): 30 TABLET ORAL at 10:05

## 2025-04-06 NOTE — PROGRESS NOTE ADULT - SUBJECTIVE AND OBJECTIVE BOX
Interval History:  being discharged today  breathing is stable  s/p right pleurex  Chart reviewed and events noted;   Overnight events:    MEDICATIONS  (STANDING):  apixaban 5 milliGRAM(s) Oral every 12 hours  atorvastatin 40 milliGRAM(s) Oral at bedtime  clopidogrel Tablet 75 milliGRAM(s) Oral daily  influenza   Vaccine 0.5 milliLiter(s) IntraMuscular once  labetalol 100 milliGRAM(s) Oral two times a day  lactated ringers. 1000 milliLiter(s) (75 mL/Hr) IV Continuous <Continuous>  sodium chloride 0.9%. 1000 milliLiter(s) (60 mL/Hr) IV Continuous <Continuous>    MEDICATIONS  (PRN):  acetaminophen     Tablet .. 1000 milliGRAM(s) Oral every 6 hours PRN Temp greater or equal to 38C (100.4F), Mild Pain (1 - 3), Moderate Pain (4 - 6)  guaiFENesin Oral Liquid (Sugar-Free) 200 milliGRAM(s) Oral every 6 hours PRN Cough  oxyCODONE    IR 5 milliGRAM(s) Oral every 4 hours PRN Severe Pain (7 - 10)      Vital Signs Last 24 Hrs  T(C): 36.4 (06 Apr 2025 11:37), Max: 37.1 (05 Apr 2025 21:14)  T(F): 97.5 (06 Apr 2025 11:37), Max: 98.7 (05 Apr 2025 21:14)  HR: 79 (06 Apr 2025 11:37) (79 - 88)  BP: 107/71 (06 Apr 2025 11:37) (107/71 - 115/71)  BP(mean): --  RR: 19 (06 Apr 2025 05:05) (19 - 19)  SpO2: 97% (06 Apr 2025 11:37) (96% - 98%)    Parameters below as of 06 Apr 2025 11:37  Patient On (Oxygen Delivery Method): nasal cannula  O2 Flow (L/min): 4      PHYSICAL EXAM  General: adult in NAD  HEENT: clear oropharynx, anicteric sclera, pink conjunctivae  Neck: supple  CV: normal S1S2 with no murmur rubs or gallops  Lungs: clear to auscultation, no wheezes, no rhales  Abdomen: soft non-tender non-distended, no hepato/splenomegaly  Ext: no clubbing cyanosis or edema  Skin: no rashes and no petichiae  Neuro: alert and oriented X3 no focal deficits      LABS:  CBC Full  -  ( 06 Apr 2025 08:00 )  WBC Count : 8.63 K/uL  RBC Count : 4.43 M/uL  Hemoglobin : 8.8 g/dL  Hematocrit : 28.8 %  Platelet Count - Automated : 542 K/uL  Mean Cell Volume : 65.0 fl  Mean Cell Hemoglobin : 19.9 pg  Mean Cell Hemoglobin Concentration : 30.6 g/dL  Auto Neutrophil # : x  Auto Lymphocyte # : x  Auto Monocyte # : x  Auto Eosinophil # : x  Auto Basophil # : x  Auto Neutrophil % : x  Auto Lymphocyte % : x  Auto Monocyte % : x  Auto Eosinophil % : x  Auto Basophil % : x    04-06    131[L]  |  97  |  40[H]  ----------------------------<  102[H]  4.6   |  29  |  1.50[H]    Ca    9.6      06 Apr 2025 08:00  Phos  4.1     04-06  Mg     2.5     04-06    TPro  6.4  /  Alb  2.3[L]  /  TBili  0.3  /  DBili  x   /  AST  20  /  ALT  14  /  AlkPhos  84  04-06        fe studies      WBC trend  8.63 K/uL (04-06-25 @ 08:00)  9.41 K/uL (04-05-25 @ 08:15)  8.52 K/uL (04-04-25 @ 07:32)  8.64 K/uL (04-04-25 @ 00:13)      Hgb trend  8.8 g/dL (04-06-25 @ 08:00)  9.3 g/dL (04-05-25 @ 08:15)  8.6 g/dL (04-04-25 @ 07:32)  8.8 g/dL (04-04-25 @ 00:13)      plt trend  542 K/uL (04-06-25 @ 08:00)  545 K/uL (04-05-25 @ 08:15)  567 K/uL (04-04-25 @ 07:32)  537 K/uL (04-04-25 @ 00:13)        RADIOLOGY & ADDITIONAL STUDIES:

## 2025-04-06 NOTE — PROGRESS NOTE ADULT - SUBJECTIVE AND OBJECTIVE BOX
[facility] -- Charles Martinez, Andrei Mccall, Olga, Eron Pablo: Office # [phone]    Follow Up:  Shortness of breath , elevated troponin     Subjective/Observations: No events overnight resting comfortably in bed.  No complaints of chest pain, dyspnea, or palpitations reported. No signs of orthopnea or PND. Tolerating room air     REVIEW OF SYSTEMS: All other review of systems are negative unless indicated above    PAST MEDICAL & SURGICAL HISTORY:  Gastric lymphoma      Anemia of chronic disease      Pleural effusion      DVT (deep venous thrombosis)      Hypertension      Hyperlipidemia      S/P appendectomy  November 17th 2014      S/P cholecystectomy  15 years ago          MEDICATIONS  (STANDING):  apixaban 5 milliGRAM(s) Oral every 12 hours  atorvastatin 40 milliGRAM(s) Oral at bedtime  clopidogrel Tablet 75 milliGRAM(s) Oral daily  influenza   Vaccine 0.5 milliLiter(s) IntraMuscular once  labetalol 100 milliGRAM(s) Oral two times a day  lactated ringers. 1000 milliLiter(s) (75 mL/Hr) IV Continuous <Continuous>  sodium chloride 0.9%. 1000 milliLiter(s) (60 mL/Hr) IV Continuous <Continuous>    MEDICATIONS  (PRN):  acetaminophen     Tablet .. 1000 milliGRAM(s) Oral every 6 hours PRN Temp greater or equal to 38C (100.4F), Mild Pain (1 - 3), Moderate Pain (4 - 6)  guaiFENesin Oral Liquid (Sugar-Free) 200 milliGRAM(s) Oral every 6 hours PRN Cough  oxyCODONE    IR 5 milliGRAM(s) Oral every 4 hours PRN Severe Pain (7 - 10)    Allergies    Bactrim DS (Hives)    Intolerances      Vital Signs Last 24 Hrs  T(C): 36.3 (06 Apr 2025 05:05), Max: 37.1 (05 Apr 2025 21:14)  T(F): 97.3 (06 Apr 2025 05:05), Max: 98.7 (05 Apr 2025 21:14)  HR: 80 (06 Apr 2025 05:05) (80 - 88)  BP: 110/69 (06 Apr 2025 05:05) (110/69 - 115/71)  BP(mean): --  RR: 19 (06 Apr 2025 05:05) (19 - 19)  SpO2: 98% (06 Apr 2025 05:05) (96% - 98%)    Parameters below as of 06 Apr 2025 05:05  Patient On (Oxygen Delivery Method): nasal cannula  O2 Flow (L/min): 3    I&O's Summary    05 Apr 2025 07:01  -  06 Apr 2025 07:00  --------------------------------------------------------  IN: 0 mL / OUT: 2020 mL / NET: -2020 mL          TELE:  Off cardiac monitor   PHYSICAL EXAM:  Constitutional: NAD, awake and alert  HEENT: Moist Mucous Membranes, Anicteric  Pulmonary: Non-labored, breath sounds with Bilaterally diminished at bases  No  wheezes, crackles or rhonchi   Cardiovascular: Regular, S1 and S2, No murmurs, No rubs, gallops or clicks  Gastrointestinal:  soft, nontender, nondistended   Lymph: No peripheral edema. No lymphadenopathy.   Skin: No visible rashes or ulcers.  Psych:  Mood & affect appropriate      LABS: All Labs Reviewed:                        8.8    8.63  )-----------( 542      ( 06 Apr 2025 08:00 )             28.8                         9.3    9.41  )-----------( 545      ( 05 Apr 2025 08:15 )             30.1                         8.6    8.52  )-----------( 567      ( 04 Apr 2025 07:32 )             27.8     06 Apr 2025 08:00    131    |  97     |  40     ----------------------------<  102    4.6     |  29     |  1.50   05 Apr 2025 08:15    131    |  97     |  37     ----------------------------<  118    4.3     |  28     |  1.60   04 Apr 2025 07:32    134    |  99     |  32     ----------------------------<  104    4.2     |  25     |  1.30     Ca    9.6        06 Apr 2025 08:00  Ca    9.5        05 Apr 2025 08:15  Ca    9.2        04 Apr 2025 07:32  Phos  4.1       06 Apr 2025 08:00  Phos  5.0       05 Apr 2025 08:15  Phos  4.0       04 Apr 2025 07:32  Mg     2.5       06 Apr 2025 08:00  Mg     2.4       05 Apr 2025 08:15  Mg     2.1       04 Apr 2025 07:32    TPro  6.4    /  Alb  2.3    /  TBili  0.3    /  DBili  x      /  AST  20     /  ALT  14     /  AlkPhos  84     06 Apr 2025 08:00  TPro  6.6    /  Alb  2.1    /  TBili  0.4    /  DBili  x      /  AST  24     /  ALT  16     /  AlkPhos  93     05 Apr 2025 08:15  TPro  6.6    /  Alb  2.1    /  TBili  0.5    /  DBili  x      /  AST  24     /  ALT  18     /  AlkPhos  93     04 Apr 2025 07:32   LIVER FUNCTIONS - ( 06 Apr 2025 08:00 )  Alb: 2.3 g/dL / Pro: 6.4 g/dL / ALK PHOS: 84 U/L / ALT: 14 U/L / AST: 20 U/L / GGT: x           Troponin I, High Sensitivity Result: 260.7 ng/L (04-01-25 @ 17:29)  Troponin I, High Sensitivity Result: 275.8 ng/L (04-01-25 @ 06:15)  D-Dimer Assay, Quantitative: 2136 ng/mL DDU (04-01-25 @ 06:15)  Cholesterol: 186 mg/dL (03-23-25 @ 07:10)  HDL Cholesterol: 32 mg/dL (03-23-25 @ 07:10)  Triglycerides, Serum: 133 mg/dL (03-23-25 @ 07:10)    12 Lead ECG:   Ventricular Rate 80 BPM    Atrial Rate 80 BPM    P-R Interval 150 ms    QRS Duration 94 ms    Q-T Interval 364 ms    QTC Calculation(Bazett) 419 ms    P Axis 64 degrees    R Axis -15 degrees    T Axis 3 degrees    Diagnosis Line Normal sinus rhythm  Low voltage QRS  Inferior infarct (cited on or before 19-MAR-2025)  Cannot rule out Anterior infarct , age undetermined  Abnormal ECG  When compared with ECG of 22-MAR-2025 03:33,  No significant change was found  Confirmed by CHASE CELIS ([id number]) on 4/1/2025 10:36:04 PM (04-01-25 @ 05:38)      TRANSTHORACIC ECHOCARDIOGRAM REPORT  ________________________________________________________________________________                                      _______       Pt. Name:       JESSE EATON Study Date:    3/20/2025  MRN:     [id number]                   YOB: 1960  Accession #:    [id number]                  Age:           64 years  Account#:       [id number]                 Gender:        M  Heart Rate:                                Height:        66.93in (170.00 cm)  Rhythm:                                    Weight:        205.03 lb (93.00 kg)  Blood Pressure: 106/64 mmHg                BSA/BMI:       2.04 m² / 32.18 kg/m²  ________________________________________________________________________________________  Referring Physician:    [id number] Oswaldo Snowden  Interpreting Physician: Alberto Gilliam M.D.  Primary Sonographer:    Emperatriz AVILA    CPT:               ECHO TTE WO CON COMP W DOPP - 39700.m  Indication(s):     Chest pain, unspecified - R07.9  Procedure:         Transthoracic echocardiogram with 2-D, M-mode and complete                     spectral and color flow Doppler.  Ordering Location: TELE  Admission Status:  Inpatient    _______________________________________________________________________________________     CONCLUSIONS:      1. Left ventricular wall thickness is normal. Left ventricular systolic function is normal with an ejection fraction visually estimated at 60 to 65 %. There are no regional wall motion abnormalities seen.   2. Normal right ventricular cavity size, with normal wall thickness, and normal right ventricular systolic function.   3. Mild mitral regurgitation.   4. Fibrocalcific aortic valve sclerosis without stenosis.   5. The inferior vena cava is dilated measuring 2.44 cm in diameter, (dilated >2.1cm) with normal inspiratory collapse (normal >50%) consistent with mildly elevated right atrial pressure (~8, range 5-10mmHg).   6. Small pericardial effusion.   7. There is mild (grade 1) left ventricular diastolic dysfunction.   8. Compared to the transthoracic echocardiogram performed on 3/5/2025, there have been no significant interval changes.   9. Pulmonary artery systolic pressure could not be estimated.    ________________________________________________________________________________________  FINDINGS:     Left Ventricle:  Left ventricular wall thickness is normal. Left ventricular systolic function is normal with an ejection fraction visually estimated at 60 to 65%. There are no regional wall motion abnormalities seen. There is mild (grade 1) left ventricular diastolic dysfunction.     Right Ventricle:  The right ventricular cavity is normal in size, with normal wall thickness and right ventricular systolic function is normal.     Aortic Valve:  There is fibrocalcific aortic valve sclerosis without stenosis. There is no evidence of aortic regurgitation.     Mitral Valve:  Structurally normal mitral valve with normal leaflet excursion. There is mild mitral regurgitation.     Tricuspid Valve:  Structurally normal tricuspid valve with normal leaflet excursion. There is insufficient tricuspid regurgitation detected to calculate pulmonary artery systolic pressure.     Pulmonic Valve:  The pulmonic valve was not well visualized.     Pericardium:  There is a small pericardial effusion.     Systemic Veins:  The inferior vena cava is dilated measuring 2.44 cm in diameter, (dilated >2.1cm) with normal inspiratory collapse (normal >50%) consistent with mildly elevated right atrial pressure (~8, range 5-10mmHg).  ____________________________________________________________________  QUANTITATIVE DATA:  Left Ventricle Measurements: (Indexed to BSA)     Visualized LV EF%: 60 to 65%     MV E Vmax: 1.03 m/s  MV A Vmax: 1.33 m/s  MV E/A:    0.77  MV DT:     183 msec             Right Ventricle Measurements:     RV Base (RVID1): 2.8 cm    Mitral Valve Measurements:     MV E Vmax: 1.0 m/s  MV A Vmax: 1.3 m/s  MV E/A:    0.8       Tricuspid Valve Measurements:     RA Pressure: 8 mmHg    ________________________________________________________________________________________  Electronically signed on 3/21/2025 at 11:40:35 AM by Alberto Gilliam M.D.         *** Final ***

## 2025-04-06 NOTE — DISCHARGE NOTE NURSING/CASE MANAGEMENT/SOCIAL WORK - PATIENT PORTAL LINK FT
You can access the FollowMyHealth Patient Portal offered by [facility] by registering at the following website: [email or web address]. By joining [employer]’s FollowMyHealth portal, you will also be able to view your health information using other applications (apps) compatible with our system.

## 2025-04-06 NOTE — PROGRESS NOTE ADULT - REASON FOR ADMISSION
Pleural effusion

## 2025-04-06 NOTE — PROGRESS NOTE ADULT - SUBJECTIVE AND OBJECTIVE BOX
Date/Time Patient Seen:  		  Referring MD:   Data Reviewed	       Patient is a 64y old  Male who presents with a chief complaint of Pleural effusion (05 Apr 2025 19:58)      Subjective/HPI     PAST MEDICAL & SURGICAL HISTORY:  No pertinent past medical history    Incisional hernia  2015    Gastric lymphoma    Anemia of chronic disease    Pleural effusion    DVT (deep venous thrombosis)    Hypertension    Hyperlipidemia    No significant past surgical history    S/P appendectomy  November 17th 2014    S/P cholecystectomy  15 years ago          Medication list         MEDICATIONS  (STANDING):  apixaban 5 milliGRAM(s) Oral every 12 hours  atorvastatin 40 milliGRAM(s) Oral at bedtime  clopidogrel Tablet 75 milliGRAM(s) Oral daily  influenza   Vaccine 0.5 milliLiter(s) IntraMuscular once  labetalol 100 milliGRAM(s) Oral two times a day  lactated ringers. 1000 milliLiter(s) (75 mL/Hr) IV Continuous <Continuous>  sodium chloride 0.9%. 1000 milliLiter(s) (60 mL/Hr) IV Continuous <Continuous>    MEDICATIONS  (PRN):  acetaminophen     Tablet .. 1000 milliGRAM(s) Oral every 6 hours PRN Temp greater or equal to 38C (100.4F), Mild Pain (1 - 3), Moderate Pain (4 - 6)  guaiFENesin Oral Liquid (Sugar-Free) 200 milliGRAM(s) Oral every 6 hours PRN Cough  oxyCODONE    IR 5 milliGRAM(s) Oral every 4 hours PRN Severe Pain (7 - 10)         Vitals log        ICU Vital Signs Last 24 Hrs  T(C): 36.3 (06 Apr 2025 05:05), Max: 37.1 (05 Apr 2025 21:14)  T(F): 97.3 (06 Apr 2025 05:05), Max: 98.7 (05 Apr 2025 21:14)  HR: 80 (06 Apr 2025 05:05) (80 - 88)  BP: 110/69 (06 Apr 2025 05:05) (110/69 - 115/71)  BP(mean): --  ABP: --  ABP(mean): --  RR: 19 (06 Apr 2025 05:05) (19 - 19)  SpO2: 98% (06 Apr 2025 05:05) (96% - 98%)    O2 Parameters below as of 06 Apr 2025 05:05  Patient On (Oxygen Delivery Method): nasal cannula  O2 Flow (L/min): 3               Input and Output:  I&O's Detail    05 Apr 2025 07:01  -  06 Apr 2025 07:00  --------------------------------------------------------  IN:  Total IN: 0 mL    OUT:    Chest Tube (mL): 1720 mL    Voided (mL): 300 mL  Total OUT: 2020 mL    Total NET: -2020 mL          Lab Data                        9.3    9.41  )-----------( 545      ( 05 Apr 2025 08:15 )             30.1     04-05    131[L]  |  97  |  37[H]  ----------------------------<  118[H]  4.3   |  28  |  1.60[H]    Ca    9.5      05 Apr 2025 08:15  Phos  5.0     04-05  Mg     2.4     04-05    TPro  6.6  /  Alb  2.1[L]  /  TBili  0.4  /  DBili  x   /  AST  24  /  ALT  16  /  AlkPhos  93  04-05            Review of Systems	      Objective     Physical Examination    heart ss2  lung dc bs  head nc  head at      Pertinent Lab findings & Imaging      Jameson:  NO   Adequate UO     I&O's Detail    05 Apr 2025 07:01  -  06 Apr 2025 07:00  --------------------------------------------------------  IN:  Total IN: 0 mL    OUT:    Chest Tube (mL): 1720 mL    Voided (mL): 300 mL  Total OUT: 2020 mL    Total NET: -2020 mL               Discussed with:     Cultures:	        Radiology

## 2025-04-06 NOTE — DISCHARGE NOTE NURSING/CASE MANAGEMENT/SOCIAL WORK - NSDCPEFALRISK_GEN_ALL_CORE
For information on Fall & Injury Prevention, visit: [email or web address] OR  [email or web address] OR  https://www.cdc.gov/steadi/patient.html

## 2025-04-06 NOTE — PROGRESS NOTE ADULT - SUBJECTIVE AND OBJECTIVE BOX
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 Pt endorses this is similar to his prior episodes of SOB and it is predominately exertional in nature. Pt reports that  this is similar to prior episodes. Pt endorses visiting Dr. Fung last Wed. and placing him on 2L NC d/t the SOB, pt endorses using 3L at home. Pt states that also a deep productive cough w/ white phlegm of which has been chronic. Of note, pt was admitted from 3/3-3/7 with large left pleural effusion and LUE DVT. Pt had thoracentesis done of which 3.1 L of fluid from the left thorax. Of note, pt was admitted on 3/19-3/23 for pleural effusion and NSTEMI. Pt had a thoracentesis done of which drained 560 cc from the left thorax. Pt reports last receiving FolFox two weeks ago (received four cycles so far), but will not receive any more per Dr. Fung. On 3L NC in room.  Known to us from outpatient office.  Sees Dr. Eugene.  States he is urinating less than usual.  NO N/V.  SOB improved.      No N/V/SOB    PAST MEDICAL & SURGICAL HISTORY:  Gastric lymphoma      Anemia of chronic disease      Pleural effusion      DVT (deep venous thrombosis)      Hypertension      Hyperlipidemia      S/P appendectomy  2014      S/P cholecystectomy  15 years ago           FAMILY HISTORY:  Family history of coronary artery disease in father  Father -  age 60 following [facility]    Social History:Non smoker    MEDICATIONS  (STANDING):  albumin human 25% IVPB 100 milliLiter(s) IV Intermittent once  apixaban 5 milliGRAM(s) Oral every 12 hours  atorvastatin 40 milliGRAM(s) Oral at bedtime  clopidogrel Tablet 75 milliGRAM(s) Oral daily  influenza   Vaccine 0.5 milliLiter(s) IntraMuscular once  labetalol 100 milliGRAM(s) Oral two times a day  lactated ringers. 1000 milliLiter(s) (75 mL/Hr) IV Continuous <Continuous>  sodium chloride 0.9%. 1000 milliLiter(s) (60 mL/Hr) IV Continuous <Continuous>    MEDICATIONS  (PRN):  acetaminophen     Tablet .. 1000 milliGRAM(s) Oral every 6 hours PRN Temp greater or equal to 38C (100.4F), Mild Pain (1 - 3), Moderate Pain (4 - 6)  guaiFENesin Oral Liquid (Sugar-Free) 200 milliGRAM(s) Oral every 6 hours PRN Cough  oxyCODONE    IR 5 milliGRAM(s) Oral every 4 hours PRN Severe Pain (7 - 10)   Meds reviewed    Allergies    Bactrim DS (Hives)    Intolerances         REVIEW OF SYSTEMS:    as above    ICU Vital Signs Last 24 Hrs  T(C): 36.3 (2025 05:05), Max: 37.1 (2025 21:14)  T(F): 97.3 (2025 05:05), Max: 98.7 (2025 21:14)  HR: 80 (2025 05:05) (80 - 88)  BP: 110/69 (2025 05:05) (110/69 - 115/71)  BP(mean): --  ABP: --  ABP(mean): --  RR: 19 (2025 05:05) (19 - 19)  SpO2: 98% (2025 05:05) (96% - 98%)    O2 Parameters below as of 2025 05:05  Patient On (Oxygen Delivery Method): nasal cannula  O2 Flow (L/min): 3          PHYSICAL EXAM:    GENERAL: NAD  HEAD:  Atraumatic, Normocephalic  EYES: EOMI, conjunctiva and sclera clear  ENMT: No Drainage from nares, No drainage from ears  NERVOUS SYSTEM:  Awake and Alert  CHEST/LUNG: Clear to percussion bilaterally  EXTREMITIES:  No Edema  SKIN: No rashes No obvious ecchymosis      LABS:                                   8.8    8.63  )-----------( 542      ( 2025 08:00 )             28.8     04-06    131[L]  |  97  |  40[H]  ----------------------------<  102[H]  4.6   |  29  |  1.50[H]    Ca    9.6      2025 08:00  Phos  4.1     04-06  Mg     2.5     04-06    TPro  6.4  /  Alb  2.3[L]  /  TBili  0.3  /  DBili  x   /  AST  20  /  ALT  14  /  AlkPhos  84  04-06      Urinalysis Basic - ( 2025 08:00 )    Color: x / Appearance: x / SG: x / pH: x  Gluc: 102 mg/dL / Ketone: x  / Bili: x / Urobili: x   Blood: x / Protein: x / Nitrite: x   Leuk Esterase: x / RBC: x / WBC x   Sq Epi: x / Non Sq Epi: x / Bacteria: x

## 2025-04-06 NOTE — DISCHARGE NOTE NURSING/CASE MANAGEMENT/SOCIAL WORK - FINANCIAL ASSISTANCE
[facility] provides services at a reduced cost to those who are determined to be eligible through [facility]’s financial assistance program. Information regarding [facility]’s financial assistance program can be found by going to [email or web address] or by calling [phone].

## 2025-04-06 NOTE — PROGRESS NOTE ADULT - ASSESSMENT
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 Slightly improved multiloculated left pleural effusion along with pleural nodularity and septal lines concerning for malignant etiology.   - Pt has been admitted twice in the past month in which thoracentesis was performed which drained malignant pleural effusions on the left  - Now s/p Pleurx 4/4  - Tolerated procedure well, cardiac status optimal post operatively     - TTE (3/20/2025) shows EF 60-65%, grade 1 diastolic dysfunction.   - s/p Bumex 1 mg IV every 12 with albumin.  - Creatinine improving  - Can change to home dose PO Bumex    - Strict I/Os, daily weights.    - EKG: SR   - Troponin elevated to 275-> 260.7,  may be residual from recent type 2 NSTEMI, that was medically managed   - no anginal complaints at this time   - Plavix resumed post procedure. No need for ASA.   - Continue statin     - BP stable and controlled   - Continue home labetalol  - Hold losartan in setting of worsening renal function.     -apixiban resumed for  DVT     - Monitor and replete lytes, keep K>4, Mg>2.  - Will continue to follow.    Melvin Sofia DNP, AGACNP-BC  Cardiology   Call Teams

## 2025-04-06 NOTE — CASE MANAGEMENT PROGRESS NOTE - NSCMPROGRESSNOTE_GEN_ALL_CORE
Per MD, patient is medically cleared for discharge home today.  CM met with patient to discuss discharge disposition to home with [facility]. Patient is requesting start of care on 4/8/25 as he has an appointment with the oncologist on 4/7/25. Discharge notice signed and copy given to patient. Patient has an oxygen concentrator and portable oxygen currently. Brother will bring POC and transport patient home. Patient verbalized understanding of the transition plan and is in agreement. CM answered all questions to the best of my abilities. CM remains available throughout hospital stay.

## 2025-04-06 NOTE — PROGRESS NOTE ADULT - PROVIDER SPECIALTY LIST ADULT
Cardiology
Heme/Onc
Heme/Onc
Pulmonology
Thoracic Surgery
Vascular Surgery
Cardiology
Nephrology
Cardiology
Heme/Onc
Thoracic Surgery
Thoracic Surgery
Pulmonology
Pulmonology
Hospitalist

## 2025-04-06 NOTE — PROGRESS NOTE ADULT - NS ATTEND AMEND GEN_ALL_CORE FT
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 Slightly improved multiloculated left pleural effusion along with pleural nodularity and septal lines concerning for malignant etiology.   - Pt has been admitted twice in the past month in which thoracentesis was performed which drained malignant pleural effusions on the left  - Now s/p Pleurx 4/4  - Tolerated procedure well, cardiac status optimal post operatively   - Can change to home dose PO Bumex    - Plavix resumed post procedure. No need for ASA.   - Continue statin   - Continue home labetalol  - Hold losartan in setting of worsening renal function.   -apixiban resumed for  DVT
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  He appears to be volume overloaded on exam.  He has pleural effusions are likely malignant.  And the cause of his shortness of breath.  He needs a therapeutic thoracentesis along with a Pleurx catheter placement.  Thoracic surgery aware. Planned for PleurX on 4/3.   He is total body volume overloaded.  His creatinine has been rising since starting Bumex.  I think this is likely from his hypoalbuminemia.  Would continue Bumex 1 mg IV every 12 with albumin.   Strict I's and O's Daily weights and monitor creatinine/bicarb  Echocardiogram done on 3/20/2025 shows EF 60-65%, grade 1 diastolic dysfunction.     His troponins are mildly elevated.  His last admission a few weeks ago saw him have a type II MI that was medically managed.  Trop 275-->269. No need to trend further.  He denies any anginal symptoms at this time.  He is EKG does not have any significant ischemic changes.  Monitor on telemetry.  Resume Plavix when able post procedure. No need for ASA as we do not want him to be on triple therapy.     cont ac for DVT (currently on heparin gtt), on Eliquis at home  Further cardiac workup will depend on clinical course.  He is optimized from a cardiac standpoint for his PleurX.
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  - Plavix on hold resume post procedure. No need for ASA.   - Continue home labetalol  - Hold losartan in setting of worsening renal function.   - DVT on home eliquis changed to heparin for OR
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  - Can change to home dose PO Bumex    - Plavix on hold resume post procedure. No need for ASA.   - Continue statin   - Continue home labetalol  - Hold losartan in setting of worsening renal function.   - DVT on home Eliquis changed to heparin for OR. Can resume Eliquis
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  He appears to be volume overloaded on exam.  He has pleural effusions are likely malignant.  And the cause of his shortness of breath.  He needs a therapeutic thoracentesis along with a Pleurx catheter placement.  Thoracic surgery aware. Planned for PleurX on 4/3.   He is total body volume overloaded.  His creatinine has been rising since starting Bumex.  I think this is likely from his hypoalbuminemia.  Would give Bumex 1 mg IV every 12 with albumin. If Cr continues to rise in AM, may need to hold diuretic.   Strict I's and O's Daily weights and monitor creatinine/bicarb  Echocardiogram done on 3/20/2025 shows EF 60-65%, grade 1 diastolic dysfunction.     His troponins are mildly elevated.  His last admission a few weeks ago saw him have a type II MI that was medically managed.  Trop 275-->269. No need to trend further.  He denies any anginal symptoms at this time.  He is EKG does not have any significant ischemic changes.  Monitor on telemetry.  Resume Plavix when able post procedure. No need for ASA as we do not want him to be on triple therapy.     cont ac for DVT (currently on heparin gtt), on Eliquis at home  Further cardiac workup will depend on clinical course.  - All other workup per primary team.  - Will continue to follow.  He is optimized from a cardiac standpoint for his PleurX.

## 2025-04-06 NOTE — PROGRESS NOTE ADULT - ASSESSMENT
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    mets ca  malignant pleural eff  HTN  anemia  dyspnea  Atelectasis  DVT hx  gastric ca    right pleurx in  thoracic follow up  renal eval noted -   I moustapha  pain relief    I moustapha  fio2 support  cardio eval and optimization of cvs rx regimen  tele monitoring  I and O  replete lytes  thoracic sx consultation for pleurx placement  TTE w/ normal LV & RV size and function EF 60-65%, Mild MR, small pericardial effusion, Grade 1 DD  follows with Dr Mckeon - Onc MD

## 2025-04-06 NOTE — PROGRESS NOTE ADULT - ASSESSMENT
[ASSESSMENT and  PLAN]  C16.7     Poorly differentiated gastric cancer  C77.1     Lymph node metastasis  R18.0     Malignant ascites  J91.0     Malignant pleural effusion  D63.8    Anemia due to chronic disease  D50.0    Iron deficiency anemia  D75.83  Reactive thrombocytosis  I21.4      NSTEMI, recent    63 yo man w met poorly diff gastric ca (neck/axillary/abdomen LN mets, under care Dr Mckeon [facility], on FOLFOX chemo p3ptzls, Dx'd 11/2024, adm [facility] 12/2024 w MARYSOL req temporary HD.    04/01/2025 now admitted for R effusion.   seen by CT surgery. Planned pleurex for today        RECOMMENDATIONS    R effusion  CT surgery eval appreciated  s/p thoracentesis prior.   s/p pleurex 4/4/2025  to resume anticoagulation for LUE DVT when cleared by thoracic surgery. is back on eliquis    Follow CBC and transfuse as indicated    to continue Heme/Onc followup w own doctor Dr Mckeon upon discharge  will followup with outside oncologist on discharge.  has appt tomorrow  is heme onc stable for discharge.

## 2025-04-06 NOTE — DISCHARGE NOTE NURSING/CASE MANAGEMENT/SOCIAL WORK - NSDCVIVACCINE_GEN_ALL_CORE_FT
influenza, injectable, quadrivalent, preservative free; 26-Nov-2014 17:06; Dulce Hair (RN); [employer]; ; IntraMuscular; Deltoid Left.; 0.5 milliLiter(s);   Tdap; 08-Apr-2021 16:20; Lilly OlsonRN); [employer]; [id number] (Exp. Date: 10-Shekhar-2022); IntraMuscular; Deltoid Left.; 0.5 milliLiter(s); VIS (VIS Published: 09-May-2013, VIS Presented: 08-Apr-2021);

## 2025-04-06 NOTE — PROGRESS NOTE ADULT - ASSESSMENT
MARYSOL on CKD 3  Hyponatremia  HTN    -Baseline Creatinine 1.3  -MARYSOL Likely 2/2 decreased EABV  -Reviewed Urine lytes  -Reviewed UA  -Reviewed Ur Osm  -S/p albumin  -Goal sodium correction 6-8 meq in 24 hour period, correcting appropriately  -Creatinine improving  -No renal objection to DC, hold bumex on DC for 2-3 days.   Follow up with Dr. Eugene in 1 week    d/w family

## 2025-04-09 ENCOUNTER — INPATIENT (INPATIENT)
Facility: HOSPITAL | Age: 65
LOS: 7 days | Discharge: HOSPICE | DRG: 316 | End: 2025-04-17
Attending: GENERAL PRACTICE | Admitting: STUDENT IN AN ORGANIZED HEALTH CARE EDUCATION/TRAINING PROGRAM
Payer: COMMERCIAL

## 2025-04-09 VITALS
WEIGHT: 205.03 LBS | DIASTOLIC BLOOD PRESSURE: 56 MMHG | RESPIRATION RATE: 28 BRPM | OXYGEN SATURATION: 100 % | SYSTOLIC BLOOD PRESSURE: 82 MMHG | HEIGHT: 67 IN | HEART RATE: 68 BPM | TEMPERATURE: 97 F

## 2025-04-09 DIAGNOSIS — I25.10 ATHEROSCLEROTIC HEART DISEASE OF NATIVE CORONARY ARTERY WITHOUT ANGINA PECTORIS: ICD-10-CM

## 2025-04-09 DIAGNOSIS — D63.8 ANEMIA IN OTHER CHRONIC DISEASES CLASSIFIED ELSEWHERE: ICD-10-CM

## 2025-04-09 DIAGNOSIS — Z90.49 ACQUIRED ABSENCE OF OTHER SPECIFIED PARTS OF DIGESTIVE TRACT: Chronic | ICD-10-CM

## 2025-04-09 DIAGNOSIS — I80.229 PHLEBITIS AND THROMBOPHLEBITIS OF UNSPECIFIED POPLITEAL VEIN: ICD-10-CM

## 2025-04-09 DIAGNOSIS — N17.9 ACUTE KIDNEY FAILURE, UNSPECIFIED: ICD-10-CM

## 2025-04-09 DIAGNOSIS — C85.99 NON-HODGKIN LYMPHOMA, UNSPECIFIED, EXTRANODAL AND SOLID ORGAN SITES: ICD-10-CM

## 2025-04-09 DIAGNOSIS — E78.5 HYPERLIPIDEMIA, UNSPECIFIED: ICD-10-CM

## 2025-04-09 DIAGNOSIS — E87.1 HYPO-OSMOLALITY AND HYPONATREMIA: ICD-10-CM

## 2025-04-09 DIAGNOSIS — I95.9 HYPOTENSION, UNSPECIFIED: ICD-10-CM

## 2025-04-09 DIAGNOSIS — Z98.89 OTHER SPECIFIED POSTPROCEDURAL STATES: Chronic | ICD-10-CM

## 2025-04-09 DIAGNOSIS — Z29.9 ENCOUNTER FOR PROPHYLACTIC MEASURES, UNSPECIFIED: ICD-10-CM

## 2025-04-09 DIAGNOSIS — J90 PLEURAL EFFUSION, NOT ELSEWHERE CLASSIFIED: ICD-10-CM

## 2025-04-09 DIAGNOSIS — I82.409 ACUTE EMBOLISM AND THROMBOSIS OF UNSPECIFIED DEEP VEINS OF UNSPECIFIED LOWER EXTREMITY: ICD-10-CM

## 2025-04-09 LAB
ALBUMIN SERPL ELPH-MCNC: 1.9 G/DL — LOW (ref 3.3–5)
ALP SERPL-CCNC: 104 U/L — SIGNIFICANT CHANGE UP (ref 40–120)
ALT FLD-CCNC: 20 U/L — SIGNIFICANT CHANGE UP (ref 12–78)
ANION GAP SERPL CALC-SCNC: 10 MMOL/L — SIGNIFICANT CHANGE UP (ref 5–17)
ANISOCYTOSIS BLD QL: SLIGHT — SIGNIFICANT CHANGE UP
APTT BLD: 29.3 SEC — SIGNIFICANT CHANGE UP (ref 24.5–35.6)
AST SERPL-CCNC: 32 U/L — SIGNIFICANT CHANGE UP (ref 15–37)
BASOPHILS # BLD AUTO: 0 K/UL — SIGNIFICANT CHANGE UP (ref 0–0.2)
BASOPHILS NFR BLD AUTO: 0 % — SIGNIFICANT CHANGE UP (ref 0–2)
BILIRUB SERPL-MCNC: 0.3 MG/DL — SIGNIFICANT CHANGE UP (ref 0.2–1.2)
BUN SERPL-MCNC: 71 MG/DL — HIGH (ref 7–23)
CALCIUM SERPL-MCNC: 9.2 MG/DL — SIGNIFICANT CHANGE UP (ref 8.5–10.1)
CHLORIDE SERPL-SCNC: 94 MMOL/L — LOW (ref 96–108)
CK SERPL-CCNC: 75 U/L — SIGNIFICANT CHANGE UP (ref 26–308)
CO2 SERPL-SCNC: 26 MMOL/L — SIGNIFICANT CHANGE UP (ref 22–31)
CREAT SERPL-MCNC: 3.3 MG/DL — HIGH (ref 0.5–1.3)
EGFR: 20 ML/MIN/1.73M2 — LOW
EGFR: 20 ML/MIN/1.73M2 — LOW
EOSINOPHIL # BLD AUTO: 0.1 K/UL — SIGNIFICANT CHANGE UP (ref 0–0.5)
EOSINOPHIL NFR BLD AUTO: 1 % — SIGNIFICANT CHANGE UP (ref 0–6)
FLUAV AG NPH QL: SIGNIFICANT CHANGE UP
FLUBV AG NPH QL: SIGNIFICANT CHANGE UP
GLUCOSE SERPL-MCNC: 107 MG/DL — HIGH (ref 70–99)
HCT VFR BLD CALC: 28.3 % — LOW (ref 39–50)
HGB BLD-MCNC: 8.9 G/DL — LOW (ref 13–17)
INR BLD: 1.57 RATIO — HIGH (ref 0.85–1.16)
LACTATE SERPL-SCNC: 2.7 MMOL/L — HIGH (ref 0.7–2)
LYMPHOCYTES # BLD AUTO: 0.39 K/UL — LOW (ref 1–3.3)
LYMPHOCYTES # BLD AUTO: 4 % — LOW (ref 13–44)
MACROCYTES BLD QL: SLIGHT — SIGNIFICANT CHANGE UP
MANUAL SMEAR VERIFICATION: SIGNIFICANT CHANGE UP
MCHC RBC-ENTMCNC: 19.9 PG — LOW (ref 27–34)
MCHC RBC-ENTMCNC: 31.4 G/DL — LOW (ref 32–36)
MCV RBC AUTO: 63.3 FL — LOW (ref 80–100)
MICROCYTES BLD QL: SIGNIFICANT CHANGE UP
MONOCYTES # BLD AUTO: 1.36 K/UL — HIGH (ref 0–0.9)
MONOCYTES NFR BLD AUTO: 14 % — SIGNIFICANT CHANGE UP (ref 2–14)
MYELOCYTES NFR BLD: 2 % — HIGH (ref 0–0)
NEUTROPHILS # BLD AUTO: 7.69 K/UL — HIGH (ref 1.8–7.4)
NEUTROPHILS NFR BLD AUTO: 79 % — HIGH (ref 43–77)
NRBC # BLD: 1 /100 WBCS — HIGH (ref 0–0)
NRBC BLD AUTO-RTO: SIGNIFICANT CHANGE UP /100 WBCS (ref 0–0)
NRBC BLD-RTO: 1 /100 WBCS — HIGH (ref 0–0)
NT-PROBNP SERPL-SCNC: 3483 PG/ML — HIGH (ref 0–125)
PLAT MORPH BLD: NORMAL — SIGNIFICANT CHANGE UP
PLATELET # BLD AUTO: 498 K/UL — HIGH (ref 150–400)
POIKILOCYTOSIS BLD QL AUTO: SLIGHT — SIGNIFICANT CHANGE UP
POLYCHROMASIA BLD QL SMEAR: SLIGHT — SIGNIFICANT CHANGE UP
POTASSIUM SERPL-MCNC: 5 MMOL/L — SIGNIFICANT CHANGE UP (ref 3.5–5.3)
POTASSIUM SERPL-SCNC: 5 MMOL/L — SIGNIFICANT CHANGE UP (ref 3.5–5.3)
PROT SERPL-MCNC: 6.1 G/DL — SIGNIFICANT CHANGE UP (ref 6–8.3)
PROTHROM AB SERPL-ACNC: 18.4 SEC — HIGH (ref 9.9–13.4)
RBC # BLD: 4.47 M/UL — SIGNIFICANT CHANGE UP (ref 4.2–5.8)
RBC # FLD: 21.3 % — HIGH (ref 10.3–14.5)
RBC BLD AUTO: ABNORMAL
RSV RNA NPH QL NAA+NON-PROBE: SIGNIFICANT CHANGE UP
SARS-COV-2 RNA SPEC QL NAA+PROBE: SIGNIFICANT CHANGE UP
SCHISTOCYTES BLD QL AUTO: SLIGHT — SIGNIFICANT CHANGE UP
SODIUM SERPL-SCNC: 130 MMOL/L — LOW (ref 135–145)
SOURCE RESPIRATORY: SIGNIFICANT CHANGE UP
TROPONIN I, HIGH SENSITIVITY RESULT: 72.1 NG/L — SIGNIFICANT CHANGE UP
WBC # BLD: 9.73 K/UL — SIGNIFICANT CHANGE UP (ref 3.8–10.5)
WBC # FLD AUTO: 9.73 K/UL — SIGNIFICANT CHANGE UP (ref 3.8–10.5)

## 2025-04-09 PROCEDURE — 71045 X-RAY EXAM CHEST 1 VIEW: CPT | Mod: 26

## 2025-04-09 PROCEDURE — 71250 CT THORAX DX C-: CPT | Mod: 26

## 2025-04-09 PROCEDURE — 99291 CRITICAL CARE FIRST HOUR: CPT

## 2025-04-09 PROCEDURE — 74176 CT ABD & PELVIS W/O CONTRAST: CPT | Mod: 26

## 2025-04-09 PROCEDURE — 99222 1ST HOSP IP/OBS MODERATE 55: CPT

## 2025-04-09 RX ORDER — APIXABAN 2.5 MG/1
5 TABLET, FILM COATED ORAL
Refills: 0 | Status: DISCONTINUED | OUTPATIENT
Start: 2025-04-09 | End: 2025-04-10

## 2025-04-09 RX ORDER — CEFTRIAXONE 500 MG/1
2000 INJECTION, POWDER, FOR SOLUTION INTRAMUSCULAR; INTRAVENOUS EVERY 24 HOURS
Refills: 0 | Status: COMPLETED | OUTPATIENT
Start: 2025-04-09 | End: 2025-04-13

## 2025-04-09 RX ORDER — INFLUENZA A VIRUS A/IDAHO/07/2018 (H1N1) ANTIGEN (MDCK CELL DERIVED, PROPIOLACTONE INACTIVATED, INFLUENZA A VIRUS A/INDIANA/08/2018 (H3N2) ANTIGEN (MDCK CELL DERIVED, PROPIOLACTONE INACTIVATED), INFLUENZA B VIRUS B/SINGAPORE/INFTT-16-0610/2016 ANTIGEN (MDCK CELL DERIVED, PROPIOLACTONE INACTIVATED), INFLUENZA B VIRUS B/IOWA/06/2017 ANTIGEN (MDCK CELL DERIVED, PROPIOLACTONE INACTIVATED) 15; 15; 15; 15 UG/.5ML; UG/.5ML; UG/.5ML; UG/.5ML
0.5 INJECTION, SUSPENSION INTRAMUSCULAR ONCE
Refills: 0 | Status: DISCONTINUED | OUTPATIENT
Start: 2025-04-09 | End: 2025-04-17

## 2025-04-09 RX ORDER — AZITHROMYCIN 250 MG
500 CAPSULE ORAL EVERY 24 HOURS
Refills: 0 | Status: COMPLETED | OUTPATIENT
Start: 2025-04-10 | End: 2025-04-11

## 2025-04-09 RX ORDER — ALBUMIN (HUMAN) 12.5 G/50ML
50 INJECTION, SOLUTION INTRAVENOUS EVERY 6 HOURS
Refills: 0 | Status: COMPLETED | OUTPATIENT
Start: 2025-04-09 | End: 2025-04-10

## 2025-04-09 RX ORDER — CLOPIDOGREL BISULFATE 75 MG/1
75 TABLET, FILM COATED ORAL DAILY
Refills: 0 | Status: DISCONTINUED | OUTPATIENT
Start: 2025-04-09 | End: 2025-04-17

## 2025-04-09 RX ORDER — SODIUM CHLORIDE 9 G/1000ML
1000 INJECTION, SOLUTION INTRAVENOUS ONCE
Refills: 0 | Status: COMPLETED | OUTPATIENT
Start: 2025-04-09 | End: 2025-04-09

## 2025-04-09 RX ORDER — ATORVASTATIN CALCIUM 80 MG/1
40 TABLET, FILM COATED ORAL AT BEDTIME
Refills: 0 | Status: DISCONTINUED | OUTPATIENT
Start: 2025-04-09 | End: 2025-04-17

## 2025-04-09 RX ORDER — ONDANSETRON HCL/PF 4 MG/2 ML
4 VIAL (ML) INJECTION DAILY
Refills: 0 | Status: DISCONTINUED | OUTPATIENT
Start: 2025-04-09 | End: 2025-04-11

## 2025-04-09 RX ORDER — AZITHROMYCIN 250 MG
500 CAPSULE ORAL ONCE
Refills: 0 | Status: COMPLETED | OUTPATIENT
Start: 2025-04-09 | End: 2025-04-09

## 2025-04-09 RX ORDER — AZITHROMYCIN 250 MG
CAPSULE ORAL
Refills: 0 | Status: COMPLETED | OUTPATIENT
Start: 2025-04-09 | End: 2025-04-11

## 2025-04-09 RX ORDER — MIDODRINE HYDROCHLORIDE 5 MG/1
10 TABLET ORAL EVERY 8 HOURS
Refills: 0 | Status: DISCONTINUED | OUTPATIENT
Start: 2025-04-09 | End: 2025-04-17

## 2025-04-09 RX ORDER — NOREPINEPHRINE BITARTRATE 8 MG
0.25 SOLUTION INTRAVENOUS
Qty: 8 | Refills: 0 | Status: DISCONTINUED | OUTPATIENT
Start: 2025-04-09 | End: 2025-04-09

## 2025-04-09 RX ORDER — OXYCODONE HYDROCHLORIDE 30 MG/1
5 TABLET ORAL EVERY 4 HOURS
Refills: 0 | Status: DISCONTINUED | OUTPATIENT
Start: 2025-04-09 | End: 2025-04-10

## 2025-04-09 RX ADMIN — MIDODRINE HYDROCHLORIDE 10 MILLIGRAM(S): 5 TABLET ORAL at 15:55

## 2025-04-09 RX ADMIN — MIDODRINE HYDROCHLORIDE 10 MILLIGRAM(S): 5 TABLET ORAL at 21:52

## 2025-04-09 RX ADMIN — SODIUM CHLORIDE 1000 MILLILITER(S): 9 INJECTION, SOLUTION INTRAVENOUS at 15:55

## 2025-04-09 RX ADMIN — Medication 1000 MILLILITER(S): at 11:40

## 2025-04-09 RX ADMIN — Medication 4 MILLIGRAM(S): at 21:00

## 2025-04-09 RX ADMIN — Medication 250 MILLIGRAM(S): at 22:19

## 2025-04-09 RX ADMIN — ALBUMIN (HUMAN) 50 MILLILITER(S): 12.5 INJECTION, SOLUTION INTRAVENOUS at 19:22

## 2025-04-09 RX ADMIN — ATORVASTATIN CALCIUM 40 MILLIGRAM(S): 80 TABLET, FILM COATED ORAL at 21:52

## 2025-04-09 RX ADMIN — CEFTRIAXONE 100 MILLIGRAM(S): 500 INJECTION, POWDER, FOR SOLUTION INTRAMUSCULAR; INTRAVENOUS at 21:53

## 2025-04-09 NOTE — CONSULT NOTE ADULT - ASSESSMENT
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 Patient is s/p R pleurx placement on 4/4/2025 in which patient recovered without complications. Now presenting with primary complaints of lightheadedness/dizziness, hypotension, and low urine output  CXR findings :  Increased interstitial markings bilaterally slightly greater on the left as before. There is a left effusion with atelectatic change. Underlying inflammatory infectious process not excluded. Smaller right effusion with some increased interstitial markings in the right infrahilar region which are less prominent than before. Small caliber right chest tube noted by the right CP angle. No pneumothorax. Port-A-Cath as before. Borderline cardiomegaly. Regional osseous structures appropriate for age.  HR 70, BP 88/53, SPO2 99 on 4L NC. Afebrile. WC normal, H/H 8.9/28.3, Cr 3.30, Lactate 2.7, BNP 3483    Plan:  - Maintain pleurx  - Drain pleurx every other day  - Rest of care per primary team  - Patient with fluid overload and recurrent pleural effusions, recommend conservative use of IVF  - Monitor vitals and oxygen requirements  - Continue abx  - Will continue to follow  To be discussed with Dr. Reich, further recs to follow

## 2025-04-09 NOTE — CONSULT NOTE ADULT - ATTENDING COMMENTS
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 Admitted for hypotension, MARYSOL, hyponatremia, and present chronic pleural effusion. Cardiology consulted for dyspnea and hypotension.          #Dyspnea with volume overload  #Acute on chronic HFpEF  #Recurrent pleural effusion 2/2 malignancy  - Pt with worsening SOB, has been admitted twice in the past month in which thoracentesis was performed which drained malignant pleural effusions on the left, s/p Pleurx 4/4. Pt states pleurx catheter not draining much per home nurse. Recommend fluid removal with pleurx, pulm eval. Hold diuretics   - Pt with MARYSOL, trend Cr  - Strict I/Os, daily weights.  - hold antihypertensives -- home labetalol, losartan  - ICU consulted by admitting team, recommended midodrine 10mg TID + albumin  - continue plavix, statin  - continue eliquis

## 2025-04-09 NOTE — PATIENT PROFILE ADULT - FUNCTIONAL ASSESSMENT - BASIC MOBILITY 6.
4-calculated by average/Not able to assess (calculate score using [facility] averaging method)

## 2025-04-09 NOTE — H&P ADULT - NSHPPHYSICALEXAM_GEN_ALL_CORE
PHYSICAL EXAM:  GENERAL: NAD  HEENT:  anicteric, moist mucous membranes  CHEST/LUNG:  decreased b/l lower lung sounds. R sided pleurex noted with water seal, dressing C/D/I  HEART:  RRR, S1, S2  ABDOMEN:  BS+, soft, nontender, nondistended  EXTREMITIES: 2+ pitting edema in b/l LEs.  cyanosis, or calf tenderness  NERVOUS SYSTEM: answers questions and follows commands appropriately PHYSICAL EXAM:  GENERAL: NAD  HEENT:  anicteric, moist mucous membranes  CHEST/LUNG:  decreased b/l lower lung sounds. R sided pleurex noted with water seal, dressing C/D/I  HEART:  RRR, S1, S2  ABDOMEN:  BS+, soft, nontender, distended  EXTREMITIES: 2+ pitting edema in b/l LEs.  cyanosis, or calf tenderness  NERVOUS SYSTEM: answers questions and follows commands appropriately

## 2025-04-09 NOTE — ED PROVIDER NOTE - OBJECTIVE STATEMENT
64-year-old male with a history of GI carcinoma, metastatic lymphadenopathy, DVT, hypertension, anemia presents with has been having some persistent shortness of breath since discharge.  The patient was admitted for shortness of breath, had a thoracentesis performed while in the hospital.  Now the patient has been complaining of persistent shortness of breath, but was hypotensive today.  Patient also states he is having some lower extremity edema, decreased urine output.  No acute chest pain.  No abdominal pain.  No vomiting or diarrhea.  No change in activity or behavior.  No aggravating or alleviating factors otherwise noted.  No other acute complaints.

## 2025-04-09 NOTE — CONSULT NOTE ADULT - SUBJECTIVE AND OBJECTIVE BOX
Patient is a 64y old  Male who presents with a chief complaint of hypotension (09 Apr 2025 15:33)      BRIEF HOSPITAL COURSE: ***      Events last 24 hours: ***      PAST MEDICAL & SURGICAL HISTORY:  Gastric lymphoma      Anemia of chronic disease      Pleural effusion      DVT (deep venous thrombosis)      Hypertension      Hyperlipidemia      S/P appendectomy  November 17th 2014      S/P cholecystectomy  15 years ago        SOCIAL HISTORY: Lives at home with his brother. Retired, delivered glass for a living.        Review of Systems:  CONSTITUTIONAL: +Chills, no fevers.  EYES: No visual disturbances  ENMT:  No difficulty hearing  NECK: No pain  RESPIRATORY: +Cough productive of yellow sputum. +Dyspnea on exertion.  CARDIOVASCULAR: No chest pain, palpitations.  GASTROINTESTINAL: +Left sided abdominal pain (which he states is localized to where his mass is). +Vomiting once daily. +Constipation, last BM today - reportedly normal. No diarrhea. No hematemesis, melena or hematochezia.  GENITOURINARY: Decreased urinary output x 1 day. No hematuria or dysuria.  NEUROLOGICAL: +Lightheadedness upon standing. No headaches, numbness, or tremors  SKIN: No rashes, no wounds  MUSCULOSKELETAL: Muscle mass loss and subsequent weakness. Bilateral thigh pain at night. No calf pain  PSYCHIATRIC: +Anxiety, difficulty sleeping        Medications:  azithromycin  IVPB 500 milliGRAM(s) IV Intermittent once  azithromycin  IVPB      cefTRIAXone   IVPB 2000 milliGRAM(s) IV Intermittent every 24 hours    midodrine 10 milliGRAM(s) Oral every 8 hours      oxyCODONE    IR 5 milliGRAM(s) Oral every 4 hours PRN      apixaban 5 milliGRAM(s) Oral two times a day  clopidogrel Tablet 75 milliGRAM(s) Oral daily        atorvastatin 40 milliGRAM(s) Oral at bedtime    albumin human 25% IVPB 50 milliLiter(s) IV Intermittent every 6 hours                ICU Vital Signs Last 24 Hrs  T(C): 36.3 (09 Apr 2025 11:21), Max: 36.3 (09 Apr 2025 11:21)  T(F): 97.3 (09 Apr 2025 11:21), Max: 97.3 (09 Apr 2025 11:21)  HR: 67 (09 Apr 2025 12:50) (67 - 69)  BP: 90/51 (09 Apr 2025 12:50) (73/49 - 90/51)  BP(mean): 58 (09 Apr 2025 11:48) (58 - 58)  ABP: --  ABP(mean): --  RR: 22 (09 Apr 2025 12:40) (22 - 28)  SpO2: 99% (09 Apr 2025 12:40) (99% - 100%)    O2 Parameters below as of 09 Apr 2025 12:40  Patient On (Oxygen Delivery Method): nasal cannula  O2 Flow (L/min): 4              I&O's Detail        LABS:                        8.9    9.73  )-----------( 498      ( 09 Apr 2025 11:35 )             28.3     04-09    130[L]  |  94[L]  |  71[H]  ----------------------------<  107[H]  5.0   |  26  |  3.30[H]    Ca    9.2      09 Apr 2025 11:35    TPro  6.1  /  Alb  1.9[L]  /  TBili  0.3  /  DBili  x   /  AST  32  /  ALT  20  /  AlkPhos  104  04-09          CAPILLARY BLOOD GLUCOSE        PT/INR - ( 09 Apr 2025 11:35 )   PT: 18.4 sec;   INR: 1.57 ratio         PTT - ( 09 Apr 2025 11:35 )  PTT:29.3 sec  Urinalysis Basic - ( 09 Apr 2025 11:35 )    Color: x / Appearance: x / SG: x / pH: x  Gluc: 107 mg/dL / Ketone: x  / Bili: x / Urobili: x   Blood: x / Protein: x / Nitrite: x   Leuk Esterase: x / RBC: x / WBC x   Sq Epi: x / Non Sq Epi: x / Bacteria: x      CULTURES:            Physical Examination:    General: Non-toxic appearing. No acute distress.      HEENT: Pupils equal, reactive to light. Symmetric.    PULM: Diminished. Crackles left base    CVS: Regular rate and rhythm, no murmurs.    ABD: Firmly distended, nontender, dull to percussion diffusely. No peritoneal signs. Normoactive bowel sounds.    EXT: 2+ pitting bilateral LE edema, no calf tenderness.    SKIN: Warm and well perfused.    NEURO: Alert, oriented, interactive, nonfocal        RADIOLOGY: < from: Xray Chest 1 View AP/PA (04.09.25 @ 12:04) >    ACC: [id number] EXAM:  XR CHEST AP OR PA 1V   ORDERED BY: MARCELINO GRAHAM     PROCEDURE DATE:  04/09/2025      INTERPRETATION:  EXAM: XR CHEST    INDICATION: hyoptension, sob, edema PXR    COMPARISON: April 5, 2025    IMPRESSION: Increased interstitial markings bilaterally slightly greater   on the left as before. There is a left effusion with atelectatic change.   Underlying inflammatory infectious process not excluded. Smaller right   effusion with some increased interstitial markings in theright   infrahilar region which are less prominent than before. Small caliber   right chest tube noted by the right CP angle. No pneumothorax.   Port-A-Cath as before. Borderline cardiomegaly. Regional osseous   structures appropriate for age    --- End of Report ---    MARCELA PEREZ MD; Attending Radiologist  This document has been electronically signed. 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 Patient reports dyspnea on exertion that onset the day of discharge and has progressively worsened. Pleurx drainage was attempted yesterday with minimal output. He also reports increasing abdominal distention with onset of left sided pain today, as well as acute on chronic LE edema that has worsened since discharge despite leg elevation and diuretics. His PO intake has been poor, with episodes of regurgitation/vomiting once daily and lightheadedness upon standing. He reports he stopped urinating yesterday evening. Also notes a chronic cough, however his sputum has changed from white to yellow over the past two days. Reports chills, but denies fevers. No dysuria, hematuria, diarrhea, hematochezia or melena. He went for his chemotherapy appointment today, however blood pressure was noted to be low therefore appointment was canceled and he was referred to the hospital.     ICU consulted for hypotension. Labs revealed hypochloremic hyponatremia, MARYSOL on CKD, lactate 2.7. BNP 3483. Patient was fluid resuscitated with 1L initially due to concerns for fluid overload. On evaluation, patient was awake and alert, non-toxic, asymptomatic from hypotension. SBP 80-90's with MAP consistently >65. POCUS revealed RV free wall collapse and virtual IVC consistent with intravascular volume depletion. Given additional 1L fluid bolus with improvement in SBP 's.        PAST MEDICAL & SURGICAL HISTORY:  Gastric lymphoma      Anemia of chronic disease      Pleural effusion      DVT (deep venous thrombosis)      Hypertension      Hyperlipidemia      S/P appendectomy  November 17th 2014      S/P cholecystectomy  15 years ago        SOCIAL HISTORY: Lives at home with his brother. Retired, delivered glass for a living.        Review of Systems:  CONSTITUTIONAL: +Chills, no fevers.  EYES: No visual disturbances  ENMT:  No difficulty hearing  NECK: No pain  RESPIRATORY: +Cough productive of yellow sputum. +Dyspnea on exertion.  CARDIOVASCULAR: No chest pain, palpitations.  GASTROINTESTINAL: +Left sided abdominal pain (which he states is localized to where his mass is). +Vomiting once daily. +Constipation, last BM today - reportedly normal. No diarrhea. No hematemesis, melena or hematochezia.  GENITOURINARY: Decreased urinary output x 1 day. No hematuria or dysuria.  NEUROLOGICAL: +Lightheadedness upon standing. No headaches, numbness, or tremors  SKIN: No rashes, no wounds  MUSCULOSKELETAL: Muscle mass loss and subsequent weakness. Bilateral thigh pain at night. No calf pain  PSYCHIATRIC: +Anxiety, difficulty sleeping        Medications:  azithromycin  IVPB 500 milliGRAM(s) IV Intermittent once  azithromycin  IVPB      cefTRIAXone   IVPB 2000 milliGRAM(s) IV Intermittent every 24 hours    midodrine 10 milliGRAM(s) Oral every 8 hours      oxyCODONE    IR 5 milliGRAM(s) Oral every 4 hours PRN      apixaban 5 milliGRAM(s) Oral two times a day  clopidogrel Tablet 75 milliGRAM(s) Oral daily        atorvastatin 40 milliGRAM(s) Oral at bedtime    albumin human 25% IVPB 50 milliLiter(s) IV Intermittent every 6 hours                ICU Vital Signs Last 24 Hrs  T(C): 36.3 (09 Apr 2025 11:21), Max: 36.3 (09 Apr 2025 11:21)  T(F): 97.3 (09 Apr 2025 11:21), Max: 97.3 (09 Apr 2025 11:21)  HR: 67 (09 Apr 2025 12:50) (67 - 69)  BP: 90/51 (09 Apr 2025 12:50) (73/49 - 90/51)  BP(mean): 58 (09 Apr 2025 11:48) (58 - 58)  ABP: --  ABP(mean): --  RR: 22 (09 Apr 2025 12:40) (22 - 28)  SpO2: 99% (09 Apr 2025 12:40) (99% - 100%)    O2 Parameters below as of 09 Apr 2025 12:40  Patient On (Oxygen Delivery Method): nasal cannula  O2 Flow (L/min): 4              I&O's Detail        LABS:                        8.9    9.73  )-----------( 498      ( 09 Apr 2025 11:35 )             28.3     04-09    130[L]  |  94[L]  |  71[H]  ----------------------------<  107[H]  5.0   |  26  |  3.30[H]    Ca    9.2      09 Apr 2025 11:35    TPro  6.1  /  Alb  1.9[L]  /  TBili  0.3  /  DBili  x   /  AST  32  /  ALT  20  /  AlkPhos  104  04-09          CAPILLARY BLOOD GLUCOSE        PT/INR - ( 09 Apr 2025 11:35 )   PT: 18.4 sec;   INR: 1.57 ratio         PTT - ( 09 Apr 2025 11:35 )  PTT:29.3 sec  Urinalysis Basic - ( 09 Apr 2025 11:35 )    Color: x / Appearance: x / SG: x / pH: x  Gluc: 107 mg/dL / Ketone: x  / Bili: x / Urobili: x   Blood: x / Protein: x / Nitrite: x   Leuk Esterase: x / RBC: x / WBC x   Sq Epi: x / Non Sq Epi: x / Bacteria: x      CULTURES:            Physical Examination:    General: Non-toxic appearing. No acute distress.      HEENT: Pupils equal, reactive to light. Symmetric.    PULM: Diminished. Crackles left base    CVS: Regular rate and rhythm, no murmurs.    ABD: Firmly distended, nontender, dull to percussion diffusely. No peritoneal signs. Normoactive bowel sounds.    EXT: 2+ pitting bilateral LE edema, no calf tenderness.    SKIN: Warm and well perfused.    NEURO: Alert, oriented, interactive, nonfocal        RADIOLOGY: < from: Xray Chest 1 View AP/PA (04.09.25 @ 12:04) >    ACC: [id number] EXAM:  XR CHEST AP OR PA 1V   ORDERED BY: MARCELINO GRAHAM     PROCEDURE DATE:  04/09/2025      INTERPRETATION:  EXAM: XR CHEST    INDICATION: hyoptension, sob, edema PXR    COMPARISON: April 5, 2025    IMPRESSION: Increased interstitial markings bilaterally slightly greater   on the left as before. There is a left effusion with atelectatic change.   Underlying inflammatory infectious process not excluded. Smaller right   effusion with some increased interstitial markings in theright   infrahilar region which are less prominent than before. Small caliber   right chest tube noted by the right CP angle. No pneumothorax.   Port-A-Cath as before. Borderline cardiomegaly. Regional osseous   structures appropriate for age    --- End of Report ---    MARCELA PEREZ MD; Attending Radiologist  This document has been electronically signed. Apr 9 2025 12:34PM   Patient is a 64y old  Male who presents with a chief complaint of hypotension (09 Apr 2025 15:33)      BRIEF HOSPITAL COURSE: 63 yo male, PMHx poorly differentiated metastatic gastric carcinoma with lymphadenopathy and malignant pleural effusions s/p pleurx 4/4/25, LUE and LLE DVT on Apixaban, HTN (recently added second antihypertensive 3 weeks ago), history of renal failure on temporary HD 11/2025, multiple hospitalizations with repeat thoracentesis most recently admitted with shortness of breath, underwent pleur-X placement, and was discharged on 4/6. Patient reports dyspnea on exertion that onset the day of discharge and has progressively worsened. Pleurx drainage was attempted yesterday with minimal output. He also reports increasing abdominal distention with onset of left sided pain today, as well as acute on chronic LE edema that has worsened since discharge despite leg elevation and diuretics. His PO intake has been poor, with episodes of regurgitation/vomiting once daily and lightheadedness upon standing. He reports he stopped urinating yesterday evening. Also notes a chronic cough, however his sputum has changed from white to yellow over the past two days. Reports chills, but denies fevers. No dysuria, hematuria, diarrhea, hematochezia or melena. He went for his chemotherapy appointment today, however blood pressure was noted to be low therefore appointment was canceled and he was referred to the hospital.     ICU consulted for hypotension. Labs revealed hypochloremic hyponatremia, MARYSOL on CKD, lactate 2.7. BNP 3483. Patient was fluid resuscitated with 1L initially due to concerns for fluid overload. On evaluation, patient was awake and alert, non-toxic, asymptomatic from hypotension. SBP 80-90's with MAP consistently >65. POCUS revealed RV free wall collapse and virtual IVC consistent with intravascular volume depletion. Abdominal POCUS with ascites. Given additional 1L fluid bolus with improvement in SBP 's.        PAST MEDICAL & SURGICAL HISTORY:  Gastric lymphoma      Anemia of chronic disease      Pleural effusion      DVT (deep venous thrombosis)      Hypertension      Hyperlipidemia      S/P appendectomy  November 17th 2014      S/P cholecystectomy  15 years ago        SOCIAL HISTORY: Lives at home with his brother. Retired, delivered glass for a living.        Review of Systems:  CONSTITUTIONAL: +Chills, no fevers.  EYES: No visual disturbances  ENMT:  No difficulty hearing  NECK: No pain  RESPIRATORY: +Cough productive of yellow sputum. +Dyspnea on exertion.  CARDIOVASCULAR: No chest pain, palpitations.  GASTROINTESTINAL: +Left sided abdominal pain (which he states is localized to where his mass is). +Vomiting once daily. +Constipation, last BM today - reportedly normal. No diarrhea. No hematemesis, melena or hematochezia.  GENITOURINARY: Decreased urinary output x 1 day. No hematuria or dysuria.  NEUROLOGICAL: +Lightheadedness upon standing. No headaches, numbness, or tremors  SKIN: No rashes, no wounds  MUSCULOSKELETAL: Muscle mass loss and subsequent weakness. Bilateral thigh pain at night. No calf pain  PSYCHIATRIC: +Anxiety, difficulty sleeping        Medications:  azithromycin  IVPB 500 milliGRAM(s) IV Intermittent once  azithromycin  IVPB      cefTRIAXone   IVPB 2000 milliGRAM(s) IV Intermittent every 24 hours    midodrine 10 milliGRAM(s) Oral every 8 hours      oxyCODONE    IR 5 milliGRAM(s) Oral every 4 hours PRN      apixaban 5 milliGRAM(s) Oral two times a day  clopidogrel Tablet 75 milliGRAM(s) Oral daily        atorvastatin 40 milliGRAM(s) Oral at bedtime    albumin human 25% IVPB 50 milliLiter(s) IV Intermittent every 6 hours                ICU Vital Signs Last 24 Hrs  T(C): 36.3 (09 Apr 2025 11:21), Max: 36.3 (09 Apr 2025 11:21)  T(F): 97.3 (09 Apr 2025 11:21), Max: 97.3 (09 Apr 2025 11:21)  HR: 67 (09 Apr 2025 12:50) (67 - 69)  BP: 90/51 (09 Apr 2025 12:50) (73/49 - 90/51)  BP(mean): 58 (09 Apr 2025 11:48) (58 - 58)  ABP: --  ABP(mean): --  RR: 22 (09 Apr 2025 12:40) (22 - 28)  SpO2: 99% (09 Apr 2025 12:40) (99% - 100%)    O2 Parameters below as of 09 Apr 2025 12:40  Patient On (Oxygen Delivery Method): nasal cannula  O2 Flow (L/min): 4              I&O's Detail        LABS:                        8.9    9.73  )-----------( 498      ( 09 Apr 2025 11:35 )             28.3     04-09    130[L]  |  94[L]  |  71[H]  ----------------------------<  107[H]  5.0   |  26  |  3.30[H]    Ca    9.2      09 Apr 2025 11:35    TPro  6.1  /  Alb  1.9[L]  /  TBili  0.3  /  DBili  x   /  AST  32  /  ALT  20  /  AlkPhos  104  04-09          CAPILLARY BLOOD GLUCOSE        PT/INR - ( 09 Apr 2025 11:35 )   PT: 18.4 sec;   INR: 1.57 ratio         PTT - ( 09 Apr 2025 11:35 )  PTT:29.3 sec  Urinalysis Basic - ( 09 Apr 2025 11:35 )    Color: x / Appearance: x / SG: x / pH: x  Gluc: 107 mg/dL / Ketone: x  / Bili: x / Urobili: x   Blood: x / Protein: x / Nitrite: x   Leuk Esterase: x / RBC: x / WBC x   Sq Epi: x / Non Sq Epi: x / Bacteria: x      CULTURES:            Physical Examination:    General: Non-toxic appearing. No acute distress.      HEENT: Pupils equal, reactive to light. Symmetric.    PULM: Diminished. Crackles left base    CVS: Regular rate and rhythm, no murmurs.    ABD: Firmly distended, nontender, dull to percussion diffusely. No peritoneal signs. Normoactive bowel sounds.    EXT: 2+ pitting bilateral LE edema, no calf tenderness.    SKIN: Warm and well perfused.    NEURO: Alert, oriented, interactive, nonfocal        RADIOLOGY: < from: Xray Chest 1 View AP/PA (04.09.25 @ 12:04) >    ACC: [id number] EXAM:  XR CHEST AP OR PA 1V   ORDERED BY: MARCELINO GRAHAM     PROCEDURE DATE:  04/09/2025      INTERPRETATION:  EXAM: XR CHEST    INDICATION: hyoptension, sob, edema PXR    COMPARISON: April 5, 2025    IMPRESSION: Increased interstitial markings bilaterally slightly greater   on the left as before. There is a left effusion with atelectatic change.   Underlying inflammatory infectious process not excluded. Smaller right   effusion with some increased interstitial markings in theright   infrahilar region which are less prominent than before. Small caliber   right chest tube noted by the right CP angle. No pneumothorax.   Port-A-Cath as before. Borderline cardiomegaly. Regional osseous   structures appropriate for age    --- End of Report ---    MARCELA PEREZ MD; Attending Radiologist  This document has been electronically signed. Apr 9 2025 12:34PM

## 2025-04-09 NOTE — ED ADULT NURSE REASSESSMENT NOTE - NS ED NURSE REASSESS COMMENT FT1
pt is sitting on the edge of the bed. Pt educated about hypotension and pt still refused to go back in the bed.

## 2025-04-09 NOTE — H&P ADULT - PROBLEM SELECTOR PLAN 4
CT chest/abd/pelvis (3/3): Large left pleural effusion markedly increased since 11/30/2024 with total atelectasis left lower lobe and partial atelectasis left upper lobe. New mediastinal and left axillary lymphadenopathy. Inflammatory changes associated with left subclavian vein consistent with known DVT. Upper abdominal lymphadenopathy similar to 11/30/2024. Mildly increased pelvic lymphadenopathy. Mesenteric lymphadenopathy and mesenteric fat stranding similar to prior examination. No pulmonary embolus. Limited evaluation segmental and subsegmental branches.  -scheduled for his fifth chemo cycle today (previously on FolFox, was supposed to switch to his new chemo medication today) however BP was noted to be low, and did not receive dose. last chemo was >3 weeks ago  -rt medport in place. Follows w/ Dr. Fung  --hx of beta thal minor  -Heme/Onc consulted (Dr. Hu), f/u recs. CT chest/abd/pelvis (3/3): Large left pleural effusion markedly increased since 11/30/2024 with total atelectasis left lower lobe and partial atelectasis left upper lobe. New mediastinal and left axillary lymphadenopathy. Inflammatory changes associated with left subclavian vein consistent with known DVT. Upper abdominal lymphadenopathy similar to 11/30/2024. Mildly increased pelvic lymphadenopathy. Mesenteric lymphadenopathy and mesenteric fat stranding similar to prior examination. No pulmonary embolus. Limited evaluation segmental and subsegmental branches.  -scheduled for his fifth chemo cycle today (previously on FolFox, was supposed to switch to his new chemo medication today) however BP was noted to be low, and did not receive dose. last chemo was >3 weeks ago  -rt medport in place. Follows w/ Dr. Fung  --hx of beta thal minor  -on home oxy for severe pain, can continue here but would be cautious too as he is hypotensive   -Heme/Onc consulted (Dr. Hu), f/u recs.

## 2025-04-09 NOTE — H&P ADULT - PROBLEM SELECTOR PLAN 2
BUN: 71, Cr: 3.30  -endorses has not been urinating much for the past two days (denies dysuria) BUN: 71, Cr: 3.30  -endorses has not been urinating much for the past two days (denies dysuria)  -Nephro: Check Cortisol AM, IV pressor, IV albumin, recommended wise BUN: 71, Cr: 3.30  -endorses has not been urinating much for the past two days (denies dysuria)  -Nephro consulted ( Dr. Franck Wagoner): Check Cortisol AM, IV pressor, IV albumin, recommended wise  -ordered indwelling wise BUN: 71, Cr: 3.30  -endorses has not been urinating much for the past two days (denies dysuria)  -Nephro consulted ( Dr. Franck Wagoner): Check Cortisol AM, IV pressor, IV albumin, recommended wise  -ordered indwelling wise  -monitor I/Os

## 2025-04-09 NOTE — CONSULT NOTE ADULT - SUBJECTIVE AND OBJECTIVE BOX
[facility]         Cal Soto Patel, Savella, Cohen      [phone] (office)    Reason for Consult:    Interval HPI: Patient seen and examined at bedside.     HPI: 64-year-old male with a history of GI carcinoma, metastatic lymphadenopathy, DVT, hypertension, anemia presents with has been having some persistent shortness of breath since discharge.  The patient was admitted for shortness of breath, had a thoracentesis performed while in the hospital.  Now the patient has been complaining of persistent shortness of breath, but was hypotensive today.  Patient also states he is having some lower extremity edema, decreased urine output.  No acute chest pain.  No abdominal pain.  No vomiting or diarrhea.  No change in activity or behavior.  No aggravating or alleviating factors otherwise noted.  No other acute complaints.    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 Of note, pt was admitted from 3/3-3/7 with large left pleural effusion and LUE DVT. Pt had thoracentesis done of which 3.1 L of fluid from the left thorax. Then was admitted on 3/19-3/23 for pleural effusion and NSTEMI. Pt had a thoracentesis done of which drained 560 cc from the left thorax. Then patient was admitted from - for SOB and had pleurax placement on right chest (currently has very minimal output). Patient was told to hold bumex until today, however patient restarted bumex last night after speaking with his heme-onc doctor. Endorses he had soft bp yesterday (systolic ~102), and then today he was supposed to get his fifth chemo cycle (previously on FolFox, was supposed to switch to his new chemo medication today) however BP was noted to be low and was recommended to come to the hospital. Also endorses lightheadedness today. Patient also endorses decreased urine output for the past couple of days, denies dysuria.     vitals: T: 97.3F, BP: 82/56 -> 90/51, HR: 68, RR: 22, 99% O2 on 4L nc  labs: hgb: 8.9 (baseline), CBC abnormal 2/2 to cancer, Na: 130, Cl: 94, BUN: 71, Cr: 3.30, eGFR: 20, lactate: 2.7, proBNP: 3483  UA: cloudy, trace leuk esterase, 10wbc, moderate bacteria, neg nitrate   RVP neg   X-ray:  Increased interstitial markings bilaterally slightly greater on the left as before. There is a left effusion with atelectatic change.   Underlying inflammatory infectious process not excluded. Smaller right effusion with some increased interstitial markings in the right   infrahilar region which are less prominent than before. Small caliber right chest tube noted by the right CP angle. No pneumothorax.   Port-A-Cath as before. Borderline cardiomegaly. Regional osseous structures appropriate for age  received in the ED: 1L IVB 1x, levo gtt  pending pulm, ICU, cardio, and nephro    (2025 14:45)      PAST MEDICAL & SURGICAL HISTORY:  Gastric lymphoma      Anemia of chronic disease      Pleural effusion      DVT (deep venous thrombosis)      Hypertension      Hyperlipidemia      S/P appendectomy  2014      S/P cholecystectomy  15 years ago          SOCIAL HISTORY: No active tobacco, alcohol or illicit drug use    FAMILY HISTORY:  Family history of coronary artery disease in father  Father -  age 60 following MI        Home Medications:  apixaban 5 mg oral tablet: 1 tab(s) orally 2 times a day (2025 15:01)  losartan 50 mg oral tablet: 1 tab(s) orally once a day (2025 15:01)  oxyCODONE 5 mg oral tablet: 1 tab(s) orally every 4 hours As needed Severe Pain (7 - 10) (2025 15:01)      MEDICATIONS  (STANDING):  albumin human 25% IVPB 50 milliLiter(s) IV Intermittent every 6 hours  lactated ringers Bolus 1000 milliLiter(s) IV Bolus once  midodrine 10 milliGRAM(s) Oral every 8 hours    MEDICATIONS  (PRN):      Allergies    Bactrim DS (Hives)    Intolerances        REVIEW OF SYSTEMS: Negative except as per HPI.    VITAL SIGNS:   Vital Signs Last 24 Hrs  T(C): 36.3 (2025 11:21), Max: 36.3 (2025 11:21)  T(F): 97.3 (2025 11:21), Max: 97.3 (2025 11:21)  HR: 67 (2025 12:50) (67 - 69)  BP: 90/51 (2025 12:50) (73/49 - 90/51)  BP(mean): 58 (2025 11:48) (58 - 58)  RR: 22 (2025 12:40) (22 - 28)  SpO2: 99% (2025 12:40) (99% - 100%)    Parameters below as of 2025 12:40  Patient On (Oxygen Delivery Method): nasal cannula  O2 Flow (L/min): 4      I&O's Summary      PHYSICAL EXAM:  Constitutional: NAD, well-developed  HEENT NC/AT, moist mucous membranes  Pulmonary: Non-labored, breath sounds are clear bilaterally, no wheezing, rales or rhonchi  Cardiovascular: +S1, S2, RRR, no murmur  Gastrointestinal: Soft, nontender, nondistended, normoactive bowel sounds  Extremities: No peripheral edema   Neurological: Alert, strength and sensitivity are grossly intact  Skin: No obvious lesions/rashes  Psych: Mood & affect appropriate    LABS: All Labs Reviewed:                        8.9    9.73  )-----------( 498      ( 2025 11:35 )             28.3     2025 11:35    130    |  94     |  71     ----------------------------<  107    5.0     |  26     |  3.30     Ca    9.2        2025 11:35    TPro  6.1    /  Alb  1.9    /  TBili  0.3    /  DBili  x      /  AST  32     /  ALT  20     /  AlkPhos  104    2025 11:35    PT/INR - ( 2025 11:35 )   PT: 18.4 sec;   INR: 1.57 ratio         PTT - ( 2025 11:35 )  PTT:29.3 sec       [facility]         Cal Soto Patel, Olga, Eron      [phone] (office)    Reason for Consult:    Interval HPI: Patient seen and examined at bedside. Discharged few days ago. Home nurse came to drain pleurx, only a few drops came out. Pt had increasing ZHOU and leg swelling. Cannot walk to bathroom without SOB. Went to onc appt for chemo and BP was low, advised to go to ER. Also feels he has fluid in his abdomen, feeling of fullness and discomfort.    HPI: 64-year-old male with a history of GI carcinoma, metastatic lymphadenopathy, DVT, hypertension, anemia presents with has been having some persistent shortness of breath since discharge.  The patient was admitted for shortness of breath, had a thoracentesis performed while in the hospital.  Now the patient has been complaining of persistent shortness of breath, but was hypotensive today.  Patient also states he is having some lower extremity edema, decreased urine output.  No acute chest pain.  No abdominal pain.  No vomiting or diarrhea.  No change in activity or behavior.  No aggravating or alleviating factors otherwise noted.  No other acute complaints.    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 Of note, pt was admitted from 3/3-3/7 with large left pleural effusion and LUE DVT. Pt had thoracentesis done of which 3.1 L of fluid from the left thorax. Then was admitted on 3/19-3/23 for pleural effusion and NSTEMI. Pt had a thoracentesis done of which drained 560 cc from the left thorax. Then patient was admitted from - for SOB and had pleurax placement on right chest (currently has very minimal output). Patient was told to hold bumex until today, however patient restarted bumex last night after speaking with his heme-onc doctor. Endorses he had soft bp yesterday (systolic ~102), and then today he was supposed to get his fifth chemo cycle (previously on FolFox, was supposed to switch to his new chemo medication today) however BP was noted to be low and was recommended to come to the hospital. Also endorses lightheadedness today. Patient also endorses decreased urine output for the past couple of days, denies dysuria.     vitals: T: 97.3F, BP: 82/56 -> 90/51, HR: 68, RR: 22, 99% O2 on 4L nc  labs: hgb: 8.9 (baseline), CBC abnormal 2/2 to cancer, Na: 130, Cl: 94, BUN: 71, Cr: 3.30, eGFR: 20, lactate: 2.7, proBNP: 3483  UA: cloudy, trace leuk esterase, 10wbc, moderate bacteria, neg nitrate   RVP neg   X-ray:  Increased interstitial markings bilaterally slightly greater on the left as before. There is a left effusion with atelectatic change.   Underlying inflammatory infectious process not excluded. Smaller right effusion with some increased interstitial markings in the right   infrahilar region which are less prominent than before. Small caliber right chest tube noted by the right CP angle. No pneumothorax.   Port-A-Cath as before. Borderline cardiomegaly. Regional osseous structures appropriate for age  received in the ED: 1L IVB 1x, levo gtt  pending pulm, ICU, cardio, and nephro    (2025 14:45)      PAST MEDICAL & SURGICAL HISTORY:  Gastric lymphoma      Anemia of chronic disease      Pleural effusion      DVT (deep venous thrombosis)      Hypertension      Hyperlipidemia      S/P appendectomy  2014      S/P cholecystectomy  15 years ago          SOCIAL HISTORY: No active tobacco, alcohol or illicit drug use    FAMILY HISTORY:  Family history of coronary artery disease in father  Father -  age 60 following MI        Home Medications:  apixaban 5 mg oral tablet: 1 tab(s) orally 2 times a day (2025 15:01)  losartan 50 mg oral tablet: 1 tab(s) orally once a day (2025 15:01)  oxyCODONE 5 mg oral tablet: 1 tab(s) orally every 4 hours As needed Severe Pain (7 - 10) (2025 15:01)      MEDICATIONS  (STANDING):  albumin human 25% IVPB 50 milliLiter(s) IV Intermittent every 6 hours  lactated ringers Bolus 1000 milliLiter(s) IV Bolus once  midodrine 10 milliGRAM(s) Oral every 8 hours    MEDICATIONS  (PRN):      Allergies    Bactrim DS (Hives)    Intolerances        REVIEW OF SYSTEMS: Negative except as per HPI.    VITAL SIGNS:   Vital Signs Last 24 Hrs  T(C): 36.3 (2025 11:21), Max: 36.3 (2025 11:21)  T(F): 97.3 (2025 11:21), Max: 97.3 (2025 11:21)  HR: 67 (2025 12:50) (67 - 69)  BP: 90/51 (2025 12:50) (73/49 - 90/51)  BP(mean): 58 (2025 11:48) (58 - 58)  RR: 22 (2025 12:40) (22 - 28)  SpO2: 99% (2025 12:40) (99% - 100%)    Parameters below as of 2025 12:40  Patient On (Oxygen Delivery Method): nasal cannula  O2 Flow (L/min): 4      I&O's Summary      PHYSICAL EXAM:  Constitutional: NAD, well-developed  HEENT NC/AT, moist mucous membranes  Pulmonary: +diminished b/l with crackles  Cardiovascular: +S1, S2, RRR, no murmur  Gastrointestinal: nontender, +distended  Extremities: 3+ pitting edema  Neurological: Alert, strength and sensitivity are grossly intact  Skin: No obvious lesions/rashes  Psych: Mood & affect appropriate    LABS: All Labs Reviewed:                        8.9    9.73  )-----------( 498      ( 2025 11:35 )             28.3     2025 11:35    130    |  94     |  71     ----------------------------<  107    5.0     |  26     |  3.30     Ca    9.2        2025 11:35    TPro  6.1    /  Alb  1.9    /  TBili  0.3    /  DBili  x      /  AST  32     /  ALT  20     /  AlkPhos  104    2025 11:35    PT/INR - ( 2025 11:35 )   PT: 18.4 sec;   INR: 1.57 ratio         PTT - ( 2025 11:35 )  PTT:29.3 sec       [facility]         Cal Soto Patel, Olga, Eron      [phone] (office)    Reason for Consult: sob, hypotension    Interval HPI: Patient seen and examined at bedside. Discharged few days ago. Home nurse came to drain pleurx, only a few drops came out. Pt had increasing ZHOU and leg swelling. Cannot walk to bathroom without SOB. Went to onc appt for chemo and BP was low, advised to go to ER. Also feels he has fluid in his abdomen, feeling of fullness and discomfort.    HPI: 64-year-old male with a history of GI carcinoma, metastatic lymphadenopathy, DVT, hypertension, anemia presents with has been having some persistent shortness of breath since discharge.  The patient was admitted for shortness of breath, had a thoracentesis performed while in the hospital.  Now the patient has been complaining of persistent shortness of breath, but was hypotensive today.  Patient also states he is having some lower extremity edema, decreased urine output.  No acute chest pain.  No abdominal pain.  No vomiting or diarrhea.  No change in activity or behavior.  No aggravating or alleviating factors otherwise noted.  No other acute complaints.    Patient is a 65 y/o male with PMHx of poorly differentiated metastatic (known cervical and axillary lymphadenopathy) GI carcinoma, LUE and LLE DVT, HTN, on home O2 2/2 to SOB, and anemia presents to the ED with hypotension and SOB. Of note, pt was admitted from 3/3-3/7 with large left pleural effusion and LUE DVT. Pt had thoracentesis done of which 3.1 L of fluid from the left thorax. Then was admitted on 3/19-3/23 for pleural effusion and NSTEMI. Pt had a thoracentesis done of which drained 560 cc from the left thorax. Then patient was admitted from - for SOB and had pleurax placement on right chest (currently has very minimal output). Patient was told to hold bumex until today, however patient restarted bumex last night after speaking with his heme-onc doctor. Endorses he had soft bp yesterday (systolic ~102), and then today he was supposed to get his fifth chemo cycle (previously on FolFox, was supposed to switch to his new chemo medication today) however BP was noted to be low and was recommended to come to the hospital. Also endorses lightheadedness today. Patient also endorses decreased urine output for the past couple of days, denies dysuria.     vitals: T: 97.3F, BP: 82/56 -> 90/51, HR: 68, RR: 22, 99% O2 on 4L nc  labs: hgb: 8.9 (baseline), CBC abnormal 2/2 to cancer, Na: 130, Cl: 94, BUN: 71, Cr: 3.30, eGFR: 20, lactate: 2.7, proBNP: 3483  UA: cloudy, trace leuk esterase, 10wbc, moderate bacteria, neg nitrate   RVP neg   X-ray:  Increased interstitial markings bilaterally slightly greater on the left as before. There is a left effusion with atelectatic change.   Underlying inflammatory infectious process not excluded. Smaller right effusion with some increased interstitial markings in the right   infrahilar region which are less prominent than before. Small caliber right chest tube noted by the right CP angle. No pneumothorax.   Port-A-Cath as before. Borderline cardiomegaly. Regional osseous structures appropriate for age  received in the ED: 1L IVB 1x, levo gtt  pending pulm, ICU, cardio, and nephro    (2025 14:45)      PAST MEDICAL & SURGICAL HISTORY:  Gastric lymphoma      Anemia of chronic disease      Pleural effusion      DVT (deep venous thrombosis)      Hypertension      Hyperlipidemia      S/P appendectomy  2014      S/P cholecystectomy  15 years ago          SOCIAL HISTORY: No active tobacco, alcohol or illicit drug use    FAMILY HISTORY:  Family history of coronary artery disease in father  Father -  age 60 following MI        Home Medications:  apixaban 5 mg oral tablet: 1 tab(s) orally 2 times a day (2025 15:01)  losartan 50 mg oral tablet: 1 tab(s) orally once a day (2025 15:01)  oxyCODONE 5 mg oral tablet: 1 tab(s) orally every 4 hours As needed Severe Pain (7 - 10) (2025 15:01)      MEDICATIONS  (STANDING):  albumin human 25% IVPB 50 milliLiter(s) IV Intermittent every 6 hours  lactated ringers Bolus 1000 milliLiter(s) IV Bolus once  midodrine 10 milliGRAM(s) Oral every 8 hours    MEDICATIONS  (PRN):      Allergies    Bactrim DS (Hives)    Intolerances        REVIEW OF SYSTEMS: Negative except as per HPI.    VITAL SIGNS:   Vital Signs Last 24 Hrs  T(C): 36.3 (2025 11:21), Max: 36.3 (2025 11:21)  T(F): 97.3 (2025 11:21), Max: 97.3 (2025 11:21)  HR: 67 (2025 12:50) (67 - 69)  BP: 90/51 (2025 12:50) (73/49 - 90/51)  BP(mean): 58 (2025 11:48) (58 - 58)  RR: 22 (2025 12:40) (22 - 28)  SpO2: 99% (2025 12:40) (99% - 100%)    Parameters below as of 2025 12:40  Patient On (Oxygen Delivery Method): nasal cannula  O2 Flow (L/min): 4      I&O's Summary      PHYSICAL EXAM:  Constitutional: NAD, well-developed  HEENT NC/AT, moist mucous membranes  Pulmonary: +diminished b/l with crackles  Cardiovascular: +S1, S2, RRR, no murmur  Gastrointestinal: nontender, +distended  Extremities: 3+ pitting edema  Neurological: Alert, strength and sensitivity are grossly intact  Skin: No obvious lesions/rashes  Psych: Mood & affect appropriate    LABS: All Labs Reviewed:                        8.9    9.73  )-----------( 498      ( 2025 11:35 )             28.3     2025 11:35    130    |  94     |  71     ----------------------------<  107    5.0     |  26     |  3.30     Ca    9.2        2025 11:35    TPro  6.1    /  Alb  1.9    /  TBili  0.3    /  DBili  x      /  AST  32     /  ALT  20     /  AlkPhos  104    2025 11:35    PT/INR - ( 2025 11:35 )   PT: 18.4 sec;   INR: 1.57 ratio         PTT - ( 2025 11:35 )  PTT:29.3 sec

## 2025-04-09 NOTE — CONSULT NOTE ADULT - ASSESSMENT
Assessment: Patient is a 65 y/o male with PMHx of poorly differentiated metastatic (known cervical and axillary lymphadenopathy) GI carcinoma, LUE and LLE DVT, HTN, on home O2 2/2 to SOB, and anemia presents to the ED with hypotension and SOB. Admitted for hypotension, MARYSOL, hyponatremia, and present chronic pleural effusion. Cardiology consulted for dyspnea and hypotension.      Plan:     #Dyspnea  #HFpEF  #Recurrent pleural effusion  - Pt with worsening SOB  - EKG  - CXR with b/l effusions  - Pt has been admitted twice in the past month in which thoracentesis was performed which drained malignant pleural effusions on the left, s/p Pleurx 4/4  - TTE (3/20/2025) shows EF 60-65%, grade 1 diastolic dysfunction.     #Hypotension  - hold antihypertensives -- labetalol, losartan  - midodrine  - Monitor and replete lytes, keep K>4, Mg>2.    #NSTEMI medically managed  #CAD  - continue plavix, statin    #Hx of DVT  - continue eliquis    - Other cardiovascular workup will depend on clinical course.  - All other workup per primary team.  - Will continue to follow.             Assessment: Patient is a 65 y/o male with PMHx of poorly differentiated metastatic (known cervical and axillary lymphadenopathy) GI carcinoma, LUE and LLE DVT, HTN, on home O2 2/2 to SOB, and anemia presents to the ED with hypotension and SOB. Admitted for hypotension, MARYSOL, hyponatremia, and present chronic pleural effusion. Cardiology consulted for dyspnea and hypotension.      Plan:     #Dyspnea  #HFpEF  #Recurrent pleural effusion  - Pt with worsening SOB  - Trop neg x1 (trop 72), lactate: 2.7T, proBNP: 3483  - EKG NSR low voltage  - CXR with b/l effusions  - Pt has been admitted twice in the past month in which thoracentesis was performed which drained malignant pleural effusions on the left, s/p Pleurx 4/4  - TTE (3/20/2025) shows EF 60-65%, grade 1 diastolic dysfunction.     #Hypotension  - hold antihypertensives -- homelabetalol, losartan  - ICU was consulted by admitting team, recommended midodrine 10mg TID  - Monitor and replete lytes, keep K>4, Mg>2.    #NSTEMI 3/2025, medically managed without cath  #CAD  - continue plavix, statin    #Hx of DVT  - continue eliquis    - Other cardiovascular workup will depend on clinical course.  - All other workup per primary team.  - Will continue to follow.             Assessment: Patient is a 65 y/o male with PMHx of poorly differentiated metastatic (known cervical and axillary lymphadenopathy) GI carcinoma, LUE and LLE DVT, HTN, on home O2 2/2 to SOB, and anemia presents to the ED with hypotension and SOB. Admitted for hypotension, MARYSOL, hyponatremia, and present chronic pleural effusion. Cardiology consulted for dyspnea and hypotension.      Plan:     #Dyspnea  #HFpEF  #Recurrent pleural effusion  #MARYSOL  - Pt with worsening SOB, has been admitted twice in the past month in which thoracentesis was performed which drained malignant pleural effusions on the left, s/p Pleurx 4/4  - Trop neg x1 (trop 72), lactate: 2.7T, proBNP: 3483  - EKG NSR low voltage  - CXR with b/l effusions  - TTE (3/20/2025) shows EF 60-65%, grade 1 diastolic dysfunction.   - Diuresis   - Trend Cr  - Strict I/Os, daily weights.    #Hypotension  - hold antihypertensives -- home labetalol, losartan  - ICU consulted by admitting team, recommended midodrine 10mg TID + albumin  - Caution with fluids given pleural effusions    #NSTEMI 3/2025, medically managed without cath  #CAD  - continue plavix, statin    #Hx of DVT  - continue eliquis    - Monitor and replete lytes, keep K>4, Mg>2.  - Other cardiovascular workup will depend on clinical course.  - All other workup per primary team.  - Will continue to follow.             Assessment: Patient is a 65 y/o male with PMHx of poorly differentiated metastatic (known cervical and axillary lymphadenopathy) GI carcinoma, LUE and LLE DVT, HTN, on home O2 2/2 to SOB, and anemia presents to the ED with hypotension and SOB. Admitted for hypotension, MARYSOL, hyponatremia, and present chronic pleural effusion. Cardiology consulted for dyspnea and hypotension.      Plan:     #Dyspnea  #HFpEF  #Recurrent pleural effusion 2/2 malignancy  #MARYSOL  - Pt with worsening SOB, has been admitted twice in the past month in which thoracentesis was performed which drained malignant pleural effusions on the left, s/p Pleurx 4/4  - Trop neg x1 (trop 72), lactate: 2.7, proBNP: 3483  - EKG NSR low voltage  - CXR with b/l effusions  - TTE (3/20/2025) shows EF 60-65%, grade 1 diastolic dysfunction.   - Diuresis   - Trend Cr  - Strict I/Os, daily weights.    #Hypotension  - hold antihypertensives -- home labetalol, losartan  - ICU consulted by admitting team, recommended midodrine 10mg TID + albumin  - Caution with fluids given pleural effusions    #NSTEMI 3/2025, medically managed without cath  #CAD  - continue plavix, statin    #Hx of DVT  - continue eliquis    - Monitor and replete lytes, keep K>4, Mg>2.  - Other cardiovascular workup will depend on clinical course.  - All other workup per primary team.  - Will continue to follow.             Assessment: Patient is a 63 y/o male with PMHx of poorly differentiated metastatic (known cervical and axillary lymphadenopathy) GI carcinoma, LUE and LLE DVT, HTN, on home O2 2/2 to SOB, and anemia presents to the ED with hypotension and SOB. Admitted for hypotension, MARYSOL, hyponatremia, and present chronic pleural effusion. Cardiology consulted for dyspnea and hypotension.      Plan:     #Dyspnea with volume overload  #Acute on chronic HFpEF  #Recurrent pleural effusion 2/2 malignancy  - Pt with worsening SOB, has been admitted twice in the past month in which thoracentesis was performed which drained malignant pleural effusions on the left, s/p Pleurx 4/4. Pt states pleurx catheter not draining much per home nurse. Recommend fluid removal with pleurx, pulm eval. Hold diuretics due to hypotension  - Trop neg x1 (trop 72), lactate: 2.7, proBNP: 3483  - EKG NSR low voltage  - CXR with: There is a left effusion with atelectatic change. Underlying inflammatory infectious process not excluded. Smaller right effusion with some increased interstitial markings in the right infrahilar region which are less prominent than before.  - TTE (3/20/2025) shows EF 60-65%, grade 1 diastolic dysfunction.   - Hold diuretics given hypotension  - Pt with MARYSOL, trend Cr  - Strict I/Os, daily weights.    #Hypotension  - hold antihypertensives -- home labetalol, losartan  - ICU consulted by admitting team, recommended midodrine 10mg TID + albumin  - Hold diuretics given hypotension  - Caution with fluids given pleural effusions, volume overload    #NSTEMI 3/2025, medically managed without cath  #CAD  - continue plavix, statin    #Hx of DVT  - continue eliquis    - Monitor and replete lytes, keep K>4, Mg>2.  - Other cardiovascular workup will depend on clinical course.  - All other workup per primary team.  - Will continue to follow.

## 2025-04-09 NOTE — CONSULT NOTE ADULT - ASSESSMENT
Acute on CKD Stage 3  Hypotension  Anemia  Hypoalbuminemia      -MARYSOL secondary to severe hypotension leading to renal hypoperfusion; unclear etiology of the hypotension  -Work up for hypotension is in progress  -Check Cortisol AM  -IV pressor  -IV albumin  -Unable to give fluids at this time given hypervolemic state  -Unable to diurese due to severe hypotension  -Recommend wise to measure urine output  -May need to restart dialysis if hemodynamics can tolerate, but not emergent at this time. Patient very hesitant toward restarting dialysis as well. Will continue to .   -ICU eval in progress  -Daily chem    Thank you    D/W ER    Renal critical care time spent > 34 mins

## 2025-04-09 NOTE — H&P ADULT - PROBLEM SELECTOR PLAN 10
DVT treatment/prophylaxis  -continue home eliquis DVT treatment/prophylaxis  -continue home eliquis    GOC: palliative consulted; full code as of now:   patient has metastatic cancer and has been admitted to the hospital >3 times over the past two months. patient currently does not have high hopes for the future and wanted to be DNR/DNI but was also emotional and near the verge of tears before he said he doesn't know. perhaps he requires an in-depth conversation about his future goals.

## 2025-04-09 NOTE — ED PROVIDER NOTE - CLINICAL SUMMARY MEDICAL DECISION MAKING FREE TEXT BOX
Acute shortness of breath with hypotension, with known metastatic cancer.  Will check labs, x-ray, pulmonology, question nephrology, reeval

## 2025-04-09 NOTE — ED PROVIDER NOTE - DIFFERENTIAL DIAGNOSIS
Differential Diagnosis Rule out acute infection, recurrent effusions, electrolyte abnormality, leukocytosis, kidney insufficiency, other acute pathology

## 2025-04-09 NOTE — H&P ADULT - PROBLEM SELECTOR PLAN 5
Patient has been admitted three times within the past couple of months in which thoracentesis was performed and drained malignant pleural effusions  -pleurex placement on right chest C/D/I  -pulm consulted (Dr. Damico) Patient has been admitted three times within the past couple of months in which thoracentesis was performed and drained malignant pleural effusions  -pleurex placement on right chest C/D/I  -Patient has been complaining of cough for the past two months but now is producing tannish sputum. As patient is immunocompromised, patient may not amount to a fever or wbc.    -Increased lung markings on chest x-ray, no known consolidation, but will cover for possible infection with ceftriaxone 2g daily (would also cover if SBP present-- known minimal ascites, will hold off on tap for now as patient is on eliquis and plavix)   -pulm consulted (Dr. Damico) Patient has been admitted three times within the past couple of months in which thoracentesis was performed and drained malignant pleural effusions  -pleurex placement on right chest C/D/I  -Patient has been complaining of cough for the past two months but now is producing tannish sputum. As patient is immunocompromised, patient may not amount to a fever or wbc.    -lactate increased at 2.7  -Increased lung markings on chest x-ray, no known consolidation, but will cover for possible infection with ceftriaxone 2g daily (would also cover if SBP present-- known minimal ascites, will hold off on tap for now as patient is on eliquis and plavix) and azithromycin   -ordered BC, legionella, strep antibiotics; f/u results   -pulm consulted (Dr. Damico)

## 2025-04-09 NOTE — CONSULT NOTE ADULT - ASSESSMENT
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 Now presents with hypotension, worsening dyspnea on exertion, abdominal distention, LE edema, and anuria.    ICU consulted for hypotension  Though he appears volume overloaded, suspect this is extravascular with relative intravascular depletion as supported by ccPOCUS findings.   He is fluid responsive, given additional 1L IV fluid bolus with improvement in hemodynamics.  Would start midodrine in interim to continue to support end organ perfusion while allowing for gentle IV fluid hydration; agree with standing albumin q6hrs  Left pleural effusion appears small, loculated and relatively unchanged from prior. Right effusion s/p pleurX improved; drain as needed.  ZHOU appears to be mediated by significant abdominal distention with ventilatory restriction. Raises suspicion for component of abd compartment syndrome as well, would place wise.  He is on Apixaban, which he took this morning, for VTE. Would start Heparin gtt tonight (no initial bolus) and consult IR for therapeutic and diagnostic paracentesis; send cultures and cytology for malignancy.  Hold antihypertensives  TTE ordered    Patient does not wish to resume hemodialysis if he were to develop recurrent renal failure; he recounts that his quality of life while on hemodialysis was terrible and he wishes to preserve whatever quality of life he may in whatever time he has left.    At this time, he does not require ICU level of care. Please reconsult should clinical condition change.    Above d/w ICU Attending Dr. Andino 65 yo male, PMHx poorly differentiated metastatic gastric carcinoma with lymphadenopathy and malignant pleural effusions s/p pleurx 4/4/25, LUE and LLE DVT on Apixaban, HTN (recently added second antihypertensive 3 weeks ago), history of renal failure on temporary HD 11/2025, multiple hospitalizations with repeat thoracentesis most recently admitted with shortness of breath, underwent pleur-X placement, and was discharged on 4/6. Now presents with hypotension, worsening dyspnea on exertion, abdominal distention, LE edema, and anuria.    ICU consulted for hypotension  Though he appears volume overloaded, suspect this is extravascular with relative intravascular depletion as supported by ccPOCUS findings.   He is fluid responsive, given additional 1L IV fluid bolus with improvement in hemodynamics.  Would start midodrine in interim to continue to support end organ perfusion while allowing for gentle IV fluid hydration; agree with standing albumin q6hrs  Left pleural effusion appears small, loculated and relatively unchanged from prior. Right effusion s/p pleurX improved; drain as needed.  ZHOU appears to be mediated by significant abdominal distention with ventilatory restriction. Raises suspicion for component of abd compartment syndrome as well, would place wise.  He is on Apixaban, which he took this morning, for VTE. Would start Heparin gtt tonight (no initial bolus) and consult IR for therapeutic and diagnostic paracentesis; send cultures and cytology for malignancy.  Agree with empiric antibiotics given immunocompromised status to cover both pneumonia and SBP  Hold antihypertensives  TTE ordered    Patient does not wish to resume hemodialysis if he were to develop recurrent renal failure; he recounts that his quality of life while on hemodialysis was terrible and he wishes to preserve whatever quality of life he may in whatever time he has left.    At this time, he does not require ICU level of care. Please reconsult should clinical condition change.    Above d/w ICU Attending Dr. Andino

## 2025-04-09 NOTE — H&P ADULT - NSHPSOCIALHISTORY_GEN_ALL_CORE
Tobacco: former smoker quit 15 yrs ago   EtOH: denies  Recreational drug use: denies  Lives with: brother   Ambulates: w/o assistance   ADLs: independent

## 2025-04-09 NOTE — CONSULT NOTE ADULT - SUBJECTIVE AND OBJECTIVE BOX
THORACIC SURGERY CONSULT    HPI:  This is a 64y y/o Male with a PMHx of poorly differentiated metastatic  Gastric  carcinoma on Chemo, H/O HTN,  Anemia, NSTEMI, DVT of LUE on 3/3/25 currently on Eliquis p/w, and recent admissions for Pleural effusion requiring L thoracentesis on 3/4/25 and 3/20/25. Patient is s/p R pleurx placement on 4/4/2025 in which patient recovered without complications. Patient states pain is improved at pleurx site and denies any bleeding or drainage at the incision. States visiting nurse came to his home yesterday to drain the R pleurx in which only a few drops were obtained.   Patient states shortness of breath is unchanged since last admission. States SOB is worse with activity or exertion such as walking. Admits to deep chronic cough with tan/yellow phlegm which used to be white/foamy. Also admits to increased swelling in the legs that he states developed during his most recent admission. Denies feeling SOB at rest. Denies any chest pain or cardiac issues. He denies recent sickness, nasal congestion, fevers. Admits to occasional chills. Also admits to decreased appetite and associated decreased PO intake. Denies any abdominal pain, nausea, or vomiting.     Interval HPI:  Patient states he was supposed to get his 5th round of chemotherapy today. While at his appointment he was noted to have a blood pressure of around 70/40 and was told to got to the ED. Patient states he has been feeling lightheadedness and dizziness since Monday. Admits to overall feeling of fatigue and states he sometimes feels unsteady when walking. He denies any associated chest pain. He also states he has been making very little urine since yesterday, stating he has not urinated at all today. He states he normally takes bumex which was being held until last night. He also complains of increased feeling of fullness in his abdomen which has gotten worse since last admission.     PAST MEDICAL & SURGICAL HISTORY:  Gastric lymphoma      Anemia of chronic disease      Pleural effusion      DVT (deep venous thrombosis)      Hypertension      Hyperlipidemia      S/P appendectomy  November 17th 2014      S/P cholecystectomy  15 years ago      Social History:  Tobacco: former smoker quit 15 yrs ago   EtOH: denies  Recreational drug use: denies  Lives with: brother   Ambulates: w/o assistance   ADLs: independent (09 Apr 2025 14:45)      ALLERGIES: Bactrim DS (Hives)        REVIEW OF SYSTEMS:   Head: denies headaches, dizziness & lightheadedness  Eyes: denies changes in vision, eye pain, double vision & eye discharge  Ears: denies changes in hearing & ear discharge  Nose: denies rhinorrhea  Mouth: denies bleeding gums & sore tongue & sore throat  Neck: denies swollen lymph nodes   Respiratory: Admits to SOB, cough, sputum production. Denies wheezing  Cardiac: denies CP & irregular heart beat  Abdominal: Admits to abdominal fullness and discomfort. Denies change in bowel movements  : Admits to decreased urination. Denies dysuria, frequent urination, hematuria  Musculoskeletal: denies joint pain & muscle pain  Neuro: denies involuntary muscle movements  Psych: AAO x 3, Admits to frustration with health status      MEDICATIONS:  albumin human 25% IVPB 50 milliLiter(s) IV Intermittent every 6 hours  apixaban 5 milliGRAM(s) Oral two times a day  atorvastatin 40 milliGRAM(s) Oral at bedtime  azithromycin  IVPB 500 milliGRAM(s) IV Intermittent once  azithromycin  IVPB      cefTRIAXone   IVPB 2000 milliGRAM(s) IV Intermittent every 24 hours  clopidogrel Tablet 75 milliGRAM(s) Oral daily  midodrine 10 milliGRAM(s) Oral every 8 hours  oxyCODONE    IR 5 milliGRAM(s) Oral every 4 hours PRN        VITAL SIGNS:  T(C): 36.3 (04-09-25 @ 11:21), Max: 36.3 (04-09-25 @ 11:21)  HR: 67 (04-09-25 @ 12:50) (67 - 69)  BP: 90/51 (04-09-25 @ 12:50) (73/49 - 90/51)  RR: 22 (04-09-25 @ 12:40) (22 - 28)  SpO2: 99% (04-09-25 @ 12:40) (99% - 100%)      LABS:         Comprehensive Metabolic Panel (04.09.25 @ 11:35)    Sodium: 130 mmol/L   Potassium: 5.0 mmol/L   Chloride: 94 mmol/L   Carbon Dioxide: 26 mmol/L   Anion Gap: 10 mmol/L   Blood Urea Nitrogen: 71 mg/dL   Creatinine: 3.30 mg/dL   Glucose: 107 mg/dL   Calcium: 9.2 mg/dL   Protein Total: 6.1 g/dL   Albumin: 1.9 g/dL   Bilirubin Total: 0.3 mg/dL   Alkaline Phosphatase: 104 U/L   Aspartate Aminotransferase (AST/SGOT): 32 U/L   Alanine Aminotransferase (ALT/SGPT): 20 U/L   eGFR: 20: The estimated glomerular filtration rate (eGFR) calculation is based on  the 2021 CKD-EPI creatinine equation, which is validated in male and  female population 18 years of age and older (N Engl J Med 2021;  [id number]). mL/min/1.73m2    Complete Blood Count + Automated Diff (04.09.25 @ 11:35)    Auto NRBC: N/A: Manual NRBC performed. /100 WBCs   WBC Count: 9.73 K/uL   RBC Count: 4.47 M/uL   Hemoglobin: 8.9 g/dL   Hematocrit: 28.3 %   Mean Cell Volume: 63.3 fl   Mean Cell Hemoglobin: 19.9 pg   Mean Cell Hemoglobin Conc: 31.4 g/dL   Red Cell Distrib Width: 21.3 %   Platelet Count - Automated: 498 K/uL   Neutrophil #: 7.69 K/uL   Lymphocyte #: 0.39 K/uL   Monocyte #: 1.36 K/uL   Eosinophil #: 0.10 K/uL   Basophil #: 0.00 K/uL   Neutrophil %: 79.0: Differential percentages must be correlated with absolute numbers for  clinical significance. %   Lymphocyte %: 4.0 %   Monocyte %: 14.0 %   Eosinophil %: 1.0 %   Basophil %: 0.0 %    PT/INR - ( 09 Apr 2025 11:35 )   PT: 18.4 sec;   INR: 1.57 ratio         PTT - ( 09 Apr 2025 11:35 )  PTT:29.3 sec    Pro-Brain Natriuretic Peptide (04.09.25 @ 11:35)    Pro-Brain Natriuretic Peptide: 3483 pg/mL    Lactate, Blood (04.09.25 @ 11:35)    Lactate, Blood: 2.7: Elevated lactate. Consider ordering follow-up lactate to trend. mmol/L      IMAGING:   ACC: [id number] EXAM:  XR CHEST AP OR PA 1V   ORDERED BY: MARCELINO GRAHAM     PROCEDURE DATE:  04/09/2025          INTERPRETATION:  EXAM: XR CHEST    INDICATION: hyoptension, sob, edema PXR    COMPARISON: April 5, 2025    IMPRESSION: Increased interstitial markings bilaterally slightly greater   on the left as before. There is a left effusion with atelectatic change.   Underlying inflammatory infectious process not excluded. Smaller right   effusion with some increased interstitial markings in the right   infrahilar region which are less prominent than before. Small caliber   right chest tube noted by the right CP angle. No pneumothorax.   Port-A-Cath as before. Borderline cardiomegaly. Regional osseous   structures appropriate for age    --- End of Report ---            MARCELA PEREZ MD; Attending Radiologist  This document has been electronically signed. Apr 9 2025 12:34PM        PHYSICAL EXAM:  General: No acute distress, alert, cooperative, well developed, well groomed  Skin: no jaundice, moist, normal texture, good skin turgor  Head: Normocephalic  Eyes: PERRL, EOMI, vision grossly intact  Neck: supple, No JVD, No lymphadenopathy  Pulmonary: On 4L NC supplemental O2, good respiratory effort, speaking in full sentences, no accessory muscle use, no intercostal retractions. Coarse breath sounds, decreased breath sounds at lung bases. R pleurx capped, Dressing C/D/I  Cardiac: normal S1 & S2, normal rate & rhythm, no murmurs appreciated. 2+ peripheral edema, no cyanosis or pallor. Extremities are warm & well perfused  Abdomen: Tense, protuberant, fluid-filled. Nontender to palpation.  Extremities: no gross joint deformities or abnormalities, b/l 2+ LE edema, no calf tenderness bilaterally  Psych: AAO x 3, upset and frustrated about health status

## 2025-04-09 NOTE — H&P ADULT - PROBLEM SELECTOR PLAN 7
-03/03 doppler: Extensive left upper extremity DVT extending proximally to involve the right internal jugular and subclavian veins. Left cervical lymphadenopathy with abnormal morphology suggesting malignancy.  -03/06: + LLE DVT  -Continue home eliquis BID

## 2025-04-09 NOTE — ED PROVIDER NOTE - TEMPLATE, MLM
[facility]  [address], [facility] [address]  Phone: [phone]  Fax: [phone]      ASSESSMENT   Diagnoses and all orders for this visit:    1. Lumbar facet arthropathy  -     REFERRAL TO PHYSICAL THERAPY    2. Muscle spasm of back  -     REFERRAL TO PHYSICAL THERAPY    3. Chronic musculoskeletal pain  -     DULoxetine (CYMBALTA) 30 mg capsule; Take 1 po with dinner for 7 days  Indications: CHRONIC MUSCULOSKELETAL PAIN, NEUROPATHIC PAIN  -     DULoxetine (CYMBALTA) 60 mg capsule; 1 po daily with food  Indications: CHRONIC MUSCULOSKELETAL PAIN, NEUROPATHIC PAIN  -     REFERRAL TO PHYSICAL THERAPY    4. Spinal stenosis of lumbar region with neurogenic claudication  -     REFERRAL TO PHYSICAL THERAPY    5. Gait abnormality  -     REFERRAL TO PHYSICAL THERAPY    6. Neuritis  -     DULoxetine (CYMBALTA) 30 mg capsule; Take 1 po with dinner for 7 days  Indications: CHRONIC MUSCULOSKELETAL PAIN, NEUROPATHIC PAIN  -     DULoxetine (CYMBALTA) 60 mg capsule; 1 po daily with food  Indications: CHRONIC MUSCULOSKELETAL PAIN, NEUROPATHIC PAIN  -     REFERRAL TO PHYSICAL THERAPY    7. Cervical pain    8. History of opioid abuse       IMPRESSION AND PLAN:  Salvador Godinez is a 77 y.o. male with history of cervical and lumbar pain. He complains of burning pain in the lower back with aching pain radiating down both legs. Pt reports that occasionally his legs give out on him and he experiences numbness in the legs. 1) Pt was given information on lumbar arthritis exercises. 2) Discussed treatment options with the Pt, including medication, physical therapy, and steroid injections. 3) He was referred to lumbar physical therapy. 4) Pt was prescribed Cymbalta 30 mg 1 tab daily for 1 week. He will then increase to Cymbalta 60 mg daily. 5) Mr. Pam Serrato has a reminder for a \"due or due soon\" health maintenance.  I have asked that he contact his primary care provider, Shanita Parrish MD, for follow-up on this health maintenance. 6)  demonstrated consistency with prescribing. 7) Pt will follow-up in 6 weeks. HISTORY OF PRESENT ILLNESS:  Lars Kawasaki is a 77 y.o. male with history of cervical and lumbar pain. Pt presents to the office today for lumbar MRI follow up. He complains of burning pain in the lower back with aching pain radiating down both legs. Pt reports that occasionally his legs give out on him. He complains of numbness in the legs and admits to falling. Of note, patient had a prior L4-5 fusion in 02/2016. Pt also had C5-6,C6-7 ACDF with Dr. Kylie Salomon on 05/18/2017. He continues to perform neck exercises with benefit but still has some stiffness. Pt admits to pain in the left arm and has difficulty turning in bed. He recently tried lumbar physical therapy with significant improvement. Pt reports experiencing loss of appetite and experiencing confusion when taking Neurontin 300 mg. He has tried multiple medications in the past including Lyrica and states that at this time he is not particularly interested in trying additional medication. Pt reports that he is not currently taking antidepressants or anti anxiety medication. He no longer takes Celexa and denies any suicidal ideation. Pt at this time desires to proceed with a referral to physical therapy. Of note, patient is a smoker.      Pain Scale: 4/10    PCP: Shanita Parrish MD       Past Medical History:   Diagnosis Date    Bilateral hip pain     Chronic pain     back; hx of opiod addiction    Depression     Diabetes ([facility] [address]     borderline, no meds-trying to control with diet    DJD (degenerative joint disease)     GERD (gastroesophageal reflux disease)     Hepatitis C January, 2015    Hypertension     Kidney stones     Lymphadenopathy, generalized 12/05/2015    neg bx    Numbness of foot left    resolved per patient 1/29/16    S/P lumbar fusion 2/9/2016    L4/5 LAMINECTOMY/FUSION/TRANSFORAMINAL LUMBAR INTERBODY FUSION (TLIF) by Dr. Arline Briggs on 2/8/16     Unspecified sleep apnea     no cpap machine-pt denies        Social History     Social History    Marital status:      Spouse name: N/A    Number of children: N/A    Years of education: N/A     Occupational History    Not on file. Social History Main Topics    Smoking status: Current Every Day Smoker     Packs/day: 0.25     Years: 40.00     Types: Cigarettes     Last attempt to quit: 5/12/2017    Smokeless tobacco: Never Used    Alcohol use No    Drug use: Yes     Special: Marijuana, Cocaine      Comment: stopped cocaine 2007, marijuana 5/14/17    Sexual activity: No     Other Topics Concern    Not on file     Social History Narrative       Current Outpatient Prescriptions   Medication Sig Dispense Refill    DULoxetine (CYMBALTA) 30 mg capsule Take 1 po with dinner for 7 days  Indications: CHRONIC MUSCULOSKELETAL PAIN, NEUROPATHIC PAIN 7 Cap 0    DULoxetine (CYMBALTA) 60 mg capsule 1 po daily with food  Indications: CHRONIC MUSCULOSKELETAL PAIN, NEUROPATHIC PAIN 30 Cap 2    lisinopril (PRINIVIL, ZESTRIL) 20 mg tablet Take 1 Tab by mouth daily. 90 Tab 1    amLODIPine (NORVASC) 10 mg tablet Take 1 Tab by mouth daily. 90 Tab 1    omeprazole (PRILOSEC) 20 mg capsule Take 1 Cap by mouth daily. 90 Cap 3    gabapentin (NEURONTIN) 300 mg capsule Take 1 Cap by mouth three (3) times daily. 90 Cap 1    HYDROcodone-acetaminophen (NORCO) 5-325 mg per tablet Take 1 Tab by mouth every four (4) hours as needed for Pain. Max Daily Amount: 6 Tabs. 10 Tab 0    terbinafine HCl (LAMISIL) 250 mg tablet 250 mg. Takes 7 pills every other month         Allergies   Allergen Reactions    Nicotine Contact Dermatitis     Nicotine Patch reaction - Contact dermatitis with numbness and tingling also occuring in the left arm and denuding of the skin.     Thimerosal Other (comments)     Contact lens solution, made eyes red and irrated         REVIEW OF SYSTEMS    Constitutional: Negative for fever, chills, or weight change. Respiratory: Negative for cough or shortness of breath. Cardiovascular: Negative for chest pain or palpitations. Gastrointestinal: Negative for acid reflux, change in bowel habits, or constipation. Genitourinary: Negative for dysuria and flank pain. Musculoskeletal: Positive for cervical and lumbar pain. Skin: Negative for rash. Neurological: Positive for numbness in the feet. Negative for headaches or dizziness. Endo/Heme/Allergies: Negative for increased bruising. Psychiatric/Behavioral: Negative for difficulty with sleep. Patient ambulates with the assistance of a single point cane. PHYSICAL EXAMINATION  Visit Vitals    /75    Pulse 76    Temp 98.4 °F (36.9 °C) (Oral)    Resp 18    Ht 5' 11\" (1.803 m)    Wt 226 lb (102.5 kg)    SpO2 95%    BMI 31.52 kg/m2       Constitutional: Awake, alert, and in no acute distress. Neurological: 1+ symmetrical DTRs in the upper extremities. 1+ symmetrical DTRs in the lower extremities. Sensation to light touch is intact. Negative Mathieu's sign bilaterally. Skin: warm, dry, and intact. Musculoskeletal: Tenderness to palpation in the lower lumbar region. Moderate pain with extension and axial loading. No pain with internal or external rotation of his hips. Positive straight leg raise on the left.       Biceps  Triceps Deltoids Wrist Ext Wrist Flex Hand Intrin   Right +4/5 +4/5 +4/5 +4/5 +4/5 +4/5   Left +4/5 +4/5 +4/5 +4/5 +4/5 +4/5      Hip Flex  Quads Hamstrings Ankle DF EHL Ankle PF   Right -4/5 +4/5 +4/5 +4/5 +4/5 +4/5   Left 4/5 +4/5 +4/5 +4/5 +4/5 +4/5     IMAGING:    Lumbar spine MRI from 09/19/2017 was personally reviewed with the patient and demonstrated:    Results from [place] encounter on 09/19/17   MRI LUMB SPINE W WO CONT   Narrative EXAMINATION: MRI lumbar spine with and without contrast    INDICATION: Lumbar pain, lumbar fusion 1 year prior, bilateral lower extremity  weakness    COMPARISON: CT 1/19/2017, MRI 10/27/2016    TECHNIQUE: Sagittal and axial multiecho MRI sequences of the lumbar spine  performed before and after 9 cc IV Gadavist.    FINDINGS:    General: Status post L4-L5 posterior fixation, bilateral laminotomies, and  interbody spacer. No suspicious postoperative collection identified. Multilevel  disc space loss with chronic-appearing endplate changes. Vertebral body heights  are preserved. 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 Trace retrolisthesis of L2 on L3 and L3 on L4. Conus ends  at level L2. No abnormal signal or enhancement in the distal cord. There is an  enhancing left-sided cauda equina nerve root. No abnormal epidural collection  identified. Lower lumbar paraspinous muscle edema and mild fatty atrophy. No  suspicious marrow lesions. Miscellaneous: Probable subcentimeter cyst in the left kidney. Incompletely  evaluated right adrenal nodule. Levels:    T11-T12: Evaluated sagittal plane only. Eccentric to left disc bulge/protrusion  and moderate facet arthropathy causing at least mild spinal canal stenosis and  at least mild left greater than right foraminal stenoses. T12-L1: Mild disc bulge eccentric to the right with mild facet arthropathy and  mild ligamentum flavum bulge. Mild spinal canal stenosis. Foramina patent. L1-L2: Mild disc bulge and right ventral disc osteophyte protrusion. Mild facet  arthropathy. Mild spinal canal stenosis. Mild bilateral foraminal stenoses. L2-L3: Trace retrolisthesis with mild disc bulge. Mild facet arthropathy with  ligamentum flavum bulge. Moderate spinal canal stenosis with probable abutment  of nerve roots in the lateral recess. Moderate bilateral foraminal stenoses. L3-L4: Mild disc bulge. Mild facet arthropathy with ligamentum flavum bulge. Mild to moderate spinal canal stenosis.  Moderate bilateral foraminal stenoses. L4-L5: Postsurgical level. Small central disc protrusion with mild disc  osteophyte bulge. Suspected advanced bilateral facet arthropathy, not well  evaluated. Mild to moderate spinal canal stenosis. Moderate bilateral foraminal  stenoses. L5-S1: Tiny left lateral recess disc extrusion extending slightly caudal, with  underlying mild disc bulge. Severe facet arthropathy. Mild spinal canal stenosis  with abutment of the left S1 root in the lateral recess. . Severe right greater  than left foraminal stenoses. Imaged sacrum: Unremarkable. Impression IMPRESSION:      1. At L4-L5, there has been fusion and posterior decompression, with  degenerative changes still resulting in mild to moderate spinal canal stenosis  and moderate foraminal stenoses. No specific findings to suggest infection. An  enhancing left cauda equina nerve root, similar to prior, possibly postsurgical,  or reactive, of unlikely significance. -Multilevel advanced degenerative disc disease with multiple disc  bulges/protrusions. Multilevel advanced facet arthropathy. Up to moderate spinal  canal stenosis at L2-L3 and L3-L4. Multilevel moderate to severe foraminal  stenoses, most notably L5-S1. Abutment of several nerve roots as above. -See level by level details above. In general, degenerative changes and degree  of stenosis similar to 2016 MRI. Cervical spine MRI from 03/24/2017 was personally reviewed with the patient and demonstrated:  Findings:       There is mild reversal of cervical lordosis. Low grade 1 anterolisthesis of C4  on C5 (approximately 2 mm), new from the comparison study. There is low grade 1  retrolisthesis of C5 on C6 and C6 on C7, estimated 2 mm or so at each level,  stable. Craniocervical junction appears normal. Visualized posterior fossa  within normal limits. Paravertebral soft tissues appear normal.     C2-C3, no significant disc pathology or proliferative change.  No canal or  foraminal stenosis. C3-C4, there is focal central disc protrusion which effaces the ventral  subarachnoid space and mildly impresses the ventral cord, narrowing the AP  diameter the canal to 7 mm. No intrinsic cord edema is seen. There is bulky  facet arthropathy on the left with probable moderate foraminal stenosis. The  right neural foramen is narrowed but patent. C4-C5, low grade 1 anterolisthesis with diffuse disc bulge mildly impresses the  ventral cord. AP diameter of the canal measures 9 mm at this level. There is  bulky left facet arthropathy with probable high-grade foraminal stenosis on the  left. Moderate right foraminal stenosis is seen. C5-C6, moderate loss of disc space height, low grade 1 retrolisthesis, diffuse  disc osteophyte complex and moderate ligamentous hypertrophy cause severe canal  stenosis, AP diameter the canal approximately 7 mm, but with no visible CSF  around the cord at this level. There is high-grade foraminal stenosis  bilaterally. C6-C7, moderate loss of disc space height, low grade 1 retrolisthesis with  diffuse disc osteophyte complex combines with bulky posterior ligamentous  hypertrophy causing moderate canal stenosis, with only a very thin rim of CSF  seen anteriorly at this level. High-grade foraminal stenosis is seen  bilaterally. C7-T1, bilobed paracentral disc protrusion, mild, without canal stenosis. Mild  ligamentous hypertrophy. The neural foramen are narrowed but patent. Below the level of T1, the canal and foramen are patent.     IMPRESSION:       Multilevel degenerative disc and joint disease with spondylolisthesis. - Multifactorial degenerative high-grade canal stenosis, C5-C6.   - High-grade foraminal stenosis is seen bilaterally at C5-C6 and C6-C7.   - Less severe multilevel foraminal and canal narrowing as detailed above.     Written by Phill Sandoval, as dictated by Sybil Bolanos MD.  I, Dr. Sybil Bolanos confirm that all documentation is accurate. Communicable/Infectious

## 2025-04-09 NOTE — ED PROVIDER NOTE - PROGRESS NOTE DETAILS
Patient with some persistent hypotension, despite IV fluids.  We will continue to monitor. Dw Dr Mccall, will see pt. Discussed with hospitalist, Dr. Benavides.  He will admit patient pending ICU evaluation. Patient's blood pressure now in the 70s again.  Will start pressors now.  Patient still awaiting ICU consultation. Patient doing well, no acute complaints at this time.  Patient's blood pressure is now better again, prior to starting pressors.  The patient was seen by ICU, she states that she will get midodrine, at this time no need for pressors or ICU admission.  She will continue to monitor the patient today.  Discussed with hospitalist, will admit to telemetry.

## 2025-04-09 NOTE — CONSULT NOTE ADULT - SUBJECTIVE AND OBJECTIVE BOX
Patient is a 64y old  Male who presents with a chief complaint of      HPI: 64 year old male with a PMH as listed below presented with worsening SOB and edema. Also admits to poor urination. Was previously on dialysis but was taken off due to renal recovery. Was supposed to follow up in office with Dr Eugene 25 but ended in ER first. Renal consulted for further eval. No new medications, no recent illnesses, no NSAID use.        PAST MEDICAL & SURGICAL HISTORY:  Gastric lymphoma      Anemia of chronic disease      Pleural effusion      DVT (deep venous thrombosis)      Hypertension      Hyperlipidemia      S/P appendectomy  2014      S/P cholecystectomy  15 years ago           FAMILY HISTORY:  Family history of coronary artery disease in father  Father -  age 60 following MI    [place]    Social History:Non smoker    MEDICATIONS  (STANDING):  albumin human 25% IVPB 50 milliLiter(s) IV Intermittent every 6 hours  norepinephrine Infusion 0.25 MICROgram(s)/kG/Min (43.6 mL/Hr) IV Continuous <Continuous>    MEDICATIONS  (PRN):   Meds reviewed    Allergies    Bactrim DS (Hives)    Intolerances         REVIEW OF SYSTEMS:    Review of Systems:   Constitutional: Denies fatigue  HEENT: Denies headaches and dizziness  Respiratory: +SOB  Cardiovascular: edema  Gastrointestinal: Denies nausea, denies vomiting, diarrhea, constipation, abdominal pain, or bloody stools  Genitourinary: poor urination  Skin: denies rashes or itching  Musculoskeletal: denies muscle aches, joint swelling  Neurologic: Denies generalized weakness, denies loss of sensation, numbness, or tingling      Vital Signs Last 24 Hrs  T(C): 36.3 (2025 11:21), Max: 36.3 (2025 11:21)  T(F): 97.3 (2025 11:21), Max: 97.3 (2025 11:21)  HR: 67 (2025 12:50) (67 - 69)  BP: 90/51 (2025 12:50) (73/49 - 90/51)  BP(mean): 58 (2025 11:48) (58 - 58)  RR: 22 (2025 12:40) (22 - 28)  SpO2: 99% (2025 12:40) (99% - 100%)    Parameters below as of 2025 12:40  Patient On (Oxygen Delivery Method): nasal cannula  O2 Flow (L/min): 4    Daily Height in cm: 170.18 (2025 11:21)    Daily     PHYSICAL EXAM:    GENERAL: Ill appearing, on NC  HEAD:  Atraumatic, Normocephalic  EYES: Conjunctiva and sclera clear  ENMT: No Drainage from nares, No drainage from ears  NERVOUS SYSTEM:  Awake and Alert  CHEST/LUNG: Reduced breath sounds  HEART: Regular rate and rhythm; No murmurs, rubs, or gallops  ABDOMEN: Soft, Nontender, Nondistended; Bowel sounds present  EXTREMITIES:  +++Edema B/L LE        LABS:                        8.9    9.73  )-----------( 498      ( 2025 11:35 )             28.3     04-09    130[L]  |  94[L]  |  71[H]  ----------------------------<  107[H]  5.0   |  26  |  3.30[H]    Ca    9.2      2025 11:35    TPro  6.1  /  Alb  1.9[L]  /  TBili  0.3  /  DBili  x   /  AST  32  /  ALT  20  /  AlkPhos  104  04-09    PT/INR - ( 2025 11:35 )   PT: 18.4 sec;   INR: 1.57 ratio         PTT - ( 2025 11:35 )  PTT:29.3 sec  Urinalysis Basic - ( 2025 11:35 )    Color: x / Appearance: x / SG: x / pH: x  Gluc: 107 mg/dL / Ketone: x  / Bili: x / Urobili: x   Blood: x / Protein: x / Nitrite: x   Leuk Esterase: x / RBC: x / WBC x   Sq Epi: x / Non Sq Epi: x / Bacteria: x              RADIOLOGY & ADDITIONAL TESTS:

## 2025-04-09 NOTE — H&P ADULT - HISTORY OF PRESENT ILLNESS
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 Of note, pt was admitted from 3/3-3/7 with large left pleural effusion and LUE DVT. Pt had thoracentesis done of which 3.1 L of fluid from the left thorax. Then was admitted on 3/19-3/23 for pleural effusion and NSTEMI. Pt had a thoracentesis done of which drained 560 cc from the left thorax. Then patient was admitted from 04/03-04/06 for SOB and had pleurax placement on right chest (currently has very minimal output). Patient was told to hold bumex until today, however patient restarted bumex last night after speaking with his heme-onc doctor. Endorses he had soft bp yesterday (systolic ~102), and then today he was supposed to get his fifth chemo cycle (previously on FolFox, was supposed to switch to his new chemo medication today) however BP was noted to be low and was recommended to come to the hospital. Also endorses lightheadedness today.   Patient also endorses decreased urine output for the past couple of days, denies dysuria.     vitals: T: 97.3F, BP: 82/56 -> 90/51, HR: 68, RR: 22, 99% O2 on 4L nc  labs: hgb: 8.9 (baseline), CBC abnormal 2/2 to cancer, Na: 130, Cl: 94, BUN: 71, Cr: 3.30, eGFR: 20, lactate: 2.7, proBNP: 3483  UA: cloudy, trace leuk esterase, 10wbc, moderate bacteria, neg nitrate   RVP neg   X-ray:  Increased interstitial markings bilaterally slightly greater on the left as before. There is a left effusion with atelectatic change.   Underlying inflammatory infectious process not excluded. Smaller right effusion with some increased interstitial markings in the right   infrahilar region which are less prominent than before. Small caliber right chest tube noted by the right CP angle. No pneumothorax.   Port-A-Cath as before. Borderline cardiomegaly. Regional osseous structures appropriate for age  received in the ED: 1L IVB 1x, levo gtt  pending pulm, ICU, cardio, and nephro    Patient is a 65 y/o male with PMHx of poorly differentiated metastatic (known cervical and axillary lymphadenopathy) GI carcinoma, LUE and LLE DVT, HTN, on home O2 2/2 to SOB, and anemia presents to the ED with hypotension and SOB. Of note, pt was admitted from 3/3-3/7 with large left pleural effusion and LUE DVT. Pt had thoracentesis done of which 3.1 L of fluid from the left thorax. Then was admitted on 3/19-3/23 for pleural effusion and NSTEMI. Pt had a thoracentesis done of which drained 560 cc from the left thorax. Then patient was admitted from 04/03-04/06 for SOB and had pleurax placement on right chest (currently has very minimal output). Patient was told to hold bumex until today, however patient restarted bumex last night after speaking with his heme-onc doctor. Endorses he had soft bp yesterday (systolic ~102), and then today he was supposed to get his fifth chemo cycle (previously on FolFox, was supposed to switch to his new chemo medication today) however BP was noted to be low and was recommended to come to the hospital. Also endorses lightheadedness today.   Patient has been complaining of cough for the past two months but now is producing tannish sputum.  Patient also endorses decreased urine output for the past couple of days, denies dysuria.     vitals: T: 97.3F, BP: 82/56 -> 90/51, HR: 68, RR: 22, 99% O2 on 4L nc  labs: hgb: 8.9 (baseline), CBC abnormal 2/2 to cancer, Na: 130, Cl: 94, BUN: 71, Cr: 3.30, eGFR: 20, lactate: 2.7, proBNP: 3483  UA: cloudy, trace leuk esterase, 10wbc, moderate bacteria, neg nitrate   RVP neg   X-ray:  Increased interstitial markings bilaterally slightly greater on the left as before. There is a left effusion with atelectatic change.   Underlying inflammatory infectious process not excluded. Smaller right effusion with some increased interstitial markings in the right   infrahilar region which are less prominent than before. Small caliber right chest tube noted by the right CP angle. No pneumothorax.   Port-A-Cath as before. Borderline cardiomegaly. Regional osseous structures appropriate for age  received in the ED: 1L IVB 1x, levo gtt  pending pulm, ICU, cardio, and nephro

## 2025-04-09 NOTE — H&P ADULT - PROBLEM SELECTOR PLAN 9
Current hgb: 8.9   -hgb 13.3 (9/2024). However, recently hemoglobins for the past couple of months has been 7-8.   -Anemia of chronic disease etiology  -patient requires hgb to be above 8 to receive chemo   -Monitor routine CBCs  -consult heme/onc Dr. Hon Carrera

## 2025-04-09 NOTE — H&P ADULT - ASSESSMENT
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 Placed on pressors in ED. Admitted for hypotension, MARYSOL, hyponatremia, and present chronic pleural effusion.

## 2025-04-09 NOTE — H&P ADULT - PROBLEM SELECTOR PLAN 3
Likely 2/2 to fluid overload status and dehydration  -s/p 1L NaCl 0.9% IVB 1x, and 1L LR in ED  -would be cautious about fluids as patient has present pleural effusions  -would monitor for now

## 2025-04-09 NOTE — H&P ADULT - PROBLEM SELECTOR PLAN 6
Admitted recently for Type 2 MI (elevated troponins, asymptomatic, no cath performed)   -continue home Plavix and ASA   --Cardiology consulted ([facility]) consulted Admitted recently for Type 2 MI (elevated troponins, asymptomatic, no cath performed)   -continue home Plavix   --Cardiology consulted ([facility]) consulted

## 2025-04-09 NOTE — CONSULT NOTE ADULT - SUBJECTIVE AND OBJECTIVE BOX
Date/Time Patient Seen:  		  Referring MD:   Data Reviewed	       Patient is a 64y old  Male who presents with a chief complaint of hypotension (09 Apr 2025 17:17)      Subjective/HPI   64-year-old male with a history of GI carcinoma, metastatic lymphadenopathy, DVT, hypertension, anemia presents with has been having some persistent shortness of breath since discharge.  The patient was admitted for shortness of breath, had a thoracentesis performed while in the hospital.  Now the patient has been complaining of persistent shortness of breath, but was hypotensive   PAST MEDICAL & SURGICAL HISTORY:  No pertinent past medical history    Incisional hernia  2015    Gastric lymphoma    Anemia of chronic disease    Pleural effusion    DVT (deep venous thrombosis)    Hypertension    Hyperlipidemia    No significant past surgical history    S/P appendectomy  November 17th 2014    S/P cholecystectomy  15 years ago          Medication list         MEDICATIONS  (STANDING):  albumin human 25% IVPB 50 milliLiter(s) IV Intermittent every 6 hours  apixaban 5 milliGRAM(s) Oral two times a day  atorvastatin 40 milliGRAM(s) Oral at bedtime  azithromycin  IVPB 500 milliGRAM(s) IV Intermittent once  azithromycin  IVPB      cefTRIAXone   IVPB 2000 milliGRAM(s) IV Intermittent every 24 hours  clopidogrel Tablet 75 milliGRAM(s) Oral daily  midodrine 10 milliGRAM(s) Oral every 8 hours    MEDICATIONS  (PRN):  oxyCODONE    IR 5 milliGRAM(s) Oral every 4 hours PRN Severe Pain (7 - 10)         Vitals log        ICU Vital Signs Last 24 Hrs  T(C): 36.3 (09 Apr 2025 11:21), Max: 36.3 (09 Apr 2025 11:21)  T(F): 97.3 (09 Apr 2025 11:21), Max: 97.3 (09 Apr 2025 11:21)  HR: 67 (09 Apr 2025 12:50) (67 - 69)  BP: 90/51 (09 Apr 2025 12:50) (73/49 - 90/51)  BP(mean): 58 (09 Apr 2025 11:48) (58 - 58)  ABP: --  ABP(mean): --  RR: 22 (09 Apr 2025 12:40) (22 - 28)  SpO2: 99% (09 Apr 2025 12:40) (99% - 100%)    O2 Parameters below as of 09 Apr 2025 12:40  Patient On (Oxygen Delivery Method): nasal cannula  O2 Flow (L/min): 4               Input and Output:  I&O's Detail      Lab Data                        8.9    9.73  )-----------( 498      ( 09 Apr 2025 11:35 )             28.3     04-09    130[L]  |  94[L]  |  71[H]  ----------------------------<  107[H]  5.0   |  26  |  3.30[H]    Ca    9.2      09 Apr 2025 11:35    TPro  6.1  /  Alb  1.9[L]  /  TBili  0.3  /  DBili  x   /  AST  32  /  ALT  20  /  AlkPhos  104  04-09            Review of Systems	  sob  weakness  pleurx    Objective     Physical Examination        Pertinent Lab findings & Imaging      Barba:  NO   Adequate UO     I&O's Detail           Discussed with:     Cultures:	        Radiology        ACC: [id number] EXAM:  XR CHEST AP OR PA 1V   ORDERED BY: MARCELINO GRAHAM     PROCEDURE DATE:  04/09/2025          INTERPRETATION:  EXAM: XR CHEST    INDICATION: hyoptension, sob, edema PXR    COMPARISON: April 5, 2025    IMPRESSION: Increased interstitial markings bilaterally slightly greater   on the left as before. There is a left effusion with atelectatic change.   Underlying inflammatory infectious process not excluded. Smaller right   effusion with some increased interstitial markings in the right   infrahilar region which are less prominent than before. Small caliber   right chest tube noted by the right CP angle. No pneumothorax.   Port-A-Cath as before. Borderline cardiomegaly. Regional osseous   structures appropriate for age    --- End of Report ---            MARCELA PEREZ MD; Attending Radiologist  This document has been electronically signed. Apr 9 2025 12:34PM

## 2025-04-09 NOTE — H&P ADULT - PROBLEM SELECTOR PLAN 1
Acute for the past two days, endorses slight lightheadedness   -BP: 82/56 -> 90/51 ->77/49  -s/p 1L NaCl 0.9% IVB 1x  -starting midodrine,   -ICU will re-evaluate in a few hours   -albumin ordered  -HOLD home labetalol BID and losartan qd  -would be cautious about Acute for the past two days, endorses slight lightheadedness   -BP: 82/56 -> 90/51 ->77/49  -s/p 1L NaCl 0.9% IVB 1x, and 1L LR in ED  -as per ICU, starting midodrine 10mg for now, albumin ordered as well  -ICU will re-evaluate in a few hours, but low threshold to reconsult if any changes  -HOLD home labetalol BID and losartan qd  -would be cautious about fluids as patient has present pleural effusions Acute for the past two days, endorses slight lightheadedness   -BP: 82/56 -> 90/51 ->77/49  -s/p 1L NaCl 0.9% IVB 1x, and 1L LR in ED  -as per ICU, starting midodrine 10mg for now, albumin ordered as well  -ICU will re-evaluate in a few hours, but low threshold to reconsult if any changes  -HOLD home labetalol BID and losartan qd; and hold home bumex   -would be cautious about fluids as patient has present pleural effusions

## 2025-04-09 NOTE — H&P ADULT - ATTENDING COMMENTS
64M with PMH of Poorly differentiated metastatic GI carcinoma (known cervical and axillary lymphadenopathy), Recent of NSTEMI (3/19- managed medically), HTN, DVT (on eliquis), Pleural effusions (s/p pleurex on 4/4) presented to ED for hypotension. Recently discharged on 4/6. Vitals on admission T 97.3 HR 68 BP 82/56 RR 28 SpO2 100 (4L NC). Labs significant for Hgb 8.9 (~baseline) Na 130 BUN/Cr: 71/3.3 (previously 1.5). Lactate 2.7 BNP 3483. EKG NSR@69 bpm. CXR: Increased interstitial markings bilaterally slightly greater on the left as before. There is a left effusion with atelectatic change. Underlying inflammatory infectious process not excluded. Smaller right effusion with some increased interstitial markings in the right infrahilar region which are less prominent than before. In ED, patient received 1L NS. Admitted for Hypotension, MARYSOL, Acute hypoxic respiratory failure. Given no fevers, increased wbc, new respiratory symptoms, or consolidation on CXR, no clear indication for abx at this time. Hold BP meds. Cautious with IVF given fluid overload state. Recommend ICU evaluation. Prognosis guarded. Recommend palliative consult. 64M with PMH of Poorly differentiated metastatic GI carcinoma (known cervical and axillary lymphadenopathy), Recent of NSTEMI (3/19- managed medically), HTN, DVT (on eliquis), Pleural effusions (s/p pleurex on 4/4) presented to ED for hypotension. Recently discharged on 4/6. Vitals on admission T 97.3 HR 68 BP 82/56 RR 28 SpO2 100 (4L NC). Labs significant for Hgb 8.9 (~baseline) Na 130 BUN/Cr: 71/3.3 (previously 1.5). Lactate 2.7 BNP 3483. EKG NSR@69 bpm. CXR: Increased interstitial markings bilaterally slightly greater on the left as before. There is a left effusion with atelectatic change. Underlying inflammatory infectious process not excluded. Smaller right effusion with some increased interstitial markings in the right infrahilar region which are less prominent than before. In ED, patient received 2L IVF. Admitted for Hypotension, MARYSOL, Acute hypoxic respiratory failure.       ICU evaluated patient, will not accept at this time (but will re-evaluate later). Hold BP meds. Cautious with IVF given fluid overload state and as patient has already received 2L IVF. Albumin ordered by renal. Seems patient has increased ascites (trace ascites seen on previous imaging), likely malignant in nature. Spoke to ICU, ultrasound did not show any definitive pockets for drainage and given patient is high-risk of bleeding (on eliquis and plavix), will empirically treat patient for SBP at this time. Although no fevers or increased wbc, patient is immunocompromised and may not mount a significant immune response. Also would not pursue large-volume paracentesis at this time given patient is hypotensive. Started on midodrine by ICU. Additionally, patient reported increased tanish sputum for past couple of days. Will cover patient for CAP given possible infectious process seen on CXR.     Prognosis guarded. Low threshold to re-consult ICU. Recommend palliative consult. 64M with PMH of Poorly differentiated metastatic GI carcinoma (known cervical and axillary lymphadenopathy), Recent of NSTEMI (3/19- managed medically), HTN, DVT (on eliquis), Pleural effusions (s/p pleurex on 4/4) presented to ED for hypotension. Recently discharged on 4/6. Vitals on admission T 97.3 HR 68 BP 82/56 RR 28 SpO2 100 (4L NC). Labs significant for Hgb 8.9 (~baseline) Na 130 BUN/Cr: 71/3.3 (previously 1.5). Lactate 2.7 BNP 3483. EKG NSR@69 bpm. CXR: Increased interstitial markings bilaterally slightly greater on the left as before. There is a left effusion with atelectatic change. Underlying inflammatory infectious process not excluded. Smaller right effusion with some increased interstitial markings in the right infrahilar region which are less prominent than before. In ED, patient received 2L IVF. Admitted for Hypotension, MARYSOL, Acute hypoxic respiratory failure.       ICU evaluated patient, will not accept at this time (but will re-evaluate later). Hold BP meds. Cautious with IVF given fluid overload state and as patient has already received 2L IVF. Albumin ordered by renal. Seems patient has increased ascites (trace ascites seen on previous imaging), likely malignant in nature. Spoke to ICU, ultrasound did not show any definitive pockets for drainage and given patient is high-risk of bleeding (on eliquis and plavix), will empirically treat patient for SBP at this time. Although no fevers or increased wbc, patient is immunocompromised and may not mount a significant immune response. Also would not pursue large-volume paracentesis at this time given patient is hypotensive. Started on midodrine by ICU. Additionally, patient reported increased tanish sputum for past couple of days. Will cover patient for CAP given possible infectious process seen on CXR. Obtain Bcx, Step+Legionella Ur Ag.     Prognosis guarded. Low threshold to re-consult ICU. Recommend palliative consult. 64M with PMH of Poorly differentiated metastatic GI carcinoma (known cervical and axillary lymphadenopathy), Recent of NSTEMI (3/19- managed medically), HTN, DVT (on eliquis), Pleural effusions (s/p pleurex on 4/4) presented to ED for hypotension. Recently discharged on 4/6. Vitals on admission T 97.3 HR 68 BP 82/56 RR 28 SpO2 100 (4L NC). Labs significant for Hgb 8.9 (~baseline) Na 130 BUN/Cr: 71/3.3 (previously 1.5). Lactate 2.7 BNP 3483. EKG NSR@69 bpm. CXR: Increased interstitial markings bilaterally slightly greater on the left as before. There is a left effusion with atelectatic change. Underlying inflammatory infectious process not excluded. Smaller right effusion with some increased interstitial markings in the right infrahilar region which are less prominent than before. In ED, patient received 2L IVF. Admitted for Hypotension, MARYSOL, Acute hypoxic respiratory failure.       ICU evaluated patient, will not accept at this time (but will re-evaluate later). Hold BP meds. Cautious with IVF given fluid overload state and as patient has already received 2L IVF (patient likely third-spacing). Albumin ordered by renal. Seems patient has increased ascites (trace ascites seen on previous imaging), likely malignant in nature. Spoke to ICU, ultrasound did not show any definitive pockets for drainage and given patient is high-risk of bleeding (on eliquis and plavix), will empirically treat patient for SBP at this time. Although no fevers or increased wbc, patient is immunocompromised and may not mount a significant immune response. Also would not pursue large-volume paracentesis at this time given patient is hypotensive. Started on midodrine by ICU. Additionally, patient reported increased tanish sputum for past couple of days. Will cover patient for CAP given possible infectious process seen on CXR. Obtain Bcx, Step+Legionella Ur Ag. Appreciate renal recs.     Prognosis guarded. Low threshold to re-consult ICU. Recommend palliative consult.

## 2025-04-09 NOTE — CONSULT NOTE ADULT - ASSESSMENT
64-year-old male with a history of GI carcinoma, metastatic lymphadenopathy, DVT, hypertension, anemia presents with has been having some persistent shortness of breath since discharge.  The patient was admitted for shortness of breath, had a thoracentesis performed while in the hospital.  Now the patient has been complaining of persistent shortness of breath, but was hypotensive     pleurx R  Hypotension  mets ca  eval Acute Infection  GI Carcinoma  Anemia  DVT hx  MARYSOL    ICU cx and eval -   HD monitoring and Support  pleurx drain as tolerated - 100 - 1000 per drain attempt - M W F   I moustapha  emp ABX  cx - biomarkers  thoracic follow up  cardio follow up  monitor VS and HD and Sat  goal sat > 88 pct  trend renal indices  I and O  replete lytes

## 2025-04-10 ENCOUNTER — RESULT REVIEW (OUTPATIENT)
Age: 65
End: 2025-04-10

## 2025-04-10 ENCOUNTER — TRANSCRIPTION ENCOUNTER (OUTPATIENT)
Age: 65
End: 2025-04-10

## 2025-04-10 DIAGNOSIS — R18.8 OTHER ASCITES: ICD-10-CM

## 2025-04-10 DIAGNOSIS — C16.9 MALIGNANT NEOPLASM OF STOMACH, UNSPECIFIED: ICD-10-CM

## 2025-04-10 LAB
ALBUMIN SERPL ELPH-MCNC: 2.4 G/DL — LOW (ref 3.3–5)
ALP SERPL-CCNC: 95 U/L — SIGNIFICANT CHANGE UP (ref 40–120)
ALT FLD-CCNC: 16 U/L — SIGNIFICANT CHANGE UP (ref 12–78)
ANION GAP SERPL CALC-SCNC: 12 MMOL/L — SIGNIFICANT CHANGE UP (ref 5–17)
APTT BLD: 102.3 SEC — HIGH (ref 24.5–35.6)
APTT BLD: 50.6 SEC — HIGH (ref 24.5–35.6)
APTT BLD: 79.6 SEC — HIGH (ref 24.5–35.6)
AST SERPL-CCNC: 25 U/L — SIGNIFICANT CHANGE UP (ref 15–37)
BASOPHILS # BLD AUTO: 0.06 K/UL — SIGNIFICANT CHANGE UP (ref 0–0.2)
BASOPHILS NFR BLD AUTO: 0.7 % — SIGNIFICANT CHANGE UP (ref 0–2)
BILIRUB SERPL-MCNC: 0.4 MG/DL — SIGNIFICANT CHANGE UP (ref 0.2–1.2)
BUN SERPL-MCNC: 78 MG/DL — HIGH (ref 7–23)
CALCIUM SERPL-MCNC: 9.3 MG/DL — SIGNIFICANT CHANGE UP (ref 8.5–10.1)
CHLORIDE SERPL-SCNC: 95 MMOL/L — LOW (ref 96–108)
CO2 SERPL-SCNC: 26 MMOL/L — SIGNIFICANT CHANGE UP (ref 22–31)
CORTIS AM PEAK SERPL-MCNC: 24.3 UG/DL — HIGH (ref 6–18.4)
CREAT SERPL-MCNC: 3.5 MG/DL — HIGH (ref 0.5–1.3)
EGFR: 19 ML/MIN/1.73M2 — LOW
EGFR: 19 ML/MIN/1.73M2 — LOW
EOSINOPHIL # BLD AUTO: 0.09 K/UL — SIGNIFICANT CHANGE UP (ref 0–0.5)
EOSINOPHIL NFR BLD AUTO: 1 % — SIGNIFICANT CHANGE UP (ref 0–6)
GLUCOSE SERPL-MCNC: 97 MG/DL — SIGNIFICANT CHANGE UP (ref 70–99)
HCT VFR BLD CALC: 27.3 % — LOW (ref 39–50)
HCT VFR BLD CALC: 27.6 % — LOW (ref 39–50)
HGB BLD-MCNC: 8.5 G/DL — LOW (ref 13–17)
HGB BLD-MCNC: 8.6 G/DL — LOW (ref 13–17)
IMM GRANULOCYTES NFR BLD AUTO: 1.6 % — HIGH (ref 0–0.9)
INR BLD: 1.43 RATIO — HIGH (ref 0.85–1.16)
LACTATE SERPL-SCNC: 1.1 MMOL/L — SIGNIFICANT CHANGE UP (ref 0.7–2)
LYMPHOCYTES # BLD AUTO: 0.51 K/UL — LOW (ref 1–3.3)
LYMPHOCYTES # BLD AUTO: 5.8 % — LOW (ref 13–44)
MAGNESIUM SERPL-MCNC: 2.6 MG/DL — SIGNIFICANT CHANGE UP (ref 1.6–2.6)
MCHC RBC-ENTMCNC: 19.8 PG — LOW (ref 27–34)
MCHC RBC-ENTMCNC: 20 PG — LOW (ref 27–34)
MCHC RBC-ENTMCNC: 30.8 G/DL — LOW (ref 32–36)
MCHC RBC-ENTMCNC: 31.5 G/DL — LOW (ref 32–36)
MCV RBC AUTO: 63.5 FL — LOW (ref 80–100)
MCV RBC AUTO: 64.2 FL — LOW (ref 80–100)
MONOCYTES # BLD AUTO: 1.15 K/UL — HIGH (ref 0–0.9)
MONOCYTES NFR BLD AUTO: 13 % — SIGNIFICANT CHANGE UP (ref 2–14)
NEUTROPHILS # BLD AUTO: 6.87 K/UL — SIGNIFICANT CHANGE UP (ref 1.8–7.4)
NEUTROPHILS NFR BLD AUTO: 77.9 % — HIGH (ref 43–77)
NRBC BLD AUTO-RTO: 0 /100 WBCS — SIGNIFICANT CHANGE UP (ref 0–0)
NRBC BLD AUTO-RTO: 0 /100 WBCS — SIGNIFICANT CHANGE UP (ref 0–0)
PHOSPHATE SERPL-MCNC: 5.6 MG/DL — HIGH (ref 2.5–4.5)
PLATELET # BLD AUTO: 531 K/UL — HIGH (ref 150–400)
PLATELET # BLD AUTO: 565 K/UL — HIGH (ref 150–400)
POTASSIUM SERPL-MCNC: 5.1 MMOL/L — SIGNIFICANT CHANGE UP (ref 3.5–5.3)
POTASSIUM SERPL-SCNC: 5.1 MMOL/L — SIGNIFICANT CHANGE UP (ref 3.5–5.3)
PROT SERPL-MCNC: 6.2 G/DL — SIGNIFICANT CHANGE UP (ref 6–8.3)
PROTHROM AB SERPL-ACNC: 16.7 SEC — HIGH (ref 9.9–13.4)
RBC # BLD: 4.3 M/UL — SIGNIFICANT CHANGE UP (ref 4.2–5.8)
RBC # BLD: 4.3 M/UL — SIGNIFICANT CHANGE UP (ref 4.2–5.8)
RBC # FLD: 21.1 % — HIGH (ref 10.3–14.5)
RBC # FLD: 21.2 % — HIGH (ref 10.3–14.5)
SODIUM SERPL-SCNC: 133 MMOL/L — LOW (ref 135–145)
WBC # BLD: 8.82 K/UL — SIGNIFICANT CHANGE UP (ref 3.8–10.5)
WBC # BLD: 9.37 K/UL — SIGNIFICANT CHANGE UP (ref 3.8–10.5)
WBC # FLD AUTO: 8.82 K/UL — SIGNIFICANT CHANGE UP (ref 3.8–10.5)
WBC # FLD AUTO: 9.37 K/UL — SIGNIFICANT CHANGE UP (ref 3.8–10.5)

## 2025-04-10 PROCEDURE — 99497 ADVNCD CARE PLAN 30 MIN: CPT | Mod: GC

## 2025-04-10 PROCEDURE — 99233 SBSQ HOSP IP/OBS HIGH 50: CPT | Mod: GC

## 2025-04-10 PROCEDURE — 76705 ECHO EXAM OF ABDOMEN: CPT | Mod: 26

## 2025-04-10 PROCEDURE — 93306 TTE W/DOPPLER COMPLETE: CPT | Mod: 26

## 2025-04-10 PROCEDURE — 76770 US EXAM ABDO BACK WALL COMP: CPT | Mod: 26

## 2025-04-10 PROCEDURE — 99233 SBSQ HOSP IP/OBS HIGH 50: CPT

## 2025-04-10 RX ORDER — ACETAMINOPHEN 500 MG/5ML
1000 LIQUID (ML) ORAL EVERY 8 HOURS
Refills: 0 | Status: DISCONTINUED | OUTPATIENT
Start: 2025-04-10 | End: 2025-04-16

## 2025-04-10 RX ORDER — ACETAMINOPHEN 500 MG/5ML
650 LIQUID (ML) ORAL EVERY 6 HOURS
Refills: 0 | Status: DISCONTINUED | OUTPATIENT
Start: 2025-04-10 | End: 2025-04-17

## 2025-04-10 RX ORDER — HEPARIN SODIUM 1000 [USP'U]/ML
3500 INJECTION INTRAVENOUS; SUBCUTANEOUS EVERY 6 HOURS
Refills: 0 | Status: DISCONTINUED | OUTPATIENT
Start: 2025-04-10 | End: 2025-04-11

## 2025-04-10 RX ORDER — IPRATROPIUM BROMIDE AND ALBUTEROL SULFATE .5; 2.5 MG/3ML; MG/3ML
3 SOLUTION RESPIRATORY (INHALATION) EVERY 6 HOURS
Refills: 0 | Status: DISCONTINUED | OUTPATIENT
Start: 2025-04-10 | End: 2025-04-12

## 2025-04-10 RX ORDER — ALBUMIN (HUMAN) 12.5 G/50ML
50 INJECTION, SOLUTION INTRAVENOUS ONCE
Refills: 0 | Status: COMPLETED | OUTPATIENT
Start: 2025-04-10 | End: 2025-04-10

## 2025-04-10 RX ORDER — LIDOCAINE HCL/PF 10 MG/ML
5 VIAL (ML) INJECTION ONCE
Refills: 0 | Status: COMPLETED | OUTPATIENT
Start: 2025-04-10 | End: 2025-04-10

## 2025-04-10 RX ORDER — SODIUM ZIRCONIUM CYCLOSILICATE 5 G/5G
10 POWDER, FOR SUSPENSION ORAL ONCE
Refills: 0 | Status: COMPLETED | OUTPATIENT
Start: 2025-04-10 | End: 2025-04-10

## 2025-04-10 RX ORDER — HEPARIN SODIUM 1000 [USP'U]/ML
INJECTION INTRAVENOUS; SUBCUTANEOUS
Qty: 25000 | Refills: 0 | Status: DISCONTINUED | OUTPATIENT
Start: 2025-04-10 | End: 2025-04-11

## 2025-04-10 RX ORDER — MELATONIN 5 MG
5 TABLET ORAL AT BEDTIME
Refills: 0 | Status: DISCONTINUED | OUTPATIENT
Start: 2025-04-10 | End: 2025-04-12

## 2025-04-10 RX ORDER — HEPARIN SODIUM 1000 [USP'U]/ML
7500 INJECTION INTRAVENOUS; SUBCUTANEOUS EVERY 6 HOURS
Refills: 0 | Status: DISCONTINUED | OUTPATIENT
Start: 2025-04-10 | End: 2025-04-11

## 2025-04-10 RX ADMIN — HEPARIN SODIUM 1900 UNIT(S)/HR: 1000 INJECTION INTRAVENOUS; SUBCUTANEOUS at 19:38

## 2025-04-10 RX ADMIN — HEPARIN SODIUM 3500 UNIT(S): 1000 INJECTION INTRAVENOUS; SUBCUTANEOUS at 09:04

## 2025-04-10 RX ADMIN — MIDODRINE HYDROCHLORIDE 10 MILLIGRAM(S): 5 TABLET ORAL at 21:29

## 2025-04-10 RX ADMIN — ALBUMIN (HUMAN) 50 MILLILITER(S): 12.5 INJECTION, SOLUTION INTRAVENOUS at 01:28

## 2025-04-10 RX ADMIN — HEPARIN SODIUM 1700 UNIT(S)/HR: 1000 INJECTION INTRAVENOUS; SUBCUTANEOUS at 03:38

## 2025-04-10 RX ADMIN — Medication 1 APPLICATION(S): at 18:33

## 2025-04-10 RX ADMIN — MIDODRINE HYDROCHLORIDE 10 MILLIGRAM(S): 5 TABLET ORAL at 13:27

## 2025-04-10 RX ADMIN — Medication 5 MILLIGRAM(S): at 21:28

## 2025-04-10 RX ADMIN — HEPARIN SODIUM 1900 UNIT(S)/HR: 1000 INJECTION INTRAVENOUS; SUBCUTANEOUS at 19:43

## 2025-04-10 RX ADMIN — HEPARIN SODIUM 1900 UNIT(S)/HR: 1000 INJECTION INTRAVENOUS; SUBCUTANEOUS at 19:34

## 2025-04-10 RX ADMIN — CLOPIDOGREL BISULFATE 75 MILLIGRAM(S): 75 TABLET, FILM COATED ORAL at 13:27

## 2025-04-10 RX ADMIN — SODIUM ZIRCONIUM CYCLOSILICATE 10 GRAM(S): 5 POWDER, FOR SUSPENSION ORAL at 18:31

## 2025-04-10 RX ADMIN — CEFTRIAXONE 100 MILLIGRAM(S): 500 INJECTION, POWDER, FOR SOLUTION INTRAMUSCULAR; INTRAVENOUS at 21:27

## 2025-04-10 RX ADMIN — HEPARIN SODIUM 1900 UNIT(S)/HR: 1000 INJECTION INTRAVENOUS; SUBCUTANEOUS at 09:03

## 2025-04-10 RX ADMIN — Medication 5 MILLILITER(S): at 18:31

## 2025-04-10 RX ADMIN — MIDODRINE HYDROCHLORIDE 10 MILLIGRAM(S): 5 TABLET ORAL at 06:03

## 2025-04-10 RX ADMIN — Medication 250 MILLIGRAM(S): at 18:31

## 2025-04-10 RX ADMIN — Medication 650 MILLIGRAM(S): at 21:10

## 2025-04-10 RX ADMIN — ALBUMIN (HUMAN) 50 MILLILITER(S): 12.5 INJECTION, SOLUTION INTRAVENOUS at 18:32

## 2025-04-10 RX ADMIN — Medication 650 MILLIGRAM(S): at 21:34

## 2025-04-10 RX ADMIN — ATORVASTATIN CALCIUM 40 MILLIGRAM(S): 80 TABLET, FILM COATED ORAL at 21:28

## 2025-04-10 RX ADMIN — ALBUMIN (HUMAN) 50 MILLILITER(S): 12.5 INJECTION, SOLUTION INTRAVENOUS at 06:03

## 2025-04-10 NOTE — PROGRESS NOTE ADULT - PROBLEM SELECTOR PLAN 8
-03/03 doppler: Extensive left upper extremity DVT extending proximally to involve the right internal jugular and subclavian veins. Left cervical lymphadenopathy with abnormal morphology suggesting malignancy.  -03/06: + LLE DVT  -Continue home eliquis BID -03/03 doppler: Extensive left upper extremity DVT extending proximally to involve the right internal jugular and subclavian veins. Left cervical lymphadenopathy with abnormal morphology suggesting malignancy.  -03/06: + LLE DVT  -Continue home eliquis BID (now on hep gtt for possible future paracentesis)

## 2025-04-10 NOTE — PROGRESS NOTE ADULT - PROBLEM SELECTOR PLAN 1
Acute for the past two days, endorses slight lightheadedness   -BP: 82/56 -> 90/51 ->77/49  -s/p 1L NaCl 0.9% IVB 1x, and 1L LR in ED  -as per ICU, starting midodrine 10mg for now, albumin ordered as well  -TTE ordered, f/u results   -HOLD home labetalol BID and losartan qd; and hold home bumex   -would be cautious about fluids as patient has present pleural effusions

## 2025-04-10 NOTE — PROGRESS NOTE ADULT - SUBJECTIVE AND OBJECTIVE BOX
SUBJECTIVE:  Patient seen and examined at bedside. Overnight hypotension noted. Patient reports no new complaints at this time, states he is breathing "ok." Saturating well on 5L O2 via NC.    VITALS  Vital Signs Last 24 Hrs  T(C): 36.9 (10 Apr 2025 04:27), Max: 36.9 (10 Apr 2025 04:27)  T(F): 98.4 (10 Apr 2025 04:27), Max: 98.4 (10 Apr 2025 04:27)  HR: 73 (10 Apr 2025 04:27) (64 - 81)  BP: 87/55 (10 Apr 2025 04:27) (73/49 - 97/64)  BP(mean): 65 (09 Apr 2025 16:00) (58 - 65)  RR: 19 (10 Apr 2025 04:27) (18 - 28)  SpO2: 92% (10 Apr 2025 04:27) (92% - 100%)    Parameters below as of 10 Apr 2025 04:27  Patient On (Oxygen Delivery Method): nasal cannula  O2 Flow (L/min): 3    PHYSICAL EXAM  GENERAL:  Well-developed male sitting in a chair in NAD.  HEENT:  NC/AT. Sclera white.   CARDIO:  Regular rate and rhythm.   RESPIRATORY:  Nonlabored breathing, no accessory muscle use. R sided pleurx in place, surrounding skin soft, nontender. Dressing clean/dry.  ABDOMEN:  Rotund, tense. No rebound tenderness or guarding.  SKIN:  No jaundice, pallor, or cyanosis  NEURO:  A&O x 3    INTAKE & OUTPUT  I&O's Summary    09 Apr 2025 07:01  -  10 Apr 2025 07:00  --------------------------------------------------------  IN: 0 mL / OUT: 300 mL / NET: -300 mL    I&O's Detail    09 Apr 2025 07:01  -  10 Apr 2025 07:00  --------------------------------------------------------  IN:  Total IN: 0 mL    OUT:    Voided (mL): 300 mL  Total OUT: 300 mL    Total NET: -300 mL    MEDICATIONS  MEDICATIONS  (STANDING):  atorvastatin 40 milliGRAM(s) Oral at bedtime  azithromycin  IVPB 500 milliGRAM(s) IV Intermittent every 24 hours  azithromycin  IVPB      cefTRIAXone   IVPB 2000 milliGRAM(s) IV Intermittent every 24 hours  clopidogrel Tablet 75 milliGRAM(s) Oral daily  heparin  Infusion.  Unit(s)/Hr (17 mL/Hr) IV Continuous <Continuous>  influenza   Vaccine 0.5 milliLiter(s) IntraMuscular once  midodrine 10 milliGRAM(s) Oral every 8 hours    MEDICATIONS  (PRN):  heparin   Injectable 7500 Unit(s) IV Push every 6 hours PRN For aPTT less than 40  heparin   Injectable 3500 Unit(s) IV Push every 6 hours PRN For aPTT between 40 - 57  ondansetron    Tablet 4 milliGRAM(s) Oral daily PRN Nausea and/or Vomiting  oxyCODONE    IR 5 milliGRAM(s) Oral every 4 hours PRN Severe Pain (7 - 10)    LABS:                        8.9    9.73  )-----------( 498      ( 09 Apr 2025 11:35 )             28.3     04-09    130[L]  |  94[L]  |  71[H]  ----------------------------<  107[H]  5.0   |  26  |  3.30[H]    Ca    9.2      09 Apr 2025 11:35    TPro  6.1  /  Alb  1.9[L]  /  TBili  0.3  /  DBili  x   /  AST  32  /  ALT  20  /  AlkPhos  104  04-09    PT/INR - ( 09 Apr 2025 11:35 )   PT: 18.4 sec;   INR: 1.57 ratio  PTT - ( 09 Apr 2025 11:35 )  PTT:29.3 sec    ASSESSMENT & PLAN  64 year old male with a PMH of poorly differentiated metastatic gastric carcinoma on chemo, HTN, anemia, NSTEMI, DVT of LUE on 3/3/25 (on Eliquis and plavix), and recent history of bilateral pleural effusions requiring L thoracentesis 3/4/25 and 3/20/25, and R pleurx placement on 4/4/2025, now admitted with hypotension, oliguria, MARYSOL, suspected CAP.    - Await CT scan final read  - Continue pleurx, drain every other day  - Continue antibiotics  - Supplemental O2 PRN  - Eliquis held for possible paracentesis today  - Follow up labs  - Conservative use of IVF given pleural effusions  - To be discussed with Dr. Reich

## 2025-04-10 NOTE — PROVIDER CONTACT NOTE (CRITICAL VALUE NOTIFICATION) - BACKGROUND
Patient came in with SOB.
Patient is 64 years old with history of plural effusion. Patient admitted for Shortness of breath and hypotensive.

## 2025-04-10 NOTE — DISCHARGE NOTE NURSING/CASE MANAGEMENT/SOCIAL WORK - NSDCVIVACCINE_GEN_ALL_CORE_FT
influenza, injectable, quadrivalent, preservative free; 26-Nov-2014 17:06; Dulce Hair (RN); [employer]; ; IntraMuscular; Deltoid Left.; 0.5 milliLiter(s);   Tdap; 08-Apr-2021 16:20; Lilly OlsonRN); [employer]; [id number] (Exp. Date: 10-Shekhar-2022); IntraMuscular; Deltoid Left.; 0.5 milliLiter(s); VIS (VIS Published: 09-May-2013, VIS Presented: 08-Apr-2021);

## 2025-04-10 NOTE — PROGRESS NOTE ADULT - ASSESSMENT
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 Admitted for hypotension, MARYSOL, hyponatremia, and present chronic pleural effusion.

## 2025-04-10 NOTE — PHARMACOTHERAPY INTERVENTION NOTE - COMMENTS
Patient is a 64 year old male currently ordered for oxycodone 5 mg Q4H prn severe pain. Discussed with Dr. Garcia and recommended adding therapy for mild and moderate pain with Tylenol to introduce a step-wise approach to pain management. Per discussion with MD, Tylenol 650 mg Q6H prn mild pain and Tylenol 1000 mg Q8H prn for moderate pain. Oxycodone discontinued given poor renal function.

## 2025-04-10 NOTE — CONSULT NOTE ADULT - ASSESSMENT
65 yo man w met poorly diff gastric ca (neck/axillary/abdomen LN mets, under care Dr [facility], on FOLFOX chemo [id number], Dx'd 11/2024, adm [facility] 12/2024 w MARYSOL req temporary HD.    04/01/2025 now admitted for R effusion.  s/p pleurex catheter 4/4/2025    now admitted with hypotension and SOB    Recommendations:  1.  follow CBC and transfuse as indicated  2.  thoracic surgery evaluation   3.  ?need of paracentesis  4.  further heme onc recommendations pending above

## 2025-04-10 NOTE — CONSULT NOTE ADULT - SUBJECTIVE AND OBJECTIVE BOX
HPI:  Patient is a 65 y/o male with PMHx of poorly differentiated metastatic (known cervical and axillary lymphadenopathy) GI carcinoma, LUE and LLE DVT, HTN, on home O2 2/2 to SOB, and anemia presents to the ED with hypotension and SOB. Of note, pt was admitted from 3/3-3/7 with large left pleural effusion and LUE DVT. Pt had thoracentesis done of which 3.1 L of fluid from the left thorax. Then was admitted on 3/19-3/23 for pleural effusion and NSTEMI. Pt had a thoracentesis done of which drained 560 cc from the left thorax. Then patient was admitted from - for SOB and had pleurax placement on right chest (currently has very minimal output). Patient was told to hold bumex until today, however patient restarted bumex last night after speaking with his heme-onc doctor. Endorses he had soft bp yesterday (systolic ~102), and then today he was supposed to get his fifth chemo cycle (previously on FolFox, was supposed to switch to his new chemo medication today) however BP was noted to be low and was recommended to come to the hospital. Also endorses lightheadedness today.   Patient has been complaining of cough for the past two months but now is producing tannish sputum.  Patient also endorses decreased urine output for the past couple of days, denies dysuria.   (2025 14:45)    INTERVAL HPI: Urology consulted for MARYSOL, oliguria, CT finding of renal pelvic fluid fullness/UPJ obstruction configuration (R>L) and unchanged bilateral cysts. 300cc urine output documented today. Cr 3.5 today, uptrending. At this time patient is refusing wise insertion.    PAST MEDICAL & SURGICAL HISTORY:  Gastric lymphoma  Anemia of chronic disease  Pleural effusion  DVT (deep venous thrombosis)  Hypertension  Hyperlipidemia  S/P appendectomy, 2014  S/P cholecystectomy, 15 years ago    REVIEW OF SYSTEMS:  CONSTITUTIONAL: No weakness, fevers or chills  EYES/ENT: No visual changes;  No vertigo or throat pain   NECK: No pain or stiffness  RESPIRATORY: See HPI. No wheezing, hemoptysis  CARDIOVASCULAR: No chest pain or palpitations  GASTROINTESTINAL: No abdominal or epigastric pain. No nausea, vomiting, or hematemesis; No diarrhea or constipation. No melena or hematochezia.  GENITOURINARY: No dysuria, frequency or hematuria  NEUROLOGICAL: No numbness or weakness  SKIN: No itching, burning, rashes, or lesions   All other review of systems is negative unless indicated above.    MEDICATIONS:  MEDICATIONS  (STANDING):  atorvastatin 40 milliGRAM(s) Oral at bedtime  azithromycin  IVPB 500 milliGRAM(s) IV Intermittent every 24 hours  azithromycin  IVPB      cefTRIAXone   IVPB 2000 milliGRAM(s) IV Intermittent every 24 hours  chlorhexidine 2% Cloths 1 Application(s) Topical <User Schedule>  clopidogrel Tablet 75 milliGRAM(s) Oral daily  heparin  Infusion.  Unit(s)/Hr (17 mL/Hr) IV Continuous <Continuous>  influenza   Vaccine 0.5 milliLiter(s) IntraMuscular once  lidocaine 2% Jelly 5 milliLiter(s) Topical once  melatonin 5 milliGRAM(s) Oral at bedtime  midodrine 10 milliGRAM(s) Oral every 8 hours  sodium zirconium cyclosilicate 10 Gram(s) Oral once    MEDICATIONS  (PRN):  acetaminophen     Tablet .. 650 milliGRAM(s) Oral every 6 hours PRN Mild Pain (1 - 3)  acetaminophen   IVPB .. 1000 milliGRAM(s) IV Intermittent every 8 hours PRN Moderate Pain (4 - 6)  heparin   Injectable 7500 Unit(s) IV Push every 6 hours PRN For aPTT less than 40  heparin   Injectable 3500 Unit(s) IV Push every 6 hours PRN For aPTT between 40 - 57  ondansetron    Tablet 4 milliGRAM(s) Oral daily PRN Nausea and/or Vomiting    ALLERGIES:  Bactrim DS (Hives)    SOCIAL HISTORY:  Tobacco: former smoker quit 15 yrs ago   EtOH: denies  Recreational drug use: denies  Lives with: brother   Ambulates: w/o assistance   ADLs: independent (2025 14:45)    FAMILY HISTORY:  Family history of coronary artery disease in father  Father -  age 60 following MI    VITAL SIGNS:  Vital Signs Last 24 Hrs  T(C): 36.3 (10 Apr 2025 11:32), Max: 36.9 (10 Apr 2025 04:27)  T(F): 97.3 (10 Apr 2025 11:32), Max: 98.4 (10 Apr 2025 04:27)  HR: 100 (10 Apr 2025 11:32) (64 - 100)  BP: 86/48 (10 Apr 2025 11:32) (85/49 - 97/64)  BP(mean): 65 (2025 16:00) (65 - 65)  RR: 17 (10 Apr 2025 11:32) (15 - 20)  SpO2: 94% (10 Apr 2025 11:32) (92% - 99%)    PHYSICAL EXAM:  GENERAL:  Well-developed male sitting in a chair in NAD.  HEENT:  NC/AT. Sclera white.   CARDIO:  Regular rate and rhythm.   RESPIRATORY:  Nonlabored breathing, no accessory muscle use. R sided pleurx in place, surrounding skin soft, nontender. Dressing clean/dry.  ABDOMEN:  Rotund, tense. No rebound tenderness or guarding.  SKIN:  No jaundice, pallor, or cyanosis  NEURO:  A&O x 3    LABS:                        8.6    9.37  )-----------( 565      ( 10 Apr 2025 10:27 )             27.3     04-10    133[L]  |  95[L]  |  78[H]  ----------------------------<  97  5.1   |  26  |  3.50[H]    Ca    9.3      10 Apr 2025 07:22  Phos  5.6     04-10  Mg     2.6     04-10    TPro  6.2  /  Alb  2.4[L]  /  TBili  0.4  /  DBili  x   /  AST  25  /  ALT  16  /  AlkPhos  95  04-10    LIVER FUNCTIONS - ( 10 Apr 2025 07:22 )  Alb: 2.4 g/dL / Pro: 6.2 g/dL / ALK PHOS: 95 U/L / ALT: 16 U/L / AST: 25 U/L / GGT: x           PT/INR - ( 10 Apr 2025 07:22 )   PT: 16.7 sec;   INR: 1.43 ratio  PTT - ( 10 Apr 2025 10:27 )  PTT:102.3 sec    RADIOLOGY & ADDITIONAL STUDIES:  < from: CT Abdomen and Pelvis No Cont (25 @ 22:22) >  CHEST:  LUNGS AND LARGE AIRWAYS: Trace distal tracheal secretions.Mild dependent   RIGHT pleural effusion, diminished- post in situ percutaneous RIGHT   thoracentesis catheter . Adjacent RIGHT base passive atelectasis, not   significantly changed. Multiloculated LEFT pleural effusion w/ hazy mural   thickening /upper lobe pleural nodularity and associated LLL   atelectasis/consolidation, not significantly changed., . IAN/RML   interlobular septal thickening, not significantly changed  PLEURA: See above.  VESSELS: No aortic dilatation. Aortic /coronary artery calcification.   Central venous catheter terminates in RA.  HEART: Heart size is normal. Moderate pericardial effusion.  MEDIASTINUM AND STELLA: Diffuse mediastinal, and bilateral axillary (L>R)   lymphadenopathy, unchanged. Largest LN : RIGHT paratracheal 2.8 x 1.8 cm   (301/22), not significantly changed when allowing for interobserver   differences.  CHEST WALL AND LOWER NECK: Gynecomastia. Subcutaneous/implanted RIGHT   chest wall infusion port..    ABDOMEN AND PELVIS:  LIVER: Minimally lobulated outer hepatic contour..  BILE DUCTS: Normal caliber.  GALLBLADDER: Within normal limits.  SPLEEN: Within normal limits.  PANCREAS: Within normal limits.  ADRENALS: Within normal limits.  KIDNEYS/URETERS: Renal pelvic fluid fullness/UPJ obstruction   configuration (R>L) and bilateral cysts, unchanged.    BLADDER: Concentric urinary bladder mural prominence.  REPRODUCTIVE ORGANS: Normal-sized prostate.    BOWEL: No bowel obstruction. Nonvisualized appendix. No appendicitis.  PERITONEUM/RETROPERITONEUM: Moderate volume abdominal/pelvic ascites,   developed. Nodular Gerota's fascia thickening and diffuse mesenteric root   edema..  VESSELS: Atherosclerotic changes.  LYMPH NODES: Gastrohepatic, periportal/portocaval, retroperitoneal,   mesenteric root, and pelvic side wall lymphadenopathy is not   significantly changed with innumerable predominantly centimeter sized   lymph nodes. Bilateral inguinal lymphadenopathy is increased/developed   with inguinal lymph nodes, measuring up to 1.7 x 1.2 cm (RIGHT 301/157).   RIGHT retrocrural ectatic cisterna chyli , unchanged (301/74).  ABDOMINAL WALL: Diffuse anasarca (eccentric to RIGHT), developed.  MUSCULOSKELETAL.: Anterior right psoas calcification, unchanged..   Scattered sclerotic osseous metastasis, not significantly changed. Mild   thoracolumbar degenerative changes..    IMPRESSION:  1.  RIGHT pleural effusion, diminished , post percutaneous   thoracocentesis catheter.  2.  Moderate loculated LEFT pleural effusion and adjacent LLL atelectasis   and/or consolidation, not significantly changed.  3.  Moderate volume abdominal ascites, increased/developed from prior   exams. Nodular thickening of pleural and peritoneal surfaces c/w   malignant etiology  4.  Diffuse thoracoabdominal lymphadenopathy, enlarged/developed in   inguinal region.  5.  Sclerotic osseous metastasis, not significantly changed

## 2025-04-10 NOTE — CHART NOTE - NSCHARTNOTEFT_GEN_A_CORE
Called by RN for patient having low BP 86/49. Patient seen and examined at bedside, satting currently at 94% on 5L. Has received midodrine 10mg x2, albumin x2, and 2L bolus thus far. Upon chart review, ICU and cardio recommends avoiding excess IVF due to pleural effusions. Patient does appear volume overloaded on exam.       T(C): 36.5 (04-09-25 @ 23:15), Max: 36.5 (04-09-25 @ 23:15)  HR: 71 (04-10-25 @ 02:33) (64 - 81)  BP: 94/59 (04-10-25 @ 02:33) (73/49 - 97/64)  RR: 20 (04-09-25 @ 23:15) (18 - 28)  SpO2: 94% (04-09-25 @ 23:15) (94% - 100%)  Wt(kg): --    Physical Exam:  Gen: well appearing, NAD  HEENT: NCAT, PEERL b/l, EOMI b/l, no conjunctival erythema  Cardio: regular rate and rhythm, +s1s2, no murmurs, rubs, or gallops  Pulm: CTA b/l, no wheezes, rales or rhonchi  Abdomen: soft, nontender, +distended, +BS x4 quadrants, no guarding  Extremities: no clubbing, cyanosis, +2 pedal pulses, 2+ pitting edema   Neuro: AAOx3, moving all 4 extremities, sensation intact  Skin: warm and dry    Assessment/Plan  Patient is a 63 y/o male with PMHx of poorly differentiated metastatic (known cervical and axillary lymphadenopathy) GI carcinoma, LUE and LLE DVT, HTN, on home O2 2/2 to SOB, and anemia presents to the ED with hypotension and SOB. Placed on pressors in ED. Admitted for hypotension, MARYSOL, hyponatremia, and present chronic pleural effusion.     - s/p midodrine x2, albumin x2, 2L bolus   - Consulted ICU who recommends no pressors at this time since pressures have been ranging similarly but will continue to f/u   - HELD Eliquis and started heparin drip, pt plan for possible paracentesis tomorrow  - would avoid more IVF due to pleural effusions   - Will continue to monitor hemodynamics   - RN to call if changes

## 2025-04-10 NOTE — PROGRESS NOTE ADULT - SUBJECTIVE AND OBJECTIVE BOX
Patient is a 64y old  Male who presents with a chief complaint of hypotension (10 Apr 2025 11:25)    INTERVAL HPI/OVERNIGHT EVENTS: Patient was switched over to heparin gtt overnight for possible future paracentesis. Pt seen and examined at the bedside this AM. Patient does not endorse pain, admits his lightheadedness feels better. Denies being able to sleep at night.     MEDICATIONS  (STANDING):  atorvastatin 40 milliGRAM(s) Oral at bedtime  azithromycin  IVPB 500 milliGRAM(s) IV Intermittent every 24 hours  azithromycin  IVPB      cefTRIAXone   IVPB 2000 milliGRAM(s) IV Intermittent every 24 hours  chlorhexidine 2% Cloths 1 Application(s) Topical <User Schedule>  clopidogrel Tablet 75 milliGRAM(s) Oral daily  heparin  Infusion.  Unit(s)/Hr (17 mL/Hr) IV Continuous <Continuous>  influenza   Vaccine 0.5 milliLiter(s) IntraMuscular once  midodrine 10 milliGRAM(s) Oral every 8 hours    MEDICATIONS  (PRN):  acetaminophen     Tablet .. 650 milliGRAM(s) Oral every 6 hours PRN Mild Pain (1 - 3)  heparin   Injectable 7500 Unit(s) IV Push every 6 hours PRN For aPTT less than 40  heparin   Injectable 3500 Unit(s) IV Push every 6 hours PRN For aPTT between 40 - 57  ondansetron    Tablet 4 milliGRAM(s) Oral daily PRN Nausea and/or Vomiting      Allergies    Bactrim DS (Hives)    Intolerances      Vital Signs Last 24 Hrs  T(C): 36.3 (10 Apr 2025 11:32), Max: 36.9 (10 Apr 2025 04:27)  T(F): 97.3 (10 Apr 2025 11:32), Max: 98.4 (10 Apr 2025 04:27)  HR: 100 (10 Apr 2025 11:32) (64 - 100)  BP: 86/48 (10 Apr 2025 11:32) (85/49 - 97/64)  BP(mean): 65 (09 Apr 2025 16:00) (65 - 65)  RR: 17 (10 Apr 2025 11:32) (15 - 20)  SpO2: 94% (10 Apr 2025 11:32) (92% - 99%)    Parameters below as of 10 Apr 2025 11:32  Patient On (Oxygen Delivery Method): room air      Physical Exam: PHYSICAL EXAM:  GENERAL: NAD  HEENT:  anicteric, moist mucous membranes  CHEST/LUNG:  decreased b/l lower lung sounds. R sided pleurex noted with water seal, dressing C/D/I  HEART:  RRR, S1, S2  ABDOMEN:  slightly distended and tight, nontender  EXTREMITIES: 2+ painful pitting edema in b/l LEs.  known DVT in left upper extremity and left lower extremity   NERVOUS SYSTEM: answers questions and follows commands appropriately    LABS:                        8.6    9.37  )-----------( 565      ( 10 Apr 2025 10:27 )             27.3     CBC Full  -  ( 10 Apr 2025 10:27 )  WBC Count : 9.37 K/uL  Hemoglobin : 8.6 g/dL  Hematocrit : 27.3 %  Platelet Count - Automated : 565 K/uL  Mean Cell Volume : 63.5 fl  Mean Cell Hemoglobin : 20.0 pg  Mean Cell Hemoglobin Concentration : 31.5 g/dL  Auto Neutrophil # : x  Auto Lymphocyte # : x  Auto Monocyte # : x  Auto Eosinophil # : x  Auto Basophil # : x  Auto Neutrophil % : x  Auto Lymphocyte % : x  Auto Monocyte % : x  Auto Eosinophil % : x  Auto Basophil % : x    10 Apr 2025 07:22    133    |  95     |  78     ----------------------------<  97     5.1     |  26     |  3.50     Ca    9.3        10 Apr 2025 07:22  Phos  5.6       10 Apr 2025 07:22  Mg     2.6       10 Apr 2025 07:22    TPro  6.2    /  Alb  2.4    /  TBili  0.4    /  DBili  x      /  AST  25     /  ALT  16     /  AlkPhos  95     10 Apr 2025 07:22    PT/INR - ( 10 Apr 2025 07:22 )   PT: 16.7 sec;   INR: 1.43 ratio         PTT - ( 10 Apr 2025 10:27 )  PTT:102.3 sec  Urinalysis Basic - ( 10 Apr 2025 07:22 )    Color: x / Appearance: x / SG: x / pH: x  Gluc: 97 mg/dL / Ketone: x  / Bili: x / Urobili: x   Blood: x / Protein: x / Nitrite: x   Leuk Esterase: x / RBC: x / WBC x   Sq Epi: x / Non Sq Epi: x / Bacteria: x      CAPILLARY BLOOD GLUCOSE              RADIOLOGY & ADDITIONAL TESTS:  Personally reviewed.     Consultant(s) Notes Reviewed:  [x] YES  [ ] NO

## 2025-04-10 NOTE — CONSULT NOTE ADULT - NS ATTEND AMEND GEN_ALL_CORE FT
I have reviewed the above note, examined the patient and discussed the findings on CT with the patient. I have reviewed all prior abdominal and chest CTs. There are no acute urological changes on CT, and obstruction is not the etiology of the patient's ARF. He will be rescanned tonight to r/o persistent retention, and a wise will be placed if needed. Saucerization Depth: dermis and superficial adipose tissue

## 2025-04-10 NOTE — PROGRESS NOTE ADULT - ATTENDING COMMENTS
. Patient was seen and examined by myself. Case was discussed with house staff in details. I have reviewed and agree with the plan as outlined above with edits where appropriate.    HPI:  Patient is a 65 y/o male with PMHx of poorly differentiated metastatic (known cervical and axillary lymphadenopathy) GI carcinoma, LUE and LLE DVT, HTN, on home O2 2/2 to SOB, and anemia presents to the ED with hypotension and SOB. Of note, pt was admitted from 3/3-3/7 with large left pleural effusion and LUE DVT. Pt had thoracentesis done of which 3.1 L of fluid from the left thorax. Then was admitted on 3/19-3/23 for pleural effusion and NSTEMI. Pt had a thoracentesis done of which drained 560 cc from the left thorax. Then patient was admitted from 04/03-04/06 for SOB and had pleurax placement on right chest (currently has very minimal output). Patient was told to hold bumex until today, however patient restarted bumex last night after speaking with his heme-onc doctor. Endorses he had soft bp yesterday (systolic ~102), and then today he was supposed to get his fifth chemo cycle (previously on FolFox, was supposed to switch to his new chemo medication today) however BP was noted to be low and was recommended to come to the hospital. Also endorses lightheadedness today.     vitals: T: 97.3F, BP: 82/56 -> 90/51, HR: 68, RR: 22, 99% O2 on 4L nc  labs: hgb: 8.9 (baseline), CBC abnormal 2/2 to cancer, Na: 130, Cl: 94, BUN: 71, Cr: 3.30, eGFR: 20, lactate: 2.7, proBNP: 3483  UA: cloudy, trace leuk esterase, 10wbc, moderate bacteria, neg nitrate   RVP neg   X-ray:  Increased interstitial markings bilaterally slightly greater on the left as before. There is a left effusion with atelectatic change.   Underlying inflammatory infectious process not excluded. Smaller right effusion with some increased interstitial markings in the right   infrahilar region which are less prominent than before. Small caliber right chest tube noted by the right CP angle. No pneumothorax.   Port-A-Cath as before. Borderline cardiomegaly. Regional osseous structures appropriate for age  received in the ED: 1L IVB 1x, levo gtt   (09 Apr 2025 14:45)      ROS: as in the HPI; all other ROS negative    SH and family history as above    Vital Signs Last 24 Hrs  T(C): 36.3 (10 Apr 2025 11:32), Max: 36.9 (10 Apr 2025 04:27)  T(F): 97.3 (10 Apr 2025 11:32), Max: 98.4 (10 Apr 2025 04:27)  HR: 100 (10 Apr 2025 11:32) (64 - 100)  BP: 86/48 (10 Apr 2025 11:32) (85/49 - 97/64)  BP(mean): 65 (09 Apr 2025 16:00) (65 - 65)  RR: 17 (10 Apr 2025 11:32) (15 - 20)  SpO2: 94% (10 Apr 2025 11:32) (92% - 99%)    Parameters below as of 10 Apr 2025 11:32  Patient On (Oxygen Delivery Method): room air        GEN: feels very tired and SOB upon exertion.   HEENT- normocephalic; mouth moist  CVS- S1S2+  LUNGS- course BS B/L, Ronchi,  auscultation; no wheezing  ABD: Soft , nontender,+ distended, Bowel sounds are present   EXTREMITY: no calf tenderness, no cyanosis, 2 + pitting  edema  NEURO: AAOx3; non focal neurologic exam   PSYCH: appears anxious and frustrated.   BACK: no swelling or mass;   VASCULAR: distal peripheral pulses present  SKIN: warm and dry.       Labs and imaging reviewed      Patient presenting with Patient is a 64y old  Male who presents with a chief complaint of hypotension (10 Apr 2025 15:33)   admitted for   1. Hypotension: s/p albumin 3 doses , 2L IV bolus, all BP meds held. on midodrine 10mg q8h. BP remain soft, will given additional dose of albumin. if patient remain hypotensive, will need ICU re eval.      2. acute on chronic HFmrEF: EF 54%, no other sig changes from prior echo, cardio eval noted. no diuresis due to hypotension.     3. recurrent pleural effusion due to HF  and malignancy: Drain Pleurx when BP improves, but as per patient, not that much out put at home. CT show improved R pleural effusion but loculated L pleural effusion. Ascites, Us show Small to mod Ascites,  need IR eval for thoracentesis/Paracentesis when BP improves. CTS eval noted.     4. DVT: eliquis held, on Heparin gtt.     5.  MARYSOL with CKD 3: renal function worsening. likely pre renal, ATN 2/2 Hypotension.  place wise to monitor out put, urology eval for UPJ obstruction on CT, Us Renal : Mild right and trace left hydronephrosis. need to be conservative about IVF due to volume overloaded state at current.     6. Gastric Lymphoma:  discussed with hematology, treatment on hold due to current condition, evidence of progression of the disease, palliative care eval suggested.     7 acute on chronic RF with hypoxia: on 3L NC, IV abx rocephin /zithromax for pneumonia, management pleural effusion as above. duoneb prn         Plan of care discussed with patient ;  all questions and concerns were addressed.

## 2025-04-10 NOTE — DISCHARGE NOTE NURSING/CASE MANAGEMENT/SOCIAL WORK - FINANCIAL ASSISTANCE
[facility] provides services at a reduced cost to those who are determined to be eligible through [facility]’s financial assistance program. Information regarding [facility]’s financial assistance program can be found by going to [email or web address] or by calling [phone].

## 2025-04-10 NOTE — PROVIDER CONTACT NOTE (CRITICAL VALUE NOTIFICATION) - ASSESSMENT
"Angie Ro  1996      Assessment and Plan:  1. Ear aches- exam negative/ ? Eustachian tube dysfct- trial of claritin D; TMJ possible but less likey  2. Occasional dizzines/pallor check CBC   3. If not doing better consider ENT referral      Chief Complaint: ear ache    Visit diagnoses:    1. Eustachian tube dysfunction  loratadine-pseudoephedrine (CLARITIN-D 12 HOUR) 5-120 mg Tb12    HM2(CBC w/o Differential)       Meds:  Current Outpatient Prescriptions   Medication Sig Dispense Refill     fluticasone (FLONASE) 50 mcg/actuation nasal spray 2 sprays each nostril daily 16 g 12     norgestimate-ethinyl estradiol (ORTHO-CYCLEN) 0.25-35 mg-mcg per tablet Take 1 tablet by mouth daily. 28 tablet 11     triamcinolone (KENALOG) 0.1 % ointment Apply 1 application topically as needed.       loratadine-pseudoephedrine (CLARITIN-D 12 HOUR) 5-120 mg Tb12 Take 1 tablet by mouth 2 (two) times a day. 30 tablet 2     No current facility-administered medications for this visit.        Allergies   Allergen Reactions     Amoxicillin-Pot Clavulanate Rash       ROS: complete review of symptoms otherwise negative except as noted below    S:  Bilateral ear achiness. ? Some blood left ear. No fever.        O:   Vitals:    03/06/17 1531   BP: 100/70   Patient Site: Left Arm   Patient Position: Sitting   Cuff Size: Adult Regular   Pulse: 72   Temp: 98.4  F (36.9  C)   TempSrc: Tympanic   Weight: 131 lb 6.4 oz (59.6 kg)   Height: 5' 4.5\" (1.638 m)       Physical Exam:  General- she's pale which may be baseline  VS- see above  HEENT- neg ; TMs normal no cerumen/no perforation/no erythema.           Crispin Garcia MD              " Upon assessment, patient on 4L oxygen sat at 92%. Cardiac monitor shows SR.

## 2025-04-10 NOTE — CASE MANAGEMENT PROGRESS NOTE - NSCMPROGRESSNOTE_GEN_ALL_CORE
Patient discussed during interdisciplinary rounds. Patient readmitted with hypotention. Patient remains acute on heparin drip and IV zithro. Patient on 2L oxygen and has stationary home oxygen concentrator and portable oxygen concentrator. Patient recently discharged with [facility] for right side pleurx management. Patient is pending chest CT. Discharge disposition pending clinical course. CM remains available.

## 2025-04-10 NOTE — PROGRESS NOTE ADULT - PROBLEM SELECTOR PLAN 2
BUN: 71, Cr: 3.30  -endorses has not been urinating much for the past two days (denies dysuria)  -Nephro consulted ( Dr. Franck Wagoner): Check Cortisol AM, IV pressor, IV albumin, recommended wise  -ordered indwelling wise  -monitor I/Os

## 2025-04-10 NOTE — CARE COORDINATION ASSESSMENT. - INCOME/ENTITLEMENTS
unable to assess Benefits, risks, and possible complications of procedure explained to patient/caregiver who verbalized understanding and gave written consent.

## 2025-04-10 NOTE — PROGRESS NOTE ADULT - ASSESSMENT
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 Admitted for hypotension, MARYSOL, hyponatremia, and present chronic pleural effusion.     Hypotension, chronic pleural Effusion, HFpEF   - Pt with worsening SOB, has been admitted twice in the past month in which thoracentesis was performed which drained malignant pleural effusions on the left, s/p Pleurx (4/4), per pt catheter is not draining much per home nurse  - CXR: There is a left effusion with atelectatic change. Underlying inflammatory infectious process not excluded. Smaller right effusion with some increased interstitial markings in the right infrahilar region which are less prominent than before.  - CT surg following     - hx of CAD recent NSTEMI 3/2025, medically managed without cath  - EKG NSR low voltage  - Trop neg x1 (trop 72), lactate: 2.7, pro BNP: 3483  - no anginal complaint   - continue Plavix, statin    - TTE (3/20/2025) shows EF 60-65%, grade 1 diastolic dysfunction.   - Hold diuretics given hypotension  - MARYSOL, creat: 3.5, continue to trend renal indices, might need CRRT, nephro following   - Strict I/Os, daily weights.    - hypotensive BP 80- 90's systolics   - tele: SR 70-80's continue tele for now   - home antihypertensives on hold (labetalol, losartan)  - now on midodrine 10mg TID + albumin  - does not need ICU level of care at this time per  ICU team  - Caution with fluids given pleural effusions, volume overload  - Monitor and replete Lytes. Keep K > 4 and Mg > 2    - home Eliquis on hold (hx of DVT)  - hep gtt infusing, for possible paracentesis   - rec GI consult    - at risk for abrupt decompensation  - rec GOC discussion   - Will continue to follow.    Karime Hartley [facility]  Nurse Practitioner - Cardiology   call TEAMS

## 2025-04-10 NOTE — PROGRESS NOTE ADULT - SUBJECTIVE AND OBJECTIVE BOX
[facility] -- Charles Martinez Pannella, Patel, Savella, Goodger, Cohen  Office # [phone]    Follow Up:  hypotension, SOB     Subjective/Observations: seen and examined, awake, OOB to chair, alert, denies chest pain, dyspnea, palpitations or dizziness, orthopnea and PND. Tolerating room air. events overnight noted     REVIEW OF SYSTEMS: All other review of systems is negative unless indicated above  PAST MEDICAL & SURGICAL HISTORY:  Gastric lymphoma      Anemia of chronic disease      Pleural effusion      DVT (deep venous thrombosis)      Hypertension      Hyperlipidemia      S/P appendectomy  November 17th 2014      S/P cholecystectomy  15 years ago        MEDICATIONS  (STANDING):  atorvastatin 40 milliGRAM(s) Oral at bedtime  azithromycin  IVPB 500 milliGRAM(s) IV Intermittent every 24 hours  azithromycin  IVPB      cefTRIAXone   IVPB 2000 milliGRAM(s) IV Intermittent every 24 hours  chlorhexidine 2% Cloths 1 Application(s) Topical <User Schedule>  clopidogrel Tablet 75 milliGRAM(s) Oral daily  heparin  Infusion.  Unit(s)/Hr (17 mL/Hr) IV Continuous <Continuous>  influenza   Vaccine 0.5 milliLiter(s) IntraMuscular once  midodrine 10 milliGRAM(s) Oral every 8 hours    MEDICATIONS  (PRN):  acetaminophen     Tablet .. 650 milliGRAM(s) Oral every 6 hours PRN Mild Pain (1 - 3)  heparin   Injectable 7500 Unit(s) IV Push every 6 hours PRN For aPTT less than 40  heparin   Injectable 3500 Unit(s) IV Push every 6 hours PRN For aPTT between 40 - 57  ondansetron    Tablet 4 milliGRAM(s) Oral daily PRN Nausea and/or Vomiting    Allergies    Bactrim DS (Hives)    Intolerances      Vital Signs Last 24 Hrs  T(C): 36.6 (10 Apr 2025 08:48), Max: 36.9 (10 Apr 2025 04:27)  T(F): 97.9 (10 Apr 2025 08:48), Max: 98.4 (10 Apr 2025 04:27)  HR: 73 (10 Apr 2025 04:27) (64 - 81)  BP: 94/60 (10 Apr 2025 08:48) (73/49 - 97/64)  BP(mean): 65 (09 Apr 2025 16:00) (58 - 65)  RR: 15 (10 Apr 2025 08:48) (15 - 24)  SpO2: 99% (10 Apr 2025 08:48) (92% - 100%)    Parameters below as of 10 Apr 2025 08:48  Patient On (Oxygen Delivery Method): room air      I&O's Summary    09 Apr 2025 07:01  -  10 Apr 2025 07:00  --------------------------------------------------------  IN: 0 mL / OUT: 300 mL / NET: -300 mL          TELE:  70-80's   PHYSICAL EXAM:  Constitutional: NAD, awake and alert  HEENT: Moist Mucous Membranes, Anicteric  Pulmonary: Non-labored, breath sounds are clear bilaterally, No wheezing, rales or rhonchi  Cardiovascular: Regular, S1 and S2, No murmurs, rubs, gallops or clicks  Gastrointestinal: Bowel Sounds present, soft, distended   Lymph: 1-2+ b/l LE edema. No lymphadenopathy.  Skin: No visible rashes or ulcers. right Pleurx cath, dressing CDI   Psych:  Mood & affect appropriate  LABS: All Labs Reviewed:                        8.6    9.37  )-----------( 565      ( 10 Apr 2025 10:27 )             27.3                         8.5    8.82  )-----------( 531      ( 10 Apr 2025 07:22 )             27.6                         8.9    9.73  )-----------( 498      ( 09 Apr 2025 11:35 )             28.3     10 Apr 2025 07:22    133    |  95     |  78     ----------------------------<  97     5.1     |  26     |  3.50   09 Apr 2025 11:35    130    |  94     |  71     ----------------------------<  107    5.0     |  26     |  3.30     Ca    9.3        10 Apr 2025 07:22  Ca    9.2        09 Apr 2025 11:35  Phos  5.6       10 Apr 2025 07:22  Mg     2.6       10 Apr 2025 07:22    TPro  6.2    /  Alb  2.4    /  TBili  0.4    /  DBili  x      /  AST  25     /  ALT  16     /  AlkPhos  95     10 Apr 2025 07:22  TPro  6.1    /  Alb  1.9    /  TBili  0.3    /  DBili  x      /  AST  32     /  ALT  20     /  AlkPhos  104    09 Apr 2025 11:35    PT/INR - ( 10 Apr 2025 07:22 )   PT: 16.7 sec;   INR: 1.43 ratio         PTT - ( 10 Apr 2025 10:27 )  PTT:102.3 sec      12 Lead ECG:   Ventricular Rate 69 BPM    Atrial Rate 69 BPM    P-R Interval 154 ms    QRS Duration 94 ms    Q-T Interval 396 ms    QTC Calculation(Bazett) 424 ms    P Axis 53 degrees    R Axis -17 degrees    T Axis -4 degrees    Diagnosis Line Normal sinus rhythm  Low voltage QRS  Inferior infarct (cited on or before 19-MAR-2025)    Confirmed by CHIKA CABRERA ([id number]) on 4/9/2025 2:09:12 PM (04-09-25 @ 13:48)      TRANSTHORACIC ECHOCARDIOGRAM REPORT  ________________________________________________________________________________                                      _______       Pt. Name:       JESSE MARQUEZNATHANIEL Study Date:    3/20/2025  MRN:     [id number]                   YOB: 1960  Accession #:    [id number]                  Age:           64 years  Account#:       [id number]                 Gender:        M  Heart Rate:                                Height:        66.93in (170.00 cm)  Rhythm:                                    Weight:        205.03 lb (93.00 kg)  Blood Pressure: 106/64 mmHg                BSA/BMI:       2.04 m² / 32.18 kg/m²  ________________________________________________________________________________________  Referring Physician:    [id number] Oswaldo Snowden  Interpreting Physician: Alberto Gilliam M.D.  Primary Sonographer:    Emperatriz AVILA    CPT:               ECHO TTE WO CON COMP W DOPP - 55553.m  Indication(s):     Chest pain, unspecified - R07.9  Procedure:         Transthoracic echocardiogram with 2-D, M-mode and complete                     spectral and color flow Doppler.  Ordering Location: TELE  Admission Status:  Inpatient    _______________________________________________________________________________________     CONCLUSIONS:      1. Left ventricular wall thickness is normal. Left ventricular systolic function is normal with an ejection fraction visually estimated at 60 to 65 %. There are no regional wall motion abnormalities seen.   2. Normal right ventricular cavity size, with normal wall thickness, and normal right ventricular systolic function.   3. Mild mitral regurgitation.   4. Fibrocalcific aortic valve sclerosis without stenosis.   5. The inferior vena cava is dilated measuring 2.44 cm in diameter, (dilated >2.1cm) with normal inspiratory collapse (normal >50%) consistent with mildly elevated right atrial pressure (~8, range 5-10mmHg).   6. Small pericardial effusion.   7. There is mild (grade 1) left ventricular diastolic dysfunction.   8. Compared to the transthoracic echocardiogram performed on 3/5/2025, there have been no significant interval changes.   9. Pulmonary artery systolic pressure could not be estimated.    ________________________________________________________________________________________  FINDINGS:     Left Ventricle:  Left ventricular wall thickness is normal. Left ventricular systolic function is normal with an ejection fraction visually estimated at 60 to 65%. There are no regional wall motion abnormalities seen. There is mild (grade 1) left ventricular diastolic dysfunction.     Right Ventricle:  The right ventricular cavity is normal in size, with normal wall thickness and right ventricular systolic function is normal.     Aortic Valve:  There is fibrocalcific aortic valve sclerosis without stenosis. There is no evidence of aortic regurgitation.     Mitral Valve:  Structurally normal mitral valve with normal leaflet excursion. There is mild mitral regurgitation.     Tricuspid Valve:  Structurally normal tricuspid valve with normal leaflet excursion. There is insufficient tricuspid regurgitation detected to calculate pulmonary artery systolic pressure.     Pulmonic Valve:  The pulmonic valve was not well visualized.     Pericardium:  There is a small pericardial effusion.     Systemic Veins:  The inferior vena cava is dilated measuring 2.44 cm in diameter, (dilated >2.1cm) with normal inspiratory collapse (normal >50%) consistent with mildly elevated right atrial pressure (~8, range 5-10mmHg).  ____________________________________________________________________  QUANTITATIVE DATA:  Left Ventricle Measurements: (Indexed to BSA)     Visualized LV EF%: 60 to 65%     MV E Vmax: 1.03 m/s  MV A Vmax: 1.33 m/s  MV E/A:    0.77  MV DT:     183 msec             Right Ventricle Measurements:     RV Base (RVID1): 2.8 cm    Mitral Valve Measurements:     MV E Vmax: 1.0 m/s  MV A Vmax: 1.3 m/s  MV E/A:    0.8       Tricuspid Valve Measurements:     RA Pressure: 8 mmHg    ________________________________________________________________________________________  Electronically signed on 3/21/2025 at 11:40:35 AM by Alberto Gilliam M.D.         *** Final ***

## 2025-04-10 NOTE — CONSULT NOTE ADULT - SUBJECTIVE AND OBJECTIVE BOX
Patient is a 64y old  Male who presents with a chief complaint of hypotension (10 Apr 2025 08:02)      HPI:  Patient is a 63 y/o male with PMHx of poorly differentiated metastatic (known cervical and axillary lymphadenopathy) GI carcinoma, LUE and LLE DVT, HTN, on home O2 2/2 to SOB, and anemia presents to the ED with hypotension and SOB. Of note, pt was admitted from 3/3-3/7 with large left pleural effusion and LUE DVT. Pt had thoracentesis done of which 3.1 L of fluid from the left thorax. Then was admitted on 3/19-3/23 for pleural effusion and NSTEMI. Pt had a thoracentesis done of which drained 560 cc from the left thorax. Then patient was admitted from - for SOB and had pleurax placement on right chest (currently has very minimal output). Patient was told to hold bumex until today, however patient restarted bumex last night after speaking with his heme-onc doctor. Endorses he had soft bp yesterday (systolic ~102), and then today he was supposed to get his fifth chemo cycle (previously on FolFox, was supposed to switch to his new chemo medication today) however BP was noted to be low and was recommended to come to the hospital. Also endorses lightheadedness today.   Patient has been complaining of cough for the past two months but now is producing tannish sputum.  Patient also endorses decreased urine output for the past couple of days, denies dysuria.     vitals: T: 97.3F, BP: 82/56 -> 90/51, HR: 68, RR: 22, 99% O2 on 4L nc  labs: hgb: 8.9 (baseline), CBC abnormal 2/2 to cancer, Na: 130, Cl: 94, BUN: 71, Cr: 3.30, eGFR: 20, lactate: 2.7, proBNP: 3483  UA: cloudy, trace leuk esterase, 10wbc, moderate bacteria, neg nitrate   RVP neg   X-ray:  Increased interstitial markings bilaterally slightly greater on the left as before. There is a left effusion with atelectatic change.   Underlying inflammatory infectious process not excluded. Smaller right effusion with some increased interstitial markings in the right   infrahilar region which are less prominent than before. Small caliber right chest tube noted by the right CP angle. No pneumothorax.   Port-A-Cath as before. Borderline cardiomegaly. Regional osseous structures appropriate for age  received in the ED: 1L IVB 1x, levo gtt  pending pulm, ICU, cardio, and nephro    (2025 14:45)       ROS:  Negative except for:    PAST MEDICAL & SURGICAL HISTORY:  Gastric lymphoma      Anemia of chronic disease      Pleural effusion      DVT (deep venous thrombosis)      Hypertension      Hyperlipidemia      S/P appendectomy  2014      S/P cholecystectomy  15 years ago          SOCIAL HISTORY:    FAMILY HISTORY:  Family history of coronary artery disease in father  Father -  age 60 following MI        MEDICATIONS  (STANDING):  atorvastatin 40 milliGRAM(s) Oral at bedtime  azithromycin  IVPB 500 milliGRAM(s) IV Intermittent every 24 hours  azithromycin  IVPB      cefTRIAXone   IVPB 2000 milliGRAM(s) IV Intermittent every 24 hours  chlorhexidine 2% Cloths 1 Application(s) Topical <User Schedule>  clopidogrel Tablet 75 milliGRAM(s) Oral daily  heparin  Infusion.  Unit(s)/Hr (17 mL/Hr) IV Continuous <Continuous>  influenza   Vaccine 0.5 milliLiter(s) IntraMuscular once  midodrine 10 milliGRAM(s) Oral every 8 hours    MEDICATIONS  (PRN):  acetaminophen     Tablet .. 650 milliGRAM(s) Oral every 6 hours PRN Mild Pain (1 - 3)  heparin   Injectable 7500 Unit(s) IV Push every 6 hours PRN For aPTT less than 40  heparin   Injectable 3500 Unit(s) IV Push every 6 hours PRN For aPTT between 40 - 57  ondansetron    Tablet 4 milliGRAM(s) Oral daily PRN Nausea and/or Vomiting      Allergies    Bactrim DS (Hives)    Intolerances        Vital Signs Last 24 Hrs  T(C): 36.6 (10 Apr 2025 08:48), Max: 36.9 (10 Apr 2025 04:27)  T(F): 97.9 (10 Apr 2025 08:48), Max: 98.4 (10 Apr 2025 04:27)  HR: 73 (10 Apr 2025 04:27) (64 - 81)  BP: 94/60 (10 Apr 2025 08:48) (73/49 - 97/64)  BP(mean): 65 (2025 16:00) (58 - 65)  RR: 15 (10 Apr 2025 08:48) (15 - 28)  SpO2: 99% (10 Apr 2025 08:48) (92% - 100%)    Parameters below as of 10 Apr 2025 08:48  Patient On (Oxygen Delivery Method): room air        PHYSICAL EXAM  General: adult in NAD  HEENT: clear oropharynx, anicteric sclera, pink conjunctivae  Neck: supple  CV: normal S1S2 with no murmur rubs or gallops  Lungs: clear to auscultation, no wheezes, no rhales  Abdomen: soft non-tender non-distended, no hepato/splenomegaly  Ext: no clubbing cyanosis or edema  Skin: no rashes and no petichiae  Neuro: alert and oriented X3 no focal deficits      LABS:    CBC Full  -  ( 10 Apr 2025 07:22 )  WBC Count : 8.82 K/uL  RBC Count : 4.30 M/uL  Hemoglobin : 8.5 g/dL  Hematocrit : 27.6 %  Platelet Count - Automated : 531 K/uL  Mean Cell Volume : 64.2 fl  Mean Cell Hemoglobin : 19.8 pg  Mean Cell Hemoglobin Concentration : 30.8 g/dL  Auto Neutrophil # : x  Auto Lymphocyte # : x  Auto Monocyte # : x  Auto Eosinophil # : x  Auto Basophil # : x  Auto Neutrophil % : x  Auto Lymphocyte % : x  Auto Monocyte % : x  Auto Eosinophil % : x  Auto Basophil % : x    04-10    133[L]  |  95[L]  |  78[H]  ----------------------------<  97  5.1   |  26  |  3.50[H]    Ca    9.3      10 Apr 2025 07:22  Phos  5.6     04-10  Mg     2.6     04-10    TPro  6.2  /  Alb  2.4[L]  /  TBili  0.4  /  DBili  x   /  AST  25  /  ALT  16  /  AlkPhos  95  04-10    PT/INR - ( 10 Apr 2025 07:22 )   PT: 16.7 sec;   INR: 1.43 ratio         PTT - ( 10 Apr 2025 07:22 )  PTT:50.6 sec          BLOOD SMEAR INTERPRETATION:    RADIOLOGY & ADDITIONAL STUDIES:    < from: CT Abdomen and Pelvis No Cont (25 @ 22:22) >    ACC: [id number] EXAM:  CT ABDOMEN AND PELVIS   ORDERED BY: TAHIRA POPE     ACC: [id number] EXAM:  CT CHEST   ORDERED BY: TAHIRA POPE     PROCEDURE DATE:  2025          INTERPRETATION:  CLINICAL INFORMATION: Increasing shortness of breath,   malignant pleural effusion. History of GI malignancy    COMPARISON: Chest CT 2023, Abdominal CT 3/3/2025    CONTRAST/COMPLICATIONS:  IV Contrast: NONE  Oral Contrast: NONE    PROCEDURE:  CT of the Chest, Abdomen and Pelvis was performed.  Sagittal and coronal reformats were performed.    LIMITATIONS: Lack of intravenous contrast material limits diagnostic   sensitivity in evaluating solid organs /vasculature.    FINDINGS:  CHEST:  LUNGS AND LARGE AIRWAYS: Trace distal tracheal secretions.Mild dependent   RIGHT pleural effusion, diminished- post in situ percutaneous RIGHT   thoracentesis catheter . Adjacent RIGHT base passive atelectasis, not   significantly changed. Multiloculated LEFT pleural effusion w/ hazy mural   thickening /upper lobe pleural nodularity and associated LLL   atelectasis/consolidation, not significantly changed., . IAN/RML   interlobular septal thickening, not significantly changed  PLEURA: See above.  VESSELS: No aortic dilatation. Aortic /coronary artery calcification.   Central venous catheter terminates in RA.  HEART: Heart size is normal. Moderate pericardial effusion.  MEDIASTINUM AND STELLA: Diffuse mediastinal, and bilateral axillary (L>R)   lymphadenopathy, unchanged. Largest LN : RIGHT paratracheal 2.8 x 1.8 cm   (301/22), not significantly changed when allowing for interobserver   differences.  CHEST WALL AND LOWER NECK: Gynecomastia. Subcutaneous/implanted RIGHT   chest wall infusion port..    ABDOMEN AND PELVIS:  LIVER: Minimally lobulated outer hepatic contour..  BILE DUCTS: Normal caliber.  GALLBLADDER: Within normal limits.  SPLEEN: Within normal limits.  PANCREAS: Within normal limits.  ADRENALS: Within normal limits.  KIDNEYS/URETERS: Renal pelvic fluid fullness/UPJ obstruction   configuration (R>L) and bilateral cysts, unchanged.    BLADDER: Concentric urinary bladder mural prominence.  REPRODUCTIVE ORGANS: Normal-sized prostate.    BOWEL: No bowel obstruction. Nonvisualized appendix. No appendicitis.  PERITONEUM/RETROPERITONEUM: Moderate volume abdominal/pelvic ascites,   developed. Nodular Gerota's fascia thickening and diffuse mesenteric root   edema..  VESSELS: Atherosclerotic changes.  LYMPH NODES: Gastrohepatic, periportal/portocaval, retroperitoneal,   mesenteric root, and pelvic side wall lymphadenopathy is not   significantly changed with innumerable predominantly centimeter sized   lymph nodes. Bilateral inguinal lymphadenopathy is increased/developed   with inguinal lymph nodes, measuring up to 1.7 x 1.2 cm (RIGHT 301/157).   RIGHT retrocrural ectatic cisterna chyli , unchanged (301/74).  ABDOMINAL WALL: Diffuse anasarca (eccentric to RIGHT), developed.  MUSCULOSKELETAL.: Anterior right psoas calcification, unchanged..   Scattered sclerotic osseous metastasis, not significantly changed. Mild   thoracolumbar degenerative changes..    IMPRESSION:  1.  RIGHT pleural effusion, diminished , post percutaneous   thoracocentesis catheter.  2.  Moderate loculated LEFT pleural effusion and adjacent LLL atelectasis   and/or consolidation, not significantly changed.  3.  Moderate volume abdominal ascites, increased/developed from prior   exams. Nodular thickening of pleural and peritoneal surfaces c/w   malignant etiology  4.  Diffuse thoracoabdominal lymphadenopathy, enlarged/developed in   inguinal region.  5.  Sclerotic osseous metastasis, not significantly changed    --- End of Report ---            GUICHO CORCORAN MD; Attending Radiologist  This document has been electronically signed. Apr 10 2025  8:19AM    < end of copied text >

## 2025-04-10 NOTE — CARE COORDINATION ASSESSMENT. - OTHER PERTINENT REFERRAL INFORMATION
Patient is a 65 y/o male with PMHx- GI carcinoma, LUE and LLE DVT, HTN, on home O2 2/2 to SOB, and anemia presents to the ED with hypotension and SOB. Pt os a&ox4, lives at  home with brother and is known to [facility] from previous admission. Pt is followed by Heme/Onc Dr Mckeon in community.

## 2025-04-10 NOTE — PROGRESS NOTE ADULT - PROBLEM SELECTOR PLAN 11
DVT treatment/prophylaxis  -continue home eliquis    GOC: palliative consulted; full code as of now:   patient has metastatic cancer and has been admitted to the hospital >3 times over the past two months. patient currently does not have high hopes for the future and wanted to be DNR/DNI but was also emotional and near the verge of tears before he said he doesn't know. perhaps he requires an in-depth conversation about his future goals. DVT treatment/prophylaxis  -continue home eliquis (now on hep gtt for possible future paracentesis)    GOC: palliative consulted; full code

## 2025-04-10 NOTE — CONSULT NOTE ADULT - ASSESSMENT
64 year old male with a PMH of poorly differentiated metastatic gastric carcinoma on chemo, HTN, anemia, NSTEMI, DVT of LUE on 3/3/25 (on Eliquis and plavix), and recent history of bilateral pleural effusions requiring L thoracentesis 3/4/25 and 3/20/25, and R pleurx placement on 4/4/2025, now admitted with hypotension, oliguria, MARYSOL, suspected CAP. Found to have renal pelvic fluid fullness/UPJ obstruction configuration (R>L) on CT with uptrending Cr. 64 year old male with a PMH of poorly differentiated metastatic gastric carcinoma on chemo, HTN, anemia, NSTEMI, DVT of LUE on 3/3/25 (on Eliquis and plavix), and recent history of bilateral pleural effusions requiring L thoracentesis 3/4/25 and 3/20/25, and R pleurx placement on 4/4/2025, now admitted with hypotension, oliguria, MARYSOL, suspected CAP. Found to have renal pelvic fluid fullness/UPJ obstruction configuration (R>L) on CT with uptrending Cr.    PLAN:   - discussed with attending  - no acute urological intervention indicated at this time, CT scan appears to be unchanged from prior imaging, hydronephrosis does not look to be cause of acute renal dysfunction  - no further recs indicated at this time    Discussed with Dr. Washington 64 year old male with a PMH of poorly differentiated metastatic gastric carcinoma on chemo, HTN, anemia, NSTEMI, DVT of LUE on 3/3/25 (on Eliquis and plavix), and recent history of bilateral pleural effusions requiring L thoracentesis 3/4/25 and 3/20/25, and R pleurx placement on 4/4/2025, now admitted with hypotension, oliguria, MARYSOL, suspected CAP. Found to have renal pelvic fluid fullness/UPJ obstruction configuration (R>L) on CT with uptrending Cr.    PLAN:   - discussed with attending  - no acute urological intervention indicated at this time, CT scan appears to be unchanged from prior imaging, hydronephrosis does not look to be cause of acute renal dysfunction  - no further recs indicated at this time    Discussed with Dr. Washington

## 2025-04-10 NOTE — CARE COORDINATION ASSESSMENT. - NSCAREPROVIDERS_GEN_ALL_CORE_FT
CARE PROVIDERS:  Accepting Physician: Oswaldo Snowden  Administration: Saravanan Samuels  Admitting: Oswaldo Snowden  Attending: Oswaldo Snowden  Cardiology Technician: Mary Joaquin  Case Management: Melia Mosley  Consultant: Mora Cadet  Consultant: Karime Hartley  Consultant: Davonte Mccall  Consultant: Garfield Damico  Consultant: Jolene Dalal  Consultant: Jami Farias  Consultant: Rizwana Juárez  Consultant: Essence Hoskins  Consultant: Jacqueline Ayon  Consultant: Laura Sanchez  Consultant: Tali Wharton  Consultant: Emelina Boo  Covering Team: Brody Mckeon  Covering Team: Elaine Aden  Covering Team: Antoni Cox  ED Attending: Damon Wilder  ED Nurse: Namita Hagan  Emergency Medicine: Damon Wilder  Infection Control: Varun Bro  Information Services: Kenia Esteves  Nurse: Victoria Watson  Nurse: Adry Neal  Nurse: Luis E Sanz  Nurse: Jose F Archuleta  Oncology: Scarlet Pickett  Ordered: ADM, User  Outpatient Provider: Curtis Palomo  Outpatient Provider: Jhonny Hunter  Override: Wero Erickson  Override: Ra Raya  Override: Namita Hagan  Override: Jose F Archuleta  PCA/Nursing Assistant: Kenia Barrera  Primary Team: Damon Cuba  Primary Team: Ellis Cage  Primary Team: Agustín Low  Primary Team: Kevin Matta  Primary Team: Ferny Garcia  Primary Team: Kit Mesa  Primary Team: Oswaldo Snowden  Registered Dietitian: Naima Michelle  Research: Matti Crawford  : Bobbi Sanders  : Marisol Leavitt  Team: [facility], Team

## 2025-04-10 NOTE — PROGRESS NOTE ADULT - PROBLEM SELECTOR PLAN 5
CT chest/abd ordered: mild to moderate ascites and mild b/l hydronephrosis   -US Abdomen ordered, f/u results  -Will consult IR if need for paracentesis arises, currently on heparin gtt

## 2025-04-10 NOTE — CARE COORDINATION ASSESSMENT. - NSPASTMEDSURGHISTORY_GEN_ALL_CORE_FT
PAST MEDICAL & SURGICAL HISTORY:  S/P cholecystectomy  15 years ago      S/P appendectomy  November 17th 2014      Anemia of chronic disease      Gastric lymphoma      Hyperlipidemia      Hypertension      DVT (deep venous thrombosis)      Pleural effusion

## 2025-04-10 NOTE — DISCHARGE NOTE NURSING/CASE MANAGEMENT/SOCIAL WORK - PATIENT PORTAL LINK FT
You can access the FollowMyHealth Patient Portal offered by [facility] by registering at the following website: [email or web address]. By joining [facility]’s FollowMyHealth portal, you will also be able to view your health information using other applications (apps) compatible with our system.

## 2025-04-10 NOTE — PROGRESS NOTE ADULT - PROBLEM SELECTOR PLAN 4
CT chest/abd/pelvis (3/3): Large left pleural effusion markedly increased since 11/30/2024 with total atelectasis left lower lobe and partial atelectasis left upper lobe. New mediastinal and left axillary lymphadenopathy. Inflammatory changes associated with left subclavian vein consistent with known DVT. Upper abdominal lymphadenopathy similar to 11/30/2024. Mildly increased pelvic lymphadenopathy. Mesenteric lymphadenopathy and mesenteric fat stranding similar to prior examination. No pulmonary embolus. Limited evaluation segmental and subsegmental branches.  -scheduled for his fifth chemo cycle today (previously on FolFox, was supposed to switch to his new chemo medication today) however BP was noted to be low, and did not receive dose. last chemo was >3 weeks ago  -rt medport in place. Follows w/ Dr. Fung  --hx of beta thal minor  -holding home oxy, ordered tylenol and ofirmev for now PRN for pain management. can consider dilaudid for severe pain if needed   -Heme/Onc consulted (Dr. Hu), f/u recs.

## 2025-04-10 NOTE — DISCHARGE NOTE PROVIDER - NSDCCAREPROVSEEN_GEN_ALL_CORE_FT
Ferny Garcia Jose, Ferny Hoskins, Essence Madrigal, Jewels Damico, Garfield Mccall, Davonte Cantu, Rashi Carrera, Tyler Dimas

## 2025-04-10 NOTE — DISCHARGE NOTE PROVIDER - NSDCCPCAREPLAN_GEN_ALL_CORE_FT
PRINCIPAL DISCHARGE DIAGNOSIS  Diagnosis: Acute hypotension  Assessment and Plan of Treatment: You presented to the hospital with low blood pressure. You were given medication to help raise your blood pressure as well as take extra fluid off of your lungs and abdomen. You had over 2L drained from the lung catheter and 3L taken out of your abdomen. Your symptoms did not improve and ultimately your goals of care were changed to a more comfort based approach. You were discharged to the [facility].      SECONDARY DISCHARGE DIAGNOSES  Diagnosis: Acute renal failure  Assessment and Plan of Treatment: You presented to the hospital in kidney failure which did not improve despite taking extra fluid off of your body. Your goals of care were adhered to in which you were not given dialysis. Ultimately, you were discharged to the [facility] with kidney failure.

## 2025-04-10 NOTE — CONSULT NOTE ADULT - SUBJECTIVE AND OBJECTIVE BOX
[facility]  DANY Burris S. Shah, Y. Patel, G. [facility]  [phone]    JESSE EATON  64y, Male  [id number]    HPI--  HPI:  Patient is a 65 y/o male with PMHx of poorly differentiated metastatic (known cervical and axillary lymphadenopathy) GI carcinoma, LUE and LLE DVT, HTN, on home O2 2/2 to SOB, and anemia presents to the ED with hypotension and SOB. Of note, pt was admitted from 3/3-3/7 with large left pleural effusion and LUE DVT. Pt had thoracentesis done of which 3.1 L of fluid from the left thorax. Then was admitted on 3/19-3/23 for pleural effusion and NSTEMI. Pt had a thoracentesis done of which drained 560 cc from the left thorax. Then patient was admitted from 04/03-04/06 for SOB and had pleurax placement on right chest (currently has very minimal output). Patient was told to hold bumex until today, however patient restarted bumex last night after speaking with his heme-onc doctor. Endorses he had soft bp yesterday (systolic ~102), and then today he was supposed to get his fifth chemo cycle (previously on FolFox, was supposed to switch to his new chemo medication today) however BP was noted to be low and was recommended to come to the hospital. Also endorses lightheadedness today.   Patient has been complaining of cough for the past two months but now is producing tannish sputum.  Patient also endorses decreased urine output for the past couple of days, denies dysuria.     vitals: T: 97.3F, BP: 82/56 -> 90/51, HR: 68, RR: 22, 99% O2 on 4L nc  labs: hgb: 8.9 (baseline), CBC abnormal 2/2 to cancer, Na: 130, Cl: 94, BUN: 71, Cr: 3.30, eGFR: 20, lactate: 2.7, proBNP: 3483  UA: cloudy, trace leuk esterase, 10wbc, moderate bacteria, neg nitrate   RVP neg   X-ray:  Increased interstitial markings bilaterally slightly greater on the left as before. There is a left effusion with atelectatic change.   Underlying inflammatory infectious process not excluded. Smaller right effusion with some increased interstitial markings in the right   infrahilar region which are less prominent than before. Small caliber right chest tube noted by the right CP angle. No pneumothorax.   Port-A-Cath as before. Borderline cardiomegaly. Regional osseous structures appropriate for age  received in the ED: 1L IVB 1x, levo gtt  pending pulm, ICU, cardio, and nephro    (09 Apr 2025 14:45)    ID c/s on HD#3 for further evaluation and Abx guidance  Pt having persistent episodes of hypotension        Active Medications--  acetaminophen     Tablet .. 650 milliGRAM(s) Oral every 6 hours PRN  acetaminophen   IVPB .. 1000 milliGRAM(s) IV Intermittent every 8 hours PRN  atorvastatin 40 milliGRAM(s) Oral at bedtime  azithromycin  IVPB 500 milliGRAM(s) IV Intermittent every 24 hours  azithromycin  IVPB      cefTRIAXone   IVPB 2000 milliGRAM(s) IV Intermittent every 24 hours  chlorhexidine 2% Cloths 1 Application(s) Topical <User Schedule>  clopidogrel Tablet 75 milliGRAM(s) Oral daily  heparin   Injectable 7500 Unit(s) IV Push every 6 hours PRN  heparin   Injectable 3500 Unit(s) IV Push every 6 hours PRN  heparin  Infusion.  Unit(s)/Hr IV Continuous <Continuous>  influenza   Vaccine 0.5 milliLiter(s) IntraMuscular once  lidocaine 2% Jelly 5 milliLiter(s) Topical once  melatonin 5 milliGRAM(s) Oral at bedtime  midodrine 10 milliGRAM(s) Oral every 8 hours  ondansetron    Tablet 4 milliGRAM(s) Oral daily PRN  sodium zirconium cyclosilicate 10 Gram(s) Oral once    Antimicrobials:   azithromycin  IVPB 500 milliGRAM(s) IV Intermittent every 24 hours  azithromycin  IVPB      cefTRIAXone   IVPB 2000 milliGRAM(s) IV Intermittent every 24 hours    Immunologic: influenza   Vaccine 0.5 milliLiter(s) IntraMuscular once      ROS:  CONSTITUTIONAL: No fevers or chills. No weakness or headache. No weight changes.  EYES/ENT: No visual or hearing changes. No sore throat or throat pain .  NECK: No pain or stiffness  RESPIRATORY: No cough, wheezing, or hemoptysis. No shortness of breath  CARDIOVASCULAR: No chest pain or palpitations  GASTROINTESTINAL: No abdominal pain. No nausea or vomiting. No diarrhea or constipation.  GENITOURINARY: No dysuria, frequency or hematuria  NEUROLOGICAL: No numbness or weakness  SKIN: No itching or rashes  PSYCHIATRIC: Pleasant. Appropriate affect    Allergies: Bactrim DS (Hives)    PMH -- No pertinent past medical history    Incisional hernia    Gastric lymphoma    Anemia of chronic disease    Pleural effusion    DVT (deep venous thrombosis)    Hypertension    Hyperlipidemia      PSH -- No significant past surgical history    S/P appendectomy    S/P cholecystectomy      FH -- Family history of coronary artery disease in father      Social History --  EtOH: denies   Tobacco: denies   Drug Use: denies     Travel/Environmental/Occupational History:    Physical Exam--  Vital Signs Last 24 Hrs  T(F): 97.3 (10 Apr 2025 11:32), Max: 98.4 (10 Apr 2025 04:27)  HR: 100 (10 Apr 2025 11:32) (64 - 100)  BP: 86/48 (10 Apr 2025 11:32) (85/49 - 97/64)  RR: 17 (10 Apr 2025 11:32) (15 - 20)  SpO2: 94% (10 Apr 2025 11:32) (92% - 99%)  General: NAD  HEENT: NC/AT, EOMI  Lungs: no use resp acc muscles  Heart: Regular rate and rhythm.   Abdomen: Soft. Nondistended. Nontender.   Extremities: no edema  Skin: Warm. Dry. Good turgor. No rash.     Laboratory & Imaging Data:  CBC:                       8.6    9.37  )-----------( 565      ( 10 Apr 2025 10:27 )             27.3     CMP: 04-10    133[L]  |  95[L]  |  78[H]  ----------------------------<  97  5.1   |  26  |  3.50[H]    Ca    9.3      10 Apr 2025 07:22  Phos  5.6     04-10  Mg     2.6     04-10    TPro  6.2  /  Alb  2.4[L]  /  TBili  0.4  /  DBili  x   /  AST  25  /  ALT  16  /  AlkPhos  95  04-10    LIVER FUNCTIONS - ( 10 Apr 2025 07:22 )  Alb: 2.4 g/dL / Pro: 6.2 g/dL / ALK PHOS: 95 U/L / ALT: 16 U/L / AST: 25 U/L / GGT: x           Urinalysis Basic - ( 10 Apr 2025 07:22 )    Color: x / Appearance: x / SG: x / pH: x  Gluc: 97 mg/dL / Ketone: x  / Bili: x / Urobili: x   Blood: x / Protein: x / Nitrite: x   Leuk Esterase: x / RBC: x / WBC x   Sq Epi: x / Non Sq Epi: x / Bacteria: x        Microbiology: reviewed        Radiology: reviewed    < from: US Renal (04.10.25 @ 15:01) >    ACC: [id number] EXAM:  US KIDNEY(S)   ORDERED BY: YELITZA RECIO     PROCEDURE DATE:  04/10/2025          INTERPRETATION:  CLINICAL INFORMATION: 64 years  Male with brisa worsening.    COMPARISON: Noncontrast CT abdomen and pelvis 4/9/2025    TECHNIQUE: Sonography of the kidneys and bladder.    FINDINGS:  Right kidney: 12.1 cm. Mild hydronephrosis. 3.3 x 2.3 x 3.4 cm mid renal   cyst.    Left kidney: 12.1 cm. Trace hydronephrosis. 3.3 x 4.2 x 4.1 cm mid renal   cyst.    Urinary bladder: Incompletely distended. 40 cc volume. Grossly   unremarkable.    Other: Ascites.    IMPRESSION:  Mild right and trace left hydronephrosis.    Ascites.        --- End of Report ---            WENDY WRIGHT MD; Attending Radiologist  This document has been electronically signed. Apr 10 2025  3:24PM    < end of copied text >  < from: US Abdomen Limited (04.10.25 @ 10:00) >    ACC: [id number] EXAM:  US ABDOMEN LIMITED   ORDERED BY: ELIAN RED     PROCEDURE DATE:  04/10/2025          INTERPRETATION:  CLINICAL INFORMATION: Evaluate for ascites    COMPARISON: Ultrasound 3/25, CT scan 4/25    4 quadrant ultrasound performed    Findings    Multiple moderate amount of ascites is identified in all 4 quadrants   similar in appearance to recent CAT scan.    IMPRESSION:    Small to moderate amount of ascites    --- End of Report ---            MICHAELA GRIJALVA MD; Attending Radiologist  This document has been electronically signed. Apr 10 2025  1:54PM    < end of copied text >  < from: CT Abdomen and Pelvis No Cont (04.09.25 @ 22:22) >    ACC: [id number] EXAM:  CT ABDOMEN AND PELVIS   ORDERED BY: TAHIRA POPE     ACC: [id number] EXAM:  CT CHEST   ORDERED BY: TAHIRA POPE     PROCEDURE DATE:  04/09/2025          INTERPRETATION:  CLINICAL INFORMATION: Increasing shortness of breath,   malignant pleural effusion. History of GI malignancy    COMPARISON: Chest CT 4/1/2023, Abdominal CT 3/3/2025    CONTRAST/COMPLICATIONS:  IV Contrast: NONE  Oral Contrast: NONE    PROCEDURE:  CT of the Chest, Abdomen and Pelvis was performed.  Sagittal and coronal reformats were performed.    LIMITATIONS: Lack of intravenous contrast material limits diagnostic   sensitivity in evaluating solid organs /vasculature.    FINDINGS:  CHEST:  LUNGS AND LARGE AIRWAYS: Trace distal tracheal secretions.Mild dependent   RIGHT pleural effusion, diminished- post in situ percutaneous RIGHT   thoracentesis catheter . Adjacent RIGHT base passive atelectasis, not   significantly changed. Multiloculated LEFT pleural effusion w/ hazy mural   thickening /upper lobe pleural nodularity and associated LLL   atelectasis/consolidation, not significantly changed., . IAN/RML   interlobular septal thickening, not significantly changed  PLEURA: See above.  VESSELS: No aortic dilatation. Aortic /coronary artery calcification.   Central venous catheter terminates in RA.  HEART: Heart size is normal. Moderate pericardial effusion.  MEDIASTINUM AND STELLA: Diffuse mediastinal, and bilateral axillary (L>R)   lymphadenopathy, unchanged. Largest LN : RIGHT paratracheal 2.8 x 1.8 cm   (301/22), not significantly changed when allowing for interobserver   differences.  CHEST WALL AND LOWER NECK: Gynecomastia. Subcutaneous/implanted RIGHT   chest wall infusion port..    ABDOMEN AND PELVIS:  LIVER: Minimally lobulated outer hepatic contour..  BILE DUCTS: Normal caliber.  GALLBLADDER: Within normal limits.  SPLEEN: Within normal limits.  PANCREAS: Within normal limits.  ADRENALS: Within normal limits.  KIDNEYS/URETERS: Renal pelvic fluid fullness/UPJ obstruction   configuration (R>L) and bilateral cysts, unchanged.    BLADDER: Concentric urinary bladder mural prominence.  REPRODUCTIVE ORGANS: Normal-sized prostate.    BOWEL: No bowel obstruction. Nonvisualized appendix. No appendicitis.  PERITONEUM/RETROPERITONEUM: Moderate volume abdominal/pelvic ascites,   developed. Nodular Gerota's fascia thickening and diffuse mesenteric root   edema..  VESSELS: Atherosclerotic changes.  LYMPH NODES: Gastrohepatic, periportal/portocaval, retroperitoneal,   mesenteric root, and pelvic side wall lymphadenopathy is not   significantly changed with innumerable predominantly centimeter sized   lymph nodes. Bilateral inguinal lymphadenopathy is increased/developed   with inguinal lymph nodes, measuring up to 1.7 x 1.2 cm (RIGHT 301/157).   RIGHT retrocrural ectatic cisterna chyli , unchanged (301/74).  ABDOMINAL WALL: Diffuse anasarca (eccentric to RIGHT), developed.  MUSCULOSKELETAL.: Anterior right psoas calcification, unchanged..   Scattered sclerotic osseous metastasis, not significantly changed. Mild   thoracolumbar degenerative changes..    IMPRESSION:  1.  RIGHT pleural effusion, diminished , post percutaneous   thoracocentesis catheter.  2.  Moderate loculated LEFT pleural effusion and adjacent LLL atelectasis   and/or consolidation, not significantly changed.  3.  Moderate volume abdominal ascites, increased/developed from prior   exams. Nodular thickening of pleural and peritoneal surfaces c/w   malignant etiology  4.  Diffuse thoracoabdominal lymphadenopathy, enlarged/developed in   inguinal region.  5.  Sclerotic osseous metastasis, not significantly changed    --- End of Report ---            GUICHO CORCORAN MD; Attending Radiologist  This document has been electronically signed. Apr 10 2025  8:19AM    < end of copied text >

## 2025-04-10 NOTE — DISCHARGE NOTE PROVIDER - HOSPITAL COURSE
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 Of note, pt was admitted from 3/3-3/7 with large left pleural effusion and LUE DVT. Pt had thoracentesis done of which 3.1 L of fluid from the left thorax. Then was admitted on 3/19-3/23 for pleural effusion and NSTEMI. Pt had a thoracentesis done of which drained 560 cc from the left thorax. Then patient was admitted from 04/03-04/06 for SOB and had pleurax placement on right chest (currently has very minimal output). Patient was told to hold bumex until today, however patient restarted bumex last night after speaking with his heme-onc doctor. Endorses he had soft bp yesterday (systolic ~102), and then today he was supposed to get his fifth chemo cycle (previously on FolFox, was supposed to switch to his new chemo medication today) however BP was noted to be low and was recommended to come to the hospital. Also endorses lightheadedness today.   Patient has been complaining of cough for the past two months but now is producing tannish sputum.  Patient also endorses decreased urine output for the past couple of days, denies dysuria.     vitals: T: 97.3F, BP: 82/56 -> 90/51, HR: 68, RR: 22, 99% O2 on 4L nc  labs: hgb: 8.9 (baseline), CBC abnormal 2/2 to cancer, Na: 130, Cl: 94, BUN: 71, Cr: 3.30, eGFR: 20, lactate: 2.7, proBNP: 3483  UA: cloudy, trace leuk esterase, 10wbc, moderate bacteria, neg nitrate   RVP neg   X-ray:  Increased interstitial markings bilaterally slightly greater on the left as before. There is a left effusion with atelectatic change.   Underlying inflammatory infectious process not excluded. Smaller right effusion with some increased interstitial markings in the right   infrahilar region which are less prominent than before. Small caliber right chest tube noted by the right CP angle. No pneumothorax.   Port-A-Cath as before. Borderline cardiomegaly. Regional osseous structures appropriate for age  received in the ED: 1L IVB 1x, levo gtt  pending pulm, ICU, cardio, and nephro    (09 Apr 2025 14:45)      ---  HOSPITAL COURSE: Patient admitted to medicine floor for management of hypotension, MARYSOL, hyponatremia, and ascites.     Patient received 1L NaCl 0.9% IVB 1x, and 1L LR in ED. As per ICU, started midodrine 10mg q8, albumin ordered as well. Home HTN medications and home bumex was held.   TTE ordered showed:   Patient endorses has not been urinating much for the past two days (denies dysuria). Nephro consulted ( Dr. Franck Wagoner): Check Cortisol AM, IV pressor, IV albumin, recommended wise. Ordered indwelling wise.   Hyponatremia resolved with fluids. Holding home oxy, ordered tylenol, and ofirmev for now PRN for pain management.   CT chest/abd ordered: mild to moderate ascites and mild b/l hydronephrosis. US Abdomen ordered:   Will consult IR if need for paracentesis arises, currently on heparin gtt.  Patient has been complaining of cough for the past two months but now is producing tannish sputum. As patient is immunocompromised, patient may not amount to a fever or wbc.   Lactate increased at 2.7. Increased lung markings on chest x-ray, no known consolidation, but will cover for possible infection with ceftriaxone 2g daily (would also cover if SBP present-- known minimal ascites, will hold off on tap for now as patient is on eliquis and plavix) and azithromycin. Drain pleurex every other day. Ordered BC, legionella, strep antibiotics.           Pt seen and examined on day of discharge. Patient is medically optimized for discharge to home with close outpatient followup.    PHYSICAL EXAM ON DAY OF DISCHARGE:  The patient was seen and examined on the day of discharge. Please see progress note from day of discharge for further information.         ---  CONSULTANTS:   Pulm consulted (Dr. Damico)  ID: Dr. Corcoran     ---  TIME SPENT:  I, the attending physician, was physically present for the key portions of the evaluation and management (E/M) service provided. 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 Of note, pt was admitted from 3/3-3/7 with large left pleural effusion and LUE DVT. Pt had thoracentesis done of which 3.1 L of fluid from the left thorax. Then was admitted on 3/19-3/23 for pleural effusion and NSTEMI. Pt had a thoracentesis done of which drained 560 cc from the left thorax. Then patient was admitted from 04/03-04/06 for SOB and had pleurax placement on right chest (currently has very minimal output). Patient was told to hold bumex until today, however patient restarted bumex last night after speaking with his heme-onc doctor. Endorses he had soft bp yesterday (systolic ~102), and then today he was supposed to get his fifth chemo cycle (previously on FolFox, was supposed to switch to his new chemo medication today) however BP was noted to be low and was recommended to come to the hospital. Also endorses lightheadedness today.   Patient has been complaining of cough for the past two months but now is producing tannish sputum.  Patient also endorses decreased urine output for the past couple of days, denies dysuria.     vitals: T: 97.3F, BP: 82/56 -> 90/51, HR: 68, RR: 22, 99% O2 on 4L nc  labs: hgb: 8.9 (baseline), CBC abnormal 2/2 to cancer, Na: 130, Cl: 94, BUN: 71, Cr: 3.30, eGFR: 20, lactate: 2.7, proBNP: 3483  UA: cloudy, trace leuk esterase, 10wbc, moderate bacteria, neg nitrate   RVP neg   X-ray:  Increased interstitial markings bilaterally slightly greater on the left as before. There is a left effusion with atelectatic change.   Underlying inflammatory infectious process not excluded. Smaller right effusion with some increased interstitial markings in the right   infrahilar region which are less prominent than before. Small caliber right chest tube noted by the right CP angle. No pneumothorax.   Port-A-Cath as before. Borderline cardiomegaly. Regional osseous structures appropriate for age  received in the ED: 1L IVB 1x, levo gtt  pending pulm, ICU, cardio, and nephro    (09 Apr 2025 14:45)      ---  HOSPITAL COURSE: Patient admitted to medicine floor for management of hypotension, MARYSOL, hyponatremia, and ascites.     Patient received 1L NaCl 0.9% IVB 1x, and 1L LR in ED. As per ICU, started midodrine 10mg q8, albumin ordered as well. Home HTN medications and home bumex was held.   TTE ordered showed:   Patient endorses has not been urinating much for the past two days (denies dysuria). Nephro consulted ( Dr. Franck Wagoner): Cortisol AM mildly elevated IV albumin, recommended wise.   Hyponatremia resolved with fluids. Holding home oxy, ordered tylenol, and ofirmev for now PRN for pain management.   CT chest/abd ordered: mild to moderate ascites and mild b/l hydronephrosis. US Abdomen ordered: Small to Moderate Ascites  Consulted IR for paracentesis. Abdominal fluid studies revealed:  Patient has been complaining of cough for the past two months but now is producing tannish sputum. As patient is immunocompromised, patient may not amount to a fever or wbc.   Increased lung markings on chest x-ray, no known consolidation, but will cover for possible infection with ceftriaxone 2g daily (would also cover if SBP present) and azithromycin. Drain pleurex every other day. Ordered BC, legionella, strep antibiotics.           Pt seen and examined on day of discharge. Patient is medically optimized for discharge to home with close outpatient followup.    PHYSICAL EXAM ON DAY OF DISCHARGE:  The patient was seen and examined on the day of discharge. Please see progress note from day of discharge for further information.         ---  CONSULTANTS:   Pulm consulted (Dr. Damico)  ID: Dr. Corcoran     ---  TIME SPENT:  I, the attending physician, was physically present for the key portions of the evaluation and management (E/M) service provided. The total amount of time spent reviewing the hospital notes, laboratory values, imaging findings, assessing/counseling the patient, discussing with consultant physicians, social work, nursing staff was -- minutes    ---  Primary care provider was made aware of plan for discharge:      [  ] NO     [  ] YES   HPI:  Patient is a 63 y/o male with PMHx of poorly differentiated metastatic (known cervical and axillary lymphadenopathy) GI carcinoma, LUE and LLE DVT, HTN, on home O2 2/2 to SOB, and anemia presents to the ED with hypotension and SOB. Of note, pt was admitted from 3/3-3/7 with large left pleural effusion and LUE DVT. Pt had thoracentesis done of which 3.1 L of fluid from the left thorax. Then was admitted on 3/19-3/23 for pleural effusion and NSTEMI. Pt had a thoracentesis done of which drained 560 cc from the left thorax. Then patient was admitted from 04/03-04/06 for SOB and had pleurax placement on right chest (currently has very minimal output). Patient was told to hold bumex until today, however patient restarted bumex last night after speaking with his heme-onc doctor. Endorses he had soft bp yesterday (systolic ~102), and then today he was supposed to get his fifth chemo cycle (previously on FolFox, was supposed to switch to his new chemo medication today) however BP was noted to be low and was recommended to come to the hospital. Also endorses lightheadedness today.   Patient has been complaining of cough for the past two months but now is producing tannish sputum.  Patient also endorses decreased urine output for the past couple of days, denies dysuria.     vitals: T: 97.3F, BP: 82/56 -> 90/51, HR: 68, RR: 22, 99% O2 on 4L nc  labs: hgb: 8.9 (baseline), CBC abnormal 2/2 to cancer, Na: 130, Cl: 94, BUN: 71, Cr: 3.30, eGFR: 20, lactate: 2.7, proBNP: 3483  UA: cloudy, trace leuk esterase, 10wbc, moderate bacteria, neg nitrate   RVP neg   X-ray:  Increased interstitial markings bilaterally slightly greater on the left as before. There is a left effusion with atelectatic change.   Underlying inflammatory infectious process not excluded. Smaller right effusion with some increased interstitial markings in the right   infrahilar region which are less prominent than before. Small caliber right chest tube noted by the right CP angle. No pneumothorax.   Port-A-Cath as before. Borderline cardiomegaly. Regional osseous structures appropriate for age  received in the ED: 1L IVB 1x, levo gtt  pending pulm, ICU, cardio, and nephro    (09 Apr 2025 14:45)      ---  HOSPITAL COURSE: Patient admitted to medicine floor for management of hypotension, MARYSOL, hyponatremia, and ascites.     Patient received 1L NaCl 0.9% IVB 1x, and 1L LR in ED. As per ICU, started midodrine 10mg q8, albumin ordered as well. Home HTN medications and home bumex was held.   TTE ordered showed:   Patient endorses has not been urinating much for the past two days (denies dysuria). Nephro consulted ( Dr. Franck Wagoner): Cortisol AM mildly elevated IV albumin, recommended wise.   Hyponatremia resolved with fluids. Holding home oxy, ordered tylenol, and ofirmev for now PRN for pain management.   CT chest/abd ordered: mild to moderate ascites and mild b/l hydronephrosis. US Abdomen ordered: Small to Moderate Ascites  Consulted IR for paracentesis. Abdominal fluid studies revealed:  Patient has been complaining of cough for the past two months but now is producing tannish sputum. As patient is immunocompromised, patient may not amount to a fever or wbc.   Increased lung markings on chest x-ray, no known consolidation, but will cover for possible infection with ceftriaxone 2g daily (would also cover if SBP present) and azithromycin. Drain pleurex every other day. Ordered BC, legionella, strep antibiotics.   With minimal improvement despite aggressive management, patient ultimately made CMO. Discharge plan to inpatient hospice.      PHYSICAL EXAM ON DAY OF DISCHARGE:  GENERAL: NAD  HEENT:  anicteric, moist mucous membranes  CHEST/LUNG:  Left lung with minimal lung sounds to anterior LLL, right lung cta   HEART:  RRR, S1, S2  ABDOMEN:  minimal bowel sounds, distended, nontender, fluid noted on physical exam  EXTREMITIES: 2+ pitting edema to B/L lower extremities extending feet to thighs, no cyanosis, or calf tenderness  NERVOUS SYSTEM: answers questions and follows commands appropriately      ---  CONSULTANTS:   Pulm consulted (Dr. Damico)  ID: Dr. Corcoran     ---  TIME SPENT:  I, the attending physician, was physically present for the key portions of the evaluation and management (E/M) service provided. 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 Of note, pt was admitted from 3/3-3/7 with large left pleural effusion and LUE DVT. Pt had thoracentesis done of which 3.1 L of fluid from the left thorax. Then was admitted on 3/19-3/23 for pleural effusion and NSTEMI. Pt had a thoracentesis done of which drained 560 cc from the left thorax. Then patient was admitted from 04/03-04/06 for SOB and had pleurax placement on right chest (currently has very minimal output). Patient was told to hold bumex until today, however patient restarted bumex last night after speaking with his heme-onc doctor. Endorses he had soft bp yesterday (systolic ~102), and then today he was supposed to get his fifth chemo cycle (previously on FolFox, was supposed to switch to his new chemo medication today) however BP was noted to be low and was recommended to come to the hospital. Also endorses lightheadedness today.   Patient has been complaining of cough for the past two months but now is producing tannish sputum.  Patient also endorses decreased urine output for the past couple of days, denies dysuria.     vitals: T: 97.3F, BP: 82/56 -> 90/51, HR: 68, RR: 22, 99% O2 on 4L nc  labs: hgb: 8.9 (baseline), CBC abnormal 2/2 to cancer, Na: 130, Cl: 94, BUN: 71, Cr: 3.30, eGFR: 20, lactate: 2.7, proBNP: 3483  UA: cloudy, trace leuk esterase, 10wbc, moderate bacteria, neg nitrate   RVP neg   X-ray:  Increased interstitial markings bilaterally slightly greater on the left as before. There is a left effusion with atelectatic change.   Underlying inflammatory infectious process not excluded. Smaller right effusion with some increased interstitial markings in the right   infrahilar region which are less prominent than before. Small caliber right chest tube noted by the right CP angle. No pneumothorax.   Port-A-Cath as before. Borderline cardiomegaly. Regional osseous structures appropriate for age  received in the ED: 1L IVB 1x, levo gtt  pending pulm, ICU, cardio, and nephro    (09 Apr 2025 14:45)      ---  HOSPITAL COURSE: Patient admitted to medicine floor for management of hypotension, MARYSOL, hyponatremia, and ascites.   Hypotension was severe and initially was responsive to fluids. He was started on midodrine. bumex was held.   MARYSOL was secondary to severe hypotension on admission. CT abd showed mild bilateral hydronephrosis. Renal functions did not improve during hospitalization and patient had oliguria. dialysis was offered. Patient declined dialysis. He was also seen by urology and obstruction was ruled out.     Patient did have significant dyspnea on admission. He was treated for suspect pneumonia by ID. He also underwent a paracentesis which unfortunately was also positive for malignancy. His pleurx was drained every other day.     With minimal improvement despite aggressive management, patient ultimately made CMO. He spoke with his outpatient hematologist and hematology at [facility]. Given his fragility, he is not a candidate for any further treatment options of his metastatic gastric lymphoma. Patient was found to have pneumothorax on repeat CXR. He also reported intractable vomiting and was unable to tolerate po intake. Patient was accepted to inpatient hospice.    PHYSICAL EXAM ON DAY OF DISCHARGE:  GENERAL: NAD  HEENT:  anicteric, moist mucous membranes  CHEST/LUNG:  Left lung with minimal lung sounds to anterior LLL, right lung cta   HEART:  RRR, S1, S2  ABDOMEN:  minimal bowel sounds, distended, nontender, fluid noted on physical exam  EXTREMITIES: 2+ pitting edema to B/L lower extremities extending feet to thighs, no cyanosis, or calf tenderness  NERVOUS SYSTEM: answers questions and follows commands appropriately    ---  CONSULTANTS:   Pulm consulted (Dr. Damico)  ID: Dr. Corcoran   heme: Dr Carrera  thoracic: Dr Reich  cardiology: Dr Forbes   urology: Dr Washington   Palliative   Psych: Dr Correa  PMR: Dr Nielsen     ---  TIME SPENT:  I, the attending physician, was physically present for the key portions of the evaluation and management (E/M) service provided. The total amount of time spent reviewing the hospital notes, laboratory values, imaging findings, assessing/counseling the patient, discussing with consultant physicians, social work, nursing staff was 45 minutes

## 2025-04-10 NOTE — PROGRESS NOTE ADULT - SUBJECTIVE AND OBJECTIVE BOX
Patient is a 64y old  Male who presents with a chief complaint of      HPI: 64 year old male with a PMH as listed below presented with worsening SOB and edema. Also admits to poor urination. Was previously on dialysis but was taken off due to renal recovery. Was supposed to follow up in office with Dr Eugene 25 but ended in ER first. Renal consulted for further eval. No new medications, no recent illnesses, no NSAID use.      He is c/o abdominal fullness, no sob, but feeling tired.     PAST MEDICAL & SURGICAL HISTORY:  Gastric lymphoma      Anemia of chronic disease      Pleural effusion      DVT (deep venous thrombosis)      Hypertension      Hyperlipidemia      S/P appendectomy  2014      S/P cholecystectomy  15 years ago           FAMILY HISTORY:  Family history of coronary artery disease in father  Father -  age 60 following MI    [place]    Social History:Non smoker    MEDICATIONS  (STANDING):  albumin human 25% IVPB 50 milliLiter(s) IV Intermittent every 6 hours  norepinephrine Infusion 0.25 MICROgram(s)/kG/Min (43.6 mL/Hr) IV Continuous <Continuous>    MEDICATIONS  (PRN):   Meds reviewed    Allergies    Bactrim DS (Hives)    Intolerances         REVIEW OF SYSTEMS: as per HPI    Vital Signs Last 24 Hrs  T(C): 36.3 (10 Apr 2025 11:32), Max: 36.9 (10 Apr 2025 04:27)  T(F): 97.3 (10 Apr 2025 11:32), Max: 98.4 (10 Apr 2025 04:27)  HR: 100 (10 Apr 2025 11:32) (64 - 100)  BP: 86/48 (10 Apr 2025 11:32) (85/49 - 97/64)  BP(mean): 65 (2025 16:00) (65 - 65)  RR: 17 (10 Apr 2025 11:32) (15 - 20)  SpO2: 94% (10 Apr 2025 11:32) (92% - 99%)    Parameters below as of 10 Apr 2025 11:32  Patient On (Oxygen Delivery Method): room air        PHYSICAL EXAM:    GENERAL: Ill appearing, on NC  HEAD:  Atraumatic, Normocephalic  EYES: Conjunctiva and sclera clear  ENMT: No Drainage from nares, No drainage from ears  NERVOUS SYSTEM:  Awake and Alert  CHEST/LUNG: Reduced breath sounds b/l   HEART: Regular rate and rhythm; No murmurs, rubs, or gallops  ABDOMEN: distended abdomen, NT  EXTREMITIES:  +++Edema B/L LE          LABS:                          8.6    9.37  )-----------( 565      ( 10 Apr 2025 10:27 )             27.3     04-10    133[L]  |  95[L]  |  78[H]  ----------------------------<  97  5.1   |  26  |  3.50[H]    Ca    9.3      10 Apr 2025 07:22  Phos  5.6     04-10  Mg     2.6     04-10    TPro  6.2  /  Alb  2.4[L]  /  TBili  0.4  /  DBili  x   /  AST  25  /  ALT  16  /  AlkPhos  95  04-10    LIVER FUNCTIONS - ( 10 Apr 2025 07:22 )  Alb: 2.4 g/dL / Pro: 6.2 g/dL / ALK PHOS: 95 U/L / ALT: 16 U/L / AST: 25 U/L / GGT: x           PT/INR - ( 10 Apr 2025 07:22 )   PT: 16.7 sec;   INR: 1.43 ratio         PTT - ( 10 Apr 2025 10:27 )  PTT:102.3 sec  Urinalysis Basic - ( 10 Apr 2025 07:22 )    Color: x / Appearance: x / SG: x / pH: x  Gluc: 97 mg/dL / Ketone: x  / Bili: x / Urobili: x   Blood: x / Protein: x / Nitrite: x   Leuk Esterase: x / RBC: x / WBC x   Sq Epi: x / Non Sq Epi: x / Bacteria: x       Patient is a 64y old  Male who presents with a chief complaint of      HPI: 64 year old male with a PMH as listed below presented with worsening SOB and edema. Also admits to poor urination. Was previously on dialysis but was taken off due to renal recovery. Was supposed to follow up in office with Dr Eugene 25 but ended in ER first. Renal consulted for further eval. No new medications, no recent illnesses, no NSAID use.      He is c/o abdominal fullness, no sob, but feeling tired. Is not voiding much     PAST MEDICAL & SURGICAL HISTORY:  Gastric lymphoma      Anemia of chronic disease      Pleural effusion      DVT (deep venous thrombosis)      Hypertension      Hyperlipidemia      S/P appendectomy  2014      S/P cholecystectomy  15 years ago           FAMILY HISTORY:  Family history of coronary artery disease in father  Father -  age 60 following MI    [place]    Social History:Non smoker    MEDICATIONS  (STANDING):  albumin human 25% IVPB 50 milliLiter(s) IV Intermittent every 6 hours  norepinephrine Infusion 0.25 MICROgram(s)/kG/Min (43.6 mL/Hr) IV Continuous <Continuous>    MEDICATIONS  (PRN):   Meds reviewed    Allergies    Bactrim DS (Hives)    Intolerances         REVIEW OF SYSTEMS: as per HPI    Vital Signs Last 24 Hrs  T(C): 36.3 (10 Apr 2025 11:32), Max: 36.9 (10 Apr 2025 04:27)  T(F): 97.3 (10 Apr 2025 11:32), Max: 98.4 (10 Apr 2025 04:27)  HR: 100 (10 Apr 2025 11:32) (64 - 100)  BP: 86/48 (10 Apr 2025 11:32) (85/49 - 97/64)  BP(mean): 65 (2025 16:00) (65 - 65)  RR: 17 (10 Apr 2025 11:32) (15 - 20)  SpO2: 94% (10 Apr 2025 11:32) (92% - 99%)    Parameters below as of 10 Apr 2025 11:32  Patient On (Oxygen Delivery Method): room air        PHYSICAL EXAM:    GENERAL: Ill appearing, on NC  HEAD:  Atraumatic, Normocephalic  EYES: Conjunctiva and sclera clear  ENMT: No Drainage from nares, No drainage from ears  NERVOUS SYSTEM:  Awake and Alert  CHEST/LUNG: Reduced breath sounds b/l   HEART: Regular rate and rhythm; No murmurs, rubs, or gallops  ABDOMEN: distended abdomen, NT  EXTREMITIES:  +++Edema B/L LE          LABS:                          8.6    9.37  )-----------( 565      ( 10 Apr 2025 10:27 )             27.3     04-10    133[L]  |  95[L]  |  78[H]  ----------------------------<  97  5.1   |  26  |  3.50[H]    Ca    9.3      10 Apr 2025 07:22  Phos  5.6     04-10  Mg     2.6     04-10    TPro  6.2  /  Alb  2.4[L]  /  TBili  0.4  /  DBili  x   /  AST  25  /  ALT  16  /  AlkPhos  95  04-10    LIVER FUNCTIONS - ( 10 Apr 2025 07:22 )  Alb: 2.4 g/dL / Pro: 6.2 g/dL / ALK PHOS: 95 U/L / ALT: 16 U/L / AST: 25 U/L / GGT: x           PT/INR - ( 10 Apr 2025 07:22 )   PT: 16.7 sec;   INR: 1.43 ratio         PTT - ( 10 Apr 2025 10:27 )  PTT:102.3 sec  Urinalysis Basic - ( 10 Apr 2025 07:22 )    Color: x / Appearance: x / SG: x / pH: x  Gluc: 97 mg/dL / Ketone: x  / Bili: x / Urobili: x   Blood: x / Protein: x / Nitrite: x   Leuk Esterase: x / RBC: x / WBC x   Sq Epi: x / Non Sq Epi: x / Bacteria: x

## 2025-04-10 NOTE — PROGRESS NOTE ADULT - PROBLEM SELECTOR PLAN 6
Patient has been admitted three times within the past couple of months in which thoracentesis was performed and drained malignant pleural effusions  -pleurex placement on right chest C/D/I  -Patient has been complaining of cough for the past two months but now is producing tannish sputum. As patient is immunocompromised, patient may not amount to a fever or wbc.    -lactate increased at 2.7  -Increased lung markings on chest x-ray, no known consolidation, but will cover for possible infection with ceftriaxone 2g daily (would also cover if SBP present-- known minimal ascites, will hold off on tap for now as patient is on eliquis and plavix) and azithromycin   -drain pleurex every other day   -ordered BC, legionella, strep antibiotics; f/u results   -pulm consulted (Dr. Damico) Patient has been admitted three times within the past couple of months in which thoracentesis was performed and drained malignant pleural effusions  -pleurex placement on right chest C/D/I  -Patient has been complaining of cough for the past two months but now is producing tannish sputum. As patient is immunocompromised, patient may not amount to a fever or wbc.    -lactate increased at 2.7  -Increased lung markings on chest x-ray, no known consolidation, but will cover for possible infection with ceftriaxone 2g daily (would also cover if SBP present-- known minimal ascites, will hold off on tap for now as patient is on eliquis and plavix) and azithromycin   -drain pleurex every other day   -ordered BC, legionella, strep antibiotics; f/u results   -pulm consulted (Dr. Damico)  -ID consulted (Dr. Corcoran)

## 2025-04-10 NOTE — CONSULT NOTE ADULT - ASSESSMENT
Full note to follow    Patient evaluated with face-to-face time in addition to reviewing history, labs, microbiology, and imaging.   Antibiotic stewardship, local antibiogram, infection control strategies and potential transmission issues taken into consideration at time of treatment decision making process.   Thank you for allowing us to participate in the care of your patient.  D/w Dr. Garcia  Infectious Diseases will follow. Please call with any questions.  Karen Tyler M.D.  Available on [employer] TEAMS -- *[facility]*  [facility] [phone]  For after 5 P.M. 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 Admitted for hypotension, MARYSOL, hyponatremia, and present chronic pleural effusion.   ID c/s for further evaluation    Hypotension/MARYSOL  Ascites/Pleural Effusions; R/o infection  Gastric lymphoma    Recommendations:   C/w ceftriaxone 2gm dose  F/u pending cx  F/u pending uPNA Ag  BP control, may need pressor support    Patient evaluated with face-to-face time in addition to reviewing history, labs, microbiology, and imaging.   Antibiotic stewardship, local antibiogram, infection control strategies and potential transmission issues taken into consideration at time of treatment decision making process.   Thank you for allowing us to participate in the care of your patient.  D/w Dr. Garcia  Infectious Diseases will follow. Please call with any questions.  Karen Tyler M.D.  Available on [employer] TEAMS -- *[facility]*  [facility] [phone]  For after 5 P.M. 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 Admitted for hypotension, MARYSOL, hyponatremia, and present chronic pleural effusion.   ID c/s for further evaluation    Hypotension/MARYSOL  Ascites, Pleural Effusions s/p Pleurx  R/o PNA, SBP  Gastric lymphoma    - patient having episodes of hypotension since admission, was on levo ggt on admission  - afebrile no leukocytosis  - has now worsened since yesterday  CT 4/9 showing  1.  RIGHT pleural effusion, diminished , post percutaneous thoracocentesis catheter.  2.  Moderate loculated LEFT pleural effusion and adjacent LLL atelectasis and/or consolidation, not significantly changed.  3.  Moderate volume abdominal ascites, increased/developed from prior exams. Nodular thickening of pleural and peritoneal surfaces c/w malignant etiology  4.  Diffuse thoracoabdominal lymphadenopathy, enlarged/developed in inguinal region.  5.  Sclerotic osseous metastasis, not significantly changed      Recommendations:   Given patient immunocompromised, will keep covered w/ ABx  C/w ceftriaxone 2gm dose for possible PNA/SBP  C/w azithromycin  F/u pending cx  F/u pending uPNA Ag  BP control, may need pressor support; ICU c/s  Trend temps/WBC  Monitor renal fxn, renally dose medication  Further recs to follow pending above    Patient evaluated with face-to-face time in addition to reviewing history, labs, microbiology, and imaging.   Antibiotic stewardship, local antibiogram, infection control strategies and potential transmission issues taken into consideration at time of treatment decision making process.   Thank you for allowing us to participate in the care of your patient.  D/w Dr. Garcia  Infectious Diseases will follow. Please call with any questions.  Karen Tyler M.D.  Available on [employer] TEAMS -- *PREFERRED*  [facility] [phone]  For after 5 P.M. 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 Admitted for hypotension, MARYSOL, hyponatremia, and present chronic pleural effusion.   ID c/s for further evaluation    Hypotension, AHRF on NC, MARYSOL; SIRS vs. Sepsis  Ascites, Pleural Effusions s/p Pleurx  Gastric lymphoma    - patient having episodes of hypotension since admission, was on levo ggt on admission  - afebrile no leukocytosis, hypoxia on NC  - has now worsened since yesterday  CT 4/9 showing  1.  RIGHT pleural effusion, diminished , post percutaneous thoracocentesis catheter.  2.  Moderate loculated LEFT pleural effusion and adjacent LLL atelectasis and/or consolidation, not significantly changed.  3.  Moderate volume abdominal ascites, increased/developed from prior exams. Nodular thickening of pleural and peritoneal surfaces c/w malignant etiology  4.  Diffuse thoracoabdominal lymphadenopathy, enlarged/developed in inguinal region.  5.  Sclerotic osseous metastasis, not significantly changed    Recommendations:   Suspect overall clinical picture in setting of medications/underlying ca >>> infection  However given patient immunocompromised, will keep covered w/ ABx  C/w ceftriaxone 2gm dose for possible PNA/SBP  C/w azithromycin  F/u pending cx  F/u pending uPNA Ag  BP control, may need pressor support; ICU c/s  Trend temps/WBC  Monitor renal fxn, renally dose medication  Further recs to follow pending above    Patient evaluated with face-to-face time in addition to reviewing history, labs, microbiology, and imaging.   Antibiotic stewardship, local antibiogram, infection control strategies and potential transmission issues taken into consideration at time of treatment decision making process.   Thank you for allowing us to participate in the care of your patient.  D/w Dr. Garcia  Infectious Diseases will follow. Please call with any questions.  Karen Tyler M.D.  Available on [employer] TEAMS -- *[facility]*  [facility] [phone]  For after 5 P.M. and weekends, please call [phone]

## 2025-04-10 NOTE — DISCHARGE NOTE PROVIDER - CARE PROVIDER_API CALL
Mulugeta Delcid.  Physician Assistant Services  [address] [place] [address]  Phone: [phone]  Fax: [phone]  Follow Up Time: 2 weeks

## 2025-04-10 NOTE — DISCHARGE NOTE PROVIDER - NSDCMRMEDTOKEN_GEN_ALL_CORE_FT
apixaban 5 mg oral tablet: 1 tab(s) orally 2 times a day  bumetanide 2 mg oral tablet: 1 tab(s) orally once a day restart on Wed 4/9  clopidogrel 75 mg oral tablet: 1 tab(s) orally once a day  labetalol 100 mg oral tablet: 1 tab(s) orally 2 times a day  Lipitor 40 mg oral tablet: 1 tab(s) orally once a day  losartan 50 mg oral tablet: 1 tab(s) orally once a day  oxyCODONE 5 mg oral tablet: 1 tab(s) orally every 4 hours As needed Severe Pain (7 - 10)   acetaminophen 325 mg oral tablet: 2 tab(s) orally every 6 hours as needed for Mild Pain (1 - 3)  apixaban 5 mg oral tablet: 1 tab(s) orally 2 times a day  atropine 1% ophthalmic solution: 2 drop(s) sublingually every 6 hours as needed for secretion  bisacodyl 10 mg rectal suppository: 1 suppository(ies) rectal once a day as needed for Constipation  clonazePAM 0.5 mg oral tablet: 1 tab(s) orally once a day MDD: 1 tab  clonazePAM 1 mg oral tablet: 1 tab(s) orally once a day (at bedtime) MDD: 1tab  clopidogrel 75 mg oral tablet: 1 tab(s) orally once a day  Dilaudid 1 mg/mL oral liquid: 1 milliliter(s) orally every 4 hours as needed for  moderate pain MDD: 6 ml  Dilaudid 1 mg/mL oral liquid: 2 milliliter(s) orally every 4 hours as needed for  severe pain and SOB MDD: 12ml  ipratropium-albuterol 0.5 mg-2.5 mg/3 mL inhalation solution: 3 milliliter(s) inhaled every 6 hours  midodrine 10 mg oral tablet: 1 tab(s) orally every 8 hours  mirtazapine 45 mg oral tablet: 1 tab(s) orally once a day (at bedtime)  pantoprazole 40 mg oral delayed release tablet: 1 tab(s) orally once a day (before a meal)  PARoxetine 20 mg oral tablet: 1 tab(s) orally once a day  sucralfate 1 g/10 mL oral suspension: 10 milliliter(s) orally 4 times a day   benzocaine 20% topical spray: 1 Apply topically to affected area every 6 hours As needed sore throat  bisacodyl 10 mg rectal suppository: 1 suppository(ies) rectal once a day as needed for Constipation  clonazePAM 0.5 mg oral tablet: 1 tab(s) orally once a day (at bedtime)  glycopyrrolate 0.2 mg/mL injectable solution: 0.4 milligram(s) injectable every 6 hours as needed for  secretions  HYDROmorphone: 0.5 milligram(s) intravenous every 2 hours as needed for  severe pain (7-10)  HYDROmorphone 0.2 mg/mL-NaCl 0.9% intravenous solution: 1 milliliter(s) intravenous every 4 hours As needed Moderate Pain (4 - 6)  HYDROmorphone 0.2 mg/mL-NaCl 0.9% intravenous solution: 1 milliliter(s) intravenous every 2 hours As needed Dyspnea, respiratory rate greater than 24  ipratropium-albuterol 0.5 mg-2.5 mg/3 mL inhalation solution: 3 milliliter(s) inhaled every 6 hours  LORazepam 1 mg/mL-NaCl 0.9% intravenous solution: 0.5 milliliter(s) intravenous every 2 hours As needed Anxiety, Agitation, Refractory dyspnea  ondansetron 2 mg/mL injectable solution: 4 milligram(s) injectable every 6 hours  pantoprazole 40 mg intravenous injection: 40 milligram(s) intravenous once a day

## 2025-04-10 NOTE — PROGRESS NOTE ADULT - ASSESSMENT
Acute on CKD Stage 3  Hypotension  Anemia  Hypoalbuminemia      -MARYSOL secondary to severe hypotension leading to renal hypoperfusion; unclear etiology of the hypotension  -Additionally, CTAP done overnight noted renal fullness and uvp obstruction, recommend urgent Urology evaluation  -Will get stat renal US to better eval with rising SCR  -Not on pressors, is on midodrine  -Ok to give albumin  -Unable to give fluids at this time given hypervolemic state  -If bp stable can possibly give diuresis, may need albumin with lasix or bumex infusions eventually  -May need wise with above findings, pending stat renal US  -May need to restart dialysis if hemodynamics can tolerate, but not emergent at this time. Patient very hesitant toward restarting dialysis as well. Will continue to .     Thank you    will d/w primary  Acute on CKD Stage 3  Hypotension  Anemia  Hypoalbuminemia      -MARYSOL secondary to severe hypotension leading to renal hypoperfusion; unclear etiology of the hypotension  -Additionally, CTAP done overnight noted renal fullness and uvp obstruction, recommend urgent Urology evaluation  -Will get stat renal US to better eval with rising SCR  -He refused wise, but has been unable to void, may need wise   -Not on pressors, is on midodrine  -Ok to give albumin  -Unable to give fluids at this time given hypervolemic state  -If bp stable can possibly give diuresis, may need albumin with lasix or bumex infusions eventually  -May need wise with above findings, pending stat renal US  -May need to restart dialysis if hemodynamics can tolerate, but not emergent at this time. Patient very hesitant toward restarting dialysis as well. Will continue to .   -Potassium higher end, give one dose lokelma and add K restrictions    Thank you     d/w primary

## 2025-04-11 ENCOUNTER — RESULT REVIEW (OUTPATIENT)
Age: 65
End: 2025-04-11

## 2025-04-11 LAB
ALBUMIN FLD-MCNC: 2.3 G/DL — SIGNIFICANT CHANGE UP
ALBUMIN SERPL ELPH-MCNC: 2.2 G/DL — LOW (ref 3.3–5)
ALP SERPL-CCNC: 120 U/L — SIGNIFICANT CHANGE UP (ref 40–120)
ALT FLD-CCNC: 16 U/L — SIGNIFICANT CHANGE UP (ref 12–78)
ANION GAP SERPL CALC-SCNC: 10 MMOL/L — SIGNIFICANT CHANGE UP (ref 5–17)
APPEARANCE UR: CLEAR — SIGNIFICANT CHANGE UP
APTT BLD: 71.5 SEC — HIGH (ref 24.5–35.6)
AST SERPL-CCNC: 28 U/L — SIGNIFICANT CHANGE UP (ref 15–37)
B PERT IGG+IGM PNL SER: ABNORMAL
BACTERIA # UR AUTO: ABNORMAL /HPF
BASOPHILS # BLD AUTO: 0.06 K/UL — SIGNIFICANT CHANGE UP (ref 0–0.2)
BASOPHILS NFR BLD AUTO: 0.6 % — SIGNIFICANT CHANGE UP (ref 0–2)
BILIRUB SERPL-MCNC: 0.3 MG/DL — SIGNIFICANT CHANGE UP (ref 0.2–1.2)
BILIRUB UR-MCNC: NEGATIVE — SIGNIFICANT CHANGE UP
BUN SERPL-MCNC: 83 MG/DL — HIGH (ref 7–23)
CALCIUM SERPL-MCNC: 9 MG/DL — SIGNIFICANT CHANGE UP (ref 8.5–10.1)
CHLORIDE SERPL-SCNC: 97 MMOL/L — SIGNIFICANT CHANGE UP (ref 96–108)
CO2 SERPL-SCNC: 22 MMOL/L — SIGNIFICANT CHANGE UP (ref 22–31)
COLOR FLD: YELLOW — SIGNIFICANT CHANGE UP
COLOR SPEC: YELLOW — SIGNIFICANT CHANGE UP
COMMENT - FLUIDS: SIGNIFICANT CHANGE UP
CREAT ?TM UR-MCNC: 164 MG/DL — SIGNIFICANT CHANGE UP
CREAT SERPL-MCNC: 3.6 MG/DL — HIGH (ref 0.5–1.3)
DIFF PNL FLD: NEGATIVE — SIGNIFICANT CHANGE UP
EGFR: 18 ML/MIN/1.73M2 — LOW
EGFR: 18 ML/MIN/1.73M2 — LOW
EOSINOPHIL # BLD AUTO: 0.06 K/UL — SIGNIFICANT CHANGE UP (ref 0–0.5)
EOSINOPHIL NFR BLD AUTO: 0.6 % — SIGNIFICANT CHANGE UP (ref 0–6)
EPI CELLS # UR: PRESENT
FLUID INTAKE SUBSTANCE CLASS: SIGNIFICANT CHANGE UP
GLUCOSE FLD-MCNC: 83 MG/DL — SIGNIFICANT CHANGE UP
GLUCOSE SERPL-MCNC: 96 MG/DL — SIGNIFICANT CHANGE UP (ref 70–99)
GLUCOSE UR QL: NEGATIVE MG/DL — SIGNIFICANT CHANGE UP
HCT VFR BLD CALC: 28.8 % — LOW (ref 39–50)
HGB BLD-MCNC: 8.9 G/DL — LOW (ref 13–17)
IMM GRANULOCYTES NFR BLD AUTO: 1.2 % — HIGH (ref 0–0.9)
KETONES UR-MCNC: ABNORMAL MG/DL
LDH SERPL L TO P-CCNC: 241 U/L — SIGNIFICANT CHANGE UP
LEGIONELLA AG UR QL: NEGATIVE — SIGNIFICANT CHANGE UP
LEUKOCYTE ESTERASE UR-ACNC: ABNORMAL
LYMPHOCYTES # BLD AUTO: 0.62 K/UL — LOW (ref 1–3.3)
LYMPHOCYTES # BLD AUTO: 6.4 % — LOW (ref 13–44)
LYMPHOCYTES # FLD: 4 % — SIGNIFICANT CHANGE UP
MAGNESIUM SERPL-MCNC: 2.9 MG/DL — HIGH (ref 1.6–2.6)
MCHC RBC-ENTMCNC: 19.6 PG — LOW (ref 27–34)
MCHC RBC-ENTMCNC: 30.9 G/DL — LOW (ref 32–36)
MCV RBC AUTO: 63.3 FL — LOW (ref 80–100)
MESOTHL CELL # FLD: SIGNIFICANT CHANGE UP
MONOCYTES # BLD AUTO: 1.46 K/UL — HIGH (ref 0–0.9)
MONOCYTES NFR BLD AUTO: 15.1 % — HIGH (ref 2–14)
MONOS+MACROS # FLD: 38 % — SIGNIFICANT CHANGE UP
NEUTROPHILS # BLD AUTO: 7.34 K/UL — SIGNIFICANT CHANGE UP (ref 1.8–7.4)
NEUTROPHILS NFR BLD AUTO: 76.1 % — SIGNIFICANT CHANGE UP (ref 43–77)
NEUTROPHILS-BODY FLUID: 58 % — SIGNIFICANT CHANGE UP
NITRITE UR-MCNC: NEGATIVE — SIGNIFICANT CHANGE UP
NRBC BLD AUTO-RTO: 0 /100 WBCS — SIGNIFICANT CHANGE UP (ref 0–0)
OSMOLALITY UR: 369 MOSM/KG — SIGNIFICANT CHANGE UP (ref 50–1200)
OTHER CELLS FLD MANUAL: PRESENT — SIGNIFICANT CHANGE UP
PH UR: 5 — SIGNIFICANT CHANGE UP (ref 5–8)
PHOSPHATE SERPL-MCNC: 5.7 MG/DL — HIGH (ref 2.5–4.5)
PLATELET # BLD AUTO: 514 K/UL — HIGH (ref 150–400)
POTASSIUM SERPL-MCNC: 4.9 MMOL/L — SIGNIFICANT CHANGE UP (ref 3.5–5.3)
POTASSIUM SERPL-SCNC: 4.9 MMOL/L — SIGNIFICANT CHANGE UP (ref 3.5–5.3)
POTASSIUM UR-SCNC: 55 MMOL/L — SIGNIFICANT CHANGE UP
PROT ?TM UR-MCNC: 20 MG/DL — HIGH (ref 0–12)
PROT FLD-MCNC: 3.6 G/DL — SIGNIFICANT CHANGE UP
PROT SERPL-MCNC: 6 G/DL — SIGNIFICANT CHANGE UP (ref 6–8.3)
PROT UR-MCNC: SIGNIFICANT CHANGE UP MG/DL
PROT/CREAT UR-RTO: 0.1 RATIO — SIGNIFICANT CHANGE UP (ref 0–0.2)
RBC # BLD: 4.55 M/UL — SIGNIFICANT CHANGE UP (ref 4.2–5.8)
RBC # FLD: 21.2 % — HIGH (ref 10.3–14.5)
RBC CASTS # UR COMP ASSIST: 1 /HPF — SIGNIFICANT CHANGE UP (ref 0–4)
RCV VOL RI: 2000 CELLS/UL — HIGH
S PNEUM AG UR QL: NEGATIVE — SIGNIFICANT CHANGE UP
SODIUM SERPL-SCNC: 129 MMOL/L — LOW (ref 135–145)
SODIUM UR-SCNC: <20 MMOL/L — SIGNIFICANT CHANGE UP
SP GR SPEC: 1.02 — SIGNIFICANT CHANGE UP (ref 1–1.03)
TOTAL NUCLEATED CELL COUNT, BODY FLUID: 1341 CELLS/UL — SIGNIFICANT CHANGE UP
TUBE TYPE: SIGNIFICANT CHANGE UP
UROBILINOGEN FLD QL: 0.2 MG/DL — SIGNIFICANT CHANGE UP (ref 0.2–1)
UUN UR-MCNC: 548 MG/DL — SIGNIFICANT CHANGE UP
WBC # BLD: 9.66 K/UL — SIGNIFICANT CHANGE UP (ref 3.8–10.5)
WBC # FLD AUTO: 9.66 K/UL — SIGNIFICANT CHANGE UP (ref 3.8–10.5)
WBC COUNT.: 906 CELLS/UL — HIGH
WBC UR QL: 4 /HPF — SIGNIFICANT CHANGE UP (ref 0–5)

## 2025-04-11 PROCEDURE — 99233 SBSQ HOSP IP/OBS HIGH 50: CPT

## 2025-04-11 PROCEDURE — 99233 SBSQ HOSP IP/OBS HIGH 50: CPT | Mod: GC

## 2025-04-11 PROCEDURE — 49083 ABD PARACENTESIS W/IMAGING: CPT

## 2025-04-11 PROCEDURE — 88341 IMHCHEM/IMCYTCHM EA ADD ANTB: CPT | Mod: 26

## 2025-04-11 PROCEDURE — 88342 IMHCHEM/IMCYTCHM 1ST ANTB: CPT | Mod: 26

## 2025-04-11 RX ORDER — ONDANSETRON HCL/PF 4 MG/2 ML
4 VIAL (ML) INJECTION EVERY 8 HOURS
Refills: 0 | Status: DISCONTINUED | OUTPATIENT
Start: 2025-04-11 | End: 2025-04-13

## 2025-04-11 RX ORDER — HEPARIN SODIUM 1000 [USP'U]/ML
3500 INJECTION INTRAVENOUS; SUBCUTANEOUS EVERY 6 HOURS
Refills: 0 | Status: DISCONTINUED | OUTPATIENT
Start: 2025-04-11 | End: 2025-04-13

## 2025-04-11 RX ORDER — HEPARIN SODIUM 1000 [USP'U]/ML
INJECTION INTRAVENOUS; SUBCUTANEOUS
Qty: 25000 | Refills: 0 | Status: DISCONTINUED | OUTPATIENT
Start: 2025-04-11 | End: 2025-04-13

## 2025-04-11 RX ORDER — ALBUMIN (HUMAN) 12.5 G/50ML
100 INJECTION, SOLUTION INTRAVENOUS ONCE
Refills: 0 | Status: COMPLETED | OUTPATIENT
Start: 2025-04-11 | End: 2025-04-11

## 2025-04-11 RX ORDER — HEPARIN SODIUM 1000 [USP'U]/ML
7500 INJECTION INTRAVENOUS; SUBCUTANEOUS EVERY 6 HOURS
Refills: 0 | Status: DISCONTINUED | OUTPATIENT
Start: 2025-04-11 | End: 2025-04-13

## 2025-04-11 RX ORDER — MELATONIN 5 MG
10 TABLET ORAL AT BEDTIME
Refills: 0 | Status: COMPLETED | OUTPATIENT
Start: 2025-04-11 | End: 2025-04-11

## 2025-04-11 RX ADMIN — MIDODRINE HYDROCHLORIDE 10 MILLIGRAM(S): 5 TABLET ORAL at 06:52

## 2025-04-11 RX ADMIN — CLOPIDOGREL BISULFATE 75 MILLIGRAM(S): 75 TABLET, FILM COATED ORAL at 17:36

## 2025-04-11 RX ADMIN — HEPARIN SODIUM 1700 UNIT(S)/HR: 1000 INJECTION INTRAVENOUS; SUBCUTANEOUS at 19:27

## 2025-04-11 RX ADMIN — ALBUMIN (HUMAN) 50 MILLILITER(S): 12.5 INJECTION, SOLUTION INTRAVENOUS at 10:08

## 2025-04-11 RX ADMIN — Medication 10 MILLIGRAM(S): at 21:19

## 2025-04-11 RX ADMIN — HEPARIN SODIUM 1900 UNIT(S)/HR: 1000 INJECTION INTRAVENOUS; SUBCUTANEOUS at 02:18

## 2025-04-11 RX ADMIN — HEPARIN SODIUM 1700 UNIT(S)/HR: 1000 INJECTION INTRAVENOUS; SUBCUTANEOUS at 18:44

## 2025-04-11 RX ADMIN — Medication 250 MILLIGRAM(S): at 17:35

## 2025-04-11 RX ADMIN — ATORVASTATIN CALCIUM 40 MILLIGRAM(S): 80 TABLET, FILM COATED ORAL at 21:19

## 2025-04-11 RX ADMIN — CEFTRIAXONE 100 MILLIGRAM(S): 500 INJECTION, POWDER, FOR SOLUTION INTRAMUSCULAR; INTRAVENOUS at 21:20

## 2025-04-11 RX ADMIN — Medication 4 MILLIGRAM(S): at 17:35

## 2025-04-11 RX ADMIN — MIDODRINE HYDROCHLORIDE 10 MILLIGRAM(S): 5 TABLET ORAL at 14:50

## 2025-04-11 RX ADMIN — HEPARIN SODIUM 1900 UNIT(S)/HR: 1000 INJECTION INTRAVENOUS; SUBCUTANEOUS at 06:52

## 2025-04-11 RX ADMIN — MIDODRINE HYDROCHLORIDE 10 MILLIGRAM(S): 5 TABLET ORAL at 21:19

## 2025-04-11 NOTE — PROGRESS NOTE ADULT - SUBJECTIVE AND OBJECTIVE BOX
INTERVAL HPI/OVERNIGHT EVENTS: c/o abdominal discomfort, asking for fluid to be removed     MEDICATIONS  (STANDING):  atorvastatin 40 milliGRAM(s) Oral at bedtime  azithromycin  IVPB 500 milliGRAM(s) IV Intermittent every 24 hours  azithromycin  IVPB      cefTRIAXone   IVPB 2000 milliGRAM(s) IV Intermittent every 24 hours  chlorhexidine 2% Cloths 1 Application(s) Topical <User Schedule>  clopidogrel Tablet 75 milliGRAM(s) Oral daily  heparin  Infusion.  Unit(s)/Hr (17 mL/Hr) IV Continuous <Continuous>  influenza   Vaccine 0.5 milliLiter(s) IntraMuscular once  melatonin 5 milliGRAM(s) Oral at bedtime  midodrine 10 milliGRAM(s) Oral every 8 hours    MEDICATIONS  (PRN):  acetaminophen     Tablet .. 650 milliGRAM(s) Oral every 6 hours PRN Mild Pain (1 - 3)  acetaminophen   IVPB .. 1000 milliGRAM(s) IV Intermittent every 8 hours PRN Moderate Pain (4 - 6)  albuterol/ipratropium for Nebulization 3 milliLiter(s) Nebulizer every 6 hours PRN Shortness of Breath and/or Wheezing  heparin   Injectable 7500 Unit(s) IV Push every 6 hours PRN For aPTT less than 40  heparin   Injectable 3500 Unit(s) IV Push every 6 hours PRN For aPTT between 40 - 57  ondansetron    Tablet 4 milliGRAM(s) Oral daily PRN Nausea and/or Vomiting        Vital Signs Last 24 Hrs  T(C): 36.4 (11 Apr 2025 04:30), Max: 36.6 (10 Apr 2025 08:48)  T(F): 97.5 (11 Apr 2025 04:30), Max: 97.9 (10 Apr 2025 08:48)  HR: 73 (11 Apr 2025 04:30) (73 - 100)  BP: 98/62 (11 Apr 2025 04:30) (86/48 - 104/70)  BP(mean): --  RR: 17 (11 Apr 2025 04:30) (15 - 17)  SpO2: 96% (11 Apr 2025 04:30) (94% - 99%)    Parameters below as of 11 Apr 2025 04:30  Patient On (Oxygen Delivery Method): nasal cannula  O2 Flow (L/min): 2          LABS:                        8.6    9.37  )-----------( 565      ( 10 Apr 2025 10:27 )             27.3     04-10    133[L]  |  95[L]  |  78[H]  ----------------------------<  97  5.1   |  26  |  3.50[H]    Ca    9.3      10 Apr 2025 07:22  Phos  5.6     04-10  Mg     2.6     04-10    TPro  6.2  /  Alb  2.4[L]  /  TBili  0.4  /  DBili  x   /  AST  25  /  ALT  16  /  AlkPhos  95  04-10    PT/INR - ( 10 Apr 2025 07:22 )   PT: 16.7 sec;   INR: 1.43 ratio         PTT - ( 11 Apr 2025 01:26 )  PTT:71.5 sec  Urinalysis Basic - ( 10 Apr 2025 07:22 )    Color: x / Appearance: x / SG: x / pH: x  Gluc: 97 mg/dL / Ketone: x  / Bili: x / Urobili: x   Blood: x / Protein: x / Nitrite: x   Leuk Esterase: x / RBC: x / WBC x   Sq Epi: x / Non Sq Epi: x / Bacteria: x      Urine culture:  04-09 @ 16:36 --   No growth at 24 hours  Urine culture:  04-09 @ 16:30 --   No growth at 24 hours  Urine culture:  04-09 @ 11:35 --   No growth at 24 hours      RADIOLOGY & ADDITIONAL TESTS:

## 2025-04-11 NOTE — PROGRESS NOTE ADULT - SUBJECTIVE AND OBJECTIVE BOX
Patient is a 64y old  Male who presents with a chief complaint of hypotension (11 Apr 2025 11:21)      INTERVAL HPI/OVERNIGHT EVENTS: Pt seen and examined in his chair at bedside. Patient states that he is concerned with his kidney function as he does not want to go back on dialysis. Scheduled for Paracentesis today. States he still feels generally winded with minimal ambulation and fatigued most of the time. States he occasionally gets muscle cramps/aches in his lower back resolved with positional changes.    MEDICATIONS  (STANDING):  atorvastatin 40 milliGRAM(s) Oral at bedtime  azithromycin  IVPB 500 milliGRAM(s) IV Intermittent every 24 hours  azithromycin  IVPB      cefTRIAXone   IVPB 2000 milliGRAM(s) IV Intermittent every 24 hours  chlorhexidine 2% Cloths 1 Application(s) Topical <User Schedule>  clopidogrel Tablet 75 milliGRAM(s) Oral daily  influenza   Vaccine 0.5 milliLiter(s) IntraMuscular once  melatonin 5 milliGRAM(s) Oral at bedtime  midodrine 10 milliGRAM(s) Oral every 8 hours    MEDICATIONS  (PRN):  acetaminophen     Tablet .. 650 milliGRAM(s) Oral every 6 hours PRN Mild Pain (1 - 3)  acetaminophen   IVPB .. 1000 milliGRAM(s) IV Intermittent every 8 hours PRN Moderate Pain (4 - 6)  albuterol/ipratropium for Nebulization 3 milliLiter(s) Nebulizer every 6 hours PRN Shortness of Breath and/or Wheezing  ondansetron    Tablet 4 milliGRAM(s) Oral daily PRN Nausea and/or Vomiting      Allergies    Bactrim DS (Hives)    Intolerances        REVIEW OF SYSTEMS:  CONSTITUTIONAL: No fever or chills  HEENT:  No headache, no sore throat  RESPIRATORY: +SOB, No cough, wheezing  CARDIOVASCULAR: No chest pain, palpitations  GASTROINTESTINAL: No abd pain, nausea, vomiting, or diarrhea  GENITOURINARY: No dysuria, frequency, or hematuria  NEUROLOGICAL: no focal weakness or dizziness  MUSCULOSKELETAL: intermittent lower back pain bilaterally      Vital Signs Last 24 Hrs  T(C): 36.6 (11 Apr 2025 11:52), Max: 36.7 (11 Apr 2025 11:37)  T(F): 97.9 (11 Apr 2025 11:52), Max: 98.1 (11 Apr 2025 11:37)  HR: 80 (11 Apr 2025 11:52) (73 - 83)  BP: 116/72 (11 Apr 2025 11:52) (98/62 - 119/70)  BP(mean): 86 (11 Apr 2025 11:52) (86 - 86)  RR: 22 (11 Apr 2025 11:52) (17 - 22)  SpO2: 99% (11 Apr 2025 11:52) (94% - 99%)    Parameters below as of 11 Apr 2025 11:52  Patient On (Oxygen Delivery Method): nasal cannula  O2 Flow (L/min): 3      PHYSICAL EXAM:  GENERAL: NAD  HEENT:  anicteric, moist mucous membranes  CHEST/LUNG:  Left lung with minimal lung sounds to LLL, right lung cta except mildly decreased at base  HEART:  RRR, S1, S2  ABDOMEN:  minimal bowel sounds, distended, nontender, fluid noted on physical exam  EXTREMITIES: 2+ pitting edema to B/L lower extremities extending feet to thighs, no cyanosis, or calf tenderness  NERVOUS SYSTEM: answers questions and follows commands appropriately        LABS:                        8.9    9.66  )-----------( 514      ( 11 Apr 2025 06:17 )             28.8     CBC Full  -  ( 11 Apr 2025 06:17 )  WBC Count : 9.66 K/uL  Hemoglobin : 8.9 g/dL  Hematocrit : 28.8 %  Platelet Count - Automated : 514 K/uL  Mean Cell Volume : 63.3 fl  Mean Cell Hemoglobin : 19.6 pg  Mean Cell Hemoglobin Concentration : 30.9 g/dL  Auto Neutrophil # : x  Auto Lymphocyte # : x  Auto Monocyte # : x  Auto Eosinophil # : x  Auto Basophil # : x  Auto Neutrophil % : x  Auto Lymphocyte % : x  Auto Monocyte % : x  Auto Eosinophil % : x  Auto Basophil % : x    11 Apr 2025 06:17    129    |  97     |  83     ----------------------------<  96     4.9     |  22     |  3.60     Ca    9.0        11 Apr 2025 06:17  Phos  5.7       11 Apr 2025 06:17  Mg     2.9       11 Apr 2025 06:17    TPro  6.0    /  Alb  2.2    /  TBili  0.3    /  DBili  x      /  AST  28     /  ALT  16     /  AlkPhos  120    11 Apr 2025 06:17    PT/INR - ( 10 Apr 2025 07:22 )   PT: 16.7 sec;   INR: 1.43 ratio         PTT - ( 11 Apr 2025 01:26 )  PTT:71.5 sec  Urinalysis Basic - ( 11 Apr 2025 06:17 )    Color: x / Appearance: x / SG: x / pH: x  Gluc: 96 mg/dL / Ketone: x  / Bili: x / Urobili: x   Blood: x / Protein: x / Nitrite: x   Leuk Esterase: x / RBC: x / WBC x   Sq Epi: x / Non Sq Epi: x / Bacteria: x      CAPILLARY BLOOD GLUCOSE            Culture - Blood (collected 04-09-25 @ 16:36)  Source: Blood Blood-Peripheral  Preliminary Report (04-11-25 @ 04:02):    No growth at 24 hours    Culture - Blood (collected 04-09-25 @ 16:30)  Source: Blood Blood-Peripheral  Preliminary Report (04-11-25 @ 04:02):    No growth at 24 hours    Culture - Blood (collected 04-09-25 @ 11:35)  Source: Blood Blood-Peripheral  Preliminary Report (04-10-25 @ 17:02):    No growth at 24 hours        RADIOLOGY & ADDITIONAL TESTS:    Personally reviewed.     Consultant(s) Notes Reviewed:  [x] YES  [ ] NO

## 2025-04-11 NOTE — CHART NOTE - NSCHARTNOTEFT_GEN_A_CORE
Palliative care SW attempted to meet with patient today for psychosocial support. Patient on the phone and requested a visit at a later date. PC SW to follow up.

## 2025-04-11 NOTE — PROGRESS NOTE ADULT - ASSESSMENT
ASSESSMENT & PLAN  64yM with a PMH of poorly differentiated metastatic gastric carcinoma on chemo, HTN, anemia, NSTEMI, DVT of LUE on 3/3/25 (on Eliquis and plavix), and recent history of bilateral pleural effusions requiring L thoracentesis 3/4/25 and 3/20/25, and R pleurx placement on 4/4/2025, now admitted with hypotension, oliguria, MARYSOL, suspected CAP.    - Continue pleurx, drain every other day  - Continue antibiotics  - Supplemental O2 PRN  - Eliquis & hep gtt held for possible paracentesis, primary team to consult IR, timing to be confirmed  - Conservative use of IVF given pleural effusions  - To be discussed with Dr. Reich

## 2025-04-11 NOTE — PROGRESS NOTE ADULT - ASSESSMENT
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 Admitted for hypotension, MARYSOL, hyponatremia, and present chronic pleural effusion.   ID c/s for further evaluation    Hypotension, AHRF on NC, MARYSOL; SIRS vs. Sepsis  Ascites, Pleural Effusions s/p Pleurx  Gastric lymphoma  - patient having episodes of hypotension since admission, was on levo ggt on admission  - afebrile no leukocytosis, hypoxia on NC  - BCx NGTD  CT 4/9 showing  RIGHT pleural effusion, diminished , post percutaneous thoracocentesis catheter. Moderate loculated LEFT pleural effusion and adjacent LLL atelectasis and/or consolidation, not significantly changed.  Moderate volume abdominal ascites, increased/developed from prior exams. Nodular thickening of pleural and peritoneal surfaces c/w malignant etiology. Diffuse thoracoabdominal lymphadenopathy, enlarged/developed in inguinal region.  Sclerotic osseous metastasis, not significantly changed  4/11 S/p 3550cc of cloudy yellow ascitic fluid removed from right abdomen     Recommendations:   Suspect overall clinical picture in setting of medications/underlying ca >>> infection  However given patient immunocompromised, will keep covered w/ ABx  C/w ceftriaxone 2gm dose for possible PNA/SBP x7 day course  C/w azithromycin, can d/c if urine legionella negative  F/u pending peritoneal fluid cx  F/u pending uPNA Ag  BP control, may need pressor support; ICU c/s  Trend temps/WBC  Monitor renal fxn, renally dose medication  Further recs to follow pending above    Patient evaluated with face-to-face time in addition to reviewing history, labs, microbiology, and imaging.   Antibiotic stewardship, local antibiogram, infection control strategies and potential transmission issues taken into consideration at time of treatment decision making process.   Thank you for allowing us to participate in the care of your patient.  D/w Dr. Jose Forbes covering weekend service from 04/12/25-04/13/25  I will resume care 04/14/25  Infectious Diseases will follow. Please call with any questions.  Karen Tyler M.D.  Available on [employer] TEAMS -- *[facility] [phone]  For after 5 P.M. and weekends, please call [phone]

## 2025-04-11 NOTE — PROGRESS NOTE ADULT - PROBLEM SELECTOR PLAN 1
Acute for the past two days, endorses slight lightheadedness   -BP: 82/56 -> 90/51 ->77/49  -s/p 1L NaCl 0.9% IVB 1x, and 1L LR in ED  - C/w Midodrine 10mg , s/p 4 doses albumin  -TTE ordered, f/u results   -HOLD home labetalol BID and losartan qd; and hold home bumex   -would be cautious about fluids as patient has present pleural effusions and in extravascular volume overload state

## 2025-04-11 NOTE — PROGRESS NOTE ADULT - PROBLEM SELECTOR PLAN 11
DVT treatment/prophylaxis  -Hep gtt held for paracentesis today, will restart after procedure.    GOC: palliative consulted; full code

## 2025-04-11 NOTE — PROGRESS NOTE ADULT - PROBLEM SELECTOR PLAN 1
CT scans are stable, there is no obstruction as the etiology of his renal failure. PVR last night was 35 cc, so a wise isnt indicated.

## 2025-04-11 NOTE — PROGRESS NOTE ADULT - SUBJECTIVE AND OBJECTIVE BOX
Patient is a 64y old  Male who presents with a chief complaint of      HPI: 64 year old male with a PMH as listed below presented with worsening SOB and edema. Also admits to poor urination. Was previously on dialysis but was taken off due to renal recovery. Was supposed to follow up in office with Dr Eugene 25 but ended in ER first. Renal consulted for further eval. No new medications, no recent illnesses, no NSAID use.      He is c/o abdominal pain, no sob, but feeling tired. Is not voiding much     PAST MEDICAL & SURGICAL HISTORY:  Gastric lymphoma      Anemia of chronic disease      Pleural effusion      DVT (deep venous thrombosis)      Hypertension      Hyperlipidemia      S/P appendectomy  2014      S/P cholecystectomy  15 years ago           FAMILY HISTORY:  Family history of coronary artery disease in father  Father -  age 60 following MI    NC    Social History:Non smoker    MEDICATIONS  (STANDING):  albumin human 25% IVPB 50 milliLiter(s) IV Intermittent every 6 hours  norepinephrine Infusion 0.25 MICROgram(s)/kG/Min (43.6 mL/Hr) IV Continuous <Continuous>    MEDICATIONS  (PRN):   Meds reviewed    Allergies    Bactrim DS (Hives)    Intolerances         REVIEW OF SYSTEMS: as per HPI    ICU Vital Signs Last 24 Hrs  T(C): 36.6 (2025 11:52), Max: 36.7 (2025 11:37)  T(F): 97.9 (2025 11:52), Max: 98.1 (2025 11:37)  HR: 82 (2025 14:48) (73 - 83)  BP: 119/65 (2025 14:48) (98/62 - 122/71)  BP(mean): 86 (2025 11:52) (86 - 86)  ABP: --  ABP(mean): --  RR: 20 (2025 13:10) (17 - 24)  SpO2: 99% (2025 13:10) (94% - 99%)    O2 Parameters below as of 2025 13:10  Patient On (Oxygen Delivery Method): nasal cannula  O2 Flow (L/min): 3      PHYSICAL EXAM:    GENERAL: Ill appearing, on NC  HEAD:  Atraumatic, Normocephalic  EYES: Conjunctiva and sclera clear  ENMT: No Drainage from nares, No drainage from ears  NERVOUS SYSTEM:  Awake and Alert  ABDOMEN: distended abdomen, NT  EXTREMITIES:  +++Edema B/L LE          LABS:                          8.9    9.66  )-----------( 514      ( 2025 06:17 )             28.8     04-11    129[L]  |  97  |  83[H]  ----------------------------<  96  4.9   |  22  |  3.60[H]    Ca    9.0      2025 06:17  Phos  5.7     04-11  Mg     2.9     04-11    TPro  6.0  /  Alb  2.2[L]  /  TBili  0.3  /  DBili  x   /  AST  28  /  ALT  16  /  AlkPhos  120  04-11    PT/INR - ( 10 Apr 2025 07:22 )   PT: 16.7 sec;   INR: 1.43 ratio         PTT - ( 2025 01:26 )  PTT:71.5 sec  Urinalysis Basic - ( 2025 06:17 )    Color: x / Appearance: x / SG: x / pH: x  Gluc: 96 mg/dL / Ketone: x  / Bili: x / Urobili: x   Blood: x / Protein: x / Nitrite: x   Leuk Esterase: x / RBC: x / WBC x   Sq Epi: x / Non Sq Epi: x / Bacteria: x

## 2025-04-11 NOTE — PROGRESS NOTE ADULT - SUBJECTIVE AND OBJECTIVE BOX
SUBJECTIVE:  Patient seen and examined at bedside.  Patient reports frustration about delay in paracentesis. States he wants relief from pain. Patient states SOB is persistent on exertion but denies it at rest. Patient denies any fever, chills, chest pain, shortness of breath at rest, nausea, vomiting, or urinary complaints.      VITALS  Vital Signs Last 24 Hrs  T(C): 36.4 (11 Apr 2025 04:30), Max: 36.6 (10 Apr 2025 20:18)  T(F): 97.5 (11 Apr 2025 04:30), Max: 97.9 (10 Apr 2025 20:18)  HR: 73 (11 Apr 2025 04:30) (73 - 100)  BP: 98/62 (11 Apr 2025 04:30) (86/48 - 104/70)  RR: 17 (11 Apr 2025 04:30) (17 - 17)  SpO2: 96% (11 Apr 2025 04:30) (94% - 96%)    Parameters below as of 11 Apr 2025 04:30  Patient On (Oxygen Delivery Method): nasal cannula  O2 Flow (L/min): 2      PHYSICAL EXAM  GENERAL:  Well-developed Male lying comfortably in bed in [facility].  HEENT:  NC/AT. Sclera white. Mucous membranes moist.  RESPIRATORY:  Nonlabored breathing on 5L NC.  ABDOMEN:  Distended, slight tenderness on right side upon percussion      INTAKE & OUTPUT  I&O's Summary    10 Apr 2025 07:01  -  11 Apr 2025 07:00  --------------------------------------------------------  IN: 349 mL / OUT: 495 mL / NET: -146 mL    11 Apr 2025 07:01  -  11 Apr 2025 11:22  --------------------------------------------------------  IN: 0 mL / OUT: 110 mL / NET: -110 mL      I&O's Detail    10 Apr 2025 07:01  -  11 Apr 2025 07:00  --------------------------------------------------------  IN:    Heparin Infusion: 284 mL    IV PiggyBack: 50 mL    Oral Fluid: 15 mL  Total IN: 349 mL    OUT:    Voided (mL): 495 mL  Total OUT: 495 mL    Total NET: -146 mL      11 Apr 2025 07:01  -  11 Apr 2025 11:22  --------------------------------------------------------  IN:  Total IN: 0 mL    OUT:    Voided (mL): 110 mL  Total OUT: 110 mL    Total NET: -110 mL          MEDICATIONS  MEDICATIONS  (STANDING):  atorvastatin 40 milliGRAM(s) Oral at bedtime  azithromycin  IVPB 500 milliGRAM(s) IV Intermittent every 24 hours  azithromycin  IVPB      cefTRIAXone   IVPB 2000 milliGRAM(s) IV Intermittent every 24 hours  chlorhexidine 2% Cloths 1 Application(s) Topical <User Schedule>  clopidogrel Tablet 75 milliGRAM(s) Oral daily  influenza   Vaccine 0.5 milliLiter(s) IntraMuscular once  melatonin 5 milliGRAM(s) Oral at bedtime  midodrine 10 milliGRAM(s) Oral every 8 hours    MEDICATIONS  (PRN):  acetaminophen     Tablet .. 650 milliGRAM(s) Oral every 6 hours PRN Mild Pain (1 - 3)  acetaminophen   IVPB .. 1000 milliGRAM(s) IV Intermittent every 8 hours PRN Moderate Pain (4 - 6)  albuterol/ipratropium for Nebulization 3 milliLiter(s) Nebulizer every 6 hours PRN Shortness of Breath and/or Wheezing  ondansetron    Tablet 4 milliGRAM(s) Oral daily PRN Nausea and/or Vomiting      LABS:                        8.9    9.66  )-----------( 514      ( 11 Apr 2025 06:17 )             28.8     04-11    129[L]  |  97  |  83[H]  ----------------------------<  96  4.9   |  22  |  3.60[H]    Ca    9.0      11 Apr 2025 06:17  Phos  5.7     04-11  Mg     2.9     04-11    TPro  6.0  /  Alb  2.2[L]  /  TBili  0.3  /  DBili  x   /  AST  28  /  ALT  16  /  AlkPhos  120  04-11    PT/INR - ( 10 Apr 2025 07:22 )   PT: 16.7 sec;   INR: 1.43 ratio         PTT - ( 11 Apr 2025 01:26 )  PTT:71.5 sec    Culture - Blood (collected 09 Apr 2025 16:36)  Source: Blood Blood-Peripheral  Preliminary Report (11 Apr 2025 04:02):    No growth at 24 hours    Culture - Blood (collected 09 Apr 2025 16:30)  Source: Blood Blood-Peripheral  Preliminary Report (11 Apr 2025 04:02):    No growth at 24 hours    Culture - Blood (collected 09 Apr 2025 11:35)  Source: Blood Blood-Peripheral  Preliminary Report (10 Apr 2025 17:02):    No growth at 24 hours

## 2025-04-11 NOTE — PROGRESS NOTE ADULT - PROBLEM SELECTOR PLAN 2
BUN: 71, Cr: 3.30  -endorses has not been urinating much for the past two days (denies dysuria).   -Nephro consulted ( Dr. Franck Wagoner): Cortisol AM mildly elevated, IV albumin  - Barba not indicated per Urology, post void residual 34cc last night  -monitor I/Os

## 2025-04-11 NOTE — PROGRESS NOTE ADULT - ATTENDING COMMENTS
Patient was seen and examined by myself. Case was discussed with house staff in details. I have reviewed and agree with the plan as outlined above with edits where appropriate.    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 Of note, pt was admitted from 3/3-3/7 with large left pleural effusion and LUE DVT. Pt had thoracentesis done of which 3.1 L of fluid from the left thorax. Then was admitted on 3/19-3/23 for pleural effusion and NSTEMI. Pt had a thoracentesis done of which drained 560 cc from the left thorax. Then patient was admitted from 04/03-04/06 for SOB and had pleurax placement on right chest (currently has very minimal output). Patient was told to hold bumex until today, however patient restarted bumex last night after speaking with his heme-onc doctor. Endorses he had soft bp yesterday (systolic ~102), and then today he was supposed to get his fifth chemo cycle (previously on FolFox, was supposed to switch to his new chemo medication today) however BP was noted to be low and was recommended to come to the hospital. Also endorses lightheadedness today.     vitals: T: 97.3F, BP: 82/56 -> 90/51, HR: 68, RR: 22, 99% O2 on 4L nc  labs: hgb: 8.9 (baseline), CBC abnormal 2/2 to cancer, Na: 130, Cl: 94, BUN: 71, Cr: 3.30, eGFR: 20, lactate: 2.7, proBNP: 3483  UA: cloudy, trace leuk esterase, 10wbc, moderate bacteria, neg nitrate   RVP neg   X-ray:  Increased interstitial markings bilaterally slightly greater on the left as before. There is a left effusion with atelectatic change.   Underlying inflammatory infectious process not excluded. Smaller right effusion with some increased interstitial markings in the right   infrahilar region which are less prominent than before. Small caliber right chest tube noted by the right CP angle. No pneumothorax.   Port-A-Cath as before. Borderline cardiomegaly. Regional osseous structures appropriate for age  received in the ED: 1L IVB 1x, levo gtt   (09 Apr 2025 14:45)      ROS: as in the HPI; all other ROS negative    SH and family history as above    Vital Signs Last 24 Hrs  T(C): 36.3 (10 Apr 2025 11:32), Max: 36.9 (10 Apr 2025 04:27)  T(F): 97.3 (10 Apr 2025 11:32), Max: 98.4 (10 Apr 2025 04:27)  HR: 100 (10 Apr 2025 11:32) (64 - 100)  BP: 86/48 (10 Apr 2025 11:32) (85/49 - 97/64)  BP(mean): 65 (09 Apr 2025 16:00) (65 - 65)  RR: 17 (10 Apr 2025 11:32) (15 - 20)  SpO2: 94% (10 Apr 2025 11:32) (92% - 99%)    Parameters below as of 10 Apr 2025 11:32  Patient On (Oxygen Delivery Method): room air        GEN: feels very tired and SOB upon exertion.   HEENT- normocephalic; mouth moist  CVS- S1S2+  LUNGS- course BS B/L, Ronchi,  auscultation; no wheezing  ABD: Soft , nontender,+ distended, Bowel sounds are present   EXTREMITY: no calf tenderness, no cyanosis, 2 + pitting  edema  NEURO: AAOx3; non focal neurologic exam   PSYCH: appears anxious and frustrated.   BACK: no swelling or mass;   VASCULAR: distal peripheral pulses present  SKIN: warm and dry.       Labs and imaging reviewed      Patient presenting with Patient is a 64y old  Male who presents with a chief complaint of hypotension (10 Apr 2025 15:33)   admitted for   1. Hypotension: improved  s/p albumin  , 2L IV bolus, all BP meds held. on midodrine 10mg q8h.      2. acute on chronic HFmrEF: EF 54%, no other sig changes from prior echo, cardio eval noted. no diuresis due to hypotension.     3. recurrent pleural effusion due to HF  and malignancy: R Pleurx accessed today, but no  out put, CT show improved R pleural effusion but loculated L pleural effusion. CTS eval noted.      Ascites: S/P paracentesis 4/11 3550ml ascites removed, BP stable post procedure, fluid study to be followed.     4. DVT: eliquis held, on Heparin gtt.     5.  MARYSOL with CKD 3: renal function worsening. likely pre renal, ATN 2/2 Hypotension.  urology eval for UPJ obstruction on CT, Us Renal : Mild right and trace left hydronephrosis. need to be conservative about IVF due to volume overloaded state at current. patient able to void, positive urine out put.     6. Gastric Lymphoma:  discussed with hematology, treatment on hold due to current condition, evidence of progression of the disease, palliative care eval noted.     7 acute on chronic RF with hypoxia: on 3L NC, IV abx rocephin /zithromax for pneumonia, management pleural effusion as above. duoneb prn         Plan of care discussed with patient ;  all questions and concerns were addressed.

## 2025-04-11 NOTE — PROGRESS NOTE ADULT - SUBJECTIVE AND OBJECTIVE BOX
[place] [facility]  DANY Burris Y. Patel, S. Shah, G. [facility]  [phone]    Name: JESSE EATON  Age: 64y  Gender: Male  MRN: [id number]    Interval History:  No acute overnight events.   Notes reviewed    Antibiotics:  azithromycin  IVPB 500 milliGRAM(s) IV Intermittent every 24 hours  azithromycin  IVPB      cefTRIAXone   IVPB 2000 milliGRAM(s) IV Intermittent every 24 hours      Medications:  acetaminophen     Tablet .. 650 milliGRAM(s) Oral every 6 hours PRN  acetaminophen   IVPB .. 1000 milliGRAM(s) IV Intermittent every 8 hours PRN  albuterol/ipratropium for Nebulization 3 milliLiter(s) Nebulizer every 6 hours PRN  atorvastatin 40 milliGRAM(s) Oral at bedtime  azithromycin  IVPB 500 milliGRAM(s) IV Intermittent every 24 hours  azithromycin  IVPB      cefTRIAXone   IVPB 2000 milliGRAM(s) IV Intermittent every 24 hours  chlorhexidine 2% Cloths 1 Application(s) Topical <User Schedule>  clopidogrel Tablet 75 milliGRAM(s) Oral daily  influenza   Vaccine 0.5 milliLiter(s) IntraMuscular once  melatonin 5 milliGRAM(s) Oral at bedtime  midodrine 10 milliGRAM(s) Oral every 8 hours  ondansetron    Tablet 4 milliGRAM(s) Oral daily PRN      Review of Systems:  A 10-point review of systems was obtained.   Review of systems otherwise negative except as previously noted.    Allergies: Bactrim DS (Hives)    For details regarding the patient's past medical history, social history, family history, and other miscellaneous elements, please refer the initial infectious diseases consultation and/or the admitting history and physical examination for this admission.    Objective:  Vitals:   T(C): 36.6 (25 @ 11:52), Max: 36.7 (25 @ 11:37)  HR: 82 (25 @ 14:48) (73 - 83)  BP: 119/65 (25 @ 14:48) (98/62 - 122/71)  RR: 20 (25 @ 13:10) (17 - 24)  SpO2: 99% (25 @ 13:10) (94% - 99%)    Physical Examination:  General: no acute distress  HEENT: NC/AT, EOMI  Cardio: RRR  Resp: decreased breath sounds  Abd: soft, NT, ND  Ext: no edema or cyanosis  Skin: warm, dry, no visible rash      Laboratory Studies:  CBC:                       8.9    9.66  )-----------( 514      ( 2025 06:17 )             28.8     CMP:     129[L]  |  97  |  83[H]  ----------------------------<  96  4.9   |  22  |  3.60[H]    Ca    9.0      2025 06:17  Phos  5.7       Mg     2.9         TPro  6.0  /  Alb  2.2[L]  /  TBili  0.3  /  DBili  x   /  AST  28  /  ALT  16  /  AlkPhos  120      LIVER FUNCTIONS - ( 2025 06:17 )  Alb: 2.2 g/dL / Pro: 6.0 g/dL / ALK PHOS: 120 U/L / ALT: 16 U/L / AST: 28 U/L / GGT: x           Urinalysis Basic - ( 2025 16:20 )    Color: Yellow / Appearance: Clear / S.017 / pH: x  Gluc: x / Ketone: Trace mg/dL  / Bili: Negative / Urobili: 0.2 mg/dL   Blood: x / Protein: Trace mg/dL / Nitrite: Negative   Leuk Esterase: Trace / RBC: 1 /HPF / WBC 4 /HPF   Sq Epi: x / Non Sq Epi: x / Bacteria: Few /HPF        Microbiology: reviewed    Urinalysis with Rflx Culture (collected 25 @ 16:20)    Culture - Blood (collected 25 @ 16:36)  Source: Blood Blood-Peripheral  Preliminary Report (25 @ 04:02):    No growth at 24 hours    Culture - Blood (collected 25 @ 16:30)  Source: Blood Blood-Peripheral  Preliminary Report (25 @ 04:02):    No growth at 24 hours    Culture - Blood (collected 25 @ 11:35)  Source: Blood Blood-Peripheral  Preliminary Report (04-10-25 @ 17:02):    No growth at 24 hours          Radiology: reviewed       [place] [facility]  DANY Burris Y. Patel, S. Shah, G. [facility]  [phone]    Name: JESSE EATON  Age: 64y  Gender: Male  MRN: [id number]    Interval History:  No acute overnight events.   S/p 3550cc of cloudy yellow ascitic fluid removed from right abdomen   Notes reviewed    Antibiotics:  azithromycin  IVPB 500 milliGRAM(s) IV Intermittent every 24 hours  azithromycin  IVPB      cefTRIAXone   IVPB 2000 milliGRAM(s) IV Intermittent every 24 hours      Medications:  acetaminophen     Tablet .. 650 milliGRAM(s) Oral every 6 hours PRN  acetaminophen   IVPB .. 1000 milliGRAM(s) IV Intermittent every 8 hours PRN  albuterol/ipratropium for Nebulization 3 milliLiter(s) Nebulizer every 6 hours PRN  atorvastatin 40 milliGRAM(s) Oral at bedtime  azithromycin  IVPB 500 milliGRAM(s) IV Intermittent every 24 hours  azithromycin  IVPB      cefTRIAXone   IVPB 2000 milliGRAM(s) IV Intermittent every 24 hours  chlorhexidine 2% Cloths 1 Application(s) Topical <User Schedule>  clopidogrel Tablet 75 milliGRAM(s) Oral daily  influenza   Vaccine 0.5 milliLiter(s) IntraMuscular once  melatonin 5 milliGRAM(s) Oral at bedtime  midodrine 10 milliGRAM(s) Oral every 8 hours  ondansetron    Tablet 4 milliGRAM(s) Oral daily PRN      Review of Systems:  A 10-point review of systems was obtained.   Review of systems otherwise negative except as previously noted.    Allergies: Bactrim DS (Hives)    For details regarding the patient's past medical history, social history, family history, and other miscellaneous elements, please refer the initial infectious diseases consultation and/or the admitting history and physical examination for this admission.    Objective:  Vitals:   T(C): 36.6 (25 @ 11:52), Max: 36.7 (25 @ 11:37)  HR: 82 (25 @ 14:48) (73 - 83)  BP: 119/65 (25 @ 14:48) (98/62 - 122/71)  RR: 20 (25 @ 13:10) (17 - 24)  SpO2: 99% (25 @ 13:10) (94% - 99%)    Physical Examination:  General: no acute distress  HEENT: NC/AT, EOMI  Cardio: RRR  Resp: decreased breath sounds  Abd: soft, NT, ND  Ext: no edema or cyanosis  Skin: warm, dry, no visible rash      Laboratory Studies:  CBC:                       8.9    9.66  )-----------( 514      ( 2025 06:17 )             28.8     CMP:     129[L]  |  97  |  83[H]  ----------------------------<  96  4.9   |  22  |  3.60[H]    Ca    9.0      2025 06:17  Phos  5.7       Mg     2.9         TPro  6.0  /  Alb  2.2[L]  /  TBili  0.3  /  DBili  x   /  AST  28  /  ALT  16  /  AlkPhos  120      LIVER FUNCTIONS - ( 2025 06:17 )  Alb: 2.2 g/dL / Pro: 6.0 g/dL / ALK PHOS: 120 U/L / ALT: 16 U/L / AST: 28 U/L / GGT: x           Urinalysis Basic - ( 2025 16:20 )    Color: Yellow / Appearance: Clear / S.017 / pH: x  Gluc: x / Ketone: Trace mg/dL  / Bili: Negative / Urobili: 0.2 mg/dL   Blood: x / Protein: Trace mg/dL / Nitrite: Negative   Leuk Esterase: Trace / RBC: 1 /HPF / WBC 4 /HPF   Sq Epi: x / Non Sq Epi: x / Bacteria: Few /HPF        Microbiology: reviewed    Urinalysis with Rflx Culture (collected 25 @ 16:20)    Culture - Blood (collected 25 @ 16:36)  Source: Blood Blood-Peripheral  Preliminary Report (25 @ 04:02):    No growth at 24 hours    Culture - Blood (collected 25 @ 16:30)  Source: Blood Blood-Peripheral  Preliminary Report (25 @ 04:02):    No growth at 24 hours    Culture - Blood (collected 25 @ 11:35)  Source: Blood Blood-Peripheral  Preliminary Report (04-10-25 @ 17:02):    No growth at 24 hours          Radiology: reviewed

## 2025-04-11 NOTE — PROGRESS NOTE ADULT - PROBLEM SELECTOR PLAN 6
Patient has been admitted three times within the past couple of months in which thoracentesis was performed and drained malignant pleural effusions  -pleurex placement on right chest C/D/I  -Patient has been complaining of cough for the past two months but now is producing tannish sputum. As patient is immunocompromised, patient may not mount fever or wbc.    -Increased lung markings on chest x-ray, no known consolidation, but will cover for possible infection with ceftriaxone 2g daily (would also cover if SBP present-- known minimal ascites, will hold off on tap for now as patient is on eliquis and plavix) and azithromycin   -drain pleurex every other day per CT surgery, never more than 1 L at a time.   -ordered BC, legionella, strep antibiotics; f/u results   -pulm consulted (Dr. Damico) recs appreciated  -ID consulted (Dr. Corcoran), recs appreciated  -CT surgery consulted, recs appreciated

## 2025-04-11 NOTE — PROGRESS NOTE ADULT - SUBJECTIVE AND OBJECTIVE BOX
All interim records and events noted.    continues to be SOB, sig malaise      MEDICATIONS  (STANDING):  atorvastatin 40 milliGRAM(s) Oral at bedtime  azithromycin  IVPB 500 milliGRAM(s) IV Intermittent every 24 hours  azithromycin  IVPB      cefTRIAXone   IVPB 2000 milliGRAM(s) IV Intermittent every 24 hours  chlorhexidine 2% Cloths 1 Application(s) Topical <User Schedule>  clopidogrel Tablet 75 milliGRAM(s) Oral daily  influenza   Vaccine 0.5 milliLiter(s) IntraMuscular once  melatonin 5 milliGRAM(s) Oral at bedtime  midodrine 10 milliGRAM(s) Oral every 8 hours    MEDICATIONS  (PRN):  acetaminophen     Tablet .. 650 milliGRAM(s) Oral every 6 hours PRN Mild Pain (1 - 3)  acetaminophen   IVPB .. 1000 milliGRAM(s) IV Intermittent every 8 hours PRN Moderate Pain (4 - 6)  albuterol/ipratropium for Nebulization 3 milliLiter(s) Nebulizer every 6 hours PRN Shortness of Breath and/or Wheezing  ondansetron    Tablet 4 milliGRAM(s) Oral daily PRN Nausea and/or Vomiting      Vital Signs Last 24 Hrs  T(C): 36.6 (11 Apr 2025 11:52), Max: 36.7 (11 Apr 2025 11:37)  T(F): 97.9 (11 Apr 2025 11:52), Max: 98.1 (11 Apr 2025 11:37)  HR: 80 (11 Apr 2025 11:52) (73 - 83)  BP: 116/72 (11 Apr 2025 11:52) (98/62 - 119/70)  BP(mean): 86 (11 Apr 2025 11:52) (86 - 86)  RR: 22 (11 Apr 2025 11:52) (17 - 22)  SpO2: 99% (11 Apr 2025 11:52) (94% - 99%)    Parameters below as of 11 Apr 2025 11:52  Patient On (Oxygen Delivery Method): nasal cannula  O2 Flow (L/min): 3      PHYSICAL EXAM  General: in chiar in no acute distress  Head: atraumatic, normocephalic  ENT: sclera anicteric, buccal mucosa moist  Neck: supple, trachea midline  CV: S1 S2, regular rate and rhythm  Lungs: clear to auscultation, no wheezes/rhonchi. Decr BS andreas bases  Abdomen: distended but not tense. bowel sound spresent, nontender to gentle palp  Extrem: no clubbing/cyanosis/edema  Skin: no significant increased ecchymosis/petechiae  Neuro: alert and oriented X3,  no focal deficits      LABS:             8.9    9.66  )-----------( 514      ( 04-11 @ 06:17 )             28.8                8.6    9.37  )-----------( 565      ( 04-10 @ 10:27 )             27.3                8.5    8.82  )-----------( 531      ( 04-10 @ 07:22 )             27.6                8.9    9.73  )-----------( 498      ( 04-09 @ 11:35 )             28.3       04-11    129[L]  |  97  |  83[H]  ----------------------------<  96  4.9   |  22  |  3.60[H]    Ca    9.0      11 Apr 2025 06:17  Phos  5.7     04-11  Mg     2.9     04-11    TPro  6.0  /  Alb  2.2[L]  /  TBili  0.3  /  DBili  x   /  AST  28  /  ALT  16  /  AlkPhos  120  04-11    04-11 @ 01:26  PT-- INR--  PTT71.5  04-10 @ 16:00  PT-- INR--  PTT79.6  04-10 @ 10:27  PT-- INR--  MGO499.3  04-10 @ 07:22  PT16.7 INR1.43  PTT50.6  04-09 @ 11:35  PT18.4 INR1.57  PTT29.3      RADIOLOGY & ADDITIONAL STUDIES:  < from: US Abdomen Limited (04.10.25 @ 10:00) >    ACC: [id number] EXAM:  US ABDOMEN LIMITED   ORDERED BY: ELIAN RED     PROCEDURE DATE:  04/10/2025          INTERPRETATION:  CLINICAL INFORMATION: Evaluate for ascites    COMPARISON: Ultrasound 3/25, CT scan 4/25    4 quadrant ultrasound performed    Findings    Multiple moderate amount of ascites is identified in all 4 quadrants   similar in appearance to recent CAT scan.    IMPRESSION:    Small to moderate amount of ascites    --- End of Report ---      < end of copied text >  < from: CT Abdomen and Pelvis No Cont (04.09.25 @ 22:22) >    ACC: [id number] EXAM:  CT ABDOMEN AND PELVIS   ORDERED BY: TAHIRA POPE     ACC: [id number] EXAM:  CT CHEST   ORDERED BY: TAHIRA POPE     PROCEDURE DATE:  04/09/2025          INTERPRETATION:  CLINICAL INFORMATION: Increasing shortness of breath,   malignant pleural effusion. History of GI malignancy    COMPARISON: Chest CT 4/1/2023, Abdominal CT 3/3/2025    CONTRAST/COMPLICATIONS:  IV Contrast: NONE  Oral Contrast: NONE    PROCEDURE:  CT of the Chest, Abdomen and Pelvis was performed.  Sagittal and coronal reformats were performed.    LIMITATIONS: Lack of intravenous contrast material limits diagnostic   sensitivity in evaluating solid organs /vasculature.    FINDINGS:  CHEST:  LUNGS AND LARGE AIRWAYS: Trace distal tracheal secretions.Mild dependent   RIGHT pleural effusion, diminished- post in situ percutaneous RIGHT   thoracentesis catheter . Adjacent RIGHT base passive atelectasis, not   significantly changed. Multiloculated LEFT pleural effusion w/ hazy mural   thickening /upper lobe pleural nodularity and associated LLL   atelectasis/consolidation, not significantly changed., . IAN/RML   interlobular septal thickening, not significantly changed  PLEURA: See above.  VESSELS: No aortic dilatation. Aortic /coronary artery calcification.   Central venous catheter terminates in RA.  HEART: Heart size is normal. Moderate pericardial effusion.  MEDIASTINUM AND STELLA: Diffuse mediastinal, and bilateral axillary (L>R)   lymphadenopathy, unchanged. Largest LN : RIGHT paratracheal 2.8 x 1.8 cm   (301/22), not significantly changed when allowing for interobserver   differences.  CHEST WALL AND LOWER NECK: Gynecomastia. Subcutaneous/implanted RIGHT   chest wall infusion port..    ABDOMEN AND PELVIS:  LIVER: Minimally lobulated outer hepatic contour..  BILE DUCTS: Normal caliber.  GALLBLADDER: Within normal limits.  SPLEEN: Within normal limits.  PANCREAS: Within normal limits.  ADRENALS: Within normal limits.  KIDNEYS/URETERS: Renal pelvic fluid fullness/UPJ obstruction   configuration (R>L) and bilateral cysts, unchanged.    BLADDER: Concentric urinary bladder mural prominence.  REPRODUCTIVE ORGANS: Normal-sized prostate.    BOWEL: No bowel obstruction. Nonvisualized appendix. No appendicitis.  PERITONEUM/RETROPERITONEUM: Moderate volume abdominal/pelvic ascites,   developed. Nodular Gerota's fascia thickening and diffuse mesenteric root   edema..  VESSELS: Atherosclerotic changes.  LYMPH NODES: Gastrohepatic, periportal/portocaval, retroperitoneal,   mesenteric root, and pelvic side wall lymphadenopathy is not   significantly changed with innumerable predominantly centimeter sized   lymph nodes. Bilateral inguinal lymphadenopathy is increased/developed   with inguinal lymph nodes, measuring up to 1.7 x 1.2 cm (RIGHT 301/157).   RIGHT retrocrural ectatic cisterna chyli , unchanged (301/74).  ABDOMINAL WALL: Diffuse anasarca (eccentric to RIGHT), developed.  MUSCULOSKELETAL.: Anterior right psoas calcification, unchanged..   Scattered sclerotic osseous metastasis, not significantly changed. Mild   thoracolumbar degenerative changes..    IMPRESSION:  1.  RIGHT pleural effusion, diminished , post percutaneous   thoracocentesis catheter.  2.  Moderate loculated LEFT pleural effusion and adjacent LLL atelectasis   and/or consolidation, not significantly changed.  3.  Moderate volume abdominal ascites, increased/developed from prior   exams. Nodular thickening of pleural and peritoneal surfaces c/w   malignant etiology  4.  Diffuse thoracoabdominal lymphadenopathy, enlarged/developed in   inguinal region.  5.  Sclerotic osseous metastasis, not significantly changed    --- End of Report ---        < end of copied text >    < from: US Renal (04.10.25 @ 15:01) >    ACC: [id number] EXAM:  US KIDNEY(S)   ORDERED BY: YELITZA RECIO     PROCEDURE DATE:  04/10/2025          INTERPRETATION:  CLINICAL INFORMATION: 64 years  Male with brisa worsening.    COMPARISON: Noncontrast CT abdomen and pelvis 4/9/2025    TECHNIQUE: Sonography of the kidneys and bladder.    FINDINGS:  Right kidney: 12.1 cm. Mild hydronephrosis. 3.3 x 2.3 x 3.4 cm mid renal   cyst.    Left kidney: 12.1 cm. Trace hydronephrosis. 3.3 x 4.2 x 4.1 cm mid renal   cyst.    Urinary bladder: Incompletely distended. 40 cc volume. Grossly   unremarkable.    Other: Ascites.    IMPRESSION:  Mild right and trace left hydronephrosis.    Ascites.        < end of copied text >        IMPRESSION/RECOMMENDATIONS:

## 2025-04-11 NOTE — PROGRESS NOTE ADULT - PROBLEM SELECTOR PLAN 8
-03/03 doppler: Extensive left upper extremity DVT extending proximally to involve the right internal jugular and subclavian veins. Left cervical lymphadenopathy with abnormal morphology suggesting malignancy.  -03/06: + LLE DVT  -Continue home eliquis BID (now on hep gtt for possible future paracentesis)

## 2025-04-11 NOTE — PROGRESS NOTE ADULT - ASSESSMENT
Patient is a 65 y/o male with PMHx of poorly differentiated metastatic (known cervical and axillary lymphadenopathy) GI carcinoma, LUE and LLE DVT, HTN, on home O2 2/2 to SOB, and anemia presents to the ED with hypotension and SOB. Admitted for hypotension, MARYSOL, hyponatremia, and present chronic pleural effusion.

## 2025-04-11 NOTE — CASE MANAGEMENT PROGRESS NOTE - NSCMPROGRESSNOTE_GEN_ALL_CORE
Discussed pt in rounds, pt remains acute, Receiving IV antibiotics, oxygen 2L. has home oxygen.  BUN 83, Creat 3.60,

## 2025-04-11 NOTE — PROGRESS NOTE ADULT - ASSESSMENT
Acute on CKD Stage 3  Hypotension  Anemia  Hypoalbuminemia      -MARYSOL secondary to severe hypotension leading to renal hypoperfusion; unclear etiology of the hypotension  -Additionally, CTAP done overnight noted renal fullness and uvp obstruction, recommend urgent Urology evaluation  -Renal US to better eval with rising SCR-Mild right and trace left hydronephrosis.  -He refused wise, but has been unable to void, may need wise   -Not on pressors, is on midodrine  -Unable to give fluids at this time given hypervolemic state  -If bp stable can possibly give diuresis, may need albumin with lasix or bumex infusions eventually  -May need wise with above findings, pending stat renal US  -May need to restart dialysis if hemodynamics can tolerate, but not emergent at this time. Patient very hesitant toward restarting dialysis as well. Will continue to .   -Potassium higher end, S/P one dose lokelma and add K restrictions  -Urology consulted and state no intervention  -Planned for paracentesis, will give more albumin    d/w primary    Thank you

## 2025-04-11 NOTE — PROCEDURE NOTE - PROCEDURE FINDINGS AND DETAILS
3550cc of cloudy yellow ascitic fluid removed from right abdomen under sterile conditions and ultrasound guidance.

## 2025-04-11 NOTE — PROGRESS NOTE ADULT - ASSESSMENT
65 yo man w 11/2024 met poorly diff gastric ca(on presentation w neck, axillary, abdomen LADS, under care Dr Mckeon, [facility], has been on FOLFOX was to change to new chemo this week), episode MARYSOL req temp HD prior to start of chemo 12/2024, 3/2025 LUE DVT likely assoc w HD catheter(removed) and left thoracentesis for large left effusion, 4/2025 adm for SOB post right PleurX  Has been on Bumex, adm w hypotension  W/U sig for--  -CT c/a/p 4/9 w decr right effusion, mod loculated left effuse, 1+--2+cm above/below diaphragm LADs, sclerotic bones, mod ascites  -US abdomen --small to mod ascites< from: US Renal (04.10.25 @ 15:01) >  -renal US -mild andreas hydro      -supportive and symptomatic management from Heme/Onc standpoint  -consider paracentesis, thoracentesis for palliation  -?prerenal azotemia due to Bumex, Nephrology on case  -anemia due to malignancy, chronic ds, acute illness, chemo--Hgb 8s stable, similar to preivous admission(Hgb 7s--8s). no acute intervention needed

## 2025-04-11 NOTE — PROGRESS NOTE ADULT - ASSESSMENT
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 Admitted for hypotension, MARYSOL, hyponatremia, and present chronic pleural effusion.     Hypotension, chronic pleural Effusion, HFpEF   - Pt with worsening SOB, has been admitted twice in the past month in which thoracentesis was performed which drained malignant pleural effusions on the left, s/p Pleurx (4/4), per pt catheter is not draining much per home nurse  - CXR: There is a left effusion with atelectatic change. Underlying inflammatory infectious process not excluded. Smaller right effusion with some increased interstitial markings in the right infrahilar region which are less prominent than before.  - CT surg following     - hx of CAD recent NSTEMI 3/2025, medically managed without cath  - EKG NSR low voltage  - Trop neg x1 (trop 72), lactate: 2.7, pro BNP: 3483  - no anginal complaint   - continue Plavix, statin    - TTE (3/20/2025) shows EF 60-65%, grade 1 diastolic dysfunction.   - MARYSOL, creat: 3.5, continue to trend renal indices, might need CRRT, nephro following   -Ct with no obstruction seen by urology   -fu renal recs  - Strict I/Os, daily weights.    - home antihypertensives on hold (labetalol, losartan)  - now on midodrine 10mg TID + albumin  - Caution with fluids given pleural effusions, volume overload  - Monitor and replete Lytes. Keep K > 4 and Mg > 2    - home Eliquis on hold (hx of DVT)  -,hem notes for possible paracentesis ?  - rec GI consult    - at risk for abrupt decompensation  - rec GOC discussion   - Will continue to follow.

## 2025-04-11 NOTE — PROGRESS NOTE ADULT - PROBLEM SELECTOR PLAN 5
CT chest/abd ordered: mild to moderate ascites and mild b/l hydronephrosis   -US Abdomen ordered- small to moderate ascites  -IR consulted for paracentesis today - f/u cytology and peritoneal fluid labs

## 2025-04-11 NOTE — PROGRESS NOTE ADULT - SUBJECTIVE AND OBJECTIVE BOX
[facility] -- Charles Martinez Pannella, Patel, Savella Goodger, Cohen  Office # [phone]      Follow Up:  Shortness of breath     Subjective/Observations:   Seen bedside, complaints of abdominal discomfort  remains on NC  no chest pain dizziness palpitations    REVIEW OF SYSTEMS: All other review of systems is negative unless indicated above    PAST MEDICAL & SURGICAL HISTORY:  Gastric lymphoma      Anemia of chronic disease      Pleural effusion      DVT (deep venous thrombosis)      Hypertension      Hyperlipidemia      S/P appendectomy  2014      S/P cholecystectomy  15 years ago          MEDICATIONS  (STANDING):  albumin human 25% IVPB 100 milliLiter(s) IV Intermittent once  atorvastatin 40 milliGRAM(s) Oral at bedtime  azithromycin  IVPB 500 milliGRAM(s) IV Intermittent every 24 hours  azithromycin  IVPB      cefTRIAXone   IVPB 2000 milliGRAM(s) IV Intermittent every 24 hours  chlorhexidine 2% Cloths 1 Application(s) Topical <User Schedule>  clopidogrel Tablet 75 milliGRAM(s) Oral daily  influenza   Vaccine 0.5 milliLiter(s) IntraMuscular once  melatonin 5 milliGRAM(s) Oral at bedtime  midodrine 10 milliGRAM(s) Oral every 8 hours    MEDICATIONS  (PRN):  acetaminophen     Tablet .. 650 milliGRAM(s) Oral every 6 hours PRN Mild Pain (1 - 3)  acetaminophen   IVPB .. 1000 milliGRAM(s) IV Intermittent every 8 hours PRN Moderate Pain (4 - 6)  albuterol/ipratropium for Nebulization 3 milliLiter(s) Nebulizer every 6 hours PRN Shortness of Breath and/or Wheezing  ondansetron    Tablet 4 milliGRAM(s) Oral daily PRN Nausea and/or Vomiting      Allergies    Bactrim DS (Hives)    Intolerances        Vital Signs Last 24 Hrs  T(C): 36.4 (2025 04:30), Max: 36.6 (10 Apr 2025 20:18)  T(F): 97.5 (2025 04:30), Max: 97.9 (10 Apr 2025 20:18)  HR: 73 (2025 04:30) (73 - 100)  BP: 98/62 (2025 04:30) (86/48 - 104/70)  BP(mean): --  RR: 17 (2025 04:30) (17 - 17)  SpO2: 96% (2025 04:30) (94% - 96%)    Parameters below as of 2025 04:30  Patient On (Oxygen Delivery Method): nasal cannula  O2 Flow (L/min): 2      I&O's Summary    10 Apr 2025 07:01  -  2025 07:00  --------------------------------------------------------  IN: 349 mL / OUT: 495 mL / NET: -146 mL    2025 07:01  -  2025 09:52  --------------------------------------------------------  IN: 0 mL / OUT: 110 mL / NET: -110 mL       PHYSICAL EXAM:  TELE:   Constitutional: NAD, awake and alert, well-developed  HEENT: Moist Mucous Membranes, Anicteric  Pulmonary: Non-labored, breath sounds are Dim   Cardiovascular: Regular, S1 and S2 nl, No murmurs, rubs, gallops or clicks  Gastrointestinal: Bowel Sounds present, sofly distended   Lymph: +peripheral edema.  Skin: No visible rashes or ulcers.  Psych:  Mood & affect appropriate    LABS: All Labs Reviewed:                        8.9    9.66  )-----------( 514      ( 2025 06:17 )             28.8                         8.6    9.37  )-----------( 565      ( 10 Apr 2025 10:27 )             27.3                         8.5    8.82  )-----------( 531      ( 10 Apr 2025 07:22 )             27.6     2025 06:17    129    |  97     |  83     ----------------------------<  96     4.9     |  22     |  3.60   10 Apr 2025 07:22    133    |  95     |  78     ----------------------------<  97     5.1     |  26     |  3.50   2025 11:35    130    |  94     |  71     ----------------------------<  107    5.0     |  26     |  3.30     Ca    9.0        2025 06:17  Ca    9.3        10 Apr 2025 07:22  Ca    9.2        2025 11:35  Phos  5.7       2025 06:17  Phos  5.6       10 Apr 2025 07:22  Mg     2.9       2025 06:17  Mg     2.6       10 Apr 2025 07:22    TPro  6.0    /  Alb  2.2    /  TBili  0.3    /  DBili  x      /  AST  28     /  ALT  16     /  AlkPhos  120    2025 06:17  TPro  6.2    /  Alb  2.4    /  TBili  0.4    /  DBili  x      /  AST  25     /  ALT  16     /  AlkPhos  95     10 Apr 2025 07:22  TPro  6.1    /  Alb  1.9    /  TBili  0.3    /  DBili  x      /  AST  32     /  ALT  20     /  AlkPhos  104    2025 11:35    PT/INR - ( 10 Apr 2025 07:22 )   PT: 16.7 sec;   INR: 1.43 ratio         PTT - ( 2025 01:26 )  PTT:71.5 sec         EC Lead ECG:   Ventricular Rate 69 BPM    Atrial Rate 69 BPM    P-R Interval 154 ms    QRS Duration 94 ms    Q-T Interval 396 ms    QTC Calculation(Bazett) 424 ms    P Axis 53 degrees    R Axis -17 degrees    T Axis -4 degrees    Diagnosis Line Normal sinus rhythm  Low voltage QRS  Inferior infarct (cited on or before 19-MAR-2025)    Confirmed by CHIKA CABRERA ([id number]) on 2025 2:09:12 PM (25 @ 13:48)      TRANSTHORACIC ECHOCARDIOGRAM REPORT  ________________________________________________________________________________                                      _______       Pt. Name:       JESSE NAJERAAMACCHIPATTIE Study Date:    4/10/2025  MRN:     [id number]                   YOB: 1960  Accession #:    [id number]                  Age:           64 years  Account#:       [id number]                 Gender:        M  Heart Rate:                                Height:        66.93in (170.00 cm)  Rhythm:                                    Weight:        205.03 lb (93.00 kg)  Blood Pressure: 86/48 mmHg                 BSA/BMI:       2.04 m² / 32.18 kg/m²  ________________________________________________________________________________________  Referring Physician:    [id number] Oswaldo Snowden  Interpreting Physician: Alberto Gilliam M.D.  Primary Sonographer:    Kennedy Eugene    CPT:               ECHO TTE WO CON COMP W DOPP - 88418.m  Indication(s):     Dyspnea, unspecified- R06.00  Procedure:         Transthoracic echocardiogram with 2-D, M-mode and complete                     spectral and color flow Doppler.  Ordering Location: [facility]  Admission Status:  Inpatient  Study Information: Image quality for this study is technically difficult.    _______________________________________________________________________________________     CONCLUSIONS:      1. Technically difficult image quality.   2. Left ventricular cavity is normal in size. Left ventricular systolic function is normal with an ejection fraction of 54 % by David's method of disks.   3. Normal right ventricular cavity size and normal right ventricular systolic function.   4. Mild mitral regurgitation.   5. Abdominal ascites is incidentally noted.   6. Estimated pulmonary artery systolic pressure is 10 mmHg.   7. Large right and small left pleural effusion noted.   8. Small pericardial effusion with no echocardiographic evidence of tamponade physiology.   9. Compared to the transthoracic echocardiogram performed on 3/20/2025, there have been no significant cardiac interval changes.    ________________________________________________________________________________________  FINDINGS:     Left Ventricle:  The left ventricular cavity is normal insize. Left ventricular systolic function is normal with a calculated ejection fraction of 54 % by the David's biplane method of disks.     Right Ventricle:  The right ventricular cavity is normal in size and right ventricular systolic function is normal. Tricuspid annular plane systolic excursion (TAPSE) is 2.6 cm (normal >=1.7 cm).     Left Atrium:  The left atrium is normal in size with an indexed volume of 28.21 ml/m².     Right Atrium:  The right atrium is normal in size.     Interatrial Septum:  The interatrial septum appears intact.     Aortic Valve:  The aortic valve appears trileaflet. There is calcification of the aortic valve leaflets. There is fibrocalcific aortic valve sclerosis without stenosis. The peak transaortic velocity is1.30 m/s, peak transaortic gradient is 6.8 mmHg and mean transaortic gradient is 4.0 mmHg. There is no evidence of aortic regurgitation.     Mitral Valve:  Structurally normal mitral valve with normal leaflet excursion. There is calcification of the mitral valve annulus. There is mild mitral regurgitation.     Tricuspid Valve:  Structurally normal tricuspid valve with normal leaflet excursion. There is trace tricuspid regurgitation. Estimated pulmonary artery systolic pressure is 10 mmHg.     Pulmonic Valve:  Structurally normal pulmonic valve with normal leaflet excursion. There is mild pulmonic regurgitation.     Aorta:  The aortic root at the sinuses of Valsalva is normal in size, measuring 3.30 cm (indexed 1.62 cm/m²). The ascending aortais normal in size, measuring 3.40 cm (indexed 1.67 cm/m²).     Pericardium:  There is a small pericardial effusion with no echocardiographic evidence of tamponade physiology.     Pleura:  Large right and small left pleural effusion noted.     Systemic Veins:  The inferior vena cava is normal in size measuring 1.91 cm in diameter, (normal <2.1cm) with normal inspiratory collapse (normal >50%) consistent with normal right atrial pressure (~3, range 0-5mmHg).     Additional Findings and Comments:  The presence of abdominal ascites is incidentally noted.  ____________________________________________________________________  QUANTITATIVE DATA:  Left Ventricle Measurements: (Indexed to BSA)     IVSd (2D):   1.1 cm  LVPWd (2D):  1.2 cm  LVIDd (2D):4.7 cm  LVIDs (2D):  3.3 cm  LV Mass:     201 g  98.4 g/m²  LV Vol d, MOD A2C: 86.0 ml 42.12 ml/m²  LV Vol d, MOD A4C: 88.1 ml 43.14 ml/m²  LV Vol d, MOD BP:  87.9 ml 43.05 ml/m²  LV Vol s, MOD A2C: 42.2 ml 20.67 ml/m²  LV Vol s, MOD A4C: 38.5 ml 18.85 ml/m²  LV Vol s, MOD BP:  40.5 ml 19.81 ml/m²  LVOT SV MOD BP:    47.4 ml  LV EF% MOD BP:     54 %     MV E Vmax:    0.86 m/s  MV A Vmax:    0.76 m/s  MV E/A:       1.12  e' lateral:   9.79 cm/s  e' medial:    6.53 cm/s  E/e' lateral: 8.77  E/e' medial:  13.15  E/e' Average: 10.53  MV DT:        201 msec    Aorta Measurements: (Normal range) (Indexed to BSA)     Ao Root d     3.30 cm (3.1 - 3.7 cm) 1.62 cm/m²  Ao Asc d, 2D: 3.40  Ao Asc prox:  3.40 cm                1.67 cm/m²            Left Atrium Measurements: (Indexed to BSA)  LA Diam 2D: 4.30 cm         Right Ventricle Measurements:     TAPSE: 2.6 cm       LVOT / RVOT/ Qp/Qs Data: (Indexed to BSA)  LVOT Diameter,s: 2.10 cm  LVOT Area:       3.46 cm²  LVOT Vmax:       1.04 m/s  LVOT Vmn:        0.650 m/s  LVOT VTI:        17.50 cm  LVOT peak grad:  4 mmHg  LVOT mean grad:  2.0 mmHg  LVOT SV:         60.6 ml   29.68 ml/m²    Aortic Valve Measurements:  AV Vmax:                1.3 m/s  AV Peak Gradient:       6.8 mmHg  AV Mean Gradient:       4.0 mmHg  AV VTI:                 23.3 cm  AV VTI Ratio:           0.75  AoV EOA, Contin:        2.60 cm²  AoV EOA, Contin i:      1.27 cm²/m²  AoV Dimensionless Index 0.75    Mitral Valve Measurements:     MV E Vmax: 0.9 m/s         MR Vmax:         1.47 m/s  MV A Vmax: 0.8 m/s         MR Peak Gradient: 8.6 mmHg  MV E/A:    1.1       Tricuspid Valve Measurements:     TR Vmax:          1.3 m/s  TR Peak Gradient: 7.2 mmHg  RA Pressure:      3 mmHg  PASP:             10 mmHg    ________________________________________________________________________________________  Electronically signed on 4/10/2025 at 1:35:40 PM by Alberto Gilliam M.D.         *** Final ***      Radiology:

## 2025-04-12 LAB
ALBUMIN SERPL ELPH-MCNC: 2.3 G/DL — LOW (ref 3.3–5)
ALP SERPL-CCNC: 304 U/L — HIGH (ref 40–120)
ALT FLD-CCNC: 23 U/L — SIGNIFICANT CHANGE UP (ref 12–78)
ANION GAP SERPL CALC-SCNC: 13 MMOL/L — SIGNIFICANT CHANGE UP (ref 5–17)
APTT BLD: 41.1 SEC — HIGH (ref 24.5–35.6)
APTT BLD: 46 SEC — HIGH (ref 24.5–35.6)
APTT BLD: 74.5 SEC — HIGH (ref 24.5–35.6)
AST SERPL-CCNC: 37 U/L — SIGNIFICANT CHANGE UP (ref 15–37)
BASOPHILS # BLD AUTO: 0.04 K/UL — SIGNIFICANT CHANGE UP (ref 0–0.2)
BASOPHILS NFR BLD AUTO: 0.4 % — SIGNIFICANT CHANGE UP (ref 0–2)
BILIRUB SERPL-MCNC: 0.4 MG/DL — SIGNIFICANT CHANGE UP (ref 0.2–1.2)
BUN SERPL-MCNC: 80 MG/DL — HIGH (ref 7–23)
CALCIUM SERPL-MCNC: 9.2 MG/DL — SIGNIFICANT CHANGE UP (ref 8.5–10.1)
CHLORIDE SERPL-SCNC: 94 MMOL/L — LOW (ref 96–108)
CO2 SERPL-SCNC: 25 MMOL/L — SIGNIFICANT CHANGE UP (ref 22–31)
CREAT SERPL-MCNC: 3.1 MG/DL — HIGH (ref 0.5–1.3)
EGFR: 22 ML/MIN/1.73M2 — LOW
EGFR: 22 ML/MIN/1.73M2 — LOW
EOSINOPHIL # BLD AUTO: 0.02 K/UL — SIGNIFICANT CHANGE UP (ref 0–0.5)
EOSINOPHIL NFR BLD AUTO: 0.2 % — SIGNIFICANT CHANGE UP (ref 0–6)
GLUCOSE SERPL-MCNC: 104 MG/DL — HIGH (ref 70–99)
GRAM STN FLD: SIGNIFICANT CHANGE UP
HCT VFR BLD CALC: 28.2 % — LOW (ref 39–50)
HCT VFR BLD CALC: 28.8 % — LOW (ref 39–50)
HGB BLD-MCNC: 8.8 G/DL — LOW (ref 13–17)
HGB BLD-MCNC: 9 G/DL — LOW (ref 13–17)
IMM GRANULOCYTES NFR BLD AUTO: 1 % — HIGH (ref 0–0.9)
LYMPHOCYTES # BLD AUTO: 0.47 K/UL — LOW (ref 1–3.3)
LYMPHOCYTES # BLD AUTO: 4.4 % — LOW (ref 13–44)
MAGNESIUM SERPL-MCNC: 2.7 MG/DL — HIGH (ref 1.6–2.6)
MCHC RBC-ENTMCNC: 19.6 PG — LOW (ref 27–34)
MCHC RBC-ENTMCNC: 19.6 PG — LOW (ref 27–34)
MCHC RBC-ENTMCNC: 31.2 G/DL — LOW (ref 32–36)
MCHC RBC-ENTMCNC: 31.3 G/DL — LOW (ref 32–36)
MCV RBC AUTO: 62.7 FL — LOW (ref 80–100)
MCV RBC AUTO: 62.7 FL — LOW (ref 80–100)
MONOCYTES # BLD AUTO: 1.22 K/UL — HIGH (ref 0–0.9)
MONOCYTES NFR BLD AUTO: 11.3 % — SIGNIFICANT CHANGE UP (ref 2–14)
NEUTROPHILS # BLD AUTO: 8.91 K/UL — HIGH (ref 1.8–7.4)
NEUTROPHILS NFR BLD AUTO: 82.7 % — HIGH (ref 43–77)
NRBC BLD AUTO-RTO: 0 /100 WBCS — SIGNIFICANT CHANGE UP (ref 0–0)
NRBC BLD AUTO-RTO: 0 /100 WBCS — SIGNIFICANT CHANGE UP (ref 0–0)
PHOSPHATE SERPL-MCNC: 5.4 MG/DL — HIGH (ref 2.5–4.5)
PLATELET # BLD AUTO: 524 K/UL — HIGH (ref 150–400)
PLATELET # BLD AUTO: 532 K/UL — HIGH (ref 150–400)
POTASSIUM SERPL-MCNC: 4.7 MMOL/L — SIGNIFICANT CHANGE UP (ref 3.5–5.3)
POTASSIUM SERPL-SCNC: 4.7 MMOL/L — SIGNIFICANT CHANGE UP (ref 3.5–5.3)
PROT SERPL-MCNC: 6.4 G/DL — SIGNIFICANT CHANGE UP (ref 6–8.3)
RBC # BLD: 4.5 M/UL — SIGNIFICANT CHANGE UP (ref 4.2–5.8)
RBC # BLD: 4.59 M/UL — SIGNIFICANT CHANGE UP (ref 4.2–5.8)
RBC # FLD: 21.2 % — HIGH (ref 10.3–14.5)
RBC # FLD: 21.2 % — HIGH (ref 10.3–14.5)
SODIUM SERPL-SCNC: 132 MMOL/L — LOW (ref 135–145)
SPECIMEN SOURCE: SIGNIFICANT CHANGE UP
WBC # BLD: 10.77 K/UL — HIGH (ref 3.8–10.5)
WBC # BLD: 11.47 K/UL — HIGH (ref 3.8–10.5)
WBC # FLD AUTO: 10.77 K/UL — HIGH (ref 3.8–10.5)
WBC # FLD AUTO: 11.47 K/UL — HIGH (ref 3.8–10.5)

## 2025-04-12 PROCEDURE — 99233 SBSQ HOSP IP/OBS HIGH 50: CPT | Mod: GC

## 2025-04-12 PROCEDURE — 99233 SBSQ HOSP IP/OBS HIGH 50: CPT

## 2025-04-12 PROCEDURE — 71045 X-RAY EXAM CHEST 1 VIEW: CPT | Mod: 26

## 2025-04-12 RX ORDER — ALBUMIN (HUMAN) 12.5 G/50ML
50 INJECTION, SOLUTION INTRAVENOUS ONCE
Refills: 0 | Status: COMPLETED | OUTPATIENT
Start: 2025-04-12 | End: 2025-04-12

## 2025-04-12 RX ORDER — LACTULOSE 10 G/15ML
20 SOLUTION ORAL ONCE
Refills: 0 | Status: COMPLETED | OUTPATIENT
Start: 2025-04-12 | End: 2025-04-12

## 2025-04-12 RX ORDER — MAGNESIUM, ALUMINUM HYDROXIDE 200-200 MG
30 TABLET,CHEWABLE ORAL
Refills: 0 | Status: DISCONTINUED | OUTPATIENT
Start: 2025-04-12 | End: 2025-04-17

## 2025-04-12 RX ORDER — IPRATROPIUM BROMIDE AND ALBUTEROL SULFATE .5; 2.5 MG/3ML; MG/3ML
3 SOLUTION RESPIRATORY (INHALATION) EVERY 6 HOURS
Refills: 0 | Status: DISCONTINUED | OUTPATIENT
Start: 2025-04-12 | End: 2025-04-17

## 2025-04-12 RX ORDER — ALPRAZOLAM 0.5 MG
0.25 TABLET, EXTENDED RELEASE 24 HR ORAL AT BEDTIME
Refills: 0 | Status: DISCONTINUED | OUTPATIENT
Start: 2025-04-12 | End: 2025-04-14

## 2025-04-12 RX ORDER — BUMETANIDE 1 MG/1
2 TABLET ORAL ONCE
Refills: 0 | Status: COMPLETED | OUTPATIENT
Start: 2025-04-12 | End: 2025-04-12

## 2025-04-12 RX ORDER — BISACODYL 5 MG
10 TABLET, DELAYED RELEASE (ENTERIC COATED) ORAL DAILY
Refills: 0 | Status: DISCONTINUED | OUTPATIENT
Start: 2025-04-12 | End: 2025-04-17

## 2025-04-12 RX ADMIN — HEPARIN SODIUM 2100 UNIT(S)/HR: 1000 INJECTION INTRAVENOUS; SUBCUTANEOUS at 10:49

## 2025-04-12 RX ADMIN — CLOPIDOGREL BISULFATE 75 MILLIGRAM(S): 75 TABLET, FILM COATED ORAL at 13:11

## 2025-04-12 RX ADMIN — Medication 30 MILLILITER(S): at 16:45

## 2025-04-12 RX ADMIN — LACTULOSE 20 GRAM(S): 10 SOLUTION ORAL at 18:24

## 2025-04-12 RX ADMIN — Medication 30 MILLILITER(S): at 09:15

## 2025-04-12 RX ADMIN — HEPARIN SODIUM 2100 UNIT(S)/HR: 1000 INJECTION INTRAVENOUS; SUBCUTANEOUS at 19:02

## 2025-04-12 RX ADMIN — BUMETANIDE 2 MILLIGRAM(S): 1 TABLET ORAL at 15:06

## 2025-04-12 RX ADMIN — Medication 40 MILLIGRAM(S): at 09:15

## 2025-04-12 RX ADMIN — IPRATROPIUM BROMIDE AND ALBUTEROL SULFATE 3 MILLILITER(S): .5; 2.5 SOLUTION RESPIRATORY (INHALATION) at 13:35

## 2025-04-12 RX ADMIN — HEPARIN SODIUM 2100 UNIT(S)/HR: 1000 INJECTION INTRAVENOUS; SUBCUTANEOUS at 18:21

## 2025-04-12 RX ADMIN — MIDODRINE HYDROCHLORIDE 10 MILLIGRAM(S): 5 TABLET ORAL at 07:04

## 2025-04-12 RX ADMIN — HEPARIN SODIUM 3500 UNIT(S): 1000 INJECTION INTRAVENOUS; SUBCUTANEOUS at 11:06

## 2025-04-12 RX ADMIN — IPRATROPIUM BROMIDE AND ALBUTEROL SULFATE 3 MILLILITER(S): .5; 2.5 SOLUTION RESPIRATORY (INHALATION) at 19:43

## 2025-04-12 RX ADMIN — MIDODRINE HYDROCHLORIDE 10 MILLIGRAM(S): 5 TABLET ORAL at 13:10

## 2025-04-12 RX ADMIN — CEFTRIAXONE 100 MILLIGRAM(S): 500 INJECTION, POWDER, FOR SOLUTION INTRAMUSCULAR; INTRAVENOUS at 22:33

## 2025-04-12 RX ADMIN — HEPARIN SODIUM 3500 UNIT(S): 1000 INJECTION INTRAVENOUS; SUBCUTANEOUS at 02:48

## 2025-04-12 RX ADMIN — Medication 10 MILLIGRAM(S): at 09:15

## 2025-04-12 RX ADMIN — HEPARIN SODIUM 2100 UNIT(S)/HR: 1000 INJECTION INTRAVENOUS; SUBCUTANEOUS at 11:06

## 2025-04-12 RX ADMIN — Medication 4 MILLIGRAM(S): at 08:56

## 2025-04-12 RX ADMIN — HEPARIN SODIUM 1900 UNIT(S)/HR: 1000 INJECTION INTRAVENOUS; SUBCUTANEOUS at 02:41

## 2025-04-12 RX ADMIN — Medication 4 MILLIGRAM(S): at 16:45

## 2025-04-12 RX ADMIN — HEPARIN SODIUM 1900 UNIT(S)/HR: 1000 INJECTION INTRAVENOUS; SUBCUTANEOUS at 07:29

## 2025-04-12 RX ADMIN — ALBUMIN (HUMAN) 50 MILLILITER(S): 12.5 INJECTION, SOLUTION INTRAVENOUS at 14:23

## 2025-04-12 RX ADMIN — Medication 0.25 MILLIGRAM(S): at 22:33

## 2025-04-12 NOTE — PROGRESS NOTE ADULT - ASSESSMENT
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 Admitted for hypotension, MARYSOL, hyponatremia, and present chronic pleural effusion.     Hypotension, chronic pleural Effusion, HFpEF   - Pt with worsening SOB, has been admitted twice in the past month in which thoracentesis was performed which drained malignant pleural effusions on the left, s/p Pleurx (4/4), per pt catheter is not draining much per home nurse  - CXR: There is a left effusion with atelectatic change. Underlying inflammatory infectious process not excluded. Smaller right effusion with some increased interstitial markings in the right infrahilar region which are less prominent than before.  - CT surg following   Pleurx drain every other day     - hx of CAD recent NSTEMI 3/2025, medically managed without cath  - EKG NSR low voltage  - Trop neg x1 (trop 72), lactate: 2.7, pro BNP: 3483  - no anginal complaint   - continue Plavix, statin    - TTE (3/20/2025) shows EF 60-65%, grade 1 diastolic dysfunction.   - MARYSOL, creat: 3.5, continue to trend renal indices, might need CRRT, nephro following   -Ct with no obstruction seen by urology   -fu renal recs  - Strict I/Os, daily weights.    - home antihypertensives on hold (labetalol, losartan)  - now on midodrine 10mg TID + albumin  - Caution with fluids given pleural effusions, volume overload  - Monitor and replete Lytes. Keep K > 4 and Mg > 2    - home Eliquis held for paracentesis resume when able   -sp paracentesis 4/11 3300 cc drained fu fluid culture     - at risk for abrupt decompensation  - rec GOC discussion   - Will continue to follow.

## 2025-04-12 NOTE — PROGRESS NOTE ADULT - ASSESSMENT
Acute on CKD Stage 3  Hypotension  Anemia  Hypoalbuminemia      -MARYSOL secondary to severe hypotension leading to renal hypoperfusion; unclear etiology of the hypotension  -Additionally, CTAP done noting renal fullness and uvp obstruction; also seen on renal sono  -Renal indices are improving; urinating well per patient; refused wise  -Midodrine for BP  -Unable to give fluids at this time given hypervolemic state  -Potassium improved  -S/p paracentesis  -Pleurex in place  -Diuresis PRN  -Urology consulted and stated no intervention      Thank you

## 2025-04-12 NOTE — PROGRESS NOTE ADULT - PROBLEM SELECTOR PLAN 1
Acute for the past two days, endorses slight lightheadedness   -BP: 82/56 -> 90/51 ->77/49  -s/p 1L NaCl 0.9% IVB 1x, and 1L LR in ED  - C/w Midodrine 10mg , s/p 4 doses albumin  -TTE ordered: LVEF 54 % Mild mitral regurgitation. Abdominal ascites is incidentally noted. Large right and small left pleural effusion noted. Small pericardial effusion with no echocardiographic evidence of tamponade physiology.  -HOLD home labetalol BID and losartan qd; and hold home bumex   -would be cautious about fluids as patient has present pleural effusions and in extravascular volume overload state  -may consider diuretics today with albumin(?), pending nephro and cardio input

## 2025-04-12 NOTE — PROGRESS NOTE ADULT - ASSESSMENT
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,  11/2024 met poorly diff gastric ca(on presentation w neck, axillary, abdomen LADS, under care Dr Mckeon, [facility], has been on FOLFOX was to change to new chemo this week), episode MARYSOL req temp HD prior to start of chemo 12/2024, 3/2025 LUE DVT likely assoc w HD catheter(removed) and left thoracentesis for large left effusion, 4/2025 adm for SOB post right PleurX  Has been on Bumex, adm w hypotension  W/U sig for--  -CT c/a/p 4/9 w decr right effusion, mod loculated left effuse, 1+--2+cm above/below diaphragm LADs, sclerotic bones, mod ascites  -US abdomen --small to mod ascites< from: US Renal (04.10.25 @ 15:01) >  -renal US -mild andreas hydro  -?prerenal azotemia due to Bumex, Nephrology on case  -Pulm Nephrology, ID on case      -post paracentesis 4/11 3.3L  -Cr show some improvement today 3.1  -overall still w sig malaise but better since prior to admission  -anemia due to malignancy, chronic ds, acute illness, chemo--Hgb 8s stable, similar to preivous admission(Hgb 7s--8s). no acute intervention needed  -supportive and symptomatic management from Heme/Onc standpoint, pt to followup w own Heme/Onc Dr Mckeon for new chemotherapy as planned

## 2025-04-12 NOTE — PROGRESS NOTE ADULT - SUBJECTIVE AND OBJECTIVE BOX
[facility] -- Charles Martinez Pannella, Patel, Savella Goodger, Cohen  Office # [phone]      Follow Up:    Sob   Subjective/Observations: No events overnight resting comfortably in bed.  No complaints of chest pain, palpitations reported. No signs of orthopnea or PND.  remains on home 3liters NC   still with dyspnea       REVIEW OF SYSTEMS: All other review of systems is negative unless indicated above    PAST MEDICAL & SURGICAL HISTORY:  Gastric lymphoma      Anemia of chronic disease      Pleural effusion      DVT (deep venous thrombosis)      Hypertension      Hyperlipidemia      S/P appendectomy  2014      S/P cholecystectomy  15 years ago          MEDICATIONS  (STANDING):  atorvastatin 40 milliGRAM(s) Oral at bedtime  cefTRIAXone   IVPB 2000 milliGRAM(s) IV Intermittent every 24 hours  chlorhexidine 2% Cloths 1 Application(s) Topical <User Schedule>  clopidogrel Tablet 75 milliGRAM(s) Oral daily  heparin  Infusion.  Unit(s)/Hr (17 mL/Hr) IV Continuous <Continuous>  influenza   Vaccine 0.5 milliLiter(s) IntraMuscular once  melatonin 5 milliGRAM(s) Oral at bedtime  midodrine 10 milliGRAM(s) Oral every 8 hours  pantoprazole    Tablet 40 milliGRAM(s) Oral once    MEDICATIONS  (PRN):  acetaminophen     Tablet .. 650 milliGRAM(s) Oral every 6 hours PRN Mild Pain (1 - 3)  acetaminophen   IVPB .. 1000 milliGRAM(s) IV Intermittent every 8 hours PRN Moderate Pain (4 - 6)  albuterol/ipratropium for Nebulization 3 milliLiter(s) Nebulizer every 6 hours PRN Shortness of Breath and/or Wheezing  aluminum hydroxide/magnesium hydroxide/simethicone Suspension 30 milliLiter(s) Oral four times a day PRN Dyspepsia  bisacodyl Suppository 10 milliGRAM(s) Rectal daily PRN Constipation  heparin   Injectable 7500 Unit(s) IV Push every 6 hours PRN For aPTT less than 40  heparin   Injectable 3500 Unit(s) IV Push every 6 hours PRN For aPTT between 40 - 57  ondansetron Injectable 4 milliGRAM(s) IV Push every 8 hours PRN Nausea and/or Vomiting      Allergies    Bactrim DS (Hives)    Intolerances        Vital Signs Last 24 Hrs  T(C): 36.3 (2025 04:49), Max: 36.9 (2025 20:57)  T(F): 97.3 (2025 04:49), Max: 98.4 (2025 20:57)  HR: 79 (2025 04:49) (78 - 83)  BP: 112/69 (2025 04:49) (103/62 - 122/71)  BP(mean): 86 (2025 11:52) (86 - 86)  RR: 18 (2025 04:49) (18 - 24)  SpO2: 95% (2025 04:49) (93% - 99%)    Parameters below as of 2025 04:49  Patient On (Oxygen Delivery Method): nasal cannula        I&O's Summary    2025 07:01  -  2025 07:00  --------------------------------------------------------  IN: 277 mL / OUT: 5190 mL / NET: -4913 mL          PHYSICAL EXAM:  Constitutional: NAD, awake and alert, well-developed  HEENT: Moist Mucous Membranes, Anicteric  Pulmonary: Non-labored, breath sounds are DIM   Cardiovascular: Regular, S1 and S2 nl, No murmurs, rubs, gallops or clicks  Gastrointestinal: Bowel Sounds present, soft, nontender.   Lymph: + peripheral edema.  Skin: No visible rashes or ulcers.  Psych:  Mood & affect appropriate    LABS: All Labs Reviewed:                        8.8    11.47 )-----------( 524      ( 2025 01:17 )             28.2                         8.9    9.66  )-----------( 514      ( 2025 06:17 )             28.8                         8.6    9.37  )-----------( 565      ( 10 Apr 2025 10:27 )             27.3     2025 06:17    129    |  97     |  83     ----------------------------<  96     4.9     |  22     |  3.60   10 Apr 2025 07:22    133    |  95     |  78     ----------------------------<  97     5.1     |  26     |  3.50   2025 11:35    130    |  94     |  71     ----------------------------<  107    5.0     |  26     |  3.30     Ca    9.0        2025 06:17  Ca    9.3        10 Apr 2025 07:22  Ca    9.2        2025 11:35  Phos  5.7       2025 06:17  Phos  5.6       10 Apr 2025 07:22  Mg     2.9       2025 06:17  Mg     2.6       10 Apr 2025 07:22    TPro  6.0    /  Alb  2.2    /  TBili  0.3    /  DBili  x      /  AST  28     /  ALT  16     /  AlkPhos  120    2025 06:17  TPro  6.2    /  Alb  2.4    /  TBili  0.4    /  DBili  x      /  AST  25     /  ALT  16     /  AlkPhos  95     10 Apr 2025 07:22  TPro  6.1    /  Alb  1.9    /  TBili  0.3    /  DBili  x      /  AST  32     /  ALT  20     /  AlkPhos  104    2025 11:35    PTT - ( 2025 01:17 )  PTT:41.1 sec         EC Lead ECG:   Ventricular Rate 69 BPM    Atrial Rate 69 BPM    P-R Interval 154 ms    QRS Duration 94 ms    Q-T Interval 396 ms    QTC Calculation(Bazett) 424 ms    P Axis 53 degrees    R Axis -17 degrees    T Axis -4 degrees    Diagnosis Line Normal sinus rhythm  Low voltage QRS  Inferior infarct (cited on or before 19-MAR-2025)    Confirmed by CHIKA CABRERA ([id number]) on 2025 2:09:12 PM (25 @ 13:48)      TRANSTHORACIC ECHOCARDIOGRAM REPORT  ________________________________________________________________________________                                      _______       Pt. Name:       JESSE NAJERAAMACCHIPATTIE Study Date:    4/10/2025  MRN:     [id number]                   YOB: 1960  Accession #:    [id number]                  Age:           64 years  Account#:       [id number]                 Gender:        M  Heart Rate:                                Height:        66.93in (170.00 cm)  Rhythm:                                    Weight:        205.03 lb (93.00 kg)  Blood Pressure: 86/48 mmHg                 BSA/BMI:       2.04 m² / 32.18 kg/m²  ________________________________________________________________________________________  Referring Physician:    [id number] Oswaldo Snowden  Interpreting Physician: Alberto Gilliam M.D.  Primary Sonographer:    Kennedy Eugene    CPT:               ECHO TTE WO CON COMP W DOPP - 36840.m  Indication(s):     Dyspnea, unspecified- R06.00  Procedure:         Transthoracic echocardiogram with 2-D, M-mode and complete                     spectral and color flow Doppler.  Ordering Location: [facility]  Admission Status:  Inpatient  Study Information: Image quality for this study is technically difficult.    _______________________________________________________________________________________     CONCLUSIONS:      1. Technically difficult image quality.   2. Left ventricular cavity is normal in size. Left ventricular systolic function is normal with an ejection fraction of 54 % by David's method of disks.   3. Normal right ventricular cavity size and normal right ventricular systolic function.   4. Mild mitral regurgitation.   5. Abdominal ascites is incidentally noted.   6. Estimated pulmonary artery systolic pressure is 10 mmHg.   7. Large right and small left pleural effusion noted.   8. Small pericardial effusion with no echocardiographic evidence of tamponade physiology.   9. Compared to the transthoracic echocardiogram performed on 3/20/2025, there have been no significant cardiac interval changes.    ________________________________________________________________________________________  FINDINGS:     Left Ventricle:  The left ventricular cavity is normal insize. Left ventricular systolic function is normal with a calculated ejection fraction of 54 % by the David's biplane method of disks.     Right Ventricle:  The right ventricular cavity is normal in size and right ventricular systolic function is normal. Tricuspid annular plane systolic excursion (TAPSE) is 2.6 cm (normal >=1.7 cm).     Left Atrium:  The left atrium is normal in size with an indexed volume of 28.21 ml/m².     Right Atrium:  The right atrium is normal in size.     Interatrial Septum:  The interatrial septum appears intact.     Aortic Valve:  The aortic valve appears trileaflet. There is calcification of the aortic valve leaflets. There is fibrocalcific aortic valve sclerosis without stenosis. The peak transaortic velocity is1.30 m/s, peak transaortic gradient is 6.8 mmHg and mean transaortic gradient is 4.0 mmHg. There is no evidence of aortic regurgitation.     Mitral Valve:  Structurally normal mitral valve with normal leaflet excursion. There is calcification of the mitral valve annulus. There is mild mitral regurgitation.     Tricuspid Valve:  Structurally normal tricuspid valve with normal leaflet excursion. There is trace tricuspid regurgitation. Estimated pulmonary artery systolic pressure is 10 mmHg.     Pulmonic Valve:  Structurally normal pulmonic valve with normal leaflet excursion. There is mild pulmonic regurgitation.     Aorta:  The aortic root at the sinuses of Valsalva is normal in size, measuring 3.30 cm (indexed 1.62 cm/m²). The ascending aortais normal in size, measuring 3.40 cm (indexed 1.67 cm/m²).     Pericardium:  There is a small pericardial effusion with no echocardiographic evidence of tamponade physiology.     Pleura:  Large right and small left pleural effusion noted.     Systemic Veins:  The inferior vena cava is normal in size measuring 1.91 cm in diameter, (normal <2.1cm) with normal inspiratory collapse (normal >50%) consistent with normal right atrial pressure (~3, range 0-5mmHg).     Additional Findings and Comments:  The presence of abdominal ascites is incidentally noted.  ____________________________________________________________________  QUANTITATIVE DATA:  Left Ventricle Measurements: (Indexed to BSA)     IVSd (2D):   1.1 cm  LVPWd (2D):  1.2 cm  LVIDd (2D):4.7 cm  LVIDs (2D):  3.3 cm  LV Mass:     201 g  98.4 g/m²  LV Vol d, MOD A2C: 86.0 ml 42.12 ml/m²  LV Vol d, MOD A4C: 88.1 ml 43.14 ml/m²  LV Vol d, MOD BP:  87.9 ml 43.05 ml/m²  LV Vol s, MOD A2C: 42.2 ml 20.67 ml/m²  LV Vol s, MOD A4C: 38.5 ml 18.85 ml/m²  LV Vol s, MOD BP:  40.5 ml 19.81 ml/m²  LVOT SV MOD BP:    47.4 ml  LV EF% MOD BP:     54 %     MV E Vmax:    0.86 m/s  MV A Vmax:    0.76 m/s  MV E/A:       1.12  e' lateral:   9.79 cm/s  e' medial:    6.53 cm/s  E/e' lateral: 8.77  E/e' medial:  13.15  E/e' Average: 10.53  MV DT:        201 msec    Aorta Measurements: (Normal range) (Indexed to BSA)     Ao Root d     3.30 cm (3.1 - 3.7 cm) 1.62 cm/m²  Ao Asc d, 2D: 3.40  Ao Asc prox:  3.40 cm                1.67 cm/m²            Left Atrium Measurements: (Indexed to BSA)  LA Diam 2D: 4.30 cm         Right Ventricle Measurements:     TAPSE: 2.6 cm       LVOT / RVOT/ Qp/Qs Data: (Indexed to BSA)  LVOT Diameter,s: 2.10 cm  LVOT Area:       3.46 cm²  LVOT Vmax:       1.04 m/s  LVOT Vmn:        0.650 m/s  LVOT VTI:        17.50 cm  LVOT peak grad:  4 mmHg  LVOT mean grad:  2.0 mmHg  LVOT SV:         60.6 ml   29.68 ml/m²    Aortic Valve Measurements:  AV Vmax:                1.3 m/s  AV Peak Gradient:       6.8 mmHg  AV Mean Gradient:       4.0 mmHg  AV VTI:                 23.3 cm  AV VTI Ratio:           0.75  AoV EOA, Contin:        2.60 cm²  AoV EOA, Contin i:      1.27 cm²/m²  AoV Dimensionless Index 0.75    Mitral Valve Measurements:     MV E Vmax: 0.9 m/s         MR Vmax:         1.47 m/s  MV A Vmax: 0.8 m/s         MR Peak Gradient: 8.6 mmHg  MV E/A:    1.1       Tricuspid Valve Measurements:     TR Vmax:          1.3 m/s  TR Peak Gradient: 7.2 mmHg  RA Pressure:      3 mmHg  PASP:             10 mmHg    ________________________________________________________________________________________  Electronically signed on 4/10/2025 at 1:35:40 PM by Alberto Gilliam M.D.         *** Final ***      Radiology:

## 2025-04-12 NOTE — PROGRESS NOTE ADULT - SUBJECTIVE AND OBJECTIVE BOX
Date/Time Patient Seen:  		  Referring MD:   Data Reviewed	       Patient is a 64y old  Male who presents with a chief complaint of hypotension (11 Apr 2025 16:55)      Subjective/HPI     PAST MEDICAL & SURGICAL HISTORY:  No pertinent past medical history    Incisional hernia  2015    Gastric lymphoma    Anemia of chronic disease    Pleural effusion    DVT (deep venous thrombosis)    Hypertension    Hyperlipidemia    No significant past surgical history    S/P appendectomy  November 17th 2014    S/P cholecystectomy  15 years ago          Medication list         MEDICATIONS  (STANDING):  atorvastatin 40 milliGRAM(s) Oral at bedtime  cefTRIAXone   IVPB 2000 milliGRAM(s) IV Intermittent every 24 hours  chlorhexidine 2% Cloths 1 Application(s) Topical <User Schedule>  clopidogrel Tablet 75 milliGRAM(s) Oral daily  heparin  Infusion.  Unit(s)/Hr (17 mL/Hr) IV Continuous <Continuous>  influenza   Vaccine 0.5 milliLiter(s) IntraMuscular once  melatonin 5 milliGRAM(s) Oral at bedtime  midodrine 10 milliGRAM(s) Oral every 8 hours    MEDICATIONS  (PRN):  acetaminophen     Tablet .. 650 milliGRAM(s) Oral every 6 hours PRN Mild Pain (1 - 3)  acetaminophen   IVPB .. 1000 milliGRAM(s) IV Intermittent every 8 hours PRN Moderate Pain (4 - 6)  albuterol/ipratropium for Nebulization 3 milliLiter(s) Nebulizer every 6 hours PRN Shortness of Breath and/or Wheezing  heparin   Injectable 7500 Unit(s) IV Push every 6 hours PRN For aPTT less than 40  heparin   Injectable 3500 Unit(s) IV Push every 6 hours PRN For aPTT between 40 - 57  ondansetron Injectable 4 milliGRAM(s) IV Push every 8 hours PRN Nausea and/or Vomiting         Vitals log        ICU Vital Signs Last 24 Hrs  T(C): 36.3 (12 Apr 2025 04:49), Max: 36.9 (11 Apr 2025 20:57)  T(F): 97.3 (12 Apr 2025 04:49), Max: 98.4 (11 Apr 2025 20:57)  HR: 79 (12 Apr 2025 04:49) (78 - 83)  BP: 112/69 (12 Apr 2025 04:49) (103/62 - 122/71)  BP(mean): 86 (11 Apr 2025 11:52) (86 - 86)  ABP: --  ABP(mean): --  RR: 18 (12 Apr 2025 04:49) (18 - 24)  SpO2: 95% (12 Apr 2025 04:49) (93% - 99%)    O2 Parameters below as of 12 Apr 2025 04:49  Patient On (Oxygen Delivery Method): nasal cannula                 Input and Output:  I&O's Detail    10 Apr 2025 07:01  -  11 Apr 2025 07:00  --------------------------------------------------------  IN:    Heparin Infusion: 284 mL    IV PiggyBack: 50 mL    Oral Fluid: 15 mL  Total IN: 349 mL    OUT:    Voided (mL): 495 mL  Total OUT: 495 mL    Total NET: -146 mL      11 Apr 2025 07:01  -  12 Apr 2025 06:15  --------------------------------------------------------  IN:    Oral Fluid: 277 mL  Total IN: 277 mL    OUT:    Other (mL): 3550 mL    Voided (mL): 1390 mL  Total OUT: 4940 mL    Total NET: -4663 mL          Lab Data                        8.8    11.47 )-----------( 524      ( 12 Apr 2025 01:17 )             28.2     04-11    129[L]  |  97  |  83[H]  ----------------------------<  96  4.9   |  22  |  3.60[H]    Ca    9.0      11 Apr 2025 06:17  Phos  5.7     04-11  Mg     2.9     04-11    TPro  6.0  /  Alb  2.2[L]  /  TBili  0.3  /  DBili  x   /  AST  28  /  ALT  16  /  AlkPhos  120  04-11            Review of Systems	      Objective     Physical Examination        Pertinent Lab findings & Imaging      Jameson:  NO   Adequate UO     I&O's Detail    10 Apr 2025 07:01  -  11 Apr 2025 07:00  --------------------------------------------------------  IN:    Heparin Infusion: 284 mL    IV PiggyBack: 50 mL    Oral Fluid: 15 mL  Total IN: 349 mL    OUT:    Voided (mL): 495 mL  Total OUT: 495 mL    Total NET: -146 mL      11 Apr 2025 07:01  -  12 Apr 2025 06:15  --------------------------------------------------------  IN:    Oral Fluid: 277 mL  Total IN: 277 mL    OUT:    Other (mL): 3550 mL    Voided (mL): 1390 mL  Total OUT: 4940 mL    Total NET: -4663 mL               Discussed with:     Cultures:	        Radiology

## 2025-04-12 NOTE — CHART NOTE - NSCHARTNOTEFT_GEN_A_CORE
Called by RN for pt complaining of nausea and vomiting. Pt was seen and examined at bedside. He states he has been feeling nauseous all day and vomiting a few times. He states he ate a salad. No christelle blood noted in vomit. Denies abdominal pain, fever, chills.     T(C): 37.1 (04-12-25 @ 20:59), Max: 37.1 (04-12-25 @ 20:59)  HR: 85 (04-12-25 @ 20:59) (78 - 89)  BP: 104/66 (04-12-25 @ 20:59) (104/66 - 122/73)  RR: 19 (04-12-25 @ 20:59) (18 - 19)  SpO2: 97% (04-12-25 @ 20:59) (95% - 97%)  Wt(kg): --    Physical :  Gen- NAD, ncat  Cardio - s+1,s+2, rrr, no murmur  Abdomen- +BS, mild tenderness in LLQ no guarding, no rebound, no masses  Ext- 2+ pitting edema to B/L lower extremities extending feet to thighs, no cyanosis, or calf tenderness  Neuro- awake alert answering all questions sitting in chair     Assessment/Plan  Patient is a 65 y/o male with PMHx of poorly differentiated metastatic (known cervical and axillary lymphadenopathy) GI carcinoma, LUE and LLE DVT, HTN, on home O2 2/2 to SOB, and anemia presents to the ED with hypotension and SOB. Admitted for hypotension, MARYSOL, hyponatremia, and present chronic pleural effusion.   -no christelle blood noted in vomit likely from food   -on zofran prn   -Will continue to monitor   -RN to notify w/ any changes Called by RN for pt complaining of nausea and vomiting. Pt was seen and examined at bedside. He states he has been feeling nauseous all day and vomiting a few times. He states he ate a salad. No christelle blood noted in vomit. Denies abdominal pain, fever, chills.     T(C): 37.1 (04-12-25 @ 20:59), Max: 37.1 (04-12-25 @ 20:59)  HR: 85 (04-12-25 @ 20:59) (78 - 89)  BP: 104/66 (04-12-25 @ 20:59) (104/66 - 122/73)  RR: 19 (04-12-25 @ 20:59) (18 - 19)  SpO2: 97% (04-12-25 @ 20:59) (95% - 97%)  Wt(kg): --    Physical :  Gen- NAD, ncat  Cardio - s+1,s+2, rrr, no murmur  Abdomen- +BS, mild tenderness in LLQ no guarding, no rebound, no masses  Ext- 2+ pitting edema to B/L lower extremities extending feet to thighs, no cyanosis, or calf tenderness  Neuro- awake alert answering all questions sitting in chair     Assessment/Plan  Patient is a 63 y/o male with PMHx of poorly differentiated metastatic (known cervical and axillary lymphadenopathy) GI carcinoma, LUE and LLE DVT, HTN, on home O2 2/2 to SOB, and anemia presents to the ED with hypotension and SOB. Admitted for hypotension, MARYSOL, hyponatremia, and present chronic pleural effusion.   -no christelle blood noted in vomit likely from food   -on zofran prn   -Will continue to monitor   -RN to notify w/ any changes     Addendum   Pt w/ persistent nausea episodes and vomiting w/o relief from zofran will give reglan 10mg x1.  -qtc 424   -f/u repeat EKG to check qtc

## 2025-04-12 NOTE — PROGRESS NOTE ADULT - SUBJECTIVE AND OBJECTIVE BOX
All interim records and events noted.    abdomen symptom better since paracentesis yesterday  still SOB  overall still sig malaise but better than prior to admission      MEDICATIONS  (STANDING):  albuterol/ipratropium for Nebulization 3 milliLiter(s) Nebulizer every 6 hours  atorvastatin 40 milliGRAM(s) Oral at bedtime  cefTRIAXone   IVPB 2000 milliGRAM(s) IV Intermittent every 24 hours  chlorhexidine 2% Cloths 1 Application(s) Topical <User Schedule>  clopidogrel Tablet 75 milliGRAM(s) Oral daily  heparin  Infusion.  Unit(s)/Hr (17 mL/Hr) IV Continuous <Continuous>  influenza   Vaccine 0.5 milliLiter(s) IntraMuscular once  melatonin 5 milliGRAM(s) Oral at bedtime  midodrine 10 milliGRAM(s) Oral every 8 hours    MEDICATIONS  (PRN):  acetaminophen     Tablet .. 650 milliGRAM(s) Oral every 6 hours PRN Mild Pain (1 - 3)  acetaminophen   IVPB .. 1000 milliGRAM(s) IV Intermittent every 8 hours PRN Moderate Pain (4 - 6)  aluminum hydroxide/magnesium hydroxide/simethicone Suspension 30 milliLiter(s) Oral four times a day PRN Dyspepsia  bisacodyl Suppository 10 milliGRAM(s) Rectal daily PRN Constipation  heparin   Injectable 7500 Unit(s) IV Push every 6 hours PRN For aPTT less than 40  heparin   Injectable 3500 Unit(s) IV Push every 6 hours PRN For aPTT between 40 - 57  ondansetron Injectable 4 milliGRAM(s) IV Push every 8 hours PRN Nausea and/or Vomiting      Vital Signs Last 24 Hrs  T(C): 36.3 (12 Apr 2025 04:49), Max: 36.9 (11 Apr 2025 20:57)  T(F): 97.3 (12 Apr 2025 04:49), Max: 98.4 (11 Apr 2025 20:57)  HR: 79 (12 Apr 2025 04:49) (79 - 83)  BP: 112/69 (12 Apr 2025 04:49) (103/62 - 121/60)  BP(mean): --  RR: 18 (12 Apr 2025 04:49) (18 - 20)  SpO2: 95% (12 Apr 2025 04:49) (93% - 99%)    Parameters below as of 12 Apr 2025 04:49  Patient On (Oxygen Delivery Method): nasal cannula        PHYSICAL EXAM  General: in chair, mild discomfort but in no acute distress  Head: atraumatic, normocephalic  ENT: sclera anicteric, buccal mucosa moist  Neck: supple, trachea midline  CV: S1 S2, regular rate and rhythm  Lungs: clear to auscultation, no wheezes/rhonchi. decreased BS andreas bases  Abdomen: distended but soft and less than yesterday(prior to paracentesis), soft bowel sounds,ontender to palp  Extrem: no clubbing/cyanosis. Trace andreas lower legs edema to knees  Skin: no significant increased ecchymosis/petechiae  Neuro: alert and oriented X3,  no focal deficits      LABS:             9.0    10.77 )-----------( 532      ( 04-12 @ 09:11 )             28.8                8.8    11.47 )-----------( 524      ( 04-12 @ 01:17 )             28.2                8.9    9.66  )-----------( 514      ( 04-11 @ 06:17 )             28.8                8.6    9.37  )-----------( 565      ( 04-10 @ 10:27 )             27.3                8.5    8.82  )-----------( 531      ( 04-10 @ 07:22 )             27.6       04-12    132[L]  |  94[L]  |  80[H]  ----------------------------<  104[H]  4.7   |  25  |  3.10[H]    Ca    9.2      12 Apr 2025 09:11  Phos  5.4     04-12  Mg     2.7     04-12    TPro  6.4  /  Alb  2.3[L]  /  TBili  0.4  /  DBili  x   /  AST  37  /  ALT  23  /  AlkPhos  304[H]  04-12 04-12 @ 09:11  PT-- INR--  PTT46.0  04-12 @ 01:17  PT-- INR--  PTT41.1  04-11 @ 01:26  PT-- INR--  PTT71.5  04-10 @ 16:00  PT-- INR--  PTT79.6  04-10 @ 10:27  PT-- INR--  GKS683.3  04-10 @ 07:22  PT16.7 INR1.43  PTT50.6  04-09 @ 11:35  PT18.4 INR1.57  PTT29.3      RADIOLOGY & ADDITIONAL STUDIES:    IMPRESSION/RECOMMENDATIONS:

## 2025-04-12 NOTE — PROGRESS NOTE ADULT - SUBJECTIVE AND OBJECTIVE BOX
Patient is a 64y old  Male who presents with a chief complaint of hypotension (11 Apr 2025 11:21)    INTERVAL HPI/OVERNIGHT EVENTS: Patient seen and examined at bedside. Patient is s/p paracentesis ~3550cc of cloudy yellow fluid. Patient endorses feeling SOB and lightheaded this morning, worse than prior days. Ordered chest x-ray. On tele there are notable intermittant desats to mid 80s. Patient also admits to not being able to sleep well overnight.     MEDICATIONS  (STANDING):  atorvastatin 40 milliGRAM(s) Oral at bedtime  azithromycin  IVPB 500 milliGRAM(s) IV Intermittent every 24 hours  azithromycin  IVPB      cefTRIAXone   IVPB 2000 milliGRAM(s) IV Intermittent every 24 hours  chlorhexidine 2% Cloths 1 Application(s) Topical <User Schedule>  clopidogrel Tablet 75 milliGRAM(s) Oral daily  influenza   Vaccine 0.5 milliLiter(s) IntraMuscular once  melatonin 5 milliGRAM(s) Oral at bedtime  midodrine 10 milliGRAM(s) Oral every 8 hours    MEDICATIONS  (PRN):  acetaminophen     Tablet .. 650 milliGRAM(s) Oral every 6 hours PRN Mild Pain (1 - 3)  acetaminophen   IVPB .. 1000 milliGRAM(s) IV Intermittent every 8 hours PRN Moderate Pain (4 - 6)  albuterol/ipratropium for Nebulization 3 milliLiter(s) Nebulizer every 6 hours PRN Shortness of Breath and/or Wheezing  ondansetron    Tablet 4 milliGRAM(s) Oral daily PRN Nausea and/or Vomiting      Allergies    Bactrim DS (Hives)    Intolerances    Vital Signs Last 24 Hrs  T(C): 36.3 (12 Apr 2025 04:49), Max: 36.9 (11 Apr 2025 20:57)  T(F): 97.3 (12 Apr 2025 04:49), Max: 98.4 (11 Apr 2025 20:57)  HR: 79 (12 Apr 2025 04:49) (78 - 83)  BP: 112/69 (12 Apr 2025 04:49) (103/62 - 122/71)  BP(mean): --  RR: 18 (12 Apr 2025 04:49) (18 - 24)  SpO2: 95% (12 Apr 2025 04:49) (93% - 99%)    Parameters below as of 12 Apr 2025 04:49  Patient On (Oxygen Delivery Method): nasal cannula        PHYSICAL EXAM:  GENERAL: NAD  HEENT:  anicteric, moist mucous membranes  CHEST/LUNG:  Left lung with minimal lung sounds to anterior LLL, right lung cta except mildly decreased at base  HEART:  RRR, S1, S2  ABDOMEN:  minimal bowel sounds, distended, nontender, fluid noted on physical exam  EXTREMITIES: 2+ pitting edema to B/L lower extremities extending feet to thighs, no cyanosis, or calf tenderness  NERVOUS SYSTEM: answers questions and follows commands appropriately        LABS:  cret                        9.0    10.77 )-----------( 532      ( 12 Apr 2025 09:11 )             28.8     04-12    132[L]  |  94[L]  |  80[H]  ----------------------------<  104[H]  4.7   |  25  |  3.10[H]    Ca    9.2      12 Apr 2025 09:11  Phos  5.4     04-12  Mg     2.7     04-12    TPro  6.4  /  Alb  2.3[L]  /  TBili  0.4  /  DBili  x   /  AST  37  /  ALT  23  /  AlkPhos  304[H]  04-12    PTT - ( 12 Apr 2025 09:11 )  PTT:46.0 sec Patient is a 64y old  Male who presents with a chief complaint of hypotension (11 Apr 2025 11:21)    INTERVAL HPI/OVERNIGHT EVENTS: Patient seen and examined at bedside. Patient is s/p paracentesis ~3550cc of cloudy yellow fluid. Patient endorses feeling SOB and lightheaded this morning, worse than prior days. Increased O2 to 5L, satting 100%. Ordered chest x-ray. On tele there are notable intermittant desats to mid 80s. Patient also admits to not being able to sleep well overnight.     MEDICATIONS  (STANDING):  atorvastatin 40 milliGRAM(s) Oral at bedtime  azithromycin  IVPB 500 milliGRAM(s) IV Intermittent every 24 hours  azithromycin  IVPB      cefTRIAXone   IVPB 2000 milliGRAM(s) IV Intermittent every 24 hours  chlorhexidine 2% Cloths 1 Application(s) Topical <User Schedule>  clopidogrel Tablet 75 milliGRAM(s) Oral daily  influenza   Vaccine 0.5 milliLiter(s) IntraMuscular once  melatonin 5 milliGRAM(s) Oral at bedtime  midodrine 10 milliGRAM(s) Oral every 8 hours    MEDICATIONS  (PRN):  acetaminophen     Tablet .. 650 milliGRAM(s) Oral every 6 hours PRN Mild Pain (1 - 3)  acetaminophen   IVPB .. 1000 milliGRAM(s) IV Intermittent every 8 hours PRN Moderate Pain (4 - 6)  albuterol/ipratropium for Nebulization 3 milliLiter(s) Nebulizer every 6 hours PRN Shortness of Breath and/or Wheezing  ondansetron    Tablet 4 milliGRAM(s) Oral daily PRN Nausea and/or Vomiting      Allergies    Bactrim DS (Hives)    Intolerances    Vital Signs Last 24 Hrs  T(C): 36.3 (12 Apr 2025 04:49), Max: 36.9 (11 Apr 2025 20:57)  T(F): 97.3 (12 Apr 2025 04:49), Max: 98.4 (11 Apr 2025 20:57)  HR: 79 (12 Apr 2025 04:49) (78 - 83)  BP: 112/69 (12 Apr 2025 04:49) (103/62 - 122/71)  BP(mean): --  RR: 18 (12 Apr 2025 04:49) (18 - 24)  SpO2: 95% (12 Apr 2025 04:49) (93% - 99%)    Parameters below as of 12 Apr 2025 04:49  Patient On (Oxygen Delivery Method): nasal cannula        PHYSICAL EXAM:  GENERAL: NAD  HEENT:  anicteric, moist mucous membranes  CHEST/LUNG:  Left lung with minimal lung sounds to anterior LLL, right lung cta except mildly decreased at base  HEART:  RRR, S1, S2  ABDOMEN:  minimal bowel sounds, distended, nontender, fluid noted on physical exam  EXTREMITIES: 2+ pitting edema to B/L lower extremities extending feet to thighs, no cyanosis, or calf tenderness  NERVOUS SYSTEM: answers questions and follows commands appropriately        LABS:  cret                        9.0    10.77 )-----------( 532      ( 12 Apr 2025 09:11 )             28.8     04-12    132[L]  |  94[L]  |  80[H]  ----------------------------<  104[H]  4.7   |  25  |  3.10[H]    Ca    9.2      12 Apr 2025 09:11  Phos  5.4     04-12  Mg     2.7     04-12    TPro  6.4  /  Alb  2.3[L]  /  TBili  0.4  /  DBili  x   /  AST  37  /  ALT  23  /  AlkPhos  304[H]  04-12    PTT - ( 12 Apr 2025 09:11 )  PTT:46.0 sec

## 2025-04-12 NOTE — PROGRESS NOTE ADULT - PROBLEM SELECTOR PLAN 6
Patient has been admitted three times within the past couple of months in which thoracentesis was performed and drained malignant pleural effusions  -pleurex placement on right chest C/D/I  -Patient has been complaining of cough for the past two months but now is producing tannish sputum. As patient is immunocompromised, patient may not mount fever or wbc.    -Increased lung markings on chest x-ray, no known consolidation, but will cover for possible infection with ceftriaxone 2g daily and azithromycin (dc azithro as legionella neg)  -BC (NGTD at 48 hrs), strep antibiotics neg  -drain Pleurx every other day per CT surgery, never more than 1 L at a time. currently not draining, surgery to monitor over the weekend, may consider Pleurx vac if needed   -may consider diuretics today with albumin(?), pending nephro and cardio input  -pulm consulted (Dr. Damico) recs appreciated  -ID consulted (Dr. Corcoran), recs appreciated  -CT surgery consulted, recs appreciated Patient has been admitted three times within the past couple of months in which thoracentesis was performed and drained malignant pleural effusions  -pleurex placement on right chest C/D/I  -Patient has been complaining of cough for the past two months but now is producing tannish sputum. As patient is immunocompromised, patient may not mount fever or wbc.    -Increased lung markings on chest x-ray, no known consolidation, but will cover for possible infection with ceftriaxone 2g daily and azithromycin (dc azithro as legionella neg)  -BC (NGTD at 48 hrs), strep antibiotics neg  -drain Pleurx every other day per CT surgery, never more than 1 L at a time. currently not draining, surgery to monitor over the weekend, may consider Pleurx vac if needed   -may consider diuretics today with albumin(?), pending nephro and cardio input  -switched duonebs from PRN to standing for SOB. Also added maalox/PPI to regimen.   -pulm consulted (Dr. Damico) recs appreciated  -ID consulted (Dr. Corcoran), recs appreciated  -CT surgery consulted, recs appreciated

## 2025-04-12 NOTE — PROGRESS NOTE ADULT - ASSESSMENT
64-year-old male with a history of GI carcinoma, metastatic lymphadenopathy, DVT, hypertension, anemia presents with has been having some persistent shortness of breath since discharge.  The patient was admitted for shortness of breath, had a thoracentesis performed while in the hospital.  Now the patient has been complaining of persistent shortness of breath, but was hypotensive     pleurx R  Hypotension  mets ca  eval Acute Infection  GI Carcinoma  Anemia  DVT hx  MARYSOL    s/p paracentesis - 3300 cc drained -   abx in progress  pall note reviewed  ID follow up  thoracic and onc follow up  I and O  I moustapha  fio2 support to keep sat > 88 pct    HD monitoring and Support  pleurx drain as tolerated - 100 - 1000 per drain attempt - M W F   I moustapha  emp ABX  cx - biomarkers  thoracic follow up  cardio follow up  monitor VS and HD and Sat  goal sat > 88 pct  trend renal indices  I and O  replete lytes

## 2025-04-12 NOTE — PROGRESS NOTE ADULT - PROBLEM SELECTOR PLAN 2
BUN: 71, Cr: 3.30  -has not been urinating much for the past two days (denies dysuria), endorses urine output slightly better today   -Nephro consulted ( Dr. Franck Wagoner): Cortisol AM mildly elevated, IV albumin  -Barba not indicated per Urology, post void residual 34cc last night  -monitor I/Os BUN: 71, Cr: 3.30  -has not been urinating much for the past two days (denies dysuria), endorses urine output slightly better today   -Nephro consulted ( Dr. Franck Wagoner): Cortisol AM mildly elevated, IV albumin  -Barba not indicated per Urology, post void residual 34cc last night  -CT A/P showed renal pelvic fluid fullness/UPJ obstruction configuration (R>L) and bilateral cysts, unchanged.. Renal US showed mild right and trace left hydronephrosis. As per surgery, no intervention required.   -monitor I/Os

## 2025-04-12 NOTE — PROGRESS NOTE ADULT - PROBLEM SELECTOR PLAN 5
CT chest/abd ordered: mild to moderate ascites and mild b/l hydronephrosis   -US Abdomen ordered- small to moderate ascites  -s/p paracentesis by IR: drained 3550cc of cloudy yellow fluid, neutrophils are wnl, pending culture results

## 2025-04-12 NOTE — PROGRESS NOTE ADULT - ATTENDING COMMENTS
Patient was seen and examined by myself. Case was discussed with house staff in details. I have reviewed and agree with the plan as outlined above with edits where appropriate.    HPI:  Patient is a 65 y/o male with PMHx of poorly differentiated metastatic (known cervical and axillary lymphadenopathy) GI carcinoma, LUE and LLE DVT, HTN, on home O2 2/2 to SOB, and anemia presents to the ED with hypotension and SOB. Of note, pt was admitted from 3/3-3/7 with large left pleural effusion and LUE DVT. Pt had thoracentesis done of which 3.1 L of fluid from the left thorax. Then was admitted on 3/19-3/23 for pleural effusion and NSTEMI. Pt had a thoracentesis done of which drained 560 cc from the left thorax. Then patient was admitted from 04/03-04/06 for SOB and had pleurax placement on right chest (currently has very minimal output). Patient was told to hold bumex until today, however patient restarted bumex last night after speaking with his heme-onc doctor. Endorses he had soft bp yesterday (systolic ~102), and then today he was supposed to get his fifth chemo cycle (previously on FolFox, was supposed to switch to his new chemo medication today) however BP was noted to be low and was recommended to come to the hospital. Also endorses lightheadedness today.     vitals: T: 97.3F, BP: 82/56 -> 90/51, HR: 68, RR: 22, 99% O2 on 4L nc  labs: hgb: 8.9 (baseline), CBC abnormal 2/2 to cancer, Na: 130, Cl: 94, BUN: 71, Cr: 3.30, eGFR: 20, lactate: 2.7, proBNP: 3483  UA: cloudy, trace leuk esterase, 10wbc, moderate bacteria, neg nitrate   RVP neg   X-ray:  Increased interstitial markings bilaterally slightly greater on the left as before. There is a left effusion with atelectatic change.   Underlying inflammatory infectious process not excluded. Smaller right effusion with some increased interstitial markings in the right   infrahilar region which are less prominent than before. Small caliber right chest tube noted by the right CP angle. No pneumothorax.   Port-A-Cath as before. Borderline cardiomegaly. Regional osseous structures appropriate for age  received in the ED: 1L IVB 1x, levo gtt   (09 Apr 2025 14:45)      ROS: as in the HPI; all other ROS negative    SH and family history as above    Vital Signs Last 24 Hrs, vital stable , on 3L NC   feeling nausea     GEN:  SOB upon exertion.   HEENT- normocephalic; mouth moist  CVS- S1S2+  LUNGS- course BS B/L, Rhonchi,  auscultation; no wheezing  ABD: Soft , nontender,+ distended, Bowel sounds are present   EXTREMITY: no calf tenderness, no cyanosis, 2 + pitting  edema  NEURO: AAOx3; non focal neurologic exam   PSYCH: appears anxious and frustrated.   BACK: no swelling or mass;   VASCULAR: distal peripheral pulses present  SKIN: warm and dry.       Labs and imaging reviewed      Patient presenting with Patient is a 64y old  Male who presents with a chief complaint of hypotension (10 Apr 2025 15:33)   admitted for   1. Hypotension: improved,  s/p albumin  , 2L IV bolus, all BP meds held. on midodrine 10mg q8h.      2. acute on chronic HFmrEF: EF 54%, no other sig changes from prior echo, cardio eval noted. BP improved but still soft, will give albumin IV followed by IV bumex today, patient still appears volume overloaded.  discussed with nephro    3. recurrent pleural effusion due to HF  and malignancy: R Pleurx accessed today, but no  out put, CT show improved R pleural effusion but loculated L pleural effusion. CTS eval noted.      Ascites: S/P paracentesis 4/11 3550ml ascites removed, BP stable post procedure,  follow Fluid Cx , cytology      4. DVT: eliquis held, on Heparin gtt.     5.  MARYSOL with CKD 3: renal function worsening. likely pre renal, ATN 2/2 Hypotension.  urology eval for UPJ obstruction on CT, Us Renal : Mild right and trace left hydronephrosis. need to be conservative about IVF due to volume overloaded state at current. patient able to void, positive urine out put.   renal function improved     6. Gastric Lymphoma:  discussed with hematology, treatment on hold due to current condition, evidence of progression of the disease, palliative care eval noted.     7 acute on chronic RF with hypoxia: on 3L NC, IV abx rocephin /zithromax for pneumonia, management pleural effusion as above. duoneb ATC          Plan of care discussed with patient ;  all questions and concerns were addressed.

## 2025-04-12 NOTE — PROGRESS NOTE ADULT - PROBLEM SELECTOR PLAN 11
DVT treatment/prophylaxis  -Hep gtt (will be placed back on eliquis prior to dc)    GOC: palliative consulted; full code DVT treatment/prophylaxis  -Hep gtt (will be placed back on eliquis prior to dc)    #Constipation  -Ordered suppository, monitor for BM     GOC: palliative consulted; full code

## 2025-04-12 NOTE — PROGRESS NOTE ADULT - SUBJECTIVE AND OBJECTIVE BOX
S: Patient seen and examined at bedside.  No acute overnight events.  Patient reports no new complaints at this time. Patient underwent paracentesis with IR yesterday, states SOB has improved somewhat and cough has gotten better with less phlegm. However, patient states he is still experiencing significant tiredness and it is difficult to ambulate without getting significantly SOB. Patient denies any fever, chills, chest pain, nausea, vomiting, or urinary complaints.    MEDICATIONS:  acetaminophen     Tablet .. 650 milliGRAM(s) Oral every 6 hours PRN  acetaminophen   IVPB .. 1000 milliGRAM(s) IV Intermittent every 8 hours PRN  albuterol/ipratropium for Nebulization 3 milliLiter(s) Nebulizer every 6 hours PRN  atorvastatin 40 milliGRAM(s) Oral at bedtime  cefTRIAXone   IVPB 2000 milliGRAM(s) IV Intermittent every 24 hours  chlorhexidine 2% Cloths 1 Application(s) Topical <User Schedule>  clopidogrel Tablet 75 milliGRAM(s) Oral daily  heparin   Injectable 7500 Unit(s) IV Push every 6 hours PRN  heparin   Injectable 3500 Unit(s) IV Push every 6 hours PRN  heparin  Infusion.  Unit(s)/Hr IV Continuous <Continuous>  influenza   Vaccine 0.5 milliLiter(s) IntraMuscular once  melatonin 5 milliGRAM(s) Oral at bedtime  midodrine 10 milliGRAM(s) Oral every 8 hours  ondansetron Injectable 4 milliGRAM(s) IV Push every 8 hours PRN      O:  Vital Signs Last 24 Hrs  T(C): 36.3 (12 Apr 2025 04:49), Max: 36.9 (11 Apr 2025 20:57)  T(F): 97.3 (12 Apr 2025 04:49), Max: 98.4 (11 Apr 2025 20:57)  HR: 79 (12 Apr 2025 04:49) (78 - 83)  BP: 112/69 (12 Apr 2025 04:49) (103/62 - 122/71)  BP(mean): 86 (11 Apr 2025 11:52) (86 - 86)  RR: 18 (12 Apr 2025 04:49) (18 - 24)  SpO2: 95% (12 Apr 2025 04:49) (93% - 99%)    Parameters below as of 12 Apr 2025 04:49  Patient On (Oxygen Delivery Method): nasal cannula        PHYSICAL EXAM  GENERAL:  Well-developed Male lying comfortably in bed in NAD.  HEENT:  NC/AT. Sclera white. Mucous membranes moist.  RESPIRATORY:  Nonlabored breathing on 3L NC. Patient unable to take deep inspirations without coughing, lung sounds present bilaterally however muffled.   ABDOMEN:  Distended, slight tenderness on right side upon percussion    I&O SUMMARY:    04-11-25 @ 07:01  -  04-12-25 @ 07:00  --------------------------------------------------------  IN:    Oral Fluid: 277 mL  Total IN: 277 mL    OUT:    Other (mL): 3550 mL    Voided (mL): 1640 mL  Total OUT: 5190 mL    Total NET: -4913 mL          LABS:                        8.8    11.47 )-----------( 524      ( 12 Apr 2025 01:17 )             28.2     04-11    129[L]  |  97  |  83[H]  ----------------------------<  96  4.9   |  22  |  3.60[H]    Ca    9.0      11 Apr 2025 06:17  Phos  5.7     04-11  Mg     2.9     04-11    TPro  6.0  /  Alb  2.2[L]  /  TBili  0.3  /  DBili  x   /  AST  28  /  ALT  16  /  AlkPhos  120  04-11    PTT - ( 12 Apr 2025 01:17 )  PTT:41.1 sec      Lactate, Blood: 1.1 mmol/L (04-10 @ 07:22)  Lactate, Blood: 2.7 mmol/L (04-09 @ 11:35)      Urinalysis with Rflx Culture (collected 04-11-25 @ 16:20)    Culture - Body Fluid with Gram Stain (collected 04-11-25 @ 12:42)  Source: Abdominal Fl Abdominal Fluid  Gram Stain (04-12-25 @ 00:10):    No polymorphonuclear leukocytes per low power field    No organisms seen per oil power field        RADIOLOGY:    ASSESSMENT & PLAN  64yM with a PMH of poorly differentiated metastatic gastric carcinoma on chemo, HTN, anemia, NSTEMI, DVT of LUE on 3/3/25 (on Eliquis and plavix), and recent history of bilateral pleural effusions requiring L thoracentesis 3/4/25 and 3/20/25, and R pleurx placement on 4/4/2025, now admitted with hypotension, oliguria, MARYSOL, suspected CAP. Patient underwent paracentesis with IR yesterday 4/11 with 3550cc of cloudy yellow ascitic fluid removed from right abdomen. As per RN, around 300cc fluid drained from pleurx catheter yesterday.     - Continue pleurx, drain every other day  - s/p paracentesis 4/11, f/u fluid culture  - GOC  - Continue antibiotics  - Supplemental O2 PRN  - Conservative use of IVF given pleural effusions  - To be discussed with Dr. Reich

## 2025-04-12 NOTE — PROGRESS NOTE ADULT - SUBJECTIVE AND OBJECTIVE BOX
Patient is a 64y old  Male who presents with a chief complaint of      HPI: 64 year old male with a PMH as listed below presented with worsening SOB and edema. Also admits to poor urination. Was previously on dialysis but was taken off due to renal recovery. Was supposed to follow up in office with Dr Eguene 25 but ended in ER first. Renal consulted for further eval. No new medications, no recent illnesses, no NSAID use.      No new complaints at present  Says he is urinating better than prior    PAST MEDICAL & SURGICAL HISTORY:  Gastric lymphoma      Anemia of chronic disease      Pleural effusion      DVT (deep venous thrombosis)      Hypertension      Hyperlipidemia      S/P appendectomy  2014      S/P cholecystectomy  15 years ago           FAMILY HISTORY:  Family history of coronary artery disease in father  Father -  age 60 following MI    [place]    Social History:Non smoker    MEDICATIONS  (STANDING):  albumin human 25% IVPB 50 milliLiter(s) IV Intermittent every 6 hours  norepinephrine Infusion 0.25 MICROgram(s)/kG/Min (43.6 mL/Hr) IV Continuous <Continuous>    MEDICATIONS  (PRN):   Meds reviewed    Allergies    Bactrim DS (Hives)    Intolerances         REVIEW OF SYSTEMS: as per HPI    Vital Signs Last 24 Hrs  T(C): 36.3 (2025 04:49), Max: 36.9 (2025 20:57)  T(F): 97.3 (2025 04:49), Max: 98.4 (2025 20:57)  HR: 79 (2025 04:49) (78 - 83)  BP: 112/69 (2025 04:49) (103/62 - 122/71)  BP(mean): --  RR: 18 (2025 04:49) (18 - 24)  SpO2: 95% (2025 04:49) (93% - 99%)    Parameters below as of 2025 04:49  Patient On (Oxygen Delivery Method): nasal cannula          PHYSICAL EXAM:    GENERAL: Ill appearing, on NC  HEAD:  Atraumatic, Normocephalic  EYES: Conjunctiva and sclera clear  ENMT: No Drainage from nares, No drainage from ears  NERVOUS SYSTEM:  Awake and Alert  ABDOMEN: distended abdomen, NT  EXTREMITIES:  +++Edema B/L LE          LABS:                          9.0    10.77 )-----------( 532      ( 2025 09:11 )             28.8     04-12    132[L]  |  94[L]  |  80[H]  ----------------------------<  104[H]  4.7   |  25  |  3.10[H]    Ca    9.2      2025 09:11  Phos  5.4     04-12  Mg     2.7     04-12    TPro  6.4  /  Alb  2.3[L]  /  TBili  0.4  /  DBili  x   /  AST  37  /  ALT  23  /  AlkPhos  304[H]  04-12    PTT - ( 2025 09:11 )  PTT:46.0 sec  Urinalysis Basic - ( 2025 09:11 )    Color: x / Appearance: x / SG: x / pH: x  Gluc: 104 mg/dL / Ketone: x  / Bili: x / Urobili: x   Blood: x / Protein: x / Nitrite: x   Leuk Esterase: x / RBC: x / WBC x   Sq Epi: x / Non Sq Epi: x / Bacteria: x

## 2025-04-13 LAB
ALBUMIN SERPL ELPH-MCNC: 2.3 G/DL — LOW (ref 3.3–5)
ALP SERPL-CCNC: 493 U/L — HIGH (ref 40–120)
ALT FLD-CCNC: 35 U/L — SIGNIFICANT CHANGE UP (ref 12–78)
ANION GAP SERPL CALC-SCNC: 14 MMOL/L — SIGNIFICANT CHANGE UP (ref 5–17)
APTT BLD: 120.9 SEC — CRITICAL HIGH (ref 24.5–35.6)
APTT BLD: 28.6 SEC — SIGNIFICANT CHANGE UP (ref 24.5–35.6)
APTT BLD: 43.2 SEC — HIGH (ref 24.5–35.6)
AST SERPL-CCNC: 48 U/L — HIGH (ref 15–37)
BASOPHILS # BLD AUTO: 0 K/UL — SIGNIFICANT CHANGE UP (ref 0–0.2)
BASOPHILS NFR BLD AUTO: 0 % — SIGNIFICANT CHANGE UP (ref 0–2)
BILIRUB SERPL-MCNC: 0.4 MG/DL — SIGNIFICANT CHANGE UP (ref 0.2–1.2)
BUN SERPL-MCNC: 88 MG/DL — HIGH (ref 7–23)
CALCIUM SERPL-MCNC: 9.4 MG/DL — SIGNIFICANT CHANGE UP (ref 8.5–10.1)
CHLORIDE SERPL-SCNC: 92 MMOL/L — LOW (ref 96–108)
CO2 SERPL-SCNC: 27 MMOL/L — SIGNIFICANT CHANGE UP (ref 22–31)
CREAT SERPL-MCNC: 3.4 MG/DL — HIGH (ref 0.5–1.3)
EGFR: 19 ML/MIN/1.73M2 — LOW
EGFR: 19 ML/MIN/1.73M2 — LOW
EOSINOPHIL # BLD AUTO: 0 K/UL — SIGNIFICANT CHANGE UP (ref 0–0.5)
EOSINOPHIL NFR BLD AUTO: 0 % — SIGNIFICANT CHANGE UP (ref 0–6)
GLUCOSE SERPL-MCNC: 120 MG/DL — HIGH (ref 70–99)
HCT VFR BLD CALC: 29.4 % — LOW (ref 39–50)
HGB BLD-MCNC: 9.2 G/DL — LOW (ref 13–17)
LYMPHOCYTES # BLD AUTO: 0.41 K/UL — LOW (ref 1–3.3)
LYMPHOCYTES # BLD AUTO: 3 % — LOW (ref 13–44)
MAGNESIUM SERPL-MCNC: 3 MG/DL — HIGH (ref 1.6–2.6)
MCHC RBC-ENTMCNC: 19.7 PG — LOW (ref 27–34)
MCHC RBC-ENTMCNC: 31.3 G/DL — LOW (ref 32–36)
MCV RBC AUTO: 62.8 FL — LOW (ref 80–100)
MONOCYTES # BLD AUTO: 1.63 K/UL — HIGH (ref 0–0.9)
MONOCYTES NFR BLD AUTO: 12 % — SIGNIFICANT CHANGE UP (ref 2–14)
NEUTROPHILS # BLD AUTO: 11.42 K/UL — HIGH (ref 1.8–7.4)
NEUTROPHILS NFR BLD AUTO: 84 % — HIGH (ref 43–77)
NRBC BLD AUTO-RTO: SIGNIFICANT CHANGE UP /100 WBCS (ref 0–0)
PHOSPHATE SERPL-MCNC: 5.4 MG/DL — HIGH (ref 2.5–4.5)
PLATELET # BLD AUTO: 554 K/UL — HIGH (ref 150–400)
POTASSIUM SERPL-MCNC: 5 MMOL/L — SIGNIFICANT CHANGE UP (ref 3.5–5.3)
POTASSIUM SERPL-SCNC: 5 MMOL/L — SIGNIFICANT CHANGE UP (ref 3.5–5.3)
PROT SERPL-MCNC: 6.3 G/DL — SIGNIFICANT CHANGE UP (ref 6–8.3)
RBC # BLD: 4.68 M/UL — SIGNIFICANT CHANGE UP (ref 4.2–5.8)
RBC # FLD: 21.2 % — HIGH (ref 10.3–14.5)
SODIUM SERPL-SCNC: 133 MMOL/L — LOW (ref 135–145)
WBC # BLD: 13.59 K/UL — HIGH (ref 3.8–10.5)
WBC # FLD AUTO: 13.59 K/UL — HIGH (ref 3.8–10.5)

## 2025-04-13 PROCEDURE — 71045 X-RAY EXAM CHEST 1 VIEW: CPT | Mod: 26

## 2025-04-13 PROCEDURE — 99233 SBSQ HOSP IP/OBS HIGH 50: CPT

## 2025-04-13 PROCEDURE — 93010 ELECTROCARDIOGRAM REPORT: CPT

## 2025-04-13 PROCEDURE — 99233 SBSQ HOSP IP/OBS HIGH 50: CPT | Mod: GC

## 2025-04-13 RX ORDER — METOCLOPRAMIDE HCL 10 MG
10 TABLET ORAL ONCE
Refills: 0 | Status: COMPLETED | OUTPATIENT
Start: 2025-04-13 | End: 2025-04-13

## 2025-04-13 RX ORDER — SUCRALFATE 1 G
1 TABLET ORAL
Refills: 0 | Status: DISCONTINUED | OUTPATIENT
Start: 2025-04-13 | End: 2025-04-17

## 2025-04-13 RX ORDER — BUMETANIDE 1 MG/1
2 TABLET ORAL ONCE
Refills: 0 | Status: DISCONTINUED | OUTPATIENT
Start: 2025-04-13 | End: 2025-04-13

## 2025-04-13 RX ORDER — HEPARIN SODIUM 1000 [USP'U]/ML
7500 INJECTION INTRAVENOUS; SUBCUTANEOUS ONCE
Refills: 0 | Status: COMPLETED | OUTPATIENT
Start: 2025-04-13 | End: 2025-04-13

## 2025-04-13 RX ORDER — METOCLOPRAMIDE HCL 10 MG
10 TABLET ORAL EVERY 8 HOURS
Refills: 0 | Status: DISCONTINUED | OUTPATIENT
Start: 2025-04-13 | End: 2025-04-17

## 2025-04-13 RX ORDER — HEPARIN SODIUM 1000 [USP'U]/ML
3500 INJECTION INTRAVENOUS; SUBCUTANEOUS EVERY 6 HOURS
Refills: 0 | Status: DISCONTINUED | OUTPATIENT
Start: 2025-04-13 | End: 2025-04-15

## 2025-04-13 RX ORDER — HEPARIN SODIUM 1000 [USP'U]/ML
INJECTION INTRAVENOUS; SUBCUTANEOUS
Qty: 25000 | Refills: 0 | Status: DISCONTINUED | OUTPATIENT
Start: 2025-04-13 | End: 2025-04-15

## 2025-04-13 RX ORDER — HEPARIN SODIUM 1000 [USP'U]/ML
7500 INJECTION INTRAVENOUS; SUBCUTANEOUS EVERY 6 HOURS
Refills: 0 | Status: DISCONTINUED | OUTPATIENT
Start: 2025-04-13 | End: 2025-04-15

## 2025-04-13 RX ORDER — ALBUMIN (HUMAN) 12.5 G/50ML
50 INJECTION, SOLUTION INTRAVENOUS EVERY 8 HOURS
Refills: 0 | Status: COMPLETED | OUTPATIENT
Start: 2025-04-13 | End: 2025-04-14

## 2025-04-13 RX ORDER — ONDANSETRON HCL/PF 4 MG/2 ML
4 VIAL (ML) INJECTION EVERY 6 HOURS
Refills: 0 | Status: DISCONTINUED | OUTPATIENT
Start: 2025-04-13 | End: 2025-04-15

## 2025-04-13 RX ADMIN — Medication 10 MILLIGRAM(S): at 11:34

## 2025-04-13 RX ADMIN — Medication 1 GRAM(S): at 21:03

## 2025-04-13 RX ADMIN — ALBUMIN (HUMAN) 50 MILLILITER(S): 12.5 INJECTION, SOLUTION INTRAVENOUS at 21:02

## 2025-04-13 RX ADMIN — Medication 10 MILLIGRAM(S): at 05:58

## 2025-04-13 RX ADMIN — HEPARIN SODIUM 1900 UNIT(S)/HR: 1000 INJECTION INTRAVENOUS; SUBCUTANEOUS at 07:17

## 2025-04-13 RX ADMIN — ALBUMIN (HUMAN) 50 MILLILITER(S): 12.5 INJECTION, SOLUTION INTRAVENOUS at 11:39

## 2025-04-13 RX ADMIN — Medication 4 MILLIGRAM(S): at 16:31

## 2025-04-13 RX ADMIN — CLOPIDOGREL BISULFATE 75 MILLIGRAM(S): 75 TABLET, FILM COATED ORAL at 11:34

## 2025-04-13 RX ADMIN — Medication 1 GRAM(S): at 16:31

## 2025-04-13 RX ADMIN — MIDODRINE HYDROCHLORIDE 10 MILLIGRAM(S): 5 TABLET ORAL at 21:03

## 2025-04-13 RX ADMIN — HEPARIN SODIUM 1700 UNIT(S)/HR: 1000 INJECTION INTRAVENOUS; SUBCUTANEOUS at 18:26

## 2025-04-13 RX ADMIN — Medication 40 MILLIGRAM(S): at 16:31

## 2025-04-13 RX ADMIN — Medication 40 MILLIGRAM(S): at 08:45

## 2025-04-13 RX ADMIN — Medication 4 MILLIGRAM(S): at 00:11

## 2025-04-13 RX ADMIN — HEPARIN SODIUM 7500 UNIT(S): 1000 INJECTION INTRAVENOUS; SUBCUTANEOUS at 18:25

## 2025-04-13 RX ADMIN — HEPARIN SODIUM 1700 UNIT(S)/HR: 1000 INJECTION INTRAVENOUS; SUBCUTANEOUS at 19:13

## 2025-04-13 RX ADMIN — IPRATROPIUM BROMIDE AND ALBUTEROL SULFATE 3 MILLILITER(S): .5; 2.5 SOLUTION RESPIRATORY (INHALATION) at 19:04

## 2025-04-13 RX ADMIN — HEPARIN SODIUM 1900 UNIT(S)/HR: 1000 INJECTION INTRAVENOUS; SUBCUTANEOUS at 03:22

## 2025-04-13 RX ADMIN — MIDODRINE HYDROCHLORIDE 10 MILLIGRAM(S): 5 TABLET ORAL at 08:45

## 2025-04-13 RX ADMIN — CEFTRIAXONE 100 MILLIGRAM(S): 500 INJECTION, POWDER, FOR SOLUTION INTRAMUSCULAR; INTRAVENOUS at 21:04

## 2025-04-13 RX ADMIN — ATORVASTATIN CALCIUM 40 MILLIGRAM(S): 80 TABLET, FILM COATED ORAL at 21:03

## 2025-04-13 RX ADMIN — Medication 0.25 MILLIGRAM(S): at 21:04

## 2025-04-13 RX ADMIN — Medication 4 MILLIGRAM(S): at 08:45

## 2025-04-13 RX ADMIN — MIDODRINE HYDROCHLORIDE 10 MILLIGRAM(S): 5 TABLET ORAL at 16:31

## 2025-04-13 NOTE — PROGRESS NOTE ADULT - PROBLEM SELECTOR PLAN 1
Acute for the past two days, endorses slight lightheadedness   -BP: 82/56 -> 90/51 ->77/49  -s/p 1L NaCl 0.9% IVB 1x, and 1L LR in ED  -C/w Midodrine 10mg , s/p multiple doses albumin  -TTE ordered: LVEF 54 % Mild mitral regurgitation. Abdominal ascites is incidentally noted. Large right and small left pleural effusion noted. Small pericardial effusion with no echocardiographic evidence of tamponade physiology.  -HOLD home labetalol BID and losartan qd; and hold home bumex   -would be cautious about fluids as patient has present pleural effusions and in extravascular volume overload state  -s/p 1 dose of 2mg IV bumex yesterday with albumin, will hold diuresis for now due to Cr bump and continue albumin

## 2025-04-13 NOTE — PROGRESS NOTE ADULT - PROBLEM SELECTOR PLAN 2
BUN: 71, Cr: 3.30  -has not been urinating much for the past two days (denies dysuria), endorses urine output slightly better today   -Nephro consulted ( Dr. Franck Wagoner): Cortisol AM mildly elevated, IV albumin  -Barba not indicated per Urology, post void residual 34cc last night  -CT A/P showed renal pelvic fluid fullness/UPJ obstruction configuration (R>L) and bilateral cysts, unchanged.. Renal US showed mild right and trace left hydronephrosis. As per surgery, no intervention required.   -monitor I/Os

## 2025-04-13 NOTE — PROGRESS NOTE ADULT - SUBJECTIVE AND OBJECTIVE BOX
Patient is a 64y old  Male who presents with a chief complaint of hypotension (11 Apr 2025 11:21)    INTERVAL HPI/OVERNIGHT EVENTS: Patient seen and examined at bedside. Chart notes from overnight noted. Patient is actively nauseous at bedside. Unable to properly assess SOB (s/p fluid removal and bumex) due to nausea. Wishes inpatient team to touch base with his outpatient oncologist tomorrow.     MEDICATIONS  (STANDING):  atorvastatin 40 milliGRAM(s) Oral at bedtime  azithromycin  IVPB 500 milliGRAM(s) IV Intermittent every 24 hours  azithromycin  IVPB      cefTRIAXone   IVPB 2000 milliGRAM(s) IV Intermittent every 24 hours  chlorhexidine 2% Cloths 1 Application(s) Topical <User Schedule>  clopidogrel Tablet 75 milliGRAM(s) Oral daily  influenza   Vaccine 0.5 milliLiter(s) IntraMuscular once  melatonin 5 milliGRAM(s) Oral at bedtime  midodrine 10 milliGRAM(s) Oral every 8 hours    MEDICATIONS  (PRN):  acetaminophen     Tablet .. 650 milliGRAM(s) Oral every 6 hours PRN Mild Pain (1 - 3)  acetaminophen   IVPB .. 1000 milliGRAM(s) IV Intermittent every 8 hours PRN Moderate Pain (4 - 6)  albuterol/ipratropium for Nebulization 3 milliLiter(s) Nebulizer every 6 hours PRN Shortness of Breath and/or Wheezing  ondansetron    Tablet 4 milliGRAM(s) Oral daily PRN Nausea and/or Vomiting      Allergies    Bactrim DS (Hives)    Intolerances    Vital Signs Last 24 Hrs  T(C): 36.7 (13 Apr 2025 11:30), Max: 37.1 (12 Apr 2025 20:59)  T(F): 98 (13 Apr 2025 11:30), Max: 98.7 (12 Apr 2025 20:59)  HR: 95 (13 Apr 2025 11:30) (78 - 95)  BP: 107/69 (13 Apr 2025 11:30) (100/69 - 122/73)  BP(mean): --  RR: 20 (13 Apr 2025 11:30) (18 - 20)  SpO2: 92% (13 Apr 2025 11:30) (92% - 97%)    Parameters below as of 13 Apr 2025 11:30  Patient On (Oxygen Delivery Method): nasal cannula  O2 Flow (L/min): 4      PHYSICAL EXAM:  GENERAL: NAD  HEENT:  anicteric, moist mucous membranes  CHEST/LUNG:  Left lung with minimal lung sounds to anterior LLL, right lung cta except mildly decreased at base  HEART:  RRR, S1, S2  ABDOMEN:  minimal bowel sounds, distended, nontender, fluid noted on physical exam  EXTREMITIES: 2+ pitting edema to B/L lower extremities extending feet to thighs (slightly improved from day prior due to diuresis), no cyanosis, or calf tenderness  NERVOUS SYSTEM: answers questions and follows commands appropriately      LABS:  cret                        9.2    13.59 )-----------( 554      ( 13 Apr 2025 09:15 )             29.4     04-13    133[L]  |  92[L]  |  88[H]  ----------------------------<  120[H]  5.0   |  27  |  3.40[H]    Ca    9.4      13 Apr 2025 09:15  Phos  5.4     04-13  Mg     3.0     04-13    TPro  6.3  /  Alb  2.3[L]  /  TBili  0.4  /  DBili  x   /  AST  48[H]  /  ALT  35  /  AlkPhos  493[H]  04-13    PTT - ( 13 Apr 2025 09:15 )  PTT:43.2 sec

## 2025-04-13 NOTE — PROGRESS NOTE ADULT - PROBLEM SELECTOR PLAN 6
Patient has been admitted three times within the past couple of months in which thoracentesis was performed and drained malignant pleural effusions  -pleurex placement on right chest C/D/I  -Patient has been complaining of cough for the past two months but now is producing tannish sputum. As patient is immunocompromised, patient may not mount fever or wbc.    -Increased lung markings on chest x-ray, no known consolidation, but will cover for possible infection with ceftriaxone 2g daily and azithromycin (dc azithro as legionella neg)  -BC (NGTD at 48 hrs), strep antibiotics neg  -switched duonebs from PRN to standing for SOB. Also added maalox/PPI to regimen.   -drain Pleurx every other day per CT surgery, never more than 1 L at a time. pleuravac yesterday drained 1L  -s/p 1 dose of 2mg IV bumex yesterday with albumin, will hold diuresis for now due to Cr bump and continue albumin  -pulm consulted (Dr. Damico) recs appreciated  -ID consulted (Dr. Corcoran), recs appreciated  -CT surgery consulted, recs appreciated

## 2025-04-13 NOTE — PROGRESS NOTE ADULT - PROBLEM SELECTOR PLAN 8
-03/03 doppler: Extensive left upper extremity DVT extending proximally to involve the right internal jugular and subclavian veins. Left cervical lymphadenopathy with abnormal morphology suggesting malignancy.  -03/06: + LLE DVT  -holding home eliquis and (TEMPORARILY) heparin gtt for now as patient is actively retching

## 2025-04-13 NOTE — CHART NOTE - NSCHARTNOTEFT_GEN_A_CORE
Connected R chest pigtail catheter to Pleur-evac system in clean fashion. Catheter flushed w/ 10 cc NS to ensure patency, then system connected to water seal w/ 600 cc serosanguinous pleuritic output. Pt tolerated procedure well without evidence of air leak at conclusion. Connected R chest pigtail catheter to Pleur-evac system in clean fashion. Catheter flushed w/ 10 cc NS to ensure patency, then system connected to water seal w/ 600 cc serosanguinous pleural output. Pt tolerated procedure well without evidence of air leak at conclusion.

## 2025-04-13 NOTE — PROGRESS NOTE ADULT - PROBLEM SELECTOR PLAN 11
DVT treatment/prophylaxis  -holding home eliquis and (TEMPORARILY) heparin gtt for now as patient is actively retching    #Constipation  -Ordered suppository, lactulose with no relief  -ordered SMOG for today   -GI PCR to be collected when patient has a bowel movement     GOC: palliative consulted; full code

## 2025-04-13 NOTE — PROGRESS NOTE ADULT - ASSESSMENT
Acute on CKD Stage 3  Hypotension  Anemia  Hypoalbuminemia      -MARYSOL secondary to severe hypotension leading to renal hypoperfusion; unclear etiology of the hypotension  -Additionally, CTAP done noting renal fullness and uvp obstruction; also seen on renal sono  -Renal indices are improving; urinating well per patient; refused wise  -Midodrine for BP  -Unable to give fluids at this time given hypervolemic state  -Potassium improved  -S/p paracentesis  -Pleurex in place  -Diuresis PRN - will give 2mg IVP x 1 + Albumin; will assess daily for diuresis  -Monitor urine output  -Urology consulted and stated no intervention  -Will follow up repeat labs      Thank you

## 2025-04-13 NOTE — PROGRESS NOTE ADULT - SUBJECTIVE AND OBJECTIVE BOX
Patient is a 64y old  Male who presents with a chief complaint of      HPI: 64 year old male with a PMH as listed below presented with worsening SOB and edema. Also admits to poor urination. Was previously on dialysis but was taken off due to renal recovery. Was supposed to follow up in office with Dr Eugene 25 but ended in ER first. Renal consulted for further eval. No new medications, no recent illnesses, no NSAID use.      Has some nausea this morning  Breathing is stable per patient  Says he is urinating     PAST MEDICAL & SURGICAL HISTORY:  Gastric lymphoma      Anemia of chronic disease      Pleural effusion      DVT (deep venous thrombosis)      Hypertension      Hyperlipidemia      S/P appendectomy  2014      S/P cholecystectomy  15 years ago           FAMILY HISTORY:  Family history of coronary artery disease in father  Father -  age 60 following MI    NC    Social History:Non smoker    MEDICATIONS  (STANDING):  albumin human 25% IVPB 50 milliLiter(s) IV Intermittent every 8 hours  albuterol/ipratropium for Nebulization 3 milliLiter(s) Nebulizer every 6 hours  ALPRAZolam 0.25 milliGRAM(s) Oral at bedtime  atorvastatin 40 milliGRAM(s) Oral at bedtime  benzocaine/menthol Lozenge 1 Lozenge Oral once  buMETAnide Injectable 2 milliGRAM(s) IV Push once  cefTRIAXone   IVPB 2000 milliGRAM(s) IV Intermittent every 24 hours  chlorhexidine 2% Cloths 1 Application(s) Topical <User Schedule>  clopidogrel Tablet 75 milliGRAM(s) Oral daily  heparin  Infusion.  Unit(s)/Hr (17 mL/Hr) IV Continuous <Continuous>  influenza   Vaccine 0.5 milliLiter(s) IntraMuscular once  midodrine 10 milliGRAM(s) Oral every 8 hours  pantoprazole    Tablet 40 milliGRAM(s) Oral before breakfast    MEDICATIONS  (PRN):  acetaminophen     Tablet .. 650 milliGRAM(s) Oral every 6 hours PRN Mild Pain (1 - 3)  acetaminophen   IVPB .. 1000 milliGRAM(s) IV Intermittent every 8 hours PRN Moderate Pain (4 - 6)  aluminum hydroxide/magnesium hydroxide/simethicone Suspension 30 milliLiter(s) Oral four times a day PRN Dyspepsia  bisacodyl Suppository 10 milliGRAM(s) Rectal daily PRN Constipation  heparin   Injectable 7500 Unit(s) IV Push every 6 hours PRN For aPTT less than 40  heparin   Injectable 3500 Unit(s) IV Push every 6 hours PRN For aPTT between 40 - 57  ondansetron Injectable 4 milliGRAM(s) IV Push every 8 hours PRN Nausea and/or Vomiting      Allergies    Bactrim DS (Hives)    Intolerances         REVIEW OF SYSTEMS: as per HPI    Vital Signs Last 24 Hrs  T(C): 36.8 (2025 05:01), Max: 37.1 (2025 20:59)  T(F): 98.2 (2025 05:01), Max: 98.7 (2025 20:59)  HR: 84 (2025 05:01) (78 - 89)  BP: 100/69 (2025 05:01) (100/69 - 122/73)  BP(mean): --  RR: 19 (2025 05:01) (18 - 19)  SpO2: 96% (2025 05:01) (95% - 97%)    Parameters below as of 2025 05:01  Patient On (Oxygen Delivery Method): nasal cannula  O2 Flow (L/min): 3      PHYSICAL EXAM:    GENERAL: Ill appearing, on NC  HEAD:  Atraumatic, Normocephalic  EYES: Conjunctiva and sclera clear  ENMT: No Drainage from nares, No drainage from ears  NERVOUS SYSTEM:  Awake and Alert  ABDOMEN: distended abdomen, NT  EXTREMITIES:  +++Edema B/L LE          LABS:    25: pending                        9.0    10.77 )-----------( 532      ( 2025 09:11 )             28.8     04-12    132[L]  |  94[L]  |  80[H]  ----------------------------<  104[H]  4.7   |  25  |  3.10[H]    Ca    9.2      2025 09:11  Phos  5.4     04-12  Mg     2.7     04-12    TPro  6.4  /  Alb  2.3[L]  /  TBili  0.4  /  DBili  x   /  AST  37  /  ALT  23  /  AlkPhos  304[H]  04-12    PTT - ( 2025 09:11 )  PTT:46.0 sec  Urinalysis Basic - ( 2025 09:11 )    Color: x / Appearance: x / SG: x / pH: x  Gluc: 104 mg/dL / Ketone: x  / Bili: x / Urobili: x   Blood: x / Protein: x / Nitrite: x   Leuk Esterase: x / RBC: x / WBC x   Sq Epi: x / Non Sq Epi: x / Bacteria: x

## 2025-04-13 NOTE — PROGRESS NOTE ADULT - PROBLEM SELECTOR PLAN 4
CT chest/abd/pelvis (3/3): Large left pleural effusion markedly increased since 11/30/2024 with total atelectasis left lower lobe and partial atelectasis left upper lobe. New mediastinal and left axillary lymphadenopathy. Inflammatory changes associated with left subclavian vein consistent with known DVT. Upper abdominal lymphadenopathy similar to 11/30/2024. Mildly increased pelvic lymphadenopathy. Mesenteric lymphadenopathy and mesenteric fat stranding similar to prior examination. No pulmonary embolus. Limited evaluation segmental and subsegmental branches.  -scheduled for his fifth chemo cycle today (previously on FolFox, was supposed to switch to his new chemo medication today) however BP was noted to be low, and did not receive dose. last chemo was >3 weeks ago  -rt medport in place. Follows w/ Dr. Fung  --hx of beta thal minor  -holding home oxy, ordered tylenol and ofirmev for now PRN for pain management. can consider dilaudid for severe pain if needed   -currently complaining of nausea and emesis, likely 2/2 to cancer but may have infectious component to it. continue abx and will order GI PCR for when patient has a bowel movement. ordered zofran and reglan ATC for nausea for now, but if symptoms persist will consider Compazin as an alternative antiemetic   -Heme/Onc consulted (Dr. Hu), f/u recs. CT chest/abd/pelvis (3/3): Large left pleural effusion markedly increased since 11/30/2024 with total atelectasis left lower lobe and partial atelectasis left upper lobe. New mediastinal and left axillary lymphadenopathy. Inflammatory changes associated with left subclavian vein consistent with known DVT. Upper abdominal lymphadenopathy similar to 11/30/2024. Mildly increased pelvic lymphadenopathy. Mesenteric lymphadenopathy and mesenteric fat stranding similar to prior examination. No pulmonary embolus. Limited evaluation segmental and subsegmental branches.  -scheduled for his fifth chemo cycle today (previously on FolFox, was supposed to switch to his new chemo medication today) however BP was noted to be low, and did not receive dose. last chemo was >3 weeks ago  -rt medport in place. Follows w/ Dr. Fung  --hx of beta thal minor  -holding home oxy, ordered tylenol and ofirmev for now PRN for pain management. can consider dilaudid for severe pain if needed   -currently complaining of nausea and emesis, likely 2/2 to cancer but may have infectious component to it. continue abx and will order GI PCR for when patient has a bowel movement. ordered zofran and reglan ATC for nausea for now, but if symptoms persist will consider Compazin as an alternative antiemetic   -EKG on 04/13: QTC: 437   -Heme/Onc consulted (Dr. Hu), f/u recs.

## 2025-04-13 NOTE — PROGRESS NOTE ADULT - SUBJECTIVE AND OBJECTIVE BOX
SUBJECTIVE:  Patient seen and examined at bedside.  Patient reports still having SOB on exertion. Reports episode of vomiting earlier. Patient denies any fever, chills, chest pain, or other complaints.    VITALS  Vital Signs Last 24 Hrs  T(C): 36.7 (13 Apr 2025 11:30), Max: 37.1 (12 Apr 2025 20:59)  T(F): 98 (13 Apr 2025 11:30), Max: 98.7 (12 Apr 2025 20:59)  HR: 94 (13 Apr 2025 16:31) (84 - 95)  BP: 123/71 (13 Apr 2025 16:31) (100/69 - 123/71)  RR: 20 (13 Apr 2025 11:30) (19 - 20)  SpO2: 92% (13 Apr 2025 11:30) (92% - 97%)    Parameters below as of 13 Apr 2025 11:30  Patient On (Oxygen Delivery Method): nasal cannula  O2 Flow (L/min): 4      PHYSICAL EXAM  GENERAL:  Well-developed Male lying comfortably in bed in [facility].  HEENT:  NC/AT. Sclera white.   RESPIRATORY:  Nonlabored breathing on 4L NC.    INTAKE & OUTPUT  I&O's Summary    12 Apr 2025 07:01  -  13 Apr 2025 07:00  --------------------------------------------------------  IN: 1104 mL / OUT: 1300 mL / NET: -196 mL    13 Apr 2025 07:01  -  13 Apr 2025 19:49  --------------------------------------------------------  IN: 478 mL / OUT: 420 mL / NET: 58 mL      I&O's Detail    12 Apr 2025 07:01  -  13 Apr 2025 07:00  --------------------------------------------------------  IN:    Heparin Infusion: 204 mL    IV PiggyBack: 50 mL    Oral Fluid: 850 mL  Total IN: 1104 mL    OUT:    Other (mL): 1000 mL    Voided (mL): 300 mL  Total OUT: 1300 mL    Total NET: -196 mL      13 Apr 2025 07:01  -  13 Apr 2025 19:49  --------------------------------------------------------  IN:    Heparin Infusion: 38 mL    IV PiggyBack: 50 mL    Oral Fluid: 390 mL  Total IN: 478 mL    OUT:    Voided (mL): 420 mL  Total OUT: 420 mL    Total NET: 58 mL          MEDICATIONS  MEDICATIONS  (STANDING):  albumin human 25% IVPB 50 milliLiter(s) IV Intermittent every 8 hours  albuterol/ipratropium for Nebulization 3 milliLiter(s) Nebulizer every 6 hours  ALPRAZolam 0.25 milliGRAM(s) Oral at bedtime  atorvastatin 40 milliGRAM(s) Oral at bedtime  benzocaine/menthol Lozenge 1 Lozenge Oral once  cefTRIAXone   IVPB 2000 milliGRAM(s) IV Intermittent every 24 hours  chlorhexidine 2% Cloths 1 Application(s) Topical <User Schedule>  clopidogrel Tablet 75 milliGRAM(s) Oral daily  heparin  Infusion.  Unit(s)/Hr (17 mL/Hr) IV Continuous <Continuous>  influenza   Vaccine 0.5 milliLiter(s) IntraMuscular once  midodrine 10 milliGRAM(s) Oral every 8 hours  pantoprazole  Injectable 40 milliGRAM(s) IV Push two times a day  sucralfate suspension 1 Gram(s) Oral four times a day    MEDICATIONS  (PRN):  acetaminophen     Tablet .. 650 milliGRAM(s) Oral every 6 hours PRN Mild Pain (1 - 3)  acetaminophen   IVPB .. 1000 milliGRAM(s) IV Intermittent every 8 hours PRN Moderate Pain (4 - 6)  aluminum hydroxide/magnesium hydroxide/simethicone Suspension 30 milliLiter(s) Oral four times a day PRN Dyspepsia  bisacodyl Suppository 10 milliGRAM(s) Rectal daily PRN Constipation  heparin   Injectable 7500 Unit(s) IV Push every 6 hours PRN For aPTT less than 40  heparin   Injectable 3500 Unit(s) IV Push every 6 hours PRN For aPTT between 40 - 57  metoclopramide Injectable 10 milliGRAM(s) IV Push every 8 hours PRN nausea , vomiting  ondansetron Injectable 4 milliGRAM(s) IV Push every 6 hours PRN Nausea and/or Vomiting      LABS:                        9.2    13.59 )-----------( 554      ( 13 Apr 2025 09:15 )             29.4     04-13    133[L]  |  92[L]  |  88[H]  ----------------------------<  120[H]  5.0   |  27  |  3.40[H]    Ca    9.4      13 Apr 2025 09:15  Phos  5.4     04-13  Mg     3.0     04-13    TPro  6.3  /  Alb  2.3[L]  /  TBili  0.4  /  DBili  x   /  AST  48[H]  /  ALT  35  /  AlkPhos  493[H]  04-13    PTT - ( 13 Apr 2025 17:25 )  PTT:28.6 sec    Urinalysis with Rflx Culture (collected 11 Apr 2025 16:20)    Culture - Fungal, Body Fluid (collected 11 Apr 2025 12:42)  Source: Peritoneal Peritoneal Fluid  Preliminary Report (13 Apr 2025 08:26):    No growth    Culture - Body Fluid with Gram Stain (collected 11 Apr 2025 12:42)  Source: Abdominal Fl Abdominal Fluid  Gram Stain (12 Apr 2025 00:10):    No polymorphonuclear leukocytes per low power field    No organisms seen per oil power field  Preliminary Report (12 Apr 2025 16:01):    No growth

## 2025-04-13 NOTE — PROGRESS NOTE ADULT - ATTENDING COMMENTS
Patient was seen and examined by myself. Case was discussed with house staff in details. I have reviewed and agree with the plan as outlined above with edits where appropriate.    HPI:  Patient is a 65 y/o male with PMHx of poorly differentiated metastatic (known cervical and axillary lymphadenopathy) GI carcinoma, LUE and LLE DVT, HTN, on home O2 2/2 to SOB, and anemia presents to the ED with hypotension and SOB. Of note, pt was admitted from 3/3-3/7 with large left pleural effusion and LUE DVT. Pt had thoracentesis done of which 3.1 L of fluid from the left thorax. Then was admitted on 3/19-3/23 for pleural effusion and NSTEMI. Pt had a thoracentesis done of which drained 560 cc from the left thorax. Then patient was admitted from 04/03-04/06 for SOB and had pleurax placement on right chest (currently has very minimal output). Patient was told to hold bumex until today, however patient restarted bumex last night after speaking with his heme-onc doctor. Endorses he had soft bp yesterday (systolic ~102), and then today he was supposed to get his fifth chemo cycle (previously on FolFox, was supposed to switch to his new chemo medication today) however BP was noted to be low and was recommended to come to the hospital. Also endorses lightheadedness today.     vitals: T: 97.3F, BP: 82/56 -> 90/51, HR: 68, RR: 22, 99% O2 on 4L nc  labs: hgb: 8.9 (baseline), CBC abnormal 2/2 to cancer, Na: 130, Cl: 94, BUN: 71, Cr: 3.30, eGFR: 20, lactate: 2.7, proBNP: 3483  UA: cloudy, trace leuk esterase, 10wbc, moderate bacteria, neg nitrate   RVP neg   X-ray:  Increased interstitial markings bilaterally slightly greater on the left as before. There is a left effusion with atelectatic change.   Underlying inflammatory infectious process not excluded. Smaller right effusion with some increased interstitial markings in the right   infrahilar region which are less prominent than before. Small caliber right chest tube noted by the right CP angle. No pneumothorax.   Port-A-Cath as before. Borderline cardiomegaly. Regional osseous structures appropriate for age  received in the ED: 1L IVB 1x, levo gtt   (09 Apr 2025 14:45)      ROS: as in the HPI; all other ROS negative    SH and family history as above    Vital Signs Last 24 Hrs, vital stable , on 3L NC   feeling nausea , vomited     GEN:  SOB upon exertion.   HEENT- normocephalic; mouth moist  CVS- S1S2+  LUNGS- course BS B/L, Rhonchi,  auscultation; no wheezing  ABD: Soft , nontender,+ distended, Bowel sounds are present   EXTREMITY: no calf tenderness, no cyanosis, 2 + pitting  edema  NEURO: AAOx3; non focal neurologic exam   PSYCH: appears anxious and frustrated.   BACK: no swelling or mass;   VASCULAR: distal peripheral pulses present  SKIN: warm and dry.       Labs and imaging reviewed      Patient presenting with Patient is a 64y old  Male who presents with a chief complaint of hypotension (10 Apr 2025 15:33)   admitted for   1. Hypotension: improved, c/w  albumin  , 2L IV bolus, all BP meds held. on midodrine 10mg q8h.      2. acute on chronic HFmrEF: EF 54%, no other sig changes from prior echo, cardio eval noted. R pleurx accessed yesterday 1L fluid drained.     3. recurrent pleural effusion due to HF  and malignancy: R Pleurx accessed yesterday , will be re accessed tomorrow . CTS eval noted. repeat CXR show B/L pleural effusion.      Ascites: S/P paracentesis 4/11 3550ml ascites removed, BP stable post procedure,  Fluid Cx negative, cytology  pending.     Nausea/vomiting : zofran , reglan, PPI IV BID, GI Eval. hold heparin drip due to concern of poss GIB. resume if GI symptom improves. GI PCR to r/o infectious cause.     Abnormal LFT : multifactorial, last day ABX today. monitor LFT.      4. DVT: eliquis held, held Heparin gtt.     5.  MARYSOL with CKD 3: renal function worsening. likely pre renal, ATN 2/2 Hypotension.  urology eval for UPJ obstruction on CT, Us Renal : Mild right and trace left hydronephrosis. need to be conservative about IVF due to volume overloaded state at current. patient able to void, positive urine out put.   renal function improved     6. Gastric Lymphoma:  discussed with hematology, treatment on hold due to current condition, evidence of progression of the disease, palliative care eval noted.     7 acute on chronic RF with hypoxia: on 3L NC, IV abx rocephin /zithromax for pneumonia, management pleural effusion as above. duoneb ATC          Plan of care discussed with patient ;  all questions and concerns were addressed.

## 2025-04-13 NOTE — PROGRESS NOTE ADULT - ASSESSMENT
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,  11/2024 met poorly diff gastric ca(on presentation w neck, axillary, abdomen LADS, under care Dr Mckeon, [facility], has been on FOLFOX was to change to new chemo this week), episode MARYSOL req temporary HD prior to start of chemo 12/2024, 3/2025 LUE DVT likely assoc w HD catheter(removed) and left thoracentesis for large left effusion, 4/2025 adm for SOB post right PleurX  Has been on Bumex, adm w hypotension  W/U sig for--  -CT c/a/p 4/9 w decr right effusion, mod loculated left effuse, 1+--2+cm above/below diaphragm LADs, sclerotic bones, mod ascites  -US abdomen --small to mod ascites  -renal US -mild andreas hydro  -?prerenal azotemia due to Bumex, Nephrology on case  -Pulm Nephrology, ID on case      -post paracentesis 4/11 3.3L w some abdomen sx relieve  -Cr today incr again to 3.4 after yesterday's sl decr to 3.1  -new sig incr alk phos and mild incr SGOT since admission--?hepatorenal ?drug effect (4/9 CT no sig liver findings)  -anemia due to thalassemia, malignancy, chronic ds, acute illness, chemo--stable and similar to previous admission levels(microcytic MCV due to Thalassemia/chronic ds, no iron deficiency found)    -overall continues w sig malaise, and overall findings c/w progression of malignancy  -supportive and symptomatic management from Heme/Onc standpoint, pt to followup w own Heme/Onc Dr Mckeon for new chemotherapy as planned

## 2025-04-13 NOTE — PROGRESS NOTE ADULT - ASSESSMENT
ASSESSMENT & PLAN  64yM with a PMH of poorly differentiated metastatic gastric carcinoma on chemo, HTN, anemia, NSTEMI, DVT of LUE on 3/3/25 (on Eliquis and plavix), and recent history of bilateral pleural effusions requiring L thoracentesis 3/4/25 and 3/20/25, and R pleurx placement on 4/4/2025, now admitted with hypotension, oliguria, MARYSOL, suspected CAP. Patient underwent paracentesis with IR on 4/11 with 3550cc of cloudy yellow ascitic fluid removed from right abdomen. As per RN, around 300cc fluid drained from pleurx catheter 4/11.     - Will attempt to connect to pleur-evac to passively drain from pleurx  - f/u fluid culture from paracentesis  - GOC  - Continue antibiotics  - Supplemental O2 PRN  - Conservative use of IVF given pleural effusions

## 2025-04-13 NOTE — PROGRESS NOTE ADULT - SUBJECTIVE AND OBJECTIVE BOX
Date/Time Patient Seen:  		  Referring MD:   Data Reviewed	       Patient is a 64y old  Male who presents with a chief complaint of hypotension (13 Apr 2025 12:29)      Subjective/HPI     PAST MEDICAL & SURGICAL HISTORY:  No pertinent past medical history    Incisional hernia  2015    Gastric lymphoma    Anemia of chronic disease    Pleural effusion    DVT (deep venous thrombosis)    Hypertension    Hyperlipidemia    No significant past surgical history    S/P appendectomy  November 17th 2014    S/P cholecystectomy  15 years ago          Medication list         MEDICATIONS  (STANDING):  albumin human 25% IVPB 50 milliLiter(s) IV Intermittent every 8 hours  albuterol/ipratropium for Nebulization 3 milliLiter(s) Nebulizer every 6 hours  ALPRAZolam 0.25 milliGRAM(s) Oral at bedtime  atorvastatin 40 milliGRAM(s) Oral at bedtime  benzocaine/menthol Lozenge 1 Lozenge Oral once  cefTRIAXone   IVPB 2000 milliGRAM(s) IV Intermittent every 24 hours  chlorhexidine 2% Cloths 1 Application(s) Topical <User Schedule>  clopidogrel Tablet 75 milliGRAM(s) Oral daily  heparin  Infusion.  Unit(s)/Hr (17 mL/Hr) IV Continuous <Continuous>  influenza   Vaccine 0.5 milliLiter(s) IntraMuscular once  midodrine 10 milliGRAM(s) Oral every 8 hours  pantoprazole  Injectable 40 milliGRAM(s) IV Push two times a day  sucralfate suspension 1 Gram(s) Oral four times a day    MEDICATIONS  (PRN):  acetaminophen     Tablet .. 650 milliGRAM(s) Oral every 6 hours PRN Mild Pain (1 - 3)  acetaminophen   IVPB .. 1000 milliGRAM(s) IV Intermittent every 8 hours PRN Moderate Pain (4 - 6)  aluminum hydroxide/magnesium hydroxide/simethicone Suspension 30 milliLiter(s) Oral four times a day PRN Dyspepsia  bisacodyl Suppository 10 milliGRAM(s) Rectal daily PRN Constipation  heparin   Injectable 7500 Unit(s) IV Push every 6 hours PRN For aPTT less than 40  heparin   Injectable 3500 Unit(s) IV Push every 6 hours PRN For aPTT between 40 - 57  metoclopramide Injectable 10 milliGRAM(s) IV Push every 8 hours PRN nausea , vomiting  ondansetron Injectable 4 milliGRAM(s) IV Push every 6 hours PRN Nausea and/or Vomiting         Vitals log        ICU Vital Signs Last 24 Hrs  T(C): 36.7 (13 Apr 2025 11:30), Max: 37.1 (12 Apr 2025 20:59)  T(F): 98 (13 Apr 2025 11:30), Max: 98.7 (12 Apr 2025 20:59)  HR: 94 (13 Apr 2025 16:31) (78 - 95)  BP: 123/71 (13 Apr 2025 16:31) (100/69 - 123/71)  BP(mean): --  ABP: --  ABP(mean): --  RR: 20 (13 Apr 2025 11:30) (19 - 20)  SpO2: 92% (13 Apr 2025 11:30) (92% - 97%)    O2 Parameters below as of 13 Apr 2025 11:30  Patient On (Oxygen Delivery Method): nasal cannula  O2 Flow (L/min): 4               Input and Output:  I&O's Detail    12 Apr 2025 07:01  -  13 Apr 2025 07:00  --------------------------------------------------------  IN:    Heparin Infusion: 204 mL    IV PiggyBack: 50 mL    Oral Fluid: 850 mL  Total IN: 1104 mL    OUT:    Other (mL): 1000 mL    Voided (mL): 300 mL  Total OUT: 1300 mL    Total NET: -196 mL      13 Apr 2025 07:01  -  13 Apr 2025 19:22  --------------------------------------------------------  IN:    Heparin Infusion: 38 mL    IV PiggyBack: 50 mL    Oral Fluid: 390 mL  Total IN: 478 mL    OUT:    Voided (mL): 420 mL  Total OUT: 420 mL    Total NET: 58 mL          Lab Data                        9.2    13.59 )-----------( 554      ( 13 Apr 2025 09:15 )             29.4     04-13    133[L]  |  92[L]  |  88[H]  ----------------------------<  120[H]  5.0   |  27  |  3.40[H]    Ca    9.4      13 Apr 2025 09:15  Phos  5.4     04-13  Mg     3.0     04-13    TPro  6.3  /  Alb  2.3[L]  /  TBili  0.4  /  DBili  x   /  AST  48[H]  /  ALT  35  /  AlkPhos  493[H]  04-13            Review of Systems	      Objective     Physical Examination    heart s1s2  lung dec bS  head nc  head at      Pertinent Lab findings & Imaging      Jameson:  NO   Adequate UO     I&O's Detail    12 Apr 2025 07:01  -  13 Apr 2025 07:00  --------------------------------------------------------  IN:    Heparin Infusion: 204 mL    IV PiggyBack: 50 mL    Oral Fluid: 850 mL  Total IN: 1104 mL    OUT:    Other (mL): 1000 mL    Voided (mL): 300 mL  Total OUT: 1300 mL    Total NET: -196 mL      13 Apr 2025 07:01  -  13 Apr 2025 19:22  --------------------------------------------------------  IN:    Heparin Infusion: 38 mL    IV PiggyBack: 50 mL    Oral Fluid: 390 mL  Total IN: 478 mL    OUT:    Voided (mL): 420 mL  Total OUT: 420 mL    Total NET: 58 mL               Discussed with:     Cultures:	        Radiology

## 2025-04-13 NOTE — PROGRESS NOTE ADULT - SUBJECTIVE AND OBJECTIVE BOX
All interim records and events noted.    resting comfortably      MEDICATIONS  (STANDING):  albumin human 25% IVPB 50 milliLiter(s) IV Intermittent every 8 hours  albuterol/ipratropium for Nebulization 3 milliLiter(s) Nebulizer every 6 hours  ALPRAZolam 0.25 milliGRAM(s) Oral at bedtime  atorvastatin 40 milliGRAM(s) Oral at bedtime  benzocaine/menthol Lozenge 1 Lozenge Oral once  buMETAnide Injectable 2 milliGRAM(s) IV Push once  cefTRIAXone   IVPB 2000 milliGRAM(s) IV Intermittent every 24 hours  chlorhexidine 2% Cloths 1 Application(s) Topical <User Schedule>  clopidogrel Tablet 75 milliGRAM(s) Oral daily  influenza   Vaccine 0.5 milliLiter(s) IntraMuscular once  midodrine 10 milliGRAM(s) Oral every 8 hours  pantoprazole  Injectable 40 milliGRAM(s) IV Push two times a day  sorbitol 70%/mineral oil/magnesium hydroxide/glycerin Enema 120 milliLiter(s) Rectal once    MEDICATIONS  (PRN):  acetaminophen     Tablet .. 650 milliGRAM(s) Oral every 6 hours PRN Mild Pain (1 - 3)  acetaminophen   IVPB .. 1000 milliGRAM(s) IV Intermittent every 8 hours PRN Moderate Pain (4 - 6)  aluminum hydroxide/magnesium hydroxide/simethicone Suspension 30 milliLiter(s) Oral four times a day PRN Dyspepsia  bisacodyl Suppository 10 milliGRAM(s) Rectal daily PRN Constipation  metoclopramide Injectable 10 milliGRAM(s) IV Push every 8 hours PRN nausea , vomiting  ondansetron Injectable 4 milliGRAM(s) IV Push every 6 hours PRN Nausea and/or Vomiting      Vital Signs Last 24 Hrs  T(C): 36.8 (13 Apr 2025 05:01), Max: 37.1 (12 Apr 2025 20:59)  T(F): 98.2 (13 Apr 2025 05:01), Max: 98.7 (12 Apr 2025 20:59)  HR: 91 (13 Apr 2025 08:40) (78 - 91)  BP: 105/69 (13 Apr 2025 08:40) (100/69 - 122/73)  BP(mean): --  RR: 19 (13 Apr 2025 05:01) (18 - 19)  SpO2: 96% (13 Apr 2025 05:01) (95% - 97%)    Parameters below as of 13 Apr 2025 05:01  Patient On (Oxygen Delivery Method): nasal cannula  O2 Flow (L/min): 3      PHYSICAL EXAM  General: in no acute distress  Head: atraumatic, normocephalic  ENT: buccal mucosa moist  Neck: supple, trachea midline  CV: S1 S2, regular rate and rhythm  Lungs: clear to auscultation, no wheezes/rhonchi  Abdomen: mildly distended, not tense, bowel sounds present  Skin: no significant increased ecchymosis/petechiae        LABS:             9.2    13.59 )-----------( 554      ( 04-13 @ 09:15 )             29.4                9.0    10.77 )-----------( 532      ( 04-12 @ 09:11 )             28.8                8.8    11.47 )-----------( 524      ( 04-12 @ 01:17 )             28.2                8.9    9.66  )-----------( 514      ( 04-11 @ 06:17 )             28.8                8.6    9.37  )-----------( 565      ( 04-10 @ 10:27 )             27.3       04-13    133[L]  |  92[L]  |  88[H]  ----------------------------<  120[H]  5.0   |  27  |  3.40[H]    Ca    9.4      13 Apr 2025 09:15  Phos  5.4     04-13  Mg     3.0     04-13    TPro  6.3  /  Alb  2.3[L]  /  TBili  0.4  /  DBili  x   /  AST  48[H]  /  ALT  35  /  AlkPhos  493[H]  04-13 04-13 @ 09:15  PT-- INR--  PTT43.2  04-13 @ 01:10  PT-- INR--  ZCQ499.9  04-12 @ 17:50  PT-- INR--  PTT74.5  04-12 @ 09:11  PT-- INR--  PTT46.0  04-12 @ 01:17  PT-- INR--  PTT41.1  04-11 @ 01:26  PT-- INR--  PTT71.5  04-10 @ 16:00  PT-- INR--  PTT79.6  04-10 @ 10:27  PT-- INR--  MFZ252.3  04-10 @ 07:22  PT16.7 INR1.43  PTT50.6  04-09 @ 11:35  PT18.4 INR1.57  PTT29.3      RADIOLOGY & ADDITIONAL STUDIES:    IMPRESSION/RECOMMENDATIONS:

## 2025-04-13 NOTE — PROGRESS NOTE ADULT - ASSESSMENT
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 Admitted for hypotension, MARYSOL, hyponatremia, and present chronic pleural effusion.     Hypotension, chronic pleural Effusion, HFpEF   - Pt with worsening SOB, has been admitted twice in the past month in which thoracentesis was performed which drained malignant pleural effusions on the left, s/p Pleurx (4/4), per pt catheter is not draining much per home nurse  - CXR: There is a left effusion with atelectatic change. Underlying inflammatory infectious process not excluded. Smaller right effusion with some increased interstitial markings in the right infrahilar region which are less prominent than before.  - CT surg following   Pleurx drain every other day     - hx of CAD recent NSTEMI 3/2025, medically managed without cath  - EKG NSR low voltage  - Trop neg x1 (trop 72), lactate: 2.7, pro BNP: 3483  - no anginal complaint   - continue Plavix, statin    - TTE (3/20/2025) shows EF 60-65%, grade 1 diastolic dysfunction.   - MARYSOL, creat: 3.5, continue to trend renal indices, might need CRRT, nephro following   -Ct with no obstruction seen by urology   -fu renal recs  - Strict I/Os, daily weights.  -for lasix IVP x 1 with albumin renal recs reviewed     - home antihypertensives on hold (labetalol, losartan)  - now on midodrine 10mg TID + albumin  - Monitor and replete Lytes. Keep K > 4 and Mg > 2    - home Eliquis held for paracentesis now on heparin for possible repeat paracentesis   -sp paracentesis 4/11 3300 cc drained fu fluid culture     - at risk for abrupt decompensation  - rec GOC discussion  65 y/o male with PMHx of poorly differentiated metastatic (known cervical and axillary lymphadenopathy) GI carcinoma, LUE and LLE DVT, HTN, on home O2 2/2 to SOB, and anemia presents to the ED with hypotension and SOB. Admitted for hypotension, MARYSOL, hyponatremia, and present chronic pleural effusion.     Hypotension, chronic pleural Effusion, HFpEF   - Pt with worsening SOB, has been admitted twice in the past month in which thoracentesis was performed which drained malignant pleural effusions on the left, s/p Pleurx (4/4), per pt catheter is not draining much per home nurse  - CXR: There is a left effusion with atelectatic change. Underlying inflammatory infectious process not excluded. Smaller right effusion with some increased interstitial markings in the right infrahilar region which are less prominent than before.  - CT surg following   Pleurx drain every other day     - hx of CAD recent NSTEMI 3/2025, medically managed without cath  - EKG NSR low voltage  - Trop neg x1 (trop 72), lactate: 2.7, pro BNP: 3483  - no anginal complaint   - continue Plavix, statin    - TTE (3/20/2025) shows EF 60-65%, grade 1 diastolic dysfunction.   - MARYSOL, creat: 3.5, continue to trend renal indices, might need CRRT, nephro following   -Ct with no obstruction seen by urology   -fu renal recs  - Strict I/Os, daily weights.  -for bumex IVP x 1 with albumin renal recs reviewed     - home antihypertensives on hold (labetalol, losartan)  - now on midodrine 10mg TID + albumin  - Monitor and replete Lytes. Keep K > 4 and Mg > 2    - home Eliquis held for paracentesis now on heparin for possible repeat paracentesis   -sp paracentesis 4/11 3300 cc drained fu fluid culture     - at risk for abrupt decompensation  - rec GOC discussion

## 2025-04-13 NOTE — CONSULT NOTE ADULT - ASSESSMENT
Metastatic gastric CA  Ascites s/p para  Shortness of breath   Nausea vomiting  Cholestatic liver injury    Recommendations:  - PPI 40mg PO QD  - Carafate liquid QID  - zofran prn nausea  - f/u Heme/onc recs  - trend LFTs' ALP elevation multifactorial; if continues to rise can repeat RUQ U/S or MRI/MRCP  - Advance diet as tolerated  - Will follow with you    Tyler Brown M.D.  [facility]  [phone]    Advanced care planning was discussed with patient and family.  Advanced care planning forms were reviewed and discussed.  Risks, benefits and alternatives of gastroenterologic procedures were discussed in detail and all questions were answered  60 minutes spent on total encounter of which more than fifty percent of the encounter was spent counseling and/or coordinating care by the attending physician.

## 2025-04-13 NOTE — PROGRESS NOTE ADULT - SUBJECTIVE AND OBJECTIVE BOX
[facility] -- Charles Martinez Pannella, Patel, Savella Goodger, Cohen  Office # [phone]      Follow Up:  Shortness of breath     Subjective/Observations:     Seen bedside, overnight with nausea   now resting in nad remains on nc   REVIEW OF SYSTEMS: All other review of systems is negative unless indicated above    PAST MEDICAL & SURGICAL HISTORY:  Gastric lymphoma      Anemia of chronic disease      Pleural effusion      DVT (deep venous thrombosis)      Hypertension      Hyperlipidemia      S/P appendectomy  2014      S/P cholecystectomy  15 years ago          MEDICATIONS  (STANDING):  albumin human 25% IVPB 50 milliLiter(s) IV Intermittent every 8 hours  albuterol/ipratropium for Nebulization 3 milliLiter(s) Nebulizer every 6 hours  ALPRAZolam 0.25 milliGRAM(s) Oral at bedtime  atorvastatin 40 milliGRAM(s) Oral at bedtime  benzocaine/menthol Lozenge 1 Lozenge Oral once  buMETAnide Injectable 2 milliGRAM(s) IV Push once  cefTRIAXone   IVPB 2000 milliGRAM(s) IV Intermittent every 24 hours  chlorhexidine 2% Cloths 1 Application(s) Topical <User Schedule>  clopidogrel Tablet 75 milliGRAM(s) Oral daily  heparin  Infusion.  Unit(s)/Hr (17 mL/Hr) IV Continuous <Continuous>  influenza   Vaccine 0.5 milliLiter(s) IntraMuscular once  midodrine 10 milliGRAM(s) Oral every 8 hours  pantoprazole    Tablet 40 milliGRAM(s) Oral before breakfast    MEDICATIONS  (PRN):  acetaminophen     Tablet .. 650 milliGRAM(s) Oral every 6 hours PRN Mild Pain (1 - 3)  acetaminophen   IVPB .. 1000 milliGRAM(s) IV Intermittent every 8 hours PRN Moderate Pain (4 - 6)  aluminum hydroxide/magnesium hydroxide/simethicone Suspension 30 milliLiter(s) Oral four times a day PRN Dyspepsia  bisacodyl Suppository 10 milliGRAM(s) Rectal daily PRN Constipation  heparin   Injectable 7500 Unit(s) IV Push every 6 hours PRN For aPTT less than 40  heparin   Injectable 3500 Unit(s) IV Push every 6 hours PRN For aPTT between 40 - 57  ondansetron Injectable 4 milliGRAM(s) IV Push every 8 hours PRN Nausea and/or Vomiting      Allergies    Bactrim DS (Hives)    Intolerances        Vital Signs Last 24 Hrs  T(C): 36.8 (2025 05:01), Max: 37.1 (2025 20:59)  T(F): 98.2 (2025 05:01), Max: 98.7 (2025 20:59)  HR: 91 (2025 08:40) (78 - 91)  BP: 105/69 (2025 08:40) (100/69 - 122/73)  BP(mean): --  RR: 19 (2025 05:01) (18 - 19)  SpO2: 96% (2025 05:01) (95% - 97%)    Parameters below as of 2025 05:01  Patient On (Oxygen Delivery Method): nasal cannula  O2 Flow (L/min): 3      I&O's Summary    2025 07:01  -  2025 07:00  --------------------------------------------------------  IN: 1104 mL / OUT: 1300 mL / NET: -196 mL          PHYSICAL EXAM:  TELE: Sr  Constitutional: NAD, awake and alert, well-developed  HEENT: Moist Mucous Membranes, Anicteric  Pulmonary: Non-labored, breath sounds are Dim  Cardiovascular: Regular, S1 and S2 nl, No murmurs, rubs, gallops or clicks  Gastrointestinal: Bowel Sounds present, soft, nontender.   Lymph: + peripheral edema.  Skin: No visible rashes or ulcers.  Psych:  Mood & affect appropriate    LABS: All Labs Reviewed:                        9.0    10.77 )-----------( 532      ( 2025 09:11 )             28.8                         8.8    11.47 )-----------( 524      ( 2025 01:17 )             28.2                         8.9    9.66  )-----------( 514      ( 2025 06:17 )             28.8     2025 09:11    132    |  94     |  80     ----------------------------<  104    4.7     |  25     |  3.10   2025 06:17    129    |  97     |  83     ----------------------------<  96     4.9     |  22     |  3.60     Ca    9.2        2025 09:11  Ca    9.0        2025 06:17  Phos  5.4       2025 09:11  Phos  5.7       2025 06:17  Mg     2.7       2025 09:11  Mg     2.9       2025 06:17    TPro  6.4    /  Alb  2.3    /  TBili  0.4    /  DBili  x      /  AST  37     /  ALT  23     /  AlkPhos  304    2025 09:11  TPro  6.0    /  Alb  2.2    /  TBili  0.3    /  DBili  x      /  AST  28     /  ALT  16     /  AlkPhos  120    2025 06:17    PTT - ( 2025 01:10 )  PTT:120.9 sec         EC Lead ECG:   Ventricular Rate 69 BPM    Atrial Rate 69 BPM    P-R Interval 154 ms    QRS Duration 94 ms    Q-T Interval 396 ms    QTC Calculation(Bazett) 424 ms    P Axis 53 degrees    R Axis -17 degrees    T Axis -4 degrees    Diagnosis Line Normal sinus rhythm  Low voltage QRS  Inferior infarct (cited on or before 19-MAR-2025)    Confirmed by CHIKA CABRERA ([id number]) on 2025 2:09:12 PM (25 @ 13:48)      TRANSTHORACIC ECHOCARDIOGRAM REPORT  ________________________________________________________________________________                                      _______       Pt. Name:       JESSE MARQUEZNATHANIEL Study Date:    4/10/2025  MRN:     [id number]                   YOB: 1960  Accession #:    [id number]                  Age:           64 years  Account#:       [id number]                 Gender:        M  Heart Rate:                                Height:        66.93in (170.00 cm)  Rhythm:                                    Weight:        205.03 lb (93.00 kg)  Blood Pressure: 86/48 mmHg                 BSA/BMI:       2.04 m² / 32.18 kg/m²  ________________________________________________________________________________________  Referring Physician:    [id number] Oswaldo Snowden  Interpreting Physician: Alberto Gilliam M.D.  Primary Sonographer:    Kennedy Eugene    CPT:               ECHO TTE WO CON COMP W DOPP - 97814.m  Indication(s):     Dyspnea, unspecified- R06.00  Procedure:         Transthoracic echocardiogram with 2-D, M-mode and complete                     spectral and color flow Doppler.  Ordering Location: [facility]  Admission Status:  Inpatient  Study Information: Image quality for this study is technically difficult.    _______________________________________________________________________________________     CONCLUSIONS:      1. Technically difficult image quality.   2. Left ventricular cavity is normal in size. Left ventricular systolic function is normal with an ejection fraction of 54 % by David's method of disks.   3. Normal right ventricular cavity size and normal right ventricular systolic function.   4. Mild mitral regurgitation.   5. Abdominal ascites is incidentally noted.   6. Estimated pulmonary artery systolic pressure is 10 mmHg.   7. Large right and small left pleural effusion noted.   8. Small pericardial effusion with no echocardiographic evidence of tamponade physiology.   9. Compared to the transthoracic echocardiogram performed on 3/20/2025, there have been no significant cardiac interval changes.    ________________________________________________________________________________________  FINDINGS:     Left Ventricle:  The left ventricular cavity is normal insize. Left ventricular systolic function is normal with a calculated ejection fraction of 54 % by the David's biplane method of disks.     Right Ventricle:  The right ventricular cavity is normal in size and right ventricular systolic function is normal. Tricuspid annular plane systolic excursion (TAPSE) is 2.6 cm (normal >=1.7 cm).     Left Atrium:  The left atrium is normal in size with an indexed volume of 28.21 ml/m².     Right Atrium:  The right atrium is normal in size.     Interatrial Septum:  The interatrial septum appears intact.     Aortic Valve:  The aortic valve appears trileaflet. There is calcification of the aortic valve leaflets. There is fibrocalcific aortic valve sclerosis without stenosis. The peak transaortic velocity is1.30 m/s, peak transaortic gradient is 6.8 mmHg and mean transaortic gradient is 4.0 mmHg. There is no evidence of aortic regurgitation.     Mitral Valve:  Structurally normal mitral valve with normal leaflet excursion. There is calcification of the mitral valve annulus. There is mild mitral regurgitation.     Tricuspid Valve:  Structurally normal tricuspid valve with normal leaflet excursion. There is trace tricuspid regurgitation. Estimated pulmonary artery systolic pressure is 10 mmHg.     Pulmonic Valve:  Structurally normal pulmonic valve with normal leaflet excursion. There is mild pulmonic regurgitation.     Aorta:  The aortic root at the sinuses of Valsalva is normal in size, measuring 3.30 cm (indexed 1.62 cm/m²). The ascending aortais normal in size, measuring 3.40 cm (indexed 1.67 cm/m²).     Pericardium:  There is a small pericardial effusion with no echocardiographic evidence of tamponade physiology.     Pleura:  Large right and small left pleural effusion noted.     Systemic Veins:  The inferior vena cava is normal in size measuring 1.91 cm in diameter, (normal <2.1cm) with normal inspiratory collapse (normal >50%) consistent with normal right atrial pressure (~3, range 0-5mmHg).     Additional Findings and Comments:  The presence of abdominal ascites is incidentally noted.  ____________________________________________________________________  QUANTITATIVE DATA:  Left Ventricle Measurements: (Indexed to BSA)     IVSd (2D):   1.1 cm  LVPWd (2D):  1.2 cm  LVIDd (2D):4.7 cm  LVIDs (2D):  3.3 cm  LV Mass:     201 g  98.4 g/m²  LV Vol d, MOD A2C: 86.0 ml 42.12 ml/m²  LV Vol d, MOD A4C: 88.1 ml 43.14 ml/m²  LV Vol d, MOD BP:  87.9 ml 43.05 ml/m²  LV Vol s, MOD A2C: 42.2 ml 20.67 ml/m²  LV Vol s, MOD A4C: 38.5 ml 18.85 ml/m²  LV Vol s, MOD BP:  40.5 ml 19.81 ml/m²  LVOT SV MOD BP:    47.4 ml  LV EF% MOD BP:     54 %     MV E Vmax:    0.86 m/s  MV A Vmax:    0.76 m/s  MV E/A:       1.12  e' lateral:   9.79 cm/s  e' medial:    6.53 cm/s  E/e' lateral: 8.77  E/e' medial:  13.15  E/e' Average: 10.53  MV DT:        201 msec    Aorta Measurements: (Normal range) (Indexed to BSA)     Ao Root d     3.30 cm (3.1 - 3.7 cm) 1.62 cm/m²  Ao Asc d, 2D: 3.40  Ao Asc prox:  3.40 cm                1.67 cm/m²            Left Atrium Measurements: (Indexed to BSA)  LA Diam 2D: 4.30 cm         Right Ventricle Measurements:     TAPSE: 2.6 cm       LVOT / RVOT/ Qp/Qs Data: (Indexed to BSA)  LVOT Diameter,s: 2.10 cm  LVOT Area:       3.46 cm²  LVOT Vmax:       1.04 m/s  LVOT Vmn:        0.650 m/s  LVOT VTI:        17.50 cm  LVOT peak grad:  4 mmHg  LVOT mean grad:  2.0 mmHg  LVOT SV:         60.6 ml   29.68 ml/m²    Aortic Valve Measurements:  AV Vmax:                1.3 m/s  AV Peak Gradient:       6.8 mmHg  AV Mean Gradient:       4.0 mmHg  AV VTI:                 23.3 cm  AV VTI Ratio:           0.75  AoV EOA, Contin:        2.60 cm²  AoV EOA, Contin i:      1.27 cm²/m²  AoV Dimensionless Index 0.75    Mitral Valve Measurements:     MV E Vmax: 0.9 m/s         MR Vmax:         1.47 m/s  MV A Vmax: 0.8 m/s         MR Peak Gradient: 8.6 mmHg  MV E/A:    1.1       Tricuspid Valve Measurements:     TR Vmax:          1.3 m/s  TR Peak Gradient: 7.2 mmHg  RA Pressure:      3 mmHg  PASP:             10 mmHg    ________________________________________________________________________________________  Electronically signed on 4/10/2025 at 1:35:40 PM by Alberto Gilliam M.D.         *** Final ***      Radiology:

## 2025-04-13 NOTE — CONSULT NOTE ADULT - SUBJECTIVE AND OBJECTIVE BOX
[facility]    Scar Kirill Bernal NP    [address] [place] [address] [phone]      Chief Complaint:  Patient is a 64y old  Male who presents with a chief complaint of hypotension (2025 12:09)      HPI:    Allergies:  Bactrim DS (Hives)      Medications:  acetaminophen     Tablet .. 650 milliGRAM(s) Oral every 6 hours PRN  acetaminophen   IVPB .. 1000 milliGRAM(s) IV Intermittent every 8 hours PRN  albumin human 25% IVPB 50 milliLiter(s) IV Intermittent every 8 hours  albuterol/ipratropium for Nebulization 3 milliLiter(s) Nebulizer every 6 hours  ALPRAZolam 0.25 milliGRAM(s) Oral at bedtime  aluminum hydroxide/magnesium hydroxide/simethicone Suspension 30 milliLiter(s) Oral four times a day PRN  atorvastatin 40 milliGRAM(s) Oral at bedtime  benzocaine/menthol Lozenge 1 Lozenge Oral once  bisacodyl Suppository 10 milliGRAM(s) Rectal daily PRN  cefTRIAXone   IVPB 2000 milliGRAM(s) IV Intermittent every 24 hours  chlorhexidine 2% Cloths 1 Application(s) Topical <User Schedule>  clopidogrel Tablet 75 milliGRAM(s) Oral daily  influenza   Vaccine 0.5 milliLiter(s) IntraMuscular once  metoclopramide Injectable 10 milliGRAM(s) IV Push every 8 hours PRN  midodrine 10 milliGRAM(s) Oral every 8 hours  ondansetron Injectable 4 milliGRAM(s) IV Push every 6 hours PRN  pantoprazole  Injectable 40 milliGRAM(s) IV Push two times a day  sorbitol 70%/mineral oil/magnesium hydroxide/glycerin Enema 120 milliLiter(s) Rectal once      PMHX/PSHX:  No pertinent past medical history    Incisional hernia    Gastric lymphoma    Anemia of chronic disease    Pleural effusion    DVT (deep venous thrombosis)    Hypertension    Hyperlipidemia    No significant past surgical history    S/P appendectomy    S/P cholecystectomy        Family history:  Family history of coronary artery disease in father        Social History:     ROS:     General:  No wt loss, fevers, chills, night sweats, fatigue,   Eyes:  Good vision, no reported pain  ENT:  No sore throat, pain, runny nose, dysphagia  CV:  No pain, palpitations, hypo/hypertension  Resp:  No dyspnea, cough, tachypnea, wheezing  GI:  No pain, No nausea, No vomiting, No diarrhea, No constipation, No weight loss, No fever, No pruritis, No rectal bleeding, No tarry stools, No dysphagia,  :  No pain, bleeding, incontinence, nocturia  Muscle:  No pain, weakness  Neuro:  No weakness, tingling, memory problems  Psych:  No fatigue, insomnia, mood problems, depression  Endocrine:  No polyuria, polydipsia, cold/heat intolerance  Heme:  No petechiae, ecchymosis, easy bruisability  Skin:  No rash, tattoos, scars, edema      PHYSICAL EXAM:   Vital Signs:  Vital Signs Last 24 Hrs  T(C): 36.7 (2025 11:30), Max: 37.1 (2025 20:59)  T(F): 98 (2025 11:30), Max: 98.7 (2025 20:59)  HR: 95 (2025 11:30) (78 - 95)  BP: 107/69 (2025 11:30) (100/69 - 122/73)  BP(mean): --  RR: 20 (2025 11:30) (18 - 20)  SpO2: 92% (2025 11:30) (92% - 97%)    Parameters below as of 2025 11:30  Patient On (Oxygen Delivery Method): nasal cannula  O2 Flow (L/min): 4    Daily     Daily Weight in k.4 (2025 05:01)    GENERAL:  Appears stated age, well-groomed, well-nourished, no distress  HEENT:  NC/AT,  conjunctivae clear and pink, no thyromegaly, nodules, adenopathy, no JVD, sclera -anicteric  CHEST:  Full & symmetric excursion, no increased effort, breath sounds clear  HEART:  Regular rhythm, S1, S2, no murmur/rub/S3/S4, no abdominal bruit, no edema  ABDOMEN:  Soft, non-tender, non-distended, normoactive bowel sounds,  no masses ,no hepato-splenomegaly, no signs of chronic liver disease  EXTEREMITIES:  no cyanosis,clubbing or edema  SKIN:  No rash/erythema/ecchymoses/petechiae/wounds/abscess/warm/dry  NEURO:  Alert, oriented, no asterixis, no tremor, no encephalopathy    LABS:                        9.2    13.59 )-----------( 554      ( 2025 09:15 )             29.4         133[L]  |  92[L]  |  88[H]  ----------------------------<  120[H]  5.0   |  27  |  3.40[H]    Ca    9.4      2025 09:15  Phos  5.4       Mg     3.0         TPro  6.3  /  Alb  2.3[L]  /  TBili  0.4  /  DBili  x   /  AST  48[H]  /  ALT  35  /  AlkPhos  493[H]      LIVER FUNCTIONS - ( 2025 09:15 )  Alb: 2.3 g/dL / Pro: 6.3 g/dL / ALK PHOS: 493 U/L / ALT: 35 U/L / AST: 48 U/L / GGT: x           PTT - ( 2025 09:15 )  PTT:43.2 sec  Urinalysis Basic - ( 2025 09:15 )    Color: x / Appearance: x / SG: x / pH: x  Gluc: 120 mg/dL / Ketone: x  / Bili: x / Urobili: x   Blood: x / Protein: x / Nitrite: x   Leuk Esterase: x / RBC: x / WBC x   Sq Epi: x / Non Sq Epi: x / Bacteria: x    Imaging:  < from: CT Abdomen and Pelvis No Cont (25 @ 22:22) >  1.  RIGHT pleural effusion, diminished , post percutaneous   thoracocentesis catheter.  2.  Moderate loculated LEFT pleural effusion and adjacent LLL atelectasis   and/or consolidation, not significantly changed.  3.  Moderate volume abdominal ascites, increased/developed from prior   exams. Nodular thickening of pleural and peritoneal surfaces c/w   malignant etiology  4.  Diffuse thoracoabdominal lymphadenopathy, enlarged/developed in   inguinal region.  5.  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 650 milliGRAM(s) Oral every 6 hours PRN  acetaminophen   IVPB .. 1000 milliGRAM(s) IV Intermittent every 8 hours PRN  albumin human 25% IVPB 50 milliLiter(s) IV Intermittent every 8 hours  albuterol/ipratropium for Nebulization 3 milliLiter(s) Nebulizer every 6 hours  ALPRAZolam 0.25 milliGRAM(s) Oral at bedtime  aluminum hydroxide/magnesium hydroxide/simethicone Suspension 30 milliLiter(s) Oral four times a day PRN  atorvastatin 40 milliGRAM(s) Oral at bedtime  benzocaine/menthol Lozenge 1 Lozenge Oral once  bisacodyl Suppository 10 milliGRAM(s) Rectal daily PRN  cefTRIAXone   IVPB 2000 milliGRAM(s) IV Intermittent every 24 hours  chlorhexidine 2% Cloths 1 Application(s) Topical <User Schedule>  clopidogrel Tablet 75 milliGRAM(s) Oral daily  influenza   Vaccine 0.5 milliLiter(s) IntraMuscular once  metoclopramide Injectable 10 milliGRAM(s) IV Push every 8 hours PRN  midodrine 10 milliGRAM(s) Oral every 8 hours  ondansetron Injectable 4 milliGRAM(s) IV Push every 6 hours PRN  pantoprazole  Injectable 40 milliGRAM(s) IV Push two times a day  sorbitol 70%/mineral oil/magnesium hydroxide/glycerin Enema 120 milliLiter(s) Rectal once      PMHX/PSHX:  No pertinent past medical history    Incisional hernia    Gastric lymphoma    Anemia of chronic disease    Pleural effusion    DVT (deep venous thrombosis)    Hypertension    Hyperlipidemia    No significant past surgical history    S/P appendectomy    S/P cholecystectomy        Family history:  Family history of coronary artery disease in father        Social History:     ROS:     General:  No wt loss, fevers, chills, night sweats, fatigue,   Eyes:  Good vision, no reported pain  ENT:  No sore throat, pain, runny nose, dysphagia  CV:  No pain, palpitations, hypo/hypertension  Resp:  No dyspnea, cough, tachypnea, wheezing  GI:  No pain, No nausea, No vomiting, No diarrhea, No constipation, No weight loss, No fever, No pruritis, No rectal bleeding, No tarry stools, No dysphagia,  :  No pain, bleeding, incontinence, nocturia  Muscle:  No pain, weakness  Neuro:  No weakness, tingling, memory problems  Psych:  No fatigue, insomnia, mood problems, depression  Endocrine:  No polyuria, polydipsia, cold/heat intolerance  Heme:  No petechiae, ecchymosis, easy bruisability  Skin:  No rash, tattoos, scars, edema      PHYSICAL EXAM:   Vital Signs:  Vital Signs Last 24 Hrs  T(C): 36.7 (2025 11:30), Max: 37.1 (2025 20:59)  T(F): 98 (2025 11:30), Max: 98.7 (2025 20:59)  HR: 95 (2025 11:30) (78 - 95)  BP: 107/69 (2025 11:30) (100/69 - 122/73)  BP(mean): --  RR: 20 (2025 11:30) (18 - 20)  SpO2: 92% (2025 11:30) (92% - 97%)    Parameters below as of 2025 11:30  Patient On (Oxygen Delivery Method): nasal cannula  O2 Flow (L/min): 4    Daily     Daily Weight in k.4 (2025 05:01)      General: in no acute distress  Head: atraumatic, normocephalic  ENT: buccal mucosa moist  Neck: supple, trachea midline  CV: S1 S2, regular rate and rhythm  Lungs: clear to auscultation, no wheezes/rhonchi  Abdomen: mildly distended, not tense, bowel sounds present  Skin: no significant increased ecchymosis/petechiae      LABS:                        9.2    13.59 )-----------( 554      ( 2025 09:15 )             29.4         133[L]  |  92[L]  |  88[H]  ----------------------------<  120[H]  5.0   |  27  |  3.40[H]    Ca    9.4      2025 09:15  Phos  5.4       Mg     3.0         TPro  6.3  /  Alb  2.3[L]  /  TBili  0.4  /  DBili  x   /  AST  48[H]  /  ALT  35  /  AlkPhos  493[H]      LIVER FUNCTIONS - ( 2025 09:15 )  Alb: 2.3 g/dL / Pro: 6.3 g/dL / ALK PHOS: 493 U/L / ALT: 35 U/L / AST: 48 U/L / GGT: x           PTT - ( 2025 09:15 )  PTT:43.2 sec  Urinalysis Basic - ( 2025 09:15 )    Color: x / Appearance: x / SG: x / pH: x  Gluc: 120 mg/dL / Ketone: x  / Bili: x / Urobili: x   Blood: x / Protein: x / Nitrite: x   Leuk Esterase: x / RBC: x / WBC x   Sq Epi: x / Non Sq Epi: x / Bacteria: x    Imaging:  < from: CT Abdomen and Pelvis No Cont (25 @ 22:22) >  1.  RIGHT pleural effusion, diminished , post percutaneous   thoracocentesis catheter.  2.  Moderate loculated LEFT pleural effusion and adjacent LLL atelectasis   and/or consolidation, not significantly changed.  3.  Moderate volume abdominal ascites, increased/developed from prior   exams. Nodular thickening of pleural and peritoneal surfaces c/w   malignant etiology  4.  Diffuse thoracoabdominal lymphadenopathy, enlarged/developed in   inguinal region.  5.  Sclerotic osseous metastasis, not significantly changed    --- End of Report ---            < end of copied text >

## 2025-04-13 NOTE — PROGRESS NOTE ADULT - ASSESSMENT
· Assessment	  64-year-old male with a history of GI carcinoma, metastatic lymphadenopathy, DVT, hypertension, anemia presents with has been having some persistent shortness of breath since discharge.  The patient was admitted for shortness of breath, had a thoracentesis performed while in the hospital.  Now the patient has been complaining of persistent shortness of breath, but was hypotensive     pleurx R  Hypotension  mets ca  eval Acute Infection  GI Carcinoma  Anemia  DVT hx  MARYSOL    s/p paracentesis - 3300 cc drained -   abx in progress  pall note reviewed  ID follow up  thoracic and onc follow up  I and O  I moustapha  fio2 support to keep sat > 88 pct  on HEP gtt    HD monitoring and Support  pleurx drain as tolerated - 100 - 1000 per drain attempt - M W F   I moustapha  emp ABX  cx - biomarkers  thoracic follow up  cardio follow up  monitor VS and HD and Sat  goal sat > 88 pct  trend renal indices  I and O  replete lytes

## 2025-04-14 LAB
ALBUMIN SERPL ELPH-MCNC: 2.6 G/DL — LOW (ref 3.3–5)
ALP SERPL-CCNC: 442 U/L — HIGH (ref 40–120)
ALT FLD-CCNC: 33 U/L — SIGNIFICANT CHANGE UP (ref 12–78)
ANION GAP SERPL CALC-SCNC: 11 MMOL/L — SIGNIFICANT CHANGE UP (ref 5–17)
APTT BLD: 64.1 SEC — HIGH (ref 24.5–35.6)
APTT BLD: 76.2 SEC — HIGH (ref 24.5–35.6)
AST SERPL-CCNC: 38 U/L — HIGH (ref 15–37)
BASOPHILS # BLD AUTO: 0.05 K/UL — SIGNIFICANT CHANGE UP (ref 0–0.2)
BASOPHILS NFR BLD AUTO: 0.4 % — SIGNIFICANT CHANGE UP (ref 0–2)
BILIRUB SERPL-MCNC: 0.4 MG/DL — SIGNIFICANT CHANGE UP (ref 0.2–1.2)
BUN SERPL-MCNC: 86 MG/DL — HIGH (ref 7–23)
CALCIUM SERPL-MCNC: 8.9 MG/DL — SIGNIFICANT CHANGE UP (ref 8.5–10.1)
CHLORIDE SERPL-SCNC: 90 MMOL/L — LOW (ref 96–108)
CO2 SERPL-SCNC: 29 MMOL/L — SIGNIFICANT CHANGE UP (ref 22–31)
CREAT SERPL-MCNC: 3.8 MG/DL — HIGH (ref 0.5–1.3)
CULTURE RESULTS: SIGNIFICANT CHANGE UP
EGFR: 17 ML/MIN/1.73M2 — LOW
EGFR: 17 ML/MIN/1.73M2 — LOW
EOSINOPHIL # BLD AUTO: 0.03 K/UL — SIGNIFICANT CHANGE UP (ref 0–0.5)
EOSINOPHIL NFR BLD AUTO: 0.2 % — SIGNIFICANT CHANGE UP (ref 0–6)
GLUCOSE SERPL-MCNC: 117 MG/DL — HIGH (ref 70–99)
HCT VFR BLD CALC: 28.5 % — LOW (ref 39–50)
HCT VFR BLD CALC: 29.4 % — LOW (ref 39–50)
HGB BLD-MCNC: 8.8 G/DL — LOW (ref 13–17)
HGB BLD-MCNC: 9 G/DL — LOW (ref 13–17)
IMM GRANULOCYTES NFR BLD AUTO: 1 % — HIGH (ref 0–0.9)
LYMPHOCYTES # BLD AUTO: 0.49 K/UL — LOW (ref 1–3.3)
LYMPHOCYTES # BLD AUTO: 3.6 % — LOW (ref 13–44)
MAGNESIUM SERPL-MCNC: 3.1 MG/DL — HIGH (ref 1.6–2.6)
MCHC RBC-ENTMCNC: 19.5 PG — LOW (ref 27–34)
MCHC RBC-ENTMCNC: 19.5 PG — LOW (ref 27–34)
MCHC RBC-ENTMCNC: 30.6 G/DL — LOW (ref 32–36)
MCHC RBC-ENTMCNC: 30.9 G/DL — LOW (ref 32–36)
MCV RBC AUTO: 63.2 FL — LOW (ref 80–100)
MCV RBC AUTO: 63.8 FL — LOW (ref 80–100)
MONOCYTES # BLD AUTO: 1.35 K/UL — HIGH (ref 0–0.9)
MONOCYTES NFR BLD AUTO: 9.8 % — SIGNIFICANT CHANGE UP (ref 2–14)
NEUTROPHILS # BLD AUTO: 11.66 K/UL — HIGH (ref 1.8–7.4)
NEUTROPHILS NFR BLD AUTO: 85 % — HIGH (ref 43–77)
NRBC BLD AUTO-RTO: 0 /100 WBCS — SIGNIFICANT CHANGE UP (ref 0–0)
NRBC BLD AUTO-RTO: 0 /100 WBCS — SIGNIFICANT CHANGE UP (ref 0–0)
PATHOLOGIST REVIEW: SIGNIFICANT CHANGE UP
PHOSPHATE SERPL-MCNC: 5 MG/DL — HIGH (ref 2.5–4.5)
PLATELET # BLD AUTO: 503 K/UL — HIGH (ref 150–400)
PLATELET # BLD AUTO: 546 K/UL — HIGH (ref 150–400)
POTASSIUM SERPL-MCNC: 4.8 MMOL/L — SIGNIFICANT CHANGE UP (ref 3.5–5.3)
POTASSIUM SERPL-SCNC: 4.8 MMOL/L — SIGNIFICANT CHANGE UP (ref 3.5–5.3)
PROT SERPL-MCNC: 6.3 G/DL — SIGNIFICANT CHANGE UP (ref 6–8.3)
RBC # BLD: 4.51 M/UL — SIGNIFICANT CHANGE UP (ref 4.2–5.8)
RBC # BLD: 4.61 M/UL — SIGNIFICANT CHANGE UP (ref 4.2–5.8)
RBC # FLD: 20.8 % — HIGH (ref 10.3–14.5)
RBC # FLD: 20.9 % — HIGH (ref 10.3–14.5)
SODIUM SERPL-SCNC: 130 MMOL/L — LOW (ref 135–145)
SPECIMEN SOURCE: SIGNIFICANT CHANGE UP
WBC # BLD: 13.6 K/UL — HIGH (ref 3.8–10.5)
WBC # BLD: 13.72 K/UL — HIGH (ref 3.8–10.5)
WBC # FLD AUTO: 13.6 K/UL — HIGH (ref 3.8–10.5)
WBC # FLD AUTO: 13.72 K/UL — HIGH (ref 3.8–10.5)

## 2025-04-14 PROCEDURE — 99233 SBSQ HOSP IP/OBS HIGH 50: CPT

## 2025-04-14 PROCEDURE — 99254 IP/OBS CNSLTJ NEW/EST MOD 60: CPT

## 2025-04-14 PROCEDURE — 71045 X-RAY EXAM CHEST 1 VIEW: CPT | Mod: 26

## 2025-04-14 PROCEDURE — 99233 SBSQ HOSP IP/OBS HIGH 50: CPT | Mod: GC

## 2025-04-14 RX ORDER — SUCRALFATE 1 G
1 TABLET ORAL
Refills: 0 | Status: DISCONTINUED | OUTPATIENT
Start: 2025-04-14 | End: 2025-04-14

## 2025-04-14 RX ORDER — ALBUMIN (HUMAN) 12.5 G/50ML
100 INJECTION, SOLUTION INTRAVENOUS EVERY 8 HOURS
Refills: 0 | Status: COMPLETED | OUTPATIENT
Start: 2025-04-14 | End: 2025-04-15

## 2025-04-14 RX ORDER — PAROXETINE HYDROCHLORIDE 20 MG/1
20 TABLET, FILM COATED ORAL DAILY
Refills: 0 | Status: DISCONTINUED | OUTPATIENT
Start: 2025-04-14 | End: 2025-04-16

## 2025-04-14 RX ORDER — CLONAZEPAM 0.5 MG/1
0.5 TABLET ORAL
Refills: 0 | Status: DISCONTINUED | OUTPATIENT
Start: 2025-04-14 | End: 2025-04-15

## 2025-04-14 RX ORDER — MIRTAZAPINE 30 MG/1
7.5 TABLET, FILM COATED ORAL AT BEDTIME
Refills: 0 | Status: DISCONTINUED | OUTPATIENT
Start: 2025-04-14 | End: 2025-04-14

## 2025-04-14 RX ORDER — MIRTAZAPINE 30 MG/1
15 TABLET, FILM COATED ORAL AT BEDTIME
Refills: 0 | Status: DISCONTINUED | OUTPATIENT
Start: 2025-04-14 | End: 2025-04-15

## 2025-04-14 RX ORDER — CLONAZEPAM 0.5 MG/1
0.5 TABLET ORAL ONCE
Refills: 0 | Status: DISCONTINUED | OUTPATIENT
Start: 2025-04-14 | End: 2025-04-14

## 2025-04-14 RX ADMIN — HEPARIN SODIUM 1700 UNIT(S)/HR: 1000 INJECTION INTRAVENOUS; SUBCUTANEOUS at 07:13

## 2025-04-14 RX ADMIN — ALBUMIN (HUMAN) 50 MILLILITER(S): 12.5 INJECTION, SOLUTION INTRAVENOUS at 17:27

## 2025-04-14 RX ADMIN — Medication 400 MILLIGRAM(S): at 18:07

## 2025-04-14 RX ADMIN — ALBUMIN (HUMAN) 50 MILLILITER(S): 12.5 INJECTION, SOLUTION INTRAVENOUS at 05:30

## 2025-04-14 RX ADMIN — Medication 40 MILLIGRAM(S): at 05:34

## 2025-04-14 RX ADMIN — Medication 1 GRAM(S): at 05:29

## 2025-04-14 RX ADMIN — HEPARIN SODIUM 1700 UNIT(S)/HR: 1000 INJECTION INTRAVENOUS; SUBCUTANEOUS at 10:29

## 2025-04-14 RX ADMIN — Medication 1 GRAM(S): at 11:19

## 2025-04-14 RX ADMIN — MIDODRINE HYDROCHLORIDE 10 MILLIGRAM(S): 5 TABLET ORAL at 13:21

## 2025-04-14 RX ADMIN — CLONAZEPAM 0.5 MILLIGRAM(S): 0.5 TABLET ORAL at 21:49

## 2025-04-14 RX ADMIN — HEPARIN SODIUM 1700 UNIT(S)/HR: 1000 INJECTION INTRAVENOUS; SUBCUTANEOUS at 01:40

## 2025-04-14 RX ADMIN — ATORVASTATIN CALCIUM 40 MILLIGRAM(S): 80 TABLET, FILM COATED ORAL at 21:50

## 2025-04-14 RX ADMIN — HEPARIN SODIUM 1700 UNIT(S)/HR: 1000 INJECTION INTRAVENOUS; SUBCUTANEOUS at 19:58

## 2025-04-14 RX ADMIN — HEPARIN SODIUM 1700 UNIT(S)/HR: 1000 INJECTION INTRAVENOUS; SUBCUTANEOUS at 09:41

## 2025-04-14 RX ADMIN — IPRATROPIUM BROMIDE AND ALBUTEROL SULFATE 3 MILLILITER(S): .5; 2.5 SOLUTION RESPIRATORY (INHALATION) at 20:08

## 2025-04-14 RX ADMIN — Medication 1 APPLICATION(S): at 05:29

## 2025-04-14 RX ADMIN — MIDODRINE HYDROCHLORIDE 10 MILLIGRAM(S): 5 TABLET ORAL at 21:50

## 2025-04-14 RX ADMIN — MIDODRINE HYDROCHLORIDE 10 MILLIGRAM(S): 5 TABLET ORAL at 05:34

## 2025-04-14 RX ADMIN — Medication 1 GRAM(S): at 18:12

## 2025-04-14 RX ADMIN — CLOPIDOGREL BISULFATE 75 MILLIGRAM(S): 75 TABLET, FILM COATED ORAL at 11:19

## 2025-04-14 RX ADMIN — IPRATROPIUM BROMIDE AND ALBUTEROL SULFATE 3 MILLILITER(S): .5; 2.5 SOLUTION RESPIRATORY (INHALATION) at 08:07

## 2025-04-14 RX ADMIN — MIRTAZAPINE 15 MILLIGRAM(S): 30 TABLET, FILM COATED ORAL at 21:49

## 2025-04-14 RX ADMIN — ALBUMIN (HUMAN) 50 MILLILITER(S): 12.5 INJECTION, SOLUTION INTRAVENOUS at 21:50

## 2025-04-14 NOTE — SOCIAL WORK PROGRESS NOTE - NSSWPROGRESSNOTE_GEN_ALL_CORE
Discussed at daily rounds. Patient requested to speak with  today. I met with patient at bedside. He is alert and oriented. States he got bad news today and chemo is not working. States he was on dialysis in past, stopped in January and does not want to go back on tx. He said he is done and does not want to be a burden. Expressed regrets about not being able to do all he wanted to do.  Emotional support provided. Encouraged him to think about quality of life if he does not want to pursue further tx. He verbalized understanding. He said he does have good support from his brother. Informed him we can have palliative team follow up with him.  Expressed concerns about costs of supplementary insurance as he will be transitioning to Medicare end of July. Provided him # for Medicare to investigate supplements. . He was given social work name & # and encouraged to call as needed. MD updated and palliative said they will follow up with him tomorrow. Social work to remain available.  Discussed at daily rounds. Patient requested to speak with  today. I met with patient at bedside. He is alert and oriented. States he got bad news today and chemo is not working. States he was on dialysis in past, stopped in January and does not want to go back on tx. He said he is done and does not want to be a burden. Expressed regrets about not being able to do all he wanted to do.  Emotional support provided. Encouraged him to think about quality of life if he does not want to pursue further tx. He verbalized understanding. He said he does have good support from his brother. Informed him we can have palliative team follow up with him. Briefly discussed possibility of home hospice. Expressed concerns about costs of supplementary insurance as he will be transitioning to Medicare end of July. Provided him # for Medicare to investigate supplements. . He was given social work name & # and encouraged to call as needed. MD updated and palliative said they will follow up with him tomorrow. Social work to remain available.

## 2025-04-14 NOTE — PROGRESS NOTE ADULT - SUBJECTIVE AND OBJECTIVE BOX
SUBJECTIVE:  Patient seen and examined at bedside. No overnight events. Patient reports no new complaints at this time, states his breathing is ok. Denies any chest pain, current shortness of breath, nausea, or any further vomiting. On 4L O2 via NC. Tolerating a regular diet.     VITALS  Vital Signs Last 24 Hrs  T(C): 36.7 (14 Apr 2025 04:00), Max: 36.8 (13 Apr 2025 21:29)  T(F): 98 (14 Apr 2025 04:00), Max: 98.2 (13 Apr 2025 21:29)  HR: 84 (14 Apr 2025 04:00) (72 - 95)  BP: 102/70 (14 Apr 2025 04:00) (102/70 - 123/71)  RR: 19 (14 Apr 2025 04:00) (19 - 20)  SpO2: 98% (14 Apr 2025 04:00) (92% - 98%)    Parameters below as of 14 Apr 2025 04:00  Patient On (Oxygen Delivery Method): room air  O2 Flow (L/min): 4    PHYSICAL EXAM  GENERAL:  Well-nourished, well-developed male sitting comfortably in a chair in [facility].  HEENT:  NC/AT. Sclera white.  CARDIO:  Regular rate and rhythm.  RESPIRATORY:  Nonlabored breathing, no accessory muscle use. No audible wheeze. Right pleurx in place to pleurevac, on water seal. Dressing over insertion site clean/dry.  SKIN:  No jaundice, pallor, or cyanosis  NEURO:  A&O x 3    INTAKE & OUTPUT  I&O's Summary    13 Apr 2025 07:01  -  14 Apr 2025 07:00  --------------------------------------------------------  IN: 849 mL / OUT: 1320 mL / NET: -471 mL    I&O's Detail    13 Apr 2025 07:01  -  14 Apr 2025 07:00  --------------------------------------------------------  IN:    Heparin Infusion: 38 mL    Heparin Infusion: 221 mL    IV PiggyBack: 50 mL    IV PiggyBack: 150 mL    Oral Fluid: 390 mL  Total IN: 849 mL    OUT:    Chest Tube (mL): 900 mL    Voided (mL): 420 mL  Total OUT: 1320 mL    Total NET: -471 mL    MEDICATIONS  MEDICATIONS  (STANDING):  albumin human 25% IVPB 50 milliLiter(s) IV Intermittent every 8 hours  albuterol/ipratropium for Nebulization 3 milliLiter(s) Nebulizer every 6 hours  ALPRAZolam 0.25 milliGRAM(s) Oral at bedtime  atorvastatin 40 milliGRAM(s) Oral at bedtime  benzocaine/menthol Lozenge 1 Lozenge Oral once  chlorhexidine 2% Cloths 1 Application(s) Topical <User Schedule>  clopidogrel Tablet 75 milliGRAM(s) Oral daily  heparin  Infusion.  Unit(s)/Hr (17 mL/Hr) IV Continuous <Continuous>  influenza   Vaccine 0.5 milliLiter(s) IntraMuscular once  midodrine 10 milliGRAM(s) Oral every 8 hours  pantoprazole  Injectable 40 milliGRAM(s) IV Push two times a day  sucralfate suspension 1 Gram(s) Oral four times a day    MEDICATIONS  (PRN):  acetaminophen     Tablet .. 650 milliGRAM(s) Oral every 6 hours PRN Mild Pain (1 - 3)  acetaminophen   IVPB .. 1000 milliGRAM(s) IV Intermittent every 8 hours PRN Moderate Pain (4 - 6)  aluminum hydroxide/magnesium hydroxide/simethicone Suspension 30 milliLiter(s) Oral four times a day PRN Dyspepsia  bisacodyl Suppository 10 milliGRAM(s) Rectal daily PRN Constipation  heparin   Injectable 7500 Unit(s) IV Push every 6 hours PRN For aPTT less than 40  heparin   Injectable 3500 Unit(s) IV Push every 6 hours PRN For aPTT between 40 - 57  metoclopramide Injectable 10 milliGRAM(s) IV Push every 8 hours PRN nausea , vomiting  ondansetron Injectable 4 milliGRAM(s) IV Push every 6 hours PRN Nausea and/or Vomiting    LABS:                        9.0    13.60 )-----------( 503      ( 14 Apr 2025 00:40 )             29.4     04-13    133[L]  |  92[L]  |  88[H]  ----------------------------<  120[H]  5.0   |  27  |  3.40[H]    Ca    9.4      13 Apr 2025 09:15  Phos  5.4     04-13  Mg     3.0     04-13    TPro  6.3  /  Alb  2.3[L]  /  TBili  0.4  /  DBili  x   /  AST  48[H]  /  ALT  35  /  AlkPhos  493[H]  04-13    PTT - ( 14 Apr 2025 00:40 )  PTT:64.1 sec    Urinalysis with Rflx Culture (collected 11 Apr 2025 16:20)    Culture - Fungal, Body Fluid (collected 11 Apr 2025 12:42)  Source: Peritoneal Peritoneal Fluid  Preliminary Report (13 Apr 2025 08:26):    No growth    Culture - Body Fluid with Gram Stain (collected 11 Apr 2025 12:42)  Source: Abdominal Fl Abdominal Fluid  Gram Stain (12 Apr 2025 00:10):    No polymorphonuclear leukocytes per low power field    No organisms seen per oil power field  Preliminary Report (12 Apr 2025 16:01):    No growth    ASSESSMENT & PLAN  64 year old male with a PMH of poorly differentiated metastatic gastric carcinoma on chemo, HTN, anemia, NSTEMI, DVT of LUE on 3/3/25 (on Eliquis and plavix), and recent history of bilateral pleural effusions requiring L thoracentesis 3/4/25 and 3/20/25, and R pleurx placement on 4/4/2025, now admitted with hypotension, oliguria, MARYSOL, suspected CAP, abdominal ascites, s/p paracentesis 4/11 with 3550cc fluid removed. Pleurx placed to waterseal as of last night w/600cc serosanguinous pleural output. 900cc total documented for the shift. SUBJECTIVE:  Patient seen and examined at bedside. No overnight events. Patient reports no new complaints at this time, states his breathing is ok. Denies any chest pain, current shortness of breath, nausea, or any further vomiting. On 4L O2 via NC. Tolerating a regular diet.     VITALS  Vital Signs Last 24 Hrs  T(C): 36.7 (14 Apr 2025 04:00), Max: 36.8 (13 Apr 2025 21:29)  T(F): 98 (14 Apr 2025 04:00), Max: 98.2 (13 Apr 2025 21:29)  HR: 84 (14 Apr 2025 04:00) (72 - 95)  BP: 102/70 (14 Apr 2025 04:00) (102/70 - 123/71)  RR: 19 (14 Apr 2025 04:00) (19 - 20)  SpO2: 98% (14 Apr 2025 04:00) (92% - 98%)    Parameters below as of 14 Apr 2025 04:00  Patient On (Oxygen Delivery Method): room air  O2 Flow (L/min): 4    PHYSICAL EXAM  GENERAL:  Well-nourished, well-developed male sitting comfortably in a chair in [facility].  HEENT:  NC/AT. Sclera white.  CARDIO:  Regular rate and rhythm.  RESPIRATORY:  Nonlabored breathing, no accessory muscle use. No audible wheeze. Right pleurx in place to pleurevac, on water seal. Dressing over insertion site clean/dry.  SKIN:  No jaundice, pallor, or cyanosis  NEURO:  A&O x 3    INTAKE & OUTPUT  I&O's Summary    13 Apr 2025 07:01  -  14 Apr 2025 07:00  --------------------------------------------------------  IN: 849 mL / OUT: 1320 mL / NET: -471 mL    I&O's Detail    13 Apr 2025 07:01  -  14 Apr 2025 07:00  --------------------------------------------------------  IN:    Heparin Infusion: 38 mL    Heparin Infusion: 221 mL    IV PiggyBack: 50 mL    IV PiggyBack: 150 mL    Oral Fluid: 390 mL  Total IN: 849 mL    OUT:    Chest Tube (mL): 900 mL    Voided (mL): 420 mL  Total OUT: 1320 mL    Total NET: -471 mL    MEDICATIONS  MEDICATIONS  (STANDING):  albumin human 25% IVPB 50 milliLiter(s) IV Intermittent every 8 hours  albuterol/ipratropium for Nebulization 3 milliLiter(s) Nebulizer every 6 hours  ALPRAZolam 0.25 milliGRAM(s) Oral at bedtime  atorvastatin 40 milliGRAM(s) Oral at bedtime  benzocaine/menthol Lozenge 1 Lozenge Oral once  chlorhexidine 2% Cloths 1 Application(s) Topical <User Schedule>  clopidogrel Tablet 75 milliGRAM(s) Oral daily  heparin  Infusion.  Unit(s)/Hr (17 mL/Hr) IV Continuous <Continuous>  influenza   Vaccine 0.5 milliLiter(s) IntraMuscular once  midodrine 10 milliGRAM(s) Oral every 8 hours  pantoprazole  Injectable 40 milliGRAM(s) IV Push two times a day  sucralfate suspension 1 Gram(s) Oral four times a day    MEDICATIONS  (PRN):  acetaminophen     Tablet .. 650 milliGRAM(s) Oral every 6 hours PRN Mild Pain (1 - 3)  acetaminophen   IVPB .. 1000 milliGRAM(s) IV Intermittent every 8 hours PRN Moderate Pain (4 - 6)  aluminum hydroxide/magnesium hydroxide/simethicone Suspension 30 milliLiter(s) Oral four times a day PRN Dyspepsia  bisacodyl Suppository 10 milliGRAM(s) Rectal daily PRN Constipation  heparin   Injectable 7500 Unit(s) IV Push every 6 hours PRN For aPTT less than 40  heparin   Injectable 3500 Unit(s) IV Push every 6 hours PRN For aPTT between 40 - 57  metoclopramide Injectable 10 milliGRAM(s) IV Push every 8 hours PRN nausea , vomiting  ondansetron Injectable 4 milliGRAM(s) IV Push every 6 hours PRN Nausea and/or Vomiting    LABS:                        9.0    13.60 )-----------( 503      ( 14 Apr 2025 00:40 )             29.4     04-13    133[L]  |  92[L]  |  88[H]  ----------------------------<  120[H]  5.0   |  27  |  3.40[H]    Ca    9.4      13 Apr 2025 09:15  Phos  5.4     04-13  Mg     3.0     04-13    TPro  6.3  /  Alb  2.3[L]  /  TBili  0.4  /  DBili  x   /  AST  48[H]  /  ALT  35  /  AlkPhos  493[H]  04-13    PTT - ( 14 Apr 2025 00:40 )  PTT:64.1 sec    Urinalysis with Rflx Culture (collected 11 Apr 2025 16:20)    Culture - Fungal, Body Fluid (collected 11 Apr 2025 12:42)  Source: Peritoneal Peritoneal Fluid  Preliminary Report (13 Apr 2025 08:26):    No growth    Culture - Body Fluid with Gram Stain (collected 11 Apr 2025 12:42)  Source: Abdominal Fl Abdominal Fluid  Gram Stain (12 Apr 2025 00:10):    No polymorphonuclear leukocytes per low power field    No organisms seen per oil power field  Preliminary Report (12 Apr 2025 16:01):    No growth    ASSESSMENT & PLAN  64 year old male with a PMH of poorly differentiated metastatic gastric carcinoma on chemo, HTN, anemia, NSTEMI, DVT of LUE on 3/3/25 (on Eliquis and plavix), and recent history of bilateral pleural effusions requiring L thoracentesis 3/4/25 and 3/20/25, and R pleurx placement on 4/4/2025, now admitted with hypotension, oliguria, MARYSOL, suspected CAP, abdominal ascites, s/p paracentesis 4/11 with 3550cc fluid removed. Pleurx placed to waterseal as of last night w/600cc serosanguinous pleural output. 900cc total documented for the shift.    -There seems to be no obvious fluid reaccumulation on CXR, however will wait for official read.  - Continue pleurx to water seal  - Supplemental O2 PRN  - GOC  - Conservative use of IVF given pleural effusions  - Medical management per primary team  - Discussed with Dr. Palomo

## 2025-04-14 NOTE — PROGRESS NOTE ADULT - SUBJECTIVE AND OBJECTIVE BOX
Awake/Alert [facility] -- Charles Martinez Pannella, Patel, Savella, Goodger, Cohen  Office # [phone]    Follow Up:  SOB     Subjective/Observations: Patient seen and examined, awake, alert, OOB to chair, denies chest pain, c/o mild dyspnea, on O2 via NC,  hep gtt infusing events overnight noted     REVIEW OF SYSTEMS: All other review of systems is negative unless indicated above  PAST MEDICAL & SURGICAL HISTORY:  Gastric lymphoma      Anemia of chronic disease      Pleural effusion      DVT (deep venous thrombosis)      Hypertension      Hyperlipidemia      S/P appendectomy  November 17th 2014      S/P cholecystectomy  15 years ago        MEDICATIONS  (STANDING):  albuterol/ipratropium for Nebulization 3 milliLiter(s) Nebulizer every 6 hours  atorvastatin 40 milliGRAM(s) Oral at bedtime  benzocaine/menthol Lozenge 1 Lozenge Oral once  chlorhexidine 2% Cloths 1 Application(s) Topical <User Schedule>  clopidogrel Tablet 75 milliGRAM(s) Oral daily  heparin  Infusion.  Unit(s)/Hr (17 mL/Hr) IV Continuous <Continuous>  influenza   Vaccine 0.5 milliLiter(s) IntraMuscular once  midodrine 10 milliGRAM(s) Oral every 8 hours  mirtazapine 7.5 milliGRAM(s) Oral at bedtime  pantoprazole    Tablet 40 milliGRAM(s) Oral before breakfast  sucralfate suspension 1 Gram(s) Oral four times a day    MEDICATIONS  (PRN):  acetaminophen     Tablet .. 650 milliGRAM(s) Oral every 6 hours PRN Mild Pain (1 - 3)  acetaminophen   IVPB .. 1000 milliGRAM(s) IV Intermittent every 8 hours PRN Moderate Pain (4 - 6)  aluminum hydroxide/magnesium hydroxide/simethicone Suspension 30 milliLiter(s) Oral four times a day PRN Dyspepsia  bisacodyl Suppository 10 milliGRAM(s) Rectal daily PRN Constipation  heparin   Injectable 7500 Unit(s) IV Push every 6 hours PRN For aPTT less than 40  heparin   Injectable 3500 Unit(s) IV Push every 6 hours PRN For aPTT between 40 - 57  metoclopramide Injectable 10 milliGRAM(s) IV Push every 8 hours PRN nausea , vomiting  ondansetron Injectable 4 milliGRAM(s) IV Push every 6 hours PRN Nausea and/or Vomiting    Allergies    Bactrim DS (Hives)    Intolerances      Vital Signs Last 24 Hrs  T(C): 36.3 (14 Apr 2025 12:00), Max: 36.8 (13 Apr 2025 21:29)  T(F): 97.3 (14 Apr 2025 12:00), Max: 98.2 (13 Apr 2025 21:29)  HR: 78 (14 Apr 2025 12:00) (72 - 94)  BP: 109/60 (14 Apr 2025 12:00) (102/70 - 123/71)  BP(mean): --  RR: 18 (14 Apr 2025 12:00) (18 - 19)  SpO2: 98% (14 Apr 2025 12:00) (95% - 98%)    Parameters below as of 14 Apr 2025 12:00  Patient On (Oxygen Delivery Method): nasal cannula  O2 Flow (L/min): 4    I&O's Summary    13 Apr 2025 07:01  -  14 Apr 2025 07:00  --------------------------------------------------------  IN: 849 mL / OUT: 1320 mL / NET: -471 mL    14 Apr 2025 07:01  -  14 Apr 2025 14:51  --------------------------------------------------------  IN: 445 mL / OUT: 425 mL / NET: 20 mL          TELE: SR 's   PHYSICAL EXAM:  Constitutional: NAD, awake and alert  HEENT: Moist Mucous Membranes, Anicteric  Pulmonary: Non-labored, breath sounds are dim R>L , No wheezing, rales or rhonchi  Cardiovascular: Regular, S1 and S2, No murmurs, rubs, gallops or clicks  Gastrointestinal: Bowel Sounds present, soft, nontender.   Lymph: + peripheral edema. No lymphadenopathy.  Skin: No visible rashes or ulcers.  Psych:  Mood & affect appropriate  LABS: All Labs Reviewed:                        8.8    13.72 )-----------( 546      ( 14 Apr 2025 07:58 )             28.5                         9.0    13.60 )-----------( 503      ( 14 Apr 2025 00:40 )             29.4                         9.2    13.59 )-----------( 554      ( 13 Apr 2025 09:15 )             29.4     14 Apr 2025 07:58    130    |  90     |  86     ----------------------------<  117    4.8     |  29     |  3.80   13 Apr 2025 09:15    133    |  92     |  88     ----------------------------<  120    5.0     |  27     |  3.40   12 Apr 2025 09:11    132    |  94     |  80     ----------------------------<  104    4.7     |  25     |  3.10     Ca    8.9        14 Apr 2025 07:58  Ca    9.4        13 Apr 2025 09:15  Ca    9.2        12 Apr 2025 09:11  Phos  5.0       14 Apr 2025 07:58  Phos  5.4       13 Apr 2025 09:15  Phos  5.4       12 Apr 2025 09:11  Mg     3.1       14 Apr 2025 07:58  Mg     3.0       13 Apr 2025 09:15  Mg     2.7       12 Apr 2025 09:11    TPro  6.3    /  Alb  2.6    /  TBili  0.4    /  DBili  x      /  AST  38     /  ALT  33     /  AlkPhos  442    14 Apr 2025 07:58  TPro  6.3    /  Alb  2.3    /  TBili  0.4    /  DBili  x      /  AST  48     /  ALT  35     /  AlkPhos  493    13 Apr 2025 09:15  TPro  6.4    /  Alb  2.3    /  TBili  0.4    /  DBili  x      /  AST  37     /  ALT  23     /  AlkPhos  304    12 Apr 2025 09:11    PTT - ( 14 Apr 2025 07:58 )  PTT:76.2 sec      12 Lead ECG:   Ventricular Rate 94 BPM    Atrial Rate 94 BPM    P-R Interval 150 ms    QRS Duration 94 ms    Q-T Interval 350 ms    QTC Calculation(Bazett) 437 ms    P Axis 64 degrees    R Axis -23 degrees    T Axis 57 degrees    Diagnosis Line Normal sinus rhythm  Low voltage QRS  Inferior infarct (cited on or before 19-MAR-2025)  Poor R wave progression  Abnormal ECG  When compared with ECG of 09-APR-2025 13:48,  Borderline criteria for Anterior infarct are now present  Borderline criteria for Anterolateralinfarct are now present  Serial changes of Inferior infarct present  Confirmed by CHASE CELIS ([id number]) on 4/13/2025 4:49:30 PM (04-13-25 @ 09:43)      TRANSTHORACIC ECHOCARDIOGRAM REPORT  ________________________________________________________________________________                                      _______       Pt. Name:       JESSE PERALESHIPATTIE Study Date:    4/10/2025  MRN:     [id number]                   YOB: 1960  Accession #:    [id number]                  Age:           64 years  Account#:       [id number]                 Gender:        M  Heart Rate:                                Height:        66.93in (170.00 cm)  Rhythm:                                    Weight:        205.03 lb (93.00 kg)  Blood Pressure: 86/48 mmHg                 BSA/BMI:       2.04 m² / 32.18 kg/m²  ________________________________________________________________________________________  Referring Physician:    [id number] Oswaldo Snowden  Interpreting Physician: Alberto Gilliam M.D.  Primary Sonographer:    Kennedy Eugene    CPT:               ECHO TTE WO CON COMP W DOPP - 51725.m  Indication(s):     Dyspnea, unspecified- R06.00  Procedure:         Transthoracic echocardiogram with 2-D, M-mode and complete                     spectral and color flow Doppler.  Ordering Location: [facility]  Admission Status:  Inpatient  Study Information: Image quality for this study is technically difficult.    _______________________________________________________________________________________     CONCLUSIONS:      1. Technically difficult image quality.   2. Left ventricular cavity is normal in size. Left ventricular systolic function is normal with an ejection fraction of 54 % by David's method of disks.   3. Normal right ventricular cavity size and normal right ventricular systolic function.   4. Mild mitral regurgitation.   5. Abdominal ascites is incidentally noted.   6. Estimated pulmonary artery systolic pressure is 10 mmHg.   7. Large right and small left pleural effusion noted.   8. Small pericardial effusion with no echocardiographic evidence of tamponade physiology.   9. Compared to the transthoracic echocardiogram performed on 3/20/2025, there have been no significant cardiac interval changes.    ________________________________________________________________________________________  FINDINGS:     Left Ventricle:  The left ventricular cavity is normal insize. Left ventricular systolic function is normal with a calculated ejection fraction of 54 % by the David's biplane method of disks.     Right Ventricle:  The right ventricular cavity is normal in size and right ventricular systolic function is normal. Tricuspid annular plane systolic excursion (TAPSE) is 2.6 cm (normal >=1.7 cm).     Left Atrium:  The left atrium is normal in size with an indexed volume of 28.21 ml/m².     Right Atrium:  The right atrium is normal in size.     Interatrial Septum:  The interatrial septum appears intact.     Aortic Valve:  The aortic valve appears trileaflet. There is calcification of the aortic valve leaflets. There is fibrocalcific aortic valve sclerosis without stenosis. The peak transaortic velocity is1.30 m/s, peak transaortic gradient is 6.8 mmHg and mean transaortic gradient is 4.0 mmHg. There is no evidence of aortic regurgitation.     Mitral Valve:  Structurally normal mitral valve with normal leaflet excursion. There is calcification of the mitral valve annulus. There is mild mitral regurgitation.     Tricuspid Valve:  Structurally normal tricuspid valve with normal leaflet excursion. There is trace tricuspid regurgitation. Estimated pulmonary artery systolic pressure is 10 mmHg.     Pulmonic Valve:  Structurally normal pulmonic valve with normal leaflet excursion. There is mild pulmonic regurgitation.     Aorta:  The aortic root at the sinuses of Valsalva is normal in size, measuring 3.30 cm (indexed 1.62 cm/m²). The ascending aortais normal in size, measuring 3.40 cm (indexed 1.67 cm/m²).     Pericardium:  There is a small pericardial effusion with no echocardiographic evidence of tamponade physiology.     Pleura:  Large right and small left pleural effusion noted.     Systemic Veins:  The inferior vena cava is normal in size measuring 1.91 cm in diameter, (normal <2.1cm) with normal inspiratory collapse (normal >50%) consistent with normal right atrial pressure (~3, range 0-5mmHg).     Additional Findings and Comments:  The presence of abdominal ascites is incidentally noted.  ____________________________________________________________________  QUANTITATIVE DATA:  Left Ventricle Measurements: (Indexed to BSA)     IVSd (2D):   1.1 cm  LVPWd (2D):  1.2 cm  LVIDd (2D):4.7 cm  LVIDs (2D):  3.3 cm  LV Mass:     201 g  98.4 g/m²  LV Vol d, MOD A2C: 86.0 ml 42.12 ml/m²  LV Vol d, MOD A4C: 88.1 ml 43.14 ml/m²  LV Vol d, MOD BP:  87.9 ml 43.05 ml/m²  LV Vol s, MOD A2C: 42.2 ml 20.67 ml/m²  LV Vol s, MOD A4C: 38.5 ml 18.85 ml/m²  LV Vol s, MOD BP:  40.5 ml 19.81 ml/m²  LVOT SV MOD BP:    47.4 ml  LV EF% MOD BP:     54 %     MV E Vmax:    0.86 m/s  MV A Vmax:    0.76 m/s  MV E/A:       1.12  e' lateral:   9.79 cm/s  e' medial:    6.53 cm/s  E/e' lateral: 8.77  E/e' medial:  13.15  E/e' Average: 10.53  MV DT:        201 msec    Aorta Measurements: (Normal range) (Indexed to BSA)     Ao Root d     3.30 cm (3.1 - 3.7 cm) 1.62 cm/m²  Ao Asc d, 2D: 3.40  Ao Asc prox:  3.40 cm                1.67 cm/m²            Left Atrium Measurements: (Indexed to BSA)  LA Diam 2D: 4.30 cm         Right Ventricle Measurements:     TAPSE: 2.6 cm       LVOT / RVOT/ Qp/Qs Data: (Indexed to BSA)  LVOT Diameter,s: 2.10 cm  LVOT Area:       3.46 cm²  LVOT Vmax:       1.04 m/s  LVOT Vmn:        0.650 m/s  LVOT VTI:        17.50 cm  LVOT peak grad:  4 mmHg  LVOT mean grad:  2.0 mmHg  LVOT SV:         60.6 ml   29.68 ml/m²    Aortic Valve Measurements:  AV Vmax:                1.3 m/s  AV Peak Gradient:       6.8 mmHg  AV Mean Gradient:       4.0 mmHg  AV VTI:                 23.3 cm  AV VTI Ratio:           0.75  AoV EOA, Contin:        2.60 cm²  AoV EOA, Contin i:      1.27 cm²/m²  AoV Dimensionless Index 0.75    Mitral Valve Measurements:     MV E Vmax: 0.9 m/s         MR Vmax:         1.47 m/s  MV A Vmax: 0.8 m/s         MR Peak Gradient: 8.6 mmHg  MV E/A:    1.1       Tricuspid Valve Measurements:     TR Vmax:          1.3 m/s  TR Peak Gradient: 7.2 mmHg  RA Pressure:      3 mmHg  PASP:             10 mmHg    ________________________________________________________________________________________  Electronically signed on 4/10/2025 at 1:35:40 PM by Alberto Gilliam M.D.         *** Final ***

## 2025-04-14 NOTE — PROGRESS NOTE ADULT - PROBLEM SELECTOR PLAN 8
-03/03 doppler: Extensive left upper extremity DVT extending proximally to involve the right internal jugular and subclavian veins. Left cervical lymphadenopathy with abnormal morphology suggesting malignancy.  -03/06: + LLE DVT  -holding home eliquis and (TEMPORARILY) heparin gtt for now as patient is actively retching -03/03 doppler: Extensive left upper extremity DVT extending proximally to involve the right internal jugular and subclavian veins. Left cervical lymphadenopathy with abnormal morphology suggesting malignancy.  -03/06: + LLE DVT  -holding home eliquis, on heparin gtt

## 2025-04-14 NOTE — BH CONSULTATION LIAISON ASSESSMENT NOTE - NSICDXPASTSURGICALHX_GEN_ALL_CORE_FT
04/19/22 0730   Activity/Group Checklist   Group   [employer])   Attendance Attended   Attendance Duration (min) 46-60   Interactions Disorganized interaction   Affect/Mood Wide   Goals Achieved Identified feelings; Identified triggers; Able to listen to others; Able to engage in interactions PAST SURGICAL HISTORY:  S/P appendectomy November 17th 2014    S/P cholecystectomy 15 years ago

## 2025-04-14 NOTE — PROGRESS NOTE ADULT - ASSESSMENT
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,  11/2024 met poorly diff gastric ca(on presentation w neck, axillary, abdomen LADS, under care Dr Mckeon, [facility], has been on FOLFOX was to change to new chemo this week), episode MARYSOL req temporary HD prior to start of chemo 12/2024, 3/2025 LUE DVT likely assoc w HD catheter(removed) and left thoracentesis for large left effusion, 4/2025 adm for SOB post right PleurX  Has been on Bumex, adm w hypotension  W/U sig for--  -CT c/a/p 4/9 w decr right effusion, mod loculated left effuse, 1+--2+cm above/below diaphragm LADs, sclerotic bones, mod ascites  -US abdomen --small to mod ascites  -renal US -mild andreas hydro  -?prerenal azotemia due to Bumex, Nephrology on case  -Pulm Nephrology, ID on case      -post paracentesis 4/11 3.3L w some abdomen sx relieve  -Cr today incr again to 3.4 after yesterday's sl decr to 3.1  -new sig incr alk phos and mild incr SGOT since admission--?hepatorenal ?drug effect (4/9 CT no sig liver findings)  -anemia due to thalassemia, malignancy, chronic ds, acute illness, chemo--stable and similar to previous admission levels(microcytic MCV due to Thalassemia/chronic ds, no iron deficiency found)    -overall continues w sig malaise, and overall findings c/w progression of malignancy  -supportive and symptomatic management from Heme/Onc standpoint, pt to followup w own Heme/Onc Dr Mckeon for new chemotherapy as planned     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,  11/2024 met poorly diff gastric ca(on presentation w neck, axillary, abdomen LADS, under care Dr Mckeon, [facility], has been on FOLFOX was to change to new chemo this week), episode MARYSOL req temporary HD prior to start of chemo 12/2024, 3/2025 LUE DVT likely assoc w HD catheter(removed) and left thoracentesis for large left effusion, 4/2025 adm for SOB post right PleurX  Has been on Bumex, adm w hypotension  W/U sig for--  -CT c/a/p 4/9 w decr right effusion, mod loculated left effuse, 1+--2+cm above/below diaphragm LADs, sclerotic bones, mod ascites  -US abdomen --small to mod ascites  -renal US -mild andreas hydro  -?prerenal azotemia due to Bumex, Nephrology on case  -Pulm Nephrology, ID on case      -post paracentesis 4/11 3.3L w some abdomen sx relieve  -Cr today incr again to 3.4 after yesterday's sl decr to 3.1  -new sig incr alk phos and mild incr SGOT since admission--?hepatorenal ?drug effect (4/9 CT no sig liver findings)  -anemia due to thalassemia, malignancy, chronic ds, acute illness, chemo--stable and similar to previous admission levels(microcytic MCV due to Thalassemia/chronic ds, no iron deficiency found)    RECOMMENDATIONS    Oncology  -overall continues w sig malaise, and overall findings c/w progression of malignancy  -supportive and symptomatic management from Heme/Onc standpoint, pt to followup w own Heme/Onc Dr Mckeon for new chemotherapy as planned    Supportive measures were regards to the chest tube and Pleur-evac.    GOC  Extent discussion had with patient today regarding long-term goals of care  Discussed EOL issues.    Prognosis  Discussed with patient with continued duration health concerns that patient may find it extremely difficult to be able to resume chemotherapy  Discussed prognosis if off treatment    Depression  Recommend psychiatry evaluation    Dr. Correa contacted  Patient started on Remeron today by medicine Dr. Wilson  Discussed with Dr. Garcia  Discussed with Dr. Correa  Discussed with nephrology Dr. Nagy

## 2025-04-14 NOTE — PROGRESS NOTE ADULT - PROBLEM SELECTOR PLAN 2
BUN: 71, Cr: 3.30  -has not been urinating much for the past two days (denies dysuria), endorses urine output slightly better today   -Nephro consulted ( Dr. Franck Wagoner): Cortisol AM mildly elevated, IV albumin  -Barba not indicated per Urology, post void residual 34cc last night  -CT A/P showed renal pelvic fluid fullness/UPJ obstruction configuration (R>L) and bilateral cysts, unchanged.. Renal US showed mild right and trace left hydronephrosis. As per surgery, no intervention required.   -monitor I/Os BUN: 71, Cr: 3.30  -has not been urinating much for the past two days (denies dysuria), endorses urine output slightly better today   -Nephro consulted ( Dr. Franck Wagoner): Cortisol AM mildly elevated, IV albumin  -Barba not indicated per Urology, post void residual 34cc   -CT A/P showed renal pelvic fluid fullness/UPJ obstruction configuration (R>L) and bilateral cysts, unchanged.. Renal US showed mild right and trace left hydronephrosis. As per surgery, no intervention required.   -monitor I/Os

## 2025-04-14 NOTE — PROGRESS NOTE ADULT - PROBLEM SELECTOR PLAN 4
CT chest/abd/pelvis (3/3): Large left pleural effusion markedly increased since 11/30/2024 with total atelectasis left lower lobe and partial atelectasis left upper lobe. New mediastinal and left axillary lymphadenopathy. Inflammatory changes associated with left subclavian vein consistent with known DVT. Upper abdominal lymphadenopathy similar to 11/30/2024. Mildly increased pelvic lymphadenopathy. Mesenteric lymphadenopathy and mesenteric fat stranding similar to prior examination. No pulmonary embolus. Limited evaluation segmental and subsegmental branches.  -scheduled for his fifth chemo cycle today (previously on FolFox, was supposed to switch to his new chemo medication today) however BP was noted to be low, and did not receive dose. last chemo was >3 weeks ago  -rt medport in place. Follows w/ Dr. Fung  --hx of beta thal minor  -holding home oxy, ordered tylenol and ofirmev for now PRN for pain management. can consider dilaudid for severe pain if needed   -currently complaining of nausea and emesis, likely 2/2 to cancer but may have infectious component to it. continue abx and will order GI PCR for when patient has a bowel movement. ordered zofran and reglan ATC for nausea for now, but if symptoms persist will consider Compazin as an alternative antiemetic   -EKG on 04/13: QTC: 437   -Heme/Onc consulted (Dr. Hu), f/u recs. CT chest/abd/pelvis (3/3): Large left pleural effusion markedly increased since 11/30/2024 with total atelectasis left lower lobe and partial atelectasis left upper lobe. New mediastinal and left axillary lymphadenopathy. Inflammatory changes associated with left subclavian vein consistent with known DVT. Upper abdominal lymphadenopathy similar to 11/30/2024. Mildly increased pelvic lymphadenopathy. Mesenteric lymphadenopathy and mesenteric fat stranding similar to prior examination. No pulmonary embolus. Limited evaluation segmental and subsegmental branches.  -scheduled for his fifth chemo cycle today (previously on FolFox, was supposed to switch to his new chemo medication today) however BP was noted to be low, and did not receive dose. last chemo was >3 weeks ago  -rt medport in place. Follows w/ Dr. Fung  --hx of beta thal minor  -holding home oxy, ordered tylenol and ofirmev for now PRN for pain management. can consider dilaudid for severe pain if needed   -currently complaining of nausea and emesis, likely 2/2 to cancer but may have infectious component to it. continue abx and will order GI PCR for when patient has a bowel movement. ordered zofran and reglan ATC for nausea for now, but if symptoms persist will consider Compazin as an alternative antiemetic   -EKG on 04/13: QTC: 437   -Spoke to outpatient heme onc Dr. Fung, started mirtazapine 7.5mg po qhs   -Heme/Onc consulted (Dr. Hu), f/u recs.

## 2025-04-14 NOTE — BH CONSULTATION LIAISON ASSESSMENT NOTE - HPI (INCLUDE ILLNESS QUALITY, SEVERITY, DURATION, TIMING, CONTEXT, MODIFYING FACTORS, ASSOCIATED SIGNS AND SYMPTOMS)
Patient seen, evaluated and chart reviewed. 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 Of note, pt was admitted from 3/3-3/7 with large left pleural effusion and LUE DVT. Pt had thoracentesis done of which 3.1 L of fluid from the left thorax. Then was admitted on 3/19-3/23 for pleural effusion and NSTEMI. Pt had a thoracentesis done of which drained 560 cc from the left thorax. Then patient was admitted from 04/03-04/06 for SOB and had pleurax placement on right chest (currently has very minimal output). Patient was told to hold bumex until today, however patient restarted bumex last night after speaking with his heme-onc doctor. Endorses he had soft bp yesterday (systolic ~102), and then today he was supposed to get his fifth chemo cycle (previously on FolFox, was supposed to switch to his new chemo medication today) however BP was noted to be low and was recommended to come to the hospital. Patient admits to worsened mood in context of difficult medical situation, with possibility of the cancer being terminal. Patient reports poor sleep, decreased interest, feeling helpless, worsened energy, worsened concentration, poor appetite, no suicidal ideation, feeling on the edge, and increased muscle tension.

## 2025-04-14 NOTE — PROGRESS NOTE ADULT - ASSESSMENT
Patient is a 65 y/o male with PMHx of poorly differentiated metastatic (known cervical and axillary lymphadenopathy) GI carcinoma, LUE and LLE DVT, HTN, on home O2 2/2 to SOB, and anemia presents to the ED with hypotension and SOB. Admitted for hypotension, MARYOSL, hyponatremia, and present chronic pleural effusion.   ID c/s for further evaluation    Hypotension, AHRF on NC, MARYSOL; SIRS vs. Sepsis  Ascites, Pleural Effusions s/p Pleurx  Gastric lymphoma  - patient having episodes of hypotension since admission, was on levo ggt on admission  - afebrile no leukocytosis, hypoxia on NC  - BCx NGTD  CT 4/9 showing  RIGHT pleural effusion, diminished , post percutaneous thoracocentesis catheter. Moderate loculated LEFT pleural effusion and adjacent LLL atelectasis and/or consolidation, not significantly changed.  Moderate volume abdominal ascites, increased/developed from prior exams. Nodular thickening of pleural and peritoneal surfaces c/w malignant etiology. Diffuse thoracoabdominal lymphadenopathy, enlarged/developed in inguinal region.  Sclerotic osseous metastasis, not significantly changed  4/11 S/p 3550cc of cloudy yellow ascitic fluid removed from right abdomen -- peritoneal fluid cx NGTD    Recommendations:   Suspect overall clinical picture in setting of medications/underlying ca >>> infection  However given patient immunocompromised, will keep covered w/ ABx  S/p ceftriaxone 4/9-4/13 (x5 day course) discontinued in setting of elevated LFTs  Will monitor off Abx  Trend temps/WBC  Monitor renal fxn, renally dose medication    Patient evaluated with face-to-face time in addition to reviewing history, labs, microbiology, and imaging.   Antibiotic stewardship, local antibiogram, infection control strategies and potential transmission issues taken into consideration at time of treatment decision making process.   Thank you for allowing us to participate in the care of your patient.  D/w Dr. Garcia  Infectious Diseases will follow. Please call with any questions.  Karen Tyler M.D.  Available on [employer] TEAMS -- *[facility]*  [facility] [phone]  For after 5 P.M. and weekends, please call [phone] Patient is a 65 y/o male with PMHx of poorly differentiated metastatic (known cervical and axillary lymphadenopathy) GI carcinoma, LUE and LLE DVT, HTN, on home O2 2/2 to SOB, and anemia presents to the ED with hypotension and SOB. Admitted for hypotension, MARYSOL, hyponatremia, and present chronic pleural effusion.   ID c/s for further evaluation    Hypotension, AHRF on NC, MARYSOL; SIRS vs. Sepsis  Ascites, Pleural Effusions s/p Pleurx  Gastric lymphoma  - patient having episodes of hypotension since admission, was on levo ggt on admission  - afebrile no leukocytosis, hypoxia on NC  - BCx NGTD  CT 4/9 showing  RIGHT pleural effusion, diminished , post percutaneous thoracocentesis catheter. Moderate loculated LEFT pleural effusion and adjacent LLL atelectasis and/or consolidation, not significantly changed.  Moderate volume abdominal ascites, increased/developed from prior exams. Nodular thickening of pleural and peritoneal surfaces c/w malignant etiology. Diffuse thoracoabdominal lymphadenopathy, enlarged/developed in inguinal region.  Sclerotic osseous metastasis, not significantly changed  4/11 S/p 3550cc of cloudy yellow ascitic fluid removed from right abdomen -- peritoneal fluid cx NGTD    Recommendations:   Suspect overall clinical picture in setting of medications/underlying ca >>> infection  S/p ceftriaxone 4/9-4/13 (x5 day course); discontinued in setting of elevated LFTs  Will monitor off Abx  Trend temps/WBC  chest tube care  Monitor renal fxn, renally dose medication  additional care per primary team    Patient evaluated with face-to-face time in addition to reviewing history, labs, microbiology, and imaging.   Antibiotic stewardship, local antibiogram, infection control strategies and potential transmission issues taken into consideration at time of treatment decision making process.   Thank you for allowing us to participate in the care of your patient.  D/w Dr. Garcia  Infectious Diseases will follow. Please call with any questions.  Karen Tyler M.D.  Available on [employer] TEAMS -- *[facility]*  [place] [facility] [phone]  For after 5 P.M. and weekends, please call [phone]

## 2025-04-14 NOTE — PROGRESS NOTE ADULT - ATTENDING COMMENTS
Patient was seen and examined by myself. Case was discussed with house staff in details. I have reviewed and agree with the plan as outlined above with edits where appropriate.    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 Of note, pt was admitted from 3/3-3/7 with large left pleural effusion and LUE DVT. Pt had thoracentesis done of which 3.1 L of fluid from the left thorax. Then was admitted on 3/19-3/23 for pleural effusion and NSTEMI. Pt had a thoracentesis done of which drained 560 cc from the left thorax. Then patient was admitted from 04/03-04/06 for SOB and had pleurax placement on right chest (currently has very minimal output). Patient was told to hold bumex until today, however patient restarted bumex last night after speaking with his heme-onc doctor. Endorses he had soft bp yesterday (systolic ~102), and then today he was supposed to get his fifth chemo cycle (previously on FolFox, was supposed to switch to his new chemo medication today) however BP was noted to be low and was recommended to come to the hospital. Also endorses lightheadedness today.     vitals: T: 97.3F, BP: 82/56 -> 90/51, HR: 68, RR: 22, 99% O2 on 4L nc  labs: hgb: 8.9 (baseline), CBC abnormal 2/2 to cancer, Na: 130, Cl: 94, BUN: 71, Cr: 3.30, eGFR: 20, lactate: 2.7, proBNP: 3483  UA: cloudy, trace leuk esterase, 10wbc, moderate bacteria, neg nitrate   RVP neg   X-ray:  Increased interstitial markings bilaterally slightly greater on the left as before. There is a left effusion with atelectatic change.   Underlying inflammatory infectious process not excluded. Smaller right effusion with some increased interstitial markings in the right   infrahilar region which are less prominent than before. Small caliber right chest tube noted by the right CP angle. No pneumothorax.   Port-A-Cath as before. Borderline cardiomegaly. Regional osseous structures appropriate for age  received in the ED: 1L IVB 1x, levo gtt   (09 Apr 2025 14:45)      ROS: as in the HPI; all other ROS negative    SH and family history as above    Vital Signs Last 24 Hrs, vital stable , on 3L NC   feeling nausea , vomited     GEN:  SOB upon exertion.   HEENT- normocephalic; mouth moist  CVS- S1S2+  LUNGS- course BS B/L, Rhonchi,  auscultation; no wheezing  ABD: Soft , nontender,+ distended, Bowel sounds are present   EXTREMITY: no calf tenderness, no cyanosis, 2 + pitting  edema  NEURO: AAOx3; non focal neurologic exam   PSYCH: appears anxious and frustrated.   BACK: no swelling or mass;   VASCULAR: distal peripheral pulses present  SKIN: warm and dry.       Labs and imaging reviewed      Patient presenting with Patient is a 64y old  Male who presents with a chief complaint of hypotension (10 Apr 2025 15:33)   admitted for   1. Hypotension: improved, c/w  albumin  , 2L IV bolus, all BP meds held. on midodrine 10mg q8h.      2. acute on chronic HFmrEF: EF 54%, no other sig changes from prior echo, cardio eval noted. R pleurx accessed yesterday 1L fluid drained.     3. recurrent pleural effusion due to HF  and malignancy: R Pleurx accessed yesterday , will be re accessed tomorrow . CTS eval noted. repeat CXR show B/L pleural effusion.      Ascites: S/P paracentesis 4/11 3550ml ascites removed, BP stable post procedure,  Fluid Cx negative, cytology  pending.     Nausea/vomiting : zofran , reglan, PPI IV BID, GI Eval noted     Abnormal LFT : multifactorial, last day ABX yesterday . monitor LFT, trend down.      4. DVT: eliquis held, held Heparin gtt.     5.  MARYSOL with CKD 3: renal function worsening. likely pre renal, ATN 2/2 Hypotension.  urology eval for UPJ obstruction on CT, Us Renal : Mild right and trace left hydronephrosis. need to be conservative about IVF due to volume overloaded state at current. patient able to void, positive urine out put.   renal function trend up, c/w albumin as per renal     6. Gastric Lymphoma:  discussed with hematology, treatment on hold due to current condition, evidence of progression of the disease, palliative care eval noted.     7 acute on chronic RF with hypoxia: on 3L NC, completed Abx  for pneumonia, management pleural effusion as above. duoneb ATC .      Plan of care discussed with patient and daughter at bedside;  all questions and concerns were addressed.

## 2025-04-14 NOTE — PROGRESS NOTE ADULT - ASSESSMENT
64-year-old male with a history of GI carcinoma, metastatic lymphadenopathy, DVT, hypertension, anemia presents with has been having some persistent shortness of breath since discharge.  The patient was admitted for shortness of breath, had a thoracentesis performed while in the hospital.  Now the patient has been complaining of persistent shortness of breath, but was hypotensive     pleurx R  Hypotension  mets ca  eval Acute Infection  GI Carcinoma  Anemia  DVT hx  MARYSOL    s/p paracentesis - 3300 cc drained -   right CT drained overnight  vs noted  meds reviewed  remains on Midodrine  on AC - Hep gtt    HD monitoring and Support  pleurx drain as tolerated - 100 - 1000 per drain attempt - M W F   I moustapha  emp ABX  cx - biomarkers  thoracic follow up  cardio follow up  monitor VS and HD and Sat  goal sat > 88 pct  trend renal indices  I and O  replete lytes 64-year-old male with a history of GI carcinoma, metastatic lymphadenopathy, DVT, hypertension, anemia presents with has been having some persistent shortness of breath since discharge.  The patient was admitted for shortness of breath, had a thoracentesis performed while in the hospital.  Now the patient has been complaining of persistent shortness of breath, but was hypotensive     pleurx R  Hypotension  mets ca  eval Acute Infection  GI Carcinoma  Anemia  DVT hx  MARYSOL    s/p paracentesis - 3300 cc drained -   right CT drained overnight - pleurovac connected -   vs noted  meds reviewed  remains on Midodrine  on AC - Hep gtt    HD monitoring and Support  pleurx drain as tolerated - 100 - 1000 per drain attempt - M W F - at home and on DC  I moustapha  emp ABX  cx - biomarkers  thoracic follow up  cardio follow up  monitor VS and HD and Sat  goal sat > 88 pct  trend renal indices  I and O  replete lytes

## 2025-04-14 NOTE — PROGRESS NOTE ADULT - ASSESSMENT
Metastatic gastric CA  Ascites s/p para  Shortness of breath   Nausea vomiting  Cholestatic liver injury    Recommendations:  - PPI 40mg PO QD  - Carafate liquid QID  - Zofran prn nausea  - Regular diet as tolerated  - f/u Heme/onc recs  - trend LFTs' ALP elevation multifactorial; if continues to rise can repeat RUQ U/S or MRI/MRCP  - Will follow with you

## 2025-04-14 NOTE — PROGRESS NOTE ADULT - SUBJECTIVE AND OBJECTIVE BOX
Date/Time Patient Seen:  		  Referring MD:   Data Reviewed	       Patient is a 64y old  Male who presents with a chief complaint of hypotension (13 Apr 2025 19:22)      Subjective/HPI     PAST MEDICAL & SURGICAL HISTORY:  No pertinent past medical history    Incisional hernia  2015    Gastric lymphoma    Anemia of chronic disease    Pleural effusion    DVT (deep venous thrombosis)    Hypertension    Hyperlipidemia    No significant past surgical history    S/P appendectomy  November 17th 2014    S/P cholecystectomy  15 years ago          Medication list         MEDICATIONS  (STANDING):  albumin human 25% IVPB 50 milliLiter(s) IV Intermittent every 8 hours  albuterol/ipratropium for Nebulization 3 milliLiter(s) Nebulizer every 6 hours  ALPRAZolam 0.25 milliGRAM(s) Oral at bedtime  atorvastatin 40 milliGRAM(s) Oral at bedtime  benzocaine/menthol Lozenge 1 Lozenge Oral once  chlorhexidine 2% Cloths 1 Application(s) Topical <User Schedule>  clopidogrel Tablet 75 milliGRAM(s) Oral daily  heparin  Infusion.  Unit(s)/Hr (17 mL/Hr) IV Continuous <Continuous>  influenza   Vaccine 0.5 milliLiter(s) IntraMuscular once  midodrine 10 milliGRAM(s) Oral every 8 hours  pantoprazole  Injectable 40 milliGRAM(s) IV Push two times a day  sucralfate suspension 1 Gram(s) Oral four times a day    MEDICATIONS  (PRN):  acetaminophen     Tablet .. 650 milliGRAM(s) Oral every 6 hours PRN Mild Pain (1 - 3)  acetaminophen   IVPB .. 1000 milliGRAM(s) IV Intermittent every 8 hours PRN Moderate Pain (4 - 6)  aluminum hydroxide/magnesium hydroxide/simethicone Suspension 30 milliLiter(s) Oral four times a day PRN Dyspepsia  bisacodyl Suppository 10 milliGRAM(s) Rectal daily PRN Constipation  heparin   Injectable 7500 Unit(s) IV Push every 6 hours PRN For aPTT less than 40  heparin   Injectable 3500 Unit(s) IV Push every 6 hours PRN For aPTT between 40 - 57  metoclopramide Injectable 10 milliGRAM(s) IV Push every 8 hours PRN nausea , vomiting  ondansetron Injectable 4 milliGRAM(s) IV Push every 6 hours PRN Nausea and/or Vomiting         Vitals log        ICU Vital Signs Last 24 Hrs  T(C): 36.7 (14 Apr 2025 04:00), Max: 36.8 (13 Apr 2025 21:29)  T(F): 98 (14 Apr 2025 04:00), Max: 98.2 (13 Apr 2025 21:29)  HR: 84 (14 Apr 2025 04:00) (84 - 95)  BP: 102/70 (14 Apr 2025 04:00) (102/70 - 123/71)  BP(mean): --  ABP: --  ABP(mean): --  RR: 19 (14 Apr 2025 04:00) (19 - 20)  SpO2: 98% (14 Apr 2025 04:00) (92% - 98%)    O2 Parameters below as of 14 Apr 2025 04:00  Patient On (Oxygen Delivery Method): room air  O2 Flow (L/min): 4               Input and Output:  I&O's Detail    12 Apr 2025 07:01  -  13 Apr 2025 07:00  --------------------------------------------------------  IN:    Heparin Infusion: 204 mL    IV PiggyBack: 50 mL    Oral Fluid: 850 mL  Total IN: 1104 mL    OUT:    Other (mL): 1000 mL    Voided (mL): 300 mL  Total OUT: 1300 mL    Total NET: -196 mL      13 Apr 2025 07:01  -  14 Apr 2025 05:27  --------------------------------------------------------  IN:    Heparin Infusion: 38 mL    Heparin Infusion: 187 mL    IV PiggyBack: 100 mL    IV PiggyBack: 50 mL    Oral Fluid: 390 mL  Total IN: 765 mL    OUT:    Chest Tube (mL): 660 mL    Voided (mL): 420 mL  Total OUT: 1080 mL    Total NET: -315 mL          Lab Data                        9.0    13.60 )-----------( 503      ( 14 Apr 2025 00:40 )             29.4     04-13    133[L]  |  92[L]  |  88[H]  ----------------------------<  120[H]  5.0   |  27  |  3.40[H]    Ca    9.4      13 Apr 2025 09:15  Phos  5.4     04-13  Mg     3.0     04-13    TPro  6.3  /  Alb  2.3[L]  /  TBili  0.4  /  DBili  x   /  AST  48[H]  /  ALT  35  /  AlkPhos  493[H]  04-13            Review of Systems	      Objective     Physical Examination    heart s1s2  lung dc bs  head nc  head at      Pertinent Lab findings & Imaging      Jameson:  NO   Adequate UO     I&O's Detail    12 Apr 2025 07:01  -  13 Apr 2025 07:00  --------------------------------------------------------  IN:    Heparin Infusion: 204 mL    IV PiggyBack: 50 mL    Oral Fluid: 850 mL  Total IN: 1104 mL    OUT:    Other (mL): 1000 mL    Voided (mL): 300 mL  Total OUT: 1300 mL    Total NET: -196 mL      13 Apr 2025 07:01  -  14 Apr 2025 05:27  --------------------------------------------------------  IN:    Heparin Infusion: 38 mL    Heparin Infusion: 187 mL    IV PiggyBack: 100 mL    IV PiggyBack: 50 mL    Oral Fluid: 390 mL  Total IN: 765 mL    OUT:    Chest Tube (mL): 660 mL    Voided (mL): 420 mL  Total OUT: 1080 mL    Total NET: -315 mL               Discussed with:     Cultures:	        Radiology

## 2025-04-14 NOTE — BH CONSULTATION LIAISON ASSESSMENT NOTE - NSBHCHARTREVIEWVS_PSY_A_CORE FT
Vital Signs Last 24 Hrs  T(C): 36.3 (14 Apr 2025 12:00), Max: 36.8 (13 Apr 2025 21:29)  T(F): 97.3 (14 Apr 2025 12:00), Max: 98.2 (13 Apr 2025 21:29)  HR: 88 (14 Apr 2025 14:55) (78 - 88)  BP: 109/60 (14 Apr 2025 12:00) (102/70 - 109/60)  BP(mean): --  RR: 18 (14 Apr 2025 12:00) (18 - 19)  SpO2: 96% (14 Apr 2025 14:55) (95% - 98%)    Parameters below as of 14 Apr 2025 14:55  Patient On (Oxygen Delivery Method): nasal cannula

## 2025-04-14 NOTE — PROGRESS NOTE ADULT - SUBJECTIVE AND OBJECTIVE BOX
[facility]  DANY Burris Y. Patel, S. Shah, G. [facility]  [phone]    Name: JESSE EATON  Age: 64y  Gender: Male  MRN: [id number]    Interval History:  No acute overnight events.   Notes reviewed    Antibiotics:      Medications:  acetaminophen     Tablet .. 650 milliGRAM(s) Oral every 6 hours PRN  acetaminophen   IVPB .. 1000 milliGRAM(s) IV Intermittent every 8 hours PRN  albumin human 25% IVPB 50 milliLiter(s) IV Intermittent every 8 hours  albuterol/ipratropium for Nebulization 3 milliLiter(s) Nebulizer every 6 hours  ALPRAZolam 0.25 milliGRAM(s) Oral at bedtime  aluminum hydroxide/magnesium hydroxide/simethicone Suspension 30 milliLiter(s) Oral four times a day PRN  atorvastatin 40 milliGRAM(s) Oral at bedtime  benzocaine/menthol Lozenge 1 Lozenge Oral once  bisacodyl Suppository 10 milliGRAM(s) Rectal daily PRN  chlorhexidine 2% Cloths 1 Application(s) Topical <User Schedule>  clopidogrel Tablet 75 milliGRAM(s) Oral daily  heparin   Injectable 7500 Unit(s) IV Push every 6 hours PRN  heparin   Injectable 3500 Unit(s) IV Push every 6 hours PRN  heparin  Infusion.  Unit(s)/Hr IV Continuous <Continuous>  influenza   Vaccine 0.5 milliLiter(s) IntraMuscular once  metoclopramide Injectable 10 milliGRAM(s) IV Push every 8 hours PRN  midodrine 10 milliGRAM(s) Oral every 8 hours  ondansetron Injectable 4 milliGRAM(s) IV Push every 6 hours PRN  pantoprazole  Injectable 40 milliGRAM(s) IV Push two times a day  sucralfate suspension 1 Gram(s) Oral four times a day      Review of Systems:  A 10-point review of systems was obtained.   Review of systems otherwise negative except as previously noted.    Allergies: Bactrim DS (Hives)    For details regarding the patient's past medical history, social history, family history, and other miscellaneous elements, please refer the initial infectious diseases consultation and/or the admitting history and physical examination for this admission.    Objective:  Vitals:   T(C): 36.3 (04-14-25 @ 12:00), Max: 36.8 (04-13-25 @ 21:29)  HR: 78 (04-14-25 @ 12:00) (72 - 94)  BP: 109/60 (04-14-25 @ 12:00) (102/70 - 123/71)  RR: 18 (04-14-25 @ 12:00) (18 - 19)  SpO2: 98% (04-14-25 @ 12:00) (95% - 98%)    Physical Examination:  General: no acute distress  HEENT: NC/AT, EOMI  Cardio: RRR  Resp: on NC; Chest tube place  Abd: soft, NT, ND  Ext: no edema or cyanosis  Skin: warm, dry, no visible rash      Laboratory Studies:  CBC:                       8.8    13.72 )-----------( 546      ( 14 Apr 2025 07:58 )             28.5     CMP: 04-14    130[L]  |  90[L]  |  86[H]  ----------------------------<  117[H]  4.8   |  29  |  3.80[H]    Ca    8.9      14 Apr 2025 07:58  Phos  5.0     04-14  Mg     3.1     04-14    TPro  6.3  /  Alb  2.6[L]  /  TBili  0.4  /  DBili  x   /  AST  38[H]  /  ALT  33  /  AlkPhos  442[H]  04-14    LIVER FUNCTIONS - ( 14 Apr 2025 07:58 )  Alb: 2.6 g/dL / Pro: 6.3 g/dL / ALK PHOS: 442 U/L / ALT: 33 U/L / AST: 38 U/L / GGT: x           Urinalysis Basic - ( 14 Apr 2025 07:58 )    Color: x / Appearance: x / SG: x / pH: x  Gluc: 117 mg/dL / Ketone: x  / Bili: x / Urobili: x   Blood: x / Protein: x / Nitrite: x   Leuk Esterase: x / RBC: x / WBC x   Sq Epi: x / Non Sq Epi: x / Bacteria: x        Microbiology: reviewed    Urinalysis with Rflx Culture (collected 04-11-25 @ 16:20)    Culture - Body Fluid with Gram Stain (collected 04-11-25 @ 12:42)  Source: Abdominal Fl Abdominal Fluid  Gram Stain (04-12-25 @ 00:10):    No polymorphonuclear leukocytes per low power field    No organisms seen per oil power field  Preliminary Report (04-12-25 @ 16:01):    No growth    Culture - Fungal, Body Fluid (collected 04-11-25 @ 12:42)  Source: Peritoneal Peritoneal Fluid  Preliminary Report (04-13-25 @ 08:26):    No growth    Culture - Blood (collected 04-09-25 @ 16:36)  Source: Blood Blood-Peripheral  Preliminary Report (04-14-25 @ 04:01):    No growth at 4 days    Culture - Blood (collected 04-09-25 @ 16:30)  Source: Blood Blood-Peripheral  Preliminary Report (04-14-25 @ 04:01):    No growth at 4 days    Culture - Blood (collected 04-09-25 @ 11:35)  Source: Blood Blood-Peripheral  Preliminary Report (04-13-25 @ 17:00):    No growth at 4 days          Radiology: reviewed

## 2025-04-14 NOTE — PROGRESS NOTE ADULT - SUBJECTIVE AND OBJECTIVE BOX
Patient is a 64y old  Male who presents with a chief complaint of      HPI: 64 year old male with a PMH as listed below presented with worsening SOB and edema. Also admits to poor urination. Was previously on dialysis but was taken off due to renal recovery. Was supposed to follow up in office with Dr Eugene 25 but ended in ER first. Renal consulted for further eval. No new medications, no recent illnesses, no NSAID use.      Has some nausea this morning  Breathing is stable per patient  Says his urination has decreased    PAST MEDICAL & SURGICAL HISTORY:  Gastric lymphoma      Anemia of chronic disease      Pleural effusion      DVT (deep venous thrombosis)      Hypertension      Hyperlipidemia      S/P appendectomy  2014      S/P cholecystectomy  15 years ago           FAMILY HISTORY:  Family history of coronary artery disease in father  Father -  age 60 following MI    NC    Social History:Non smoker    MEDICATIONS  (STANDING):  albumin human 25% IVPB 50 milliLiter(s) IV Intermittent every 8 hours  albuterol/ipratropium for Nebulization 3 milliLiter(s) Nebulizer every 6 hours  ALPRAZolam 0.25 milliGRAM(s) Oral at bedtime  atorvastatin 40 milliGRAM(s) Oral at bedtime  benzocaine/menthol Lozenge 1 Lozenge Oral once  buMETAnide Injectable 2 milliGRAM(s) IV Push once  cefTRIAXone   IVPB 2000 milliGRAM(s) IV Intermittent every 24 hours  chlorhexidine 2% Cloths 1 Application(s) Topical <User Schedule>  clopidogrel Tablet 75 milliGRAM(s) Oral daily  heparin  Infusion.  Unit(s)/Hr (17 mL/Hr) IV Continuous <Continuous>  influenza   Vaccine 0.5 milliLiter(s) IntraMuscular once  midodrine 10 milliGRAM(s) Oral every 8 hours  pantoprazole    Tablet 40 milliGRAM(s) Oral before breakfast    MEDICATIONS  (PRN):  acetaminophen     Tablet .. 650 milliGRAM(s) Oral every 6 hours PRN Mild Pain (1 - 3)  acetaminophen   IVPB .. 1000 milliGRAM(s) IV Intermittent every 8 hours PRN Moderate Pain (4 - 6)  aluminum hydroxide/magnesium hydroxide/simethicone Suspension 30 milliLiter(s) Oral four times a day PRN Dyspepsia  bisacodyl Suppository 10 milliGRAM(s) Rectal daily PRN Constipation  heparin   Injectable 7500 Unit(s) IV Push every 6 hours PRN For aPTT less than 40  heparin   Injectable 3500 Unit(s) IV Push every 6 hours PRN For aPTT between 40 - 57  ondansetron Injectable 4 milliGRAM(s) IV Push every 8 hours PRN Nausea and/or Vomiting      Allergies    Bactrim DS (Hives)    Intolerances         REVIEW OF SYSTEMS: as per HPI    ICU Vital Signs Last 24 Hrs  T(C): 36.3 (2025 12:00), Max: 36.8 (2025 21:29)  T(F): 97.3 (2025 12:00), Max: 98.2 (2025 21:29)  HR: 88 (2025 14:55) (72 - 88)  BP: 109/60 (2025 12:00) (102/70 - 109/60)  BP(mean): --  ABP: --  ABP(mean): --  RR: 18 (2025 12:00) (18 - 19)  SpO2: 96% (2025 14:55) (95% - 98%)    O2 Parameters below as of 2025 14:55  Patient On (Oxygen Delivery Method): nasal cannula          PHYSICAL EXAM:    GENERAL: Ill appearing, on NC  HEAD:  Atraumatic, Normocephalic  EYES: Conjunctiva and sclera clear  ENMT: No Drainage from nares, No drainage from ears  NERVOUS SYSTEM:  Awake and Alert  ABDOMEN: distended abdomen, NT  EXTREMITIES:  +++Edema B/L LE          LABS:                          8.8    13.72 )-----------( 546      ( 2025 07:58 )             28.5     04-14    130[L]  |  90[L]  |  86[H]  ----------------------------<  117[H]  4.8   |  29  |  3.80[H]    Ca    8.9      2025 07:58  Phos  5.0     04-14  Mg     3.1     04-14    TPro  6.3  /  Alb  2.6[L]  /  TBili  0.4  /  DBili  x   /  AST  38[H]  /  ALT  33  /  AlkPhos  442[H]  04-14    PTT - ( 2025 07:58 )  PTT:76.2 sec  Urinalysis Basic - ( 2025 07:58 )    Color: x / Appearance: x / SG: x / pH: x  Gluc: 117 mg/dL / Ketone: x  / Bili: x / Urobili: x   Blood: x / Protein: x / Nitrite: x   Leuk Esterase: x / RBC: x / WBC x   Sq Epi: x / Non Sq Epi: x / Bacteria: x

## 2025-04-14 NOTE — PROGRESS NOTE ADULT - PROBLEM SELECTOR PLAN 11
DVT treatment/prophylaxis  -holding home eliquis and (TEMPORARILY) heparin gtt for now as patient is actively retching    #Constipation  -Ordered suppository, lactulose with no relief  -ordered SMOG for today   -GI PCR to be collected when patient has a bowel movement     GOC: palliative consulted; full code DVT treatment/prophylaxis  -holding home eliquis, continue heparin gtt     #Constipation  -Ordered suppository, lactulose with no relief  -ordered SMOG for today   -GI PCR to be collected when patient has a bowel movement     #Insomnia  -Tried 0.25mg xanax without much help  -Started mirtazapine 7.5mg po qhs, should help with anxiety as well     GOC: palliative consulted; full code

## 2025-04-14 NOTE — PROGRESS NOTE ADULT - ASSESSMENT
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 Admitted for hypotension, MARYSOL, hyponatremia, and present chronic pleural effusion.     Hypotension, chronic pleural Effusion, HFpEF   - p/w worsening SOB, has been admitted twice in the past month in which thoracentesis was performed which drained malignant pleural effusions on the left, s/p Pleurx (4/4), per pt catheter is not draining much per home nurse  - CXR: There is a left effusion with atelectatic change. Underlying inflammatory infectious process not excluded. Smaller right effusion with some increased interstitial markings in the right infrahilar region which are less prominent than before.  - R chest pigtail cath to pleur vac with 600 cc serosanguinous overnight. CT surg following    - hx of CAD recent NSTEMI 3/2025, medically managed without cath  - EKG NSR low voltage  - Trop neg x1 (trop 72), lactate: 2.7, pro BNP: 3483  - no anginal complaint   - continue Plavix, statin    - TTE (3/20/2025) shows EF 60-65%, grade 1 diastolic dysfunction.   - MARYSOL, creat: 3.8 continue to trend renal indices, might need CRRT, nephro following   - CT with no obstruction seen by urology   - Strict I/Os, daily weights.  - s/p bumex IVP x 1, on albumin per renal    - SBP) 100's, HR controlled  - tele: SR 70- 100's cna dc tele, no events x 8 hours   - home antihypertensives on hold (labetalol, losartan)  - off midodrine 10mg TID  - Monitor and replete Lytes. Keep K > 4 and Mg > 2    - home Eliquis held for paracentesis now on heparin gtt   - s/p paracentesis 4/11 3300 cc drained fu fluid culture     - at risk for abrupt decompensation  - rec GOC discussion   - Will continue to follow.    PATRICE RossWEI  Nurse Practitioner - Cardiology   call TEAMS

## 2025-04-14 NOTE — PROGRESS NOTE ADULT - SUBJECTIVE AND OBJECTIVE BOX
[facility]  Jhonny Obrien PA-C  [address] [place] [address]  [phone]      INTERVAL HPI/OVERNIGHT EVENTS:  Pt s/e  Nausea/vomiting resolved  Pt tolerated breakfast today without issues    MEDICATIONS  (STANDING):  albumin human 25% IVPB 50 milliLiter(s) IV Intermittent every 8 hours  albuterol/ipratropium for Nebulization 3 milliLiter(s) Nebulizer every 6 hours  ALPRAZolam 0.25 milliGRAM(s) Oral at bedtime  atorvastatin 40 milliGRAM(s) Oral at bedtime  benzocaine/menthol Lozenge 1 Lozenge Oral once  chlorhexidine 2% Cloths 1 Application(s) Topical <User Schedule>  clopidogrel Tablet 75 milliGRAM(s) Oral daily  heparin  Infusion.  Unit(s)/Hr (17 mL/Hr) IV Continuous <Continuous>  influenza   Vaccine 0.5 milliLiter(s) IntraMuscular once  midodrine 10 milliGRAM(s) Oral every 8 hours  pantoprazole  Injectable 40 milliGRAM(s) IV Push two times a day  sucralfate suspension 1 Gram(s) Oral four times a day    MEDICATIONS  (PRN):  acetaminophen     Tablet .. 650 milliGRAM(s) Oral every 6 hours PRN Mild Pain (1 - 3)  acetaminophen   IVPB .. 1000 milliGRAM(s) IV Intermittent every 8 hours PRN Moderate Pain (4 - 6)  aluminum hydroxide/magnesium hydroxide/simethicone Suspension 30 milliLiter(s) Oral four times a day PRN Dyspepsia  bisacodyl Suppository 10 milliGRAM(s) Rectal daily PRN Constipation  heparin   Injectable 7500 Unit(s) IV Push every 6 hours PRN For aPTT less than 40  heparin   Injectable 3500 Unit(s) IV Push every 6 hours PRN For aPTT between 40 - 57  metoclopramide Injectable 10 milliGRAM(s) IV Push every 8 hours PRN nausea , vomiting  ondansetron Injectable 4 milliGRAM(s) IV Push every 6 hours PRN Nausea and/or Vomiting      Allergies    Bactrim DS (Hives)        PHYSICAL EXAM:   Vital Signs:  Vital Signs Last 24 Hrs  T(C): 36.7 (2025 04:00), Max: 36.8 (2025 21:29)  T(F): 98 (2025 04:00), Max: 98.2 (2025 21:29)  HR: 81 (2025 08:00) (72 - 94)  BP: 102/70 (2025 04:00) (102/70 - 123/71)  BP(mean): --  RR: 19 (2025 04:00) (19 - 19)  SpO2: 96% (2025 08:00) (95% - 98%)    Parameters below as of 2025 08:00  Patient On (Oxygen Delivery Method): nasal cannula w/ humidification, 5 lpm      Daily     Daily Weight in k.1 (2025 04:00)    GENERAL:  Appears stated age  HEENT:  NC/AT  CHEST:  +Pleur-X catheter  HEART:  Regular rhythm  ABDOMEN:  Soft, non-tender, non-distended  EXTEREMITIES:  no cyanosis  SKIN:  No rash  NEURO:  Alert      LABS:                        8.8    13.72 )-----------( 546      ( 2025 07:58 )             28.5     04-14    130[L]  |  90[L]  |  86[H]  ----------------------------<  117[H]  4.8   |  29  |  3.80[H]    Ca    8.9      2025 07:58  Phos  5.0     04-14  Mg     3.1     04-14    TPro  6.3  /  Alb  2.6[L]  /  TBili  0.4  /  DBili  x   /  AST  38[H]  /  ALT  33  /  AlkPhos  442[H]  04-14    PTT - ( 2025 07:58 )  PTT:76.2 sec  Urinalysis Basic - ( 2025 07:58 )    Color: x / Appearance: x / SG: x / pH: x  Gluc: 117 mg/dL / Ketone: x  / Bili: x / Urobili: x   Blood: x / Protein: x / Nitrite: x   Leuk Esterase: x / RBC: x / WBC x   Sq Epi: x / Non Sq Epi: x / Bacteria: x

## 2025-04-14 NOTE — PROGRESS NOTE ADULT - SUBJECTIVE AND OBJECTIVE BOX
Patient is a 64y old  Male who presents with a chief complaint of hypotension (11 Apr 2025 11:21)    --incomplete--    INTERVAL HPI/OVERNIGHT EVENTS: Patient seen and examined at bedside. Patient endorses resolution in nausea and emesis. Bedside pleurex draining 1L of fluid. Heparin gtt restarted yesterday. Completed abx course yesterday. Still admits to difficulty sleeping overnight.     MEDICATIONS  (STANDING):  atorvastatin 40 milliGRAM(s) Oral at bedtime  azithromycin  IVPB 500 milliGRAM(s) IV Intermittent every 24 hours  azithromycin  IVPB      cefTRIAXone   IVPB 2000 milliGRAM(s) IV Intermittent every 24 hours  chlorhexidine 2% Cloths 1 Application(s) Topical <User Schedule>  clopidogrel Tablet 75 milliGRAM(s) Oral daily  influenza   Vaccine 0.5 milliLiter(s) IntraMuscular once  melatonin 5 milliGRAM(s) Oral at bedtime  midodrine 10 milliGRAM(s) Oral every 8 hours    MEDICATIONS  (PRN):  acetaminophen     Tablet .. 650 milliGRAM(s) Oral every 6 hours PRN Mild Pain (1 - 3)  acetaminophen   IVPB .. 1000 milliGRAM(s) IV Intermittent every 8 hours PRN Moderate Pain (4 - 6)  albuterol/ipratropium for Nebulization 3 milliLiter(s) Nebulizer every 6 hours PRN Shortness of Breath and/or Wheezing  ondansetron    Tablet 4 milliGRAM(s) Oral daily PRN Nausea and/or Vomiting      Allergies    Bactrim DS (Hives)    Intolerances    Vital Signs Last 24 Hrs  T(C): 36.7 (14 Apr 2025 04:00), Max: 36.8 (13 Apr 2025 21:29)  T(F): 98 (14 Apr 2025 04:00), Max: 98.2 (13 Apr 2025 21:29)  HR: 81 (14 Apr 2025 08:00) (72 - 95)  BP: 102/70 (14 Apr 2025 04:00) (102/70 - 123/71)  BP(mean): --  RR: 19 (14 Apr 2025 04:00) (19 - 20)  SpO2: 96% (14 Apr 2025 08:00) (92% - 98%)    Parameters below as of 14 Apr 2025 08:00  Patient On (Oxygen Delivery Method): nasal cannula w/ humidification, 5 lpm      PHYSICAL EXAM:  GENERAL: NAD  HEENT:  anicteric, moist mucous membranes  CHEST/LUNG:  Left lung with minimal lung sounds to anterior LLL, right lung cta   HEART:  RRR, S1, S2  ABDOMEN:  minimal bowel sounds, distended, nontender, fluid noted on physical exam  EXTREMITIES: 2+ pitting edema to B/L lower extremities extending feet to thighs (slightly improved), no cyanosis, or calf tenderness  NERVOUS SYSTEM: answers questions and follows commands appropriately      LABS:  cret                        8.8    13.72 )-----------( 546      ( 14 Apr 2025 07:58 )             28.5     04-13    133[L]  |  92[L]  |  88[H]  ----------------------------<  120[H]  5.0   |  27  |  3.40[H]    Ca    9.4      13 Apr 2025 09:15  Phos  5.0     04-14  Mg     3.1     04-14    TPro  6.3  /  Alb  2.3[L]  /  TBili  0.4  /  DBili  x   /  AST  48[H]  /  ALT  35  /  AlkPhos  493[H]  04-13    PTT - ( 14 Apr 2025 07:58 )  PTT:76.2 sec Patient is a 64y old  Male who presents with a chief complaint of hypotension (11 Apr 2025 11:21)    INTERVAL HPI/OVERNIGHT EVENTS: Patient seen and examined at bedside. Patient endorses resolution in nausea and emesis. Bedside pleurex draining 1L of fluid. Heparin gtt restarted yesterday. Completed abx course yesterday. Still admits to difficulty sleeping overnight.     MEDICATIONS  (STANDING):  atorvastatin 40 milliGRAM(s) Oral at bedtime  azithromycin  IVPB 500 milliGRAM(s) IV Intermittent every 24 hours  azithromycin  IVPB      cefTRIAXone   IVPB 2000 milliGRAM(s) IV Intermittent every 24 hours  chlorhexidine 2% Cloths 1 Application(s) Topical <User Schedule>  clopidogrel Tablet 75 milliGRAM(s) Oral daily  influenza   Vaccine 0.5 milliLiter(s) IntraMuscular once  melatonin 5 milliGRAM(s) Oral at bedtime  midodrine 10 milliGRAM(s) Oral every 8 hours    MEDICATIONS  (PRN):  acetaminophen     Tablet .. 650 milliGRAM(s) Oral every 6 hours PRN Mild Pain (1 - 3)  acetaminophen   IVPB .. 1000 milliGRAM(s) IV Intermittent every 8 hours PRN Moderate Pain (4 - 6)  albuterol/ipratropium for Nebulization 3 milliLiter(s) Nebulizer every 6 hours PRN Shortness of Breath and/or Wheezing  ondansetron    Tablet 4 milliGRAM(s) Oral daily PRN Nausea and/or Vomiting      Allergies    Bactrim DS (Hives)    Intolerances    Vital Signs Last 24 Hrs  T(C): 36.7 (14 Apr 2025 04:00), Max: 36.8 (13 Apr 2025 21:29)  T(F): 98 (14 Apr 2025 04:00), Max: 98.2 (13 Apr 2025 21:29)  HR: 81 (14 Apr 2025 08:00) (72 - 95)  BP: 102/70 (14 Apr 2025 04:00) (102/70 - 123/71)  BP(mean): --  RR: 19 (14 Apr 2025 04:00) (19 - 20)  SpO2: 96% (14 Apr 2025 08:00) (92% - 98%)    Parameters below as of 14 Apr 2025 08:00  Patient On (Oxygen Delivery Method): nasal cannula w/ humidification, 5 lpm      PHYSICAL EXAM:  GENERAL: NAD  HEENT:  anicteric, moist mucous membranes  CHEST/LUNG:  Left lung with minimal lung sounds to anterior LLL, right lung cta   HEART:  RRR, S1, S2  ABDOMEN:  minimal bowel sounds, distended, nontender, fluid noted on physical exam  EXTREMITIES: 2+ pitting edema to B/L lower extremities extending feet to thighs (slightly improved), no cyanosis, or calf tenderness  NERVOUS SYSTEM: answers questions and follows commands appropriately      LABS:  cret                        8.8    13.72 )-----------( 546      ( 14 Apr 2025 07:58 )             28.5     04-13    133[L]  |  92[L]  |  88[H]  ----------------------------<  120[H]  5.0   |  27  |  3.40[H]    Ca    9.4      13 Apr 2025 09:15  Phos  5.0     04-14  Mg     3.1     04-14    TPro  6.3  /  Alb  2.3[L]  /  TBili  0.4  /  DBili  x   /  AST  48[H]  /  ALT  35  /  AlkPhos  493[H]  04-13    PTT - ( 14 Apr 2025 07:58 )  PTT:76.2 sec

## 2025-04-14 NOTE — PROGRESS NOTE ADULT - SUBJECTIVE AND OBJECTIVE BOX
[INTERVAL HX: ]  Patient seen and examined;  Chart reviewed and events noted;     [MEDICATIONS]  MEDICATIONS  (STANDING):  albumin human 25% IVPB 100 milliLiter(s) IV Intermittent every 8 hours  albuterol/ipratropium for Nebulization 3 milliLiter(s) Nebulizer every 6 hours  atorvastatin 40 milliGRAM(s) Oral at bedtime  benzocaine/menthol Lozenge 1 Lozenge Oral once  chlorhexidine 2% Cloths 1 Application(s) Topical <User Schedule>  clopidogrel Tablet 75 milliGRAM(s) Oral daily  heparin  Infusion.  Unit(s)/Hr (17 mL/Hr) IV Continuous <Continuous>  influenza   Vaccine 0.5 milliLiter(s) IntraMuscular once  midodrine 10 milliGRAM(s) Oral every 8 hours  mirtazapine 7.5 milliGRAM(s) Oral at bedtime  pantoprazole    Tablet 40 milliGRAM(s) Oral before breakfast  sucralfate suspension 1 Gram(s) Oral four times a day    MEDICATIONS  (PRN):  acetaminophen     Tablet .. 650 milliGRAM(s) Oral every 6 hours PRN Mild Pain (1 - 3)  acetaminophen   IVPB .. 1000 milliGRAM(s) IV Intermittent every 8 hours PRN Moderate Pain (4 - 6)  aluminum hydroxide/magnesium hydroxide/simethicone Suspension 30 milliLiter(s) Oral four times a day PRN Dyspepsia  bisacodyl Suppository 10 milliGRAM(s) Rectal daily PRN Constipation  heparin   Injectable 7500 Unit(s) IV Push every 6 hours PRN For aPTT less than 40  heparin   Injectable 3500 Unit(s) IV Push every 6 hours PRN For aPTT between 40 - 57  metoclopramide Injectable 10 milliGRAM(s) IV Push every 8 hours PRN nausea , vomiting  ondansetron Injectable 4 milliGRAM(s) IV Push every 6 hours PRN Nausea and/or Vomiting      [VITALS]  Vital Signs Last 24 Hrs  T(C): 36.3 (2025 12:00), Max: 36.8 (2025 21:29)  T(F): 97.3 (2025 12:00), Max: 98.2 (2025 21:29)  HR: 88 (2025 14:55) (72 - 88)  BP: 109/60 (2025 12:00) (102/70 - 109/60)  BP(mean): --  RR: 18 (2025 12:00) (18 - 19)  SpO2: 96% (2025 14:55) (95% - 98%)    Parameters below as of 2025 14:55  Patient On (Oxygen Delivery Method): nasal cannula      [WT/HT]  Daily     Daily Weight in k.1 (2025 04:00)  [VENT]      [PHYSICAL EXAM]  GEN: NAD  HEENT: normocephalic and atraumatic. EOMI. PERRL.    NECK: Supple.  No lymphadenopathy   LUNGS: Clear to auscultation.  HEART: Regular rate and rhythm,  no MRG  ABDOMEN: Soft, nontender, and nondistended.  Positive bowel sounds.    : No CVA tenderness  EXTREMITIES: Without edema.  NEUROLOGIC: grossly intact.  PSYCHIATRIC: Appropriate affect .  SKIN: No rash     [LABS:]                        8.8    13.72 )-----------( 546      ( 2025 07:58 )             28.5     14    130[L]  |  90[L]  |  86[H]  ----------------------------<  117[H]  4.8   |  29  |  3.80[H]    Ca    8.9      2025 07:58  Phos  5.0       Mg     3.1         TPro  6.3  /  Alb  2.6[L]  /  TBili  0.4  /  DBili  x   /  AST  38[H]  /  ALT  33  /  AlkPhos  442[H]  14    PTT - ( 2025 07:58 )  PTT:76.2 sec    Sedimentation Rate, Erythrocyte: 60 mm/hr *H* [0 - 20] (25 @ 18:35)    Vitamin B12, Serum: >2000 pg/mL *H* [232 - 1245] (25 @ 18:35)  Vitamin B12, Serum: >2000 pg/mL *H* [232 - 1245] (25 @ 05:40)  Vitamin B12, Serum: >2000 pg/mL *H* [232 - 1245] (25 @ 06:24)  Vitamin B12, Serum: 1506 pg/mL *H* [200 - 900] (24 @ 19:53)    Folate, Serum: >20.0 ng/mL (25 @ 18:35)  Folate, Serum: 18.1 ng/mL (25 @ 05:40)  Folate, Serum: >20.0 ng/mL (25 @ 06:24)  Folate, Serum: 12.6 ng/mL [3.1 - 17.5] (24 @ 19:53)    Ferritin: 989 ng/mL *H* [30 - 400] (25 @ 05:40)  Ferritin: 943 ng/mL *H* [30 - 400] (25 @ :24)  Ferritin: 257 ng/mL [30 - 400] (24 @ 07:05)  Ferritin: 140 ng/mL [30 - 400] (24 @ 19:53)    Iron - Total Binding Capacity.: 212 ug/dL *L* [220 - 430] (25 @ 05:40)  Iron - Total Binding Capacity.: 223 ug/dL [220 - 430] (25 @ 06:24)  Iron - Total Binding Capacity.: 241 ug/dL [220 - 430] (24 @ 07:05)    Urinalysis Basic - ( 2025 07:58 )  Color: x / Appearance: x / SG: x / pH: x  Gluc: 117 mg/dL / Ketone: x  / Bili: x / Urobili: x   Blood: x / Protein: x / Nitrite: x   Leuk Esterase: x / RBC: x / WBC x   Sq Epi: x / Non Sq Epi: x / Bacteria: x      [RADIOLOGY STUDIES:]     [INTERVAL HX: ]  Patient seen and examined;  Chart reviewed and events noted;     Discussed with patient recent events leading to rehospitalization, inability to restart chemotherapy or start new chemotherapy    Encourage patient to reach out and speak with patient's usual oncologist Dr. Bishop.    [MEDICATIONS]  MEDICATIONS  (STANDING):  albumin human 25% IVPB 100 milliLiter(s) IV Intermittent every 8 hours  albuterol/ipratropium for Nebulization 3 milliLiter(s) Nebulizer every 6 hours  atorvastatin 40 milliGRAM(s) Oral at bedtime  benzocaine/menthol Lozenge 1 Lozenge Oral once  chlorhexidine 2% Cloths 1 Application(s) Topical <User Schedule>  clopidogrel Tablet 75 milliGRAM(s) Oral daily  heparin  Infusion.  Unit(s)/Hr (17 mL/Hr) IV Continuous <Continuous>  influenza   Vaccine 0.5 milliLiter(s) IntraMuscular once  midodrine 10 milliGRAM(s) Oral every 8 hours  mirtazapine 7.5 milliGRAM(s) Oral at bedtime  pantoprazole    Tablet 40 milliGRAM(s) Oral before breakfast  sucralfate suspension 1 Gram(s) Oral four times a day    MEDICATIONS  (PRN):  acetaminophen     Tablet .. 650 milliGRAM(s) Oral every 6 hours PRN Mild Pain (1 - 3)  acetaminophen   IVPB .. 1000 milliGRAM(s) IV Intermittent every 8 hours PRN Moderate Pain (4 - 6)  aluminum hydroxide/magnesium hydroxide/simethicone Suspension 30 milliLiter(s) Oral four times a day PRN Dyspepsia  bisacodyl Suppository 10 milliGRAM(s) Rectal daily PRN Constipation  heparin   Injectable 7500 Unit(s) IV Push every 6 hours PRN For aPTT less than 40  heparin   Injectable 3500 Unit(s) IV Push every 6 hours PRN For aPTT between 40 - 57  metoclopramide Injectable 10 milliGRAM(s) IV Push every 8 hours PRN nausea , vomiting  ondansetron Injectable 4 milliGRAM(s) IV Push every 6 hours PRN Nausea and/or Vomiting      [VITALS]  Vital Signs Last 24 Hrs  T(C): 36.3 (2025 12:00), Max: 36.8 (2025 21:29)  T(F): 97.3 (2025 12:00), Max: 98.2 (2025 21:29)  HR: 88 (2025 14:55) (72 - 88)  BP: 109/60 (2025 12:00) (102/70 - 109/60)  BP(mean): --  RR: 18 (2025 12:00) (18 - 19)  SpO2: 96% (2025 14:55) (95% - 98%)    Parameters below as of 2025 14:55  Patient On (Oxygen Delivery Method): nasal cannula      [WT/HT]  Daily     Daily Weight in k.1 (2025 04:00)  [VENT]      [PHYSICAL EXAM]  GEN: NAD  HEENT: normocephalic and atraumatic. EOMI. PERRL.    NECK: Supple.  No lymphadenopathy   LUNGS: dec BS bilaterally  HEART: Regular rate and rhythm,  no MRG  ABDOMEN: Soft, nontender, and nondistended.  Positive bowel sounds.    : No CVA tenderness  EXTREMITIES: Without edema.  NEUROLOGIC: grossly intact.  PSYCHIATRIC: Appropriate affect .  SKIN: No rash     [LABS:]                        8.8    13.72 )-----------( 546      ( 2025 07:58 )             28.5     04-14    130[L]  |  90[L]  |  86[H]  ----------------------------<  117[H]  4.8   |  29  |  3.80[H]    Ca    8.9      2025 07:58  Phos  5.0       Mg     3.1     -    TPro  6.3  /  Alb  2.6[L]  /  TBili  0.4  /  DBili  x   /  AST  38[H]  /  ALT  33  /  AlkPhos  442[H]  -14    PTT - ( 2025 07:58 )  PTT:76.2 sec    Sedimentation Rate, Erythrocyte: 60 mm/hr *H* [0 - 20] (25 @ 18:35)    Vitamin B12, Serum: >2000 pg/mL *H* [232 - 1245] (25 @ 18:35)  Vitamin B12, Serum: >2000 pg/mL *H* [232 - 1245] (25 @ 05:40)  Vitamin B12, Serum: >2000 pg/mL *H* [232 - 1245] (25 @ 06:24)  Vitamin B12, Serum: 1506 pg/mL *H* [200 - 900] (24 @ 19:53)    Folate, Serum: >20.0 ng/mL (25 @ 18:35)  Folate, Serum: 18.1 ng/mL (25 @ 05:40)  Folate, Serum: >20.0 ng/mL (25 @ 06:24)  Folate, Serum: 12.6 ng/mL [3.1 - 17.5] (24 @ 19:53)    Ferritin: 989 ng/mL *H* [30 - 400] (25 @ 05:40)  Ferritin: 943 ng/mL *H* [30 - 400] (25 @ 06:24)  Ferritin: 257 ng/mL [30 - 400] (24 @ 07:05)  Ferritin: 140 ng/mL [30 - 400] (24 @ 19:53)    Iron - Total Binding Capacity.: 212 ug/dL *L* [220 - 430] (25 @ 05:40)  Iron - Total Binding Capacity.: 223 ug/dL [220 - 430] (25 @ 06:24)  Iron - Total Binding Capacity.: 241 ug/dL [220 - 430] (24 @ 07:05)    Urinalysis Basic - ( 2025 07:58 )  Color: x / Appearance: x / SG: x / pH: x  Gluc: 117 mg/dL / Ketone: x  / Bili: x / Urobili: x   Blood: x / Protein: x / Nitrite: x   Leuk Esterase: x / RBC: x / WBC x   Sq Epi: x / Non Sq Epi: x / Bacteria: x      [RADIOLOGY STUDIES:]

## 2025-04-14 NOTE — PROGRESS NOTE ADULT - PROBLEM SELECTOR PLAN 1
Acute for the past two days, endorses slight lightheadedness   -BP: 82/56 -> 90/51 ->77/49  -s/p 1L NaCl 0.9% IVB 1x, and 1L LR in ED  -C/w Midodrine 10mg , s/p multiple doses albumin  -TTE ordered: LVEF 54 % Mild mitral regurgitation. Abdominal ascites is incidentally noted. Large right and small left pleural effusion noted. Small pericardial effusion with no echocardiographic evidence of tamponade physiology.  -HOLD home labetalol BID and losartan qd; and hold home bumex   -would be cautious about fluids as patient has present pleural effusions and in extravascular volume overload state  -s/p 1 dose of 2mg IV bumex yesterday with albumin, will hold diuresis for now due to Cr bump and continue albumin Acute for the past two days, endorses slight lightheadedness   -BP: 82/56 -> 90/51 ->77/49  -s/p 1L NaCl 0.9% IVB 1x, and 1L LR in ED  -C/w Midodrine 10mg , s/p multiple doses albumin  -TTE ordered: LVEF 54 % Mild mitral regurgitation. Abdominal ascites is incidentally noted. Large right and small left pleural effusion noted. Small pericardial effusion with no echocardiographic evidence of tamponade physiology.  -HOLD home labetalol BID and losartan qd; and hold home bumex   -would be cautious about fluids as patient has present pleural effusions and in extravascular volume overload state  -s/p 1 dose of 2mg IV bumex with albumin, will hold diuresis for now due to Cr bump and continue albumin

## 2025-04-14 NOTE — BH CONSULTATION LIAISON ASSESSMENT NOTE - RECORDS REVIEWED
Anesthesia ROS/Med Hx        Neuro/Psych Review:    Pt. positive for neuromuscular disease (CLBP, radiculopathy, L foot drop)  Pt. positive for psychiatric history (Claustrophobia)    Cardiovascular Review:    Pt. positive for hypertension    End/Other Review:    Pt. positive for obesity class II - 35.00 - 39.99  Overall Review of Systems Comments:  Initial /108, she feels anxious. BP in her H&P was normal. EKG ordered, no acute changes or abnormality. She denies any history of drug use.   Given 2 mg versed and 5 mg Labetalol IV, /90. Discussed with Dr Ortiz and the patient, will keep close eye on her BP and consult Hospitalist service postoperatively for BP control.      Anesthesia Plan     ASA Status: 2  Anesthesia Type: General  Induction: Intravenous  Preferred Airway Type: ETT  Reviewed: Lab Results, Consultations, Chest X-Rays, Patient Summary, Problem List, Allergies, Past Med History, Medications, Pre-Induction Reassessment, NPO Status and Nursing Notes  The proposed anesthetic plan, including its risks and benefits, have been discussed with the Patient - along with the risks and benefits of alternatives.  Questions were encouraged and answered and the patient and/or representative agrees to proceed.  Informed Consent for Blood: Consented  Blood Products: Not Anticipated  Plan Comments: Understands risk of pressure on forehead and chin, blindness from prone position.      Physical Exam  Mallampati: II  TM Distance: >3 FB  Neck ROM: Full  Cardio Rhythm: Regular  Cardio Rate: Normal  Breath sounds clear to auscultation:  Yes                   Hospital chart (includes HIE)

## 2025-04-14 NOTE — BH CONSULTATION LIAISON ASSESSMENT NOTE - CURRENT MEDICATION
MEDICATIONS  (STANDING):  albumin human 25% IVPB 100 milliLiter(s) IV Intermittent every 8 hours  albuterol/ipratropium for Nebulization 3 milliLiter(s) Nebulizer every 6 hours  atorvastatin 40 milliGRAM(s) Oral at bedtime  benzocaine/menthol Lozenge 1 Lozenge Oral once  chlorhexidine 2% Cloths 1 Application(s) Topical <User Schedule>  clopidogrel Tablet 75 milliGRAM(s) Oral daily  heparin  Infusion.  Unit(s)/Hr (17 mL/Hr) IV Continuous <Continuous>  influenza   Vaccine 0.5 milliLiter(s) IntraMuscular once  midodrine 10 milliGRAM(s) Oral every 8 hours  mirtazapine 7.5 milliGRAM(s) Oral at bedtime  pantoprazole    Tablet 40 milliGRAM(s) Oral before breakfast  sucralfate suspension 1 Gram(s) Oral four times a day    MEDICATIONS  (PRN):  acetaminophen     Tablet .. 650 milliGRAM(s) Oral every 6 hours PRN Mild Pain (1 - 3)  acetaminophen   IVPB .. 1000 milliGRAM(s) IV Intermittent every 8 hours PRN Moderate Pain (4 - 6)  aluminum hydroxide/magnesium hydroxide/simethicone Suspension 30 milliLiter(s) Oral four times a day PRN Dyspepsia  bisacodyl Suppository 10 milliGRAM(s) Rectal daily PRN Constipation  heparin   Injectable 7500 Unit(s) IV Push every 6 hours PRN For aPTT less than 40  heparin   Injectable 3500 Unit(s) IV Push every 6 hours PRN For aPTT between 40 - 57  metoclopramide Injectable 10 milliGRAM(s) IV Push every 8 hours PRN nausea , vomiting  ondansetron Injectable 4 milliGRAM(s) IV Push every 6 hours PRN Nausea and/or Vomiting

## 2025-04-14 NOTE — PROGRESS NOTE ADULT - PROBLEM SELECTOR PLAN 6
Patient has been admitted three times within the past couple of months in which thoracentesis was performed and drained malignant pleural effusions  -pleurex placement on right chest C/D/I  -Patient has been complaining of cough for the past two months but now is producing tannish sputum. As patient is immunocompromised, patient may not mount fever or wbc.    -Increased lung markings on chest x-ray, no known consolidation, but will cover for possible infection with ceftriaxone 2g daily and azithromycin (dc azithro as legionella neg)  -BC (NGTD at 48 hrs), strep antibiotics neg  -switched duonebs from PRN to standing for SOB. Also added maalox/PPI to regimen.   -drain Pleurx every other day per CT surgery, never more than 1 L at a time. pleuravac yesterday drained 1L  -s/p 1 dose of 2mg IV bumex yesterday with albumin, will hold diuresis for now due to Cr bump and continue albumin  -pulm consulted (Dr. Damico) recs appreciated  -ID consulted (Dr. Corcoran), recs appreciated  -CT surgery consulted, recs appreciated Patient has been admitted three times within the past couple of months in which thoracentesis was performed and drained malignant pleural effusions  -pleurex placement on right chest C/D/I  -Patient has been complaining of cough for the past two months but now is producing tannish sputum. As patient is immunocompromised, patient may not mount fever or wbc.    -Increased lung markings on chest x-ray, no known consolidation, but will cover for possible infection with ceftriaxone 2g daily and azithromycin (dc azithro as legionella neg)  -BC (NGTD at 48 hrs), strep antibiotics neg  -switched duonebs from PRN to standing for SOB. Also added maalox/PPI to regimen.   -drain Pleurx every other day per CT surgery, never more than 1 L at a time. pleuravac yesterday drained 1L  -s/p 1 dose of 2mg IV bumex with albumin, will hold diuresis for now due to Cr bump and continue albumin  -pulm consulted (Dr. Damico) recs appreciated  -ID consulted (Dr. Corcoran), recs appreciated  -CT surgery consulted, recs appreciated Patient has been admitted three times within the past couple of months in which thoracentesis was performed and drained malignant pleural effusions  -pleurex placement on right chest C/D/I  -Patient has been complaining of cough for the past two months but now is producing tannish sputum. As patient is immunocompromised, patient may not mount fever or wbc.    -Increased lung markings on chest x-ray, no known consolidation, but will cover for possible infection with ceftriaxone 2g daily and azithromycin (dc azithro as legionella neg)  -BC (NGTD at 48 hrs), strep antibiotics neg  -completed ceftriaxone course on 04/13, monitor CBC   -switched duonebs from PRN to standing for SOB. Also added maalox/PPI to regimen.   -drain Pleurx every other day per CT surgery, never more than 1 L at a time. pleuravac drained 1L, and then overnight drained another 1L  -s/p 1 dose of 2mg IV bumex with albumin, will hold diuresis for now due to Cr bump and continue albumin (recevied 4x yesterday)   -pulm consulted (Dr. Damico) recs appreciated  -ID consulted (Dr. Corcoran), recs appreciated  -CT surgery consulted, recs appreciated

## 2025-04-14 NOTE — PROGRESS NOTE ADULT - PROBLEM SELECTOR PLAN 5
CT chest/abd ordered: mild to moderate ascites and mild b/l hydronephrosis   -US Abdomen ordered- small to moderate ascites  -s/p paracentesis by IR: drained 3550cc of cloudy yellow fluid, neutrophils are wnl, pending culture results CT chest/abd ordered: mild to moderate ascites and mild b/l hydronephrosis   -US Abdomen ordered- small to moderate ascites  -s/p paracentesis by IR: drained 3550cc of cloudy yellow fluid, neutrophils are wnl, pending culture results  -will order repeat abdominal US today or tomorrow as abdomen is still distended

## 2025-04-14 NOTE — PROGRESS NOTE ADULT - ASSESSMENT
Acute on CKD Stage 3  Hypotension  Anemia  Hypoalbuminemia      -MARYSOL secondary to severe hypotension leading to renal hypoperfusion; unclear etiology of the hypotension  -Additionally, CTAP done noting renal fullness and uvp obstruction; also seen on renal sono  -Renal indices are improving; urinating well per patient; refused wise  -Midodrine for BP  -Unable to give fluids at this time given hypervolemic state  -Potassium improved  -S/p paracentesis  -Pleurex in place  -Diuresis PRN - S/P 2mg IVP x 1 + Albumin  -Monitor urine output  -Urology consulted and stated no intervention  -Creatinine worsening  -More albumin today, hold further diuresis    d/w Heme/onc and primary      Thank you

## 2025-04-15 LAB
ALBUMIN SERPL ELPH-MCNC: 2.6 G/DL — LOW (ref 3.3–5)
ALP SERPL-CCNC: 649 U/L — HIGH (ref 40–120)
ALT FLD-CCNC: 58 U/L — SIGNIFICANT CHANGE UP (ref 12–78)
ANION GAP SERPL CALC-SCNC: 8 MMOL/L — SIGNIFICANT CHANGE UP (ref 5–17)
APTT BLD: 169.5 SEC — CRITICAL HIGH (ref 24.5–35.6)
AST SERPL-CCNC: 88 U/L — HIGH (ref 15–37)
BASOPHILS # BLD AUTO: 0.05 K/UL — SIGNIFICANT CHANGE UP (ref 0–0.2)
BASOPHILS NFR BLD AUTO: 0.4 % — SIGNIFICANT CHANGE UP (ref 0–2)
BILIRUB SERPL-MCNC: 1 MG/DL — SIGNIFICANT CHANGE UP (ref 0.2–1.2)
BUN SERPL-MCNC: 96 MG/DL — HIGH (ref 7–23)
CALCIUM SERPL-MCNC: 9.2 MG/DL — SIGNIFICANT CHANGE UP (ref 8.5–10.1)
CHLORIDE SERPL-SCNC: 90 MMOL/L — LOW (ref 96–108)
CO2 SERPL-SCNC: 27 MMOL/L — SIGNIFICANT CHANGE UP (ref 22–31)
CREAT SERPL-MCNC: 4.4 MG/DL — HIGH (ref 0.5–1.3)
CULTURE RESULTS: SIGNIFICANT CHANGE UP
CULTURE RESULTS: SIGNIFICANT CHANGE UP
EGFR: 14 ML/MIN/1.73M2 — LOW
EGFR: 14 ML/MIN/1.73M2 — LOW
EOSINOPHIL # BLD AUTO: 0.06 K/UL — SIGNIFICANT CHANGE UP (ref 0–0.5)
EOSINOPHIL NFR BLD AUTO: 0.5 % — SIGNIFICANT CHANGE UP (ref 0–6)
GLUCOSE SERPL-MCNC: 103 MG/DL — HIGH (ref 70–99)
HCT VFR BLD CALC: 29 % — LOW (ref 39–50)
HGB BLD-MCNC: 9 G/DL — LOW (ref 13–17)
IMM GRANULOCYTES NFR BLD AUTO: 1.1 % — HIGH (ref 0–0.9)
LYMPHOCYTES # BLD AUTO: 0.57 K/UL — LOW (ref 1–3.3)
LYMPHOCYTES # BLD AUTO: 4.6 % — LOW (ref 13–44)
MAGNESIUM SERPL-MCNC: 3.3 MG/DL — HIGH (ref 1.6–2.6)
MCHC RBC-ENTMCNC: 19.6 PG — LOW (ref 27–34)
MCHC RBC-ENTMCNC: 31 G/DL — LOW (ref 32–36)
MCV RBC AUTO: 63 FL — LOW (ref 80–100)
MONOCYTES # BLD AUTO: 1.36 K/UL — HIGH (ref 0–0.9)
MONOCYTES NFR BLD AUTO: 11.1 % — SIGNIFICANT CHANGE UP (ref 2–14)
NEUTROPHILS # BLD AUTO: 10.08 K/UL — HIGH (ref 1.8–7.4)
NEUTROPHILS NFR BLD AUTO: 82.3 % — HIGH (ref 43–77)
NRBC BLD AUTO-RTO: 0 /100 WBCS — SIGNIFICANT CHANGE UP (ref 0–0)
PHOSPHATE SERPL-MCNC: 5.3 MG/DL — HIGH (ref 2.5–4.5)
PLATELET # BLD AUTO: 498 K/UL — HIGH (ref 150–400)
POTASSIUM SERPL-MCNC: 5.1 MMOL/L — SIGNIFICANT CHANGE UP (ref 3.5–5.3)
POTASSIUM SERPL-SCNC: 5.1 MMOL/L — SIGNIFICANT CHANGE UP (ref 3.5–5.3)
PROT SERPL-MCNC: 6.2 G/DL — SIGNIFICANT CHANGE UP (ref 6–8.3)
RBC # BLD: 4.6 M/UL — SIGNIFICANT CHANGE UP (ref 4.2–5.8)
RBC # FLD: 20.8 % — HIGH (ref 10.3–14.5)
SODIUM SERPL-SCNC: 125 MMOL/L — LOW (ref 135–145)
SPECIMEN SOURCE: SIGNIFICANT CHANGE UP
SPECIMEN SOURCE: SIGNIFICANT CHANGE UP
WBC # BLD: 12.26 K/UL — HIGH (ref 3.8–10.5)
WBC # FLD AUTO: 12.26 K/UL — HIGH (ref 3.8–10.5)

## 2025-04-15 PROCEDURE — 99233 SBSQ HOSP IP/OBS HIGH 50: CPT | Mod: GC

## 2025-04-15 PROCEDURE — 99231 SBSQ HOSP IP/OBS SF/LOW 25: CPT

## 2025-04-15 PROCEDURE — 71045 X-RAY EXAM CHEST 1 VIEW: CPT | Mod: 26

## 2025-04-15 PROCEDURE — 99233 SBSQ HOSP IP/OBS HIGH 50: CPT

## 2025-04-15 PROCEDURE — 99232 SBSQ HOSP IP/OBS MODERATE 35: CPT

## 2025-04-15 RX ORDER — MIRTAZAPINE 30 MG/1
45 TABLET, FILM COATED ORAL AT BEDTIME
Refills: 0 | Status: DISCONTINUED | OUTPATIENT
Start: 2025-04-15 | End: 2025-04-16

## 2025-04-15 RX ORDER — APIXABAN 2.5 MG/1
1 TABLET, FILM COATED ORAL
Qty: 60 | Refills: 0
Start: 2025-04-15 | End: 2025-05-14

## 2025-04-15 RX ORDER — APIXABAN 2.5 MG/1
5 TABLET, FILM COATED ORAL
Refills: 0 | Status: DISCONTINUED | OUTPATIENT
Start: 2025-04-15 | End: 2025-04-16

## 2025-04-15 RX ORDER — IPRATROPIUM BROMIDE AND ALBUTEROL SULFATE .5; 2.5 MG/3ML; MG/3ML
3 SOLUTION RESPIRATORY (INHALATION)
Qty: 168 | Refills: 0
Start: 2025-04-15 | End: 2025-04-28

## 2025-04-15 RX ORDER — CLOPIDOGREL BISULFATE 75 MG/1
1 TABLET, FILM COATED ORAL
Qty: 30 | Refills: 0
Start: 2025-04-15 | End: 2025-05-14

## 2025-04-15 RX ORDER — ONDANSETRON HCL/PF 4 MG/2 ML
4 VIAL (ML) INJECTION EVERY 6 HOURS
Refills: 0 | Status: DISCONTINUED | OUTPATIENT
Start: 2025-04-15 | End: 2025-04-17

## 2025-04-15 RX ORDER — ACETAMINOPHEN 500 MG/5ML
1000 LIQUID (ML) ORAL ONCE
Refills: 0 | Status: COMPLETED | OUTPATIENT
Start: 2025-04-15 | End: 2025-04-15

## 2025-04-15 RX ORDER — CLONAZEPAM 0.5 MG/1
0.5 TABLET ORAL DAILY
Refills: 0 | Status: DISCONTINUED | OUTPATIENT
Start: 2025-04-15 | End: 2025-04-17

## 2025-04-15 RX ORDER — MELATONIN 5 MG
5 TABLET ORAL AT BEDTIME
Refills: 0 | Status: DISCONTINUED | OUTPATIENT
Start: 2025-04-15 | End: 2025-04-15

## 2025-04-15 RX ORDER — CLONAZEPAM 0.5 MG/1
1 TABLET ORAL AT BEDTIME
Refills: 0 | Status: DISCONTINUED | OUTPATIENT
Start: 2025-04-15 | End: 2025-04-16

## 2025-04-15 RX ORDER — LIDOCAINE HYDROCHLORIDE 20 MG/ML
1 JELLY TOPICAL EVERY 24 HOURS
Refills: 0 | Status: DISCONTINUED | OUTPATIENT
Start: 2025-04-15 | End: 2025-04-17

## 2025-04-15 RX ORDER — BISACODYL 5 MG
1 TABLET, DELAYED RELEASE (ENTERIC COATED) ORAL
Qty: 30 | Refills: 0
Start: 2025-04-15 | End: 2025-05-14

## 2025-04-15 RX ORDER — ACETAMINOPHEN 500 MG/5ML
2 LIQUID (ML) ORAL
Qty: 56 | Refills: 0
Start: 2025-04-15 | End: 2025-04-21

## 2025-04-15 RX ORDER — CLONAZEPAM 0.5 MG/1
0.5 TABLET ORAL ONCE
Refills: 0 | Status: DISCONTINUED | OUTPATIENT
Start: 2025-04-15 | End: 2025-04-15

## 2025-04-15 RX ORDER — PAROXETINE HYDROCHLORIDE 20 MG/1
1 TABLET, FILM COATED ORAL
Qty: 30 | Refills: 0
Start: 2025-04-15 | End: 2025-05-14

## 2025-04-15 RX ORDER — CLONAZEPAM 0.5 MG/1
1 TABLET ORAL
Qty: 14 | Refills: 0
Start: 2025-04-15 | End: 2025-04-28

## 2025-04-15 RX ORDER — HYDROMORPHONE/SOD CHLOR,ISO/PF 2 MG/10 ML
2 SYRINGE (ML) INJECTION
Qty: 84 | Refills: 0
Start: 2025-04-15 | End: 2025-04-21

## 2025-04-15 RX ORDER — ATROPINE SULFATE 1 %
2 OINTMENT (GRAM) OPHTHALMIC (EYE)
Qty: 1 | Refills: 0
Start: 2025-04-15 | End: 2025-04-21

## 2025-04-15 RX ORDER — MIRTAZAPINE 30 MG/1
1 TABLET, FILM COATED ORAL
Qty: 30 | Refills: 0
Start: 2025-04-15 | End: 2025-05-14

## 2025-04-15 RX ORDER — HYDROMORPHONE/SOD CHLOR,ISO/PF 2 MG/10 ML
1 SYRINGE (ML) INJECTION
Qty: 42 | Refills: 0
Start: 2025-04-15 | End: 2025-04-21

## 2025-04-15 RX ORDER — HYDROMORPHONE/SOD CHLOR,ISO/PF 2 MG/10 ML
0.2 SYRINGE (ML) INJECTION ONCE
Refills: 0 | Status: DISCONTINUED | OUTPATIENT
Start: 2025-04-15 | End: 2025-04-15

## 2025-04-15 RX ORDER — MELATONIN 5 MG
10 TABLET ORAL AT BEDTIME
Refills: 0 | Status: DISCONTINUED | OUTPATIENT
Start: 2025-04-15 | End: 2025-04-17

## 2025-04-15 RX ORDER — MIDODRINE HYDROCHLORIDE 5 MG/1
1 TABLET ORAL
Qty: 90 | Refills: 0
Start: 2025-04-15 | End: 2025-05-14

## 2025-04-15 RX ORDER — SUCRALFATE 1 G
10 TABLET ORAL
Qty: 1200 | Refills: 0
Start: 2025-04-15 | End: 2025-05-14

## 2025-04-15 RX ADMIN — Medication 1 APPLICATION(S): at 06:16

## 2025-04-15 RX ADMIN — Medication 1 GRAM(S): at 17:25

## 2025-04-15 RX ADMIN — MIDODRINE HYDROCHLORIDE 10 MILLIGRAM(S): 5 TABLET ORAL at 06:16

## 2025-04-15 RX ADMIN — Medication 1 GRAM(S): at 11:26

## 2025-04-15 RX ADMIN — CLONAZEPAM 0.5 MILLIGRAM(S): 0.5 TABLET ORAL at 00:26

## 2025-04-15 RX ADMIN — Medication 650 MILLIGRAM(S): at 02:27

## 2025-04-15 RX ADMIN — MIRTAZAPINE 45 MILLIGRAM(S): 30 TABLET, FILM COATED ORAL at 21:43

## 2025-04-15 RX ADMIN — Medication 650 MILLIGRAM(S): at 11:27

## 2025-04-15 RX ADMIN — APIXABAN 5 MILLIGRAM(S): 2.5 TABLET, FILM COATED ORAL at 17:26

## 2025-04-15 RX ADMIN — Medication 1 GRAM(S): at 06:16

## 2025-04-15 RX ADMIN — IPRATROPIUM BROMIDE AND ALBUTEROL SULFATE 3 MILLILITER(S): .5; 2.5 SOLUTION RESPIRATORY (INHALATION) at 07:23

## 2025-04-15 RX ADMIN — LIDOCAINE HYDROCHLORIDE 1 PATCH: 20 JELLY TOPICAL at 20:56

## 2025-04-15 RX ADMIN — IPRATROPIUM BROMIDE AND ALBUTEROL SULFATE 3 MILLILITER(S): .5; 2.5 SOLUTION RESPIRATORY (INHALATION) at 20:54

## 2025-04-15 RX ADMIN — ATORVASTATIN CALCIUM 40 MILLIGRAM(S): 80 TABLET, FILM COATED ORAL at 21:44

## 2025-04-15 RX ADMIN — HEPARIN SODIUM 1700 UNIT(S)/HR: 1000 INJECTION INTRAVENOUS; SUBCUTANEOUS at 00:28

## 2025-04-15 RX ADMIN — Medication 650 MILLIGRAM(S): at 17:30

## 2025-04-15 RX ADMIN — LIDOCAINE HYDROCHLORIDE 1 PATCH: 20 JELLY TOPICAL at 11:27

## 2025-04-15 RX ADMIN — PAROXETINE HYDROCHLORIDE 20 MILLIGRAM(S): 20 TABLET, FILM COATED ORAL at 11:27

## 2025-04-15 RX ADMIN — Medication 400 MILLIGRAM(S): at 03:22

## 2025-04-15 RX ADMIN — Medication 40 MILLIGRAM(S): at 06:15

## 2025-04-15 RX ADMIN — Medication 10 MILLIGRAM(S): at 12:52

## 2025-04-15 RX ADMIN — MIDODRINE HYDROCHLORIDE 10 MILLIGRAM(S): 5 TABLET ORAL at 17:25

## 2025-04-15 RX ADMIN — Medication 0.2 MILLIGRAM(S): at 18:54

## 2025-04-15 RX ADMIN — Medication 1000 MILLIGRAM(S): at 07:47

## 2025-04-15 RX ADMIN — HEPARIN SODIUM 1400 UNIT(S)/HR: 1000 INJECTION INTRAVENOUS; SUBCUTANEOUS at 09:39

## 2025-04-15 RX ADMIN — CLONAZEPAM 0.5 MILLIGRAM(S): 0.5 TABLET ORAL at 06:16

## 2025-04-15 RX ADMIN — MIDODRINE HYDROCHLORIDE 10 MILLIGRAM(S): 5 TABLET ORAL at 21:43

## 2025-04-15 RX ADMIN — CLOPIDOGREL BISULFATE 75 MILLIGRAM(S): 75 TABLET, FILM COATED ORAL at 11:27

## 2025-04-15 RX ADMIN — HEPARIN SODIUM 0 UNIT(S)/HR: 1000 INJECTION INTRAVENOUS; SUBCUTANEOUS at 08:36

## 2025-04-15 RX ADMIN — ALBUMIN (HUMAN) 50 MILLILITER(S): 12.5 INJECTION, SOLUTION INTRAVENOUS at 06:37

## 2025-04-15 RX ADMIN — Medication 5 MILLIGRAM(S): at 21:44

## 2025-04-15 RX ADMIN — Medication 1 GRAM(S): at 21:46

## 2025-04-15 RX ADMIN — Medication 1 GRAM(S): at 00:08

## 2025-04-15 RX ADMIN — HEPARIN SODIUM 1700 UNIT(S)/HR: 1000 INJECTION INTRAVENOUS; SUBCUTANEOUS at 07:22

## 2025-04-15 RX ADMIN — CLONAZEPAM 1 MILLIGRAM(S): 0.5 TABLET ORAL at 21:44

## 2025-04-15 NOTE — GOALS OF CARE CONVERSATION - ADVANCED CARE PLANNING - CONVERSATION/DISCUSSION
Diagnosis/MOLST Discussed
Diagnosis/Prognosis/MOLST Discussed/Hospice Referral
Diagnosis/Prognosis/MOLST Discussed/Treatment Options

## 2025-04-15 NOTE — PROGRESS NOTE ADULT - ASSESSMENT
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 Admitted for hypotension, MARYSOL, hyponatremia, and present chronic pleural effusion.     Hypotension, chronic pleural Effusion, HFpEF   - p/w worsening SOB, has been admitted twice in the past month in which thoracentesis was performed which drained malignant pleural effusions on the left, s/p Pleurx (4/4), per pt catheter is not draining much per home nurse  - CXR: There is a left effusion with atelectatic change. Underlying inflammatory infectious process not excluded. Smaller right effusion with some increased interstitial markings in the right infrahilar region which are less prominent than before.  - R chest pigtail cath to pleur vac with 600 cc serosanguinous overnight. CT surg following    - hx of CAD recent NSTEMI 3/2025, medically managed without cath  - EKG NSR low voltage  - Trop neg x1 (trop 72), lactate: 2.7, pro BNP: 3483  - no anginal complaint   - continue Plavix, statin    - TTE (3/20/2025) shows EF 60-65%, grade 1 diastolic dysfunction.   - MARYSOL, creat: 3.8 continue to trend renal indices, might need CRRT, nephro following   - CT with no obstruction seen by urology   - Strict I/Os, daily weights.  - s/p bumex IVP x 1, on albumin per renal    - 's, HR controlled  - home antihypertensives on hold (labetalol, losartan)  - on midodrine 10mg TID  - Monitor and replete Lytes. Keep K > 4 and Mg > 2    - home Eliquis held for paracentesis now on heparin gtt   - s/p paracentesis 4/11 3300 cc drained fu fluid culture     - at risk for abrupt decompensation  - GOC discussion: DNR/DNI, DC plan for [facility] per primary  - Will continue to follow.    ARLENE Ross  Nurse Practitioner - Cardiology   call TEAMS

## 2025-04-15 NOTE — PROGRESS NOTE ADULT - PROBLEM SELECTOR PLAN 8
-03/03 doppler: Extensive left upper extremity DVT extending proximally to involve the right internal jugular and subclavian veins. Left cervical lymphadenopathy with abnormal morphology suggesting malignancy.  -03/06: + LLE DVT  -holding home eliquis, on heparin gtt

## 2025-04-15 NOTE — PROGRESS NOTE ADULT - SUBJECTIVE AND OBJECTIVE BOX
Patient is a 64y old  Male who presents with a chief complaint of      HPI: 64 year old male with a PMH as listed below presented with worsening SOB and edema. Also admits to poor urination. Was previously on dialysis but was taken off due to renal recovery. Was supposed to follow up in office with Dr Eugene 25 but ended in ER first. Renal consulted for further eval. No new medications, no recent illnesses, no NSAID use.      Not urinating well  appetite is down    PAST MEDICAL & SURGICAL HISTORY:  Gastric lymphoma      Anemia of chronic disease      Pleural effusion      DVT (deep venous thrombosis)      Hypertension      Hyperlipidemia      S/P appendectomy  2014      S/P cholecystectomy  15 years ago           FAMILY HISTORY:  Family history of coronary artery disease in father  Father -  age 60 following MI    NC    Social History:Non smoker    MEDICATIONS  (STANDING):  albumin human 25% IVPB 50 milliLiter(s) IV Intermittent every 8 hours  albuterol/ipratropium for Nebulization 3 milliLiter(s) Nebulizer every 6 hours  ALPRAZolam 0.25 milliGRAM(s) Oral at bedtime  atorvastatin 40 milliGRAM(s) Oral at bedtime  benzocaine/menthol Lozenge 1 Lozenge Oral once  buMETAnide Injectable 2 milliGRAM(s) IV Push once  cefTRIAXone   IVPB 2000 milliGRAM(s) IV Intermittent every 24 hours  chlorhexidine 2% Cloths 1 Application(s) Topical <User Schedule>  clopidogrel Tablet 75 milliGRAM(s) Oral daily  heparin  Infusion.  Unit(s)/Hr (17 mL/Hr) IV Continuous <Continuous>  influenza   Vaccine 0.5 milliLiter(s) IntraMuscular once  midodrine 10 milliGRAM(s) Oral every 8 hours  pantoprazole    Tablet 40 milliGRAM(s) Oral before breakfast    MEDICATIONS  (PRN):  acetaminophen     Tablet .. 650 milliGRAM(s) Oral every 6 hours PRN Mild Pain (1 - 3)  acetaminophen   IVPB .. 1000 milliGRAM(s) IV Intermittent every 8 hours PRN Moderate Pain (4 - 6)  aluminum hydroxide/magnesium hydroxide/simethicone Suspension 30 milliLiter(s) Oral four times a day PRN Dyspepsia  bisacodyl Suppository 10 milliGRAM(s) Rectal daily PRN Constipation  heparin   Injectable 7500 Unit(s) IV Push every 6 hours PRN For aPTT less than 40  heparin   Injectable 3500 Unit(s) IV Push every 6 hours PRN For aPTT between 40 - 57  ondansetron Injectable 4 milliGRAM(s) IV Push every 8 hours PRN Nausea and/or Vomiting      Allergies    Bactrim DS (Hives)    Intolerances         REVIEW OF SYSTEMS: as per HPI    Vital Signs Last 24 Hrs  T(C): 36.3 (15 Apr 2025 04:41), Max: 36.4 (2025 20:50)  T(F): 97.4 (15 Apr 2025 04:41), Max: 97.6 (2025 20:50)  HR: 88 (15 Apr 2025 07:20) (78 - 92)  BP: 106/69 (15 Apr 2025 04:41) (106/69 - 113/72)  BP(mean): --  RR: 18 (15 Apr 2025 04:41) (18 - 19)  SpO2: 97% (15 Apr 2025 07:20) (95% - 100%)    Parameters below as of 15 Apr 2025 07:20  Patient On (Oxygen Delivery Method): nasal cannula        PHYSICAL EXAM:    GENERAL: Ill appearing, on NC  HEAD:  Atraumatic, Normocephalic  EYES: Conjunctiva and sclera clear  ENMT: No Drainage from nares, No drainage from ears  NERVOUS SYSTEM:  Awake and Alert  ABDOMEN: distended abdomen, NT  EXTREMITIES:  +++Edema B/L LE          LABS:                                 9.0    12.26 )-----------( 498      ( 15 Apr 2025 06:20 )             29.0     04-15    125[L]  |  90[L]  |  96[H]  ----------------------------<  103[H]  5.1   |  27  |  4.40[H]    Ca    9.2      15 Apr 2025 06:20  Phos  5.3     04-15  Mg     3.3     04-15    TPro  6.2  /  Alb  2.6[L]  /  TBili  1.0  /  DBili  x   /  AST  88[H]  /  ALT  58  /  AlkPhos  649[H]  04-15    PTT - ( 15 Apr 2025 06:20 )  PTT:169.5 sec  Urinalysis Basic - ( 15 Apr 2025 06:20 )    Color: x / Appearance: x / SG: x / pH: x  Gluc: 103 mg/dL / Ketone: x  / Bili: x / Urobili: x   Blood: x / Protein: x / Nitrite: x   Leuk Esterase: x / RBC: x / WBC x   Sq Epi: x / Non Sq Epi: x / Bacteria: x

## 2025-04-15 NOTE — PROGRESS NOTE ADULT - SUBJECTIVE AND OBJECTIVE BOX
Date/Time Patient Seen:  		  Referring MD:   Data Reviewed	       Patient is a 64y old  Male who presents with a chief complaint of hypotension (14 Apr 2025 18:02)      Subjective/HPI     PAST MEDICAL & SURGICAL HISTORY:  No pertinent past medical history    Incisional hernia  2015    Gastric lymphoma    Anemia of chronic disease    Pleural effusion    DVT (deep venous thrombosis)    Hypertension    Hyperlipidemia    No significant past surgical history    S/P appendectomy  November 17th 2014    S/P cholecystectomy  15 years ago          Medication list         MEDICATIONS  (STANDING):  albumin human 25% IVPB 100 milliLiter(s) IV Intermittent every 8 hours  albuterol/ipratropium for Nebulization 3 milliLiter(s) Nebulizer every 6 hours  atorvastatin 40 milliGRAM(s) Oral at bedtime  benzocaine/menthol Lozenge 1 Lozenge Oral once  chlorhexidine 2% Cloths 1 Application(s) Topical <User Schedule>  clonazePAM  Tablet 0.5 milliGRAM(s) Oral two times a day  clopidogrel Tablet 75 milliGRAM(s) Oral daily  heparin  Infusion.  Unit(s)/Hr (17 mL/Hr) IV Continuous <Continuous>  influenza   Vaccine 0.5 milliLiter(s) IntraMuscular once  midodrine 10 milliGRAM(s) Oral every 8 hours  mirtazapine 15 milliGRAM(s) Oral at bedtime  pantoprazole    Tablet 40 milliGRAM(s) Oral before breakfast  PARoxetine 20 milliGRAM(s) Oral daily  sucralfate suspension 1 Gram(s) Oral four times a day    MEDICATIONS  (PRN):  acetaminophen     Tablet .. 650 milliGRAM(s) Oral every 6 hours PRN Mild Pain (1 - 3)  acetaminophen   IVPB .. 1000 milliGRAM(s) IV Intermittent every 8 hours PRN Moderate Pain (4 - 6)  aluminum hydroxide/magnesium hydroxide/simethicone Suspension 30 milliLiter(s) Oral four times a day PRN Dyspepsia  bisacodyl Suppository 10 milliGRAM(s) Rectal daily PRN Constipation  heparin   Injectable 7500 Unit(s) IV Push every 6 hours PRN For aPTT less than 40  heparin   Injectable 3500 Unit(s) IV Push every 6 hours PRN For aPTT between 40 - 57  metoclopramide Injectable 10 milliGRAM(s) IV Push every 8 hours PRN nausea , vomiting  ondansetron Injectable 4 milliGRAM(s) IV Push every 6 hours PRN Nausea and/or Vomiting         Vitals log        ICU Vital Signs Last 24 Hrs  T(C): 36.3 (15 Apr 2025 04:41), Max: 36.4 (14 Apr 2025 20:50)  T(F): 97.4 (15 Apr 2025 04:41), Max: 97.6 (14 Apr 2025 20:50)  HR: 81 (15 Apr 2025 04:41) (78 - 92)  BP: 106/69 (15 Apr 2025 04:41) (106/69 - 113/72)  BP(mean): --  ABP: --  ABP(mean): --  RR: 18 (15 Apr 2025 04:41) (18 - 19)  SpO2: 100% (15 Apr 2025 04:41) (95% - 100%)    O2 Parameters below as of 15 Apr 2025 04:41  Patient On (Oxygen Delivery Method): nasal cannula                 Input and Output:  I&O's Detail    13 Apr 2025 07:01  -  14 Apr 2025 07:00  --------------------------------------------------------  IN:    Heparin Infusion: 38 mL    Heparin Infusion: 221 mL    IV PiggyBack: 50 mL    IV PiggyBack: 150 mL    Oral Fluid: 390 mL  Total IN: 849 mL    OUT:    Chest Tube (mL): 900 mL    Voided (mL): 420 mL  Total OUT: 1320 mL    Total NET: -471 mL      14 Apr 2025 07:01  -  15 Apr 2025 05:31  --------------------------------------------------------  IN:    Heparin Infusion: 204 mL    Oral Fluid: 360 mL  Total IN: 564 mL    OUT:    Chest Tube (mL): 400 mL    Voided (mL): 200 mL  Total OUT: 600 mL    Total NET: -36 mL          Lab Data                        8.8    13.72 )-----------( 546      ( 14 Apr 2025 07:58 )             28.5     04-14    130[L]  |  90[L]  |  86[H]  ----------------------------<  117[H]  4.8   |  29  |  3.80[H]    Ca    8.9      14 Apr 2025 07:58  Phos  5.0     04-14  Mg     3.1     04-14    TPro  6.3  /  Alb  2.6[L]  /  TBili  0.4  /  DBili  x   /  AST  38[H]  /  ALT  33  /  AlkPhos  442[H]  04-14            Review of Systems	      Objective     Physical Examination    heart s1s2  lung dec bs  head nc  head at      Pertinent Lab findings & Imaging      Jameson:  NO   Adequate UO     I&O's Detail    13 Apr 2025 07:01  -  14 Apr 2025 07:00  --------------------------------------------------------  IN:    Heparin Infusion: 38 mL    Heparin Infusion: 221 mL    IV PiggyBack: 50 mL    IV PiggyBack: 150 mL    Oral Fluid: 390 mL  Total IN: 849 mL    OUT:    Chest Tube (mL): 900 mL    Voided (mL): 420 mL  Total OUT: 1320 mL    Total NET: -471 mL      14 Apr 2025 07:01  -  15 Apr 2025 05:31  --------------------------------------------------------  IN:    Heparin Infusion: 204 mL    Oral Fluid: 360 mL  Total IN: 564 mL    OUT:    Chest Tube (mL): 400 mL    Voided (mL): 200 mL  Total OUT: 600 mL    Total NET: -36 mL               Discussed with:     Cultures:	        Radiology

## 2025-04-15 NOTE — SOCIAL WORK PROGRESS NOTE - NSSWPROGRESSNOTE_GEN_ALL_CORE
Discussed at daily rounds. Palliative informed me patient agreed to referral for home hospice. I met with patient and his brother Damon at bedside today. Provided list of hospice agencies. Educated re. home hospice. Patient agreed to referral to [facility]. He received Medicare card in mail which will become effective 8/1/2025. Encouraged him to reach out to Medicare re. Part D and supplemental insurance options. Referral sent to [facility]. Social work to follow.

## 2025-04-15 NOTE — PROGRESS NOTE ADULT - PROBLEM SELECTOR PLAN 4
CT chest/abd/pelvis (3/3): Large left pleural effusion markedly increased since 11/30/2024 with total atelectasis left lower lobe and partial atelectasis left upper lobe. New mediastinal and left axillary lymphadenopathy. Inflammatory changes associated with left subclavian vein consistent with known DVT. Upper abdominal lymphadenopathy similar to 11/30/2024. Mildly increased pelvic lymphadenopathy. Mesenteric lymphadenopathy and mesenteric fat stranding similar to prior examination. No pulmonary embolus. Limited evaluation segmental and subsegmental branches.  -scheduled for his fifth chemo cycle today (previously on FolFox, was supposed to switch to his new chemo medication today) however BP was noted to be low, and did not receive dose. last chemo was >3 weeks ago  -rt medport in place. Follows w/ Dr. Fung  --hx of beta thal minor  -holding home oxy, ordered tylenol and ofirmev for now PRN for pain management. can consider dilaudid for severe pain if needed   -currently complaining of nausea and emesis, likely 2/2 to cancer but may have infectious component to it. continue abx and will order GI PCR for when patient has a bowel movement. ordered zofran and reglan ATC for nausea for now, but if symptoms persist will consider Compazin as an alternative antiemetic   -EKG on 04/13: QTC: 437   -Spoke to outpatient heme onc Dr. Fung, started mirtazapine 7.5mg po qhs   -Heme/Onc consulted (Dr. Hu), f/u recs.

## 2025-04-15 NOTE — PROGRESS NOTE ADULT - ASSESSMENT
Acute on CKD Stage 3  Hypotension  Anemia  Hypoalbuminemia      -MARYSOL secondary to severe hypotension leading to renal hypoperfusion; unclear etiology of the hypotension  -Additionally, CTAP done noting renal fullness and uvp obstruction; also seen on renal sono  -Midodrine for BP  -Unable to give fluids at this time given hypervolemic state  -S/p paracentesis  -Pleurex in place  -Worsening renal function, decreasing urine output, mild uremic symptoms noted as well but mentally sound; remains volume overloaded as well with poor response to diuresis  -Discussed dialysis with patient again vs palliative care given metastatic cancer. Patient was previously on dialysis earlier this year and absolutely does NOT want to go back on dialysis as it was a very unpleasant experience for him. He understands his risks of not doing dialysis but also understands his extensive cancer with no chance for cure. He wants to explore palliative options at this point.   -May consider additional albumin + bumex    D/W Resident    Renal palliative discussion done 4/15/25      Thank you   Acute on CKD Stage 3  Hypotension  Anemia  Hypoalbuminemia      -MARYSOL secondary to severe hypotension leading to renal hypoperfusion; unclear etiology of the hypotension  -Additionally, CTAP done noting renal fullness and uvp obstruction; also seen on renal sono  -Midodrine for BP  -Unable to give fluids at this time given hypervolemic state  -S/p paracentesis  -Pleurex in place  -Worsening renal function, decreasing urine output, mild uremic symptoms noted as well but mentally sound; remains volume overloaded as well with poor response to diuresis  -Discussed dialysis with patient again vs palliative care given metastatic cancer. Patient was previously on dialysis earlier this year and absolutely does NOT want to go back on dialysis as it was a very unpleasant experience for him. He understands his risks of not doing dialysis but also understands his extensive cancer with no chance for cure. He wants to explore palliative options at this point.   -May consider additional albumin + bumex    D/W Resident    Renal palliative discussion done 4/15/25    Spent > 35 mins      Thank you

## 2025-04-15 NOTE — PROGRESS NOTE ADULT - PROBLEM SELECTOR PLAN 6
Patient has been admitted three times within the past couple of months in which thoracentesis was performed and drained malignant pleural effusions  -pleurex placement on right chest C/D/I  -Patient has been complaining of cough for the past two months but now is producing tannish sputum. As patient is immunocompromised, patient may not mount fever or wbc.    -Increased lung markings on chest x-ray, no known consolidation, but will cover for possible infection with ceftriaxone 2g daily and azithromycin (dc azithro as legionella neg)  -BC (NGTD at 48 hrs), strep antibiotics neg  -completed ceftriaxone course on 04/13, monitor CBC   -switched duonebs from PRN to standing for SOB. Also added maalox/PPI to regimen.   -drain Pleurx every other day per CT surgery, never more than 1 L at a time. pleuravac drained 1L, and then overnight drained another 1L  -s/p 1 dose of 2mg IV bumex with albumin, will hold diuresis for now due to Cr bump and continue albumin (recevied 4x yesterday)   -pulm consulted (Dr. Damico) recs appreciated  -ID consulted (Dr. Corcoran), recs appreciated  -CT surgery consulted, recs appreciated

## 2025-04-15 NOTE — PROGRESS NOTE ADULT - ASSESSMENT
64-year-old male with a history of GI carcinoma, metastatic lymphadenopathy, DVT, hypertension, anemia presents with has been having some persistent shortness of breath since discharge.  The patient was admitted for shortness of breath, had a thoracentesis performed while in the hospital.  Now the patient has been complaining of persistent shortness of breath, but was hypotensive     pleurx R  Hypotension  mets ca  eval Acute Infection  GI Carcinoma  Anemia  DVT hx  MARYSOL    s/p paracentesis - 3300 cc drained -   right CT drain in progress - pleurovac connected -   anxiolysis  vs noted  meds reviewed    HD monitoring and Support  pleurx drain as tolerated - 100 - 1000 per drain attempt - M W F - at home and on DC  I moustapha  cx - biomarkers  thoracic follow up  cardio follow up  monitor VS and HD and Sat  goal sat > 88 pct  trend renal indices  I and O  replete lytes

## 2025-04-15 NOTE — CHART NOTE - NSCHARTNOTEFT_GEN_A_CORE
Per discussion with Palliative SW, patient completed MOLST form and elected for home hospice referral.    For patients being discharged with home hospice services, symptom directed meds to be considered include the following:    Morphine solution 5mg q4h prn for pain and/or dyspnea  Ativan solution 0.5-1mg q4h prn for anxiety, agitation, refractory dyspnea  Atropine opthalmic solution (1%) 2 drops SL q4h prn for secretions  Bowel regimen with either Senna 2 tabs HS or dulcolax suppository     Please reach out if questions or concerns.  Essence Hoskins MD, [facility]; Palliative Care Attending, [phone] Per discussion with Palliative SW, patient completed MOLST form and elected for home hospice referral.    For patients being discharged with home hospice services, symptom directed meds to be considered include the following:  For patients being discharged with home hospice services, symptom directed meds to be considered include the following:    Dilaudid solution 2mg q4h prn for pain and/or dyspnea  Ativan solution 0.5-1mg q4h prn for anxiety, agitation, refractory dyspnea  Atropine opthalmic solution (1%) 2 drops SL q4h prn for secretions  Bowel regimen with either Senna 2 tabs HS or dulcolax suppository       For inpatient purposes, if acute symptoms arise and goals are for comfort, would rec the following:    Given organ dysfunction (renal +/- hepatic), would avoid morphine.  IV dilaudid 0.2mg q2h prn for moderate pain  IV dilaudid 0.5mg q2h prn for severe pain  IV dilaudid 0.5mg q2h prn for dyspnea- goal RR <24  IV ativan 0.5mg q2h prn for anxiety, agitation, refractory dyspnea  IV glycopyrrolate 0.2mg q6h prn for secretions  dulcolax NE PRN constipation, daily      Please reach out if questions or concerns.  Essence Hoskins MD, [facility]; Palliative Care Attending, [phone]

## 2025-04-15 NOTE — PROGRESS NOTE ADULT - PROBLEM SELECTOR PLAN 11
DVT treatment/prophylaxis  -holding home eliquis, continue heparin gtt     #Constipation  -Ordered suppository, lactulose with no relief  -ordered SMOG for today   -GI PCR to be collected when patient has a bowel movement     #Insomnia  -Tried 0.25mg xanax without much help  -Started mirtazapine 7.5mg po qhs, should help with anxiety as well --increased to 15mg by psych  -Psych added paxil 20mg po qd and klonopin 0.5mg BID as well     GOC: palliative consulted; full code

## 2025-04-15 NOTE — PROGRESS NOTE ADULT - PROBLEM SELECTOR PLAN 2
BUN: 71, Cr: 3.30  -has not been urinating much for the past two days (denies dysuria), endorses urine output slightly better today   -Nephro consulted ( Dr. Franck Wagoner): Cortisol AM mildly elevated, IV albumin  -Barba not indicated per Urology, post void residual 34cc   -CT A/P showed renal pelvic fluid fullness/UPJ obstruction configuration (R>L) and bilateral cysts, unchanged. Renal US showed mild right and trace left hydronephrosis. As per surgery, no intervention required.   -monitor I/Os  -Cr continuing to increase, patient does not wish to have dialysis

## 2025-04-15 NOTE — PROGRESS NOTE ADULT - PROBLEM SELECTOR PLAN 1
Acute for the past two days, endorses slight lightheadedness   -BP: 82/56 -> 90/51 ->77/49  -s/p 1L NaCl 0.9% IVB 1x, and 1L LR in ED  -C/w Midodrine 10mg , s/p multiple doses albumin  -TTE ordered: LVEF 54 % Mild mitral regurgitation. Abdominal ascites is incidentally noted. Large right and small left pleural effusion noted. Small pericardial effusion with no echocardiographic evidence of tamponade physiology.  -HOLD home labetalol BID and losartan qd; and hold home bumex   -would be cautious about fluids as patient has present pleural effusions and in extravascular volume overload state  -s/p 1 dose of 2mg IV bumex with albumin, will hold diuresis for now due to Cr bump and continue albumin

## 2025-04-15 NOTE — PROGRESS NOTE ADULT - ASSESSMENT
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   - Continue pleurx to water seal; can cap upon discharge  - Supplemental O2 PRN  - GOC  - Medical management per primary team  -Will sign off  Discussed with Dr Palomo

## 2025-04-15 NOTE — CHART NOTE - NSCHARTNOTEFT_GEN_A_CORE
Patient has significant back pain and cancer pain     Given ofirmev and tylenol overnight    Careful with opioids due to creatinine clearance and hypotensive    Consulted Dr. Nielsen for pain management recs to adjust regimen as patient is near maximum tylenol daily dose and still uncontrolled overnight

## 2025-04-15 NOTE — PROGRESS NOTE ADULT - SUBJECTIVE AND OBJECTIVE BOX
All interim records and events noted.    continues w weakness, ZHOU      MEDICATIONS  (STANDING):  albuterol/ipratropium for Nebulization 3 milliLiter(s) Nebulizer every 6 hours  atorvastatin 40 milliGRAM(s) Oral at bedtime  benzocaine/menthol Lozenge 1 Lozenge Oral once  chlorhexidine 2% Cloths 1 Application(s) Topical <User Schedule>  clonazePAM  Tablet 0.5 milliGRAM(s) Oral two times a day  clopidogrel Tablet 75 milliGRAM(s) Oral daily  heparin  Infusion.  Unit(s)/Hr (17 mL/Hr) IV Continuous <Continuous>  influenza   Vaccine 0.5 milliLiter(s) IntraMuscular once  lidocaine   4% Patch 1 Patch Transdermal every 24 hours  midodrine 10 milliGRAM(s) Oral every 8 hours  mirtazapine 15 milliGRAM(s) Oral at bedtime  pantoprazole    Tablet 40 milliGRAM(s) Oral before breakfast  PARoxetine 20 milliGRAM(s) Oral daily  sucralfate suspension 1 Gram(s) Oral four times a day    MEDICATIONS  (PRN):  acetaminophen     Tablet .. 650 milliGRAM(s) Oral every 6 hours PRN Mild Pain (1 - 3)  acetaminophen   IVPB .. 1000 milliGRAM(s) IV Intermittent every 8 hours PRN Moderate Pain (4 - 6)  aluminum hydroxide/magnesium hydroxide/simethicone Suspension 30 milliLiter(s) Oral four times a day PRN Dyspepsia  bisacodyl Suppository 10 milliGRAM(s) Rectal daily PRN Constipation  heparin   Injectable 7500 Unit(s) IV Push every 6 hours PRN For aPTT less than 40  heparin   Injectable 3500 Unit(s) IV Push every 6 hours PRN For aPTT between 40 - 57  metoclopramide Injectable 10 milliGRAM(s) IV Push every 8 hours PRN nausea , vomiting      Vital Signs Last 24 Hrs  T(C): 36.4 (15 Apr 2025 11:33), Max: 36.4 (14 Apr 2025 20:50)  T(F): 97.6 (15 Apr 2025 11:33), Max: 97.6 (14 Apr 2025 20:50)  HR: 79 (15 Apr 2025 11:33) (79 - 92)  BP: 100/64 (15 Apr 2025 11:33) (100/64 - 113/72)  BP(mean): --  RR: 18 (15 Apr 2025 11:33) (18 - 19)  SpO2: 92% (15 Apr 2025 11:33) (92% - 100%)    Parameters below as of 15 Apr 2025 11:33  Patient On (Oxygen Delivery Method): nasal cannula  O2 Flow (L/min): 3      PHYSICAL EXAM  General: in no acute distress  Head: atraumatic, normocephalic  ENT: sclera anicteric, buccal mucosa moist  Neck: supple, trachea midline  CV: S1 S2, regular rate and rhythm  Lungs: clear to auscultation, no wheezes/rhonchi  Abdomen: pouchy soft, nontender to palp  Extrem: no clubbing/cyanosis. No sig edema  Skin: no significant increased ecchymosis/petechiae  Neuro: alert and oriented X3,  no focal deficits      LABS:             9.0    12.26 )-----------( 498      ( 04-15 @ 06:20 )             29.0                8.8    13.72 )-----------( 546      ( 04-14 @ 07:58 )             28.5                9.0    13.60 )-----------( 503      ( 04-14 @ 00:40 )             29.4                9.2    13.59 )-----------( 554      ( 04-13 @ 09:15 )             29.4       04-15    125[L]  |  90[L]  |  96[H]  ----------------------------<  103[H]  5.1   |  27  |  4.40[H]    Ca    9.2      15 Apr 2025 06:20  Phos  5.3     04-15  Mg     3.3     04-15    TPro  6.2  /  Alb  2.6[L]  /  TBili  1.0  /  DBili  x   /  AST  88[H]  /  ALT  58  /  AlkPhos  649[H]  04-15    04-15 @ 06:20  PT-- INR--  PTQ338.5  04-14 @ 07:58  PT-- INR--  PTT76.2  04-14 @ 00:40  PT-- INR--  PTT64.1  04-13 @ 17:25  PT-- INR--  PTT28.6  04-13 @ 09:15  PT-- INR--  PTT43.2  04-13 @ 01:10  PT-- INR--  BXD149.9  04-12 @ 17:50  PT-- INR--  PTT74.5  04-12 @ 09:11  PT-- INR--  PTT46.0  04-12 @ 01:17  PT-- INR--  PTT41.1  04-11 @ 01:26  PT-- INR--  PTT71.5  04-10 @ 16:00  PT-- INR--  PTT79.6      RADIOLOGY & ADDITIONAL STUDIES:    IMPRESSION/RECOMMENDATIONS:

## 2025-04-15 NOTE — PROGRESS NOTE ADULT - ASSESSMENT
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 Admitted for hypotension, MARYSOL, hyponatremia, and present chronic pleural effusion.   ID c/s for further evaluation    Hypotension, AHRF on NC, MARYSOL; SIRS vs. Sepsis  Ascites, Pleural Effusions s/p Pleurx  Gastric lymphoma  - patient having episodes of hypotension since admission, was on levo ggt on admission  - afebrile no leukocytosis, hypoxia on NC  - BCx NGTD  CT 4/9 showing  RIGHT pleural effusion, diminished , post percutaneous thoracocentesis catheter. Moderate loculated LEFT pleural effusion and adjacent LLL atelectasis and/or consolidation, not significantly changed.  Moderate volume abdominal ascites, increased/developed from prior exams. Nodular thickening of pleural and peritoneal surfaces c/w malignant etiology. Diffuse thoracoabdominal lymphadenopathy, enlarged/developed in inguinal region.  Sclerotic osseous metastasis, not significantly changed  4/11 S/p 3550cc of cloudy yellow ascitic fluid removed from right abdomen -- peritoneal fluid cx NGTD    Recommendations:   Suspect overall clinical picture in setting of medications/underlying ca >>> infection  However given patient immunocompromised, will keep covered w/ ABx  S/p ceftriaxone 4/9-4/13 (x5 day course) discontinued in setting of elevated LFTs  Will monitor off Abx  Trend temps/WBC  Monitor renal fxn, renally dose medication    Patient evaluated with face-to-face time in addition to reviewing history, labs, microbiology, and imaging.   Antibiotic stewardship, local antibiogram, infection control strategies and potential transmission issues taken into consideration at time of treatment decision making process.   Thank you for allowing us to participate in the care of your patient.  Infectious Diseases will follow. Please call with any questions.  Karen Tyler M.D.  Available on [employer] TEAMS -- *[facility]*  [facility] [phone]  For after 5 P.M. and weekends, please call [phone] Patient is a 63 y/o male with PMHx of poorly differentiated metastatic (known cervical and axillary lymphadenopathy) GI carcinoma, LUE and LLE DVT, HTN, on home O2 2/2 to SOB, and anemia presents to the ED with hypotension and SOB. Admitted for hypotension, MARYSOL, hyponatremia, and present chronic pleural effusion.   ID c/s for further evaluation    Hypotension, AHRF on NC, MARYSOL; SIRS vs. Sepsis  Ascites, Pleural Effusions s/p Pleurx  Gastric lymphoma  - patient having episodes of hypotension since admission, was on levo ggt on admission  - afebrile no leukocytosis, hypoxia on NC  - BCx NGTD  CT 4/9 showing  RIGHT pleural effusion, diminished , post percutaneous thoracocentesis catheter. Moderate loculated LEFT pleural effusion and adjacent LLL atelectasis and/or consolidation, not significantly changed.  Moderate volume abdominal ascites, increased/developed from prior exams. Nodular thickening of pleural and peritoneal surfaces c/w malignant etiology. Diffuse thoracoabdominal lymphadenopathy, enlarged/developed in inguinal region.  Sclerotic osseous metastasis, not significantly changed  4/11 S/p 3550cc of cloudy yellow ascitic fluid removed from right abdomen -- peritoneal fluid cx NGTD    Recommendations:  Suspect overall clinical picture in setting of medications/underlying ca >>> infection  S/p ceftriaxone 4/9-4/13 (x5 day course); discontinued in setting of elevated LFTs  Will monitor off Abx  Trend temps/WBC  chest tube care  Monitor renal fxn, renally dose medication  additional care per primary team    Patient evaluated with face-to-face time in addition to reviewing history, labs, microbiology, and imaging.   Antibiotic stewardship, local antibiogram, infection control strategies and potential transmission issues taken into consideration at time of treatment decision making process.   Thank you for allowing us to participate in the care of your patient.  D/w Dr. Garcia  Infectious Diseases will follow. Please call with any questions.  Karen Tyler M.D.  Available on [employer] TEAMS -- *[facility]*  [facility] [phone]  For after 5 P.M. and weekends, please call [phone]

## 2025-04-15 NOTE — PROGRESS NOTE ADULT - SUBJECTIVE AND OBJECTIVE BOX
[facility]  DANY Burris Y. Patel, S. Shah, [facility]  [phone]    Name: JESSE EATON  Age: 64y  Gender: Male  MRN: [id number]    Interval History:  No acute overnight events.   Notes reviewed    Antibiotics:      Medications:  acetaminophen     Tablet .. 650 milliGRAM(s) Oral every 6 hours PRN  acetaminophen   IVPB .. 1000 milliGRAM(s) IV Intermittent every 8 hours PRN  albuterol/ipratropium for Nebulization 3 milliLiter(s) Nebulizer every 6 hours  aluminum hydroxide/magnesium hydroxide/simethicone Suspension 30 milliLiter(s) Oral four times a day PRN  atorvastatin 40 milliGRAM(s) Oral at bedtime  benzocaine/menthol Lozenge 1 Lozenge Oral once  bisacodyl Suppository 10 milliGRAM(s) Rectal daily PRN  chlorhexidine 2% Cloths 1 Application(s) Topical <User Schedule>  clonazePAM  Tablet 0.5 milliGRAM(s) Oral two times a day  clopidogrel Tablet 75 milliGRAM(s) Oral daily  heparin   Injectable 7500 Unit(s) IV Push every 6 hours PRN  heparin   Injectable 3500 Unit(s) IV Push every 6 hours PRN  heparin  Infusion.  Unit(s)/Hr IV Continuous <Continuous>  influenza   Vaccine 0.5 milliLiter(s) IntraMuscular once  lidocaine   4% Patch 1 Patch Transdermal every 24 hours  metoclopramide Injectable 10 milliGRAM(s) IV Push every 8 hours PRN  midodrine 10 milliGRAM(s) Oral every 8 hours  mirtazapine 15 milliGRAM(s) Oral at bedtime  pantoprazole    Tablet 40 milliGRAM(s) Oral before breakfast  PARoxetine 20 milliGRAM(s) Oral daily  sucralfate suspension 1 Gram(s) Oral four times a day      Review of Systems:  A 10-point review of systems was obtained.     Pertinent positives and negatives--  Constitutional: No fevers. No Chills. No Rigors.   Cardiovascular: No chest pain. No palpitations.  Respiratory: No shortness of breath. No cough.  Gastrointestinal: No nausea or vomiting. No diarrhea or constipation.   Psychiatric: Pleasant. Appropriate affect.    Review of systems otherwise negative except as previously noted.    Allergies: Bactrim DS (Hives)    For details regarding the patient's past medical history, social history, family history, and other miscellaneous elements, please refer the initial infectious diseases consultation and/or the admitting history and physical examination for this admission.    Objective:  Vitals:   T(C): 36.4 (04-15-25 @ 11:33), Max: 36.4 (04-14-25 @ 20:50)  HR: 79 (04-15-25 @ 11:33) (79 - 92)  BP: 100/64 (04-15-25 @ 11:33) (100/64 - 113/72)  RR: 18 (04-15-25 @ 11:33) (18 - 19)  SpO2: 92% (04-15-25 @ 11:33) (92% - 100%)    Physical Examination:  General: no acute distress  HEENT: NC/AT  Cardio: RRR  Resp: on NC  Chest tube in place  Abd: soft, NT, ND  Ext: no edema or cyanosis  Skin: warm, dry, no visible rash      Laboratory Studies:  CBC:                       9.0    12.26 )-----------( 498      ( 15 Apr 2025 06:20 )             29.0     CMP: 04-15    125[L]  |  90[L]  |  96[H]  ----------------------------<  103[H]  5.1   |  27  |  4.40[H]    Ca    9.2      15 Apr 2025 06:20  Phos  5.3     04-15  Mg     3.3     04-15    TPro  6.2  /  Alb  2.6[L]  /  TBili  1.0  /  DBili  x   /  AST  88[H]  /  ALT  58  /  AlkPhos  649[H]  04-15    LIVER FUNCTIONS - ( 15 Apr 2025 06:20 )  Alb: 2.6 g/dL / Pro: 6.2 g/dL / ALK PHOS: 649 U/L / ALT: 58 U/L / AST: 88 U/L / GGT: x           Urinalysis Basic - ( 15 Apr 2025 06:20 )    Color: x / Appearance: x / SG: x / pH: x  Gluc: 103 mg/dL / Ketone: x  / Bili: x / Urobili: x   Blood: x / Protein: x / Nitrite: x   Leuk Esterase: x / RBC: x / WBC x   Sq Epi: x / Non Sq Epi: x / Bacteria: x        Microbiology: reviewed    Urinalysis with Rflx Culture (collected 04-11-25 @ 16:20)    Culture - Body Fluid with Gram Stain (collected 04-11-25 @ 12:42)  Source: Abdominal Fl Abdominal Fluid  Gram Stain (04-12-25 @ 00:10):    No polymorphonuclear leukocytes per low power field    No organisms seen per oil power field  Preliminary Report (04-12-25 @ 16:01):    No growth    Culture - Fungal, Body Fluid (collected 04-11-25 @ 12:42)  Source: Peritoneal Peritoneal Fluid  Preliminary Report (04-13-25 @ 08:26):    No growth    Culture - Blood (collected 04-09-25 @ 16:36)  Source: Blood Blood-Peripheral  Final Report (04-15-25 @ 04:00):    No growth at 5 days    Culture - Blood (collected 04-09-25 @ 16:30)  Source: Blood Blood-Peripheral  Final Report (04-15-25 @ 04:00):    No growth at 5 days    Culture - Blood (collected 04-09-25 @ 11:35)  Source: Blood Blood-Peripheral  Final Report (04-14-25 @ 17:01):    No growth at 5 days          Radiology: reviewed

## 2025-04-15 NOTE — PROGRESS NOTE ADULT - ATTENDING COMMENTS
Patient was seen and examined by myself. Case was discussed with house staff in details. I have reviewed and agree with the plan as outlined above with edits where appropriate.    HPI:  Patient is a 65 y/o male with PMHx of poorly differentiated metastatic (known cervical and axillary lymphadenopathy) GI carcinoma, LUE and LLE DVT, HTN, on home O2 2/2 to SOB, and anemia presents to the ED with hypotension and SOB. Of note, pt was admitted from 3/3-3/7 with large left pleural effusion and LUE DVT. Pt had thoracentesis done of which 3.1 L of fluid from the left thorax. Then was admitted on 3/19-3/23 for pleural effusion and NSTEMI. Pt had a thoracentesis done of which drained 560 cc from the left thorax. Then patient was admitted from 04/03-04/06 for SOB and had pleurax placement on right chest (currently has very minimal output). Patient was told to hold bumex until today, however patient restarted bumex last night after speaking with his heme-onc doctor. Endorses he had soft bp yesterday (systolic ~102), and then today he was supposed to get his fifth chemo cycle (previously on FolFox, was supposed to switch to his new chemo medication today) however BP was noted to be low and was recommended to come to the hospital. Also endorses lightheadedness today.     vitals: T: 97.3F, BP: 82/56 -> 90/51, HR: 68, RR: 22, 99% O2 on 4L nc  labs: hgb: 8.9 (baseline), CBC abnormal 2/2 to cancer, Na: 130, Cl: 94, BUN: 71, Cr: 3.30, eGFR: 20, lactate: 2.7, proBNP: 3483  UA: cloudy, trace leuk esterase, 10wbc, moderate bacteria, neg nitrate   RVP neg   X-ray:  Increased interstitial markings bilaterally slightly greater on the left as before. There is a left effusion with atelectatic change.   Underlying inflammatory infectious process not excluded. Smaller right effusion with some increased interstitial markings in the right   infrahilar region which are less prominent than before. Small caliber right chest tube noted by the right CP angle. No pneumothorax.   Port-A-Cath as before. Borderline cardiomegaly. Regional osseous structures appropriate for age  received in the ED: 1L IVB 1x, levo gtt   (09 Apr 2025 14:45)      ROS: as in the HPI; all other ROS negative    SH and family history as above    Vital Signs Last 24 Hrs, vital stable , on 3L NC   feeling nausea , vomited once this PM     GEN:  SOB upon exertion. feeling very tired   HEENT- normocephalic; mouth moist  CVS- S1S2+  LUNGS- course BS , B/L Rhonchi,  auscultation; no wheezing, R pleurx in. draining total 1.5L since the day before.   ABD:,+ distended, Bowel sounds are decreased   EXTREMITY: no calf tenderness, no cyanosis, 2 + pitting  edema  NEURO: AAOx3; non focal neurologic exam   PSYCH: appears anxious , sad and frustrated.   BACK: no swelling or mass;   VASCULAR: distal peripheral pulses present  SKIN: warm and dry.       Labs and imaging reviewed      Patient presenting with Patient is a 64y old  Male who presents with a chief complaint of hypotension (10 Apr 2025 15:33)   admitted for     **  Gastric Lymphoma:  discussed with hematology, treatment on hold due to current condition, evidence of progression of the disease, patient had conversation with his hematology/oncology yesterday , declined dialysis and repeat US abd today , palliative care eval noted, patient now consent to Home hospice and CMO.   SW Arrange for home hospice     1. Hypotension: improved, s/p  albumin, IV bolus, all BP meds held. on midodrine 10mg q8h.      2. acute on chronic HFmrEF: EF 54%, no other sig changes from prior echo, cardio eval noted. R pleurx  continuous drain.      3. recurrent pleural effusion due to HF  and malignancy: R Pleurx accessed yesterday , will be re accessed tomorrow . CTS eval noted. repeat CXR show B/L pleural effusion.      Ascites: S/P paracentesis 4/11 3550ml ascites removed, BP stable post procedure,  Fluid Cx negative, cytology susp malignant cell. refused repeat US abd.      Nausea/vomiting : zofran , reglan, PPI IV BID, GI Eval noted     4. DVT: eliquis resume, d/c heparin drip     5.  MARYSOL with CKD 3: renal function worsening. declined dialysis, declined further blood test       6 acute on chronic RF with hypoxia: on 3L NC, completed Abx  for pneumonia, management pleural effusion as above. duoneb ATC .      Plan of care discussed with patient;  all questions and concerns were addressed.

## 2025-04-15 NOTE — CHART NOTE - NSCHARTNOTEFT_GEN_A_CORE
Patient very anxious due to late-stage cancer and having trouble sleeping     Patient on klonipin via psych recs Dr. Correa    Given 2x 0.5 as PM dose (usually 0.5 BID) Patient very anxious due to late-stage cancer and having trouble sleeping     Patient on klonipin via psych recs Dr. Correa to start April 15th    Given 2x 0.5 as PM dose (usually 0.5 BID) for tonight

## 2025-04-15 NOTE — GOALS OF CARE CONVERSATION - ADVANCED CARE PLANNING - CONVERSATION DETAILS
Palliative care SW met with patient at bedside for follow up GOC discussion and to provide supportive counseling. Patient states he spoke with hospital oncology team as well as his outpatient oncologist who have told him chemotherapy is not longer an option for him. Patient expressed he was very upset to hear this news but also is not surprised. He stated he had a very difficult day yesterday however he is much better today and willing to talk about options.    Discussed home hospice services explaining philosophy of hospice program, criteria and services provided. Patient expressed that he feels he is ready for hospice as he does not want to return to the hospital and wants to try to have the best quality life he can for however long that may be. He is agreeable to a referral being made and Unit SW made aware.    Further discussed patient's wishes regarding extent of medical care to be provided including thoughts regarding cardiopulmonary resuscitation and mechanical ventilation/intubation. Patient states his wishes are for DNR/DNI. Discussed MOLST form. MOLST completed and placed on chart.  All questions answered.    Emotional support provided.
Palliative care SW met with patient at bedside to discuss GOC and provide supportive counseling. Reviewed patient's medical and social history as well as events leading to patient's hospitalization. Writer discussed patient's current diagnosis (hypotension, MARYSOL, hyponatremia, GI carcinoma, hx LUE and LLE DVT, HTN, Anemia), medical condition and management.     Inquired about advance directives. Patient reports he does not have any in place. Writer recommended completion of a HCP. Patient declined to complete at this time however will consider . He did however identify his brother Damon as the person who would help make medical decisions for him. Patient states he has had some conversations about his healthcare wishes. He states that at this time he would want CPR however was very clear that he would never want dialysis as he has had it in the past and would not want to go through this again. He states his brother is aware of these wishes.     Patient expressed he is feeling depressed about his current medical situation and cancer diagnosis. He reports feeling discouraged and emotionally exhausted. He shared frustration and sadness about ongoing medical complications that have required repeated hospitalizations, which have halted his chemotherapy treatments. The patient voiced concerns about his prognosis and a sense of hopelessness about his ability to move forward. He states that he is not as strong as he was when he started his treatments and can no longer enjoy or do the things he used to. Extensive emotional support provided.    Patient lives at home with his brother and who assists him as needed. He was appreciative of visit and stated it felt good to get some of his feeling out. PC SW contact information provided.  All questions answered. Patient aware of SW's ongoing availability.    Patient remains FULL CODE.

## 2025-04-15 NOTE — PROGRESS NOTE ADULT - SUBJECTIVE AND OBJECTIVE BOX
[facility]  Jhonny Obrien PA-C  [address] [place] [address]  [phone]      INTERVAL HPI/OVERNIGHT EVENTS:  Pt s/e  Nausea/vomiting resolved  Tolerating diet    MEDICATIONS  (STANDING):  albuterol/ipratropium for Nebulization 3 milliLiter(s) Nebulizer every 6 hours  atorvastatin 40 milliGRAM(s) Oral at bedtime  benzocaine/menthol Lozenge 1 Lozenge Oral once  chlorhexidine 2% Cloths 1 Application(s) Topical <User Schedule>  clonazePAM  Tablet 0.5 milliGRAM(s) Oral two times a day  clopidogrel Tablet 75 milliGRAM(s) Oral daily  heparin  Infusion.  Unit(s)/Hr (17 mL/Hr) IV Continuous <Continuous>  influenza   Vaccine 0.5 milliLiter(s) IntraMuscular once  lidocaine   4% Patch 1 Patch Transdermal every 24 hours  midodrine 10 milliGRAM(s) Oral every 8 hours  mirtazapine 15 milliGRAM(s) Oral at bedtime  pantoprazole    Tablet 40 milliGRAM(s) Oral before breakfast  PARoxetine 20 milliGRAM(s) Oral daily  sucralfate suspension 1 Gram(s) Oral four times a day    MEDICATIONS  (PRN):  acetaminophen     Tablet .. 650 milliGRAM(s) Oral every 6 hours PRN Mild Pain (1 - 3)  acetaminophen   IVPB .. 1000 milliGRAM(s) IV Intermittent every 8 hours PRN Moderate Pain (4 - 6)  aluminum hydroxide/magnesium hydroxide/simethicone Suspension 30 milliLiter(s) Oral four times a day PRN Dyspepsia  bisacodyl Suppository 10 milliGRAM(s) Rectal daily PRN Constipation  heparin   Injectable 7500 Unit(s) IV Push every 6 hours PRN For aPTT less than 40  heparin   Injectable 3500 Unit(s) IV Push every 6 hours PRN For aPTT between 40 - 57  metoclopramide Injectable 10 milliGRAM(s) IV Push every 8 hours PRN nausea , vomiting      Allergies  Bactrim DS (Hives)      PHYSICAL EXAM:   Vital Signs:  Vital Signs Last 24 Hrs  T(C): 36.3 (15 Apr 2025 04:41), Max: 36.4 (2025 20:50)  T(F): 97.4 (15 Apr 2025 04:41), Max: 97.6 (2025 20:50)  HR: 88 (15 Apr 2025 07:20) (78 - 92)  BP: 106/69 (15 Apr 2025 04:41) (106/69 - 113/72)  BP(mean): --  RR: 18 (15 Apr 2025 04:41) (18 - 19)  SpO2: 97% (15 Apr 2025 07:20) (95% - 100%)    Parameters below as of 15 Apr 2025 07:20  Patient On (Oxygen Delivery Method): nasal cannula      Daily     Daily Weight in k.1 (15 Apr 2025 04:41)    GENERAL:  Appears stated age  HEENT:  NC/AT  CHEST:  Full & symmetric excursion  HEART:  Regular rhythm  ABDOMEN:  Soft, non-tender, non-distended  EXTEREMITIES:  no cyanosis  SKIN:  No rash  NEURO:  Alert      LABS:                        9.0    12.26 )-----------( 498      ( 15 Apr 2025 06:20 )             29.0     04-15    125[L]  |  90[L]  |  96[H]  ----------------------------<  103[H]  5.1   |  27  |  4.40[H]    Ca    9.2      15 Apr 2025 06:20  Phos  5.3     04-15  Mg     3.3     04-15    TPro  6.2  /  Alb  2.6[L]  /  TBili  1.0  /  DBili  x   /  AST  88[H]  /  ALT  58  /  AlkPhos  649[H]  04-15    PTT - ( 15 Apr 2025 06:20 )  PTT:169.5 sec  Urinalysis Basic - ( 15 Apr 2025 06:20 )    Color: x / Appearance: x / SG: x / pH: x  Gluc: 103 mg/dL / Ketone: x  / Bili: x / Urobili: x   Blood: x / Protein: x / Nitrite: x   Leuk Esterase: x / RBC: x / WBC x   Sq Epi: x / Non Sq Epi: x / Bacteria: x

## 2025-04-15 NOTE — PROGRESS NOTE ADULT - SUBJECTIVE AND OBJECTIVE BOX
Patient is a 64y old  Male who presents with a chief complaint of hypotension (11 Apr 2025 11:21)    INTERVAL HPI/OVERNIGHT EVENTS: Patient seen and examined at bedside. Endorses left abdominal pain that started overnight, appears likely 2/2 to ascites. Patient spoke to his heme/onc doctor outpatient and endorses he wishes to speak to palliative.     MEDICATIONS  (STANDING):  atorvastatin 40 milliGRAM(s) Oral at bedtime  azithromycin  IVPB 500 milliGRAM(s) IV Intermittent every 24 hours  azithromycin  IVPB      cefTRIAXone   IVPB 2000 milliGRAM(s) IV Intermittent every 24 hours  chlorhexidine 2% Cloths 1 Application(s) Topical <User Schedule>  clopidogrel Tablet 75 milliGRAM(s) Oral daily  influenza   Vaccine 0.5 milliLiter(s) IntraMuscular once  melatonin 5 milliGRAM(s) Oral at bedtime  midodrine 10 milliGRAM(s) Oral every 8 hours    MEDICATIONS  (PRN):  acetaminophen     Tablet .. 650 milliGRAM(s) Oral every 6 hours PRN Mild Pain (1 - 3)  acetaminophen   IVPB .. 1000 milliGRAM(s) IV Intermittent every 8 hours PRN Moderate Pain (4 - 6)  albuterol/ipratropium for Nebulization 3 milliLiter(s) Nebulizer every 6 hours PRN Shortness of Breath and/or Wheezing  ondansetron    Tablet 4 milliGRAM(s) Oral daily PRN Nausea and/or Vomiting      Allergies    Bactrim DS (Hives)    Intolerances    Vital Signs Last 24 Hrs  T(C): 36.4 (15 Apr 2025 11:33), Max: 36.4 (14 Apr 2025 20:50)  T(F): 97.6 (15 Apr 2025 11:33), Max: 97.6 (14 Apr 2025 20:50)  HR: 79 (15 Apr 2025 11:33) (79 - 92)  BP: 100/64 (15 Apr 2025 11:33) (100/64 - 113/72)  BP(mean): --  RR: 18 (15 Apr 2025 11:33) (18 - 19)  SpO2: 92% (15 Apr 2025 11:33) (92% - 100%)    Parameters below as of 15 Apr 2025 11:33  Patient On (Oxygen Delivery Method): nasal cannula  O2 Flow (L/min): 3      PHYSICAL EXAM:  GENERAL: NAD  HEENT:  anicteric, moist mucous membranes  CHEST/LUNG:  Left lung with minimal lung sounds to anterior LLL, right lung cta   HEART:  RRR, S1, S2  ABDOMEN:  minimal bowel sounds, distended, nontender, fluid noted on physical exam  EXTREMITIES: 2+ pitting edema to B/L lower extremities extending feet to thighs, no cyanosis, or calf tenderness  NERVOUS SYSTEM: answers questions and follows commands appropriately    LABS:  cret                        9.0    12.26 )-----------( 498      ( 15 Apr 2025 06:20 )             29.0     04-15    125[L]  |  90[L]  |  96[H]  ----------------------------<  103[H]  5.1   |  27  |  4.40[H]    Ca    9.2      15 Apr 2025 06:20  Phos  5.3     04-15  Mg     3.3     04-15    TPro  6.2  /  Alb  2.6[L]  /  TBili  1.0  /  DBili  x   /  AST  88[H]  /  ALT  58  /  AlkPhos  649[H]  04-15    PTT - ( 15 Apr 2025 06:20 )  PTT:169.5 sec

## 2025-04-15 NOTE — PROGRESS NOTE ADULT - ASSESSMENT
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,  11/2024 met poorly diff gastric ca(on presentation w neck, axillary, abdomen LADS, under care Dr Mckeon, [facility], has been on FOLFOX was to change to new chemo this week), episode MARYSOL req temporary HD prior to start of chemo 12/2024, 3/2025 LUE DVT likely assoc w HD catheter(removed) and left thoracentesis for large left effusion, 4/2025 adm for SOB post right PleurX  Has been on Bumex, adm w hypotension  W/U sig for--  -CT c/a/p 4/9 w decr right effusion, mod loculated left effuse, 1+--2+cm above/below diaphragm LADs, sclerotic bones, mod ascites  -US abdomen --small to mod ascites  -renal US -mild andreas hydro  -?prerenal azotemia due to Bumex, Nephrology on case  -Pulm Nephrology, ID on case  -post paracentesis 4/11 3.3L w some abdomen sx relieve    -Cr continue to worsen since weekend, Cr now 4.4  -elevated alk phos and transaminases since weekend also worsening, today alk phos [id number]  -anemia due to thalassemia, malignancy, chronic ds, acute illness, chemo. similar to past admission--stable, no acute intervention needed  -overall continues w deterioarton, prognosis poor  -supportive and symptomatic management from Heme/onc standpoint  -GI, Nephrology, Renal and Id already on case    discussed w pt  support provided

## 2025-04-15 NOTE — PROGRESS NOTE ADULT - SUBJECTIVE AND OBJECTIVE BOX
THORACIC SURGERY PROGRESS NOTE    Pt reports having anxiety overnight after speaking with primary oncologist via phone yesterday pm and told "the cancer has spread and not much for them to do now". Notes that breathing is "comfortable". States he does not want "to ever do HD again".    VITAL SIGNS:  T(C): 36.3 (04-15-25 @ 04:41), Max: 36.4 (04-14-25 @ 20:50)  HR: 88 (04-15-25 @ 07:20) (78 - 92)  BP: 106/69 (04-15-25 @ 04:41) (106/69 - 113/72)  RR: 18 (04-15-25 @ 04:41) (18 - 19)  SpO2: 97% (04-15-25 @ 07:20) (95% - 100%)      LABS:                        9.0    12.26 )-----------( 498      ( 15 Apr 2025 06:20 )             29.0     04-15    125[L]  |  90[L]  |  96[H]  ----------------------------<  103[H]  5.1   |  27  |  4.40[H]    Ca    9.2      15 Apr 2025 06:20  Phos  5.3     04-15  Mg     3.3     04-15    TPro  6.2  /  Alb  2.6[L]  /  TBili  1.0  /  DBili  x   /  AST  88[H]  /  ALT  58  /  AlkPhos  649[H]  04-15    PTT - ( 15 Apr 2025 06:20 )  PTT:169.5 sec    RECENT CULTURES:  04-11 Abdominal Fl Abdominal Fluid XXXX   No polymorphonuclear leukocytes per low power field  No organisms seen per oil power field   No growth    04-09 Blood Blood-Peripheral XXXX XXXX   No growth at 5 days    04-09 Blood Blood-Peripheral XXXX XXXX   No growth at 5 days    04-09 Blood Blood-Peripheral XXXX XXXX   No growth at 5 days          MEDICATIONS:  MEDICATIONS  (STANDING):  albuterol/ipratropium for Nebulization 3 milliLiter(s) Nebulizer every 6 hours  atorvastatin 40 milliGRAM(s) Oral at bedtime  benzocaine/menthol Lozenge 1 Lozenge Oral once  chlorhexidine 2% Cloths 1 Application(s) Topical <User Schedule>  clonazePAM  Tablet 0.5 milliGRAM(s) Oral two times a day  clopidogrel Tablet 75 milliGRAM(s) Oral daily  heparin  Infusion.  Unit(s)/Hr (17 mL/Hr) IV Continuous <Continuous>  influenza   Vaccine 0.5 milliLiter(s) IntraMuscular once  midodrine 10 milliGRAM(s) Oral every 8 hours  mirtazapine 15 milliGRAM(s) Oral at bedtime  pantoprazole    Tablet 40 milliGRAM(s) Oral before breakfast  PARoxetine 20 milliGRAM(s) Oral daily  sucralfate suspension 1 Gram(s) Oral four times a day    MEDICATIONS  (PRN):  acetaminophen     Tablet .. 650 milliGRAM(s) Oral every 6 hours PRN Mild Pain (1 - 3)  acetaminophen   IVPB .. 1000 milliGRAM(s) IV Intermittent every 8 hours PRN Moderate Pain (4 - 6)  aluminum hydroxide/magnesium hydroxide/simethicone Suspension 30 milliLiter(s) Oral four times a day PRN Dyspepsia  bisacodyl Suppository 10 milliGRAM(s) Rectal daily PRN Constipation  heparin   Injectable 7500 Unit(s) IV Push every 6 hours PRN For aPTT less than 40  heparin   Injectable 3500 Unit(s) IV Push every 6 hours PRN For aPTT between 40 - 57  metoclopramide Injectable 10 milliGRAM(s) IV Push every 8 hours PRN nausea , vomiting  ondansetron Injectable 4 milliGRAM(s) IV Push every 6 hours PRN Nausea and/or Vomiting        PHYSICAL EXAM:   General: well developed,  M in NAD  Neuro: awake, alert, CARTER x 4  Respiratory: Diminished throughout carroll L base. R pleurx cath in place to pleura vac with serous fluid draining  Cardiac: +S1 +S2   Abdominal: Soft, NT, ND, no rebound, no guarding, + bowel sounds  Extremities: Gross generalized edema      RADIOLOGY  < from: Xray Chest 1 View- PORTABLE-Routine (Xray Chest 1 View- PORTABLE-Routine in AM.) (04.13.25 @ 09:47) >  ACC: [id number] EXAM:  XR CHEST PORTABLE ROUTINE 1V   ORDERED BY:  WES THAO DATE:  04/13/2025          INTERPRETATION:  History: Status post paracentesis    Chest:  one view.    Comparison: 04/12/2025    AP radiograph of the chest demonstrates unchanged moderate LEFT pleural   effusion with underlying atelectasis. Decreasing RIGHT pleural effusion   with minimal residual. RIGHT chest tube is unchanged. The cardiac   silhouette is normal in size. Osseous structures are intact.    Impression:unchanged moderate LEFT pleural effusion with underlying   atelectasis. Decreasing RIGHT pleural effusion with minimal residual.   RIGHT chest tube is unchanged.      < end of copied text >

## 2025-04-15 NOTE — PROGRESS NOTE ADULT - PROBLEM SELECTOR PLAN 5
CT chest/abd ordered: mild to moderate ascites and mild b/l hydronephrosis   -US Abdomen ordered- small to moderate ascites  -s/p paracentesis by IR: drained 3550cc of cloudy yellow fluid, neutrophils are wnl, pending culture results  -will order repeat abdominal US today as abdomen is still distended and patient is complaining of pain however patient refused exam  -pain management consulted by night team, ordered lidocaine patches for now. if dilaudid needs to be started, will recommend decreasing klonopin from pain regimen to prevent hypotension/excessive sedation

## 2025-04-16 LAB
CULTURE RESULTS: SIGNIFICANT CHANGE UP
HCT VFR BLD CALC: 31.4 % — LOW (ref 39–50)
HGB BLD-MCNC: 9.7 G/DL — LOW (ref 13–17)
MCHC RBC-ENTMCNC: 19.4 PG — LOW (ref 27–34)
MCHC RBC-ENTMCNC: 30.9 G/DL — LOW (ref 32–36)
MCV RBC AUTO: 62.9 FL — LOW (ref 80–100)
NRBC BLD AUTO-RTO: 0 /100 WBCS — SIGNIFICANT CHANGE UP (ref 0–0)
PLATELET # BLD AUTO: 512 K/UL — HIGH (ref 150–400)
RBC # BLD: 4.99 M/UL — SIGNIFICANT CHANGE UP (ref 4.2–5.8)
RBC # FLD: 21.2 % — HIGH (ref 10.3–14.5)
SPECIMEN SOURCE: SIGNIFICANT CHANGE UP
WBC # BLD: 16.35 K/UL — HIGH (ref 3.8–10.5)
WBC # FLD AUTO: 16.35 K/UL — HIGH (ref 3.8–10.5)

## 2025-04-16 PROCEDURE — 99233 SBSQ HOSP IP/OBS HIGH 50: CPT

## 2025-04-16 PROCEDURE — 99232 SBSQ HOSP IP/OBS MODERATE 35: CPT

## 2025-04-16 RX ORDER — CLONAZEPAM 0.5 MG/1
0.5 TABLET ORAL AT BEDTIME
Refills: 0 | Status: DISCONTINUED | OUTPATIENT
Start: 2025-04-16 | End: 2025-04-17

## 2025-04-16 RX ORDER — SUCRALFATE 1 G
1 TABLET ORAL
Qty: 56 | Refills: 0
Start: 2025-04-16 | End: 2025-04-29

## 2025-04-16 RX ORDER — HYDROMORPHONE/SOD CHLOR,ISO/PF 2 MG/10 ML
0.2 SYRINGE (ML) INJECTION EVERY 4 HOURS
Refills: 0 | Status: DISCONTINUED | OUTPATIENT
Start: 2025-04-16 | End: 2025-04-17

## 2025-04-16 RX ORDER — HYDROMORPHONE/SOD CHLOR,ISO/PF 2 MG/10 ML
0.2 SYRINGE (ML) INJECTION EVERY 6 HOURS
Refills: 0 | Status: DISCONTINUED | OUTPATIENT
Start: 2025-04-16 | End: 2025-04-16

## 2025-04-16 RX ORDER — MIRTAZAPINE 30 MG/1
15 TABLET, FILM COATED ORAL AT BEDTIME
Refills: 0 | Status: DISCONTINUED | OUTPATIENT
Start: 2025-04-16 | End: 2025-04-17

## 2025-04-16 RX ORDER — APIXABAN 2.5 MG/1
2.5 TABLET, FILM COATED ORAL
Refills: 0 | Status: DISCONTINUED | OUTPATIENT
Start: 2025-04-16 | End: 2025-04-17

## 2025-04-16 RX ORDER — APIXABAN 2.5 MG/1
1 TABLET, FILM COATED ORAL
Qty: 60 | Refills: 0
Start: 2025-04-16 | End: 2025-05-15

## 2025-04-16 RX ORDER — DRONABINOL 10 MG/1
2.5 CAPSULE ORAL EVERY 8 HOURS
Refills: 0 | Status: DISCONTINUED | OUTPATIENT
Start: 2025-04-16 | End: 2025-04-17

## 2025-04-16 RX ORDER — HYDROMORPHONE/SOD CHLOR,ISO/PF 2 MG/10 ML
1 SYRINGE (ML) INJECTION
Qty: 120 | Refills: 0
Start: 2025-04-16 | End: 2025-04-22

## 2025-04-16 RX ORDER — DEXAMETHASONE 0.5 MG/1
4 TABLET ORAL EVERY 24 HOURS
Refills: 0 | Status: DISCONTINUED | OUTPATIENT
Start: 2025-04-16 | End: 2025-04-17

## 2025-04-16 RX ADMIN — LIDOCAINE HYDROCHLORIDE 1 PATCH: 20 JELLY TOPICAL at 11:30

## 2025-04-16 RX ADMIN — Medication 1 APPLICATION(S): at 05:47

## 2025-04-16 RX ADMIN — Medication 0.2 MILLIGRAM(S): at 23:24

## 2025-04-16 RX ADMIN — Medication 10 MILLIGRAM(S): at 21:14

## 2025-04-16 RX ADMIN — MIDODRINE HYDROCHLORIDE 10 MILLIGRAM(S): 5 TABLET ORAL at 15:30

## 2025-04-16 RX ADMIN — Medication 1 GRAM(S): at 17:24

## 2025-04-16 RX ADMIN — Medication 1 GRAM(S): at 05:43

## 2025-04-16 RX ADMIN — MIDODRINE HYDROCHLORIDE 10 MILLIGRAM(S): 5 TABLET ORAL at 05:46

## 2025-04-16 RX ADMIN — LIDOCAINE HYDROCHLORIDE 1 PATCH: 20 JELLY TOPICAL at 19:51

## 2025-04-16 RX ADMIN — Medication 10 MILLIGRAM(S): at 15:31

## 2025-04-16 RX ADMIN — Medication 40 MILLIGRAM(S): at 05:43

## 2025-04-16 RX ADMIN — IPRATROPIUM BROMIDE AND ALBUTEROL SULFATE 3 MILLILITER(S): .5; 2.5 SOLUTION RESPIRATORY (INHALATION) at 08:19

## 2025-04-16 RX ADMIN — Medication 1 GRAM(S): at 11:30

## 2025-04-16 RX ADMIN — CLOPIDOGREL BISULFATE 75 MILLIGRAM(S): 75 TABLET, FILM COATED ORAL at 11:30

## 2025-04-16 RX ADMIN — ATORVASTATIN CALCIUM 40 MILLIGRAM(S): 80 TABLET, FILM COATED ORAL at 21:14

## 2025-04-16 RX ADMIN — CLONAZEPAM 0.5 MILLIGRAM(S): 0.5 TABLET ORAL at 11:30

## 2025-04-16 RX ADMIN — APIXABAN 5 MILLIGRAM(S): 2.5 TABLET, FILM COATED ORAL at 05:43

## 2025-04-16 RX ADMIN — APIXABAN 2.5 MILLIGRAM(S): 2.5 TABLET, FILM COATED ORAL at 17:24

## 2025-04-16 RX ADMIN — PAROXETINE HYDROCHLORIDE 20 MILLIGRAM(S): 20 TABLET, FILM COATED ORAL at 11:30

## 2025-04-16 RX ADMIN — Medication 1 GRAM(S): at 21:18

## 2025-04-16 RX ADMIN — MIRTAZAPINE 15 MILLIGRAM(S): 30 TABLET, FILM COATED ORAL at 21:13

## 2025-04-16 RX ADMIN — CLONAZEPAM 0.5 MILLIGRAM(S): 0.5 TABLET ORAL at 21:15

## 2025-04-16 RX ADMIN — LIDOCAINE HYDROCHLORIDE 1 PATCH: 20 JELLY TOPICAL at 23:24

## 2025-04-16 RX ADMIN — MIDODRINE HYDROCHLORIDE 10 MILLIGRAM(S): 5 TABLET ORAL at 21:14

## 2025-04-16 RX ADMIN — Medication 0.2 MILLIGRAM(S): at 01:03

## 2025-04-16 RX ADMIN — Medication 0.2 MILLIGRAM(S): at 23:17

## 2025-04-16 NOTE — PROGRESS NOTE ADULT - PROBLEM SELECTOR PLAN 11
DVT treatment/prophylaxis  -switched back to home eliquis, on lower dose now due to kidney function    #Constipation  -Ordered suppository, lactulose with no relief  -S/p SMOG   -GI PCR to be collected when patient has a bowel movement     #Insomnia  -Tried 0.25mg xanax without much help  -Started mirtazapine 7.5mg po qhs, should help with anxiety as well --increased to 15mg by psych (patient does not tolerate 45mg)   -Psych added paxil 20mg po qd and klonopin 0.5mg BID as well. klonopin was increased to 1mg for the night.  discontinued paxil and lowered klonopin back to 0.5mg BID to prevent lethargy.     GOC: palliative consulted; full code

## 2025-04-16 NOTE — PROGRESS NOTE ADULT - ASSESSMENT
Acute on CKD Stage 3  Hypotension  Anemia  Hypoalbuminemia      -MARYSOL secondary to severe hypotension leading to renal hypoperfusion; unclear etiology of the hypotension  -Additionally, CTAP done noting renal fullness and uvp obstruction; also seen on renal sono  -Midodrine for BP  -Unable to give fluids at this time given hypervolemic state  -S/p paracentesis  -Pleurex in place  -Worsening renal function, decreasing urine output, mild uremic symptoms noted as well but mentally sound; remains volume overloaded as well with poor response to diuresis  -4/15/25: Discussed dialysis with patient again vs palliative care given metastatic cancer. Patient was previously on dialysis earlier this year and absolutely does NOT want to go back on dialysis as it was a very unpleasant experience for him. He understands his risks of not doing dialysis but also understands his extensive cancer with no chance for cure. He wants to explore palliative options at this point.   -May consider additional albumin + bumex  -Palliative care is now following patient    Prognosis is guarded      Renal palliative discussion done 4/15/25      Thank you

## 2025-04-16 NOTE — PROGRESS NOTE ADULT - ASSESSMENT
64-year-old male with a history of GI carcinoma, metastatic lymphadenopathy, DVT, hypertension, anemia presents with has been having some persistent shortness of breath since discharge.  The patient was admitted for shortness of breath, had a thoracentesis performed while in the hospital.  Now the patient has been complaining of persistent shortness of breath, but was hypotensive     pleurx R  Hypotension  mets ca  eval Acute Infection  GI Carcinoma  Anemia  DVT hx  MARYSOL    poss home hospice   vs noted  meds reviewed  sw f/u    HD monitoring and Support  pleurx drain as tolerated - 100 - 1000 per drain attempt - M W F - at home and on DC  I moustapha  cx - biomarkers  thoracic follow up  cardio follow up  monitor VS and HD and Sat  goal sat > 88 pct  trend renal indices  I and O  replete lytes

## 2025-04-16 NOTE — PROGRESS NOTE ADULT - ASSESSMENT
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 Admitted for hypotension, MARYSOL, hyponatremia, and present chronic pleural effusion.     Hypotension, chronic pleural Effusion, HFpEF   - p/w worsening SOB, has been admitted twice in the past month in which thoracentesis was performed which drained malignant pleural effusions on the left, s/p Pleurx (4/4), per pt catheter is not draining much per home nurse  - CXR: There is a left effusion with atelectatic change. Underlying inflammatory infectious process not excluded. Smaller right effusion with some increased interstitial markings in the right infrahilar region which are less prominent than before.  - R chest pigtail cath to pleur vac with 600 cc serosanguinous overnight. CT surg following    - hx of CAD recent NSTEMI 3/2025, medically managed without cath  - EKG NSR low voltage  - Trop neg x1 (trop 72), lactate: 2.7, pro BNP: 3483  - no anginal complaint   - continue Plavix, statin    - TTE (3/20/2025) shows EF 60-65%, grade 1 diastolic dysfunction.   - MARYSOL, creat: 3.8 continue to trend renal indices, might need CRRT, nephro following   - CT with no obstruction seen by urology   - Strict I/Os, daily weights.  - s/p bumex IVP x 1, on albumin per renal    - BP, HR stable and controlled   - home antihypertensives on hold (labetalol, losartan)  - on midodrine 10mg TID  - Monitor and replete Lytes. Keep K > 4 and Mg > 2    - back on Eliquis, s/p paracentesis 4/11 3300 cc drained fu fluid culture     - Made comfort measures only today. DC plan for [facility]   - emotional support given  - Cardiology team will sign off. Will remain available for any issues or concerns    ARLENE Ross  Nurse Practitioner - Cardiology   call TEAMS

## 2025-04-16 NOTE — PROVIDER CONTACT NOTE (OTHER) - SITUATION
Pt c/o of nausea, has been vomitting small amounts of clear/black emesis but ate very little today.
pt vomits 2 times, non projectile, no blood
Chest tube drain detached from chest tube
pt vomit, no blood, non projectile, pt feels discomfort, PO intake poor 0-25%
aptt 28.6
Pleurax drain 1000cc

## 2025-04-16 NOTE — PROVIDER CONTACT NOTE (OTHER) - DATE AND TIME:
12-Apr-2025 15:21
12-Apr-2025 21:27
13-Apr-2025 18:09
15-Apr-2025 22:30
12-Apr-2025 16:54
13-Apr-2025 09:00

## 2025-04-16 NOTE — PROGRESS NOTE ADULT - SUBJECTIVE AND OBJECTIVE BOX
[INTERVAL HX: ]  Patient seen and examined;  Chart reviewed and events noted;     planned DC to home hospice  pending equip delivery    [MEDICATIONS]  MEDICATIONS  (STANDING):  albuterol/ipratropium for Nebulization 3 milliLiter(s) Nebulizer every 6 hours  apixaban 2.5 milliGRAM(s) Oral two times a day  atorvastatin 40 milliGRAM(s) Oral at bedtime  benzocaine/menthol Lozenge 1 Lozenge Oral once  chlorhexidine 2% Cloths 1 Application(s) Topical <User Schedule>  clonazePAM  Tablet 0.5 milliGRAM(s) Oral daily  clonazePAM  Tablet 0.5 milliGRAM(s) Oral at bedtime  clopidogrel Tablet 75 milliGRAM(s) Oral daily  influenza   Vaccine 0.5 milliLiter(s) IntraMuscular once  lidocaine   4% Patch 1 Patch Transdermal every 24 hours  melatonin 10 milliGRAM(s) Oral at bedtime  midodrine 10 milliGRAM(s) Oral every 8 hours  mirtazapine 15 milliGRAM(s) Oral at bedtime  pantoprazole    Tablet 40 milliGRAM(s) Oral before breakfast  sucralfate suspension 1 Gram(s) Oral four times a day    MEDICATIONS  (PRN):  acetaminophen     Tablet .. 650 milliGRAM(s) Oral every 6 hours PRN Mild Pain (1 - 3)  aluminum hydroxide/magnesium hydroxide/simethicone Suspension 30 milliLiter(s) Oral four times a day PRN Dyspepsia  bisacodyl Suppository 10 milliGRAM(s) Rectal daily PRN Constipation  HYDROmorphone  Injectable 0.2 milliGRAM(s) IV Push every 4 hours PRN Moderate Pain (4 - 6)  metoclopramide Injectable 10 milliGRAM(s) IV Push every 8 hours PRN nausea , vomiting  ondansetron Injectable 4 milliGRAM(s) IV Push every 6 hours PRN Nausea and/or Vomiting      [VITALS]  Vital Signs Last 24 Hrs  T(C): 36.6 (16 Apr 2025 14:00), Max: 36.6 (15 Apr 2025 20:02)  T(F): 97.8 (16 Apr 2025 14:00), Max: 97.8 (15 Apr 2025 20:02)  HR: 102 (16 Apr 2025 14:00) (88 - 102)  BP: 97/58 (16 Apr 2025 14:00) (92/61 - 97/58)  BP(mean): --  RR: 20 (16 Apr 2025 14:00) (19 - 20)  SpO2: 92% (16 Apr 2025 08:19) (92% - 98%)    Parameters below as of 16 Apr 2025 08:19  Patient On (Oxygen Delivery Method): nasal cannula      [WT/HT]  Daily     Daily   [VENT]      [PHYSICAL EXAM]  GEN: NAD  HEENT: normocephalic and atraumatic. EOMI. PERRL.    NECK: Supple.  No lymphadenopathy   LUNGS: Clear to auscultation.  HEART: Regular rate and rhythm,  no MRG  ABDOMEN: Soft, nontender, and nondistended.  Positive bowel sounds.    : No CVA tenderness  EXTREMITIES: Without edema.  NEUROLOGIC: grossly intact.  PSYCHIATRIC: Appropriate affect .  SKIN: No rash     [LABS:]                        9.7    16.35 )-----------( 512      ( 16 Apr 2025 08:30 )             31.4     04-15    125[L]  |  90[L]  |  96[H]  ----------------------------<  103[H]  5.1   |  27  |  4.40[H]    Ca    9.2      15 Apr 2025 06:20  Phos  5.3     04-15  Mg     3.3     04-15    TPro  6.2  /  Alb  2.6[L]  /  TBili  1.0  /  DBili  x   /  AST  88[H]  /  ALT  58  /  AlkPhos  649[H]  04-15    PTT - ( 15 Apr 2025 06:20 )  PTT:169.5 sec      Vitamin B12, Serum: >2000 pg/mL *H* [232 - 1245] (03-20-25 @ 18:35)    Folate, Serum: >20.0 ng/mL (03-20-25 @ 18:35)    Sedimentation Rate, Erythrocyte: 60 mm/hr *H* [0 - 20] (03-20-25 @ 18:35)    Iron - Total Binding Capacity.: 212 ug/dL *L* [220 - 430] (03-06-25 @ 05:40)    Vitamin B12, Serum: >2000 pg/mL *H* [232 - 1245] (03-06-25 @ 05:40)    Folate, Serum: 18.1 ng/mL (03-06-25 @ 05:40)    Ferritin: 989 ng/mL *H* [30 - 400] (03-06-25 @ 05:40)    Iron - Total Binding Capacity.: 223 ug/dL [220 - 430] (03-05-25 @ 06:24)    Ferritin: 943 ng/mL *H* [30 - 400] (03-05-25 @ 06:24)    Vitamin B12, Serum: >2000 pg/mL *H* [232 - 1245] (03-05-25 @ 06:24)    Folate, Serum: >20.0 ng/mL (03-05-25 @ 06:24)    Iron - Total Binding Capacity.: 241 ug/dL [220 - 430] (11-22-24 @ 07:05)    Ferritin: 257 ng/mL [30 - 400] (11-22-24 @ 07:05)    Vitamin B12, Serum: 1506 pg/mL *H* [200 - 900] (09-30-24 @ 19:53)    Ferritin: 140 ng/mL [30 - 400] (09-30-24 @ 19:53)    Folate, Serum: 12.6 ng/mL [3.1 - 17.5] (09-30-24 @ 19:53)      Urinalysis Basic - ( 15 Apr 2025 06:20 )    Color: x / Appearance: x / SG: x / pH: x  Gluc: 103 mg/dL / Ketone: x  / Bili: x / Urobili: x   Blood: x / Protein: x / Nitrite: x   Leuk Esterase: x / RBC: x / WBC x   Sq Epi: x / Non Sq Epi: x / Bacteria: x                [RADIOLOGY STUDIES:]     [INTERVAL HX: ]  Patient seen and examined;  Chart reviewed and events noted;     planned DC to home hospice  pending equip delivery    [MEDICATIONS]  MEDICATIONS  (STANDING):  albuterol/ipratropium for Nebulization 3 milliLiter(s) Nebulizer every 6 hours  apixaban 2.5 milliGRAM(s) Oral two times a day  atorvastatin 40 milliGRAM(s) Oral at bedtime  benzocaine/menthol Lozenge 1 Lozenge Oral once  chlorhexidine 2% Cloths 1 Application(s) Topical <User Schedule>  clonazePAM  Tablet 0.5 milliGRAM(s) Oral daily  clonazePAM  Tablet 0.5 milliGRAM(s) Oral at bedtime  clopidogrel Tablet 75 milliGRAM(s) Oral daily  influenza   Vaccine 0.5 milliLiter(s) IntraMuscular once  lidocaine   4% Patch 1 Patch Transdermal every 24 hours  melatonin 10 milliGRAM(s) Oral at bedtime  midodrine 10 milliGRAM(s) Oral every 8 hours  mirtazapine 15 milliGRAM(s) Oral at bedtime  pantoprazole    Tablet 40 milliGRAM(s) Oral before breakfast  sucralfate suspension 1 Gram(s) Oral four times a day    MEDICATIONS  (PRN):  acetaminophen     Tablet .. 650 milliGRAM(s) Oral every 6 hours PRN Mild Pain (1 - 3)  aluminum hydroxide/magnesium hydroxide/simethicone Suspension 30 milliLiter(s) Oral four times a day PRN Dyspepsia  bisacodyl Suppository 10 milliGRAM(s) Rectal daily PRN Constipation  HYDROmorphone  Injectable 0.2 milliGRAM(s) IV Push every 4 hours PRN Moderate Pain (4 - 6)  metoclopramide Injectable 10 milliGRAM(s) IV Push every 8 hours PRN nausea , vomiting  ondansetron Injectable 4 milliGRAM(s) IV Push every 6 hours PRN Nausea and/or Vomiting      [VITALS]  Vital Signs Last 24 Hrs  T(C): 36.6 (16 Apr 2025 14:00), Max: 36.6 (15 Apr 2025 20:02)  T(F): 97.8 (16 Apr 2025 14:00), Max: 97.8 (15 Apr 2025 20:02)  HR: 102 (16 Apr 2025 14:00) (88 - 102)  BP: 97/58 (16 Apr 2025 14:00) (92/61 - 97/58)  BP(mean): --  RR: 20 (16 Apr 2025 14:00) (19 - 20)  SpO2: 92% (16 Apr 2025 08:19) (92% - 98%)    Parameters below as of 16 Apr 2025 08:19  Patient On (Oxygen Delivery Method): nasal cannula      [WT/HT]  Daily     Daily   [VENT]      [PHYSICAL EXAM]  GEN: chronically ill looking,   HEENT: normocephalic and atraumatic. EOMI. PERRL.    NECK: Supple.  No lymphadenopathy   LUNGS: Clear to auscultation.  HEART: Regular rate and rhythm,  no MRG  ABDOMEN: Soft, mod tender, and nondistended.  Positive bowel sounds.    : No CVA tenderness  EXTREMITIES: +edema.  NEUROLOGIC: grossly intact.  PSYCHIATRIC: Appropriate affect .  SKIN: No rash     [LABS:]                        9.7    16.35 )-----------( 512      ( 16 Apr 2025 08:30 )             31.4     04-15    125[L]  |  90[L]  |  96[H]  ----------------------------<  103[H]  5.1   |  27  |  4.40[H]    Ca    9.2      15 Apr 2025 06:20  Phos  5.3     04-15  Mg     3.3     04-15    TPro  6.2  /  Alb  2.6[L]  /  TBili  1.0  /  DBili  x   /  AST  88[H]  /  ALT  58  /  AlkPhos  649[H]  04-15    PTT - ( 15 Apr 2025 06:20 )  PTT:169.5 sec      Vitamin B12, Serum: >2000 pg/mL *H* [232 - 1245] (03-20-25 @ 18:35)    Folate, Serum: >20.0 ng/mL (03-20-25 @ 18:35)    Sedimentation Rate, Erythrocyte: 60 mm/hr *H* [0 - 20] (03-20-25 @ 18:35)    Iron - Total Binding Capacity.: 212 ug/dL *L* [220 - 430] (03-06-25 @ 05:40)    Vitamin B12, Serum: >2000 pg/mL *H* [232 - 1245] (03-06-25 @ 05:40)    Folate, Serum: 18.1 ng/mL (03-06-25 @ 05:40)    Ferritin: 989 ng/mL *H* [30 - 400] (03-06-25 @ 05:40)    Iron - Total Binding Capacity.: 223 ug/dL [220 - 430] (03-05-25 @ 06:24)    Ferritin: 943 ng/mL *H* [30 - 400] (03-05-25 @ 06:24)    Vitamin B12, Serum: >2000 pg/mL *H* [232 - 1245] (03-05-25 @ 06:24)    Folate, Serum: >20.0 ng/mL (03-05-25 @ 06:24)    Iron - Total Binding Capacity.: 241 ug/dL [220 - 430] (11-22-24 @ 07:05)    Ferritin: 257 ng/mL [30 - 400] (11-22-24 @ 07:05)    Vitamin B12, Serum: 1506 pg/mL *H* [200 - 900] (09-30-24 @ 19:53)    Ferritin: 140 ng/mL [30 - 400] (09-30-24 @ 19:53)    Folate, Serum: 12.6 ng/mL [3.1 - 17.5] (09-30-24 @ 19:53)      Urinalysis Basic - ( 15 Apr 2025 06:20 )    Color: x / Appearance: x / SG: x / pH: x  Gluc: 103 mg/dL / Ketone: x  / Bili: x / Urobili: x   Blood: x / Protein: x / Nitrite: x   Leuk Esterase: x / RBC: x / WBC x   Sq Epi: x / Non Sq Epi: x / Bacteria: x                [RADIOLOGY STUDIES:]

## 2025-04-16 NOTE — DIETITIAN INITIAL EVALUATION ADULT - PROBLEM SELECTOR PLAN 4
CT chest/abd/pelvis (3/3): Large left pleural effusion markedly increased since 11/30/2024 with total atelectasis left lower lobe and partial atelectasis left upper lobe. New mediastinal and left axillary lymphadenopathy. Inflammatory changes associated with left subclavian vein consistent with known DVT. Upper abdominal lymphadenopathy similar to 11/30/2024. Mildly increased pelvic lymphadenopathy. Mesenteric lymphadenopathy and mesenteric fat stranding similar to prior examination. No pulmonary embolus. Limited evaluation segmental and subsegmental branches.  -scheduled for his fifth chemo cycle today (previously on FolFox, was supposed to switch to his new chemo medication today) however BP was noted to be low, and did not receive dose. last chemo was >3 weeks ago  -rt medport in place. Follows w/ Dr. Fung  --hx of beta thal minor  -on home oxy for severe pain, can continue here but would be cautious too as he is hypotensive   -Heme/Onc consulted (Dr. Hu), f/u recs.

## 2025-04-16 NOTE — CONSULT NOTE ADULT - SUBJECTIVE AND OBJECTIVE BOX
Physical Medicine and Rehabilitation Initial Evaluation    Patients acute care records reviewed and are summarized as follows:     Patient is a 64y Male who is admitted to acute care for hypotension and shortness of breath. Patient referred for pain control related to metastatic cancer. Also complains of back pain particularly with deep breath.    Medical studies/laboratory studies reviewed, including renal function which is impaired.    The patient was seen and examined at bedside. Patient states his pain is well controlled at this time but that he is very anxious and wants something to help him sleep. We discussed the cumulative risks of benzos + opioids + sedative hypnotics -- we decided against escalation of his pain regimen or his benzos. We discussed melatonin -- he is on 5mg here but takes 10mg at home, requesting 10mg.    Asked patient if he was sure that we don't need to revise his pain regimen, and patient reiterated that his pain is controlled at this time.    ROS:  Constitutional: Denies fevers or chills  MSK: Back pain    PAST MEDICAL & SURGICAL HISTORY:  Gastric lymphoma  Anemia of chronic disease  Pleural effusion  DVT (deep venous thrombosis)  Hypertension  Hyperlipidemia  S/P appendectomy  November 17th 2014  S/P cholecystectomy  15 years ago    Medications: reviewed and revised    Physical Exam:   Vitals: reviewed -- BP noted to be on the lower side    Constitutional: Gen: In no acute distress, cooperative with exam and questioning   MSK: LBP with ROM, tenderness to palpation L paraspinals, positive facet loading  Psychiatric: Awake alert fully oriented    A/P 64y year old Male with cancer pain, low back pain    Patient declines revision of his pain regimen  Requesting something to help him sleep  We discussed melatonin revision -- amenable to 10mg which he takes at home.  Please be in touch if pain continues to be uncontrolled -- may have been related to anxiety that pain was uncontrolled earlier potentially  Will monitor and follow    Primary team notes are reviewed  Laboratory studies reviewed including those mentioned earlier/above  Discussed management/coordinated care with primary team/referring provider.    55 minutes spent during patient encounter:    ¦ preparing to see the patient   ¦ performing a medically appropriate examination and/or evaluation   ¦ counseling and educating the patient/family/caregiver   ¦ ordering medications, tests, or procedures   ¦ referring and communicating with other health care professionals  ¦ documenting clinical information in the electronic or other health record   ¦ care coordination

## 2025-04-16 NOTE — PROGRESS NOTE ADULT - ATTENDING COMMENTS
had issues with pleurx overnight   CXR noted mod pneumothorax.   Planning for comfort measures  currently reporting feeling tired from all medications    A/P:  metastatic gastric lymphoma   recurrently malignant pleural effusion   Pneumothorax   DVT/PE on eliquis  ARF   Anxiety/depression    - patient currently in agreement with hospice. Noted new pneumothorax. Patient and brother in agreement for monitoring of respiratory status. More lethargic today. agreeable to decrease remeron to 15 mg at night and klonopin to 0.5mg BID.     - cont dilaudid IV prn     - NC for comfort    - decrease eliquis to 2.5mg BID given worsening renal functions.     poor prognosis overall. Patient not a candidate for chemo and patient does not want to pursue HD for worsening renal failure. Patient aware and would like to continue comfort measures. Given new finding of pneumo, discussed with patient and brother. will monitor overnight and reassess status in AM. Case discussed with pulm, heme and palliative. had issues with pleurx overnight   CXR noted mod pneumothorax.   Planning for comfort measures  currently reporting feeling tired   also vomited as well    A/P:  metastatic gastric lymphoma   recurrently malignant pleural effusion   Pneumothorax   DVT/PE on eliquis  ARF   Anxiety/depression    - patient currently in agreement with hospice. Noted new pneumothorax. Patient and brother in agreement for monitoring of respiratory status. More lethargic today. agreeable to decrease remeron to 15 mg at night and klonopin to 0.5mg BID.     - cont dilaudid IV prn     - NC for comfort    - decrease eliquis to 2.5mg BID given worsening renal functions.     - reglan prn vomiting.     poor prognosis overall. Patient not a candidate for chemo and patient does not want to pursue HD for worsening renal failure. Patient aware and would like to continue comfort measures. Given new finding of pneumo, discussed with patient and brother. will monitor overnight and reassess status in AM. Case discussed with pulm, heme and palliative.

## 2025-04-16 NOTE — PROGRESS NOTE ADULT - NSPROGADDITIONALINFOA_GEN_ALL_CORE
I reviewed the overnight course of events on the unit, re-confirming the patient history. I discussed the care with the patient and their family  The plan of care was discussed with the physician assistant and modifications were made to the notation where appropriate.   Differential diagnosis and plan of care discussed with patient after the evaluation  35 minutes spent on total encounter of which more than fifty percent of the encounter was spent counseling and/or coordinating care by the attending physician.  Advanced care planning was discussed with patient and family.  Advanced care planning forms were reviewed and discussed.  Risks, benefits and alternatives of gastroenterologic procedures were discussed in detail and all questions were answered.

## 2025-04-16 NOTE — BH CONSULTATION LIAISON PROGRESS NOTE - NSBHFUPINTERVALHXFT_PSY_A_CORE
Patient seen, evaluated and chart reviewed. Patient presents with similar mood to yesterday, reports improved sleep and feeling in control. Support provided.
Patient seen, evaluated and chart reviewed. Patient presents with similar mood to yesterday, reports continued poor sleep. He is not sure what the plan of treatment is at this time. Support provided.

## 2025-04-16 NOTE — PROGRESS NOTE ADULT - SUBJECTIVE AND OBJECTIVE BOX
Date/Time Patient Seen:  		  Referring MD:   Data Reviewed	       Patient is a 64y old  Male who presents with a chief complaint of hypotension (15 Apr 2025 13:52)      Subjective/HPI     PAST MEDICAL & SURGICAL HISTORY:  No pertinent past medical history    Incisional hernia  2015    Gastric lymphoma    Anemia of chronic disease    Pleural effusion    DVT (deep venous thrombosis)    Hypertension    Hyperlipidemia    No significant past surgical history    S/P appendectomy  November 17th 2014    S/P cholecystectomy  15 years ago          Medication list         MEDICATIONS  (STANDING):  albuterol/ipratropium for Nebulization 3 milliLiter(s) Nebulizer every 6 hours  apixaban 5 milliGRAM(s) Oral two times a day  atorvastatin 40 milliGRAM(s) Oral at bedtime  benzocaine/menthol Lozenge 1 Lozenge Oral once  chlorhexidine 2% Cloths 1 Application(s) Topical <User Schedule>  clonazePAM  Tablet 0.5 milliGRAM(s) Oral daily  clonazePAM  Tablet 1 milliGRAM(s) Oral at bedtime  clopidogrel Tablet 75 milliGRAM(s) Oral daily  influenza   Vaccine 0.5 milliLiter(s) IntraMuscular once  lidocaine   4% Patch 1 Patch Transdermal every 24 hours  melatonin 10 milliGRAM(s) Oral at bedtime  midodrine 10 milliGRAM(s) Oral every 8 hours  mirtazapine 45 milliGRAM(s) Oral at bedtime  pantoprazole    Tablet 40 milliGRAM(s) Oral before breakfast  PARoxetine 20 milliGRAM(s) Oral daily  sucralfate suspension 1 Gram(s) Oral four times a day    MEDICATIONS  (PRN):  acetaminophen     Tablet .. 650 milliGRAM(s) Oral every 6 hours PRN Mild Pain (1 - 3)  aluminum hydroxide/magnesium hydroxide/simethicone Suspension 30 milliLiter(s) Oral four times a day PRN Dyspepsia  bisacodyl Suppository 10 milliGRAM(s) Rectal daily PRN Constipation  HYDROmorphone  Injectable 0.2 milliGRAM(s) IV Push every 6 hours PRN Moderate Pain (4 - 6)  metoclopramide Injectable 10 milliGRAM(s) IV Push every 8 hours PRN nausea , vomiting  ondansetron Injectable 4 milliGRAM(s) IV Push every 6 hours PRN Nausea and/or Vomiting         Vitals log        ICU Vital Signs Last 24 Hrs  T(C): 36.6 (15 Apr 2025 20:02), Max: 36.6 (15 Apr 2025 20:02)  T(F): 97.8 (15 Apr 2025 20:02), Max: 97.8 (15 Apr 2025 20:02)  HR: 91 (15 Apr 2025 20:56) (79 - 94)  BP: 92/61 (15 Apr 2025 20:02) (92/61 - 100/64)  BP(mean): --  ABP: --  ABP(mean): --  RR: 19 (15 Apr 2025 20:02) (18 - 19)  SpO2: 97% (15 Apr 2025 20:56) (92% - 98%)    O2 Parameters below as of 15 Apr 2025 20:56  Patient On (Oxygen Delivery Method): nasal cannula, 4                 Input and Output:  I&O's Detail    14 Apr 2025 07:01  -  15 Apr 2025 07:00  --------------------------------------------------------  IN:    Heparin Infusion: 204 mL    Oral Fluid: 360 mL  Total IN: 564 mL    OUT:    Chest Tube (mL): 400 mL    Voided (mL): 200 mL  Total OUT: 600 mL    Total NET: -36 mL          Lab Data                        9.0    12.26 )-----------( 498      ( 15 Apr 2025 06:20 )             29.0     04-15    125[L]  |  90[L]  |  96[H]  ----------------------------<  103[H]  5.1   |  27  |  4.40[H]    Ca    9.2      15 Apr 2025 06:20  Phos  5.3     04-15  Mg     3.3     04-15    TPro  6.2  /  Alb  2.6[L]  /  TBili  1.0  /  DBili  x   /  AST  88[H]  /  ALT  58  /  AlkPhos  649[H]  04-15            Review of Systems	      Objective     Physical Examination  heart s1s2  lung d cbs  head nc  head at        Pertinent Lab findings & Imaging      Jameson:  NO   Adequate UO     I&O's Detail    14 Apr 2025 07:01  -  15 Apr 2025 07:00  --------------------------------------------------------  IN:    Heparin Infusion: 204 mL    Oral Fluid: 360 mL  Total IN: 564 mL    OUT:    Chest Tube (mL): 400 mL    Voided (mL): 200 mL  Total OUT: 600 mL    Total NET: -36 mL               Discussed with:     Cultures:	        Radiology

## 2025-04-16 NOTE — DIETITIAN INITIAL EVALUATION ADULT - PERTINENT MEDS FT
MEDICATIONS  (STANDING):  albuterol/ipratropium for Nebulization 3 milliLiter(s) Nebulizer every 6 hours  apixaban 5 milliGRAM(s) Oral two times a day  atorvastatin 40 milliGRAM(s) Oral at bedtime  benzocaine/menthol Lozenge 1 Lozenge Oral once  chlorhexidine 2% Cloths 1 Application(s) Topical <User Schedule>  clonazePAM  Tablet 0.5 milliGRAM(s) Oral daily  clonazePAM  Tablet 1 milliGRAM(s) Oral at bedtime  clopidogrel Tablet 75 milliGRAM(s) Oral daily  influenza   Vaccine 0.5 milliLiter(s) IntraMuscular once  lidocaine   4% Patch 1 Patch Transdermal every 24 hours  melatonin 10 milliGRAM(s) Oral at bedtime  midodrine 10 milliGRAM(s) Oral every 8 hours  mirtazapine 45 milliGRAM(s) Oral at bedtime  pantoprazole    Tablet 40 milliGRAM(s) Oral before breakfast  PARoxetine 20 milliGRAM(s) Oral daily  sucralfate suspension 1 Gram(s) Oral four times a day    MEDICATIONS  (PRN):  acetaminophen     Tablet .. 650 milliGRAM(s) Oral every 6 hours PRN Mild Pain (1 - 3)  aluminum hydroxide/magnesium hydroxide/simethicone Suspension 30 milliLiter(s) Oral four times a day PRN Dyspepsia  bisacodyl Suppository 10 milliGRAM(s) Rectal daily PRN Constipation  HYDROmorphone  Injectable 0.2 milliGRAM(s) IV Push every 6 hours PRN Moderate Pain (4 - 6)  metoclopramide Injectable 10 milliGRAM(s) IV Push every 8 hours PRN nausea , vomiting  ondansetron Injectable 4 milliGRAM(s) IV Push every 6 hours PRN Nausea and/or Vomiting

## 2025-04-16 NOTE — PROGRESS NOTE ADULT - NS ATTEND AMEND GEN_ALL_CORE FT
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 Admitted for hypotension, MARYSOL, hyponatremia, and present chronic pleural effusion.     - Pt with worsening SOB, has been admitted twice in the past month in which thoracentesis was performed which drained malignant pleural effusions on the left, s/p Pleurx (4/4), per pt catheter is not draining much per home nurse  - CXR: There is a left effusion with atelectatic change. Underlying inflammatory infectious process not excluded. Smaller right effusion with some increased interstitial markings in the right infrahilar region which are less prominent than before.  - may need another pleurx. fu with ct sx     - s/p paracentesis on 4/11 3550cc of cloudy yellow ascitic fluid removed from right abdomen   - still vol ol  - Please continue to maintain strict I/Os, monitor daily weights, Cr, and K.   - challenge with Bumex IV x 1 would give albumin.   - may need RRT. fu renal    - hx of CAD recent NSTEMI 3/2025, medically managed without cath  - no anginal sx   - continue Plavix, statin    - TTE (3/20/2025) shows EF 60-65%, grade 1 diastolic dysfunction.   - home antihypertensives on hold (labetalol, losartan)  - now on midodrine 10mg TID + albumin  - home Eliquis on hold (hx of DVT)      - at risk for abrupt decompensation.
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 Admitted for hypotension, MARYSOL, hyponatremia, and present chronic pleural effusion. Cardiology consulted for dyspnea and hypotension.    #Dyspnea with volume overload  #Acute on chronic HFpEF  #Recurrent pleural effusion 2/2 malignancy  - Pt with worsening SOB, has been admitted twice in the past month in which thoracentesis was performed which drained malignant pleural effusions on the left, s/p Pleurx 4/4. Pt states pleurx catheter not draining much per home nurse. Recommend fluid removal with pleurx, pulm eval. Hold diuretics   - CT chest with reduced size of right pleural effusion but unchanged loculated left pleural effusion  - CTS is following and I have reached out to them as well to discuss further mgmt  - Eliquis on hold for DVT now on heparin gtt for possible procedures  - Pt with MARYSOL, trend Cr  - Strict I/Os, daily weights.  - Remains hypotensive. hold antihypertensives -- home labetalol, losartan  - ICU consulted by admitting team, recommended midodrine 10mg TID   - continue plavix, statin as tolerated  - Should have palliative care on board as well.
I have personally seen and examined the patient in detail.  I have spoken to the provider regarding the assessment and plan of care.  I have made changes to the note accordingly.    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 Admitted for hypotension, MAYRSOL, hyponatremia, and present chronic pleural effusion.     Hypotension, chronic pleural Effusion, HFpEF   - p/w worsening SOB, has been admitted twice in the past month in which thoracentesis was performed which drained malignant pleural effusions on the left, s/p Pleurx (4/4), per pt catheter is not draining much per home nurse  - R chest pigtail cath to pleur vac with 600 cc serosanguinous overnight. CT surg following    - hx of CAD recent NSTEMI 3/2025, medically managed without cath  - EKG NSR low voltage  - Trop neg x1 (trop 72), lactate: 2.7, pro BNP: 3483  - no anginal complaint   - continue Plavix, statin    - TTE (3/20/2025) shows EF 60-65%, grade 1 diastolic dysfunction.   - MARYSOL, creat: 3.8 continue to trend renal indices, might need CRRT, nephro following   - CT with no obstruction seen by urology   - Strict I/Os, daily weights.  - s/p bumex IVP x 1, on albumin per renal    - 's, HR controlled  - home antihypertensives on hold (labetalol, losartan)  - on midodrine 10mg TID  - Monitor and replete Lytes. Keep K > 4 and Mg > 2    - home Eliquis held for paracentesis now on heparin gtt   - s/p paracentesis 4/11 3300 cc drained fu fluid culture
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 Admitted for hypotension, MARYSOL, hyponatremia, and present chronic pleural effusion.     Hypotension, chronic pleural Effusion, HFpEF   - p/w worsening SOB, has been admitted twice in the past month in which thoracentesis was performed which drained malignant pleural effusions on the left, s/p Pleurx (4/4), per pt catheter is not draining much per home nurse  - R chest pigtail cath to pleur vac with 600 cc serosanguinous overnight. CT surg following    - hx of CAD recent NSTEMI 3/2025, medically managed without cath  - EKG NSR low voltage  - Trop neg x1 (trop 72), lactate: 2.7, pro BNP: 3483  - On Plavix, statin    - TTE (3/20/2025) shows EF 60-65%, grade 1 diastolic dysfunction.   - MARYSOL noted  - CT with no obstruction seen by urology   - s/p bumex IVP x 1, on albumin per renal    - home antihypertensives on hold (labetalol, losartan)  - on midodrine 10mg TID. remains intermittently hypotensive.   - s/p paracentesis 4/11 3300 cc drained fu fluid culture. Low dose eliquis resumed.     Now comfort measures only. We will sign off, but please re-consult if needed.
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 Admitted for hypotension, MARYSOL, hyponatremia, and present chronic pleural effusion.     - Pt with worsening SOB, has been admitted twice in the past month in which thoracentesis was performed which drained malignant pleural effusions on the left, s/p Pleurx (4/4), per pt catheter is not draining much per home nurse  - CXR: There is a left effusion with atelectatic change. Underlying inflammatory infectious process not excluded. Smaller right effusion with some increased interstitial markings in the right infrahilar region which are less prominent than before.  - cont to drain pleurx per ctsx  - right pleural effusion likely loculatd. ?draining largest pocket. fu with ctsx    - s/p paracentesis on 4/11 3550cc of cloudy yellow ascitic fluid removed from right abdomen   - challenge with Bumex IV x 1 would give albumin.   - may need RRT. fu renal    - hx of CAD recent NSTEMI 3/2025, medically managed without cath  - no anginal sx   - continue Plavix, statin    - TTE (3/20/2025) shows EF 60-65%, grade 1 diastolic dysfunction.   - home antihypertensives on hold (labetalol, losartan)  - now on midodrine 10mg TID + albumin  - home Eliquis on hold (hx of DVT)    - w/u for N/V per primary team   - at risk for abrupt decompensation.
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 Admitted for hypotension, MARYSOL, hyponatremia, and present chronic pleural effusion.     - Pt with worsening SOB, has been admitted twice in the past month in which thoracentesis was performed which drained malignant pleural effusions on the left, s/p Pleurx (4/4), per pt catheter is not draining much per home nurse  - CXR: There is a left effusion with atelectatic change. Underlying inflammatory infectious process not excluded. Smaller right effusion with some increased interstitial markings in the right infrahilar region which are less prominent than before.  - cont to drain pleurx per ctsx  - right pleural effusion likely loculated. ?draining largest pocket. fu with ctsx    - s/p paracentesis on 4/11 3550cc of cloudy yellow ascitic fluid removed from right abdomen   - s/p Bumex IV x 1 w albumin  - diuresis per renal  - may need RRT. fu renal    - hx of CAD recent NSTEMI 3/2025, medically managed without cath  - no anginal sx   - continue Plavix, statin    - TTE (3/20/2025) shows EF 60-65%, grade 1 diastolic dysfunction.   - home antihypertensives on hold (labetalol, losartan)  - now on midodrine 10mg TID + albumin  - home Eliquis on hold (hx of DVT)    - at risk for abrupt decompensation.
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 Admitted for hypotension, MARYSLO, hyponatremia, and present chronic pleural effusion.     - Pt with worsening SOB, has been admitted twice in the past month in which thoracentesis was performed which drained malignant pleural effusions on the left, s/p Pleurx (4/4), per pt catheter is not draining much per home nurse  - CXR: There is a left effusion with atelectatic change. Underlying inflammatory infectious process not excluded. Smaller right effusion with some increased interstitial markings in the right infrahilar region which are less prominent than before.  - may need another pleurx. fu with ct sx     - plan for paracentesis today.   - still vol ol  - Please continue to maintain strict I/Os, monitor daily weights, Cr, and K.   - if possible will challenge with lasix and albumin   - may need RRT. fu renal    - hx of CAD recent NSTEMI 3/2025, medically managed without cath  - no anginal sx   - continue Plavix, statin    - TTE (3/20/2025) shows EF 60-65%, grade 1 diastolic dysfunction.   - home antihypertensives on hold (labetalol, losartan)  - now on midodrine 10mg TID + albumin  - home Eliquis on hold (hx of DVT)      - at risk for abrupt decompensation

## 2025-04-16 NOTE — PROVIDER CONTACT NOTE (OTHER) - REASON
pt vomit, no blood, non projectile, pt feels discomfort, PO intake poor 0-25%, no bowel movement
Pleurax drain 1000cc
aptt 28.6
Chest tube drain detached
pt vomits 2 times, non projectile, no blood
pt has been Vomitting black emesis

## 2025-04-16 NOTE — PROGRESS NOTE ADULT - SUBJECTIVE AND OBJECTIVE BOX
[facility]  DANY Burris Y. Patel, S. [facility], [facility]  [phone]    Name: JESSE EATON  Age: 64y  Gender: Male  MRN: [id number]    Interval History:  No acute overnight events.   Notes reviewed    Antibiotics:      Medications:  acetaminophen     Tablet .. 650 milliGRAM(s) Oral every 6 hours PRN  albuterol/ipratropium for Nebulization 3 milliLiter(s) Nebulizer every 6 hours  aluminum hydroxide/magnesium hydroxide/simethicone Suspension 30 milliLiter(s) Oral four times a day PRN  apixaban 2.5 milliGRAM(s) Oral two times a day  atorvastatin 40 milliGRAM(s) Oral at bedtime  benzocaine/menthol Lozenge 1 Lozenge Oral once  bisacodyl Suppository 10 milliGRAM(s) Rectal daily PRN  chlorhexidine 2% Cloths 1 Application(s) Topical <User Schedule>  clonazePAM  Tablet 0.5 milliGRAM(s) Oral daily  clonazePAM  Tablet 1 milliGRAM(s) Oral at bedtime  clopidogrel Tablet 75 milliGRAM(s) Oral daily  HYDROmorphone  Injectable 0.2 milliGRAM(s) IV Push every 6 hours PRN  influenza   Vaccine 0.5 milliLiter(s) IntraMuscular once  lidocaine   4% Patch 1 Patch Transdermal every 24 hours  melatonin 10 milliGRAM(s) Oral at bedtime  metoclopramide Injectable 10 milliGRAM(s) IV Push every 8 hours PRN  midodrine 10 milliGRAM(s) Oral every 8 hours  mirtazapine 45 milliGRAM(s) Oral at bedtime  ondansetron Injectable 4 milliGRAM(s) IV Push every 6 hours PRN  pantoprazole    Tablet 40 milliGRAM(s) Oral before breakfast  PARoxetine 20 milliGRAM(s) Oral daily  sucralfate suspension 1 Gram(s) Oral four times a day      Review of Systems:  A 10-point review of systems was obtained.   Review of systems otherwise negative except as previously noted.    Allergies: Bactrim DS (Hives)    For details regarding the patient's past medical history, social history, family history, and other miscellaneous elements, please refer the initial infectious diseases consultation and/or the admitting history and physical examination for this admission.    Objective:  Vitals:   T(C): 36.6 (04-15-25 @ 20:02), Max: 36.6 (04-15-25 @ 20:02)  HR: 92 (04-16-25 @ 08:19) (88 - 94)  BP: 94/62 (04-16-25 @ 05:00) (92/61 - 94/62)  RR: 19 (04-15-25 @ 20:02) (19 - 19)  SpO2: 92% (04-16-25 @ 08:19) (92% - 98%)    Physical Examination:  General: no acute distress  HEENT: NC/AT, EOMI  Cardio: RRR  Resp: decreased breath sounds on NC, chest tube in place  Abd: soft, NT, ND  Ext: no edema or cyanosis  Skin: warm, dry, no visible rash      Laboratory Studies:  CBC:                       9.7    16.35 )-----------( 512      ( 16 Apr 2025 08:30 )             31.4     CMP: 04-15    125[L]  |  90[L]  |  96[H]  ----------------------------<  103[H]  5.1   |  27  |  4.40[H]    Ca    9.2      15 Apr 2025 06:20  Phos  5.3     04-15  Mg     3.3     04-15    TPro  6.2  /  Alb  2.6[L]  /  TBili  1.0  /  DBili  x   /  AST  88[H]  /  ALT  58  /  AlkPhos  649[H]  04-15    LIVER FUNCTIONS - ( 15 Apr 2025 06:20 )  Alb: 2.6 g/dL / Pro: 6.2 g/dL / ALK PHOS: 649 U/L / ALT: 58 U/L / AST: 88 U/L / GGT: x           Urinalysis Basic - ( 15 Apr 2025 06:20 )    Color: x / Appearance: x / SG: x / pH: x  Gluc: 103 mg/dL / Ketone: x  / Bili: x / Urobili: x   Blood: x / Protein: x / Nitrite: x   Leuk Esterase: x / RBC: x / WBC x   Sq Epi: x / Non Sq Epi: x / Bacteria: x        Microbiology: reviewed    Urinalysis with Rflx Culture (collected 04-11-25 @ 16:20)    Culture - Body Fluid with Gram Stain (collected 04-11-25 @ 12:42)  Source: Abdominal Fl Abdominal Fluid  Gram Stain (04-12-25 @ 00:10):    No polymorphonuclear leukocytes per low power field    No organisms seen per oil power field  Final Report (04-16-25 @ 13:00):    No growth at 5 days    Culture - Fungal, Body Fluid (collected 04-11-25 @ 12:42)  Source: Peritoneal Peritoneal Fluid  Preliminary Report (04-13-25 @ 08:26):    No growth    Culture - Blood (collected 04-09-25 @ 16:36)  Source: Blood Blood-Peripheral  Final Report (04-15-25 @ 04:00):    No growth at 5 days    Culture - Blood (collected 04-09-25 @ 16:30)  Source: Blood Blood-Peripheral  Final Report (04-15-25 @ 04:00):    No growth at 5 days    Culture - Blood (collected 04-09-25 @ 11:35)  Source: Blood Blood-Peripheral  Final Report (04-14-25 @ 17:01):    No growth at 5 days          Radiology: reviewed

## 2025-04-16 NOTE — PROGRESS NOTE ADULT - SUBJECTIVE AND OBJECTIVE BOX
Patient is a 64y old  Male who presents with a chief complaint of hypotension (11 Apr 2025 11:21)    INTERVAL HPI/OVERNIGHT EVENTS: Patient decided DNR/DNI yesterday. Overnight patient's pleurx tube came out, and was placed back. X-ray overnight was found to have a stable pneumothorax. Patient seen and examined at bedside. Endorses he feels extremely drowsy (likely 2/2 to the increased medications). Wishes to have more energy.     MEDICATIONS  (STANDING):  atorvastatin 40 milliGRAM(s) Oral at bedtime  azithromycin  IVPB 500 milliGRAM(s) IV Intermittent every 24 hours  azithromycin  IVPB      cefTRIAXone   IVPB 2000 milliGRAM(s) IV Intermittent every 24 hours  chlorhexidine 2% Cloths 1 Application(s) Topical <User Schedule>  clopidogrel Tablet 75 milliGRAM(s) Oral daily  influenza   Vaccine 0.5 milliLiter(s) IntraMuscular once  melatonin 5 milliGRAM(s) Oral at bedtime  midodrine 10 milliGRAM(s) Oral every 8 hours    MEDICATIONS  (PRN):  acetaminophen     Tablet .. 650 milliGRAM(s) Oral every 6 hours PRN Mild Pain (1 - 3)  acetaminophen   IVPB .. 1000 milliGRAM(s) IV Intermittent every 8 hours PRN Moderate Pain (4 - 6)  albuterol/ipratropium for Nebulization 3 milliLiter(s) Nebulizer every 6 hours PRN Shortness of Breath and/or Wheezing  ondansetron    Tablet 4 milliGRAM(s) Oral daily PRN Nausea and/or Vomiting      Allergies    Bactrim DS (Hives)    Intolerances    Vital Signs Last 24 Hrs  T(C): 36.6 (16 Apr 2025 14:00), Max: 36.6 (15 Apr 2025 20:02)  T(F): 97.8 (16 Apr 2025 14:00), Max: 97.8 (15 Apr 2025 20:02)  HR: 102 (16 Apr 2025 14:00) (88 - 102)  BP: 97/58 (16 Apr 2025 14:00) (92/61 - 97/58)  BP(mean): --  RR: 20 (16 Apr 2025 14:00) (19 - 20)  SpO2: 92% (16 Apr 2025 08:19) (92% - 98%)    Parameters below as of 16 Apr 2025 08:19  Patient On (Oxygen Delivery Method): nasal cannula        PHYSICAL EXAM:  GENERAL: NAD  HEENT:  anicteric, moist mucous membranes  CHEST/LUNG:  Left lung with minimal lung sounds to anterior LLL, right lung cta   HEART:  RRR, S1, S2  ABDOMEN:  minimal bowel sounds, distended, nontender, fluid noted on physical exam  EXTREMITIES: 2+ pitting edema to B/L lower extremities extending feet to thighs, no cyanosis, or calf tenderness  NERVOUS SYSTEM: answers questions and follows commands appropriately      LABS:  cret                        9.7    16.35 )-----------( 512      ( 16 Apr 2025 08:30 )             31.4     04-15    125[L]  |  90[L]  |  96[H]  ----------------------------<  103[H]  5.1   |  27  |  4.40[H]    Ca    9.2      15 Apr 2025 06:20  Phos  5.3     04-15  Mg     3.3     04-15    TPro  6.2  /  Alb  2.6[L]  /  TBili  1.0  /  DBili  x   /  AST  88[H]  /  ALT  58  /  AlkPhos  649[H]  04-15    PTT - ( 15 Apr 2025 06:20 )  PTT:169.5 sec

## 2025-04-16 NOTE — DIETITIAN INITIAL EVALUATION ADULT - PERTINENT LABORATORY DATA
04-15    125[L]  |  90[L]  |  96[H]  ----------------------------<  103[H]  5.1   |  27  |  4.40[H]    Ca    9.2      15 Apr 2025 06:20  Phos  5.3     04-15  Mg     3.3     04-15    TPro  6.2  /  Alb  2.6[L]  /  TBili  1.0  /  DBili  x   /  AST  88[H]  /  ALT  58  /  AlkPhos  649[H]  04-15  A1C with Estimated Average Glucose Result: 5.4 % (03-23-25 @ 07:10)

## 2025-04-16 NOTE — PROGRESS NOTE ADULT - PROBLEM SELECTOR PLAN 3
Likely 2/2 to fluid overload status and dehydration  -s/p 1L NaCl 0.9% IVB 1x, and 1L LR in ED  -would be cautious about fluids as patient has present pleural effusions  -would monitor for now, improved to 133
Likely 2/2 to fluid overload status and dehydration  -s/p 1L NaCl 0.9% IVB 1x, and 1L LR in ED  -would be cautious about fluids as patient has present pleural effusions  -would monitor for now, improved to 132
Likely 2/2 to fluid overload status and dehydration  -s/p 1L NaCl 0.9% IVB 1x, and 1L LR in ED  -would be cautious about fluids as patient has present pleural effusions  -would monitor for now, improved to 133

## 2025-04-16 NOTE — DIETITIAN INITIAL EVALUATION ADULT - OTHER INFO
Patient is a "63 y/o male with PMHx of poorly differentiated metastatic (known cervical and axillary lymphadenopathy) GI carcinoma, LUE and LLE DVT, HTN, on home O2 2/2 to SOB, and anemia presents to the ED with hypotension and SOB. Admitted for hypotension, MARYSOL, hyponatremia, and present chronic pleural effusion."    Chart reviewed. Pt now DNR/DNI. Comfort measures only. Did not disturb at this time. Pt continues on regular, low K diet with Nepro daily. Consuming 0-50% of meals per nursing documentation. Assisted with feeding. No report N/V. No BMs documented thus far. CBW on admission 205#. 1+ BL ankle/foot edema noted. Per SW note; pt approved for home hospice with [facility]. Plan for possible discharge home this afternoon. RD remains available and will continue to follow-up.

## 2025-04-16 NOTE — DIETITIAN INITIAL EVALUATION ADULT - NS FNS DIET ORDER
Diet, Regular:   No Concentrated Potassium  Supplement Feeding Modality:  Oral  Nepro Cans or Servings Per Day:  1       Frequency:  Daily (04-10-25 @ 13:37) [Active]

## 2025-04-16 NOTE — PROGRESS NOTE ADULT - SUBJECTIVE AND OBJECTIVE BOX
[facility] -- Charles Martinez Pannella, Patel, Savella, Goodger, Cohen  Office # [phone]    Follow Up:   SOB     Subjective/Observations: Patient seen and examined, awake, alert, OOB to chair, denies chest pain, c/o mild dyspnea, on O2 via NC, c/o not sleeping ~ 1 wk, 24 events noted       REVIEW OF SYSTEMS: All other review of systems is negative unless indicated above  PAST MEDICAL & SURGICAL HISTORY:  Gastric lymphoma      Anemia of chronic disease      Pleural effusion      DVT (deep venous thrombosis)      Hypertension      Hyperlipidemia      S/P appendectomy  November 17th 2014      S/P cholecystectomy  15 years ago        MEDICATIONS  (STANDING):  albuterol/ipratropium for Nebulization 3 milliLiter(s) Nebulizer every 6 hours  apixaban 2.5 milliGRAM(s) Oral two times a day  atorvastatin 40 milliGRAM(s) Oral at bedtime  benzocaine/menthol Lozenge 1 Lozenge Oral once  chlorhexidine 2% Cloths 1 Application(s) Topical <User Schedule>  clonazePAM  Tablet 0.5 milliGRAM(s) Oral daily  clonazePAM  Tablet 1 milliGRAM(s) Oral at bedtime  clopidogrel Tablet 75 milliGRAM(s) Oral daily  influenza   Vaccine 0.5 milliLiter(s) IntraMuscular once  lidocaine   4% Patch 1 Patch Transdermal every 24 hours  melatonin 10 milliGRAM(s) Oral at bedtime  midodrine 10 milliGRAM(s) Oral every 8 hours  mirtazapine 45 milliGRAM(s) Oral at bedtime  pantoprazole    Tablet 40 milliGRAM(s) Oral before breakfast  PARoxetine 20 milliGRAM(s) Oral daily  sucralfate suspension 1 Gram(s) Oral four times a day    MEDICATIONS  (PRN):  acetaminophen     Tablet .. 650 milliGRAM(s) Oral every 6 hours PRN Mild Pain (1 - 3)  aluminum hydroxide/magnesium hydroxide/simethicone Suspension 30 milliLiter(s) Oral four times a day PRN Dyspepsia  bisacodyl Suppository 10 milliGRAM(s) Rectal daily PRN Constipation  HYDROmorphone  Injectable 0.2 milliGRAM(s) IV Push every 6 hours PRN Moderate Pain (4 - 6)  metoclopramide Injectable 10 milliGRAM(s) IV Push every 8 hours PRN nausea , vomiting  ondansetron Injectable 4 milliGRAM(s) IV Push every 6 hours PRN Nausea and/or Vomiting    Allergies    Bactrim DS (Hives)    Intolerances      Vital Signs Last 24 Hrs  T(C): 36.6 (15 Apr 2025 20:02), Max: 36.6 (15 Apr 2025 20:02)  T(F): 97.8 (15 Apr 2025 20:02), Max: 97.8 (15 Apr 2025 20:02)  HR: 92 (16 Apr 2025 08:19) (88 - 94)  BP: 94/62 (16 Apr 2025 05:00) (92/61 - 94/62)  BP(mean): --  RR: 19 (15 Apr 2025 20:02) (19 - 19)  SpO2: 92% (16 Apr 2025 08:19) (92% - 98%)    Parameters below as of 16 Apr 2025 08:19  Patient On (Oxygen Delivery Method): nasal cannula      I&O's Summary        TELE: Not on telemetry   PHYSICAL EXAM:  Constitutional: NAD, awake and alert  HEENT: Moist Mucous Membranes, Anicteric  Pulmonary: Non-labored, breath sounds are dim R>L , No wheezing, rales or rhonchi  Cardiovascular: Regular, S1 and S2, No murmurs, rubs, gallops or clicks  Gastrointestinal: Bowel Sounds present, soft, nontender.   Lymph: + peripheral edema. No lymphadenopathy.  Skin: No visible rashes or ulcers. + pleurex to pleur vac   Psych:  Mood & affect appropriate  LABS: All Labs Reviewed:                        9.7    16.35 )-----------( 512      ( 16 Apr 2025 08:30 )             31.4                         9.0    12.26 )-----------( 498      ( 15 Apr 2025 06:20 )             29.0                         8.8    13.72 )-----------( 546      ( 14 Apr 2025 07:58 )             28.5     15 Apr 2025 06:20    125    |  90     |  96     ----------------------------<  103    5.1     |  27     |  4.40   14 Apr 2025 07:58    130    |  90     |  86     ----------------------------<  117    4.8     |  29     |  3.80     Ca    9.2        15 Apr 2025 06:20  Ca    8.9        14 Apr 2025 07:58  Phos  5.3       15 Apr 2025 06:20  Phos  5.0       14 Apr 2025 07:58  Mg     3.3       15 Apr 2025 06:20  Mg     3.1       14 Apr 2025 07:58    TPro  6.2    /  Alb  2.6    /  TBili  1.0    /  DBili  x      /  AST  88     /  ALT  58     /  AlkPhos  649    15 Apr 2025 06:20  TPro  6.3    /  Alb  2.6    /  TBili  0.4    /  DBili  x      /  AST  38     /  ALT  33     /  AlkPhos  442    14 Apr 2025 07:58    PTT - ( 15 Apr 2025 06:20 )  PTT:169.5 sec      12 Lead ECG:   Ventricular Rate 94 BPM    Atrial Rate 94 BPM    P-R Interval 150 ms    QRS Duration 94 ms    Q-T Interval 350 ms    QTC Calculation(Bazett) 437 ms    P Axis 64 degrees    R Axis -23 degrees    T Axis 57 degrees    Diagnosis Line Normal sinus rhythm  Low voltage QRS  Inferior infarct (cited on or before 19-MAR-2025)  Poor R wave progression  Abnormal ECG  When compared with ECG of 09-APR-2025 13:48,  Borderline criteria for Anterior infarct are now present  Borderline criteria for Anterolateralinfarct are now present  Serial changes of Inferior infarct present  Confirmed by CHASE CELIS ([id number]) on 4/13/2025 4:49:30 PM (04-13-25 @ 09:43)      TRANSTHORACIC ECHOCARDIOGRAM REPORT  ________________________________________________________________________________                                      _______       Pt. Name:       JESSE MARQUEZNATHANIEL Study Date:    4/10/2025  MRN:     [id number]                   YOB: 1960  Accession #:    [id number]                  Age:           64 years  Account#:       [id number]                 Gender:        M  Heart Rate:                                Height:        66.93in (170.00 cm)  Rhythm:                                    Weight:        205.03 lb (93.00 kg)  Blood Pressure: 86/48 mmHg                 BSA/BMI:       2.04 m² / 32.18 kg/m²  ________________________________________________________________________________________  Referring Physician:    [id number] Oswaldo Snowden  Interpreting Physician: Alberto Gilliam M.D.  Primary Sonographer:    Kennedy Eugene    CPT:               ECHO TTE WO CON COMP W DOPP - 05826.m  Indication(s):     Dyspnea, unspecified- R06.00  Procedure:         Transthoracic echocardiogram with 2-D, M-mode and complete                     spectral and color flow Doppler.  Ordering Location: [facility]  Admission Status:  Inpatient  Study Information: Image quality for this study is technically difficult.    _______________________________________________________________________________________     CONCLUSIONS:      1. Technically difficult image quality.   2. Left ventricular cavity is normal in size. Left ventricular systolic function is normal with an ejection fraction of 54 % by David's method of disks.   3. Normal right ventricular cavity size and normal right ventricular systolic function.   4. Mild mitral regurgitation.   5. Abdominal ascites is incidentally noted.   6. Estimated pulmonary artery systolic pressure is 10 mmHg.   7. Large right and small left pleural effusion noted.   8. Small pericardial effusion with no echocardiographic evidence of tamponade physiology.   9. Compared to the transthoracic echocardiogram performed on 3/20/2025, there have been no significant cardiac interval changes.    ________________________________________________________________________________________  FINDINGS:     Left Ventricle:  The left ventricular cavity is normal insize. Left ventricular systolic function is normal with a calculated ejection fraction of 54 % by the David's biplane method of disks.     Right Ventricle:  The right ventricular cavity is normal in size and right ventricular systolic function is normal. Tricuspid annular plane systolic excursion (TAPSE) is 2.6 cm (normal >=1.7 cm).     Left Atrium:  The left atrium is normal in size with an indexed volume of 28.21 ml/m².     Right Atrium:  The right atrium is normal in size.     Interatrial Septum:  The interatrial septum appears intact.     Aortic Valve:  The aortic valve appears trileaflet. There is calcification of the aortic valve leaflets. There is fibrocalcific aortic valve sclerosis without stenosis. The peak transaortic velocity is1.30 m/s, peak transaortic gradient is 6.8 mmHg and mean transaortic gradient is 4.0 mmHg. There is no evidence of aortic regurgitation.     Mitral Valve:  Structurally normal mitral valve with normal leaflet excursion. There is calcification of the mitral valve annulus. There is mild mitral regurgitation.     Tricuspid Valve:  Structurally normal tricuspid valve with normal leaflet excursion. There is trace tricuspid regurgitation. Estimated pulmonary artery systolic pressure is 10 mmHg.     Pulmonic Valve:  Structurally normal pulmonic valve with normal leaflet excursion. There is mild pulmonic regurgitation.     Aorta:  The aortic root at the sinuses of Valsalva is normal in size, measuring 3.30 cm (indexed 1.62 cm/m²). The ascending aortais normal in size, measuring 3.40 cm (indexed 1.67 cm/m²).     Pericardium:  There is a small pericardial effusion with no echocardiographic evidence of tamponade physiology.     Pleura:  Large right and small left pleural effusion noted.     Systemic Veins:  The inferior vena cava is normal in size measuring 1.91 cm in diameter, (normal <2.1cm) with normal inspiratory collapse (normal >50%) consistent with normal right atrial pressure (~3, range 0-5mmHg).     Additional Findings and Comments:  The presence of abdominal ascites is incidentally noted.  ____________________________________________________________________  QUANTITATIVE DATA:  Left Ventricle Measurements: (Indexed to BSA)     IVSd (2D):   1.1 cm  LVPWd (2D):  1.2 cm  LVIDd (2D):4.7 cm  LVIDs (2D):  3.3 cm  LV Mass:     201 g  98.4 g/m²  LV Vol d, MOD A2C: 86.0 ml 42.12 ml/m²  LV Vol d, MOD A4C: 88.1 ml 43.14 ml/m²  LV Vol d, MOD BP:  87.9 ml 43.05 ml/m²  LV Vol s, MOD A2C: 42.2 ml 20.67 ml/m²  LV Vol s, MOD A4C: 38.5 ml 18.85 ml/m²  LV Vol s, MOD BP:  40.5 ml 19.81 ml/m²  LVOT SV MOD BP:    47.4 ml  LV EF% MOD BP:     54 %     MV E Vmax:    0.86 m/s  MV A Vmax:    0.76 m/s  MV E/A:       1.12  e' lateral:   9.79 cm/s  e' medial:    6.53 cm/s  E/e' lateral: 8.77  E/e' medial:  13.15  E/e' Average: 10.53  MV DT:        201 msec    Aorta Measurements: (Normal range) (Indexed to BSA)     Ao Root d     3.30 cm (3.1 - 3.7 cm) 1.62 cm/m²  Ao Asc d, 2D: 3.40  Ao Asc prox:  3.40 cm                1.67 cm/m²            Left Atrium Measurements: (Indexed to BSA)  LA Diam 2D: 4.30 cm         Right Ventricle Measurements:     TAPSE: 2.6 cm       LVOT / RVOT/ Qp/Qs Data: (Indexed to BSA)  LVOT Diameter,s: 2.10 cm  LVOT Area:       3.46 cm²  LVOT Vmax:       1.04 m/s  LVOT Vmn:        0.650 m/s  LVOT VTI:        17.50 cm  LVOT peak grad:  4 mmHg  LVOT mean grad:  2.0 mmHg  LVOT SV:         60.6 ml   29.68 ml/m²    Aortic Valve Measurements:  AV Vmax:                1.3 m/s  AV Peak Gradient:       6.8 mmHg  AV Mean Gradient:       4.0 mmHg  AV VTI:                 23.3 cm  AV VTI Ratio:           0.75  AoV EOA, Contin:        2.60 cm²  AoV EOA, Contin i:      1.27 cm²/m²  AoV Dimensionless Index 0.75    Mitral Valve Measurements:     MV E Vmax: 0.9 m/s         MR Vmax:         1.47 m/s  MV A Vmax: 0.8 m/s         MR Peak Gradient: 8.6 mmHg  MV E/A:    1.1       Tricuspid Valve Measurements:     TR Vmax:          1.3 m/s  TR Peak Gradient: 7.2 mmHg  RA Pressure:      3 mmHg  PASP:             10 mmHg    ________________________________________________________________________________________  Electronically signed on 4/10/2025 at 1:35:40 PM by Alberto Gilliam M.D.         *** Final ***

## 2025-04-16 NOTE — PROGRESS NOTE ADULT - PROBLEM SELECTOR PROBLEM 10
Anemia with chronic illness

## 2025-04-16 NOTE — PROGRESS NOTE ADULT - PROBLEM SELECTOR PROBLEM 4
Gastric lymphoma

## 2025-04-16 NOTE — DIETITIAN INITIAL EVALUATION ADULT - PROBLEM SELECTOR PLAN 5
Patient has been admitted three times within the past couple of months in which thoracentesis was performed and drained malignant pleural effusions  -pleurex placement on right chest C/D/I  -Patient has been complaining of cough for the past two months but now is producing tannish sputum. As patient is immunocompromised, patient may not amount to a fever or wbc.    -lactate increased at 2.7  -Increased lung markings on chest x-ray, no known consolidation, but will cover for possible infection with ceftriaxone 2g daily (would also cover if SBP present-- known minimal ascites, will hold off on tap for now as patient is on eliquis and plavix) and azithromycin   -ordered BC, legionella, strep antibiotics; f/u results   -pulm consulted (Dr. Damico)

## 2025-04-16 NOTE — PROGRESS NOTE ADULT - PROBLEM SELECTOR PLAN 5
CT chest/abd ordered: mild to moderate ascites and mild b/l hydronephrosis   -US Abdomen ordered- small to moderate ascites  -s/p paracentesis by IR: drained 3550cc of cloudy yellow fluid, neutrophils are wnl, pending culture results  -will order repeat abdominal US today as abdomen is still distended and patient is complaining of pain however patient refused imaging   -holding home oxy, ordered dilaudid 0.2mg IV q4 hr PRN for pain

## 2025-04-16 NOTE — DIETITIAN INITIAL EVALUATION ADULT - PROBLEM SELECTOR PLAN 6
Admitted recently for Type 2 MI (elevated troponins, asymptomatic, no cath performed)   -continue home Plavix   --Cardiology consulted ([facility]) consulted

## 2025-04-16 NOTE — PROGRESS NOTE ADULT - ASSESSMENT
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,  11/2024 met poorly diff gastric ca(on presentation w neck, axillary, abdomen LADS, under care Dr Mckeon, [facility], has been on FOLFOX was to change to new chemo this week), episode MARYSOL req temporary HD prior to start of chemo 12/2024, 3/2025 LUE DVT likely assoc w HD catheter(removed) and left thoracentesis for large left effusion, 4/2025 adm for SOB post right PleurX  Has been on Bumex, adm w hypotension  W/U sig for--  -CT c/a/p 4/9 w decr right effusion, mod loculated left effuse, 1+--2+cm above/below diaphragm LADs, sclerotic bones, mod ascites  -US abdomen --small to mod ascites  -renal US -mild andreas hydro  -?prerenal azotemia due to Bumex, Nephrology on case  -Pulm Nephrology, ID on case      -post paracentesis 4/11 3.3L w some abdomen sx relieve  -Cr today incr again to 3.4 after yesterday's sl decr to 3.1  -new sig incr alk phos and mild incr SGOT since admission--?hepatorenal ?drug effect (4/9 CT no sig liver findings)  -anemia due to thalassemia, malignancy, chronic ds, acute illness, chemo--stable and similar to previous admission levels(microcytic MCV due to Thalassemia/chronic ds, no iron deficiency found)    RECOMMENDATIONS    Oncology  -overall continues w sig malaise, and overall findings c/w progression of malignancy  -supportive and symptomatic management from Heme/Onc standpoint, pt to followup w own Heme/Onc Dr Mckeon for new chemotherapy as planned  Supportive measures were regards to the chest tube and Pleur-evac.  s/p discussion with pt outpt onc Mon after my visit w pt.   see palliative care notes    GOC  Extent discussion had with patient today regarding long-term goals of care  Discussed EOL issues.    Prognosis  Discussed with patient with continued duration health concerns that patient may find it extremely difficult to be able to resume chemotherapy  Discussed prognosis if off treatment    Nausea  on ativan PRN  on zofran  start decadron PRN for nausea  start marinol PRN    Depression  s/p psychiatry evaluation  Discussed with Dr. Correa  On Mirtazipine    prognosis poor

## 2025-04-16 NOTE — DIETITIAN INITIAL EVALUATION ADULT - PROBLEM SELECTOR PLAN 1
Acute for the past two days, endorses slight lightheadedness   -BP: 82/56 -> 90/51 ->77/49  -s/p 1L NaCl 0.9% IVB 1x, and 1L LR in ED  -as per ICU, starting midodrine 10mg for now, albumin ordered as well  -ICU will re-evaluate in a few hours, but low threshold to reconsult if any changes  -HOLD home labetalol BID and losartan qd; and hold home bumex   -would be cautious about fluids as patient has present pleural effusions

## 2025-04-16 NOTE — PROGRESS NOTE ADULT - PROBLEM SELECTOR PLAN 4
CT chest/abd/pelvis (3/3): Large left pleural effusion markedly increased since 11/30/2024 with total atelectasis left lower lobe and partial atelectasis left upper lobe. New mediastinal and left axillary lymphadenopathy. Inflammatory changes associated with left subclavian vein consistent with known DVT. Upper abdominal lymphadenopathy similar to 11/30/2024. Mildly increased pelvic lymphadenopathy. Mesenteric lymphadenopathy and mesenteric fat stranding similar to prior examination. No pulmonary embolus. Limited evaluation segmental and subsegmental branches.  -scheduled for his fifth chemo cycle today (previously on FolFox, was supposed to switch to his new chemo medication today) however BP was noted to be low, and did not receive dose. last chemo was >3 weeks ago  -rt medport in place. Follows w/ Dr. Fung  --hx of beta thal minor  -holding home oxy, ordered dilaudid 0.2mg IV q4 hr PRN for pain   -currently complaining of nausea and emesis, likely 2/2 to cancer but may have infectious component to it. continue abx and will order GI PCR for when patient has a bowel movement. ordered zofran ATC for nausea for now, but if symptoms persist will consider Compazin as an alternative antiemetic   -EKG on 04/13: QTC: 437   -Spoke to outpatient heme onc Dr. Fung, will try mirtazapine to 15mg po qhs tonight. was not tolerating higher dose of mirtazapine   -Heme/Onc consulted (Dr. Hu), f/u recs.

## 2025-04-16 NOTE — PROGRESS NOTE ADULT - PROBLEM SELECTOR PROBLEM 6
Pleural effusion
glasses/To daughter

## 2025-04-16 NOTE — PROGRESS NOTE ADULT - PROBLEM SELECTOR PLAN 6
Patient has been admitted three times within the past couple of months in which thoracentesis was performed and drained malignant pleural effusions  -pleurex placement on right chest C/D/I  -Patient has been complaining of cough for the past two months but now is producing tannish sputum. As patient is immunocompromised, patient may not mount fever or wbc.    -Increased lung markings on chest x-ray, no known consolidation, but will cover for possible infection with ceftriaxone 2g daily and azithromycin (dc azithro as legionella neg)  -BC (NGTD at 48 hrs), strep antibiotics neg  -completed ceftriaxone course on 04/13, monitor CBC   -switched duonebs from PRN to standing for SOB. Also added maalox/PPI to regimen.   -drain Pleurx every other day per CT surgery, never more than 1 L at a time. pleuravac drained 1L, and then drained another 1L  -s/p 1 dose of 2mg IV bumex with albumin, will hold diuresis for now due to Cr bump. s/p albumin (received 4x 0n 04/14)  -pneumothorax notable on chest x-ray overnight, will continue oxygen and monitor symptoms   -pulm consulted (Dr. Damico) recs appreciated  -ID consulted (Dr. Corcoran), recs appreciated  -CT surgery consulted, recs appreciated

## 2025-04-16 NOTE — DIETITIAN INITIAL EVALUATION ADULT - PROBLEM SELECTOR PLAN 10
DVT treatment/prophylaxis  -continue home eliquis    GOC: palliative consulted; full code as of now:   patient has metastatic cancer and has been admitted to the hospital >3 times over the past two months. patient currently does not have high hopes for the future and wanted to be DNR/DNI but was also emotional and near the verge of tears before he said he doesn't know. perhaps he requires an in-depth conversation about his future goals.

## 2025-04-16 NOTE — PROGRESS NOTE ADULT - PROBLEM SELECTOR PROBLEM 1
Hypotension
Hypotension
MARYSOL (acute kidney injury)
Hypotension

## 2025-04-16 NOTE — SOCIAL WORK PROGRESS NOTE - NSSWPROGRESSNOTE_GEN_ALL_CORE
Miller has spoken with [facility] from [facility] today. She was made aware that pt medically stable for discharge today and family was awaiting nebulizer to be delivered this afternoon and then come to the hospital. As per Dr Madrigal, pt had an Xray that was completed and r/o pneumothorax. Family at bedside and made aware. Miller has notified [facility] RN from [facility] today discharge canceled this afternoon as RN was to start care in the am.

## 2025-04-16 NOTE — BH CONSULTATION LIAISON PROGRESS NOTE - NSBHCONSULTPRIMARYDISCUSSYES_PSY_A_CORE FT
Will increase Remeron to 45 mg po at HS, continue Paxil 20 mg po daily, Klonopin 0.5 mg po in AM, 1 mg po at HS, Melatonin 5 mg po at HS
Will increase Remeron to 45 mg po at HS, continue Paxil 20 mg po daily, Klonopin 0.5 mg po in AM, 1 mg po at HS, Melatonin 5 mg po at HS

## 2025-04-16 NOTE — PROGRESS NOTE ADULT - PROBLEM SELECTOR PROBLEM 2
MARYSOL (acute kidney injury)

## 2025-04-16 NOTE — BH CONSULTATION LIAISON PROGRESS NOTE - NSBHATTESTBILLING_PSY_A_CORE
21371-[id number] OBS or IP - low complexity OR 25-34 mins
30122-[id number] OBS or IP - low complexity OR 25-34 mins

## 2025-04-16 NOTE — BH CONSULTATION LIAISON PROGRESS NOTE - TELEPSYCHIATRY?
OP JESSICA had called the patient via phone. OP JESSICA left a voicemail. OP JESSICA notes this is the second phone call attempt. OP JESSICA sent unable to reach letter via [employer]. OP JESSICA closed referral. Please reconsult JESSICA for future needs.  
No
No

## 2025-04-16 NOTE — PROGRESS NOTE ADULT - PROBLEM SELECTOR PLAN 9
Chronic  -c/w home lipitor
Chronic  -c/w home lipitor.
Chronic  -c/w home lipitor

## 2025-04-16 NOTE — PROVIDER CONTACT NOTE (OTHER) - ACTION/TREATMENT ORDERED:
MD notified and aware. xray performed, dsg changed and new pleur vac in place and draining.
Dr Garcia notified. iv reglan and zofran given. encourage PO intake. Enema given
Dr Garcia notified. iv zofran given
Surgical SHUBHAM Barrios notified. said will follow up with surgeon
Dr Cuba notified. said will follow up

## 2025-04-16 NOTE — PROGRESS NOTE ADULT - PROBLEM SELECTOR PLAN 8
-03/03 doppler: Extensive left upper extremity DVT extending proximally to involve the right internal jugular and subclavian veins. Left cervical lymphadenopathy with abnormal morphology suggesting malignancy.  -03/06: + LLE DVT  -switched back to home eliquis, on lower dose now due to kidney function

## 2025-04-16 NOTE — PROGRESS NOTE ADULT - SUBJECTIVE AND OBJECTIVE BOX
[facility]  Jhonny Obrien PA-C  [address] [place] [address]  [phone]      INTERVAL HPI/OVERNIGHT EVENTS:  Pt s/e  No nausea/vomiting or further GI complaints    MEDICATIONS  (STANDING):  albuterol/ipratropium for Nebulization 3 milliLiter(s) Nebulizer every 6 hours  apixaban 2.5 milliGRAM(s) Oral two times a day  atorvastatin 40 milliGRAM(s) Oral at bedtime  benzocaine/menthol Lozenge 1 Lozenge Oral once  chlorhexidine 2% Cloths 1 Application(s) Topical <User Schedule>  clonazePAM  Tablet 0.5 milliGRAM(s) Oral daily  clonazePAM  Tablet 1 milliGRAM(s) Oral at bedtime  clopidogrel Tablet 75 milliGRAM(s) Oral daily  influenza   Vaccine 0.5 milliLiter(s) IntraMuscular once  lidocaine   4% Patch 1 Patch Transdermal every 24 hours  melatonin 10 milliGRAM(s) Oral at bedtime  midodrine 10 milliGRAM(s) Oral every 8 hours  mirtazapine 45 milliGRAM(s) Oral at bedtime  pantoprazole    Tablet 40 milliGRAM(s) Oral before breakfast  PARoxetine 20 milliGRAM(s) Oral daily  sucralfate suspension 1 Gram(s) Oral four times a day    MEDICATIONS  (PRN):  acetaminophen     Tablet .. 650 milliGRAM(s) Oral every 6 hours PRN Mild Pain (1 - 3)  aluminum hydroxide/magnesium hydroxide/simethicone Suspension 30 milliLiter(s) Oral four times a day PRN Dyspepsia  bisacodyl Suppository 10 milliGRAM(s) Rectal daily PRN Constipation  HYDROmorphone  Injectable 0.2 milliGRAM(s) IV Push every 6 hours PRN Moderate Pain (4 - 6)  metoclopramide Injectable 10 milliGRAM(s) IV Push every 8 hours PRN nausea , vomiting  ondansetron Injectable 4 milliGRAM(s) IV Push every 6 hours PRN Nausea and/or Vomiting      Allergies    Bactrim DS (Hives)      PHYSICAL EXAM:   Vital Signs:  Vital Signs Last 24 Hrs  T(C): 36.6 (15 Apr 2025 20:02), Max: 36.6 (15 Apr 2025 20:02)  T(F): 97.8 (15 Apr 2025 20:02), Max: 97.8 (15 Apr 2025 20:02)  HR: 92 (16 Apr 2025 08:19) (88 - 94)  BP: 94/62 (16 Apr 2025 05:00) (92/61 - 94/62)  BP(mean): --  RR: 19 (15 Apr 2025 20:02) (19 - 19)  SpO2: 92% (16 Apr 2025 08:19) (92% - 98%)    Parameters below as of 16 Apr 2025 08:19  Patient On (Oxygen Delivery Method): nasal cannula    GENERAL:  Appears stated age  HEENT:  NC/AT  CHEST:  Full & symmetric excursion, +pleur-x  HEART:  Regular rhythm  ABDOMEN:  Soft, non-tender, non-distended  EXTEREMITIES:  no cyanosis  SKIN:  No rash  NEURO:  Alert      LABS:                        9.7    16.35 )-----------( 512      ( 16 Apr 2025 08:30 )             31.4     04-15    125[L]  |  90[L]  |  96[H]  ----------------------------<  103[H]  5.1   |  27  |  4.40[H]    Ca    9.2      15 Apr 2025 06:20  Phos  5.3     04-15  Mg     3.3     04-15    TPro  6.2  /  Alb  2.6[L]  /  TBili  1.0  /  DBili  x   /  AST  88[H]  /  ALT  58  /  AlkPhos  649[H]  04-15    PTT - ( 15 Apr 2025 06:20 )  PTT:169.5 sec  Urinalysis Basic - ( 15 Apr 2025 06:20 )    Color: x / Appearance: x / SG: x / pH: x  Gluc: 103 mg/dL / Ketone: x  / Bili: x / Urobili: x   Blood: x / Protein: x / Nitrite: x   Leuk Esterase: x / RBC: x / WBC x   Sq Epi: x / Non Sq Epi: x / Bacteria: x

## 2025-04-16 NOTE — PROGRESS NOTE ADULT - PROBLEM SELECTOR PLAN 7
Admitted recently for Type 2 MI (elevated troponins, asymptomatic, no cath performed)   - continue home Plavix   --Cardiology consulted ([facility]) consulted, recs appreciated
Admitted recently for Type 2 MI (elevated troponins, asymptomatic, no cath performed)   -continue home Plavix   --Cardiology consulted ([facility]) consulted

## 2025-04-16 NOTE — PROGRESS NOTE ADULT - ASSESSMENT
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 Admitted for hypotension, MARYSOL, hyponatremia, and present chronic pleural effusion.   ID c/s for further evaluation    Hypotension, AHRF on NC, MARYSOL; SIRS vs. Sepsis  Ascites, Pleural Effusions s/p Pleurx  Gastric lymphoma  - patient having episodes of hypotension since admission, was on levo ggt on admission  - afebrile no leukocytosis, hypoxia on NC  - BCx NGTD  CT 4/9 showing  RIGHT pleural effusion, diminished , post percutaneous thoracocentesis catheter. Moderate loculated LEFT pleural effusion and adjacent LLL atelectasis and/or consolidation, not significantly changed.  Moderate volume abdominal ascites, increased/developed from prior exams. Nodular thickening of pleural and peritoneal surfaces c/w malignant etiology. Diffuse thoracoabdominal lymphadenopathy, enlarged/developed in inguinal region.  Sclerotic osseous metastasis, not significantly changed  4/11 S/p 3550cc of cloudy yellow ascitic fluid removed from right abdomen -- peritoneal fluid cx NGTD    Recommendations:   Suspect overall clinical picture in setting of medications/underlying ca >>> infection  S/p ceftriaxone 4/9-4/13 (x5 day course); discontinued in setting of elevated LFTs  Will monitor off Abx  Trend temps/WBC  chest tube care  Monitor renal fxn, renally dose medication  additional care per primary team    Patient evaluated with face-to-face time in addition to reviewing history, labs, microbiology, and imaging.   Antibiotic stewardship, local antibiogram, infection control strategies and potential transmission issues taken into consideration at time of treatment decision making process.   Thank you for allowing us to participate in the care of your patient.  Infectious Diseases will follow. Please call with any questions.  Karen Tyler M.D.  Available on [employer] TEAMS -- *[facility]*  [facility] [phone]  For after 5 P.M. and weekends, please call [phone] Patient is a 63 y/o male with PMHx of poorly differentiated metastatic (known cervical and axillary lymphadenopathy) GI carcinoma, LUE and LLE DVT, HTN, on home O2 2/2 to SOB, and anemia presents to the ED with hypotension and SOB. Admitted for hypotension, MARYSOL, hyponatremia, and present chronic pleural effusion.   ID c/s for further evaluation    Hypotension, AHRF on NC, MARYSOL; SIRS vs. Sepsis  Ascites, Pleural Effusions s/p Pleurx  Gastric lymphoma  - patient having episodes of hypotension since admission, was on levo ggt on admission  - afebrile no leukocytosis, hypoxia on NC  - BCx NGTD  CT 4/9 showing  RIGHT pleural effusion, diminished , post percutaneous thoracocentesis catheter. Moderate loculated LEFT pleural effusion and adjacent LLL atelectasis and/or consolidation, not significantly changed.  Moderate volume abdominal ascites, increased/developed from prior exams. Nodular thickening of pleural and peritoneal surfaces c/w malignant etiology. Diffuse thoracoabdominal lymphadenopathy, enlarged/developed in inguinal region.  Sclerotic osseous metastasis, not significantly changed  4/11 S/p 3550cc of cloudy yellow ascitic fluid removed from right abdomen -- peritoneal fluid cx NGTD    S/p ceftriaxone 4/9-4/13 (x5 day course); discontinued in setting of elevated LFTs  4/16 leukocytosis and LFTs uptrending    Recommendations:   Will monitor off Abx for now  Trend temps/WBC  Trend LFTs  O2 support as needed  chest tube care  Monitor renal fxn, renally dose medication  additional care per primary team    Patient evaluated with face-to-face time in addition to reviewing history, labs, microbiology, and imaging.   Antibiotic stewardship, local antibiogram, infection control strategies and potential transmission issues taken into consideration at time of treatment decision making process.   Thank you for allowing us to participate in the care of your patient.  Infectious Diseases will follow. Please call with any questions.  Karen Tyler M.D.  Available on [employer] TEAMS -- *[facility]*  [facility] [phone]  For after 5 P.M. and weekends, please call [phone]

## 2025-04-16 NOTE — PROGRESS NOTE ADULT - PROBLEM SELECTOR PROBLEM 8
Deep vein thrombosis (DVT)

## 2025-04-16 NOTE — PROGRESS NOTE ADULT - SUBJECTIVE AND OBJECTIVE BOX
Patient is a 64y old  Male who presents with a chief complaint of      HPI: 64 year old male with a PMH as listed below presented with worsening SOB and edema. Also admits to poor urination. Was previously on dialysis but was taken off due to renal recovery. Was supposed to follow up in office with Dr Eugene 25 but ended in ER first. Renal consulted for further eval. No new medications, no recent illnesses, no NSAID use.      Not urinating well  appetite is down    PAST MEDICAL & SURGICAL HISTORY:  Gastric lymphoma      Anemia of chronic disease      Pleural effusion      DVT (deep venous thrombosis)      Hypertension      Hyperlipidemia      S/P appendectomy  2014      S/P cholecystectomy  15 years ago           FAMILY HISTORY:  Family history of coronary artery disease in father  Father -  age 60 following MI    NC    Social History:Non smoker    MEDICATIONS  (STANDING):  albuterol/ipratropium for Nebulization 3 milliLiter(s) Nebulizer every 6 hours  apixaban 5 milliGRAM(s) Oral two times a day  atorvastatin 40 milliGRAM(s) Oral at bedtime  benzocaine/menthol Lozenge 1 Lozenge Oral once  chlorhexidine 2% Cloths 1 Application(s) Topical <User Schedule>  clonazePAM  Tablet 0.5 milliGRAM(s) Oral daily  clonazePAM  Tablet 1 milliGRAM(s) Oral at bedtime  clopidogrel Tablet 75 milliGRAM(s) Oral daily  influenza   Vaccine 0.5 milliLiter(s) IntraMuscular once  lidocaine   4% Patch 1 Patch Transdermal every 24 hours  melatonin 10 milliGRAM(s) Oral at bedtime  midodrine 10 milliGRAM(s) Oral every 8 hours  mirtazapine 45 milliGRAM(s) Oral at bedtime  pantoprazole    Tablet 40 milliGRAM(s) Oral before breakfast  PARoxetine 20 milliGRAM(s) Oral daily  sucralfate suspension 1 Gram(s) Oral four times a day    MEDICATIONS  (PRN):  acetaminophen     Tablet .. 650 milliGRAM(s) Oral every 6 hours PRN Mild Pain (1 - 3)  aluminum hydroxide/magnesium hydroxide/simethicone Suspension 30 milliLiter(s) Oral four times a day PRN Dyspepsia  bisacodyl Suppository 10 milliGRAM(s) Rectal daily PRN Constipation  HYDROmorphone  Injectable 0.2 milliGRAM(s) IV Push every 6 hours PRN Moderate Pain (4 - 6)  metoclopramide Injectable 10 milliGRAM(s) IV Push every 8 hours PRN nausea , vomiting  ondansetron Injectable 4 milliGRAM(s) IV Push every 6 hours PRN Nausea and/or Vomiting        Allergies    Bactrim DS (Hives)    Intolerances         REVIEW OF SYSTEMS: as per HPI    Vital Signs Last 24 Hrs  T(C): 36.6 (15 Apr 2025 20:02), Max: 36.6 (15 Apr 2025 20:02)  T(F): 97.8 (15 Apr 2025 20:02), Max: 97.8 (15 Apr 2025 20:02)  HR: 92 (2025 08:19) (79 - 94)  BP: 94/62 (2025 05:00) (92/61 - 100/64)  BP(mean): --  RR: 19 (15 Apr 2025 20:02) (18 - 19)  SpO2: 92% (2025 08:19) (92% - 98%)    Parameters below as of 2025 08:19  Patient On (Oxygen Delivery Method): nasal cannula        PHYSICAL EXAM:    GENERAL: Ill appearing, on NC  HEAD:  Atraumatic, Normocephalic  EYES: Conjunctiva and sclera clear  ENMT: No Drainage from nares, No drainage from ears  NERVOUS SYSTEM:  Awake and Alert  ABDOMEN: distended abdomen, NT  EXTREMITIES:  +++Edema B/L LE          LABS:                                 9.0    12.26 )-----------( 498      ( 15 Apr 2025 06:20 )             29.0     04-15    125[L]  |  90[L]  |  96[H]  ----------------------------<  103[H]  5.1   |  27  |  4.40[H]    Ca    9.2      15 Apr 2025 06:20  Phos  5.3     04-15  Mg     3.3     04-15    TPro  6.2  /  Alb  2.6[L]  /  TBili  1.0  /  DBili  x   /  AST  88[H]  /  ALT  58  /  AlkPhos  649[H]  04-15    PTT - ( 15 Apr 2025 06:20 )  PTT:169.5 sec  Urinalysis Basic - ( 15 Apr 2025 06:20 )    Color: x / Appearance: x / SG: x / pH: x  Gluc: 103 mg/dL / Ketone: x  / Bili: x / Urobili: x   Blood: x / Protein: x / Nitrite: x   Leuk Esterase: x / RBC: x / WBC x   Sq Epi: x / Non Sq Epi: x / Bacteria: x

## 2025-04-16 NOTE — BH CONSULTATION LIAISON PROGRESS NOTE - NSBHCHARTREVIEWVS_PSY_A_CORE FT
Vital Signs Last 24 Hrs  T(C): 36.6 (16 Apr 2025 14:00), Max: 36.6 (15 Apr 2025 20:02)  T(F): 97.8 (16 Apr 2025 14:00), Max: 97.8 (15 Apr 2025 20:02)  HR: 102 (16 Apr 2025 14:00) (88 - 102)  BP: 97/58 (16 Apr 2025 14:00) (92/61 - 97/58)  BP(mean): --  RR: 20 (16 Apr 2025 14:00) (19 - 20)  SpO2: 92% (16 Apr 2025 08:19) (92% - 98%)    Parameters below as of 16 Apr 2025 08:19  Patient On (Oxygen Delivery Method): nasal cannula    
Vital Signs Last 24 Hrs  T(C): 36.4 (15 Apr 2025 11:33), Max: 36.4 (14 Apr 2025 20:50)  T(F): 97.6 (15 Apr 2025 11:33), Max: 97.6 (14 Apr 2025 20:50)  HR: 79 (15 Apr 2025 11:33) (79 - 92)  BP: 100/64 (15 Apr 2025 11:33) (100/64 - 113/72)  BP(mean): --  RR: 18 (15 Apr 2025 11:33) (18 - 19)  SpO2: 92% (15 Apr 2025 11:33) (92% - 100%)    Parameters below as of 15 Apr 2025 11:33  Patient On (Oxygen Delivery Method): nasal cannula  O2 Flow (L/min): 3

## 2025-04-17 VITALS — OXYGEN SATURATION: 95 %

## 2025-04-17 DIAGNOSIS — R11.2 NAUSEA WITH VOMITING, UNSPECIFIED: ICD-10-CM

## 2025-04-17 DIAGNOSIS — Z51.5 ENCOUNTER FOR PALLIATIVE CARE: ICD-10-CM

## 2025-04-17 DIAGNOSIS — Z71.89 OTHER SPECIFIED COUNSELING: ICD-10-CM

## 2025-04-17 LAB
HCT VFR BLD CALC: 33.1 % — LOW (ref 39–50)
HGB BLD-MCNC: 10.3 G/DL — LOW (ref 13–17)
MCHC RBC-ENTMCNC: 19.4 PG — LOW (ref 27–34)
MCHC RBC-ENTMCNC: 31.1 G/DL — LOW (ref 32–36)
MCV RBC AUTO: 62.2 FL — LOW (ref 80–100)
NRBC BLD AUTO-RTO: 0 /100 WBCS — SIGNIFICANT CHANGE UP (ref 0–0)
PLATELET # BLD AUTO: 494 K/UL — HIGH (ref 150–400)
RBC # BLD: 5.32 M/UL — SIGNIFICANT CHANGE UP (ref 4.2–5.8)
RBC # FLD: 21.8 % — HIGH (ref 10.3–14.5)
WBC # BLD: 17.63 K/UL — HIGH (ref 3.8–10.5)
WBC # FLD AUTO: 17.63 K/UL — HIGH (ref 3.8–10.5)

## 2025-04-17 PROCEDURE — 83880 ASSAY OF NATRIURETIC PEPTIDE: CPT

## 2025-04-17 PROCEDURE — 88342 IMHCHEM/IMCYTCHM 1ST ANTB: CPT

## 2025-04-17 PROCEDURE — 84484 ASSAY OF TROPONIN QUANT: CPT

## 2025-04-17 PROCEDURE — 94640 AIRWAY INHALATION TREATMENT: CPT

## 2025-04-17 PROCEDURE — 82570 ASSAY OF URINE CREATININE: CPT

## 2025-04-17 PROCEDURE — 85025 COMPLETE CBC W/AUTO DIFF WBC: CPT

## 2025-04-17 PROCEDURE — 82042 OTHER SOURCE ALBUMIN QUAN EA: CPT

## 2025-04-17 PROCEDURE — 76770 US EXAM ABDO BACK WALL COMP: CPT

## 2025-04-17 PROCEDURE — 88341 IMHCHEM/IMCYTCHM EA ADD ANTB: CPT

## 2025-04-17 PROCEDURE — 87102 FUNGUS ISOLATION CULTURE: CPT

## 2025-04-17 PROCEDURE — 49083 ABD PARACENTESIS W/IMAGING: CPT

## 2025-04-17 PROCEDURE — 94760 N-INVAS EAR/PLS OXIMETRY 1: CPT

## 2025-04-17 PROCEDURE — 87015 SPECIMEN INFECT AGNT CONCNTJ: CPT

## 2025-04-17 PROCEDURE — 82533 TOTAL CORTISOL: CPT

## 2025-04-17 PROCEDURE — 85730 THROMBOPLASTIN TIME PARTIAL: CPT

## 2025-04-17 PROCEDURE — 84100 ASSAY OF PHOSPHORUS: CPT

## 2025-04-17 PROCEDURE — 36415 COLL VENOUS BLD VENIPUNCTURE: CPT

## 2025-04-17 PROCEDURE — 76775 US EXAM ABDO BACK WALL LIM: CPT

## 2025-04-17 PROCEDURE — 82550 ASSAY OF CK (CPK): CPT

## 2025-04-17 PROCEDURE — C1729: CPT

## 2025-04-17 PROCEDURE — 71045 X-RAY EXAM CHEST 1 VIEW: CPT

## 2025-04-17 PROCEDURE — 81455 SO/HL 51/>GSAP DNA/DNA&RNA: CPT

## 2025-04-17 PROCEDURE — P9047: CPT

## 2025-04-17 PROCEDURE — 83605 ASSAY OF LACTIC ACID: CPT

## 2025-04-17 PROCEDURE — 84133 ASSAY OF URINE POTASSIUM: CPT

## 2025-04-17 PROCEDURE — 99239 HOSP IP/OBS DSCHRG MGMT >30: CPT

## 2025-04-17 PROCEDURE — 93306 TTE W/DOPPLER COMPLETE: CPT

## 2025-04-17 PROCEDURE — 87040 BLOOD CULTURE FOR BACTERIA: CPT

## 2025-04-17 PROCEDURE — 84300 ASSAY OF URINE SODIUM: CPT

## 2025-04-17 PROCEDURE — 85027 COMPLETE CBC AUTOMATED: CPT

## 2025-04-17 PROCEDURE — 87075 CULTR BACTERIA EXCEPT BLOOD: CPT

## 2025-04-17 PROCEDURE — 99285 EMERGENCY DEPT VISIT HI MDM: CPT

## 2025-04-17 PROCEDURE — 83935 ASSAY OF URINE OSMOLALITY: CPT

## 2025-04-17 PROCEDURE — 87205 SMEAR GRAM STAIN: CPT

## 2025-04-17 PROCEDURE — 84156 ASSAY OF PROTEIN URINE: CPT

## 2025-04-17 PROCEDURE — 99223 1ST HOSP IP/OBS HIGH 75: CPT

## 2025-04-17 PROCEDURE — 74176 CT ABD & PELVIS W/O CONTRAST: CPT | Mod: MC

## 2025-04-17 PROCEDURE — 84540 ASSAY OF URINE/UREA-N: CPT

## 2025-04-17 PROCEDURE — 83735 ASSAY OF MAGNESIUM: CPT

## 2025-04-17 PROCEDURE — 82945 GLUCOSE OTHER FLUID: CPT

## 2025-04-17 PROCEDURE — 80053 COMPREHEN METABOLIC PANEL: CPT

## 2025-04-17 PROCEDURE — 76705 ECHO EXAM OF ABDOMEN: CPT

## 2025-04-17 PROCEDURE — 93005 ELECTROCARDIOGRAM TRACING: CPT

## 2025-04-17 PROCEDURE — 83615 LACTATE (LD) (LDH) ENZYME: CPT

## 2025-04-17 PROCEDURE — 87899 AGENT NOS ASSAY W/OPTIC: CPT

## 2025-04-17 PROCEDURE — 88381 MICRODISSECTION MANUAL: CPT

## 2025-04-17 PROCEDURE — 71250 CT THORAX DX C-: CPT | Mod: MC

## 2025-04-17 PROCEDURE — 85610 PROTHROMBIN TIME: CPT

## 2025-04-17 PROCEDURE — 89051 BODY FLUID CELL COUNT: CPT

## 2025-04-17 PROCEDURE — 87070 CULTURE OTHR SPECIMN AEROBIC: CPT

## 2025-04-17 PROCEDURE — 87637 SARSCOV2&INF A&B&RSV AMP PRB: CPT

## 2025-04-17 PROCEDURE — 99232 SBSQ HOSP IP/OBS MODERATE 35: CPT

## 2025-04-17 PROCEDURE — 84157 ASSAY OF PROTEIN OTHER: CPT

## 2025-04-17 PROCEDURE — 81001 URINALYSIS AUTO W/SCOPE: CPT

## 2025-04-17 RX ORDER — GLYCOPYRROLATE 0.2 MG/ML
0.4 INJECTION INTRAMUSCULAR; INTRAVENOUS
Qty: 0 | Refills: 0 | DISCHARGE
Start: 2025-04-17

## 2025-04-17 RX ORDER — HYDROMORPHONE/SOD CHLOR,ISO/PF 2 MG/10 ML
1 SYRINGE (ML) INJECTION
Qty: 0 | Refills: 0 | DISCHARGE
Start: 2025-04-17

## 2025-04-17 RX ORDER — HYDROMORPHONE/SOD CHLOR,ISO/PF 2 MG/10 ML
0.2 SYRINGE (ML) INJECTION
Refills: 0 | Status: DISCONTINUED | OUTPATIENT
Start: 2025-04-17 | End: 2025-04-17

## 2025-04-17 RX ORDER — BENZOCAINE 220 MG/G
1 SPRAY, METERED PERIODONTAL EVERY 6 HOURS
Refills: 0 | Status: DISCONTINUED | OUTPATIENT
Start: 2025-04-17 | End: 2025-04-17

## 2025-04-17 RX ORDER — LORAZEPAM 4 MG/ML
0.5 VIAL (ML) INJECTION
Qty: 0 | Refills: 0 | DISCHARGE
Start: 2025-04-17

## 2025-04-17 RX ORDER — BENZOCAINE 220 MG/G
1 SPRAY, METERED PERIODONTAL
Qty: 0 | Refills: 0 | DISCHARGE
Start: 2025-04-17

## 2025-04-17 RX ORDER — HYDROMORPHONE/SOD CHLOR,ISO/PF 2 MG/10 ML
0.5 SYRINGE (ML) INJECTION
Qty: 0 | Refills: 0 | DISCHARGE
Start: 2025-04-17

## 2025-04-17 RX ORDER — CLONAZEPAM 0.5 MG/1
1 TABLET ORAL
Qty: 0 | Refills: 0 | DISCHARGE
Start: 2025-04-17

## 2025-04-17 RX ORDER — LORAZEPAM 4 MG/ML
0.5 VIAL (ML) INJECTION
Refills: 0 | Status: DISCONTINUED | OUTPATIENT
Start: 2025-04-17 | End: 2025-04-17

## 2025-04-17 RX ORDER — HYDROMORPHONE/SOD CHLOR,ISO/PF 2 MG/10 ML
0.5 SYRINGE (ML) INJECTION
Refills: 0 | Status: DISCONTINUED | OUTPATIENT
Start: 2025-04-17 | End: 2025-04-17

## 2025-04-17 RX ORDER — ONDANSETRON HCL/PF 4 MG/2 ML
4 VIAL (ML) INJECTION
Qty: 0 | Refills: 0 | DISCHARGE
Start: 2025-04-17

## 2025-04-17 RX ORDER — ONDANSETRON HCL/PF 4 MG/2 ML
4 VIAL (ML) INJECTION EVERY 6 HOURS
Refills: 0 | Status: DISCONTINUED | OUTPATIENT
Start: 2025-04-17 | End: 2025-04-17

## 2025-04-17 RX ORDER — GLYCOPYRROLATE 0.2 MG/ML
0.4 INJECTION INTRAMUSCULAR; INTRAVENOUS
Refills: 0 | Status: DISCONTINUED | OUTPATIENT
Start: 2025-04-17 | End: 2025-04-17

## 2025-04-17 RX ADMIN — BENZOCAINE 1 SPRAY(S): 220 SPRAY, METERED PERIODONTAL at 05:45

## 2025-04-17 RX ADMIN — Medication 1 APPLICATION(S): at 05:17

## 2025-04-17 RX ADMIN — Medication 1 GRAM(S): at 05:16

## 2025-04-17 RX ADMIN — Medication 10 MILLIGRAM(S): at 05:16

## 2025-04-17 RX ADMIN — APIXABAN 2.5 MILLIGRAM(S): 2.5 TABLET, FILM COATED ORAL at 05:16

## 2025-04-17 RX ADMIN — Medication 0.5 MILLIGRAM(S): at 11:58

## 2025-04-17 RX ADMIN — Medication 40 MILLIGRAM(S): at 05:16

## 2025-04-17 RX ADMIN — Medication 0.5 MILLIGRAM(S): at 12:58

## 2025-04-17 RX ADMIN — Medication 40 MILLIGRAM(S): at 11:57

## 2025-04-17 RX ADMIN — LIDOCAINE HYDROCHLORIDE 1 PATCH: 20 JELLY TOPICAL at 11:58

## 2025-04-17 RX ADMIN — MIDODRINE HYDROCHLORIDE 10 MILLIGRAM(S): 5 TABLET ORAL at 05:16

## 2025-04-17 RX ADMIN — Medication 4 MILLIGRAM(S): at 11:57

## 2025-04-17 RX ADMIN — IPRATROPIUM BROMIDE AND ALBUTEROL SULFATE 3 MILLILITER(S): .5; 2.5 SOLUTION RESPIRATORY (INHALATION) at 08:07

## 2025-04-17 RX ADMIN — Medication 4 MILLIGRAM(S): at 03:33

## 2025-04-17 NOTE — CONSULT NOTE ADULT - CONVERSATION DETAILS
Met with patient and brother at bedside; reviewed plan of care  pt states he wishes to die, he is suffering from N/V and difficulty breathing despite chest tube and oxygen.  discussed hospice transfer to inpatient given uncontrolled symptoms and vomiting. Requests for hastened death were explored and hospice plan of care reviewed in detail with use of medications to ease suffering. Reviewed referral and transfer process. Pt and brother in agreement. doc to doc completed by this provider and accepted pending bed availability. Emotional support provided. MOLST on chart for CMO. Discussed with Dr. Madrigal who will prepare d/c

## 2025-04-17 NOTE — CONSULT NOTE ADULT - CONSULT REASON
Pain mgmt
Hypotension
Hypotension  Abx Guidance
MARYSOL
evaluation of metastatic gastric cancer C16.7
dyspnea, hypotension
Persistent SOB, malignant pleural effusions
n/v
MARYSOL, UPJ obstruction
vol overload  hypotension  mets ca  pleurx  atelectasis  dyspnea
goals of care, gastric ca

## 2025-04-17 NOTE — CONSULT NOTE ADULT - PROVIDER SPECIALTY LIST ADULT
Cardiology
Critical Care
Nephrology
Physiatry
Thoracic Surgery
Gastroenterology
Pulmonology
Urology
Infectious Disease
Heme/Onc
Palliative Care

## 2025-04-17 NOTE — CONSULT NOTE ADULT - PROBLEM SELECTOR RECOMMENDATION 3
DNR/DNI/CMO  pt and family in agreement for full comfort approach and inpatient hospice transfer.      Given organ dysfunction (renal +/- hepatic), would avoid morphine.  Start IV zofran 4mg q6h ATC  IV dilaudid 0.2mg q2h prn for moderate pain  IV dilaudid 0.5mg q2h prn for severe pain  IV dilaudid 0.5mg q2h prn for dyspnea- goal RR <24  IV ativan 0.5mg q2h prn for anxiety, agitation, refractory dyspnea  IV glycopyrrolate 0.2mg q6h prn for secretions  dulcolax OH PRN constipation, daily

## 2025-04-17 NOTE — CONSULT NOTE ADULT - PROBLEM SELECTOR RECOMMENDATION 4
doc to doc completed, inpt hospice referral to be sent by SW. reviewed with pt, family and primary attending.    Essence Hoskins MD, [facility]; Palliative Care Attending, [phone]

## 2025-04-17 NOTE — CHART NOTE - NSCHARTNOTEFT_GEN_A_CORE
Patient having emesis for past week without BM    Continue sucralfate qd and nausea/pain regimen    Day team to re-assess

## 2025-04-17 NOTE — CONSULT NOTE ADULT - ASSESSMENT
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 Hospital course complicated by N/V and is s/p chest tube for effusions. Palliative consulted for GOC as patient deemed not a candidate for further DMT and having uncontrolled N/V.

## 2025-04-17 NOTE — CONSULT NOTE ADULT - PROBLEM SELECTOR RECOMMENDATION 2
poorly differentiated, not a candidate for further chemo  goal per patient is symptom directed care  IV dilaudid for dyspnea, pain

## 2025-04-17 NOTE — PROGRESS NOTE ADULT - ASSESSMENT
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 Admitted for hypotension, MARYSOL, hyponatremia, and present chronic pleural effusion.     Spoke with pt at length. now on comfort measures. Will go to inpt hospice. cont pain control. may need IV bumex for comfort. call with questions.

## 2025-04-17 NOTE — PROGRESS NOTE ADULT - SUBJECTIVE AND OBJECTIVE BOX
[facility] --  Charles Martinez Pannella, Patel, Savella, Cohen  Office # [phone]      Follow Up:  edema    Subjective/Observations: Patient seen and examined. Events noted. Resting  in chair. + dyspnea.  c/o diffuse pain. feels like doesnt want to go on. now with emesis.       REVIEW OF SYSTEMS: All other review of systems is negative unless indicated above    PAST MEDICAL & SURGICAL HISTORY:  Gastric lymphoma      Anemia of chronic disease      Pleural effusion      DVT (deep venous thrombosis)      Hypertension      Hyperlipidemia      S/P appendectomy  November 17th 2014      S/P cholecystectomy  15 years ago          MEDICATIONS  (STANDING):  albuterol/ipratropium for Nebulization 3 milliLiter(s) Nebulizer every 6 hours  chlorhexidine 2% Cloths 1 Application(s) Topical <User Schedule>  clonazePAM  Tablet 0.5 milliGRAM(s) Oral at bedtime  lidocaine   4% Patch 1 Patch Transdermal every 24 hours  ondansetron Injectable 4 milliGRAM(s) IV Push every 6 hours  pantoprazole  Injectable 40 milliGRAM(s) IV Push daily    MEDICATIONS  (PRN):  acetaminophen     Tablet .. 650 milliGRAM(s) Oral every 6 hours PRN Mild Pain (1 - 3)  benzocaine 20% Spray 1 Spray(s) Topical every 6 hours PRN sore throat  bisacodyl Suppository 10 milliGRAM(s) Rectal daily PRN Constipation  glycopyrrolate Injectable 0.4 milliGRAM(s) IV Push four times a day PRN Secretions  HYDROmorphone  Injectable 0.5 milliGRAM(s) IV Push every 2 hours PRN Severe Pain (7 - 10)  HYDROmorphone  Injectable 0.2 milliGRAM(s) IV Push every 4 hours PRN Moderate Pain (4 - 6)  HYDROmorphone  Injectable 0.2 milliGRAM(s) IV Push every 2 hours PRN Dyspnea, respiratory rate greater than 24  LORazepam   Injectable 0.5 milliGRAM(s) IV Push every 2 hours PRN Anxiety, Agitation, Refractory dyspnea  metoclopramide Injectable 10 milliGRAM(s) IV Push every 8 hours PRN nausea , vomiting      Allergies    Bactrim DS (Hives)    Intolerances            Vital Signs Last 24 Hrs  T(C): 36.7 (16 Apr 2025 20:05), Max: 36.7 (16 Apr 2025 20:05)  T(F): 98 (16 Apr 2025 20:05), Max: 98 (16 Apr 2025 20:05)  HR: 85 (17 Apr 2025 08:07) (85 - 101)  BP: 94/55 (16 Apr 2025 20:05) (94/55 - 94/55)  BP(mean): --  RR: 20 (16 Apr 2025 20:05) (20 - 20)  SpO2: 95% (17 Apr 2025 08:07) (91% - 95%)    Parameters below as of 17 Apr 2025 08:07  Patient On (Oxygen Delivery Method): nasal cannula        I&O's Summary    16 Apr 2025 07:01  -  17 Apr 2025 07:00  --------------------------------------------------------  IN: 0 mL / OUT: 110 mL / NET: -110 mL          PHYSICAL EXAM:     Constitutional: uncomfortable  HEENT: Moist Mucous Membranes, Anicteric  Pulmonary: Decreased breath sounds b/l. No rales, crackles or wheeze appreciated.   Cardiovascular: Regular, S1 and S2, No murmurs, rubs, gallops or clicks  Gastrointestinal: Bowel Sounds present, soft, nontender.   Lymph: ++ peripheral edema. No lymphadenopathy.  Skin: No visible rashes or ulcers.  Psych:  Mood & affect appropriate for situation    LABS: All Labs Reviewed:                        10.3   17.63 )-----------( 494      ( 17 Apr 2025 08:20 )             33.1                         9.7    16.35 )-----------( 512      ( 16 Apr 2025 08:30 )             31.4                         9.0    12.26 )-----------( 498      ( 15 Apr 2025 06:20 )             29.0     15 Apr 2025 06:20    125    |  90     |  96     ----------------------------<  103    5.1     |  27     |  4.40     Ca    9.2        15 Apr 2025 06:20  Phos  5.3       15 Apr 2025 06:20  Mg     3.3       15 Apr 2025 06:20    TPro  6.2    /  Alb  2.6    /  TBili  1.0    /  DBili  x      /  AST  88     /  ALT  58     /  AlkPhos  649    15 Apr 2025 06:20        - Troponin:

## 2025-04-17 NOTE — PROGRESS NOTE ADULT - PROVIDER SPECIALTY LIST ADULT
Cardiology
Gastroenterology
Heme/Onc
Infectious Disease
Infectious Disease
Nephrology
Pulmonology
Thoracic Surgery
CT Surgery
Cardiology
Hospitalist
Nephrology
Nephrology
Pulmonology
Pulmonology
Thoracic Surgery
Thoracic Surgery
CT Surgery
Cardiology
Cardiology
Heme/Onc
Heme/Onc
Pulmonology
Pulmonology
Thoracic Surgery
Cardiology
Heme/Onc
Infectious Disease
Infectious Disease
Nephrology
Pulmonology
Gastroenterology
Hospitalist
Urology
Hospitalist

## 2025-04-17 NOTE — PROGRESS NOTE ADULT - ASSESSMENT
64-year-old male with a history of GI carcinoma, metastatic lymphadenopathy, DVT, hypertension, anemia presents with has been having some persistent shortness of breath since discharge.  The patient was admitted for shortness of breath, had a thoracentesis performed while in the hospital.  Now the patient has been complaining of persistent shortness of breath, but was hypotensive     pleurx R  Hypotension  mets ca  eval Acute Infection  GI Carcinoma  Anemia  DVT hx  MARSYOL    pt is decompensating   dnr dni  hospice eligible  vs noted  meds reviewed  sw - pall follow up     HD monitoring and Support  pleurx drain as tolerated - 100 - 1000 per drain attempt - M W F - at home and on DC  I moustapha  cx - biomarkers  thoracic follow up  cardio follow up  monitor VS and HD and Sat  goal sat > 88 pct  trend renal indices  I and O  replete lytes

## 2025-04-17 NOTE — SOCIAL WORK PROGRESS NOTE - NSSWPROGRESSNOTE_GEN_ALL_CORE
Pt seen by palliative care today and recommended for the [facility]. [facility] and MD has completed a doc to doc with the [facility]. Pt has been medically accepted, and a bed is available for today. Miller has spoken with [facility] RN who faxed consents for pt to sign. Pt signed and Miller faxed back paperwork today back to intake RN at [facility]. Ambulance arranged for 1:00 pm today and MD aware to complete all dc paperwork. Pt's brother   contacted today and aware/agreeable to transition to inpatient hospice. RN will give report to the IN[facility] today prior to discharge. Sw will remain available.

## 2025-04-17 NOTE — CONSULT NOTE ADULT - CONSULT REQUESTED DATE/TIME
09-Apr-2025
10-Apr-2025 15:31
13-Apr-2025 12:29
09-Apr-2025 14:58
09-Apr-2025 15:34
10-Apr-2025 15:34
15-Apr-2025 19:58
09-Apr-2025 13:00
09-Apr-2025 19:12
10-Apr-2025 10:45
17-Apr-2025

## 2025-04-17 NOTE — CONSULT NOTE ADULT - SUBJECTIVE AND OBJECTIVE BOX
HPI:  Patient is a 65 y/o male with PMHx of poorly differentiated metastatic (known cervical and axillary lymphadenopathy) GI carcinoma, LUE and LLE DVT, HTN, on home O2 2/2 to SOB, and anemia presents to the ED with hypotension and SOB. Of note, pt was admitted from 3/3-3/7 with large left pleural effusion and LUE DVT. Pt had thoracentesis done of which 3.1 L of fluid from the left thorax. Then was admitted on 3/19-3/23 for pleural effusion and NSTEMI. Pt had a thoracentesis done of which drained 560 cc from the left thorax. Then patient was admitted from - for SOB and had pleurax placement on right chest (currently has very minimal output). Patient was told to hold bumex until today, however patient restarted bumex last night after speaking with his heme-onc doctor. Endorses he had soft bp yesterday (systolic ~102), and then today he was supposed to get his fifth chemo cycle (previously on FolFox, was supposed to switch to his new chemo medication today) however BP was noted to be low and was recommended to come to the hospital. Also endorses lightheadedness today.   Patient has been complaining of cough for the past two months but now is producing tannish sputum.  Patient also endorses decreased urine output for the past couple of days, denies dysuria.     vitals: T: 97.3F, BP: 82/56 -> 90/51, HR: 68, RR: 22, 99% O2 on 4L nc  labs: hgb: 8.9 (baseline), CBC abnormal 2/2 to cancer, Na: 130, Cl: 94, BUN: 71, Cr: 3.30, eGFR: 20, lactate: 2.7, proBNP: 3483  UA: cloudy, trace leuk esterase, 10wbc, moderate bacteria, neg nitrate   RVP neg   X-ray:  Increased interstitial markings bilaterally slightly greater on the left as before. There is a left effusion with atelectatic change.   Underlying inflammatory infectious process not excluded. Smaller right effusion with some increased interstitial markings in the right   infrahilar region which are less prominent than before. Small caliber right chest tube noted by the right CP angle. No pneumothorax.   Port-A-Cath as before. Borderline cardiomegaly. Regional osseous structures appropriate for age  received in the ED: 1L IVB 1x, levo gtt  pending pulm, ICU, cardio, and nephro    (2025 14:45)    PERTINENT PM/SXH:   No pertinent past medical history    Incisional hernia    Gastric lymphoma    Anemia of chronic disease    Pleural effusion    DVT (deep venous thrombosis)    Hypertension    Hyperlipidemia      No significant past surgical history    S/P appendectomy    S/P cholecystectomy      FAMILY HISTORY:  Family history of coronary artery disease in father  Father -  age 60 following MI      Family Hx substance abuse [ ]yes [ x]no  ITEMS NOT CHECKED ARE NOT PRESENT    SOCIAL HISTORY:   Significant other/partner[ ]  Children[ ]  Nondenominational/Spirituality:  Substance hx:  [ ]   Tobacco hx:  [ ]   Alcohol hx: [ ]   Home Opioid hx:  [ ] I-Stop Reference No:  Living Situation: [x ]Home  [ ]Long term care  [ ]Rehab [ ]Other    ADVANCE DIRECTIVES:    DNR/MOLST  [x ]  Living Will  [ ]   DECISION MAKER(s):  [ ] Health Care Proxy(s)  [x ] Surrogate(s)  [ ] Guardian           Name(s): Phone Number(s): Damon, brother, number per EMR    BASELINE (I)ADL(s) (prior to admission):  [place]: [ ]Total  [x ] Moderate [ ]Dependent    Allergies    Bactrim DS (Hives)    Intolerances    MEDICATIONS  (STANDING):  albuterol/ipratropium for Nebulization 3 milliLiter(s) Nebulizer every 6 hours  chlorhexidine 2% Cloths 1 Application(s) Topical <User Schedule>  clonazePAM  Tablet 0.5 milliGRAM(s) Oral at bedtime  lidocaine   4% Patch 1 Patch Transdermal every 24 hours  ondansetron Injectable 4 milliGRAM(s) IV Push every 6 hours  pantoprazole  Injectable 40 milliGRAM(s) IV Push daily    MEDICATIONS  (PRN):  acetaminophen     Tablet .. 650 milliGRAM(s) Oral every 6 hours PRN Mild Pain (1 - 3)  benzocaine 20% Spray 1 Spray(s) Topical every 6 hours PRN sore throat  bisacodyl Suppository 10 milliGRAM(s) Rectal daily PRN Constipation  glycopyrrolate Injectable 0.4 milliGRAM(s) IV Push four times a day PRN Secretions  HYDROmorphone  Injectable 0.5 milliGRAM(s) IV Push every 2 hours PRN Severe Pain (7 - 10)  HYDROmorphone  Injectable 0.2 milliGRAM(s) IV Push every 4 hours PRN Moderate Pain (4 - 6)  HYDROmorphone  Injectable 0.2 milliGRAM(s) IV Push every 2 hours PRN Dyspnea, respiratory rate greater than 24  LORazepam   Injectable 0.5 milliGRAM(s) IV Push every 2 hours PRN Anxiety, Agitation, Refractory dyspnea  metoclopramide Injectable 10 milliGRAM(s) IV Push every 8 hours PRN nausea , vomiting    PRESENT SYMPTOMS: [ ]Unable to self-report  [ ] CPOT [ ] PAINADs [ ] RDOS  Source if other than patient:  [ ]Family   [ ]Team     Pain: [ ]yes [x ]no  QOL impact -   Location -                    Aggravating factors -  Quality -  Radiation -  Timing-  Severity (0-10 scale):  Minimal acceptable level (0-10 scale):     CPOT:    https://www.[facility].org/getattachment/rwi75x30-3m2n-[email or web address]-[email or web address]-[email or web address]/Critical-Care-Pain-Observation-Tool-(CPOT)    PAIN AD Score:   http://geriatrictoolkit.[facility]/cog/painad.pdf (press ctrl +  left click to view)    Dyspnea:                           [ ]Mild [x ]Moderate [ ]Severe      RDOS:  0 to 2  minimal or no respiratory distress   3  mild distress  4 to 6 moderate distress  >7 severe distress  https://homecareinformation.net/handouts/hen/Respiratory_Distress_Observation_Scale.pdf (Ctrl +  left click to view)     Anxiety:                             [ ]Mild [ ]Moderate [ ]Severe  Fatigue:                             [ ]Mild [ ]Moderate [ ]Severe  Nausea:                             [ ]Mild [ ]Moderate [ x]Severe  Loss of appetite:              [ ]Mild [ ]Moderate [ ]Severe  Constipation:                    [ ]Mild [x ]Moderate [ ]Severe    PCSSQ[Palliative Care Spiritual Screening Question]   Severity (0-10):  Score of 4 or > indicate consideration of Chaplaincy referral.  Chaplaincy Referral: [ ] yes [ ] refused [ ] following [ x] Deferred     Caregiver [place]? : [ ] yes [ x] no [ ] Deferred [ ] Declined             Social work referral [ ] Patient & Family Centered Care Referral [ ]     Anticipatory Grief present?:  [ ] yes [ x] no  [ ] Deferred                  Social work referral [ ] Chaplaincy Referral[ ]      Other Symptoms:  [ ]All other review of systems negative     Palliative Performance Status Version 2:    40     %    http://[facility].org/files/news/palliative_performance_scale_ppsv2.pdf  PHYSICAL EXAM:  Vital Signs Last 24 Hrs  T(C): 36.7 (2025 20:05), Max: 36.7 (2025 20:05)  T(F): 98 (2025 20:05), Max: 98 (2025 20:05)  HR: 85 (2025 08:07) (85 - 102)  BP: 94/55 (2025 20:05) (94/55 - 97/58)  BP(mean): --  RR: 20 (2025 20:05) (20 - 20)  SpO2: 95% (2025 08:07) (91% - 95%)    Parameters below as of 2025 08:07  Patient On (Oxygen Delivery Method): nasal cannula     I&O's Summary    2025 07:01  -  2025 07:00  --------------------------------------------------------  IN: 0 mL / OUT: 110 mL / NET: -110 mL      GENERAL: [ ]Cachexia    [x ]Alert  [x ]Oriented x3   [ ]Lethargic  [ ]Unarousable  [ ]Verbal  [ ]Non-Verbal  Behavioral:   [ ] Anxiety  [ ] Delirium [ ] Agitation [ ] Other  HEENT:  [x ]Normal   [ ]Dry mouth   [ ]ET Tube/Trach  [ ]Oral lesions  PULMONARY: + chest tube  [ ]Clear [ ]Tachypnea  [ ]Audible excessive secretions   [ ]Rhonchi        [ ]Right [ ]Left [ ]Bilateral  [ ]Crackles        [ ]Right [ ]Left [ ]Bilateral  [ ]Wheezing     [ ]Right [ ]Left [ ]Bilateral  [x ]Diminished breath sounds [ ]right [ ]left [ ]bilateral  CARDIOVASCULAR:    [x ]Regular [ ]Irregular [ ]Tachy  [ ]Marcus [ ]Murmur [ ]Other  GASTROINTESTINAL:  [ ]Soft  [ x]Distended   [ ]+BS  [ ]Non tender [ ]Tender  [ ]Other [ ]PEG [ ]OGT/ NGT  Last BM:  GENITOURINARY:  [x ]Normal [ ] Incontinent   [ ]Oliguria/Anuria   [ ]Barba  MUSCULOSKELETAL:   [ ]Normal   [x ]Weakness  [ ]Bed/Wheelchair bound [ ]Edema  NEUROLOGIC:   [x ]No focal deficits  [ ]Cognitive impairment  [ ]Dysphagia [ ]Dysarthria [ ]Paresis [ ]Other   SKIN:   [ ]Normal  [ ]Rash  [ ]Other  [ ]Pressure ulcer(s)       Present on admission [ ]y [ ]n    CRITICAL CARE:  [ ] Shock Present  [ ]Septic [ ]Cardiogenic [ ]Neurologic [ ]Hypovolemic  [ ]  Vasopressors [ ]  Inotropes   [ ]Respiratory failure present [ ]Mechanical ventilation [ ]Non-invasive ventilatory support [ ]High flow    [ ]Acute  [ ]Chronic [ ]Hypoxic  [ ]Hypercarbic [ ]Other  [ ]Other organ failure     LABS:                        10.3   17.63 )-----------( 494      ( 2025 08:20 )             33.1             RADIOLOGY & ADDITIONAL STUDIES:  < from: CT Abdomen and Pelvis No Cont (25 @ 22:22) >    ACC: [id number] EXAM:  CT ABDOMEN AND PELVIS   ORDERED BY: TAHIRA POPE     ACC: [id number] EXAM:  CT CHEST   ORDERED BY: TAHIRA POPE     PROCEDURE DATE:  2025          INTERPRETATION:  CLINICAL INFORMATION: Increasing shortness of breath,   malignant pleural effusion. History of GI malignancy    COMPARISON: Chest CT 2023, Abdominal CT 3/3/2025    CONTRAST/COMPLICATIONS:  IV Contrast: NONE  Oral Contrast: NONE    PROCEDURE:  CT of the Chest, Abdomen and Pelvis was performed.  Sagittal and coronal reformats were performed.    LIMITATIONS: Lack of intravenous contrast material limits diagnostic   sensitivity in evaluating solid organs /vasculature.    FINDINGS:  CHEST:  LUNGS AND LARGE AIRWAYS: Trace distal tracheal secretions.Mild dependent   RIGHT pleural effusion, diminished- post in situ percutaneous RIGHT   thoracentesis catheter . Adjacent RIGHT base passive atelectasis, not   significantly changed. Multiloculated LEFT pleural effusion w/ hazy mural   thickening /upper lobe pleural nodularity and associated LLL   atelectasis/consolidation, not significantly changed., . IAN/RML   interlobular septal thickening, not significantly changed  PLEURA: See above.  VESSELS: No aortic dilatation. Aortic /coronary artery calcification.   Central venous catheter terminates in RA.  HEART: Heart size is normal. Moderate pericardial effusion.  MEDIASTINUM AND STELLA: Diffuse mediastinal, and bilateral axillary (L>R)   lymphadenopathy, unchanged. Largest LN : RIGHT paratracheal 2.8 x 1.8 cm   (301/22), not significantly changed when allowing for interobserver   differences.  CHEST WALL AND LOWER NECK: Gynecomastia. Subcutaneous/implanted RIGHT   chest wall infusion port..    ABDOMEN AND PELVIS:  LIVER: Minimally lobulated outer hepatic contour..  BILE DUCTS: Normal caliber.  GALLBLADDER: Within normal limits.  SPLEEN: Within normal limits.  PANCREAS: Within normal limits.  ADRENALS: Within normal limits.  KIDNEYS/URETERS: Renal pelvic fluid fullness/UPJ obstruction   configuration (R>L) and bilateral cysts, unchanged.    BLADDER: Concentric urinary bladder mural prominence.  REPRODUCTIVE ORGANS: Normal-sized prostate.    BOWEL: No bowel obstruction. Nonvisualized appendix. No appendicitis.  PERITONEUM/RETROPERITONEUM: Moderate volume abdominal/pelvic ascites,   developed. Nodular Gerota's fascia thickening and diffuse mesenteric root   edema..  VESSELS: Atherosclerotic changes.  LYMPH NODES: Gastrohepatic, periportal/portocaval, retroperitoneal,   mesenteric root, and pelvic side wall lymphadenopathy is not   significantly changed with innumerable predominantly centimeter sized   lymph nodes. Bilateral inguinal lymphadenopathy is increased/developed   with inguinal lymph nodes, measuring up to 1.7 x 1.2 cm (RIGHT 301/157).   RIGHT retrocrural ectatic cisterna chyli , unchanged (301/74).  ABDOMINAL WALL: Diffuse anasarca (eccentric to RIGHT), developed.  MUSCULOSKELETAL.: Anterior right psoas calcification, unchanged..   Scattered sclerotic osseous metastasis, not significantly changed. Mild   thoracolumbar degenerative changes..    IMPRESSION:  1.  RIGHT pleural effusion, diminished , post percutaneous   thoracocentesis catheter.  2.  Moderate loculated LEFT pleural effusion and adjacent LLL atelectasis   and/or consolidation, not significantly changed.  3.  Moderate volume abdominal ascites, increased/developed from prior   exams. Nodular thickening of pleural and peritoneal surfaces c/w   malignant etiology  4.  Diffuse thoracoabdominal lymphadenopathy, enlarged/developed in   inguinal region.  5.  Sclerotic osseous metastasis, not significantly changed    --- End of Report ---            GUICHO CORCORAN MD; Attending Radiologist  This document has been electronically signed. Apr 10 2025  8:19AM    < end of copied text >      PROTEIN CALORIE MALNUTRITION PRESENT: [ ]mild [ ]moderate [ ]severe [ ]underweight [ ]morbid obesity  https://www.andeal.org/vault/9290/web/files/ONC/Table_Clinical%20Characteristics%20to%20Document%20Malnutrition-White%20JV%20et%20al%2020.pdf    Height (cm): 170.2 (25 @ 11:21), 170.2 (25 @ 14:04), 170.2 (25 @ :28)  Weight (kg): 93 (25 @ 11:21), 92.487 (25 @ :04), 93.4 (25 @ :28)  BMI (kg/m2): 32.1 (25 @ 11:21), 31.9 (25 @ 14:04), 32.2 (25 @ :28)    [ ]PPSV2 < or = to 30% [ ]significant weight loss  [ ]poor nutritional intake  [ ]anasarca[ ]Artificial Nutrition      Other REFERRALS:  [x ]Hospice  [ ]Child Life  [x ]Social Work  [ ]Case management [ ]Holistic Therapy

## 2025-04-17 NOTE — PROGRESS NOTE ADULT - REASON FOR ADMISSION
hypotension

## 2025-04-17 NOTE — CONSULT NOTE ADULT - REASON FOR ADMISSION
B12 injection given. Verbal order for injection from Dr. Richardson. Patient aware to return to clinic in 1 month for next injection. Patient tolerated without incident. See MAR for documentation.    
hypotension

## 2025-04-17 NOTE — CHART NOTE - NSCHARTNOTESELECT_GEN_ALL_CORE
Event Note
Palliative Care SW/Event Note
Palliative/Event Note
Event Note

## 2025-04-17 NOTE — PROGRESS NOTE ADULT - SUBJECTIVE AND OBJECTIVE BOX
Date/Time Patient Seen:  		  Referring MD:   Data Reviewed	       Patient is a 64y old  Male who presents with a chief complaint of hypotension (16 Apr 2025 22:57)      Subjective/HPI     PAST MEDICAL & SURGICAL HISTORY:  No pertinent past medical history    Incisional hernia  2015    Gastric lymphoma    Anemia of chronic disease    Pleural effusion    DVT (deep venous thrombosis)    Hypertension    Hyperlipidemia    No significant past surgical history    S/P appendectomy  November 17th 2014    S/P cholecystectomy  15 years ago          Medication list         MEDICATIONS  (STANDING):  albuterol/ipratropium for Nebulization 3 milliLiter(s) Nebulizer every 6 hours  apixaban 2.5 milliGRAM(s) Oral two times a day  atorvastatin 40 milliGRAM(s) Oral at bedtime  benzocaine/menthol Lozenge 1 Lozenge Oral once  chlorhexidine 2% Cloths 1 Application(s) Topical <User Schedule>  clonazePAM  Tablet 0.5 milliGRAM(s) Oral daily  clonazePAM  Tablet 0.5 milliGRAM(s) Oral at bedtime  clopidogrel Tablet 75 milliGRAM(s) Oral daily  influenza   Vaccine 0.5 milliLiter(s) IntraMuscular once  lidocaine   4% Patch 1 Patch Transdermal every 24 hours  melatonin 10 milliGRAM(s) Oral at bedtime  midodrine 10 milliGRAM(s) Oral every 8 hours  mirtazapine 15 milliGRAM(s) Oral at bedtime  pantoprazole    Tablet 40 milliGRAM(s) Oral before breakfast  sucralfate suspension 1 Gram(s) Oral four times a day    MEDICATIONS  (PRN):  acetaminophen     Tablet .. 650 milliGRAM(s) Oral every 6 hours PRN Mild Pain (1 - 3)  aluminum hydroxide/magnesium hydroxide/simethicone Suspension 30 milliLiter(s) Oral four times a day PRN Dyspepsia  benzocaine 20% Spray 1 Spray(s) Topical every 6 hours PRN sore throat  bisacodyl Suppository 10 milliGRAM(s) Rectal daily PRN Constipation  dexAMETHasone     Tablet 4 milliGRAM(s) Oral every 24 hours PRN nausea  dronabinol 2.5 milliGRAM(s) Oral every 8 hours PRN Nausea and/or Vomiting  HYDROmorphone  Injectable 0.2 milliGRAM(s) IV Push every 4 hours PRN Moderate Pain (4 - 6)  metoclopramide Injectable 10 milliGRAM(s) IV Push every 8 hours PRN nausea , vomiting  ondansetron Injectable 4 milliGRAM(s) IV Push every 6 hours PRN Nausea and/or Vomiting         Vitals log        ICU Vital Signs Last 24 Hrs  T(C): 36.7 (16 Apr 2025 20:05), Max: 36.7 (16 Apr 2025 20:05)  T(F): 98 (16 Apr 2025 20:05), Max: 98 (16 Apr 2025 20:05)  HR: 101 (16 Apr 2025 20:05) (92 - 102)  BP: 94/55 (16 Apr 2025 20:05) (94/55 - 97/58)  BP(mean): --  ABP: --  ABP(mean): --  RR: 20 (16 Apr 2025 20:05) (20 - 20)  SpO2: 91% (16 Apr 2025 20:05) (91% - 92%)    O2 Parameters below as of 16 Apr 2025 20:05  Patient On (Oxygen Delivery Method): nasal cannula                 Input and Output:  I&O's Detail    16 Apr 2025 07:01  -  17 Apr 2025 05:53  --------------------------------------------------------  IN:  Total IN: 0 mL    OUT:    Chest Tube (mL): 110 mL  Total OUT: 110 mL    Total NET: -110 mL          Lab Data                        9.7    16.35 )-----------( 512      ( 16 Apr 2025 08:30 )             31.4     04-15    125[L]  |  90[L]  |  96[H]  ----------------------------<  103[H]  5.1   |  27  |  4.40[H]    Ca    9.2      15 Apr 2025 06:20  Phos  5.3     04-15  Mg     3.3     04-15    TPro  6.2  /  Alb  2.6[L]  /  TBili  1.0  /  DBili  x   /  AST  88[H]  /  ALT  58  /  AlkPhos  649[H]  04-15            Review of Systems	      Objective     Physical Examination    heart s1s2  lung dc bs  head nc  head at      Pertinent Lab findings & Imaging      Jameson:  NO   Adequate UO     I&O's Detail    16 Apr 2025 07:01  -  17 Apr 2025 05:53  --------------------------------------------------------  IN:  Total IN: 0 mL    OUT:    Chest Tube (mL): 110 mL  Total OUT: 110 mL    Total NET: -110 mL               Discussed with:     Cultures:	        Radiology

## 2025-04-19 LAB
CULTURE RESULTS: SIGNIFICANT CHANGE UP
SPECIMEN SOURCE: SIGNIFICANT CHANGE UP

## 2025-04-29 PROCEDURE — G0452: CPT | Mod: 26

## 2025-05-10 LAB
CULTURE RESULTS: SIGNIFICANT CHANGE UP
SPECIMEN SOURCE: SIGNIFICANT CHANGE UP
